# Patient Record
Sex: FEMALE | Race: WHITE | Employment: OTHER | URBAN - METROPOLITAN AREA
[De-identification: names, ages, dates, MRNs, and addresses within clinical notes are randomized per-mention and may not be internally consistent; named-entity substitution may affect disease eponyms.]

---

## 2017-01-02 ENCOUNTER — CARE COORDINATION (OUTPATIENT)
Dept: CARDIOLOGY | Facility: CLINIC | Age: 67
End: 2017-01-02

## 2017-01-02 DIAGNOSIS — I48.91 ATRIAL FIBRILLATION (H): Primary | ICD-10-CM

## 2017-01-02 RX ORDER — METOPROLOL SUCCINATE 25 MG/1
25 TABLET, EXTENDED RELEASE ORAL DAILY
Qty: 30 TABLET | Refills: 11 | Status: SHIPPED | OUTPATIENT
Start: 2017-01-02 | End: 2017-08-16

## 2017-08-15 ENCOUNTER — PRE VISIT (OUTPATIENT)
Dept: CARDIOLOGY | Facility: CLINIC | Age: 67
End: 2017-08-15

## 2017-08-15 DIAGNOSIS — I48.91 ATRIAL FIBRILLATION (H): Primary | ICD-10-CM

## 2017-08-16 ENCOUNTER — OFFICE VISIT (OUTPATIENT)
Dept: CARDIOLOGY | Facility: CLINIC | Age: 67
End: 2017-08-16
Attending: INTERNAL MEDICINE
Payer: COMMERCIAL

## 2017-08-16 VITALS
WEIGHT: 128.9 LBS | HEART RATE: 93 BPM | DIASTOLIC BLOOD PRESSURE: 85 MMHG | HEIGHT: 63 IN | BODY MASS INDEX: 22.84 KG/M2 | OXYGEN SATURATION: 97 % | SYSTOLIC BLOOD PRESSURE: 120 MMHG

## 2017-08-16 DIAGNOSIS — I48.19 PERSISTENT ATRIAL FIBRILLATION (H): Primary | ICD-10-CM

## 2017-08-16 PROCEDURE — 93005 ELECTROCARDIOGRAM TRACING: CPT | Mod: ZF

## 2017-08-16 PROCEDURE — 93010 ELECTROCARDIOGRAM REPORT: CPT | Mod: ZP | Performed by: INTERNAL MEDICINE

## 2017-08-16 PROCEDURE — 99213 OFFICE O/P EST LOW 20 MIN: CPT | Mod: ZF

## 2017-08-16 PROCEDURE — 99214 OFFICE O/P EST MOD 30 MIN: CPT | Mod: ZP | Performed by: INTERNAL MEDICINE

## 2017-08-16 RX ORDER — METOPROLOL SUCCINATE 25 MG/1
25 TABLET, EXTENDED RELEASE ORAL PRN
Qty: 30 TABLET | Refills: 1 | Status: SHIPPED | OUTPATIENT
Start: 2017-08-16 | End: 2018-01-12

## 2017-08-16 ASSESSMENT — PAIN SCALES - GENERAL: PAINLEVEL: NO PAIN (0)

## 2017-08-16 NOTE — MR AVS SNAPSHOT
After Visit Summary   8/16/2017    Cordell Donaldson    MRN: [id number]           Patient Information     Date Of Birth          1950        Visit Information        Provider Department      8/16/2017 12:30 PM Elise Huitron MD [facility]        Today's Diagnoses     Persistent atrial fibrillation (H)    -  1      Care Instructions    Patient Instructions:  It was a pleasure to see you in the cardiology clinic today.      If you have any questions, you can reach my nurse, Mendy Boo, at [phone].  Press Option #1 for the [facility], and then press Option #3 for nursing.  We are encouraging the use of [employer] to communicate with your HealthCare Provider    Note the new medications: metoprolol 25 mg by mouth prn for palpitations  Stop the following medications: none    Follow the American Heart Association Diet and Lifestyle recommendations:  Limit saturated fat, trans fat, sodium, red meat, sweets and sugar-sweetened beverages. If you choose to eat red meat, compare labels and select the leanest cuts available.  Aim for at least 150 minutes of moderate physical activity or 75 minutes of vigorous physical activity - or an equal combination of both - each week.    The results from today include: none   Please follow up with Dr. Elise Huitron    Sincerely,    Elise Huitron MD     If you have an urgent need after hours (8:00 am to 4:30 pm) please call [phone] and ask for the cardiology fellow on call.                    Follow-ups after your visit        Who to contact     If you have questions or need follow up information about today's clinic visit or your schedule please contact [facility] directly at [phone].  Normal or non-critical lab and imaging results will be communicated to you by MyChart, letter or phone within 4 business days after the clinic has received the results. If you do not hear from us within 7 days, please contact the  "clinic through [employer] or phone. If you have a critical or abnormal lab result, we will notify you by phone as soon as possible.  Submit refill requests through [employer] or call your pharmacy and they will forward the refill request to us. Please allow 3 business days for your refill to be completed.          Additional Information About Your Visit        [employer] Information     [employer] gives you secure access to your electronic health record. If you see a primary care provider, you can also send messages to your care team and make appointments. If you have questions, please call your primary care clinic.  If you do not have a primary care provider, please call [phone] and they will assist you.        Care EveryWhere ID     This is your Care EveryWhere ID. This could be used by other organizations to access your [facility] medical records  [id number]        Your Vitals Were     Pulse Height Pulse Oximetry BMI (Body Mass Index)          93 1.6 m (5' 3\") 97% 22.83 kg/m2         Blood Pressure from Last 3 Encounters:   08/16/17 120/85   11/07/16 123/81   10/03/16 115/77    Weight from Last 3 Encounters:   08/16/17 58.5 kg (128 lb 14.4 oz)   11/07/16 59.4 kg (130 lb 14.4 oz)   10/03/16 59.8 kg (131 lb 12.8 oz)              We Performed the Following     EKG 12-lead, tracing only (Future)          Today's Medication Changes          These changes are accurate as of: 8/16/17  3:12 PM.  If you have any questions, ask your nurse or doctor.               These medicines have changed or have updated prescriptions.        Dose/Directions    metoprolol 25 MG 24 hr tablet   Commonly known as:  TOPROL-XL   This may have changed:    - when to take this  - reasons to take this  - additional instructions   Changed by:  Elise Huitron MD        Dose:  25 mg   Take 1 tablet (25 mg) by mouth as needed Take as needed   Quantity:  30 tablet   Refills:  1            Where to get your medicines      Some of these will need a paper " prescription and others can be bought over the counter.  Ask your nurse if you have questions.     Bring a paper prescription for each of these medications     metoprolol 25 MG 24 hr tablet                Primary Care Provider Office Phone # Fax #    Bess Paredes [phone]       [address] [address]        Equal Access to Services     MARIAHKaiser Permanente Medical CenterJARRET : Hadii aad ku hadasho Soomaali, waaxda luqadaha, qaybta kaalmada adeegyada, waxay idiin hayaan adeeg khamairanish laraghav . So [facility] [phone].    ATENCIÓN: Si habla español, tiene a dill disposición servicios gratuitos de asistencia lingüística. Tiffanieame al [phone].    We comply with applicable federal civil rights laws and [place] laws. We do not discriminate on the basis of race, color, national origin, age, disability sex, sexual orientation or gender identity.            Thank you!     Thank you for choosing [facility]  for your care. Our goal is always to provide you with excellent care. Hearing back from our patients is one way we can continue to improve our services. Please take a few minutes to complete the written survey that you may receive in the mail after your visit with us. Thank you!             Your Updated Medication List - Protect others around you: Learn how to safely use, store and throw away your medicines at www.disposemymeds.org.          This list is accurate as of: 8/16/17  3:12 PM.  Always use your most recent med list.                   Brand Name Dispense Instructions for use Diagnosis    Acetylcarnitine HCl 250 MG Caps      Take 1,000 mg by mouth        CO Q 10 PO           Efraín Cota 550 MG Caps      Take 1 capsule by mouth 3 times daily        MAGNESIUM OXIDE PO      Take 500 mg by mouth        metoprolol 25 MG 24 hr tablet    TOPROL-XL    30 tablet    Take 1 tablet (25 mg) by mouth as needed Take as needed        Nattokinase 100 MG Caps      Take 1 capsule by mouth daily         OMEGA-3 FISH OIL PO      Take 1,000 mg by mouth        Selenium 200 MCG Caps      Take 200 mg by mouth        TAURINE PO      Take 3,000 mg by mouth        VITAMIN B-1 PO      Take 300 mg by mouth

## 2017-08-16 NOTE — LETTER
"8/16/2017      RE: Cordell Donaldson  [address]  [facility] [address]       Dear Colleague,    Thank you for the opportunity to participate in the care of your patient, Cordell Donaldson, at the [facility]. Please see a copy of my visit note below.    CARDIOLOGY Followup  HPI: Cordell Donaldson is a 67 year old woman with a history of breast cancer treated with lumpectomy and radiation who we first saw October 2016 for newly diagnosed, asymptomatic atrial fibrillation.  She declined cardioversion because she was undecided about taking anticoagulation and was also planning a trip to [place] with her .  We prescribed Rivaroxaban for CVA prophylaxis (CHADS2 Vasc = 2) and she started taking it, somewhat reluctantly.  We also prescribed prn metoprolol for symptomatic tachycardia which she has not taken.   Since our last visit with us in November 2016, she has had only one episode of palpitations after which he took metoprolol this was sometime in July. She has stopped rivbaroxaban. She leads an active lifestyle, biking and traveling regularly. She feels well with no chest pain, dyspnea, peripheral edema, lightheadedness or syncope.      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    EXAMINATION  /85 (BP Location: Left arm, Patient Position: Chair, Cuff Size: Adult Regular)  Pulse 93  Ht 1.6 m (5' 3\")  Wt 58.5 kg (128 lb 14.4 oz)  SpO2 97%  BMI 22.83 kg/m2  Fit-appearing, well-nourished female in no apparent distress.  Unlabored " breathing.  Alert & oriented fully.  Neck without JVD  CV - Irregularly irregular rhythm but not tachycardic.  Normal S1 and S2.  No murmur or rub.  Warm extremities without edema.  Clear lung fields  Neuro non focal  Skin:  No petechiae/ecchymoses.    Labs & Imaging tests were reviewed:  TSH   Date Value Ref Range Status   10/03/2016 1.64 0.40 - 4.00 mU/L Final     Cardiac data:  ECG today shows a atrial fibrillation at 93 VR        ECHO 10/3/16  The patient's rhythm is atrial fibrillation.  Left ventricular function, chamber size, wall motion, and wall thickness are normal.The EF is 60-65%. Global right ventricular function is normal. A small patent foramen ovale is present.      IMPRESSION & PLAN  68 y/o woman with persistent, asymptomatic atrial fibrillation without rapid ventricular rates.  CHADSVasc score is 2 for age and female gender.  She is unwilling to take anticoagulation but has started a Japanese herbal blood thinner, Nattokinase.  She understands that there is no data on stroke prevention with the herbal medication and also that her annual risk without being on anticoagulation from the atrial fibrillation is about 3%. She is willing to try the low dose metoprolol and return for a revisit in 12 mo.    Hyperlipidemia- based on 2014 lipids, her ASCVD 10-yr risk is 5%. Her lipids should be updated. She will continue to exercise regularly and pay attention to her diet.    Please do not hesitate to contact me if you have any questions/concerns.     Sincerely,     Elise Huitron MD

## 2017-08-16 NOTE — PATIENT INSTRUCTIONS
Patient Instructions:  It was a pleasure to see you in the cardiology clinic today.      If you have any questions, you can reach my nurse, Mendy Boo, at [phone].  Press Option #1 for the [facility], and then press Option #3 for nursing.  We are encouraging the use of [employer] to communicate with your HealthCare Provider    Note the new medications: metoprolol 25 mg by mouth prn for palpitations  Stop the following medications: none    Follow the American Heart Association Diet and Lifestyle recommendations:  Limit saturated fat, trans fat, sodium, red meat, sweets and sugar-sweetened beverages. If you choose to eat red meat, compare labels and select the leanest cuts available.  Aim for at least 150 minutes of moderate physical activity or 75 minutes of vigorous physical activity   or an equal combination of both   each week.    The results from today include: none   Please follow up with Dr. Elise Huitron    Sincerely,    Elise Huitron MD     If you have an urgent need after hours (8:00 am to 4:30 pm) please call [phone] and ask for the cardiology fellow on call.

## 2017-08-16 NOTE — NURSING NOTE
Chief Complaint   Patient presents with     Follow Up For     manage atrial fibrillation/last visit November 2016/EKG     Vitals were taken and medication were reconciled. EKG performed.    Sheeba Barros CMA    12:34 PM

## 2017-08-16 NOTE — PROGRESS NOTES
"CARDIOLOGY Followup  HPI: Cordell Donaldson is a 67 year old woman with a history of breast cancer treated with lumpectomy and radiation who we first saw October 2016 for newly diagnosed, asymptomatic atrial fibrillation.  She declined cardioversion because she was undecided about taking anticoagulation and was also planning a trip to [place] with her .  We prescribed Rivaroxaban for CVA prophylaxis (CHADS2 Vasc = 2) and she started taking it, somewhat reluctantly.  We also prescribed prn metoprolol for symptomatic tachycardia which she has not taken.   Since our last visit with us in November 2016, she has had only one episode of palpitations after which he took metoprolol this was sometime in July. She has stopped rivbaroxaban. She leads an active lifestyle, biking and traveling regularly. She feels well with no chest pain, dyspnea, peripheral edema, lightheadedness or syncope.      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    EXAMINATION  /85 (BP Location: Left arm, Patient Position: Chair, Cuff Size: Adult Regular)  Pulse 93  Ht 1.6 m (5' 3\")  Wt 58.5 kg (128 lb 14.4 oz)  SpO2 97%  BMI 22.83 kg/m2  Fit-appearing, well-nourished female in no apparent distress.  Unlabored breathing.  Alert & oriented fully.  Neck without JVD  CV - Irregularly irregular rhythm but not tachycardic.  Normal S1 and S2.  No murmur or rub.  Warm extremities without edema.  Clear lung fields  Neuro non focal  Skin:  No petechiae/ecchymoses.    Labs & Imaging tests were reviewed:  TSH   Date Value Ref Range Status "   10/03/2016 1.64 0.40 - 4.00 mU/L Final     Cardiac data:  ECG today shows a atrial fibrillation at 93 VR        ECHO 10/3/16  The patient's rhythm is atrial fibrillation.  Left ventricular function, chamber size, wall motion, and wall thickness are normal.The EF is 60-65%. Global right ventricular function is normal. A small patent foramen ovale is present.      IMPRESSION & PLAN  68 y/o woman with persistent, asymptomatic atrial fibrillation without rapid ventricular rates.  CHADSVasc score is 2 for age and female gender.  She is unwilling to take anticoagulation but has started a Japanese herbal blood thinner, Nattokinase.  She understands that there is no data on stroke prevention with the herbal medication and also that her annual risk without being on anticoagulation from the atrial fibrillation is about 3%. She is willing to try the low dose metoprolol and return for a revisit in 12 mo.    Hyperlipidemia- based on 2014 lipids, her ASCVD 10-yr risk is 5%. Her lipids should be updated. She will continue to exercise regularly and pay attention to her diet.    Elise Huitron MD, MS  Staff Cardiologist  Pager: [phone]      Answers for HPI/ROS submitted by the patient on 8/14/2017   General Symptoms: No  Skin Symptoms: No  HENT Symptoms: No  EYE SYMPTOMS: No  HEART SYMPTOMS: No  LUNG SYMPTOMS: No  INTESTINAL SYMPTOMS: No  URINARY SYMPTOMS: No  GYNECOLOGIC SYMPTOMS: No  BREAST SYMPTOMS: No  SKELETAL SYMPTOMS: No  BLOOD SYMPTOMS: No  NERVOUS SYSTEM SYMPTOMS: No  MENTAL HEALTH SYMPTOMS: No

## 2017-08-16 NOTE — NURSING NOTE
Diet: Patient instructed regarding a heart healthy diet, including discussion of reduced fat and sodium intake. Patient demonstrated understanding of this information and agreed to call with further questions or concerns.  Med Reconcile: Reviewed and verified all current medications with the patient. The updated medication list was printed and given to the patient.  Return Appointment: Patient given instructions regarding scheduling next clinic visit. Patient demonstrated understanding of this information and agreed to call with further questions or concerns.  Medication Change: Patient was educated regarding prescribed medication change, including discussion of the indication, administration, side effects, and when to report to MD or RN. Patient demonstrated understanding of this information and agreed to call with further questions or concerns.  Patient stated she understood all health information given and agreed to call with further questions or concerns.'

## 2017-08-17 LAB — INTERPRETATION ECG - MUSE: NORMAL

## 2017-08-22 ENCOUNTER — OFFICE VISIT (OUTPATIENT)
Dept: FAMILY MEDICINE | Facility: CLINIC | Age: 67
End: 2017-08-22
Attending: FAMILY MEDICINE
Payer: COMMERCIAL

## 2017-08-22 VITALS
BODY MASS INDEX: 21.97 KG/M2 | HEART RATE: 112 BPM | SYSTOLIC BLOOD PRESSURE: 128 MMHG | WEIGHT: 124 LBS | DIASTOLIC BLOOD PRESSURE: 73 MMHG | HEIGHT: 63 IN

## 2017-08-22 DIAGNOSIS — Z13.220 SCREENING FOR LIPID DISORDERS: ICD-10-CM

## 2017-08-22 DIAGNOSIS — R10.31 RLQ ABDOMINAL PAIN: Primary | ICD-10-CM

## 2017-08-22 LAB
ANION GAP SERPL CALCULATED.3IONS-SCNC: 11 MMOL/L (ref 3–14)
BASOPHILS # BLD AUTO: 0 10E9/L (ref 0–0.2)
BASOPHILS NFR BLD AUTO: 0.3 %
BUN SERPL-MCNC: 11 MG/DL (ref 7–30)
CALCIUM SERPL-MCNC: 9.1 MG/DL (ref 8.5–10.1)
CHLORIDE SERPL-SCNC: 106 MMOL/L (ref 94–109)
CHOLEST SERPL-MCNC: 226 MG/DL
CO2 SERPL-SCNC: 24 MMOL/L (ref 20–32)
CREAT SERPL-MCNC: 0.71 MG/DL (ref 0.52–1.04)
DIFFERENTIAL METHOD BLD: ABNORMAL
EOSINOPHIL # BLD AUTO: 0 10E9/L (ref 0–0.7)
EOSINOPHIL NFR BLD AUTO: 1 %
ERYTHROCYTE [DISTWIDTH] IN BLOOD BY AUTOMATED COUNT: 13.3 % (ref 10–15)
GFR SERPL CREATININE-BSD FRML MDRD: 83 ML/MIN/1.7M2
GLUCOSE SERPL-MCNC: 101 MG/DL (ref 70–99)
HCT VFR BLD AUTO: 43.4 % (ref 35–47)
HDLC SERPL-MCNC: 64 MG/DL
HGB BLD-MCNC: 14.6 G/DL (ref 11.7–15.7)
IMM GRANULOCYTES # BLD: 0 10E9/L (ref 0–0.4)
IMM GRANULOCYTES NFR BLD: 0 %
LDLC SERPL CALC-MCNC: 143 MG/DL
LYMPHOCYTES # BLD AUTO: 1 10E9/L (ref 0.8–5.3)
LYMPHOCYTES NFR BLD AUTO: 31.6 %
MCH RBC QN AUTO: 28.7 PG (ref 26.5–33)
MCHC RBC AUTO-ENTMCNC: 33.6 G/DL (ref 31.5–36.5)
MCV RBC AUTO: 85 FL (ref 78–100)
MONOCYTES # BLD AUTO: 0.3 10E9/L (ref 0–1.3)
MONOCYTES NFR BLD AUTO: 9.2 %
NEUTROPHILS # BLD AUTO: 1.8 10E9/L (ref 1.6–8.3)
NEUTROPHILS NFR BLD AUTO: 57.9 %
NONHDLC SERPL-MCNC: 162 MG/DL
NRBC # BLD AUTO: 0 10*3/UL
NRBC BLD AUTO-RTO: 0 /100
PLATELET # BLD AUTO: 174 10E9/L (ref 150–450)
POTASSIUM SERPL-SCNC: 4 MMOL/L (ref 3.4–5.3)
RBC # BLD AUTO: 5.08 10E12/L (ref 3.8–5.2)
SODIUM SERPL-SCNC: 141 MMOL/L (ref 133–144)
TRIGL SERPL-MCNC: 97 MG/DL
TSH SERPL DL<=0.005 MIU/L-ACNC: 2.16 MU/L (ref 0.4–4)
WBC # BLD AUTO: 3 10E9/L (ref 4–11)

## 2017-08-22 PROCEDURE — 84443 ASSAY THYROID STIM HORMONE: CPT | Performed by: FAMILY MEDICINE

## 2017-08-22 PROCEDURE — 85025 COMPLETE CBC W/AUTO DIFF WBC: CPT | Performed by: FAMILY MEDICINE

## 2017-08-22 PROCEDURE — 80048 BASIC METABOLIC PNL TOTAL CA: CPT | Performed by: FAMILY MEDICINE

## 2017-08-22 PROCEDURE — 99212 OFFICE O/P EST SF 10 MIN: CPT | Mod: ZF

## 2017-08-22 PROCEDURE — 80061 LIPID PANEL: CPT | Performed by: FAMILY MEDICINE

## 2017-08-22 PROCEDURE — 36415 COLL VENOUS BLD VENIPUNCTURE: CPT | Performed by: FAMILY MEDICINE

## 2017-08-22 ASSESSMENT — PAIN SCALES - GENERAL: PAINLEVEL: MILD PAIN (3)

## 2017-08-22 NOTE — NURSING NOTE
Chief Complaint   Patient presents with     Groin Pain     Pt c/o abdominal and groin pain that started this past weekend.

## 2017-08-22 NOTE — PROGRESS NOTES
Cordell is a 67 year old female who presents with groin pain  HPI:  This last week was busy with going to [employer], events and eating a lot of food [not good quality]    Saturday night [8..17] had stomach pain and bloating. Stayed in bed most of . Back to her regular schedule. Now feeling small amount of residual lower abdominal pain.     Regular BM's    No fever or chills [low grade fever a couple of days ago]  Controlled at fibrillation: no longer on metoprolol. Stopped Xerelto - on nanokinase and will consider baby ASA. [too fearful of medication].   ROS:  General: none  Head/Eyes: none  Ears/Nose/Throat: none  Cardiovascular: none  Respiratory: none  Gastrointestinal: see HPI  Skin: none  Neurological: none  Mental Health: none  Endocrine: none  PROBLEM LIST:  Patient Active Problem List   Diagnosis     Bursal abscess     Vaginal atrophy     HSIL on Pap smear     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)     VAIN III (vaginal intraepithelial neoplasia III)     Dupuytren contracture     Abnormal electrocardiogram     Atrial fibrillation (H)   OB/GYN HISTORY:    2 para 1011. Status post one vaginal delivery.   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 She has never used smokeless tobacco.   Alcohol:  reports that she drinks alcohol.   Drug use:  reports that she does not use illicit drugs.  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        FAMILY HISTORY:  Family History   Problem Relation Age of Onset     Breast Cancer Sister 47      of breast cancer age 63     Breast Cancer Paternal Grandmother    SOCIAL HISTORY:  Social History     Social History     Marital status:      Spouse name: N/A     Number of children: N/A     Years of education: N/A     Occupational History     Not on file.     Social History Main Topics     Smoking status: Former Smoker     Smokeless tobacco: Never Used     Alcohol use Yes      Comment: 2-3 galsses wine monthly     Drug use: No     Sexual activity: Not on file   MEDICATIONS:  Current Outpatient Prescriptions   Medication Sig Dispense Refill     Nattokinase 100 MG CAPS Take 1 capsule by mouth daily       Nogales Berry 550 MG CAPS Take 1 capsule by mouth 3 times daily       metoprolol (TOPROL-XL) 25 MG 24 hr tablet Take 1 tablet (25 mg) by mouth as needed Take as needed 30 tablet 1     TAURINE PO Take 3,000 mg by mouth       Thiamine HCl (VITAMIN B-1 PO) Take 300 mg by mouth       MAGNESIUM OXIDE PO Take 500 mg by mouth       Acetylcarnitine HCl 250 MG CAPS Take 1,000 mg by mouth       Omega-3 Fatty Acids (OMEGA-3 FISH OIL PO) Take 1,000 mg by mouth       Coenzyme Q10 (CO Q 10 PO)        Selenium 200 MCG CAPS Take 200 mg by mouth     ALLERGIES:  Review of patient's allergies indicates no known allergies.  VITALS:  /73  Pulse 112  Ht 1.6 m (5' 3\")  Wt 56.2 kg (124 lb)  BMI 21.97 kg/m2  PHYSICAL EXAM:  Constitutional: Well appearing woman in no acute distress.   Psychological: appropriate mood.  Eyes: anicteric, normal extra-ocular " movements,  pupils are equal and reactive to light.   Ears, Nose and Throat: tympanic membranes clear, nose clear and free of lesions, throat clear, moist mucous membrames, neck supple with full range of motion.    Cardiovascular: irregular irregular rate and rhythm, [At Fib]   Breast: Symmetrical without visible distortion or swelling. No masses noted. No nipple inversion, no breast dimpling or puckering. Axillary area without masses or lympadenapathy.   Gastrointestinal: positive bowel sounds, nontender, no hepatosplenomegaly, no masses. No guarding or rebound.  Lymphatic: no lymphadenopathy.  Musculoskeletal: full range of motion    Skin: no concerning lesions, no jaundice.  Neurological: normal gait, no tremor.   Diagnoses and associated orders for this visit:  RLQ abdominal pain  -     Basic Metabolic Panel; Future  -     CBC with Platelets Differential; Future  -     TSH; Future        -     Consider CT of abd/pelvis with any increase of pain.   Screening for lipid disorders  -     Lipid Panel; Future    Office Visit on 08/22/2017   Component Value Ref Range     Sodium 141  133 - 144 mmol/L     Potassium 4.0  3.4 - 5.3 mmol/L     Chloride 106  94 - 109 mmol/L     Carbon Dioxide 24  20 - 32 mmol/L     Anion Gap 11  3 - 14 mmol/L     Glucose 101* 70 - 99 mg/dL     Urea Nitrogen 11  7 - 30 mg/dL     Creatinine 0.71  0.52 - 1.04 mg/dL     GFR Estimate 83  >60 mL/min/1.7m2    Non African American GFR Calc     GFR Estimate If Black >90  >60 mL/min/1.7m2    African American GFR Calc     Calcium 9.1  8.5 - 10.1 mg/dL     WBC 3.0* 4.0 - 11.0 10e9/L     RBC Count 5.08  3.8 - 5.2 10e12/L     Hemoglobin 14.6  11.7 - 15.7 g/dL     Hematocrit 43.4  35.0 - 47.0 %     MCV 85  78 - 100 fl     MCH 28.7  26.5 - 33.0 pg     MCHC 33.6  31.5 - 36.5 g/dL     RDW 13.3  10.0 - 15.0 %     Platelet Count 174  150 - 450 10e9/L     Diff Method Automated Method       % Neutrophils 57.9  %     % Lymphocytes 31.6  %     % Monocytes 9.2  %      % Eosinophils 1.0  %     % Basophils 0.3  %     % Immature Granulocytes 0.0  %     Nucleated RBCs 0  0 /100     Absolute Neutrophil 1.8  1.6 - 8.3 10e9/L     Absolute Lymphocytes 1.0  0.8 - 5.3 10e9/L     Absolute Monocytes 0.3  0.0 - 1.3 10e9/L     Absolute Eosinophils 0.0  0.0 - 0.7 10e9/L     Absolute Basophils 0.0  0.0 - 0.2 10e9/L     Abs Immature Granulocytes 0.0  0 - 0.4 10e9/L     Absolute Nucleated RBC 0.0       Cholesterol 226* <200 mg/dL    Desirable:       <200 mg/dl     Triglycerides 97  <150 mg/dL     HDL Cholesterol 64  >49 mg/dL     LDL Cholesterol Calculated 143* <100 mg/dL    Comment: Above desirable:  100-129 mg/dl  Borderline High:  130-159 mg/dL  High:             160-189 mg/dL  Very high:       >189 mg/dl       Non HDL Cholesterol 162* <130 mg/dL    Comment: Above Desirable:  130-159 mg/dl  Borderline high:  160-189 mg/dl  High:             190-219 mg/dl  Very high:       >219 mg/dl       TSH 2.16  0.40 - 4.00 mU/L

## 2017-08-22 NOTE — LETTER
Date:August 23, 2017      Patient was self referred, no letter generated. Do not send.        [facility] Information

## 2017-08-22 NOTE — LETTER
2017       RE: Cordell Donaldson  [address]  [facility] [address]     Dear Colleague,    Thank you for referring your patient, Cordell Donaldson, to the [facility] at [facility]. Please see a copy of my visit note below.    Cordell is a 67 year old female who presents with groin pain  HPI:  This last week was busy with going to [place], events and eating a lot of food [not good quality]    Saturday night [8.19.17] had stomach pain and bloating. Stayed in bed most of . Back to her regular schedule. Now feeling small amount of residual lower abdominal pain.     Regular BM's    No fever or chills [low grade fever a couple of days ago]  Controlled at fibrillation: no longer on metoprolol. Stopped Xerelto - on nanokinase and will consider baby ASA. [too fearful of medication].   ROS:  General: none  Head/Eyes: none  Ears/Nose/Throat: none  Cardiovascular: none  Respiratory: none  Gastrointestinal: see HPI  Skin: none  Neurological: none  Mental Health: none  Endocrine: none  PROBLEM LIST:  Patient Active Problem List   Diagnosis     Bursal abscess     Vaginal atrophy     HSIL on Pap smear     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)     VAIN III (vaginal intraepithelial neoplasia III)     Dupuytren contracture     Abnormal electrocardiogram     Atrial fibrillation (H)   OB/GYN HISTORY:    2 para 1011. Status post one vaginal delivery.   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 She has never used smokeless tobacco.   Alcohol:  reports  that she drinks alcohol.   Drug use:  reports that she does not use illicit drugs.  Exercise:                    Diet:   PAST SURGICAL HISTORY:  Past Surgical History:   Procedure Laterality Date     BIOPSY       BREAST SURGERY       Colposcopy Cervix with Loop Electrode Conization and Lser to Vagina       COLPOSCOPY, BIOPSY, COMBINED  10/24/2012    Procedure: COMBINED COLPOSCOPY, BIOPSY;  Colposcopy, Biopsy of Cervix and Vagina, Ultrasound Guidance;  Surgeon: Colette Ng MD;  Location: [facility] OR     GYN SURGERY      laparotomy with FRANCIA, 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        FAMILY HISTORY:  Family History   Problem Relation Age of Onset     Breast Cancer Sister 47      of breast cancer age 63     Breast Cancer Paternal Grandmother    SOCIAL HISTORY:  Social History     Social History     Marital status:      Spouse name: N/A     Number of children: N/A     Years of education: N/A     Occupational History     Not on file.     Social History Main Topics     Smoking status: Former Smoker     Smokeless tobacco: Never Used     Alcohol use Yes      Comment: 2-3 galsses wine monthly     Drug use: No     Sexual activity: Not on file   MEDICATIONS:  Current Outpatient Prescriptions   Medication Sig Dispense Refill     Nattokinase 100 MG CAPS Take 1 capsule by mouth daily       [place] Berry 550 MG CAPS Take 1 capsule by mouth 3 times daily       metoprolol (TOPROL-XL) 25 MG 24 hr tablet Take 1 tablet (25 mg) by mouth as needed Take as needed 30 tablet 1     TAURINE PO Take 3,000 mg by mouth       Thiamine HCl (VITAMIN B-1 PO) Take 300 mg by mouth       MAGNESIUM OXIDE PO Take 500 mg by mouth       Acetylcarnitine HCl 250 MG CAPS Take 1,000 mg by mouth       Omega-3 Fatty Acids (OMEGA-3 FISH OIL PO) Take 1,000 mg by mouth       Coenzyme Q10 (CO Q 10 PO)        Selenium 200 MCG CAPS Take 200 mg by mouth     ALLERGIES:  Review  "of patient's allergies indicates no known allergies.  VITALS:  /73  Pulse 112  Ht 1.6 m (5' 3\")  Wt 56.2 kg (124 lb)  BMI 21.97 kg/m2  PHYSICAL EXAM:  Constitutional: Well appearing woman in no acute distress.   Psychological: appropriate mood.  Eyes: anicteric, normal extra-ocular movements,  pupils are equal and reactive to light.   Ears, Nose and Throat: tympanic membranes clear, nose clear and free of lesions, throat clear, moist mucous membrames, neck supple with full range of motion.    Cardiovascular: irregular irregular rate and rhythm, [At Fib]   Breast: Symmetrical without visible distortion or swelling. No masses noted. No nipple inversion, no breast dimpling or puckering. Axillary area without masses or lympadenapathy.   Gastrointestinal: positive bowel sounds, nontender, no hepatosplenomegaly, no masses. No guarding or rebound.  Lymphatic: no lymphadenopathy.  Musculoskeletal: full range of motion    Skin: no concerning lesions, no jaundice.  Neurological: normal gait, no tremor.   Diagnoses and associated orders for this visit:  RLQ abdominal pain  -     Basic Metabolic Panel; Future  -     CBC with Platelets Differential; Future  -     TSH; Future        -     Consider CT of abd/pelvis with any increase of pain.   Screening for lipid disorders  -     Lipid Panel; Future    Office Visit on 08/22/2017   Component Value Ref Range     Sodium 141  133 - 144 mmol/L     Potassium 4.0  3.4 - 5.3 mmol/L     Chloride 106  94 - 109 mmol/L     Carbon Dioxide 24  20 - 32 mmol/L     Anion Gap 11  3 - 14 mmol/L     Glucose 101* 70 - 99 mg/dL     Urea Nitrogen 11  7 - 30 mg/dL     Creatinine 0.71  0.52 - 1.04 mg/dL     GFR Estimate 83  >60 mL/min/1.7m2    Non African American GFR Calc     GFR Estimate If Black >90  >60 mL/min/1.7m2    African American GFR Calc     Calcium 9.1  8.5 - 10.1 mg/dL     WBC 3.0* 4.0 - 11.0 10e9/L     RBC Count 5.08  3.8 - 5.2 10e12/L     Hemoglobin 14.6  11.7 - 15.7 g/dL     " Hematocrit 43.4  35.0 - 47.0 %     MCV 85  78 - 100 fl     MCH 28.7  26.5 - 33.0 pg     MCHC 33.6  31.5 - 36.5 g/dL     RDW 13.3  10.0 - 15.0 %     Platelet Count 174  150 - 450 10e9/L     Diff Method Automated Method       % Neutrophils 57.9  %     % Lymphocytes 31.6  %     % Monocytes 9.2  %     % Eosinophils 1.0  %     % Basophils 0.3  %     % Immature Granulocytes 0.0  %     Nucleated RBCs 0  0 /100     Absolute Neutrophil 1.8  1.6 - 8.3 10e9/L     Absolute Lymphocytes 1.0  0.8 - 5.3 10e9/L     Absolute Monocytes 0.3  0.0 - 1.3 10e9/L     Absolute Eosinophils 0.0  0.0 - 0.7 10e9/L     Absolute Basophils 0.0  0.0 - 0.2 10e9/L     Abs Immature Granulocytes 0.0  0 - 0.4 10e9/L     Absolute Nucleated RBC 0.0       Cholesterol 226* <200 mg/dL    Desirable:       <200 mg/dl     Triglycerides 97  <150 mg/dL     HDL Cholesterol 64  >49 mg/dL     LDL Cholesterol Calculated 143* <100 mg/dL    Comment: Above desirable:  100-129 mg/dl  Borderline High:  130-159 mg/dL  High:             160-189 mg/dL  Very high:       >189 mg/dl       Non HDL Cholesterol 162* <130 mg/dL    Comment: Above Desirable:  130-159 mg/dl  Borderline high:  160-189 mg/dl  High:             190-219 mg/dl  Very high:       >219 mg/dl       TSH 2.16  0.40 - 4.00 mU/L           Again, thank you for allowing me to participate in the care of your patient.      Sincerely,    Bess Paredes MD

## 2017-08-22 NOTE — MR AVS SNAPSHOT
"              After Visit Summary   8/22/2017    Cordell Donaldson    MRN: [id number]           Patient Information     Date Of Birth          1950        Visit Information        Provider Department      8/22/2017 1:00 PM Bess Paredes MD [facility]        Today's Diagnoses     RLQ abdominal pain    -  1    Screening for lipid disorders           Follow-ups after your visit        Who to contact     Please call your clinic at [phone] to:    Ask questions about your health    Make or cancel appointments    Discuss your medicines    Learn about your test results    Speak to your doctor   If you have compliments or concerns about an experience at your clinic, or if you wish to file a complaint, please contact [facility] Patient Relations at [phone] or email us at [email or web address]         Additional Information About Your Visit        MyChart Information     Act[employer]it gives you secure access to your electronic health record. If you see a primary care provider, you can also send messages to your care team and make appointments. If you have questions, please call your primary care clinic.  If you do not have a primary care provider, please call [phone] and they will assist you.      Novus is an electronic gateway that provides easy, online access to your medical records. With Novus, you can request a clinic appointment, read your test results, renew a prescription or communicate with your care team.     To access your existing account, please contact your [facility] Clinic or call [phone] for assistance.        Care EveryWhere ID     This is your Care EveryWhere ID. This could be used by other organizations to access your [facility] medical records  [id number]        Your Vitals Were     Pulse Height BMI (Body Mass Index)             112 1.6 m (5' 3\") 21.97 kg/m2          Blood Pressure from Last 3 " Encounters:   08/22/17 128/73   08/16/17 120/85   11/07/16 123/81    Weight from Last 3 Encounters:   08/22/17 56.2 kg (124 lb)   08/16/17 58.5 kg (128 lb 14.4 oz)   11/07/16 59.4 kg (130 lb 14.4 oz)              We Performed the Following     Basic Metabolic Panel     CBC with Platelets Differential     Lipid Panel     TSH        Primary Care Provider Office Phone # Fax #    Bess Paredes [phone]       [address] [address]        Equal Access to Services     [facility]: Hadii loreta saunders hadmemo Soshan, wanicole sequeira, oumar neville, concha deng . So [facility] [phone].    ATENCIÓN: Si habla español, tiene a dill disposición servicios gratuitos de asistencia lingüística. Ll[facility] [phone].    We comply with applicable federal civil rights laws and [place] laws. We do not discriminate on the basis of race, color, national origin, age, disability sex, sexual orientation or gender identity.            Thank you!     Thank you for choosing [facility]  for your care. Our goal is always to provide you with excellent care. Hearing back from our patients is one way we can continue to improve our services. Please take a few minutes to complete the written survey that you may receive in the mail after your visit with us. Thank you!             Your Updated Medication List - Protect others around you: Learn how to safely use, store and throw away your medicines at www.disposemymeds.org.          This list is accurate as of: 8/22/17  3:27 PM.  Always use your most recent med list.                   Brand Name Dispense Instructions for use Diagnosis    Acetylcarnitine HCl 250 MG Caps      Take 1,000 mg by mouth        CO Q 10 PO           Efraín Cota 550 MG Caps      Take 1 capsule by mouth 3 times daily        MAGNESIUM OXIDE PO      Take 500 mg by mouth        metoprolol 25 MG 24 hr tablet    TOPROL-XL    30  tablet    Take 1 tablet (25 mg) by mouth as needed Take as needed        Nattokinase 100 MG Caps      Take 1 capsule by mouth daily        OMEGA-3 FISH OIL PO      Take 1,000 mg by mouth        Selenium 200 MCG Caps      Take 200 mg by mouth        TAURINE PO      Take 3,000 mg by mouth        VITAMIN B-1 PO      Take 300 mg by mouth

## 2018-01-03 NOTE — PROGRESS NOTES
[facility]  [address], [address]  [facility] [place] [address]  [phone]  Dept: [phone]    PRE-OP EVALUATION:  Today's date: 2018    Cordell Donaldson (: 1950) presents for pre-operative evaluation assessment as requested by Dr. Kun Bianchi.  She requires evaluation and anesthesia risk assessment prior to undergoing surgery/procedure for treatment of hearing loss in right ear. Proposed procedure: Stapedectomy.    Date of Surgery/ Procedure: 2017  Time of Surgery/ Procedure: 7:30 AM  Hospital/Surgical Facility: [facility]  Fax number for surgical facility: [phone]  Primary Physician: Bess Paredes  Type of Anesthesia Anticipated: to be determined    Patient has a Health Care Directive or Living Will:  YES [scanned]    HPI:                                                      Progressive right hearing loss and has been advised to have stapedectomy - by Dr. Bibi Bianchi.  ANW on 2018.    MEDICAL HISTORY:                                                      Patient Active Problem List    Diagnosis Date Noted     Atrial fibrillation (H) 10/03/2016     Priority: Medium     Abnormal electrocardiogram 2016     Priority: Medium     Dupuytren contracture 2014     Priority: Medium     VAIN III (vaginal intraepithelial neoplasia III) 2013     Priority: Medium     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     Priority: Medium     HSIL on Pap smear 10/12/2012     Priority: Medium     Problem list name updated by automated process. Provider to review and confirm       Bursal abscess 2012     Priority: Medium     Elbow tenderness on palpation olecranon bursa right       Vaginal atrophy 2012     Priority: Medium     Vaginal Mucosa Atrophy        Past Medical History:   Diagnosis Date     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      History of breast  cancer 2003    lumpectomy and radiation offerred chemo and patient declined at time but had oncogene test and low risk     Hyperlipidemia LDL goal < 160      Osteopenia      Severe vaginal dysplasia      Past Surgical History:   Procedure Laterality Date     BIOPSY       BREAST SURGERY       Colposcopy Cervix with Loop Electrode Conization and Lser to Vagina  2008     COLPOSCOPY, BIOPSY, COMBINED  10/24/2012    Procedure: COMBINED COLPOSCOPY, BIOPSY;  Colposcopy, Biopsy of Cervix and Vagina, Ultrasound Guidance;  Surgeon: Colette Ng MD;  Location: [facility] OR     GYN SURGERY  12/12    laparotomy with FRANCIA, presumed stage IA1 SCCA of cervix      HYSTERECTOMY, FRANCIA       FRANCIA with [facility] in December 2012.      Current Outpatient Prescriptions   Medication Sig Dispense Refill     [place] Berry 550 MG CAPS Take 1 capsule by mouth 3 times daily       TAURINE PO Take 3,000 mg by mouth       Thiamine HCl (VITAMIN B-1 PO) Take 300 mg by mouth       MAGNESIUM OXIDE PO Take 500 mg by mouth       Acetylcarnitine HCl 250 MG CAPS Take 1,000 mg by mouth       Coenzyme Q10 (CO Q 10 PO)        Selenium 200 MCG CAPS Take 200 mg by mouth       Omega-3 Fatty Acids (OMEGA-3 FISH OIL PO) Take 1,000 mg by mouth       OTC products:Taurice, Benfotiamine, magnesium, acetyl-L-Carnitine, B12, Probiotics  No Known Allergies: Latex Allergy: NO    Social History   Substance Use Topics     Smoking status: Former Smoker     Smokeless tobacco: Never Used     Alcohol use Yes      Comment: 2-3 galsses wine monthly     History   Drug Use No     REVIEW OF SYSTEMS:                                                    C: NEGATIVE for fever, chills, change in weight  E/M: NEGATIVE for ear, mouth and throat problems  R: NEGATIVE for significant cough or SOB  CV: NEGATIVE for chest pain, palpitations or peripheral edema    EXAM:                                                    /76 (BP Location: Left arm, Patient Position: Chair, Cuff Size:  "Adult Regular)  Pulse 114  Temp 97.6  F (36.4  C) (Oral)  Ht 5' 3\" (160 cm)  Wt 132 lb 0.6 oz (59.9 kg)  SpO2 97%  BMI 23.39 kg/m2  GENERAL APPEARANCE: healthy, alert and no distress  HENT: ear canals and TM's normal and nose and mouth without ulcers or lesions  RESP: lungs clear to auscultation - no rales, rhonchi or wheezes  CV: irregular rate and rhythm, normal S1 S2, no S3 or S4 and no murmur, click or rub   ABDOMEN: soft, nontender, no HSM or masses and bowel sounds normal  NEURO: Normal strength and tone, sensory exam grossly normal, mentation intact and speech normal    DIAGNOSTICS:                                                      Recent Labs   Lab Test  08/22/17   1348  10/03/16   1309  09/30/16   1051   HGB  14.6   --   14.8   PLT  174   --   192   NA  141  142   --    POTASSIUM  4.0  3.9   --    CR  0.71  0.81   --       IMPRESSION:                                                    67 year old woman who will proceed with Stapedectomy schedule for 1/23/2018 by Dr[facility] at [facility].     REVISED CARDIAC RISK INDEX: persistent, asymptomatic atrial fibrillation without rapid ventricular rates.  CHADSVasc score is 2 for age and female gender.  Declined anticoagulation, cardioversion and metoprolol. Consulted with Cardiology, Dr. Huitron 8/2017. Does take low dose ASA and fish oil [D/C 1.9.2018].     The patient has the following additional risks for perioperative complications:  -  Persistent Atrial Fibrillation [known 9/2016] without rapid ventricular rate.  Has seen cardiologist and declined cardioversion, anticoagulation and metoprolol.     RECOMMENDATIONS:                                                    Preop general physical exam    - requires cardiology clearance given persistent atrial fibrillation to discuss surgical risk given cardiac status.  She will schedule for a cardiology consult this week.   Signed Electronically by: Bess Paredes MD    Copy of this evaluation report is provided " to requesting physician.    [place] Preop Guidelines

## 2018-01-03 NOTE — PATIENT INSTRUCTIONS
Before Your Surgery      Call your surgeon if there is any change in your health. This includes signs of a cold or flu (such as a sore throat, runny nose, cough, rash or fever).    Do not smoke, drink alcohol or take over the counter medicine (unless your surgeon or primary care doctor tells you to) for the 24 hours before and after surgery.    If you take prescribed drugs: Follow your doctor s orders about which medicines to take and which to stop until after surgery.    Eating and drinking prior to surgery: follow the instructions from your surgeon    Take a shower or bath the night before surgery. Use the soap your surgeon gave you to gently clean your skin. If you do not have soap from your surgeon, use your regular soap. Do not shave or scrub the surgery site.  Wear clean pajamas and have clean sheets on your bed.     [facility]:[phone].      [facility] endoscopy [phone]    [facility]: [phone] endoscopy    [facility]: [phone]    Podiatry:  podiatry:   Dr. Gomes [facility]; [phone];  Johnathan Mack [phone].    medical arts: Rosette Walsh

## 2018-01-12 ENCOUNTER — OFFICE VISIT (OUTPATIENT)
Dept: FAMILY MEDICINE | Facility: CLINIC | Age: 68
End: 2018-01-12
Payer: COMMERCIAL

## 2018-01-12 VITALS
DIASTOLIC BLOOD PRESSURE: 76 MMHG | TEMPERATURE: 97.6 F | SYSTOLIC BLOOD PRESSURE: 109 MMHG | HEIGHT: 63 IN | BODY MASS INDEX: 23.39 KG/M2 | WEIGHT: 132.04 LBS | HEART RATE: 114 BPM | OXYGEN SATURATION: 97 %

## 2018-01-12 DIAGNOSIS — I48.19 PERSISTENT ATRIAL FIBRILLATION (H): ICD-10-CM

## 2018-01-12 DIAGNOSIS — Z12.11 COLON CANCER SCREENING: ICD-10-CM

## 2018-01-12 DIAGNOSIS — Z01.818 PREOP GENERAL PHYSICAL EXAM: Primary | ICD-10-CM

## 2018-01-12 RX ORDER — BACITRACIN ZINC AND POLYMYXIN B SULFATE 500; 10000 [USP'U]/G; [USP'U]/G
OINTMENT TOPICAL EVERY MORNING
COMMUNITY

## 2018-01-12 ASSESSMENT — PAIN SCALES - GENERAL: PAINLEVEL: NO PAIN (0)

## 2018-01-12 NOTE — NURSING NOTE
"67 year old  Chief Complaint   Patient presents with     Pre-Op Exam     Stapedectomy 1/23/2017       Blood pressure 109/76, pulse 114, temperature 97.6  F (36.4  C), temperature source Oral, height 5' 3\" (160 cm), weight 132 lb 0.6 oz (59.9 kg), SpO2 97 %. Body mass index is 23.39 kg/(m^2).  Patient Active Problem List   Diagnosis     Bursal abscess     Vaginal atrophy     HSIL on Pap smear     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)     VAIN III (vaginal intraepithelial neoplasia III)     Dupuytren contracture     Abnormal electrocardiogram     Atrial fibrillation (H)       Wt Readings from Last 2 Encounters:   01/12/18 132 lb 0.6 oz (59.9 kg)   08/22/17 124 lb (56.2 kg)     BP Readings from Last 3 Encounters:   01/12/18 109/76   08/22/17 128/73   08/16/17 120/85         Current Outpatient Prescriptions   Medication     [place] Berry 550 MG CAPS     TAURINE PO     Thiamine HCl (VITAMIN B-1 PO)     MAGNESIUM OXIDE PO     Acetylcarnitine HCl 250 MG CAPS     Coenzyme Q10 (CO Q 10 PO)     Selenium 200 MCG CAPS     Omega-3 Fatty Acids (OMEGA-3 FISH OIL PO)     No current facility-administered medications for this visit.        Social History   Substance Use Topics     Smoking status: Former Smoker     Smokeless tobacco: Never Used     Alcohol use Yes      Comment: 2-3 galsses wine monthly       Health Maintenance Due   Topic Date Due     TETANUS IMMUNIZATION (SYSTEM ASSIGNED)  04/14/1969     HEPATITIS C SCREENING  04/14/1969     ADVANCE DIRECTIVE PLANNING Q5 YRS  04/14/2006     FALL RISK ASSESSMENT  04/14/2016     DEXA SCAN SCREENING (SYSTEM ASSIGNED)  04/14/2016     PNEUMOCOCCAL (1 of 2 - PCV13) 04/14/2016     COLONOSCOPY Q10 YR  04/24/2016     INFLUENZA VACCINE (SYSTEM ASSIGNED)  09/01/2017       Lab Results   Component Value Date    PAP NIL 09/30/2016         Sarah German CMA  January 12, 2018 2:30 PM  "

## 2018-01-12 NOTE — MR AVS SNAPSHOT
After Visit Summary   1/12/2018    Cordell Donaldson    MRN: [id number]           Patient Information     Date Of Birth          1950        Visit Information        Provider Department      1/12/2018 2:20 PM Bess Paredes MD [facility]        Today's Diagnoses     Preop general physical exam    -  1      Care Instructions      Before Your Surgery      Call your surgeon if there is any change in your health. This includes signs of a cold or flu (such as a sore throat, runny nose, cough, rash or fever).    Do not smoke, drink alcohol or take over the counter medicine (unless your surgeon or primary care doctor tells you to) for the 24 hours before and after surgery.    If you take prescribed drugs: Follow your doctor s orders about which medicines to take and which to stop until after surgery.    Eating and drinking prior to surgery: follow the instructions from your surgeon    Take a shower or bath the night before surgery. Use the soap your surgeon gave you to gently clean your skin. If you do not have soap from your surgeon, use your regular soap. Do not shave or scrub the surgery site.  Wear clean pajamas and have clean sheets on your bed.     [facility]:[phone].      [facility] endoscopy [phone]    [facility]: [phone] endoscopy    [facility]: [phone]    Podiatry:    podiatry:   Dr. Gomes [facility]; [phone];  Johnathan Mack [phone].    medical arts: Rosette Walsh              Follow-ups after your visit        Who to contact     Please call your clinic at [phone] to:    Ask questions about your health    Make or cancel appointments    Discuss your medicines    Learn about your test results    Speak to your doctor   If you have compliments or concerns about an experience at your clinic, or if you wish to file a complaint, please contact [facility] Patient Relations at [phone] or email us at  [email or web address]         Additional Information About Your Visit        [employer] Information     [employer] gives you secure access to your electronic health record. If you see a primary care provider, you can also send messages to your care team and make appointments. If you have questions, please call your primary care clinic.  If you do not have a primary care provider, please call [phone] and they will assist you.      NCLC is an electronic gateway that provides easy, online access to your medical records. With [employer]CLC, you can request a clinic appointment, read your test results, renew a prescription or communicate with your care team.     To access your existing account, please contact your [facility] or call [phone] for assistance.        Care EveryWhere ID     This is your Care EveryWhere ID. This could be used by other organizations to access your [place] medical records  [id number]        Your Vitals Were     Pulse Temperature Height Pulse Oximetry BMI (Body Mass Index)       114 97.6  F (36.4  C) (Oral) 5' 3\" (160 cm) 97% 23.39 kg/m2        Blood Pressure from Last 3 Encounters:   01/12/18 109/76   08/22/17 128/73   08/16/17 120/85    Weight from Last 3 Encounters:   01/12/18 132 lb 0.6 oz (59.9 kg)   08/22/17 124 lb (56.2 kg)   08/16/17 128 lb 14.4 oz (58.5 kg)              Today, you had the following     No orders found for display       Primary Care Provider Office Phone # Fax #    Bess Paredes [phone]       [address] [place] [address]        Equal Access to Services     ERICA NIELSEN : Hadii loreta saunders hadmemo Soshan, waaxda luqadaha, qaybta kaalmaconcha downey. So [facility] [phone].    ATENCIÓN: Si habla español, tiene a dill disposición servicios gratuitos de asistencia lingüística. Llame al [phone].    We comply with applicable federal civil rights laws " and [place] laws. We do not discriminate on the basis of race, color, national origin, age, disability, sex, sexual orientation, or gender identity.            Thank you!     Thank you for choosing [facility]  for your care. Our goal is always to provide you with excellent care. Hearing back from our patients is one way we can continue to improve our services. Please take a few minutes to complete the written survey that you may receive in the mail after your visit with us. Thank you!             Your Updated Medication List - Protect others around you: Learn how to safely use, store and throw away your medicines at www.disposemymeds.org.          This list is accurate as of: 1/12/18  3:00 PM.  Always use your most recent med list.                   Brand Name Dispense Instructions for use Diagnosis    Acetylcarnitine HCl 250 MG Caps      Take 1,000 mg by mouth        CO Q 10 PO           Efraín Cota 550 MG Caps      Take 1 capsule by mouth 3 times daily        MAGNESIUM OXIDE PO      Take 500 mg by mouth        OMEGA-3 FISH OIL PO      Take 1,000 mg by mouth        Selenium 200 MCG Caps      Take 200 mg by mouth        TAURINE PO      Take 3,000 mg by mouth        VITAMIN B-1 PO      Take 300 mg by mouth

## 2018-01-15 ENCOUNTER — PRE VISIT (OUTPATIENT)
Dept: CARDIOLOGY | Facility: CLINIC | Age: 68
End: 2018-01-15

## 2018-01-15 DIAGNOSIS — Z01.810 PREOPERATIVE CARDIOVASCULAR EXAMINATION: Primary | ICD-10-CM

## 2018-01-15 DIAGNOSIS — Z53.20 ANTICOAGULANT DRUG DECLINED: ICD-10-CM

## 2018-01-15 DIAGNOSIS — Z86.79 HISTORY OF ATRIAL FIBRILLATION: ICD-10-CM

## 2018-01-15 ASSESSMENT — ENCOUNTER SYMPTOMS
CHILLS: 0
ALTERED TEMPERATURE REGULATION: 0
EXERCISE INTOLERANCE: 0
ORTHOPNEA: 0
NIGHT SWEATS: 0
HYPERTENSION: 0
SNORES LOUDLY: 0
LEG PAIN: 0
LIGHT-HEADEDNESS: 0
WEIGHT LOSS: 0
HALLUCINATIONS: 0
POSTURAL DYSPNEA: 0
DECREASED APPETITE: 0
COUGH: 0
COUGH DISTURBING SLEEP: 0
SHORTNESS OF BREATH: 0
FATIGUE: 0
SLEEP DISTURBANCES DUE TO BREATHING: 0
DYSPNEA ON EXERTION: 0
HYPOTENSION: 0
POLYDIPSIA: 0
WHEEZING: 0
SPUTUM PRODUCTION: 0
INCREASED ENERGY: 0
WEIGHT GAIN: 0
FEVER: 0
POLYPHAGIA: 0
PALPITATIONS: 1
SYNCOPE: 0
HEMOPTYSIS: 0

## 2018-01-16 ENCOUNTER — TELEPHONE (OUTPATIENT)
Dept: CARDIOLOGY | Facility: CLINIC | Age: 68
End: 2018-01-16

## 2018-01-16 NOTE — TELEPHONE ENCOUNTER
"Pharmacy would like to refill metoprolol succ er 25 mg tab     Not on pt med list     [facility] pharmacy   [phone]  [place] [address]    Please refer to Gali Delgado's 1/17/18 note:     She also carries a \"pill in the pocket\" from Dr. Huitron which is Toprol XL 25 mg and has not taken any since at least August 2017. She presents to the clinic today unaccompanied.     This is a 67 year old generally healthy patient with a past medical history significant for atrial fibrillation. She is stable from a cardiovascular disease standpoint with no evidence of unstable angina, decompensated heart failure and known atrial fibrillation. She can achieve > 10 METS without difficulty. Medications were reviewed and the patient has been on single anticoagulation therapy w/ Aspirin as she has declined Xarelto in the past due to her active lifestyle. Her CHADSVASc score is 2 which equates to an annual stroke risk of 3.2 % which would be reduced to 0.2 % annually if she were on long term anticoagulation. This does not necessarily increase her perioperative risk of a cardiac related event or increase her risk of stroke related event. I would recommend that with her heart rate of 100 bpm on EKG today that she initiate a beta blocker, previously prescribed by Dr. Huitron in an attempt to reduce her heart rate prior to this elective surgical procedure. The RCRI score is 0, Class I risk w/ 0.4 % risk of major cardiac event      Left message with [facility] and let them know that patient will call them when she needs a refill.      "

## 2018-01-17 ENCOUNTER — TELEPHONE (OUTPATIENT)
Dept: CARDIOLOGY | Facility: CLINIC | Age: 68
End: 2018-01-17

## 2018-01-17 ENCOUNTER — OFFICE VISIT (OUTPATIENT)
Dept: CARDIOLOGY | Facility: CLINIC | Age: 68
End: 2018-01-17
Attending: NURSE PRACTITIONER
Payer: COMMERCIAL

## 2018-01-17 VITALS
OXYGEN SATURATION: 99 % | BODY MASS INDEX: 22.98 KG/M2 | HEIGHT: 63 IN | WEIGHT: 129.7 LBS | HEART RATE: 75 BPM | DIASTOLIC BLOOD PRESSURE: 83 MMHG | SYSTOLIC BLOOD PRESSURE: 119 MMHG

## 2018-01-17 DIAGNOSIS — Z01.810 PREOPERATIVE CARDIOVASCULAR EXAMINATION: ICD-10-CM

## 2018-01-17 DIAGNOSIS — Z86.79 HISTORY OF ATRIAL FIBRILLATION: ICD-10-CM

## 2018-01-17 DIAGNOSIS — Z53.20 ANTICOAGULANT DRUG DECLINED: ICD-10-CM

## 2018-01-17 PROCEDURE — 93005 ELECTROCARDIOGRAM TRACING: CPT | Mod: ZF

## 2018-01-17 PROCEDURE — 99213 OFFICE O/P EST LOW 20 MIN: CPT | Mod: ZP | Performed by: NURSE PRACTITIONER

## 2018-01-17 PROCEDURE — G0463 HOSPITAL OUTPT CLINIC VISIT: HCPCS | Mod: 25,ZF

## 2018-01-17 RX ORDER — METOPROLOL SUCCINATE 25 MG/1
25 TABLET, EXTENDED RELEASE ORAL DAILY
COMMUNITY
End: 2018-01-18

## 2018-01-17 ASSESSMENT — PAIN SCALES - GENERAL: PAINLEVEL: NO PAIN (0)

## 2018-01-17 NOTE — NURSING NOTE
Chief Complaint   Patient presents with     Follow Up For     EKG same day for preop clearance     Vitals were taken and medication were reconciled. EKG performed.    Sheeba Barros CMA    9:22 AM

## 2018-01-17 NOTE — MR AVS SNAPSHOT
After Visit Summary   1/17/2018    Cordell Donaldson    MRN: [id number]           Patient Information     Date Of Birth          1950        Visit Information        Provider Department      1/17/2018 9:15 AM Gali Delgado APRN [facility]        Today's Diagnoses     Preoperative cardiovascular examination        History of atrial fibrillation        Anticoagulant drug declined          Care Instructions    Patient Instructions:    It was a pleasure to see you in the cardiology clinic today.    If you have any questions you can reach our nurse triage line at [phone].  Press Option #1 for the [facility], then press Option #3 for nursing or Option #1 for scheduling. We also encourage the use of [employer] to communicate with your HealthCare Provider    Note new medications: I would encourage you to take Metoprolol (Toprol XL 25 mg) once daily prior to your elective procedure.  Stop the following medications: None.    The results from today include: You are at low risk for any cardiac event related to your upcoming elective procedure. However, you do carry an annual stroke rate of 2.2-3 % annually with Atrial Fibrillation and sole anticoagulation with Aspirin and may have minimally higher stroke risk associated with surgery.    Please follow up with Cardiology in 1 year or as needed.    Sincerely,    HARI MurphyBC                  Follow-ups after your visit        Additional Services     Follow-Up with Cardiologist                 Future tests that were ordered for you today     Open Future Orders        Priority Expected Expires Ordered    Follow-Up with Cardiologist Routine 1/17/2019 1/18/2019 1/17/2018            Who to contact     If you have questions or need follow up information about today's clinic visit or your schedule please contact [facility] directly at [phone].  Normal or non-critical lab and imaging results will be  "communicated to you by MyChart, letter or phone within 4 business days after the clinic has received the results. If you do not hear from us within 7 days, please contact the clinic through [facility] or phone. If you have a critical or abnormal lab result, we will notify you by phone as soon as possible.  Submit refill requests through [facility] or call your pharmacy and they will forward the refill request to us. Please allow 3 business days for your refill to be completed.          Additional Information About Your Visit        [facility] Information     [facility] gives you secure access to your electronic health record. If you see a primary care provider, you can also send messages to your care team and make appointments. If you have questions, please call your primary care clinic.  If you do not have a primary care provider, please call [phone] and they will assist you.        Care EveryWhere ID     This is your Care EveryWhere ID. This could be used by other organizations to access your [place] medical records  [id number]        Your Vitals Were     Pulse Height Pulse Oximetry BMI (Body Mass Index)          75 1.6 m (5' 3\") 99% 22.98 kg/m2         Blood Pressure from Last 3 Encounters:   01/17/18 119/83   01/12/18 109/76   08/22/17 128/73    Weight from Last 3 Encounters:   01/17/18 58.8 kg (129 lb 11.2 oz)   01/12/18 59.9 kg (132 lb 0.6 oz)   08/22/17 56.2 kg (124 lb)              We Performed the Following     EKG 12-lead, tracing only        Primary Care Provider Office Phone # Fax #    Bess Paredes [phone]       [address] [facility] [address]  [facility] [address]        Equal Access to Services     [facility]: Hadii loreta ann Soshan, wakanwalda luqadaha, qaybta kaalmada jamin, concha deng . So [facility] [phone].    ATENCIÓN: Si habla español, tiene a dill disposición servicios gratuitos de asistencia lingüística. Llame al [phone].    We comply with " applicable federal civil rights laws and [place] laws. We do not discriminate on the basis of race, color, national origin, age, disability, sex, sexual orientation, or gender identity.            Thank you!     Thank you for choosing [facility]  for your care. Our goal is always to provide you with excellent care. Hearing back from our patients is one way we can continue to improve our services. Please take a few minutes to complete the written survey that you may receive in the mail after your visit with us. Thank you!             Your Updated Medication List - Protect others around you: Learn how to safely use, store and throw away your medicines at www.disposemymeds.org.          This list is accurate as of: 1/17/18 10:02 AM.  Always use your most recent med list.                   Brand Name Dispense Instructions for use Diagnosis    Acetylcarnitine HCl 250 MG Caps      Take 500 mg by mouth 2 times daily        ASPIRIN ADULT LOW STRENGTH PO      Take 20 mg by mouth daily        B-12 PO      Take 1,600 mg by mouth daily        BENFOTIAMINE PO      Take 150 mg by mouth 2 times daily        CO Q 10 PO      Take 100 mg by mouth daily        Efraín Cota 550 MG Caps      Take 1,200 mg by mouth 3 times daily        MAGNESIUM OXIDE PO      Take 500 mg by mouth        OMEGA-3 FISH OIL PO      Take 630 mg by mouth 2 times daily        PROBIOTIC FORMULA 1-250 BILLION-MG Caps      Take by mouth three times a week 25+billion CFUS        Selenium 200 MCG Caps      Take 200 mg by mouth        TAURINE PO      Take 3,000 mg by mouth        TOPROL XL 25 MG 24 hr tablet   Generic drug:  metoprolol succinate      Take 25 mg by mouth daily        * UNABLE TO FIND      5 PELLET 4 times daily MEDICATION NAME: Homeopathic Thiosinaminum        * UNABLE TO FIND           VITAMIN B-1 PO      Take 300 mg by mouth        VITAMIN D-3 PO      Take 5,000 Units by mouth daily        * Notice:  This list has 2 medication(s) that  are the same as other medications prescribed for you. Read the directions carefully, and ask your doctor or other care provider to review them with you.

## 2018-01-17 NOTE — TELEPHONE ENCOUNTER
Patient calling providing a fax number for Gali Delgado to fax pre op form to [facility] for patient's upcoming surgery - [phone].  No need to call patient

## 2018-01-17 NOTE — LETTER
"1/17/2018      RE: Cordell Donaldson  [address]  [facility] [address]       Dear Colleague,    Thank you for the opportunity to participate in the care of your patient, Cordell Donaldson, at the [facility] at [facility]. Please see a copy of my visit note below.    HPI: Ms. Donaldson is a 67 year old female patient of Dr. Huitron who was last evaluated in clinic 11/2016. The patient has a history of breast cancer treated w/ lumpectomy and radiation who has a recent diagnosis of atrial fibrillation. She has previously declined cardioversion and was undecided about taking anticoagulation but did end up taking Rivaroxaban for a short while and then self discontinued this medication and started a Japanese herbal blood thinner which was against recommendations of Dr. Huitron. Her CHADSVasc Score is 2 for age and female gender w/ an annual stroke risk of ~ 3 %.     Ms. Donaldson is quite active and does Tomás three times per week and has also been taking swimming lessons. She walks frequently as well. She has not had any chest pain, pressure or tightness. She has not had any shortness of breath, orthopnea or PND. She has not had any pre or juany syncope. She has some rare palpitations of she sleeps on her left side but not associated w/ exercise. She has not had any LE edema or weight gain. She denies any history of PE/DVT or stroke. She has no other specific cardiopulmonary concerns today. She states that she does not wish to take Xarelto or other blood thinner because of her active lifestyle and that she also has not taken Metoprolol because it blunts her heart rate response related to exercise and causes mild fatigue. She has been taking ASA daily for treatment of Afib but has been off of this since Tuesday in anticipation of her elective surgery. She also carries a \"pill in the pocket\" from Dr. Huitron which is Toprol XL 25 mg and has not taken any since at least August " . She presents to the clinic today unaccompanied.     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      History of breast cancer     lumpectomy and radiation offerred chemo and patient declined at time but had oncogene test and low risk     Hyperlipidemia LDL goal < 160      Osteopenia      Severe vaginal dysplasia        Past Surgical History:  Past Surgical History:   Procedure Laterality Date     BIOPSY       BREAST SURGERY       Colposcopy Cervix with Loop Electrode Conization and Lser to Vagina       COLPOSCOPY, BIOPSY, COMBINED  10/24/2012    Procedure: COMBINED COLPOSCOPY, BIOPSY;  Colposcopy, Biopsy of Cervix and Vagina, Ultrasound Guidance;  Surgeon: Colette Ng MD;  Location: [facility] OR     GYN SURGERY      laparotomy with FRANCIA, 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        Social History:  Social History   Substance Use Topics     Smoking status: Former Smoker     Packs/day: 0.50     Years: 15.00     Smokeless tobacco: Never Used     Alcohol use Yes      Comment: Socially        Family History:  Family History   Problem Relation Age of Onset     Breast Cancer Sister 47      of breast cancer age 63     Breast Cancer Paternal Grandmother      Myocardial Infarction Mother 73     HEART DISEASE Father 68       Medications:  Current Outpatient Prescriptions   Medication Sig     UNABLE TO FIND      metoprolol succinate (TOPROL XL) 25 MG 24 hr tablet Take 25 mg by mouth daily     BENFOTIAMINE PO Take 150 mg by mouth 2 times daily     Cholecalciferol (VITAMIN D-3 PO) Take 5,000 Units by mouth daily     Cyanocobalamin (B-12 PO) Take 1,600 mg by mouth daily     Bacillus Coagulans-Inulin (PROBIOTIC FORMULA) 1-250 BILLION-MG CAPS Take by mouth three times a week 25+billion CFUS     UNABLE TO FIND 5 PELLET 4 times daily MEDICATION  "NAME: Homeopathic Thiosinaminum     Efraín Berry 550 MG CAPS Take 1,200 mg by mouth 3 times daily      TAURINE PO Take 3,000 mg by mouth     Thiamine HCl (VITAMIN B-1 PO) Take 300 mg by mouth     MAGNESIUM OXIDE PO Take 500 mg by mouth     Acetylcarnitine HCl 250 MG CAPS Take 500 mg by mouth 2 times daily      Coenzyme Q10 (CO Q 10 PO) Take 100 mg by mouth daily      Selenium 200 MCG CAPS Take 200 mg by mouth     ASPIRIN ADULT LOW STRENGTH PO Take 20 mg by mouth daily     Omega-3 Fatty Acids (OMEGA-3 FISH OIL PO) Take 630 mg by mouth 2 times daily      No current facility-administered medications for this visit.        Review of Systems:  Constitutional: No fever, chills, or sweats. No weight gain/loss   ENT: No visual disturbance, ear ache, epistaxis, sore throat  Allergies/Immunologic: Negative.   Respiratory: No cough, hemoptysia  Cardiovascular: As per HPI  GI: No nausea, vomiting, hematemesis, melena, or hematochezia  : No urinary frequency, dysuria, or hematuria  Integument: Negative  Psychiatric: Negative  Neuro: Negative  Endocrinology: Negative   Musculoskeletal: Negative    Physical Exam:   /83 (BP Location: Left arm, Patient Position: Chair, Cuff Size: Adult Small)  Pulse 75  Ht 1.6 m (5' 3\")  Wt 58.8 kg (129 lb 11.2 oz)  SpO2 99%  BMI 22.98 kg/m2  In general, the patient is a pleasant female in no apparent distress.    HEENT: NC/AT.  PERRLA.  EOMI.  Sclerae white, not injected.  Nares clear.  Pharynx without erythema or exudate.  Dentition intact.    Neck: No adenopathy.  No thyromegaly. Carotids +4/4 bilaterally without bruits.  No jugular venous distension.   Heart: HR Irreg. Normal S1, S2 splits physiologically. No murmur, rub, click, or gallop. The PMI is in the 5th ICS in the midclavicular line. There is no heave.    Lungs: CTA.  No ronchi, wheezes, rales.  No dullness to percussion.   Abdomen: Soft, nontender, nondistended. No organomegaly.  No bruits.   Extremities: No clubbing, " cyanosis, or edema.  The pulses are +4/4 at the radial, brachial, femoral, popliteal, DP, and PT sites bilaterally.  No bruits are noted.  Neurologic: Alert and oriented to person/place/time, normal speech, gait and affect.  Skin: No petechiae, purpura or rash.    Labs:  LIPID RESULTS:  Lab Results   Component Value Date    CHOL 226 (H) 08/22/2017    HDL 64 08/22/2017     (H) 08/22/2017    TRIG 97 08/22/2017    CHOLHDLRATIO 3.4 09/18/2014    NHDL 162 (H) 08/22/2017       LIVER ENZYME RESULTS:  No results found for: AST, ALT    CBC RESULTS:  Lab Results   Component Value Date    WBC 3.0 (L) 08/22/2017    RBC 5.08 08/22/2017    HGB 14.6 08/22/2017    HCT 43.4 08/22/2017    MCV 85 08/22/2017    MCH 28.7 08/22/2017    MCHC 33.6 08/22/2017    RDW 13.3 08/22/2017     08/22/2017       BMP RESULTS:  Lab Results   Component Value Date     08/22/2017    POTASSIUM 4.0 08/22/2017    CHLORIDE 106 08/22/2017    CO2 24 08/22/2017    ANIONGAP 11 08/22/2017     (H) 08/22/2017    BUN 11 08/22/2017    CR 0.71 08/22/2017    GFRESTIMATED 83 08/22/2017    GFRESTBLACK >90 08/22/2017    LINDA 9.1 08/22/2017        A1C RESULTS:  No results found for: A1C    INR RESULTS:  No results found for: INR    Procedures:  EKG 1/17/18: Afib w/ rate 100 bpm. No ischemic changes noted - unchanged from prior.     Echocardiogram 10/3/2016: Afib. LV function, chamber size, wall motion, and wall thickness are normal. EF 60-65 %. Global RV function is normal. A small patent foramen ovale is present.     Assessment and Plan: This is a 67 year old generally healthy patient with a past medical history significant for atrial fibrillation. She is stable from a cardiovascular disease standpoint with no evidence of unstable angina, decompensated heart failure and known atrial fibrillation. She can achieve > 10 METS without difficulty. Medications were reviewed and the patient has been on single anticoagulation therapy w/ Aspirin as she has  declined Xarelto in the past due to her active lifestyle. Her CHADSVASc score is 2 which equates to an annual stroke risk of 3.2 % which would be reduced to 0.2 % annually if she were on long term anticoagulation. This does not necessarily increase her perioperative risk of a cardiac related event or increase her risk of stroke related event. I would recommend that with her heart rate of 100 bpm on EKG today that she initiate a beta blocker, previously prescribed by Dr. Huitron in an attempt to reduce her heart rate prior to this elective surgical procedure. The RCRI score is 0, Class I risk w/ 0.4 % risk of major cardiac event. The patient may proceed with elective surgical procedure involving Stapedectomy at an acceptable perioperative risk. We recommend ongoing evaluation and clinical follow-up annually with Dr. Tomy Delgado  01/17/18  9:07 AM          Patient Care Team:  Bess Paredes MD as PCP - Kun Marino  Fax [phone]

## 2018-01-17 NOTE — PATIENT INSTRUCTIONS
Patient Instructions:    It was a pleasure to see you in the cardiology clinic today.    If you have any questions you can reach our nurse triage line at [phone].  Press Option #1 for the [facility], then press Option #3 for nursing or Option #1 for scheduling. We also encourage the use of [employer] to communicate with your HealthCare Provider    Note new medications: I would encourage you to take Metoprolol (Toprol XL 25 mg) once daily prior to your elective procedure.  Stop the following medications: None.    The results from today include: You are at low risk for any cardiac event related to your upcoming elective procedure. However, you do carry an annual stroke rate of 3.2 % annually with Atrial Fibrillation and sole anticoagulation with Aspirin and may have minimally higher stroke risk associated with surgery.    Please follow up with Cardiology in 1 year or as needed.    Sincerely,    Gali TIWARI, SARAHIP-BC

## 2018-01-17 NOTE — PROGRESS NOTES
"HPI: Ms. Donaldson is a 67 year old female patient of Dr. Huitron who was last evaluated in clinic 11/2016. The patient has a history of breast cancer treated w/ lumpectomy and radiation who has a recent diagnosis of atrial fibrillation. She has previously declined cardioversion and was undecided about taking anticoagulation but did end up taking Rivaroxaban for a short while and then self discontinued this medication and started a Japanese herbal blood thinner which was against recommendations of Dr. Huitron. Her CHADSVasc Score is 2 for age and female gender w/ an annual stroke risk of ~ 3 %.     Ms. Donaldson is quite active and does Tomás three times per week and has also been taking swimming lessons. She walks frequently as well. She has not had any chest pain, pressure or tightness. She has not had any shortness of breath, orthopnea or PND. She has not had any pre or juany syncope. She has some rare palpitations of she sleeps on her left side but not associated w/ exercise. She has not had any LE edema or weight gain. She denies any history of PE/DVT or stroke. She has no other specific cardiopulmonary concerns today. She states that she does not wish to take Xarelto or other blood thinner because of her active lifestyle and that she also has not taken Metoprolol because it blunts her heart rate response related to exercise and causes mild fatigue. She has been taking ASA daily for treatment of Afib but has been off of this since Tuesday in anticipation of her elective surgery. She also carries a \"pill in the pocket\" from Dr. Huitron which is Toprol XL 25 mg and has not taken any since at least August 2017. She presents to the clinic today unaccompanied.     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      History of breast cancer 2003    lumpectomy and radiation offerred chemo and patient declined at time but had " oncogene test and low risk     Hyperlipidemia LDL goal < 160      Osteopenia      Severe vaginal dysplasia        Past Surgical History:  Past Surgical History:   Procedure Laterality Date     BIOPSY       BREAST SURGERY       Colposcopy Cervix with Loop Electrode Conization and Lser to Vagina       COLPOSCOPY, BIOPSY, COMBINED  10/24/2012    Procedure: COMBINED COLPOSCOPY, BIOPSY;  Colposcopy, Biopsy of Cervix and Vagina, Ultrasound Guidance;  Surgeon: Colette Ng MD;  Location: [facility] OR     GYN SURGERY      laparotomy with FRANCIA, 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        Social History:  Social History   Substance Use Topics     Smoking status: Former Smoker     Packs/day: 0.50     Years: 15.00     Smokeless tobacco: Never Used     Alcohol use Yes      Comment: Socially        Family History:  Family History   Problem Relation Age of Onset     Breast Cancer Sister 47      of breast cancer age 63     Breast Cancer Paternal Grandmother      Myocardial Infarction Mother 73     HEART DISEASE Father 68       Medications:  Current Outpatient Prescriptions   Medication Sig     UNABLE TO FIND      metoprolol succinate (TOPROL XL) 25 MG 24 hr tablet Take 25 mg by mouth daily     BENFOTIAMINE PO Take 150 mg by mouth 2 times daily     Cholecalciferol (VITAMIN D-3 PO) Take 5,000 Units by mouth daily     Cyanocobalamin (B-12 PO) Take 1,600 mg by mouth daily     Bacillus Coagulans-Inulin (PROBIOTIC FORMULA) 1-250 BILLION-MG CAPS Take by mouth three times a week 25+billion CFUS     UNABLE TO FIND 5 PELLET 4 times daily MEDICATION NAME: Homeopathic Thiosinaminum     Efraín Berry 550 MG CAPS Take 1,200 mg by mouth 3 times daily      TAURINE PO Take 3,000 mg by mouth     Thiamine HCl (VITAMIN B-1 PO) Take 300 mg by mouth     MAGNESIUM OXIDE PO Take 500 mg by mouth     Acetylcarnitine HCl 250 MG CAPS Take 500 mg by mouth 2 times daily       "Coenzyme Q10 (CO Q 10 PO) Take 100 mg by mouth daily      Selenium 200 MCG CAPS Take 200 mg by mouth     ASPIRIN ADULT LOW STRENGTH PO Take 20 mg by mouth daily     Omega-3 Fatty Acids (OMEGA-3 FISH OIL PO) Take 630 mg by mouth 2 times daily      No current facility-administered medications for this visit.        Review of Systems:  Constitutional: No fever, chills, or sweats. No weight gain/loss   ENT: No visual disturbance, ear ache, epistaxis, sore throat  Allergies/Immunologic: Negative.   Respiratory: No cough, hemoptysia  Cardiovascular: As per HPI  GI: No nausea, vomiting, hematemesis, melena, or hematochezia  : No urinary frequency, dysuria, or hematuria  Integument: Negative  Psychiatric: Negative  Neuro: Negative  Endocrinology: Negative   Musculoskeletal: Negative    Physical Exam:   /83 (BP Location: Left arm, Patient Position: Chair, Cuff Size: Adult Small)  Pulse 75  Ht 1.6 m (5' 3\")  Wt 58.8 kg (129 lb 11.2 oz)  SpO2 99%  BMI 22.98 kg/m2  In general, the patient is a pleasant female in no apparent distress.    HEENT: NC/AT.  PERRLA.  EOMI.  Sclerae white, not injected.  Nares clear.  Pharynx without erythema or exudate.  Dentition intact.    Neck: No adenopathy.  No thyromegaly. Carotids +4/4 bilaterally without bruits.  No jugular venous distension.   Heart: HR Irreg. Normal S1, S2 splits physiologically. No murmur, rub, click, or gallop. The PMI is in the 5th ICS in the midclavicular line. There is no heave.    Lungs: CTA.  No ronchi, wheezes, rales.  No dullness to percussion.   Abdomen: Soft, nontender, nondistended. No organomegaly.  No bruits.   Extremities: No clubbing, cyanosis, or edema.  The pulses are +4/4 at the radial, brachial, femoral, popliteal, DP, and PT sites bilaterally.  No bruits are noted.  Neurologic: Alert and oriented to person/place/time, normal speech, gait and affect.  Skin: No petechiae, purpura or rash.    Labs:  LIPID RESULTS:  Lab Results   Component Value " Date    CHOL 226 (H) 08/22/2017    HDL 64 08/22/2017     (H) 08/22/2017    TRIG 97 08/22/2017    CHOLHDLRATIO 3.4 09/18/2014    NHDL 162 (H) 08/22/2017       LIVER ENZYME RESULTS:  No results found for: AST, ALT    CBC RESULTS:  Lab Results   Component Value Date    WBC 3.0 (L) 08/22/2017    RBC 5.08 08/22/2017    HGB 14.6 08/22/2017    HCT 43.4 08/22/2017    MCV 85 08/22/2017    MCH 28.7 08/22/2017    MCHC 33.6 08/22/2017    RDW 13.3 08/22/2017     08/22/2017       BMP RESULTS:  Lab Results   Component Value Date     08/22/2017    POTASSIUM 4.0 08/22/2017    CHLORIDE 106 08/22/2017    CO2 24 08/22/2017    ANIONGAP 11 08/22/2017     (H) 08/22/2017    BUN 11 08/22/2017    CR 0.71 08/22/2017    GFRESTIMATED 83 08/22/2017    GFRESTBLACK >90 08/22/2017    LINDA 9.1 08/22/2017        A1C RESULTS:  No results found for: A1C    INR RESULTS:  No results found for: INR    Procedures:  EKG 1/17/18: Afib w/ rate 100 bpm. No ischemic changes noted - unchanged from prior.     Echocardiogram 10/3/2016: Afib. LV function, chamber size, wall motion, and wall thickness are normal. EF 60-65 %. Global RV function is normal. A small patent foramen ovale is present.     Assessment and Plan: This is a 67 year old generally healthy patient with a past medical history significant for atrial fibrillation. She is stable from a cardiovascular disease standpoint with no evidence of unstable angina, decompensated heart failure and known atrial fibrillation. She can achieve > 10 METS without difficulty. Medications were reviewed and the patient has been on single anticoagulation therapy w/ Aspirin as she has declined Xarelto in the past due to her active lifestyle. Her CHADSVASc score is 2 which equates to an annual stroke risk of 3.2 % which would be reduced to 0.2 % annually if she were on long term anticoagulation. This does not necessarily increase her perioperative risk of a cardiac related event or increase her  risk of stroke related event. I would recommend that with her heart rate of 100 bpm on EKG today that she initiate a beta blocker, previously prescribed by Dr. Huitron in an attempt to reduce her heart rate prior to this elective surgical procedure. The RCRI score is 0, Class I risk w/ 0.4 % risk of major cardiac event. The patient may proceed with elective surgical procedure involving Stapedectomy at an acceptable perioperative risk. We recommend ongoing evaluation and clinical follow-up annually with Dr. Tomy Delgado  01/17/18  9:07 AM        CC  Patient Care Team:  Bess Paredes MD as PCP - General  Bess Paredes MD TORKELSON, CAROLYN Griebie, Matthew  Fax [phone]    Answers for HPI/ROS submitted by the patient on 1/15/2018   General Symptoms: Yes  Skin Symptoms: No  HENT Symptoms: No  EYE SYMPTOMS: No  HEART SYMPTOMS: Yes  LUNG SYMPTOMS: Yes  INTESTINAL SYMPTOMS: No  URINARY SYMPTOMS: No  GYNECOLOGIC SYMPTOMS: No  BREAST SYMPTOMS: No  SKELETAL SYMPTOMS: No  BLOOD SYMPTOMS: No  NERVOUS SYSTEM SYMPTOMS: No  MENTAL HEALTH SYMPTOMS: No  Fever: No  Loss of appetite: No  Weight loss: No  Weight gain: No  Fatigue: No  Night sweats: No  Chills: No  Increased stress: No  Excessive hunger: No  Excessive thirst: No  Feeling hot or cold when others believe the temperature is normal: No  Loss of height: No  Post-operative complications: No  Surgical site pain: No  Hallucinations: No  Change in or Loss of Energy: No  Hyperactivity: No  Confusion: No  Cough: No  Sputum or phlegm: No  Coughing up blood: No  Difficulty breating or shortness of breath: No  Snoring: No  Wheezing: No  Difficulty breathing on exertion: No  Nighttime Cough: No  Difficulty breathing when lying flat: No  Chest pain or pressure: No  Fast or irregular heartbeat: Yes  Pain in legs with walking: No  Trouble breathing while lying down: No  Fingers or toes appear blue: No  High blood pressure: No  Low blood pressure:  No  Fainting: No  Murmurs: No  Pacemaker: No  Varicose veins: No  Edema or swelling: No  Wake up at night with shortness of breath: No  Light-headedness: No  Exercise intolerance: No

## 2018-01-18 DIAGNOSIS — I48.91 ATRIAL FIBRILLATION (H): Primary | ICD-10-CM

## 2018-01-18 LAB — INTERPRETATION ECG - MUSE: NORMAL

## 2018-01-18 RX ORDER — METOPROLOL SUCCINATE 25 MG/1
25 TABLET, EXTENDED RELEASE ORAL DAILY
Qty: 30 TABLET | Refills: 6 | Status: SHIPPED | OUTPATIENT
Start: 2018-01-18 | End: 2018-07-30

## 2018-01-18 NOTE — TELEPHONE ENCOUNTER
APPT INFO    Date /Time: 2/2/18- 12:20 PM    Reason for Appt: Cyst on left foot   Ref Provider/Clinic: Dr. Paredes    Are there internal records? Yes/No?  IF YES, list clinic names: [facility]     Are there outside records? Yes/No? No   Patient Contact (Y/N) & Call Details: No- referral. Per appt note- all records in Epic.    Action: ---

## 2018-01-23 ENCOUNTER — TRANSFERRED RECORDS (OUTPATIENT)
Dept: HEALTH INFORMATION MANAGEMENT | Facility: CLINIC | Age: 68
End: 2018-01-23

## 2018-02-02 ENCOUNTER — OFFICE VISIT (OUTPATIENT)
Dept: ORTHOPEDICS | Facility: CLINIC | Age: 68
End: 2018-02-02
Payer: COMMERCIAL

## 2018-02-02 ENCOUNTER — PRE VISIT (OUTPATIENT)
Dept: ORTHOPEDICS | Facility: CLINIC | Age: 68
End: 2018-02-02

## 2018-02-02 VITALS — BODY MASS INDEX: 21.26 KG/M2 | WEIGHT: 120 LBS | HEIGHT: 63 IN

## 2018-02-02 DIAGNOSIS — M71.30 MYXOID CYST: Primary | ICD-10-CM

## 2018-02-02 NOTE — LETTER
2/2/2018       RE: Cordell Donaldson  [address]  [place] [id number]     Dear Colleague,    Thank you for referring your patient, Cordell Donaldson, to the [facility] at [facility]. Please see a copy of my visit note below.    Date of Service: 2/2/2018    Chief Complaint:   Chief Complaint   Patient presents with     Eval/Assessment     Patient here for cyst on middle toe of left foot x 2 months, no pain        HPI: Cordell is a 67 year old female who presents today for further evaluation of mucoid cyst on the middle toe of the left foot.      Location: DIPJ of the left 3rd digit.     Duration: 2 months    Onset: gradual    Course: stable    Aggravating/alleviating factors: none/none. No pain. Is an annoyance.     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      History of breast cancer 2003    lumpectomy and radiation offerred chemo and patient declined at time but had oncogene test and low risk     Hyperlipidemia LDL goal < 160      Osteopenia      Severe vaginal dysplasia        PSxH:   Past Surgical History:   Procedure Laterality Date     BIOPSY       BREAST SURGERY       Colposcopy Cervix with Loop Electrode Conization and Lser to Vagina  2008     COLPOSCOPY, BIOPSY, COMBINED  10/24/2012    Procedure: COMBINED COLPOSCOPY, BIOPSY;  Colposcopy, Biopsy of Cervix and Vagina, Ultrasound Guidance;  Surgeon: Colette Ng MD;  Location: [facility] OR     GYN SURGERY  12/12    laparotomy with FRANCIA, presumed stage IA1 SCCA of cervix      HYSTERECTOMY, FRANCIA       FRANCIA with [facility] in December 2012.        Allergies: Review of patient's allergies indicates no known allergies.    SH:   Social History     Social History     Marital status:      Spouse name: N/A     Number of children: N/A     Years of education:  "N/A     Occupational History     Not on file.     Social History Main Topics     Smoking status: Former Smoker     Packs/day: 0.50     Years: 15.00     Smokeless tobacco: Never Used     Alcohol use Yes      Comment: Socially      Drug use: No     Sexual activity: Not on file     Other Topics Concern     Not on file     Social History Narrative       FH:   Family History   Problem Relation Age of Onset     Breast Cancer Sister 47      of breast cancer age 63     Breast Cancer Paternal Grandmother      Myocardial Infarction Mother 73     HEART DISEASE Father 68       Objective:   5' 3\" 120 lbs 0 oz    PT and DP pulses are 2/4 bilaterally. CRT is 3 seconds. Positive pedal hair.   Gross sensation is intact bilaterally.   Equinus intact bilaterally. No pain with active or passive ROM of the ankle, MTJ, 1st ray, or halluces bilaterally,.  Lesion on the DIPJ of the left third digit which appears to be a mucoid cyst.  Area transilluminates.  It does decrease and increase with flexion of the joint.  No pain noted to the area.  Nails normal bilaterally. No open lesions are noted.     Assessment: Mucoid/myxoid cyst of the left DIPJ of the third digit.    Plan:  - Pt seen and evaluated  -She would like to consider having this removed.  I related she would need to see Dr. Dumont for that.  She relates that she would like to have a consult with him to discuss this.  -I will see her again as needed.  Otherwise, see Dr. Dumont at scheduled visit.          Again, thank you for allowing me to participate in the care of your patient.      Sincerely,    Ke Donnelly, PINKY      "

## 2018-02-02 NOTE — MR AVS SNAPSHOT
After Visit Summary   2/2/2018    Cordell Donaldson    MRN: [id number]           Patient Information     Date Of Birth          1950        Visit Information        Provider Department      2/2/2018 12:20 PM Ke Donnelly DPM [facility]        Today's Diagnoses     Myxoid cyst    -  1       Follow-ups after your visit        Your next 10 appointments already scheduled     Feb 20, 2018  6:00 PM CST   (Arrive by 5:45 PM)   NEW FOOT/ANKLE with Iain Dumont MD   [facility] ([facility])    [address] [place] [address] [id number] [phone]              Who to contact     Please call your clinic at [phone] to:    Ask questions about your health    Make or cancel appointments    Discuss your medicines    Learn about your test results    Speak to your doctor   If you have compliments or concerns about an experience at your clinic, or if you wish to file a complaint, please contact [facility] Patient Relations at [phone] or email us at [email or web address]         Additional Information About Your Visit        MyChart Information     [employer] - is now [employer] gives you secure access to your electronic health record. If you see a primary care provider, you can also send messages to your care team and make appointments. If you have questions, please call your primary care clinic.  If you do not have a primary care provider, please call [phone] and they will assist you.      [employer] is an electronic gateway that provides easy, online access to your medical records. With [employer], you can request a clinic appointment, read your test results, renew a prescription or communicate with your care team.     To access your existing account, please contact your [facility] or call [phone] for assistance.        Care EveryWhere ID     This is your  "Care EveryWhere ID. This could be used by other organizations to access your [facility] medical records  [id number]        Your Vitals Were     Height BMI (Body Mass Index)                1.6 m (5' 3\") 21.26 kg/m2           Blood Pressure from Last 3 Encounters:   01/17/18 119/83   01/12/18 109/76   08/22/17 128/73    Weight from Last 3 Encounters:   02/02/18 54.4 kg (120 lb)   01/17/18 58.8 kg (129 lb 11.2 oz)   01/12/18 59.9 kg (132 lb 0.6 oz)              Today, you had the following     No orders found for display       Primary Care Provider Office Phone # Fax #    Bess Paredes [phone]       [address] [place] [address]        Equal Access to Services     ERICA NIELSEN : Hadii loreta brookso Soshan, waaxda luqadaha, qaybta kaalmada jamin, concha deng . So [facility] [phone].    ATENCIÓN: Si habla español, tiene a dill disposición servicios gratuitos de asistencia lingüística. Mojgan al [phone].    We comply with applicable federal civil rights laws and [place] laws. We do not discriminate on the basis of race, color, national origin, age, disability, sex, sexual orientation, or gender identity.            Thank you!     Thank you for choosing [facility]  for your care. Our goal is always to provide you with excellent care. Hearing back from our patients is one way we can continue to improve our services. Please take a few minutes to complete the written survey that you may receive in the mail after your visit with us. Thank you!             Your Updated Medication List - Protect others around you: Learn how to safely use, store and throw away your medicines at www.disposemymeds.org.          This list is accurate as of 2/2/18  3:04 PM.  Always use your most recent med list.                   Brand Name Dispense Instructions for use Diagnosis    Acetylcarnitine HCl 250 MG Caps      Take 500 mg by mouth 2 times daily     "    ASPIRIN ADULT LOW STRENGTH PO      Take 20 mg by mouth daily        B-12 PO      Take 1,600 mg by mouth daily        BENFOTIAMINE PO      Take 150 mg by mouth 2 times daily        CO Q 10 PO      Take 100 mg by mouth daily        Rye Cota 550 MG Caps      Take 1,200 mg by mouth 3 times daily        MAGNESIUM OXIDE PO      Take 500 mg by mouth        metoprolol succinate 25 MG 24 hr tablet    TOPROL XL    30 tablet    Take 1 tablet (25 mg) by mouth daily    Atrial fibrillation (H)       OMEGA-3 FISH OIL PO      Take 630 mg by mouth 2 times daily        PROBIOTIC FORMULA 1-250 BILLION-MG Caps      Take by mouth three times a week 25+billion CFUS        Selenium 200 MCG Caps      Take 200 mg by mouth        TAURINE PO      Take 3,000 mg by mouth        UNABLE TO FIND      5 PELLET 4 times daily MEDICATION NAME: Homeopathic Thiosinaminum        VITAMIN B-1 PO      Take 300 mg by mouth        VITAMIN D-3 PO      Take 5,000 Units by mouth daily

## 2018-02-02 NOTE — NURSING NOTE
"Chief Complaint   Patient presents with     Cystic Fibrosis     Patient here for cyst on middle toe of left foot x 2 months, no pain       Pain Assessment  Patient Currently in Pain: Denies               Ht 1.6 m (5' 3\")  Wt 54.4 kg (120 lb)  BMI 21.26 kg/m2    No Known Allergies    Current Outpatient Prescriptions   Medication Sig Dispense Refill     metoprolol succinate (TOPROL XL) 25 MG 24 hr tablet Take 1 tablet (25 mg) by mouth daily 30 tablet 6     BENFOTIAMINE PO Take 150 mg by mouth 2 times daily       Cholecalciferol (VITAMIN D-3 PO) Take 5,000 Units by mouth daily       Cyanocobalamin (B-12 PO) Take 1,600 mg by mouth daily       Bacillus Coagulans-Inulin (PROBIOTIC FORMULA) 1-250 BILLION-MG CAPS Take by mouth three times a week 25+billion CFUS       UNABLE TO FIND 5 PELLET 4 times daily MEDICATION NAME: Homeopathic Thiosinaminum       Efraín Berry 550 MG CAPS Take 1,200 mg by mouth 3 times daily        TAURINE PO Take 3,000 mg by mouth       Thiamine HCl (VITAMIN B-1 PO) Take 300 mg by mouth       MAGNESIUM OXIDE PO Take 500 mg by mouth       Acetylcarnitine HCl 250 MG CAPS Take 500 mg by mouth 2 times daily        Coenzyme Q10 (CO Q 10 PO) Take 100 mg by mouth daily        Selenium 200 MCG CAPS Take 200 mg by mouth       ASPIRIN ADULT LOW STRENGTH PO Take 20 mg by mouth daily       Omega-3 Fatty Acids (OMEGA-3 FISH OIL PO) Take 630 mg by mouth 2 times daily          Catherine Stallings CMA  2/2/2018    "

## 2018-02-02 NOTE — PROGRESS NOTES
Date of Service: 2/2/2018    Chief Complaint:   Chief Complaint   Patient presents with     Eval/Assessment     Patient here for cyst on middle toe of left foot x 2 months, no pain        HPI: Cordell is a 67 year old female who presents today for further evaluation of mucoid cyst on the middle toe of the left foot.      Location: DIPJ of the left 3rd digit.     Duration: 2 months    Onset: gradual    Course: stable    Aggravating/alleviating factors: none/none. No pain. Is an annoyance.     Previous Treatments: none      Review of Systems: Answers for HPI/ROS submitted by the patient on 1/29/2018   General Symptoms: No  Skin Symptoms: No  HENT Symptoms: No  EYE SYMPTOMS: No  HEART SYMPTOMS: No  LUNG SYMPTOMS: No  INTESTINAL SYMPTOMS: No  URINARY SYMPTOMS: No  GYNECOLOGIC SYMPTOMS: No  BREAST SYMPTOMS: No  SKELETAL SYMPTOMS: No  BLOOD SYMPTOMS: No  NERVOUS SYSTEM SYMPTOMS: No  MENTAL HEALTH SYMPTOMS: No      PMH:   Past Medical History:   Diagnosis Date     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      History of breast cancer 2003    lumpectomy and radiation offerred chemo and patient declined at time but had oncogene test and low risk     Hyperlipidemia LDL goal < 160      Osteopenia      Severe vaginal dysplasia        PSxH:   Past Surgical History:   Procedure Laterality Date     BIOPSY       BREAST SURGERY       Colposcopy Cervix with Loop Electrode Conization and Lser to Vagina  2008     COLPOSCOPY, BIOPSY, COMBINED  10/24/2012    Procedure: COMBINED COLPOSCOPY, BIOPSY;  Colposcopy, Biopsy of Cervix and Vagina, Ultrasound Guidance;  Surgeon: Colette Ng MD;  Location: [facility] OR     GYN SURGERY  12/12    laparotomy with FRANCIA, presumed stage IA1 SCCA of cervix      HYSTERECTOMY, FRANCIA       FRANCIA with [facility] in December 2012.        Allergies: Review of patient's allergies indicates no known allergies.    SH:   Social History     Social History     Marital status:  "     Spouse name: N/A     Number of children: N/A     Years of education: N/A     Occupational History     Not on file.     Social History Main Topics     Smoking status: Former Smoker     Packs/day: 0.50     Years: 15.00     Smokeless tobacco: Never Used     Alcohol use Yes      Comment: Socially      Drug use: No     Sexual activity: Not on file     Other Topics Concern     Not on file     Social History Narrative       FH:   Family History   Problem Relation Age of Onset     Breast Cancer Sister 47      of breast cancer age 63     Breast Cancer Paternal Grandmother      Myocardial Infarction Mother 73     HEART DISEASE Father 68       Objective:   5' 3\" 120 lbs 0 oz    PT and DP pulses are 2/4 bilaterally. CRT is 3 seconds. Positive pedal hair.   Gross sensation is intact bilaterally.   Equinus intact bilaterally. No pain with active or passive ROM of the ankle, MTJ, 1st ray, or halluces bilaterally,.  Lesion on the DIPJ of the left third digit which appears to be a mucoid cyst.  Area transilluminates.  It does decrease and increase with flexion of the joint.  No pain noted to the area.  Nails normal bilaterally. No open lesions are noted.     Assessment: Mucoid/myxoid cyst of the left DIPJ of the third digit.    Plan:  - Pt seen and evaluated  -She would like to consider having this removed.  I related she would need to see Dr. Dumont for that.  She relates that she would like to have a consult with him to discuss this.  -I will see her again as needed.  Otherwise, see Dr. Dumont at scheduled visit.        "

## 2018-02-04 NOTE — TELEPHONE ENCOUNTER
APPT INFO    Date /Time: 2/20/18 6PM   Reason for Appt: Mucoid Cyst on Middle Toe Left Foot   Ref Provider/Clinic: Dr. Donnelly   Are there internal records? Yes/No?  IF YES, list clinic names: Yes -     [facility]  [facility] -- Dr. Paredes   Are there outside records? Yes/No? no   Patient Contact (Y/N) & Call Details: No - internal referral   Action: Chart reviewed

## 2018-02-20 ENCOUNTER — PRE VISIT (OUTPATIENT)
Dept: ORTHOPEDICS | Facility: CLINIC | Age: 68
End: 2018-02-20

## 2018-07-30 DIAGNOSIS — I48.0 PAROXYSMAL ATRIAL FIBRILLATION (H): ICD-10-CM

## 2018-08-01 RX ORDER — METOPROLOL SUCCINATE 25 MG/1
25 TABLET, EXTENDED RELEASE ORAL DAILY
Qty: 90 TABLET | Refills: 1 | Status: SHIPPED | OUTPATIENT
Start: 2018-08-01 | End: 2019-01-25

## 2018-08-09 ASSESSMENT — ANXIETY QUESTIONNAIRES
1. FEELING NERVOUS, ANXIOUS, OR ON EDGE: NOT AT ALL
4. TROUBLE RELAXING: NOT AT ALL
6. BECOMING EASILY ANNOYED OR IRRITABLE: NOT AT ALL
2. NOT BEING ABLE TO STOP OR CONTROL WORRYING: NOT AT ALL
GAD7 TOTAL SCORE: 0
7. FEELING AFRAID AS IF SOMETHING AWFUL MIGHT HAPPEN: NOT AT ALL
7. FEELING AFRAID AS IF SOMETHING AWFUL MIGHT HAPPEN: NOT AT ALL
5. BEING SO RESTLESS THAT IT IS HARD TO SIT STILL: NOT AT ALL
3. WORRYING TOO MUCH ABOUT DIFFERENT THINGS: NOT AT ALL
GAD7 TOTAL SCORE: 0

## 2018-08-10 ASSESSMENT — ANXIETY QUESTIONNAIRES: GAD7 TOTAL SCORE: 0

## 2018-08-20 ENCOUNTER — RADIANT APPOINTMENT (OUTPATIENT)
Dept: MAMMOGRAPHY | Facility: CLINIC | Age: 68
End: 2018-08-20
Attending: FAMILY MEDICINE
Payer: COMMERCIAL

## 2018-08-20 DIAGNOSIS — Z12.31 VISIT FOR SCREENING MAMMOGRAM: ICD-10-CM

## 2018-08-20 PROCEDURE — 77067 SCR MAMMO BI INCL CAD: CPT

## 2018-08-21 ENCOUNTER — OFFICE VISIT (OUTPATIENT)
Dept: FAMILY MEDICINE | Facility: CLINIC | Age: 68
End: 2018-08-21
Attending: FAMILY MEDICINE
Payer: COMMERCIAL

## 2018-08-21 VITALS
SYSTOLIC BLOOD PRESSURE: 121 MMHG | DIASTOLIC BLOOD PRESSURE: 82 MMHG | HEART RATE: 78 BPM | BODY MASS INDEX: 23.37 KG/M2 | WEIGHT: 131.9 LBS | HEIGHT: 63 IN

## 2018-08-21 DIAGNOSIS — Z00.00 ANNUAL PHYSICAL EXAM: Primary | ICD-10-CM

## 2018-08-21 DIAGNOSIS — Z12.72 VAGINAL PAPANICOLAOU SMEAR: ICD-10-CM

## 2018-08-21 PROBLEM — H90.11 CONDUCTIVE HEARING LOSS IN RIGHT EAR: Status: ACTIVE | Noted: 2018-01-23

## 2018-08-21 PROCEDURE — G0463 HOSPITAL OUTPT CLINIC VISIT: HCPCS | Mod: 25

## 2018-08-21 PROCEDURE — 87624 HPV HI-RISK TYP POOLED RSLT: CPT | Performed by: FAMILY MEDICINE

## 2018-08-21 PROCEDURE — G0145 SCR C/V CYTO,THINLAYER,RESCR: HCPCS | Performed by: FAMILY MEDICINE

## 2018-08-21 ASSESSMENT — PAIN SCALES - GENERAL: PAINLEVEL: NO PAIN (0)

## 2018-08-21 NOTE — LETTER
8/21/2018       RE: Cordell Donaldson  [address]  [facility] [address]     Dear Colleague,    Thank you for referring your patient, Cordell Donaldson, to the [facility] at [facility]. Please see a copy of my visit note below.    SUBJECTIVE:                                                            Cordell Donaldson is a 68 year old female who presents for Preventive Visit.  Are you in the first 12 months of your Medicare Part B coverage?  No  Healthy Habits:    Do you get at least three servings of calcium containing foods daily (dairy, green leafy vegetables, etc.)? yes    Amount of exercise or daily activities, outside of work: daily - walking and vitor. Regular.     Problems taking medications regularly No    Medication side effects: No    Have you had an eye exam in the past two years? yes    Do you see a dentist twice per year? Yes    Stapedectomy - by Dr. Bibi Bianchi.  ANW on 1.23.2018. Not wearing hearing aids    Do you have sleep apnea, excessive snoring or daytime drowsiness?no  Concerns:   - exhausted at time but very active.   - Atrial Fibrillation - stopped ASA on and off [declined Zarelto]. On Metoprolol.   HYSTERECTOMY, FRANCIA FRANCIA with [facility] in December 2012.        Social History   Substance Use Topics     Smoking status: Former Smoker     Packs/day: 0.50     Years: 15.00     Smokeless tobacco: Never Used     Alcohol use Yes      Comment: Socially      The patient does not drink >3 drinks per day nor >7 drinks per week.  Today's PHQ-2 Score:   PHQ-2 ( 1999 Pfizer) 8/9/2018 1/12/2018   Q1: Little interest or pleasure in doing things 0 0   Q2: Feeling down, depressed or hopeless 0 0   PHQ-2 Score 0 0   Q1: Little interest or pleasure in doing things Not at all -   Q2: Feeling down, depressed or hopeless Not at all -   PHQ-2 Score 0 -   [facility]: Mammogram 8.20.2018; colonoscopy 9/2018;    Do you feel safe in your environment  - Yes  Do you have a Health Care Directive?: Yes.  Current providers sharing in care for this patient include:   Patient Care Team:  Bess Paredes MD as PCP - General  eBss Paredes MD Toker, Elizabeth A, RN as Nurse Coordinator (Cardiology)  Ke Donnelly DPM as MD (Podiatry)    Hearing impairment: Stapedectomy - by Dr. Bibi Bianchi.  ANW on 1.23.2018.    Ability to successfully perform activities of daily living: Yes, no assistance needed     Fall risk:Home safety:  none identified  Health Maintenance   Topic Date Due     TETANUS IMMUNIZATION (SYSTEM ASSIGNED)  04/14/1969     HEPATITIS C SCREENING  04/14/1969     ADVANCE DIRECTIVE PLANNING Q5 YRS  04/14/2006     DEXA SCAN SCREENING (SYSTEM ASSIGNED)  04/14/2016     PNEUMOCOCCAL (1 of 2 - PCV13) 04/14/2016     MAMMO SCREEN Q2 YR (SYSTEM ASSIGNED)  08/24/2018     INFLUENZA VACCINE (1) 09/01/2018     FALL RISK ASSESSMENT  02/02/2019     PHQ-2 Q1 YR  08/21/2019     LIPID SCREEN Q5 YR FEMALE (SYSTEM ASSIGNED)  08/22/2022     COLONOSCOPY Q10 YR  01/12/2028     Current Outpatient Prescriptions   Medication     Acetylcarnitine HCl 250 MG CAPS     aspirin 81 MG tablet     Bacillus Coagulans-Inulin (PROBIOTIC FORMULA) 1-250 BILLION-MG CAPS     BENFOTIAMINE PO     Cholecalciferol (VITAMIN D-3 PO)     Coenzyme Q10 (CO Q 10 PO)     [place] Cota 550 MG CAPS     MAGNESIUM OXIDE PO     metoprolol succinate (TOPROL XL) 25 MG 24 hr tablet     Omega-3 Fatty Acids (OMEGA-3 FISH OIL PO)     Selenium 200 MCG CAPS     TAURINE PO     Past Medical History:   Diagnosis Date     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      History of breast cancer 2003    lumpectomy and radiation offerred chemo and patient declined at time but had oncogene test and low risk     Hyperlipidemia LDL goal < 160      Osteopenia      Severe vaginal dysplasia      Past Surgical History:   Procedure Laterality Date     BIOPSY       BREAST SURGERY    "    Colposcopy Cervix with Loop Electrode Conization and Lser to Vagina  2008     COLPOSCOPY, BIOPSY, COMBINED  10/24/2012    Procedure: COMBINED COLPOSCOPY, BIOPSY;  Colposcopy, Biopsy of Cervix and Vagina, Ultrasound Guidance;  Surgeon: Colette Ng MD;  Location: [facility] OR     GYN SURGERY  12/12    laparotomy with FRANCIA, presumed stage IA1 SCCA of cervix      HYSTERECTOMY, FRANCIA       FRANCIA with [facility] in December 2012.    ROS:  C: NEGATIVE for fever, chills, change in weight  E/M: NEGATIVE for ear, mouth and throat problems  R: NEGATIVE for significant cough or SOB  CV: NEGATIVE for chest pain, palpitations or peripheral edema.  No palpitations  OBJECTIVE:                                                            /82  Pulse 78  Ht 1.6 m (5' 3\")  Wt 59.8 kg (131 lb 14.4 oz)  BMI 23.37 kg/m2 Estimated body mass index is 21.26 kg/(m^2) as calculated from the following:    Height as of 2/2/18: 1.6 m (5' 3\").    Weight as of 2/2/18: 54.4 kg (120 lb).  EXAM:   GENERAL: healthy, alert and no distress  EYES: Eyes grossly normal to inspection  HENT: ear canals and TM's normal, nose and mouth without ulcers or lesions  NECK: no adenopathy, no asymmetry, masses, or scars and thyroid normal to palpation  RESP: lungs clear to auscultation - no rales, rhonchi or wheezes  BREAST: Left breast with lumpectomy scar superiorly. Small breast on Left. no palpable axillary masses or adenopathy.  Lumpectomy scar   CV: irregular rate and rhythm, normal S1 S2, no S3 or S4, no murmur, click or rub, no peripheral edema and peripheral pulses strong.  Patient without symptoms.   ABDOMEN: soft, nontender, no hepatosplenomegaly, no masses and bowel sounds normal   (female): normal female external genitalia, normal urethral meatus, vaginal mucosa pale pink. Pap from vaginal cuff.    MS: no gross musculoskeletal defects noted, no edema  SKIN: no suspicious lesions or rashes  NEURO: Normal strength and tone, mentation intact " "and speech normal  PSYCH: mentation appears normal, affect normal/bright  EKG: will send to Cardiology for evaluation   ASSESSMENT / PLAN:                                                             Diagnoses and associated orders for this visit:  Annual physical exam    -- Mammogram up to date    -- Colonoscopyo 9/2018    -- pap/HPV today and year given history of cervical cancer [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]    -- Discussed DEXA [maybe at 71]- would decline medication; takes Vitamin D and some calcium    -- Labs in 2019    -- Advised dermatology    -- Declined immunization    -- Advanced directive [scanned]   Vaginal Papanicolaou smear  -     Obtaining, preparing and conveyance of cervical or vaginal smear to laboratory.  -     Pap imaged thin layer screen with HPV - recommended age 30 - 65 years (select HPV order below)  Other orders  -     aspirin 81 MG tablet; Take 81 mg by mouth daily  End of Life Planning:  Patient currently has an advanced directive: Yes - Practitioner is supportive of decision.  COUNSELING:  Reviewed preventive health counseling, as reflected in patient instructions       Regular exercise       Healthy diet/nutrition  Estimated body mass index is 21.26 kg/(m^2) as calculated from the following:    Height as of 2/2/18: 1.6 m (5' 3\").    Weight as of 2/2/18: 54.4 kg (120 lb).   reports that she has quit smoking. She has a 7.50 pack-year smoking history. She has never used smokeless tobacco.  Appropriate preventive services were discussed with this patient, including applicable screening as appropriate for cardiovascular disease, diabetes, osteopenia/osteoporosis, and glaucoma.  As appropriate for age/gender, discussed screening for colorectal cancer, prostate cancer, breast cancer, and cervical cancer. Checklist reviewing preventive services available has been given to the patient.  Reviewed patients plan of care and provided an AVS. The Basic " Care Plan (routine screening as documented in Health Maintenance) for Cordell meets the Care Plan requirement. This Care Plan has been established and reviewed with the Patient.  Counseling Resources:  ATP IV Guidelines  Pooled Cohorts Equation Calculator  Breast Cancer Risk Calculator  FRAX Risk Assessment  ICSI Preventive Guidelines  Dietary Guidelines for Americans, 2010  USDA's MyPlate  ASA Prophylaxis  Lung CA Screening    Again, thank you for allowing me to participate in the care of your patient.      Sincerely,    Bess Paredes MD

## 2018-08-21 NOTE — PATIENT INSTRUCTIONS
Dermatology Referral:  [facility]:  [phone]:   Colette Pemberton [[place]]  [facility] dermatology: [phone]  [facility] dermatology/[facility]  [phone]  Fax [phone]  [facility]: Dr. Denise Marie, [phone]

## 2018-08-21 NOTE — MR AVS SNAPSHOT
After Visit Summary   8/21/2018    Cordell Donaldson    MRN: [id number]           Patient Information     Date Of Birth          1950        Visit Information        Provider Department      8/21/2018 2:20 PM Bess Paredes MD [facility]        Today's Diagnoses     Annual physical exam    -  1    Vaginal Papanicolaou smear          Care Instructions    Dermatology Referral:  [facility]:  [phone]:   Colette Pemberton [[place]]  [facility] dermatology: [phone]  [facility] dermatology/[facility]  [phone]  Fax [phone]  [facility]: Dr. Denise Marie, [phone]            Follow-ups after your visit        Your next 10 appointments already scheduled     Sep 14, 2018  1:45 PM CDT   Colonoscopy with Facundo Tijerina MD   [facility] ([facility])    [address] [place] [id number] [phone]              Who to contact     Please call your clinic at [phone] to:    Ask questions about your health    Make or cancel appointments    Discuss your medicines    Learn about your test results    Speak to your doctor            Additional Information About Your Visit        [email or web address] Information     [employer] gives you secure access to your electronic health record. If you see a primary care provider, you can also send messages to your care team and make appointments. If you have questions, please call your primary care clinic.  If you do not have a primary care provider, please call [phone] and they will assist you.      [employer] is an electronic gateway that provides easy, online access to your medical records. With [employer], you can request a clinic appointment, read your test results, renew a prescription or communicate with your care team.     To access your existing account, please contact your [facility] or call [phone] for assistance.        Care  "EveryWhere ID     This is your Care EveryWhere ID. This could be used by other organizations to access your [place] medical records  [id number]        Your Vitals Were     Pulse Height BMI (Body Mass Index)             78 1.6 m (5' 3\") 23.37 kg/m2          Blood Pressure from Last 3 Encounters:   08/21/18 121/82   01/17/18 119/83   01/12/18 109/76    Weight from Last 3 Encounters:   08/21/18 59.8 kg (131 lb 14.4 oz)   02/02/18 54.4 kg (120 lb)   01/17/18 58.8 kg (129 lb 11.2 oz)              We Performed the Following     Obtaining, preparing and conveyance of cervical or vaginal smear to laboratory.     Pap imaged thin layer screen with HPV - recommended age 30 - 65 years (select HPV order below)        Primary Care Provider Office Phone # Fax #    Bess Paredes [phone]       [address] [facility] [address]  [facility] [address]        Equal Access to Services     ANGIE NIELSEN : Hadii aad ku hadasho Soomaali, waaxda luqadaha, qaybta kaalmada adeegyada, concha deng . So [facility] [phone].    ATENCIÓN: Si habla español, tiene a dill disposición servicios gratuitos de asistencia lingüística. Llame al [phone].    We comply with applicable federal civil rights laws and [place] laws. We do not discriminate on the basis of race, color, national origin, age, disability, sex, sexual orientation, or gender identity.            Thank you!     Thank you for choosing [facility]  for your care. Our goal is always to provide you with excellent care. Hearing back from our patients is one way we can continue to improve our services. Please take a few minutes to complete the written survey that you may receive in the mail after your visit with us. Thank you!             Your Updated Medication List - Protect others around you: Learn how to safely use, store and throw away your medicines at www.disposemymeds.org.          This list is accurate as of 8/21/18  " 3:02 PM.  Always use your most recent med list.                   Brand Name Dispense Instructions for use Diagnosis    Acetylcarnitine HCl 250 MG Caps      Take 500 mg by mouth 2 times daily        aspirin 81 MG tablet      Take 81 mg by mouth daily        BENFOTIAMINE PO      Take 150 mg by mouth 2 times daily        CO Q 10 PO      Take 100 mg by mouth daily        [place] Cota 550 MG Caps      Take 1,200 mg by mouth 3 times daily        MAGNESIUM OXIDE PO      Take 500 mg by mouth        metoprolol succinate 25 MG 24 hr tablet    TOPROL XL    90 tablet    Take 1 tablet (25 mg) by mouth daily    Paroxysmal atrial fibrillation (H)       OMEGA-3 FISH OIL PO      Take 630 mg by mouth 2 times daily        PROBIOTIC FORMULA 1-250 BILLION-MG Caps      Take by mouth three times a week 25+billion CFUS        Selenium 200 MCG Caps      Take 200 mg by mouth        TAURINE PO      Take 3,000 mg by mouth        VITAMIN D-3 PO      Take 5,000 Units by mouth daily

## 2018-08-21 NOTE — PROGRESS NOTES
SUBJECTIVE:                                                            Cordell Donaldson is a 68 year old female who presents for Preventive Visit.  Are you in the first 12 months of your Medicare Part B coverage?  No  Healthy Habits:    Do you get at least three servings of calcium containing foods daily (dairy, green leafy vegetables, etc.)? yes    Amount of exercise or daily activities, outside of work: daily - walking and vitor. Regular.     Problems taking medications regularly No    Medication side effects: No    Have you had an eye exam in the past two years? yes    Do you see a dentist twice per year? Yes    Stapedectomy - by Dr. Bibi Bianchi.  ANW on 1.23.2018. Not wearing hearing aids    Do you have sleep apnea, excessive snoring or daytime drowsiness?no  Concerns:   - exhausted at time but very active.   - Atrial Fibrillation - stopped ASA on and off [declined Zarelto]. On Metoprolol.   HYSTERECTOMY, FRANCIA FRANCIA with [facility] in December 2012.        Social History   Substance Use Topics     Smoking status: Former Smoker     Packs/day: 0.50     Years: 15.00     Smokeless tobacco: Never Used     Alcohol use Yes      Comment: Socially      The patient does not drink >3 drinks per day nor >7 drinks per week.  Today's PHQ-2 Score:   PHQ-2 ( 1999 Pfizer) 8/9/2018 1/12/2018   Q1: Little interest or pleasure in doing things 0 0   Q2: Feeling down, depressed or hopeless 0 0   PHQ-2 Score 0 0   Q1: Little interest or pleasure in doing things Not at all -   Q2: Feeling down, depressed or hopeless Not at all -   PHQ-2 Score 0 -   [facility]: Mammogram 8.20.2018; colonoscopy 9/2018;    Do you feel safe in your environment - Yes  Do you have a Health Care Directive?: Yes.  Current providers sharing in care for this patient include:   Patient Care Team:  Bess Paredes MD as PCP - General  Bess Paredes MD Toker, Elizabeth A, RN as Nurse Coordinator (Cardiology)  Ke Donnelly DPM as MD  (Podiatry)    Hearing impairment: Stapedectomy - by Dr. Bibi Bianchi.  ANW on 1.23.2018.    Ability to successfully perform activities of daily living: Yes, no assistance needed     Fall risk:Home safety:  none identified  Health Maintenance   Topic Date Due     TETANUS IMMUNIZATION (SYSTEM ASSIGNED)  04/14/1969     HEPATITIS C SCREENING  04/14/1969     ADVANCE DIRECTIVE PLANNING Q5 YRS  04/14/2006     DEXA SCAN SCREENING (SYSTEM ASSIGNED)  04/14/2016     PNEUMOCOCCAL (1 of 2 - PCV13) 04/14/2016     MAMMO SCREEN Q2 YR (SYSTEM ASSIGNED)  08/24/2018     INFLUENZA VACCINE (1) 09/01/2018     FALL RISK ASSESSMENT  02/02/2019     PHQ-2 Q1 YR  08/21/2019     LIPID SCREEN Q5 YR FEMALE (SYSTEM ASSIGNED)  08/22/2022     COLONOSCOPY Q10 YR  01/12/2028     Current Outpatient Prescriptions   Medication     Acetylcarnitine HCl 250 MG CAPS     aspirin 81 MG tablet     Bacillus Coagulans-Inulin (PROBIOTIC FORMULA) 1-250 BILLION-MG CAPS     BENFOTIAMINE PO     Cholecalciferol (VITAMIN D-3 PO)     Coenzyme Q10 (CO Q 10 PO)     [place] Cota 550 MG CAPS     MAGNESIUM OXIDE PO     metoprolol succinate (TOPROL XL) 25 MG 24 hr tablet     Omega-3 Fatty Acids (OMEGA-3 FISH OIL PO)     Selenium 200 MCG CAPS     TAURINE PO     Past Medical History:   Diagnosis Date     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      History of breast cancer 2003    lumpectomy and radiation offerred chemo and patient declined at time but had oncogene test and low risk     Hyperlipidemia LDL goal < 160      Osteopenia      Severe vaginal dysplasia      Past Surgical History:   Procedure Laterality Date     BIOPSY       BREAST SURGERY       Colposcopy Cervix with Loop Electrode Conization and Lser to Vagina  2008     COLPOSCOPY, BIOPSY, COMBINED  10/24/2012    Procedure: COMBINED COLPOSCOPY, BIOPSY;  Colposcopy, Biopsy of Cervix and Vagina, Ultrasound Guidance;  Surgeon: Colette Ng MD;  Location: [facility] OR     GYN  "SURGERY  12/12    laparotomy with FRANCIA, presumed stage IA1 SCCA of cervix      HYSTERECTOMY, FRANCIA       FRANCIA with [facility] in December 2012.    ROS:  C: NEGATIVE for fever, chills, change in weight  E/M: NEGATIVE for ear, mouth and throat problems  R: NEGATIVE for significant cough or SOB  CV: NEGATIVE for chest pain, palpitations or peripheral edema.  No palpitations  OBJECTIVE:                                                            /82  Pulse 78  Ht 1.6 m (5' 3\")  Wt 59.8 kg (131 lb 14.4 oz)  BMI 23.37 kg/m2 Estimated body mass index is 21.26 kg/(m^2) as calculated from the following:    Height as of 2/2/18: 1.6 m (5' 3\").    Weight as of 2/2/18: 54.4 kg (120 lb).  EXAM:   GENERAL: healthy, alert and no distress  EYES: Eyes grossly normal to inspection  HENT: ear canals and TM's normal, nose and mouth without ulcers or lesions  NECK: no adenopathy, no asymmetry, masses, or scars and thyroid normal to palpation  RESP: lungs clear to auscultation - no rales, rhonchi or wheezes  BREAST: Left breast with lumpectomy scar superiorly. Small breast on Left. no palpable axillary masses or adenopathy.  Lumpectomy scar   CV: irregular rate and rhythm, normal S1 S2, no S3 or S4, no murmur, click or rub, no peripheral edema and peripheral pulses strong.  Patient without symptoms.   ABDOMEN: soft, nontender, no hepatosplenomegaly, no masses and bowel sounds normal   (female): normal female external genitalia, normal urethral meatus, vaginal mucosa pale pink. Pap from vaginal cuff.    MS: no gross musculoskeletal defects noted, no edema  SKIN: no suspicious lesions or rashes  NEURO: Normal strength and tone, mentation intact and speech normal  PSYCH: mentation appears normal, affect normal/bright  EKG: will send to Cardiology for evaluation   ASSESSMENT / PLAN:                                                             Diagnoses and associated orders for this visit:  Annual physical exam    -- Mammogram " "up to date    -- Colonoscopyo 9/2018    -- pap/HPV today and year given history of cervical cancer [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]    -- Discussed DEXA [maybe at 71]- would decline medication; takes Vitamin D and some calcium    -- Labs in 2019    -- Advised dermatology    -- Declined immunization    -- Advanced directive [scanned]   Vaginal Papanicolaou smear  -     Obtaining, preparing and conveyance of cervical or vaginal smear to laboratory.  -     Pap imaged thin layer screen with HPV - recommended age 30 - 65 years (select HPV order below)  Other orders  -     aspirin 81 MG tablet; Take 81 mg by mouth daily  End of Life Planning:  Patient currently has an advanced directive: Yes - Practitioner is supportive of decision.  COUNSELING:  Reviewed preventive health counseling, as reflected in patient instructions       Regular exercise       Healthy diet/nutrition  Estimated body mass index is 21.26 kg/(m^2) as calculated from the following:    Height as of 2/2/18: 1.6 m (5' 3\").    Weight as of 2/2/18: 54.4 kg (120 lb).   reports that she has quit smoking. She has a 7.50 pack-year smoking history. She has never used smokeless tobacco.  Appropriate preventive services were discussed with this patient, including applicable screening as appropriate for cardiovascular disease, diabetes, osteopenia/osteoporosis, and glaucoma.  As appropriate for age/gender, discussed screening for colorectal cancer, prostate cancer, breast cancer, and cervical cancer. Checklist reviewing preventive services available has been given to the patient.  Reviewed patients plan of care and provided an AVS. The Basic Care Plan (routine screening as documented in Health Maintenance) for Cordell meets the Care Plan requirement. This Care Plan has been established and reviewed with the Patient.  Counseling Resources:  ATP IV Guidelines  Pooled Cohorts Equation Calculator  Breast Cancer Risk " Calculator  FRAX Risk Assessment  ICSI Preventive Guidelines  Dietary Guidelines for Americans, 2010  USDA's MyPlate  ASA Prophylaxis  Lung CA Screening    Bess Paredes MD  [facility]

## 2018-08-24 LAB
COPATH REPORT: NORMAL
PAP: NORMAL

## 2018-08-28 LAB
FINAL DIAGNOSIS: NORMAL
HPV HR 12 DNA CVX QL NAA+PROBE: NEGATIVE
HPV16 DNA SPEC QL NAA+PROBE: NEGATIVE
HPV18 DNA SPEC QL NAA+PROBE: NEGATIVE
SPECIMEN DESCRIPTION: NORMAL
SPECIMEN SOURCE CVX/VAG CYTO: NORMAL

## 2018-09-07 ENCOUNTER — TELEPHONE (OUTPATIENT)
Dept: GASTROENTEROLOGY | Facility: OUTPATIENT CENTER | Age: 68
End: 2018-09-07

## 2018-09-14 ENCOUNTER — DOCUMENTATION ONLY (OUTPATIENT)
Dept: GASTROENTEROLOGY | Facility: OUTPATIENT CENTER | Age: 68
End: 2018-09-14
Payer: COMMERCIAL

## 2018-09-14 ENCOUNTER — TRANSFERRED RECORDS (OUTPATIENT)
Dept: HEALTH INFORMATION MANAGEMENT | Facility: CLINIC | Age: 68
End: 2018-09-14

## 2018-12-17 ENCOUNTER — OFFICE VISIT (OUTPATIENT)
Dept: CARDIOLOGY | Facility: CLINIC | Age: 68
End: 2018-12-17
Attending: INTERNAL MEDICINE
Payer: COMMERCIAL

## 2018-12-17 VITALS
DIASTOLIC BLOOD PRESSURE: 78 MMHG | SYSTOLIC BLOOD PRESSURE: 115 MMHG | OXYGEN SATURATION: 97 % | WEIGHT: 132 LBS | HEART RATE: 88 BPM | HEIGHT: 63 IN | BODY MASS INDEX: 23.39 KG/M2

## 2018-12-17 DIAGNOSIS — Z53.20 ANTICOAGULANT DRUG DECLINED: ICD-10-CM

## 2018-12-17 DIAGNOSIS — I48.20 CHRONIC ATRIAL FIBRILLATION (H): Primary | ICD-10-CM

## 2018-12-17 PROCEDURE — G0463 HOSPITAL OUTPT CLINIC VISIT: HCPCS | Mod: 25,ZF

## 2018-12-17 PROCEDURE — 93005 ELECTROCARDIOGRAM TRACING: CPT | Mod: ZF

## 2018-12-17 PROCEDURE — 93010 ELECTROCARDIOGRAM REPORT: CPT | Mod: ZP | Performed by: INTERNAL MEDICINE

## 2018-12-17 PROCEDURE — 99214 OFFICE O/P EST MOD 30 MIN: CPT | Mod: ZP | Performed by: INTERNAL MEDICINE

## 2018-12-17 ASSESSMENT — PAIN SCALES - GENERAL: PAINLEVEL: NO PAIN (0)

## 2018-12-17 ASSESSMENT — MIFFLIN-ST. JEOR: SCORE: 1102.88

## 2018-12-17 NOTE — NURSING NOTE
Vitals completed successfully medication reconciled. Zuleyka Torres MA  Chief Complaint   Patient presents with     Follow Up      manage atrial fibrillation

## 2018-12-17 NOTE — PROGRESS NOTES
"CARDIOLOGY Followup  HPI: Cordell Donaldson is a 68 year old woman with a history of breast cancer treated with lumpectomy and radiation who we first saw October 2016 for newly diagnosed, asymptomatic atrial fibrillation.  She declined cardioversion because she was undecided about taking anticoagulation and was also planning a trip to [place] with her .  We prescribed Rivaroxaban for CVA prophylaxis (CHADS2 Vasc = 2) took it somewhat reluctantly only for a short period and then stopped it. She also carries a \"pill in the pocket\" Toprol XL 25 mg and has not taken any recently.   Since her last visit with me in 2017, she underwent middle ear surgery without any cardiac issues. She leads an active lifestyle, feels well with no chest pain, dyspnea, peripheral edema, lightheadedness or syncope.      Past Medical History:   Diagnosis Date     Abnormal Pap smear     maybe 20 years ago     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      History of breast cancer 2003    lumpectomy and radiation offerred chemo and patient declined at time but had oncogene test and low risk     Hyperlipidemia LDL goal < 160      Osteopenia      Paroxysmal A-fib (H)      Severe vaginal dysplasia      Allergies   Allergen Reactions     Quinolones Unknown     Pt states that her naturopath recommended against taking any medications in this category.       Current Outpatient Medications   Medication     Acetylcarnitine HCl 250 MG CAPS     aspirin 81 MG tablet     Bacillus Coagulans-Inulin (PROBIOTIC FORMULA) 1-250 BILLION-MG CAPS     BENFOTIAMINE PO     Cholecalciferol (VITAMIN D-3 PO)     Coenzyme Q10 (CO Q 10 PO)     Efraín Cota 550 MG CAPS     MAGNESIUM OXIDE PO     metoprolol succinate (TOPROL XL) 25 MG 24 hr tablet     Omega-3 Fatty Acids (OMEGA-3 FISH OIL PO)     Selenium 200 MCG CAPS     TAURINE PO     No current facility-administered medications for this visit.        ROS:  A complete review of " "systems was negative except as noted above in the HPI.    EXAMINATION  /78 (BP Location: Left arm, Patient Position: Chair, Cuff Size: Adult Regular)   Pulse 88   Ht 1.6 m (5' 3\")   Wt 59.9 kg (132 lb)   SpO2 97%   BMI 23.38 kg/m    Fit-appearing, well-nourished female in no apparent distress.  Unlabored breathing.  Alert & oriented fully.  Neck without JVD  CV - Irregularly irregular rhythm but not tachycardic.  Normal S1 and S2.  No murmur or rub.  Warm extremities without edema.  Clear lung fields  Neuro non focal  Skin:  No petechiae/ecchymoses.  Endocrine: no thyromegaly  Musculoskeletal: no joint swelling or tenderness  Psych: pleasant and conversant      Labs & Imaging tests were reviewed:  TSH   Date Value Ref Range Status   08/22/2017 2.16 0.40 - 4.00 mU/L Final     Cardiac data:  ECG today shows a atrial fibrillation at 83, controlled        ECHO 10/3/16  The patient's rhythm is atrial fibrillation.  Left ventricular function, chamber size, wall motion, and wall thickness are normal.The EF is 60-65%. Global right ventricular function is normal. A small patent foramen ovale is present.      IMPRESSION & PLAN  66 y/o woman with persistent, asymptomatic atrial fibrillation without rapid ventricular rates.  CHADSVasc score is 2 for age and female gender.  She is unwilling to take anticoagulation and understands that her annual risk without being on anticoagulation from the atrial fibrillation is about 3%. She is content using the metoprolol as needed and agrees to return for a revisit in 12 mo.    Hyperlipidemia- based on 2017 lipids, her ASCVD 10-yr risk is 7.4%, and does not qualify for statin therapy for primary prevention. She will continue to exercise regularly and pay attention to her diet.    Elise Huitron MD, MS  Staff Cardiologist  Pager: [phone]      Answers for HPI/ROS submitted by the patient on 12/3/2018   General Symptoms: No  Skin Symptoms: No  HENT Symptoms: No  EYE SYMPTOMS: " No  HEART SYMPTOMS: No  LUNG SYMPTOMS: No  INTESTINAL SYMPTOMS: No  URINARY SYMPTOMS: No  GYNECOLOGIC SYMPTOMS: No  BREAST SYMPTOMS: No  SKELETAL SYMPTOMS: No  BLOOD SYMPTOMS: No  NERVOUS SYSTEM SYMPTOMS: No  MENTAL HEALTH SYMPTOMS: No

## 2018-12-17 NOTE — LETTER
"12/17/2018      RE: Cordell Donaldson  [address]  [facility] [id number]       Dear Colleague,    Thank you for the opportunity to participate in the care of your patient, Cordell Donaldson, at the [facility]. Please see a copy of my visit note below.    CARDIOLOGY Followup  HPI: Cordell Donaldson is a 68 year old woman with a history of breast cancer treated with lumpectomy and radiation who we first saw October 2016 for newly diagnosed, asymptomatic atrial fibrillation.  She declined cardioversion because she was undecided about taking anticoagulation and was also planning a trip to [place] with her .  We prescribed Rivaroxaban for CVA prophylaxis (CHADS2 Vasc = 2) took it somewhat reluctantly only for a short period and then stopped it. She also carries a \"pill in the pocket\" Toprol XL 25 mg and has not taken any recently.   Since her last visit with me in 2017, she underwent middle ear surgery without any cardiac issues. She leads an active lifestyle, feels well with no chest pain, dyspnea, peripheral edema, lightheadedness or syncope.      Past Medical History:   Diagnosis Date     Abnormal Pap smear     maybe 20 years ago     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      History of breast cancer 2003    lumpectomy and radiation offerred chemo and patient declined at time but had oncogene test and low risk     Hyperlipidemia LDL goal < 160      Osteopenia      Paroxysmal A-fib (H)      Severe vaginal dysplasia      Allergies   Allergen Reactions     Quinolones Unknown     Pt states that her naturopath recommended against taking any medications in this category.       Current Outpatient Medications   Medication     Acetylcarnitine HCl 250 MG CAPS     aspirin 81 MG tablet     Bacillus Coagulans-Inulin (PROBIOTIC FORMULA) 1-250 BILLION-MG CAPS     BENFOTIAMINE PO     Cholecalciferol (VITAMIN D-3 PO)     " "Coenzyme Q10 (CO Q 10 PO)     Efraín Cota 550 MG CAPS     MAGNESIUM OXIDE PO     metoprolol succinate (TOPROL XL) 25 MG 24 hr tablet     Omega-3 Fatty Acids (OMEGA-3 FISH OIL PO)     Selenium 200 MCG CAPS     TAURINE PO     No current facility-administered medications for this visit.      EXAMINATION  /78 (BP Location: Left arm, Patient Position: Chair, Cuff Size: Adult Regular)   Pulse 88   Ht 1.6 m (5' 3\")   Wt 59.9 kg (132 lb)   SpO2 97%   BMI 23.38 kg/m     Fit-appearing, well-nourished female in no apparent distress.  Unlabored breathing.  Alert & oriented fully.  Neck without JVD  CV - Irregularly irregular rhythm but not tachycardic.  Normal S1 and S2.  No murmur or rub.  Warm extremities without edema.  Clear lung fields  Neuro non focal  Skin:  No petechiae/ecchymoses.  Endocrine: no thyromegaly  Musculoskeletal: no joint swelling or tenderness  Psych: pleasant and conversant      Labs & Imaging tests were reviewed:  TSH   Date Value Ref Range Status   08/22/2017 2.16 0.40 - 4.00 mU/L Final     Cardiac data:  ECG today shows a atrial fibrillation at 83, controlled        ECHO 10/3/16  The patient's rhythm is atrial fibrillation.  Left ventricular function, chamber size, wall motion, and wall thickness are normal.The EF is 60-65%. Global right ventricular function is normal. A small patent foramen ovale is present.      IMPRESSION & PLAN  66 y/o woman with persistent, asymptomatic atrial fibrillation without rapid ventricular rates.  CHADSVasc score is 2 for age and female gender.  She is unwilling to take anticoagulation and understands that her annual risk without being on anticoagulation from the atrial fibrillation is about 3%. She is content using the metoprolol as needed and agrees to return for a revisit in 12 mo.    Hyperlipidemia- based on 2017 lipids, her ASCVD 10-yr risk is 7.4%, and does not qualify for statin therapy for primary prevention. She will continue to exercise regularly " and pay attention to her diet.    Elise Huitron MD, MS  Staff Cardiologist  Pager: [phone]

## 2018-12-18 LAB — INTERPRETATION ECG - MUSE: NORMAL

## 2019-01-11 ENCOUNTER — OFFICE VISIT (OUTPATIENT)
Dept: FAMILY MEDICINE | Facility: CLINIC | Age: 69
End: 2019-01-11
Payer: COMMERCIAL

## 2019-01-11 VITALS
BODY MASS INDEX: 24.67 KG/M2 | DIASTOLIC BLOOD PRESSURE: 70 MMHG | RESPIRATION RATE: 16 BRPM | OXYGEN SATURATION: 98 % | SYSTOLIC BLOOD PRESSURE: 104 MMHG | HEART RATE: 81 BPM | TEMPERATURE: 97.6 F | WEIGHT: 139.25 LBS

## 2019-01-11 DIAGNOSIS — M67.88 ACHILLES TENDINOSIS: Primary | ICD-10-CM

## 2019-01-11 NOTE — PROGRESS NOTES
"Cordell is a 68 year old female who presents today wanting advise as to who to see for her achilles injury.  Wondering if PT referral would be helpful.   HPI:   - does vitor three times a week and has \"blown out her achilles tendon\"   about 4-6 months ago. Seeing a chiropractor and massage therapist.  Swimming more often and that is helpful. Able to ambulate and exercise without problems but has a bulge on the back of her heel.  ROS:  General: none  Head/Eyes: none  Ears/Nose/Throat: none  Musculoskeletal: see HPI  PROBLEM LIST:  Patient Active Problem List   Diagnosis     Dupuytren contracture     Abnormal electrocardiogram     Atrial fibrillation (H)     Conductive hearing loss in right ear     Severe cervical dysplasia   PAST MEDICAL HISTORY:  Past Medical History:   Diagnosis Date     Abnormal Pap smear     maybe 20 years ago     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      History of breast cancer 2003    lumpectomy and radiation offerred chemo and patient declined at time but had oncogene test and low risk     Hyperlipidemia LDL goal < 160      Osteopenia      Paroxysmal A-fib (H)      Severe vaginal dysplasia    Life Style Modifiers:   Tobacco:  reports that she has quit smoking. She has a 7.50 pack-year smoking history. she has never used smokeless tobacco.   Alcohol:  reports that she drinks alcohol.   Drug use:  reports that she does not use drugs.  PAST SURGICAL HISTORY:  Past Surgical History:   Procedure Laterality Date     BIOPSY       BREAST SURGERY       COLONOSCOPY       Colposcopy Cervix with Loop Electrode Conization and Lser to Vagina  2008     COLPOSCOPY, BIOPSY, COMBINED  10/24/2012    Procedure: COMBINED COLPOSCOPY, BIOPSY;  Colposcopy, Biopsy of Cervix and Vagina, Ultrasound Guidance;  Surgeon: Colette Ng MD;  Location: [facility] OR     ENT SURGERY  1/23/2018     GYN SURGERY  12/12    laparotomy with FRANCIA, presumed stage IA1 SCCA of cervix      HYSTERECTOMY, " FRANCIA FELDMAN with [facility] in 2012.    FAMILY HISTORY:  Family History   Problem Relation Age of Onset     Breast Cancer Sister 47         of breast cancer age 63     Breast Cancer Paternal Grandmother      Myocardial Infarction Mother 73     Heart Disease Father 68     Breast Cancer Sister    SOCIAL HISTORY:  [employer], business is slow at this time  MEDICATIONS:  Current Outpatient Medications   Medication Sig Dispense Refill     Acetylcarnitine HCl 250 MG CAPS Take 500 mg by mouth 2 times daily        aspirin 81 MG tablet Take 81 mg by mouth daily       Bacillus Coagulans-Inulin (PROBIOTIC FORMULA) 1-250 BILLION-MG CAPS Take by mouth three times a week 25+billion CFUS       BENFOTIAMINE PO Take 150 mg by mouth 2 times daily       Cholecalciferol (VITAMIN D-3 PO) Take 5,000 Units by mouth daily       Coenzyme Q10 (CO Q 10 PO) Take 100 mg by mouth daily        Velma Berry 550 MG CAPS Take 1,200 mg by mouth 3 times daily        MAGNESIUM OXIDE PO Take 500 mg by mouth       metoprolol succinate (TOPROL XL) 25 MG 24 hr tablet Take 1 tablet (25 mg) by mouth daily 90 tablet 1     Omega-3 Fatty Acids (OMEGA-3 FISH OIL PO) Take 630 mg by mouth 2 times daily        Selenium 200 MCG CAPS Take 200 mg by mouth       TAURINE PO Take 3,000 mg by mouth     ALLERGIES:  Quinolones  VITALS:  Blood pressure 104/70, pulse 81, temperature 97.6  F (36.4  C), temperature source Oral, resp. rate 16, weight 63.2 kg (139 lb 4 oz), SpO2 98 %.  PHYSICAL EXAM:  Constitutional: Well appearing woman in no acute distress.   Psychological: appropriate mood.  Musculoskeletal: heel has a prominent bulge. Tender to palpation.  Skin: no concerning lesions, no jaundice.  Neurological: normal gait, no tremor.   Diagnoses and associated orders for this visit:  Achilles tendinosis       She will see Dr. Davey for evaluation and possible imaging       She will proceed with scheduling and appointment with Dr. Potter but she is  aware that it may take months to get an appointment.

## 2019-01-14 ENCOUNTER — OFFICE VISIT (OUTPATIENT)
Dept: FAMILY MEDICINE | Facility: CLINIC | Age: 69
End: 2019-01-14
Payer: COMMERCIAL

## 2019-01-14 VITALS
RESPIRATION RATE: 16 BRPM | OXYGEN SATURATION: 97 % | BODY MASS INDEX: 24.62 KG/M2 | HEART RATE: 93 BPM | WEIGHT: 139 LBS | DIASTOLIC BLOOD PRESSURE: 80 MMHG | TEMPERATURE: 97.9 F | SYSTOLIC BLOOD PRESSURE: 113 MMHG

## 2019-01-14 DIAGNOSIS — M76.62 LEFT ACHILLES TENDINITIS: Primary | ICD-10-CM

## 2019-01-14 RX ORDER — DEXAMETHASONE SODIUM PHOSPHATE 4 MG/ML
INJECTION, SOLUTION INTRA-ARTICULAR; INTRALESIONAL; INTRAMUSCULAR; INTRAVENOUS; SOFT TISSUE
Qty: 30 ML | Refills: 0 | Status: SHIPPED | OUTPATIENT
Start: 2019-01-14 | End: 2019-12-17

## 2019-01-14 NOTE — PATIENT INSTRUCTIONS
LEFT sided Achilles tendinopathy with tenosynovitis.   Non-insertional    PLAN:  Suggested physical therapy with iontophoresis as a possible treatment.   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   Sent to see Mendy Matta PT    If no improvement, may obtain X-ray of Calcaneus and consider injection.   Further imaging, e.g. MRI indicated if no improvement

## 2019-01-14 NOTE — NURSING NOTE
68 year old  Chief Complaint   Patient presents with     Ankle Pain     left ankle       Blood pressure 113/80, pulse 93, temperature 97.9  F (36.6  C), temperature source Oral, resp. rate 16, weight 63 kg (139 lb), SpO2 97 %. Body mass index is 24.62 kg/m .  Patient Active Problem List   Diagnosis     Dupuytren contracture     Abnormal electrocardiogram     Atrial fibrillation (H)     Conductive hearing loss in right ear     Severe cervical dysplasia       Wt Readings from Last 2 Encounters:   01/14/19 63 kg (139 lb)   01/11/19 63.2 kg (139 lb 4 oz)     BP Readings from Last 3 Encounters:   01/14/19 113/80   01/11/19 104/70   12/17/18 115/78         Current Outpatient Medications   Medication     Acetylcarnitine HCl 250 MG CAPS     aspirin 81 MG tablet     Bacillus Coagulans-Inulin (PROBIOTIC FORMULA) 1-250 BILLION-MG CAPS     BENFOTIAMINE PO     Cholecalciferol (VITAMIN D-3 PO)     Coenzyme Q10 (CO Q 10 PO)     [place] Cota 550 MG CAPS     MAGNESIUM OXIDE PO     metoprolol succinate (TOPROL XL) 25 MG 24 hr tablet     Omega-3 Fatty Acids (OMEGA-3 FISH OIL PO)     Selenium 200 MCG CAPS     TAURINE PO     No current facility-administered medications for this visit.        Social History     Tobacco Use     Smoking status: Former Smoker     Packs/day: 0.50     Years: 15.00     Pack years: 7.50     Smokeless tobacco: Never Used   Substance Use Topics     Alcohol use: Yes     Comment: Socially      Drug use: No       Health Maintenance Due   Topic Date Due     HEPATITIS C SCREENING  04/14/1969     DTAP/TDAP/TD IMMUNIZATION (1 - Tdap) 04/14/1976     ZOSTER IMMUNIZATION (1 of 2) 04/14/2001     ADVANCE DIRECTIVE PLANNING Q5 YRS  04/14/2006     DEXA SCAN SCREENING (SYSTEM ASSIGNED)  04/14/2016     INFLUENZA VACCINE (1) 09/01/2018     FALL RISK ASSESSMENT  02/02/2019       Lab Results   Component Value Date    PAP NIL 08/21/2018 January 14, 2019 2:22 PM

## 2019-01-14 NOTE — PROGRESS NOTES
"REASON FOR VISIT:  LEFT achilles pain.       HISTORY OF PRESENT ILLNESS: Cordell Donaldson is a 68 year old female who presents for left achilles pain.    Cordell first noticed 6-8 months ago that her achilles tendon was swollen with intermittent pain, \"I blew out my achilles tendon.\" She was treating the swelling with kinesiotape, chiropractic visits, and massage therapy. She reports she first had pain starting 4 months ago, initially during the end of her exercise. She participates in [employer] 3 times a week. thought that she was doing better until 1/10/19, 4 days ago when she had an increase in pain. Cordell saw Dr. Paredes 1/11/19, 3 days ago who recommended she follow up me with me for evaluation and possible imaging.   She is interested in trying physical therapy and asks about dance physical therapy.     SOCIAL HISTORY:   Social History     Social History Narrative     Not on file           MEDICATIONS:  Reviewed.       REVIEW OF SYSTEMS: POSITIVE for 6-8 months of LEFT achilles tendon swelling and pain. She participates in [employer] 3x per week. Otherwise, the ROS is negative.      OBJECTIVE:      EXAM:  VITAL SIGNS: /80 (BP Location: Right arm, Patient Position: Sitting, Cuff Size: Adult Regular)   Pulse 93   Temp 97.9  F (36.6  C) (Oral)   Resp 16   Wt 63 kg (139 lb)   SpO2 97%   BMI 24.62 kg/m    Constitutional: healthy, alert and no distress   LEFT ankle:   Observation: Fusiform swelling of LEFT achilles tendon approximally 2-3 cm proximal to the insertion on the calcaneus.    Palpation: mild tenderness with squeeze over the involved area. Achilles is felt without defect.   Range of motion: Lacks 5 degrees of dorsiflexion as compared to the unaffected side.    Special Tests: Spencer test negative. She can stand on her toes without difficulty, no weakness.       LABORATORY DATA: None      ASSESSMENT AND PLAN:   LEFT sided Achilles tendinopathy with tenosynovitis. "   Non-insertional    PLAN:  Suggested physical therapy with iontophoresis as a possible treatment.   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   Sent to see Mendy Matta PT    If no improvement, may obtain X-ray of Calcaneus and consider injection.   Further imaging, e.g. MRI indicated if no improvement    Call with any problems or questions.      I, Lyn Delong, am serving as a scribe to document services personally performed by Surya Davey MD based on data collection and the provider's statements to me. Dr. Davey has reviewed, edited and approved the above note.     --Surya Davey MD

## 2019-01-16 ENCOUNTER — THERAPY VISIT (OUTPATIENT)
Dept: PHYSICAL THERAPY | Facility: CLINIC | Age: 69
End: 2019-01-16
Payer: COMMERCIAL

## 2019-01-16 DIAGNOSIS — M76.62 LEFT ACHILLES TENDINITIS: ICD-10-CM

## 2019-01-16 DIAGNOSIS — M76.62 ACHILLES TENDINITIS, LEFT LEG: ICD-10-CM

## 2019-01-16 PROCEDURE — 97140 MANUAL THERAPY 1/> REGIONS: CPT | Mod: GP | Performed by: PHYSICAL THERAPIST

## 2019-01-16 PROCEDURE — 97161 PT EVAL LOW COMPLEX 20 MIN: CPT | Mod: GP | Performed by: PHYSICAL THERAPIST

## 2019-01-16 PROCEDURE — 97110 THERAPEUTIC EXERCISES: CPT | Mod: GP | Performed by: PHYSICAL THERAPIST

## 2019-01-16 NOTE — PROGRESS NOTES
"[facility] Initial Evaluation  Subjective:  The history is provided by the patient.   Cordell Donaldson is a 68 year old female with a left ankle condition.  Condition occurred with:  Repetition/overuse.  Condition occurred: during recreation/sport.  This is a new condition  Date of orders 1/14/19    Pain onset 6-8 months ago during [employer], noticed swelling and a \"lump\". Self treated with KT tape. Now notices fatigue 45 minutes into activity. Improves with icing, chiropractic, massage.    PMH: hysterectomy, L side incision pt reports she had PT post operatively to help with scar tissue. Reports hx of L side hip issues in the past    Social: Realtor, [employer] 3d/wk, yoga 1 d/wk, sees chiro/massage 1x/month.    Patient reports pain:  Posterior.  Radiates to:  Ankle.  Pain is described as aching and is intermittent and reported as 4/10.  Associated symptoms:  Loss of motion/stiffness and loss of strength. Worse during: post activity.  Symptoms are exacerbated by activity and running (jumping/dancing) and relieved by ice.      Previous treatment includes chiropractic (massage).  There was moderate improvement following previous treatment.  General health as reported by patient is excellent.                                              Objective:  Standing Alignment:        Lumbar:  Anterior pelvic tilt      Knee:  Genu recuvatum L and genu recurvatum R  Ankle/foot deviations: inc thickness L achilles.    Gait:    Weight Bearing Status:  WBAT     Deviations:  Hip:  Excessive flexion LKnee:  Knee extension decr LAnkle:  Push off decr L          Ankle/Foot Evaluation  ROM:      PROM:    Dorsiflexion: Left:    Dec L ankle DF     Right:                   Strength wnl ankle: dec hip ext strength L, dec gastroc strength L.    SPECIAL TESTS:     Left ankle negative for the following special tests:  Spencer sign    PALPATION:   Left ankle tenderness present at:  gastroc/soleus; achilles tendon and plantar " fascia    EDEMA:   Left ankle edema present at: posterior (L achilles thickening)          MOBILITY TESTING:       Talocrural Left: hypomobile      Subtalar Left: hypomobile          FUNCTIONAL TESTS:         Quad:      Bilateral Leg Squat:  Control is mild loss of control and excessive anterior knee excursion                                                        General     ROS    Assessment/Plan:    Patient is a 68 year old female with left side ankle complaints.    Patient has the following significant findings with corresponding treatment plan.                Diagnosis 1:  L ankle achilles tendonosis  Pain -  hot/cold therapy, manual therapy, splint/taping/bracing/orthotics, self management, education and home program  Decreased ROM/flexibility - manual therapy, therapeutic exercise and home program  Decreased joint mobility - manual therapy, therapeutic exercise and home program  Decreased strength - therapeutic exercise, therapeutic activities and home program  Impaired balance - neuro re-education, gait training, therapeutic activities and home program  Impaired gait - gait training and home program    Therapy Evaluation Codes:   1) History comprised of:   Personal factors that impact the plan of care:      Age, Gender, Living environment, Past/current experiences and Time since onset of symptoms.    Comorbidity factors that impact the plan of care are:      None.     Medications impacting care: None.  2) Examination of Body Systems comprised of:   Body structures and functions that impact the plan of care:      Ankle.   Activity limitations that impact the plan of care are:      Bathing, Bending, Jumping, Lifting, Running and Walking.  3) Clinical presentation characteristics are:   Stable/Uncomplicated.  4) Decision-Making    Low complexity using standardized patient assessment instrument and/or measureable assessment of functional outcome.  Cumulative Therapy Evaluation is: Low complexity.    Previous and  current functional limitations:  (See Goal Flow Sheet for this information)    Short term and Long term goals: (See Goal Flow Sheet for this information)     Communication ability:  Patient appears to be able to clearly communicate and understand verbal and written communication and follow directions correctly.  Treatment Explanation - The following has been discussed with the patient:   RX ordered/plan of care  Anticipated outcomes  Possible risks and side effects  This patient would benefit from PT intervention to resume normal activities.   Rehab potential is good.    Frequency:  1 X week, once daily  Duration:  for 4 weeks tapering to 2 X a month over 8 weeks  Discharge Plan:  Achieve all LTG.  Independent in home treatment program.  Reach maximal therapeutic benefit.    Please refer to the daily flowsheet for treatment today, total treatment time and time spent performing 1:1 timed codes.

## 2019-01-24 DIAGNOSIS — I48.0 PAROXYSMAL ATRIAL FIBRILLATION (H): ICD-10-CM

## 2019-01-25 RX ORDER — METOPROLOL SUCCINATE 25 MG/1
25 TABLET, EXTENDED RELEASE ORAL DAILY
Qty: 90 TABLET | Refills: 3 | Status: SHIPPED | OUTPATIENT
Start: 2019-01-25 | End: 2020-01-23

## 2019-01-30 ENCOUNTER — THERAPY VISIT (OUTPATIENT)
Dept: PHYSICAL THERAPY | Facility: CLINIC | Age: 69
End: 2019-01-30
Payer: COMMERCIAL

## 2019-01-30 DIAGNOSIS — M76.62 ACHILLES TENDINITIS, LEFT LEG: ICD-10-CM

## 2019-01-30 PROCEDURE — 97112 NEUROMUSCULAR REEDUCATION: CPT | Mod: GP | Performed by: PHYSICAL THERAPIST

## 2019-01-30 PROCEDURE — 97110 THERAPEUTIC EXERCISES: CPT | Mod: GP | Performed by: PHYSICAL THERAPIST

## 2019-01-30 PROCEDURE — 97140 MANUAL THERAPY 1/> REGIONS: CPT | Mod: GP | Performed by: PHYSICAL THERAPIST

## 2019-02-06 ENCOUNTER — THERAPY VISIT (OUTPATIENT)
Dept: PHYSICAL THERAPY | Facility: CLINIC | Age: 69
End: 2019-02-06
Payer: COMMERCIAL

## 2019-02-06 DIAGNOSIS — M76.62 ACHILLES TENDINITIS, LEFT LEG: ICD-10-CM

## 2019-02-06 PROCEDURE — 97112 NEUROMUSCULAR REEDUCATION: CPT | Mod: GP | Performed by: PHYSICAL THERAPIST

## 2019-02-06 PROCEDURE — 97140 MANUAL THERAPY 1/> REGIONS: CPT | Mod: GP | Performed by: PHYSICAL THERAPIST

## 2019-02-13 ENCOUNTER — THERAPY VISIT (OUTPATIENT)
Dept: PHYSICAL THERAPY | Facility: CLINIC | Age: 69
End: 2019-02-13
Payer: COMMERCIAL

## 2019-02-13 DIAGNOSIS — M76.62 ACHILLES TENDINITIS, LEFT LEG: ICD-10-CM

## 2019-02-13 PROCEDURE — 97112 NEUROMUSCULAR REEDUCATION: CPT | Mod: GP | Performed by: PHYSICAL THERAPIST

## 2019-02-13 PROCEDURE — 97140 MANUAL THERAPY 1/> REGIONS: CPT | Mod: GP | Performed by: PHYSICAL THERAPIST

## 2019-02-27 ENCOUNTER — THERAPY VISIT (OUTPATIENT)
Dept: PHYSICAL THERAPY | Facility: CLINIC | Age: 69
End: 2019-02-27
Payer: COMMERCIAL

## 2019-02-27 DIAGNOSIS — M76.62 ACHILLES TENDINITIS, LEFT LEG: ICD-10-CM

## 2019-02-27 PROCEDURE — 97140 MANUAL THERAPY 1/> REGIONS: CPT | Mod: GP | Performed by: PHYSICAL THERAPIST

## 2019-02-27 PROCEDURE — 97110 THERAPEUTIC EXERCISES: CPT | Mod: GP | Performed by: PHYSICAL THERAPIST

## 2019-03-21 ENCOUNTER — THERAPY VISIT (OUTPATIENT)
Dept: PHYSICAL THERAPY | Facility: CLINIC | Age: 69
End: 2019-03-21
Payer: COMMERCIAL

## 2019-03-21 DIAGNOSIS — M76.62 ACHILLES TENDINITIS, LEFT LEG: ICD-10-CM

## 2019-03-21 PROCEDURE — 97140 MANUAL THERAPY 1/> REGIONS: CPT | Mod: GP | Performed by: PHYSICAL THERAPIST

## 2019-03-21 PROCEDURE — 97110 THERAPEUTIC EXERCISES: CPT | Mod: GP | Performed by: PHYSICAL THERAPIST

## 2019-10-03 ENCOUNTER — HEALTH MAINTENANCE LETTER (OUTPATIENT)
Age: 69
End: 2019-10-03

## 2019-10-09 ENCOUNTER — DOCUMENTATION ONLY (OUTPATIENT)
Dept: CARE COORDINATION | Facility: CLINIC | Age: 69
End: 2019-10-09

## 2019-11-21 NOTE — PROGRESS NOTES
"Discharge Note    Progress reporting period is from last progress note on Jan 16  to Mar 21, 2019.    Cordell failed to follow up and current status is unknown.  Please see information below for last relevant information on current status.  Patient seen for 6 visits.    SUBJECTIVE  Subjective changes noted by patient:  Some intermittent pain, has been doing a lot of shoveling and chopping ice. Admits to being less consistent w/HEP  .  Current pain level is  .     Previous pain level was  4/10.   Changes in function:  Yes (See Goal flowsheet attached for changes in current functional level)  Adverse reaction to treatment or activity: None    OBJECTIVE  Changes noted in objective findings: 5-/5 gastroc strength L, gait w/trendelenberg, SLS w/o trendelenberg x 30\". Localized effusion around midsubstance achilles tendon. dec Gastroc flexibility. Instr pt to either try anti inflammatories for 7 days or pursue dexamethasone treatment due to lack of progress     ASSESSMENT/PLAN  Diagnosis: L achilles   Updated problem list and treatment plan:   Pain - HEP  Decreased ROM/flexibility - HEP  Decreased strength - HEP  Impaired gait - HEP  Impaired balance - HEP  Decreased joint mobility - HEP  STG/LTGs have been met or progress has been made towards goals:  Yes, please see goal flowsheet for most current information  Assessment of Progress: current status is unknown.    Last current status: Pt is progressing slower than anticipated   Self Management Plans:  HEP  I have re-evaluated this patient and find that the nature, scope, duration and intensity of the therapy is appropriate for the medical condition of the patient.  Cordell continues to require the following intervention to meet STG and LTG's:  HEP.    Recommendations:  Discharge with current home program.  Patient to follow up with MD as needed.    Please refer to the daily flowsheet for treatment today, total treatment time and time spent performing 1:1 timed " codes.

## 2019-12-09 ENCOUNTER — OFFICE VISIT (OUTPATIENT)
Dept: CARDIOLOGY | Facility: CLINIC | Age: 69
End: 2019-12-09
Attending: INTERNAL MEDICINE
Payer: COMMERCIAL

## 2019-12-09 VITALS
HEIGHT: 63 IN | SYSTOLIC BLOOD PRESSURE: 128 MMHG | DIASTOLIC BLOOD PRESSURE: 76 MMHG | WEIGHT: 137.2 LBS | BODY MASS INDEX: 24.31 KG/M2 | OXYGEN SATURATION: 96 % | HEART RATE: 94 BPM

## 2019-12-09 DIAGNOSIS — Z91.89 10 YEAR RISK OF MI OR STROKE 7.5% OR GREATER: ICD-10-CM

## 2019-12-09 DIAGNOSIS — I48.20 CHRONIC ATRIAL FIBRILLATION (H): Primary | ICD-10-CM

## 2019-12-09 PROCEDURE — 93005 ELECTROCARDIOGRAM TRACING: CPT | Mod: ZF

## 2019-12-09 PROCEDURE — 99214 OFFICE O/P EST MOD 30 MIN: CPT | Mod: GC | Performed by: INTERNAL MEDICINE

## 2019-12-09 PROCEDURE — G0463 HOSPITAL OUTPT CLINIC VISIT: HCPCS | Mod: 25,ZF

## 2019-12-09 ASSESSMENT — MIFFLIN-ST. JEOR: SCORE: 1121.47

## 2019-12-09 ASSESSMENT — PAIN SCALES - GENERAL: PAINLEVEL: NO PAIN (0)

## 2019-12-09 NOTE — NURSING NOTE
Chief Complaint   Patient presents with     Follow Up      manage atrial fibrillation     Vitals were taken and medications were reconciled. EKG was performed.    Chloe Howell  11:22 AM

## 2019-12-09 NOTE — PATIENT INSTRUCTIONS
Patient Instructions:  It was a pleasure to see you in the cardiology clinic today.      If you have any questions, call  Nubia Ferrer RN, at [phone].  Press Option #1 for the [facility], and then press Option #4  We are encouraging the use of SourceTrace System[employer] to communicate with your HealthCare Provider    Note the new or changes to your medications: None  Stop the following medications: None    The results from today include: None  Please follow up with Dr. Huitron 1 year      If you have an urgent need after hours (8:00 am to 4:30 pm) please call [phone] and ask for the cardiology fellow on call.     PCP reccomendations provided

## 2019-12-09 NOTE — LETTER
12/9/2019      RE: Cordell Donaldson  [address]  [facility] [address]       Dear Colleague,    Thank you for the opportunity to participate in the care of your patient, Cordell Donaldson, at the [facility]. Please see a copy of my visit note below.    CARDIOLOGY Followup  HPI: Cordell Donaldson is a 69 year old woman with a history of breast cancer treated with lumpectomy and radiation who we first saw October 2016 for newly diagnosed, asymptomatic atrial fibrillation.  She declined cardioversion because she was undecided about taking anticoagulation.  We prescribed Rivaroxaban for CVA prophylaxis (CHADS2 Vasc = 2) took it somewhat reluctantly only for a short period and then stopped it. She takesToprol XL 25 mg daily.   Since her last visit with me in 2018, she denies any cardiac issues. She leads an active lifestyle, feels well with no chest pain, dyspnea, peripheral edema, lightheadedness or syncope.      Past Medical History:   Diagnosis Date     Abnormal Pap smear     maybe 20 years ago     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      History of breast cancer 2003    lumpectomy and radiation offerred chemo and patient declined at time but had oncogene test and low risk     Hyperlipidemia LDL goal < 160      Osteopenia      Paroxysmal A-fib (H)      Severe vaginal dysplasia      Allergies   Allergen Reactions     Quinolones Unknown     Pt states that her naturopath recommended against taking any medications in this category.       Current Outpatient Medications   Medication     Acetylcarnitine HCl 250 MG CAPS     Bacillus Coagulans-Inulin (PROBIOTIC FORMULA) 1-250 BILLION-MG CAPS     BENFOTIAMINE PO     Coenzyme Q10 (CO Q 10 PO)     Efraín Cota 550 MG CAPS     MAGNESIUM OXIDE PO     metoprolol succinate ER (TOPROL XL) 25 MG 24 hr tablet     Omega-3 Fatty Acids (OMEGA-3 FISH OIL PO)     Selenium 200 MCG  "CAPS     TAURINE PO     aspirin 81 MG tablet     Cholecalciferol (VITAMIN D-3 PO)     dexamethasone (DECADRON) 4 MG/ML injection     No current facility-administered medications for this visit.        ROS:  A complete review of systems was negative except as noted above in the HPI.    EXAMINATION  /76 (BP Location: Left arm, Patient Position: Chair, Cuff Size: Adult Regular)   Pulse 94   Ht 1.6 m (5' 3\")   Wt 62.2 kg (137 lb 3.2 oz)   SpO2 96%   BMI 24.30 kg/m     Fit-appearing, well-nourished female in no apparent distress.  Unlabored breathing.  Alert & oriented fully.  Neck without JVD  CV - Irregularly irregular rhythm but not tachycardic.  Normal S1 and S2.  No murmur or rub.  Warm extremities without edema.  Clear lung fields  Neuro non focal  Skin:  No petechiae/ecchymoses.  Endocrine: no thyromegaly  Musculoskeletal: no joint swelling or tenderness  Psych: pleasant and conversant      Labs & Imaging tests were reviewed:  TSH   Date Value Ref Range Status   08/22/2017 2.16 0.40 - 4.00 mU/L Final     Cardiac data:  ECG today shows a atrial fibrillation at 83, controlled        ECHO 10/3/16  The patient's rhythm is atrial fibrillation.  Left ventricular function, chamber size, wall motion, and wall thickness are normal.The EF is 60-65%. Global right ventricular function is normal. A small patent foramen ovale is present.      IMPRESSION & PLAN  #Atrial fibrillation (CHADVASC 2)  Rate appears to be well controlled with current metoprolol dose. Her stroke risk per year is 2-3%. She continues to decline starting on anticoagulation and is currently taking \"natural blood thinners; tumeric and garlic\". No signs of decompensation on exam or history.   - Continue home dose of metoprolol succinate    #HLD  Last LDL in 2017 was 143. Her ASCVD is 7.7%.   - Repeat lipid panel when fasting  - Will discuss statin indication depending on updated labs    Patient evaluated and discussed with Dr. Huitron.     Lalo " Don, [facility]  Cardiology Fellow    ATTENDING ATTESTATION:  This patient has been seen and examined by me December 9, 2019 with Lalo Ochoa MD, cardiology fellow. I have reviewed the vitals, laboratory and imaging data relevant to this patient's care. I have edited this note to reflect our joint assessment and plan, and discussed the plan with the patient    68 y/o woman with persistent, asymptomatic atrial fibrillation without rapid ventricular rates.  CHADSVasc score is 2 for age and female gender.  She is unwilling to take anticoagulation and understands that her annual risk without being on anticoagulation from the atrial fibrillation is about 3%. She is content using the metoprolol as needed and agrees to return for a revisit in 12 mo.    Hyperlipidemia- based on 2017 lipids, her ASCVD 10-yr risk is 7.4%, and does qualify for statin therapy for primary prevention but she is not interested. She is in agreement to another lipid panel this year. She will continue to exercise regularly and pay attention to her diet.     Elise Huitron MD, MS  Staff Cardiologist  Pager: [phone]

## 2019-12-09 NOTE — PROGRESS NOTES
CARDIOLOGY Followup  HPI: Cordell Donaldson is a 69 year old woman with a history of breast cancer treated with lumpectomy and radiation who we first saw October 2016 for newly diagnosed, asymptomatic atrial fibrillation.  She declined cardioversion because she was undecided about taking anticoagulation.  We prescribed Rivaroxaban for CVA prophylaxis (CHADS2 Vasc = 2) took it somewhat reluctantly only for a short period and then stopped it. She takesToprol XL 25 mg daily.   Since her last visit with me in 2018, she denies any cardiac issues. She leads an active lifestyle, feels well with no chest pain, dyspnea, peripheral edema, lightheadedness or syncope.      Past Medical History:   Diagnosis Date     Abnormal Pap smear     maybe 20 years ago     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      History of breast cancer 2003    lumpectomy and radiation offerred chemo and patient declined at time but had oncogene test and low risk     Hyperlipidemia LDL goal < 160      Osteopenia      Paroxysmal A-fib (H)      Severe vaginal dysplasia      Allergies   Allergen Reactions     Quinolones Unknown     Pt states that her naturopath recommended against taking any medications in this category.       Current Outpatient Medications   Medication     Acetylcarnitine HCl 250 MG CAPS     Bacillus Coagulans-Inulin (PROBIOTIC FORMULA) 1-250 BILLION-MG CAPS     BENFOTIAMINE PO     Coenzyme Q10 (CO Q 10 PO)     [place] Cota 550 MG CAPS     MAGNESIUM OXIDE PO     metoprolol succinate ER (TOPROL XL) 25 MG 24 hr tablet     Omega-3 Fatty Acids (OMEGA-3 FISH OIL PO)     Selenium 200 MCG CAPS     TAURINE PO     aspirin 81 MG tablet     Cholecalciferol (VITAMIN D-3 PO)     dexamethasone (DECADRON) 4 MG/ML injection     No current facility-administered medications for this visit.        ROS:  A complete review of systems was negative except as noted above in the HPI.    EXAMINATION  /76 (BP Location:  "Left arm, Patient Position: Chair, Cuff Size: Adult Regular)   Pulse 94   Ht 1.6 m (5' 3\")   Wt 62.2 kg (137 lb 3.2 oz)   SpO2 96%   BMI 24.30 kg/m    Fit-appearing, well-nourished female in no apparent distress.  Unlabored breathing.  Alert & oriented fully.  Neck without JVD  CV - Irregularly irregular rhythm but not tachycardic.  Normal S1 and S2.  No murmur or rub.  Warm extremities without edema.  Clear lung fields  Neuro non focal  Skin:  No petechiae/ecchymoses.  Endocrine: no thyromegaly  Musculoskeletal: no joint swelling or tenderness  Psych: pleasant and conversant      Labs & Imaging tests were reviewed:  TSH   Date Value Ref Range Status   08/22/2017 2.16 0.40 - 4.00 mU/L Final     Cardiac data:  ECG today shows a atrial fibrillation at 83, controlled        ECHO 10/3/16  The patient's rhythm is atrial fibrillation.  Left ventricular function, chamber size, wall motion, and wall thickness are normal.The EF is 60-65%. Global right ventricular function is normal. A small patent foramen ovale is present.      IMPRESSION & PLAN  #Atrial fibrillation (CHADVASC 2)  Rate appears to be well controlled with current metoprolol dose. Her stroke risk per year is 2-3%. She continues to decline starting on anticoagulation and is currently taking \"natural blood thinners; tumeric and garlic\". No signs of decompensation on exam or history.   - Continue home dose of metoprolol succinate    #HLD  Last LDL in 2017 was 143. Her ASCVD is 7.7%.   - Repeat lipid panel when fasting  - Will discuss statin indication depending on updated labs    Patient evaluated and discussed with Dr. Huitron.     Lalo Ochoa, [facility]  Cardiology Fellow    ATTENDING ATTESTATION:  This patient has been seen and examined by me December 9, 2019 with Lalo Ochoa MD, cardiology fellow. I have reviewed the vitals, laboratory and imaging data relevant to this patient's care. I have edited this note to reflect our joint assessment and plan, and " discussed the plan with the patient    68 y/o woman with persistent, asymptomatic atrial fibrillation without rapid ventricular rates.  CHADSVasc score is 2 for age and female gender.  She is unwilling to take anticoagulation and understands that her annual risk without being on anticoagulation from the atrial fibrillation is about 3%. She is content using the metoprolol as needed and agrees to return for a revisit in 12 mo.    Hyperlipidemia- based on 2017 lipids, her ASCVD 10-yr risk is 7.4%, and does qualify for statin therapy for primary prevention but she is not interested. She is in agreement to another lipid panel this year. She will continue to exercise regularly and pay attention to her diet.     Elise Huitron MD, MS  Staff Cardiologist  Pager: [phone]      Answers for HPI/ROS submitted by the patient on 12/6/2019   General Symptoms: No  Skin Symptoms: No  HENT Symptoms: No  EYE SYMPTOMS: No  HEART SYMPTOMS: No  LUNG SYMPTOMS: No  INTESTINAL SYMPTOMS: No  URINARY SYMPTOMS: No  GYNECOLOGIC SYMPTOMS: No  BREAST SYMPTOMS: No  SKELETAL SYMPTOMS: No  BLOOD SYMPTOMS: No  NERVOUS SYSTEM SYMPTOMS: No  MENTAL HEALTH SYMPTOMS: No

## 2019-12-11 PROBLEM — M76.62 ACHILLES TENDINITIS, LEFT LEG: Status: RESOLVED | Noted: 2019-01-16 | Resolved: 2019-12-11

## 2019-12-16 ENCOUNTER — OFFICE VISIT (OUTPATIENT)
Dept: FAMILY MEDICINE | Facility: CLINIC | Age: 69
End: 2019-12-16
Payer: COMMERCIAL

## 2019-12-16 VITALS
HEART RATE: 83 BPM | WEIGHT: 140.25 LBS | HEIGHT: 63 IN | TEMPERATURE: 97.5 F | BODY MASS INDEX: 24.85 KG/M2 | SYSTOLIC BLOOD PRESSURE: 119 MMHG | OXYGEN SATURATION: 96 % | RESPIRATION RATE: 18 BRPM | DIASTOLIC BLOOD PRESSURE: 74 MMHG

## 2019-12-16 DIAGNOSIS — D06.9 SEVERE CERVICAL DYSPLASIA: ICD-10-CM

## 2019-12-16 DIAGNOSIS — M76.62 LEFT ACHILLES TENDINITIS: ICD-10-CM

## 2019-12-16 DIAGNOSIS — Z00.00 ENCOUNTER FOR MEDICARE ANNUAL WELLNESS EXAM: Primary | ICD-10-CM

## 2019-12-16 DIAGNOSIS — M79.644 BILATERAL THUMB PAIN: ICD-10-CM

## 2019-12-16 DIAGNOSIS — M79.645 BILATERAL THUMB PAIN: ICD-10-CM

## 2019-12-16 ASSESSMENT — MIFFLIN-ST. JEOR: SCORE: 1135.17

## 2019-12-16 NOTE — PROGRESS NOTES
Cordell Donaldson is here for a general check up. Her previous PCP was Dr. Bess Paredes. She is not fasting. She is up to date on eye exams (glasses) and dental visits. No concerns for glaucoma, cataracts, or macular degeneration. Wears seat belt-yes. Bike helmet- yes. Concerns today:    ANNEL Landa is a 69 year old female here for a general check-up. She declines immunizations. Mammogram is up to date. She is due for a baseline DEXA scan, declines. Colonoscopy last done in 2018 and was normal. She will be up to date until 2028. She follows a vegan diet, eats fish occasionally.     History of cervical dysplasia  She has a history of cervical dysplasia and hysterectomy approximately 7 years ago. Ovaries are intact. She did not need radiation or chemotherapy. Her last pap was in normal with negative HPV. She is due for a pap this year and it is advised that these patients continue pap smears for at least 20 yrs post surgery.     History of breast cancer  She had left breast cancer in 2013, s/p lumpectomy and radiation. She was on tamoxifen for 5 years. She has mammograms every other year. She is up to date until next year.     Thumb pain  She is experiencing some pain at the base of her thumbs bilaterally. She has been doing treatment with her chiropractor with improvement. She has no other joint pain.     Advance directive: Yes -- on file  Hearing concerns: None  Fall Risk: None  Independent at home: Yes  Safe : Yes  Memory concerns: Yes, difficulty remembering names.     COGNITIVE SCREEN  1) Repeat 3 items (Bicycle, Hat, [place])    2) Clock draw: NORMAL  3) 3 item recall: Recalls 2 objects   Results: NORMAL clock, 1-2 items recalled: COGNITIVE IMPAIRMENT LESS LIKELY    Mini-CogTM Copyright [employer] [place]. Licensed by the author for use in [place] [employer] [facility]; reprinted with permission ([email or web address]). All rights reserved.          Health Maintenance   Topic Date Due     DEXA  1950     HEPATITIS C SCREENING   1950     ADVANCE CARE PLANNING  1950     DTAP/TDAP/TD IMMUNIZATION (1 - Tdap) 1962     ZOSTER IMMUNIZATION (1 of 2) 2001     PNEUMOCOCCAL IMMUNIZATION 65+ LOW/MEDIUM RISK (1 of 2 - PCV13) 2016     FALL RISK ASSESSMENT  2019     MAMMO SCREENING  2020     MEDICARE ANNUAL WELLNESS VISIT  2020     LIPID  2022     COLONOSCOPY  2028     PHQ-2  Completed     INFLUENZA VACCINE  Addressed     IPV IMMUNIZATION  Aged Out     MENINGITIS IMMUNIZATION  Aged Out         Patient Active Problem List   Diagnosis     Dupuytren contracture     Abnormal electrocardiogram     Atrial fibrillation (H)     Conductive hearing loss in right ear     Severe cervical dysplasia       Past Surgical History:   Procedure Laterality Date     BIOPSY       BREAST SURGERY       COLONOSCOPY       Colposcopy Cervix with Loop Electrode Conization and Lser to Vagina       COLPOSCOPY, BIOPSY, COMBINED  10/24/2012    Procedure: COMBINED COLPOSCOPY, BIOPSY;  Colposcopy, Biopsy of Cervix and Vagina, Ultrasound Guidance;  Surgeon: Colette Ng MD;  Location: [facility] OR     ENT SURGERY  2018     GYN SURGERY      laparotomy with FRANCIA, 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        Family History   Problem Relation Age of Onset     Breast Cancer Sister 47         of breast cancer age 63     Breast Cancer Paternal Grandmother      Myocardial Infarction Mother 73     Heart Disease Father 68     Breast Cancer Sister        Social    Works in real estate    HABITS:  Tob: None  ETOH: Socially drinks occasionally  Calcium: 2-3/day, no family history of osteoporosis  Exercise: Yoga    OB/GYN HISTORY:  Patient is postmenopausal.  Hx abnormal pap? Yes, history of cervical dysplasia, s/p FRANCIA   STD hx? None  Vasomotor sx: None  G 2   P 1   A 1    Current Outpatient Medications   Medication Sig Dispense Refill      Acetylcarnitine HCl 250 MG CAPS Take 500 mg by mouth 2 times daily        aspirin 81 MG tablet Take 81 mg by mouth daily       Bacillus Coagulans-Inulin (PROBIOTIC FORMULA) 1-250 BILLION-MG CAPS Take by mouth three times a week 25+billion CFUS       BENFOTIAMINE PO Take 150 mg by mouth 2 times daily       Cholecalciferol (VITAMIN D-3 PO) Take 5,000 Units by mouth daily       Coenzyme Q10 (CO Q 10 PO) Take 100 mg by mouth daily        dexamethasone (DECADRON) 4 MG/ML injection Bring to physical therapist office for use in Iontophoresis. 30 mL 0     Efraín Berry 550 MG CAPS Take 1,200 mg by mouth 3 times daily        MAGNESIUM OXIDE PO Take 500 mg by mouth       metoprolol succinate ER (TOPROL XL) 25 MG 24 hr tablet Take 1 tablet (25 mg) by mouth daily 90 tablet 3     Omega-3 Fatty Acids (OMEGA-3 FISH OIL PO) Take 630 mg by mouth 2 times daily        Selenium 200 MCG CAPS Take 200 mg by mouth       TAURINE PO Take 3,000 mg by mouth       Allergies   Allergen Reactions     Quinolones Unknown     Pt states that her naturopath recommended against taking any medications in this category.         ROS  CONSTITUTIONAL:NEGATIVE for fever, chills, change in weight  INTEGUMENTARY/SKIN: NEGATIVE for worrisome rashes, moles or lesions  EYES: NEGATIVE for vision changes or irritation  ENT/MOUTH: NEGATIVE for ear, mouth and throat problems  RESP:NEGATIVE for significant cough or SOB  BREAST: NEGATIVE for masses, tenderness or discharge Hx of left breast cancer, s/p lumpectomy (2013)  CV: NEGATIVE for chest pain, palpitations, SUN, orthopnea, PND  or peripheral edema  GI: NEGATIVE for nausea, abdominal pain, heartburn, or change in bowel habits  :NEGATIVE for frequency, dysuria, or hematuria  GYN:  Hx of cervical dysplasia, s/p hysterectomy (2012)  MUSCULOSKELETAL:NEGATIVE for significant arthralgias or myalgia POS for pain at the base of her thumb bilaterally.   NEURO: NEGATIVE for weakness, dizziness or  "paresthesias  ENDOCRINE: NEGATIVE for polyuria/dipsia,  temperature intolerance, skin/hair changes  HEME/ALLERGY/IMMUNE: NEGATIVE for bleeding problems  PSYCHIATRIC: NEGATIVE for changes in mood or affect    EXAM  /74 (BP Location: Right arm, Patient Position: Sitting, Cuff Size: Adult Regular)   Pulse 83   Temp 97.5  F (36.4  C) (Oral)   Resp 18   Ht 1.6 m (5' 2.99\")   Wt 63.6 kg (140 lb 4 oz)   SpO2 96%   BMI 24.85 kg/m    GENERAL APPEARANCE: Alert, pleasant, NAD  EYES: PERRL, EOMI, conjunctiva clear  HENT: TM normal bilaterally. Nose and mouth without lesions  NECK: no adenopathy, thyroid normal to palpation. No carotid bruits.   RESP: lungs clear to auscultation bilaterally, no rhonchi, wheezes or rales  BREAST: lumpectomy scar, well healed at left breast. Otherwise, normal without masses, no tenderness or nipple discharge and no palpable axillary masses or adenopathy   CV: regular rate and rhythm, normal S1 S2, no murmur, no carotid bruits  ABDOMEN: soft, nontender, without HSM or masses. Bowel sounds normal  : External genitalia normal mucosa pale, pap is easily obtained from vaginal cuff. Bimanual exam without adnexal tenderness or mass, uterus absent  RECTAL EXAM: small external hemorrhoid non inflamed.  MS: extremities normal-point tenderness with palpation over the CMC joint of both thumbs  SKIN: no suspicious lesions or rashes  NEURO: Normal strength and tone, sensory exam grossly normal, DTR normoreflexive in upper and lower extremities  PSYCH: mentation appears normal. and affect normal/bright.  EXT: no peripheral edema, pedal pulses palpable    Assessment:  (Z00.00) Encounter for Medicare annual wellness exam  (primary encounter diagnosis)  Comment: Cordell is a 69 year old female in stable health.  Plan: Comprehensive Metabolic Panel (LabDAQ), Lipid         Panel (LabDAQ)        Anticipatory guidance given today regarding diet, exercise, calcium intake. Declines all immunizations. She " is up to date with mammogram and colonoscopy.    (D06.9) Severe cervical dysplasia  Comment: Hx of cervical dysplasia, s/p FRANCIA 2012  Plan: HPV High Risk Types DNA Cervical, Pap imaged         thin layer diagnostic with HPV (select HPV         order below)        She will need to continue with yearly pap smears through 2032.     (M79.644,  M79.645) Bilateral thumb pain  Comment: Base of the thumbs, CMC joints  Plan: ORTHOPEDICS ADULT REFERRAL        Offered referral to ortho for xray, evaluation and treatment. Discussed alternate treatment with tumeric, analgesics, paraffin wax bath, chiro care.         Merry Lino MD  Internal Medicine/Pediatrics    I, Juan Parmar, am serving as a scribe to document services personally performed by Dr. Merry Lino, based on data collection and the provider's statements to me. Dr. Lino has reviewed, edited and approved the above note.

## 2019-12-16 NOTE — NURSING NOTE
"69 year old  Chief Complaint   Patient presents with     Medicare Visit     69 yrs old ,         Blood pressure 119/74, pulse 83, temperature 97.5  F (36.4  C), temperature source Oral, resp. rate 18, height 1.6 m (5' 2.99\"), weight 63.6 kg (140 lb 4 oz), SpO2 96 %. Body mass index is 24.85 kg/m .  Patient Active Problem List   Diagnosis     Dupuytren contracture     Abnormal electrocardiogram     Atrial fibrillation (H)     Conductive hearing loss in right ear     Severe cervical dysplasia       Wt Readings from Last 2 Encounters:   12/16/19 63.6 kg (140 lb 4 oz)   12/09/19 62.2 kg (137 lb 3.2 oz)     BP Readings from Last 3 Encounters:   12/16/19 119/74   12/09/19 128/76   01/14/19 113/80         Current Outpatient Medications   Medication     Acetylcarnitine HCl 250 MG CAPS     aspirin 81 MG tablet     Bacillus Coagulans-Inulin (PROBIOTIC FORMULA) 1-250 BILLION-MG CAPS     BENFOTIAMINE PO     Cholecalciferol (VITAMIN D-3 PO)     Coenzyme Q10 (CO Q 10 PO)     dexamethasone (DECADRON) 4 MG/ML injection     [place] Cota 550 MG CAPS     MAGNESIUM OXIDE PO     metoprolol succinate ER (TOPROL XL) 25 MG 24 hr tablet     Omega-3 Fatty Acids (OMEGA-3 FISH OIL PO)     Selenium 200 MCG CAPS     TAURINE PO     No current facility-administered medications for this visit.        Social History     Tobacco Use     Smoking status: Former Smoker     Packs/day: 0.50     Years: 15.00     Pack years: 7.50     Smokeless tobacco: Never Used   Substance Use Topics     Alcohol use: Yes     Comment: Socially      Drug use: No       Health Maintenance Due   Topic Date Due     DEXA  1950     HEPATITIS C SCREENING  1950     ADVANCE CARE PLANNING  1950     DTAP/TDAP/TD IMMUNIZATION (1 - Tdap) 04/14/1962     ZOSTER IMMUNIZATION (1 of 2) 04/14/2001     PNEUMOCOCCAL IMMUNIZATION 65+ LOW/MEDIUM RISK (1 of 2 - PCV13) 04/14/2016     FALL RISK ASSESSMENT  02/02/2019       Lab Results   Component Value Date    PAP NIL 08/21/2018 "         December 16, 2019 3:52 PM

## 2019-12-16 NOTE — PATIENT INSTRUCTIONS
Patient Education   Personalized Prevention Plan  You are due for the preventive services outlined below.  Your care team is available to assist you in scheduling these services.  If you have already completed any of these items, please share that information with your care team to update in your medical record.  Health Maintenance Due   Topic Date Due     Osteoporosis Screening  1950     Hepatitis C Screening  1950     Discuss Advance Care Planning  1950     Diptheria Tetanus Pertussis (DTAP/TDAP/TD) Vaccine (1 - Tdap) 04/14/1962     Zoster (Shingles) Vaccine (1 of 2) 04/14/2001     Pneumococcal Vaccine (1 of 2 - PCV13) 04/14/2016     FALL RISK ASSESSMENT  02/02/2019     Annual Wellness Visit  08/21/2019     Flu Vaccine (1) 09/01/2019

## 2019-12-17 RX ORDER — CHOLECALCIFEROL (VITAMIN D3) 25 MCG
1000 CAPSULE ORAL EVERY MORNING
Qty: 90 CAPSULE | Refills: 3 | COMMUNITY
Start: 2019-12-17

## 2019-12-20 LAB
COPATH REPORT: NORMAL
PAP: NORMAL

## 2019-12-23 DIAGNOSIS — Z00.00 ENCOUNTER FOR MEDICARE ANNUAL WELLNESS EXAM: ICD-10-CM

## 2019-12-23 LAB
ALBUMIN SERPL-MCNC: 3.7 G/DL (ref 3.4–5)
ALP SERPL-CCNC: 49 U/L (ref 40–150)
ALT SERPL W P-5'-P-CCNC: 44 U/L (ref 0–50)
ANION GAP SERPL CALCULATED.3IONS-SCNC: 3 MMOL/L (ref 3–14)
AST SERPL W P-5'-P-CCNC: 30 U/L (ref 0–45)
BILIRUB SERPL-MCNC: 0.8 MG/DL (ref 0.2–1.3)
BUN SERPL-MCNC: 15 MG/DL (ref 7–30)
CALCIUM SERPL-MCNC: 9 MG/DL (ref 8.5–10.1)
CHLORIDE SERPL-SCNC: 111 MMOL/L (ref 94–109)
CHOLEST SERPL-MCNC: 280 MG/DL
CO2 SERPL-SCNC: 27 MMOL/L (ref 20–32)
CREAT SERPL-MCNC: 0.8 MG/DL (ref 0.52–1.04)
FINAL DIAGNOSIS: NORMAL
GFR SERPL CREATININE-BSD FRML MDRD: 76 ML/MIN/{1.73_M2}
GLUCOSE SERPL-MCNC: 105 MG/DL (ref 70–99)
HDLC SERPL-MCNC: 67 MG/DL
HPV HR 12 DNA CVX QL NAA+PROBE: NEGATIVE
HPV16 DNA SPEC QL NAA+PROBE: NEGATIVE
HPV18 DNA SPEC QL NAA+PROBE: NEGATIVE
LDLC SERPL CALC-MCNC: 191 MG/DL
NONHDLC SERPL-MCNC: 213 MG/DL
POTASSIUM SERPL-SCNC: 4.1 MMOL/L (ref 3.4–5.3)
PROT SERPL-MCNC: 6.7 G/DL (ref 6.8–8.8)
SODIUM SERPL-SCNC: 141 MMOL/L (ref 133–144)
SPECIMEN DESCRIPTION: NORMAL
SPECIMEN SOURCE CVX/VAG CYTO: NORMAL
TRIGL SERPL-MCNC: 109 MG/DL

## 2020-01-02 ENCOUNTER — OFFICE VISIT (OUTPATIENT)
Dept: FAMILY MEDICINE | Facility: CLINIC | Age: 70
End: 2020-01-02
Payer: COMMERCIAL

## 2020-01-02 VITALS
DIASTOLIC BLOOD PRESSURE: 86 MMHG | RESPIRATION RATE: 17 BRPM | HEIGHT: 63 IN | SYSTOLIC BLOOD PRESSURE: 124 MMHG | BODY MASS INDEX: 24.45 KG/M2 | TEMPERATURE: 97.4 F | HEART RATE: 89 BPM | OXYGEN SATURATION: 96 % | WEIGHT: 138 LBS

## 2020-01-02 DIAGNOSIS — N87.9 CERVICAL DYSPLASIA: Primary | ICD-10-CM

## 2020-01-02 DIAGNOSIS — E78.5 HYPERLIPIDEMIA LDL GOAL <130: ICD-10-CM

## 2020-01-02 ASSESSMENT — MIFFLIN-ST. JEOR: SCORE: 1124.96

## 2020-01-02 NOTE — NURSING NOTE
"69 year old  Chief Complaint   Patient presents with     Gyn Exam       Blood pressure 124/86, pulse 89, temperature 97.4  F (36.3  C), temperature source Oral, resp. rate 17, height 1.6 m (5' 2.99\"), weight 62.6 kg (138 lb), SpO2 96 %. Body mass index is 24.45 kg/m .  Patient Active Problem List   Diagnosis     Dupuytren contracture     Abnormal electrocardiogram     Atrial fibrillation (H)     Conductive hearing loss in right ear     Severe cervical dysplasia       Wt Readings from Last 2 Encounters:   01/02/20 62.6 kg (138 lb)   12/16/19 63.6 kg (140 lb 4 oz)     BP Readings from Last 3 Encounters:   01/02/20 124/86   12/16/19 119/74   12/09/19 128/76         Current Outpatient Medications   Medication     Acetylcarnitine HCl 250 MG CAPS     Bacillus Coagulans-Inulin (PROBIOTIC FORMULA) 1-250 BILLION-MG CAPS     BENFOTIAMINE PO     Cholecalciferol (VITAMIN D-3) 25 MCG (1000 UT) CAPS     Coenzyme Q10 (CO Q 10 PO)     [place] 550 MG CAPS     MAGNESIUM OXIDE PO     metoprolol succinate ER (TOPROL XL) 25 MG 24 hr tablet     Omega-3 Fatty Acids (OMEGA-3 FISH OIL PO)     Selenium 200 MCG CAPS     Taurine 1000 MG CAPS     Turmeric 450 MG CAPS     UNABLE TO FIND     No current facility-administered medications for this visit.        Social History     Tobacco Use     Smoking status: Former Smoker     Packs/day: 0.50     Years: 15.00     Pack years: 7.50     Smokeless tobacco: Never Used   Substance Use Topics     Alcohol use: Yes     Comment: Socially      Drug use: No       Health Maintenance Due   Topic Date Due     ADVANCE CARE PLANNING  1950     PHQ-2  01/01/2020       Lab Results   Component Value Date    PAP UNSAT 12/16/2019 January 2, 2020 4:21 PM  "

## 2020-01-02 NOTE — PROGRESS NOTES
Cordell Donaldson is a 69 year old female here for the following issues:    Cervical dysplasia   Cordell has a history of cervical dysplasia and hysterectomy approximately 7 years ago.  Her ovaries are intact.  She did not need radiation or chemotherapy.  Her last pap was normal with negative HPV.  She is aware that these patients need to continue pap smears at least 20 years post surgery.    When I last did a pap smear, in the past month, it returned with scant cells and could not be read. She is here, therefore for repeat testing.    Hyperlipidemia  Cordell had a lipid panel drawn last visit which showed elevated total and LDL cholesterol. Previous cholesterol in 2014 and 2017 was lower. She reports she currently eats a lot of dairy, cheese and butter. No low fat sources. She is interested in modifying her diet to see if she can bring the numbers down. We discussed recheck in 6 months.   Recent Labs   Lab Test 12/23/19  0837 08/22/17  1348 09/18/14  0819 10/12/12  1051   CHOL 280* 226* 202* 269*   HDL 67 64 60 61   * 143* 122 193*   TRIG 109 97 100 73   CHOLHDLRATIO  --   --  3.4 4.4       Patient Active Problem List   Diagnosis     Dupuytren contracture     Abnormal electrocardiogram     Atrial fibrillation (H)     Conductive hearing loss in right ear     Severe cervical dysplasia       Current Outpatient Medications   Medication Sig Dispense Refill     Acetylcarnitine HCl 250 MG CAPS Take 500 mg by mouth 2 times daily        Bacillus Coagulans-Inulin (PROBIOTIC FORMULA) 1-250 BILLION-MG CAPS Take by mouth three times a week 25+billion CFUS       BENFOTIAMINE PO Take 150 mg by mouth 2 times daily       Cholecalciferol (VITAMIN D-3) 25 MCG (1000 UT) CAPS Take 1,000 Units by mouth daily 90 capsule 3     Coenzyme Q10 (CO Q 10 PO) Take 100 mg by mouth daily        Efraín Berry 550 MG CAPS Take 400 mg by mouth 2 times daily 60 capsule 11     MAGNESIUM OXIDE PO Take 500 mg by mouth       metoprolol succinate  "ER (TOPROL XL) 25 MG 24 hr tablet Take 1 tablet (25 mg) by mouth daily 90 tablet 3     Omega-3 Fatty Acids (OMEGA-3 FISH OIL PO) Take 630 mg by mouth 2 times daily        Selenium 200 MCG CAPS Take 200 mg by mouth       Taurine 1000 MG CAPS Take one po twice daily 60 capsule 11     Turmeric 450 MG CAPS Take 2 capsules by mouth daily 60 capsule 11     UNABLE TO FIND Take 1 g by mouth daily Take 1 g of Lion's Santy Mushroom daily         Allergies   Allergen Reactions     Quinolones Unknown     Pt states that her naturopath recommended against taking any medications in this category.        EXAM  /86   Pulse 89   Temp 97.4  F (36.3  C) (Oral)   Resp 17   Ht 1.6 m (5' 2.99\")   Wt 62.6 kg (138 lb)   SpO2 96%   BMI 24.45 kg/m    Gen: Alert, pleasant, NAD  : External genitalia normal . Vaginal mucosa pale. Vaginal cuff is easily identified and is normal in appearance. Cotton swab is used to clean area as pt reported using coconut oil vaginally in the 2 days prior to today's visit. Multiple passes with cytobrush were done. Bimanual is deferred      Assessment:  (N87.9) Cervical dysplasia  (primary encounter diagnosis)  Comment: hx of cervical dysplasia, requires yearly diagnostic paps. Last pap with scant cells, she is postmenopausal, no HRT  Plan: Pap imaged thin layer screen with HPV -         recommended age 30 - 65 years (select HPV order        below), HPV High Risk Types DNA Cervical        Resend sample today.    (E78.5) Hyperlipidemia LDL goal <130  Comment: very high total and LDL cholesterol  Plan: recommend modification of diet, discussed low fat sources of dairy, switch to almond, soy milk.  Recheck lipid panel again in 6 months.        Merry Lino MD  Internal Medicine/Pediatrics    "

## 2020-01-07 LAB
COPATH REPORT: NORMAL
PAP: NORMAL

## 2020-01-10 ENCOUNTER — DOCUMENTATION ONLY (OUTPATIENT)
Dept: OTHER | Facility: CLINIC | Age: 70
End: 2020-01-10

## 2020-01-22 DIAGNOSIS — I48.0 PAROXYSMAL ATRIAL FIBRILLATION (H): ICD-10-CM

## 2020-01-23 RX ORDER — METOPROLOL SUCCINATE 25 MG/1
25 TABLET, EXTENDED RELEASE ORAL DAILY
Qty: 90 TABLET | Refills: 3 | Status: SHIPPED | OUTPATIENT
Start: 2020-01-23 | End: 2021-02-01

## 2020-01-23 NOTE — TELEPHONE ENCOUNTER
"METOPROLOL SUCC ER 25 MG TAB    Last Written Prescription Date:  1/25/2019  Last Fill Quantity: 90,   # refills: 3  Last Office Visit :12/9/2019  Future Office visit:  None  90 Tabs, 3 Refills sent to pharmacy for Pt care on 1/23/2020.    Francine Epstein RN  Central Triage Red Flags/Med Refills        IMPRESSION & PLAN    12/9/2019  #Atrial fibrillation (CHADVASC 2)  Rate appears to be well controlled with current metoprolol dose. Her stroke risk per year is 2-3%. She continues to decline starting on anticoagulation and is currently taking \"natural blood thinners; tumeric and garlic\". No signs of decompensation on exam or history.   - Continue home dose of metoprolol succinate    Lalo Ochoa, [facility]  Cardiology Fellow    Elise Huitron MD, MS  Staff Cardiologist  Pager: [phone]  "

## 2020-03-05 ENCOUNTER — OFFICE VISIT (OUTPATIENT)
Dept: FAMILY MEDICINE | Facility: CLINIC | Age: 70
End: 2020-03-05
Payer: COMMERCIAL

## 2020-03-05 VITALS
BODY MASS INDEX: 25.44 KG/M2 | HEIGHT: 62 IN | OXYGEN SATURATION: 98 % | DIASTOLIC BLOOD PRESSURE: 84 MMHG | HEART RATE: 87 BPM | SYSTOLIC BLOOD PRESSURE: 116 MMHG | TEMPERATURE: 97.2 F | WEIGHT: 138.25 LBS

## 2020-03-05 DIAGNOSIS — L08.9 INFECTED SEBACEOUS CYST: Primary | ICD-10-CM

## 2020-03-05 DIAGNOSIS — L72.3 INFECTED SEBACEOUS CYST: Primary | ICD-10-CM

## 2020-03-05 RX ORDER — CEPHALEXIN 500 MG/1
500 CAPSULE ORAL 3 TIMES DAILY
Qty: 21 CAPSULE | Refills: 0 | Status: SHIPPED | OUTPATIENT
Start: 2020-03-05 | End: 2020-03-12

## 2020-03-05 ASSESSMENT — MIFFLIN-ST. JEOR: SCORE: 1113.6

## 2020-03-05 NOTE — PROGRESS NOTES
Subjective     Cordell Donaldson is a 69 year old female who presents to clinic today for the following health issues:    HPI   Concern - Cyst  Onset: End of January    Description:   She first noticed this cyst at the end of January.  She was traveling at the time, and forgot about it.  This is now tender when sitting in certain chairs, when pressure is applied to the area.  She does not believe this area has been draining. She denies fever and malaise.    Intensity: mild    Progression of Symptoms:  worsening    Accompanying Signs & Symptoms:  None    Previous history of similar problem:   No previous history    Precipitating factors:   Worsened by: None    Alleviating factors:  Improved by: Hot packs  Therapies Tried and outcome: She has tried hot packs, but stopped, as this required a lot of effort.      Patient Active Problem List   Diagnosis     Dupuytren contracture     Abnormal electrocardiogram     Atrial fibrillation (H)     Conductive hearing loss in right ear     Severe cervical dysplasia     Hyperlipidemia LDL goal <130     Past Surgical History:   Procedure Laterality Date     BIOPSY       BREAST SURGERY  2003    lumpectomy left, did radiation, 5 yr of tamoxifen     COLONOSCOPY  2018    repeat in 2028     Colposcopy Cervix with Loop Electrode Conization and Lser to Vagina  2008     COLPOSCOPY, BIOPSY, COMBINED  10/24/2012    Procedure: COMBINED COLPOSCOPY, BIOPSY;  Colposcopy, Biopsy of Cervix and Vagina, Ultrasound Guidance;  Surgeon: Colette Ng MD;  Location: [facility] OR     ENT SURGERY  01/23/2018    otoscelerosis, right side     GYN SURGERY  12/2012    laparotomy with FRANCIA, 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        Social History     Tobacco Use     Smoking status: Former Smoker     Packs/day: 0.50     Years: 15.00     Pack years: 7.50     Smokeless tobacco: Never Used   Substance Use Topics     Alcohol use:  Yes     Alcohol/week: 2.0 standard drinks     Types: 2 Standard drinks or equivalent per week     Frequency: 2-4 times a month     Drinks per session: 1 or 2     Binge frequency: Never     Comment: Socially      Family History   Problem Relation Age of Onset     Breast Cancer Sister 47         of breast cancer age 63     Breast Cancer Paternal Grandmother      Myocardial Infarction Mother 73        Tob     Myocardial Infarction Father 68        Tob         Current Outpatient Medications   Medication Sig Dispense Refill     Acetylcarnitine HCl 250 MG CAPS Take 500 mg by mouth 2 times daily        Bacillus Coagulans-Inulin (PROBIOTIC FORMULA) 1-250 BILLION-MG CAPS Take by mouth three times a week 25+billion CFUS       BENFOTIAMINE PO Take 150 mg by mouth 2 times daily       cephALEXin (KEFLEX) 500 MG capsule Take 1 capsule (500 mg) by mouth 3 times daily for 7 days 21 capsule 0     Cholecalciferol (VITAMIN D-3) 25 MCG (1000 UT) CAPS Take 1,000 Units by mouth daily 90 capsule 3     Coenzyme Q10 (CO Q 10 PO) Take 100 mg by mouth daily        Efraín Berry 550 MG CAPS Take 400 mg by mouth 2 times daily 60 capsule 11     MAGNESIUM OXIDE PO Take 500 mg by mouth       metoprolol succinate ER (TOPROL-XL) 25 MG 24 hr tablet Take 1 tablet (25 mg) by mouth daily DO NOT CRUSH. Tablet may be split in half along score line. 90 tablet 3     Omega-3 Fatty Acids (OMEGA-3 FISH OIL PO) Take 630 mg by mouth 2 times daily        Selenium 200 MCG CAPS Take 200 mg by mouth       Taurine 1000 MG CAPS Take one po twice daily 60 capsule 11     Turmeric 450 MG CAPS Take 2 capsules by mouth daily 60 capsule 11     UNABLE TO FIND Take 1 g by mouth daily Take 1 g of Lion's Santy Mushroom daily       Allergies   Allergen Reactions     Quinolones Unknown     Pt states that her naturopath recommended against taking any medications in this category.       Reviewed and updated as needed this visit by Provider  Tobacco  Allergies  Meds   "Problems  Med Hx  Surg Hx  Fam Hx  Soc Hx          Review of Systems   ROS COMP: Constitutional, HEENT, cardiovascular, pulmonary, gi and gu systems are negative, except as otherwise noted.      Objective    /84 (BP Location: Left arm, Patient Position: Sitting, Cuff Size: Adult Regular)   Pulse 87   Temp 97.2  F (36.2  C) (Oral)   Ht 1.58 m (5' 2.21\")   Wt 62.7 kg (138 lb 4 oz)   SpO2 98%   BMI 25.12 kg/m    Body mass index is 25.12 kg/m .  Physical Exam  GENERAL: healthy, alert and no distress  SKIN: 2 cm erythematous, tender nodule with central fluctuance. Right mid back.    Procedure:  After discussion of possible scarring, infection and recurrence, Verbal consent obtained. Area anesthetized with 1 cc 1% lidocaine and cleansed with chlorprep. 11 blade used to make a 1/4 \" stab incision and cyst drained of purulent and sebaceous material. Wound was cleaned and covered with loose bandage.           Assessment & Plan       ICD-10-CM    1. Infected sebaceous cyst L72.3 cephALEXin (KEFLEX) 500 MG capsule    L08.9 DRAIN SKIN ABSCESS SIMPLE/SINGLE      AB sent to pharmacy but patient instructed to take it only if redness and swelling increased. Will likely drain for a couple days.  Use warm compresses. RTC if fever or concerns.      Anita Gamez PA-C  [facility]    I, Alex Mcdermott, am serving as a scribe to document services personally performed by Anita Gamez PA-C, based on data collection and the provider's statements to me. Anita Gamez PA-C, has reviewed, edited, and approv      "

## 2020-03-05 NOTE — NURSING NOTE
"69 year old  Chief Complaint   Patient presents with     Derm Problem     cyst right side mid back x1.5 mons        Blood pressure 116/84, pulse 87, temperature 97.2  F (36.2  C), temperature source Oral, height 1.58 m (5' 2.21\"), weight 62.7 kg (138 lb 4 oz), SpO2 98 %. Body mass index is 25.12 kg/m .  Patient Active Problem List   Diagnosis     Dupuytren contracture     Abnormal electrocardiogram     Atrial fibrillation (H)     Conductive hearing loss in right ear     Severe cervical dysplasia     Hyperlipidemia LDL goal <130       Wt Readings from Last 2 Encounters:   03/05/20 62.7 kg (138 lb 4 oz)   01/02/20 62.6 kg (138 lb)     BP Readings from Last 3 Encounters:   03/05/20 116/84   01/02/20 124/86   12/16/19 119/74         Current Outpatient Medications   Medication     Acetylcarnitine HCl 250 MG CAPS     Bacillus Coagulans-Inulin (PROBIOTIC FORMULA) 1-250 BILLION-MG CAPS     BENFOTIAMINE PO     Cholecalciferol (VITAMIN D-3) 25 MCG (1000 UT) CAPS     Coenzyme Q10 (CO Q 10 PO)     [place] 550 MG CAPS     MAGNESIUM OXIDE PO     metoprolol succinate ER (TOPROL-XL) 25 MG 24 hr tablet     Omega-3 Fatty Acids (OMEGA-3 FISH OIL PO)     Selenium 200 MCG CAPS     Taurine 1000 MG CAPS     Turmeric 450 MG CAPS     UNABLE TO FIND     No current facility-administered medications for this visit.        Social History     Tobacco Use     Smoking status: Former Smoker     Packs/day: 0.50     Years: 15.00     Pack years: 7.50     Smokeless tobacco: Never Used   Substance Use Topics     Alcohol use: Yes     Comment: Socially      Drug use: No       Health Maintenance Due   Topic Date Due     PHQ-2  01/01/2020     ZOSTER IMMUNIZATION (2 of 2) 02/12/2020       Lab Results   Component Value Date    PAP NIL 01/02/2020 March 5, 2020 1:05 PM    "

## 2020-03-06 NOTE — PATIENT INSTRUCTIONS
Patient Education     Epidermoid Cyst (Sebaceous Cyst), Infected (Incision and Drainage)  You have an epidermoid cyst. This is a small, painless lump under your skin. An epidermoid cyst (often called anepidermal cyst, epidermal inclusion cyst, or incorrectly a sebaceous cyst) is a term most often used for 2 similar types of cysts:    Epidermoid cysts. These cysts form slowly under the skin. They can be found on most parts of the body. But they are most often found on areas with more hair such as the scalp, face, upper back, and genitals.    Pilar cysts. These are similar to epidermoid cysts. But they start from a different part of the hair follicle. They are more likely to be on the scalp.  Some general facts about these cysts:    A cyst is a sac filled with material that is often cheesy, fatty, oily, or stringy. The material inside them can be thick. Or it can be a thin liquid.    You can usually move the cyst slightly if you try.    The cysts can be smaller than a pea or as large as a few inches.    The cysts are usually not painful, unless they become inflamed or infected.    The area around the cyst may smell bad. If the cyst breaks open, the material inside it often smells bad too.  Your cyst became inflamed or infected and your healthcare provider wanted to drain it. Gauze packing may have been inserted into the cyst opening (cavity). This keeps the cyst open so it doesn t seal up before it has time to drain more. No matter how well it was cleaned out, no cleaning is perfect. The packing will need to be removed.  Once the pus is drained, antibiotics may not be needed unless the infection has spread into the skin around the wound. The wound will take about 1 to 2 weeks to heal, depending on the size of the abscess.  Home care  The following will help you care for your wound at home:    The wound may drain for the first 2 days. Cover the opening with a clean dry bandage. If the dressing becomes soaked with  blood or pus, change it.    If a gauze packing was placed inside the opening of the cyst, it will need to be removed. Your healthcare provider will usually do this after 2 days. If it falls out sooner, do not try to put it back inside the wound. Once the packing is removed, you should wash the area carefully in the shower once a day, until the skin opening has closed. This could take up to 5 days depending on the size of the cyst. It is good to direct the shower spray directly into the opening if this is not too painful.    If you were prescribed antibiotics, take them as directed until they are all used up.    You may use over-the-counter pain medicine to control pain, unless another medicine was given. If you have chronic liver or kidney disease or ever had a stomach ulcer or GI bleeding, talk with your provider before using these medicines.  Prevention  Once this infection has healed, use these prevention tips to avoid another infection:    Keep the cyst opening clean by bathing or showering daily.    Avoid tight-fitting clothing in the cyst area.    Watch for the signs of infection listed below so that treatment may be started early.  Follow-up care  Follow up with your healthcare provider, or as advised. If a gauze packing was put in your wound, it should be removed as instructed by your healthcare provider. Check your wound every day for the signs listed below.  When to seek medical advice  Call your healthcare provider right away if any of these occur:    Pus continues to come from the cyst 2 days after the incision and drainage    Increasing redness around the wound.    Increasing local pain or swelling    Fever of 100.4 F (38 C) or higher, or as directed by your provider  Date Last Reviewed: 10/1/2016    8254-6343 [employer]. [address] [place] [address]. All rights reserved. This information is not intended as a substitute for professional medical care. Always follow your  healthcare professional's instructions.

## 2020-03-07 SDOH — HEALTH STABILITY: MENTAL HEALTH: HOW OFTEN DO YOU HAVE A DRINK CONTAINING ALCOHOL?: 2-4 TIMES A MONTH

## 2020-03-07 SDOH — HEALTH STABILITY: MENTAL HEALTH: HOW OFTEN DO YOU HAVE 6 OR MORE DRINKS ON ONE OCCASION?: NEVER

## 2020-03-07 SDOH — HEALTH STABILITY: MENTAL HEALTH: HOW MANY STANDARD DRINKS CONTAINING ALCOHOL DO YOU HAVE ON A TYPICAL DAY?: 1 OR 2

## 2020-03-25 ENCOUNTER — TELEPHONE (OUTPATIENT)
Dept: FAMILY MEDICINE | Facility: CLINIC | Age: 70
End: 2020-03-25

## 2020-03-25 NOTE — TELEPHONE ENCOUNTER
[facility] Call Center    Phone Message    May a detailed message be left on voicemail: yes     Reason for Call: Other: Patient called to schedule an appointment with Anita Gamez for her sebaceous cyst . Nurse will call patient back regarding appointment. Please call patient back on phone number: [phone].     Action Taken: Message routed to:  [place] [facility]: [facility]    Travel Screening: Not Applicable

## 2020-03-25 NOTE — TELEPHONE ENCOUNTER
Patient's sebaceous cyst feels infected again, feels it fills up over past 2 days. Wanting it to have I/D again.   OK to schedule per Merle Gamez.     Swetha Harris RN  03/25/20  3:03 PM

## 2020-03-26 ENCOUNTER — OFFICE VISIT (OUTPATIENT)
Dept: FAMILY MEDICINE | Facility: CLINIC | Age: 70
End: 2020-03-26
Payer: COMMERCIAL

## 2020-03-26 VITALS
OXYGEN SATURATION: 99 % | TEMPERATURE: 97.5 F | BODY MASS INDEX: 25.9 KG/M2 | WEIGHT: 140.75 LBS | DIASTOLIC BLOOD PRESSURE: 74 MMHG | RESPIRATION RATE: 17 BRPM | HEIGHT: 62 IN | SYSTOLIC BLOOD PRESSURE: 113 MMHG | HEART RATE: 97 BPM

## 2020-03-26 DIAGNOSIS — L72.3 SEBACEOUS CYST: Primary | ICD-10-CM

## 2020-03-26 ASSESSMENT — MIFFLIN-ST. JEOR: SCORE: 1124.94

## 2020-03-26 NOTE — NURSING NOTE
"69 year old  Chief Complaint   Patient presents with     Derm Problem     mid back right side cyst removal       Blood pressure 113/74, pulse 97, temperature 97.5  F (36.4  C), temperature source Oral, resp. rate 17, height 1.58 m (5' 2.21\"), weight 63.8 kg (140 lb 12 oz), SpO2 99 %. Body mass index is 25.57 kg/m .  Patient Active Problem List   Diagnosis     Dupuytren contracture     Abnormal electrocardiogram     Atrial fibrillation (H)     Conductive hearing loss in right ear     Severe cervical dysplasia     Hyperlipidemia LDL goal <130       Wt Readings from Last 2 Encounters:   03/26/20 63.8 kg (140 lb 12 oz)   03/05/20 62.7 kg (138 lb 4 oz)     BP Readings from Last 3 Encounters:   03/26/20 113/74   03/05/20 116/84   01/02/20 124/86         Current Outpatient Medications   Medication     Acetylcarnitine HCl 250 MG CAPS     Bacillus Coagulans-Inulin (PROBIOTIC FORMULA) 1-250 BILLION-MG CAPS     BENFOTIAMINE PO     Cholecalciferol (VITAMIN D-3) 25 MCG (1000 UT) CAPS     Coenzyme Q10 (CO Q 10 PO)     Efraín Cota 550 MG CAPS     MAGNESIUM OXIDE PO     metoprolol succinate ER (TOPROL-XL) 25 MG 24 hr tablet     Omega-3 Fatty Acids (OMEGA-3 FISH OIL PO)     Selenium 200 MCG CAPS     Taurine 1000 MG CAPS     Turmeric 450 MG CAPS     UNABLE TO FIND     No current facility-administered medications for this visit.        Social History     Tobacco Use     Smoking status: Former Smoker     Packs/day: 0.50     Years: 15.00     Pack years: 7.50     Smokeless tobacco: Never Used   Substance Use Topics     Alcohol use: Yes     Alcohol/week: 2.0 standard drinks     Types: 2 Standard drinks or equivalent per week     Frequency: 2-4 times a month     Drinks per session: 1 or 2     Binge frequency: Never     Comment: Socially      Drug use: No       Health Maintenance Due   Topic Date Due     ZOSTER IMMUNIZATION (2 of 2) 02/12/2020       Lab Results   Component Value Date    PAP NIL 01/02/2020 March 26, 2020 8:11 " AM

## 2020-04-01 PROBLEM — H90.11 CONDUCTIVE HEARING LOSS IN RIGHT EAR: Status: RESOLVED | Noted: 2018-01-23 | Resolved: 2020-04-01

## 2020-04-01 NOTE — PROGRESS NOTES
"Subjective     Cordell Donaldson is a 69 year old female who presents to clinic today for the following health issues:      Acute Illness   Acute illness concerns: Sebaceous cyst has gotten reinfected.  Was seen 3/5/2020 for I&D of infected sebaceous cyst mid right side of back.  Patient did not take antibiotics.  She felt that it had healed and resolved.  There was no redness or swelling and then in the past few days she noted it increasing in size and becoming tender again.  She is here for definitive treatment.        Reviewed and updated as needed this visit by Provider  Tobacco  Allergies  Meds  Problems  Med Hx  Surg Hx  Fam Hx           Objective    /74   Pulse 97   Temp 97.5  F (36.4  C) (Oral)   Resp 17   Ht 1.58 m (5' 2.21\")   Wt 63.8 kg (140 lb 12 oz)   SpO2 99%   BMI 25.57 kg/m    Body mass index is 25.57 kg/m .  Physical Exam   GENERAL: healthy, alert and no distress.    Posterior right mid back:  1.5-2cm tender mildly erythematous area.  Overlying stab incision scar well healed.  Procedure note:  Verbal informed consent was obtained.  Patient informed of the possibility of infection and scarring.    Area was anesthetized with 2.5cc lidocaine, 1 cm incision made with drainage of purulent discontinue.  Attempted to remove as much of the cyst wall as possible. Area cleaned with sterile saline and q tip. Small amount of plain packing was applied.  Area covered with AB ointment, telfa and tegaderm.  Procedure tolerated well.      Assessment & Plan       ICD-10-CM    1. Sebaceous cyst  L72.3       Keep bandage in place for 24 hours then change.  If gauze falls out that is OK. Cover with ab ointment and telfa and let heal on its own.  Will likely take a 1-2 weeks.    Follow up if any signs of infection. Recurrence is still a possibility but my hope is that enough of the wall was removed so this will not happen.  BMI:   Estimated body mass index is 25.57 kg/m  as calculated from the " "following:    Height as of this encounter: 1.58 m (5' 2.21\").    Weight as of this encounter: 63.8 kg (140 lb 12 oz).       Anita Gamez PA-C  [facility]          Sebaceous cyst:    "

## 2020-11-07 ENCOUNTER — HEALTH MAINTENANCE LETTER (OUTPATIENT)
Age: 70
End: 2020-11-07

## 2020-11-19 DIAGNOSIS — Z12.31 VISIT FOR SCREENING MAMMOGRAM: ICD-10-CM

## 2020-11-19 PROCEDURE — 77067 SCR MAMMO BI INCL CAD: CPT

## 2020-11-19 PROCEDURE — 77067 SCR MAMMO BI INCL CAD: CPT | Mod: 26 | Performed by: RADIOLOGY

## 2021-01-30 ENCOUNTER — HEALTH MAINTENANCE LETTER (OUTPATIENT)
Age: 71
End: 2021-01-30

## 2021-02-01 DIAGNOSIS — I48.0 PAROXYSMAL ATRIAL FIBRILLATION (H): ICD-10-CM

## 2021-02-01 RX ORDER — METOPROLOL SUCCINATE 25 MG/1
25 TABLET, EXTENDED RELEASE ORAL DAILY
Qty: 90 TABLET | Refills: 0 | Status: SHIPPED | OUTPATIENT
Start: 2021-02-01 | End: 2021-04-25

## 2021-02-01 NOTE — TELEPHONE ENCOUNTER
[facility] Call Center    Phone Message    May a detailed message be left on voicemail: yes     Reason for Call: Medication Refill Request    Has the patient contacted the pharmacy for the refill? Yes   Name of medication being requested: Metoprolol  Provider who prescribed the medication: Tomy  Pharmacy: [id number] - [place], [place] - [address]  Date medication is needed: asap        Action Taken: Message routed to:  [facility] cardio    Travel Screening: Not Applicable

## 2021-02-01 NOTE — TELEPHONE ENCOUNTER
Centralized Medication Refill Team note:   metoprolol succinate ER (TOPROL-XL) 25 MG 24 hr tablet  Last Written Prescription Date:  1/23/20  Last Fill Quantity: 90,   # refills: 3  Last Office Visit : 12/19/2019  Future Office visit:  None/ one year  Scheduling has been notified to contact the pt for appointment.    90 RF sent and MyChart notification

## 2021-02-22 ENCOUNTER — VIRTUAL VISIT (OUTPATIENT)
Dept: CARDIOLOGY | Facility: CLINIC | Age: 71
End: 2021-02-22
Attending: INTERNAL MEDICINE
Payer: COMMERCIAL

## 2021-02-22 DIAGNOSIS — E78.2 MIXED HYPERLIPIDEMIA: ICD-10-CM

## 2021-02-22 DIAGNOSIS — I48.20 CHRONIC ATRIAL FIBRILLATION (H): Primary | ICD-10-CM

## 2021-02-22 PROCEDURE — 99214 OFFICE O/P EST MOD 30 MIN: CPT | Mod: 95 | Performed by: INTERNAL MEDICINE

## 2021-02-22 NOTE — PATIENT INSTRUCTIONS
Patient Instructions:  It was a pleasure to see you in the cardiology clinic today.      If you have any questions, call  Nubia Ferrer RN, at [phone].  Press Option #1 for the [facility], and then press Option #4  We are encouraging the use of [employer] to communicate with your HealthCare Provider    Please follow up with Dr. Huitron in 1 year. Please have lipids checked in 3 months.       If you have an urgent need after hours (8:00 am to 4:30 pm) please call [phone] and ask for the cardiology fellow on call.

## 2021-02-22 NOTE — PROGRESS NOTES
Cordell is a 70 year old who is being evaluated via a billable telephone visit.      What phone number would you like to be contacted at? [phone]  How would you like to obtain your AVS? MyChart    History:    Virtual visit. Follow up visit.     Cordell Donaldson is a 70 year old woman with a history of breast cancer treated with lumpectomy and radiation who we first saw October 2016 for newly diagnosed, asymptomatic atrial fibrillation.    She declined cardioversion because she was undecided about taking anticoagulation.  We has declined anticoagulation for CVA prophylaxis (CHADS2 Vasc = 2). She takesToprol XL 25 mg daily.     Since her last visit with me in 2019, she denies any cardiac issues. She leads an active lifestyle, feels well with no chest pain, dyspnea, peripheral edema, lightheadedness or syncope. No recent hospitalization for HF/MI/stroke    Current Outpatient Medications   Medication Sig Dispense Refill     Acetylcarnitine HCl 250 MG CAPS Take 500 mg by mouth 2 times daily        Bacillus Coagulans-Inulin (PROBIOTIC FORMULA) 1-250 BILLION-MG CAPS Take by mouth three times a week 25+billion CFUS       BENFOTIAMINE PO Take 150 mg by mouth 2 times daily       Cholecalciferol (VITAMIN D-3) 25 MCG (1000 UT) CAPS Take 1,000 Units by mouth daily 90 capsule 3     Coenzyme Q10 (CO Q 10 PO) Take 100 mg by mouth daily        Efraín Berry 550 MG CAPS Take 400 mg by mouth 2 times daily 60 capsule 11     MAGNESIUM OXIDE PO Take 500 mg by mouth       metoprolol succinate ER (TOPROL-XL) 25 MG 24 hr tablet Take 1 tablet (25 mg) by mouth daily Please call [phone] to make annual appointment 90 tablet 0     Omega-3 Fatty Acids (OMEGA-3 FISH OIL PO) Take 630 mg by mouth 2 times daily        Selenium 200 MCG CAPS Take 200 mg by mouth       Taurine 1000 MG CAPS Take one po twice daily 60 capsule 11     Turmeric 450 MG CAPS Take 2 capsules by mouth daily 60 capsule 11     UNABLE TO FIND Take 1 g by mouth daily  Take 1 g of Lion's Santy Mushroom daily         Allergies - reviewed   No Known Allergies    Past history -reviewed  Active Ambulatory Problems     Diagnosis Date Noted     Dupuytren contracture 09/05/2014     Abnormal electrocardiogram 09/30/2016     Atrial fibrillation (H) 10/03/2016     Hyperlipidemia LDL goal <130 01/02/2020     Resolved Ambulatory Problems     Diagnosis Date Noted     Bursal abscess 08/17/2012     Vaginal atrophy 08/17/2012     HSIL on Pap smear 10/12/2012     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) 08/26/2013     VAIN III (vaginal intraepithelial neoplasia III) 09/02/2013     Conductive hearing loss in right ear 01/23/2018     Severe cervical dysplasia 12/13/2012     Achilles tendinitis, left leg 01/16/2019     Past Medical History:   Diagnosis Date     Abnormal Pap smear      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      History of breast cancer 2003     Hyperlipidemia LDL goal < 160      Osteopenia      Paroxysmal A-fib (H)      Severe vaginal dysplasia         Social history - reviewed  Social History     Socioeconomic History     Marital status:      Spouse name: Not on file     Number of children: Not on file     Years of education: Not on file     Highest education level: Not on file   Occupational History     Not on file   Social Needs     Financial resource strain: Not on file     Food insecurity     Worry: Not on file     Inability: Not on file     Transportation needs     Medical: Not on file     Non-medical: Not on file   Tobacco Use     Smoking status: Former Smoker     Packs/day: 0.50     Years: 15.00     Pack years: 7.50     Smokeless tobacco: Never Used   Substance and Sexual Activity     Alcohol use: Yes     Alcohol/week: 2.0 standard drinks     Types: 2 Standard drinks or equivalent per week     Frequency: 2-4 times a month     Drinks per session: 1 or 2     Binge frequency: Never     Comment: Socially      Drug use: No      Sexual activity: Not Currently     Partners: Male     Birth control/protection: Post-menopausal   Lifestyle     Physical activity     Days per week: Not on file     Minutes per session: Not on file     Stress: Not on file   Relationships     Social connections     Talks on phone: Not on file     Gets together: Not on file     Attends Jewish service: Not on file     Active member of club or organization: Not on file     Attends meetings of clubs or organizations: Not on file     Relationship status: Not on file     Intimate partner violence     Fear of current or ex partner: Not on file     Emotionally abused: Not on file     Physically abused: Not on file     Forced sexual activity: Not on file   Other Topics Concern     Parent/sibling w/ CABG, MI or angioplasty before 65F 55M? 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        Family history -reviewed  Family History   Problem Relation Age of Onset     Breast Cancer Sister 47         of breast cancer age 63     Breast Cancer Paternal Grandmother      Myocardial Infarction Mother 73        Tob     Myocardial Infarction Father 68        Tob       ROS: non contributory on the 10-point review of system    Exam:   In general, the patient is in no apparent distress.    There were no vitals taken for this visit.  Breathing is unlabored.   HEENT: NC/AT.  PERRLA.  EOMI.  Sclerae white, not injected.    Neck: No jugular venous distension.    Extremities: No edema.   Neurologic: Alert and oriented to person/place/time, normal speech and affect  Skin: No rash.    Data:    All relevant labs were personally reviewed and discussed with the patient.   Chemistry panel:   Recent Labs   Lab Test 19  0837 17  1348    141   POTASSIUM 4.1 4.0   CHLORIDE 111* 106   CO2 27 24   ANIONGAP 3 11   * 101*   BUN 15 11   CR 0.80 0.71   LINDA 9.0 9.1   GFRESTIMATED 76 83   AST  30  --    ALT 44  --        CBC:   Recent Labs   Lab Test 08/22/17  1348 09/30/16  1051   WBC 3.0* 3.2*   RBC 5.08 5.17   HGB 14.6 14.8   HCT 43.4 45.4   MCV 85 88   MCH 28.7 28.6   MCHC 33.6 32.6   RDW 13.3 14.1    192       Lipid Panel:  Recent Labs   Lab Test 12/23/19  0837 08/22/17  1348 09/18/14  0819   CHOL 280* 226* 202*   HDL 67 64 60   * 143* 122   TRIG 109 97 100   CHOLHDLRATIO  --   --  3.4       Thyroid:   TSH   Date Value Ref Range Status   08/22/2017 2.16 0.40 - 4.00 mU/L Final     No results found for: T4  No results found for: A1C  No results found for: INR    Patient's all available and relevant cardiac investigations were personally reviewed and discussed with the patient today.    ECG Dec 2019 shows a atrial fibrillation at 83, controlled          ECHO 10/3/16  The patient's rhythm is atrial fibrillation.  Left ventricular function, chamber size, wall motion, and wall thickness are normal.The EF is 60-65%. Global right ventricular function is normal. A small patent foramen ovale is present.       Assessment and Plan:  70 year old female with    Persistent atrial fibrillation  Normal biventricular function   Dyslipidemia    Cordell has persistent, asymptomatic atrial fibrillation without rapid ventricular rates.  CHADSVasc score is 2 for age and female gender.  She has declined anticoagulation and understands that her annual risk without being on anticoagulation from the atrial fibrillation is about 3%. She takes metoprolol for rate control.     In 2019 lipids were elevated, LDL-c 191  and her ASCVD 10-yr risk is 7.2%, but patient is not interested in statin therapy. She is in agreement to another lipid panel this year. She will continue to exercise regularly and pay attention to her diet.    I do not recommend any changes to the cardiac medical regimen at this point. The patient states understanding and is agreeable with the plan.    Recommendations:   1. Continue current cardiac  medications.  2. Healthy lifestyle encouraged with regular exercise (moderate intensity,150 minutes/week) and diet low in carbohydrates and saturated fat  3. Lipids and ECG in 3 months  4. Follow up with me 1 year    The 10-year ASCVD risk score ([place] BLANKENSHIP Jr., et al., 2013) is: 7.2%    Values used to calculate the score:      Age: 70 years      Sex: Female      Is Non- : No      Diabetic: No      Tobacco smoker: No      Systolic Blood Pressure: 113 mmHg      Is BP treated: No      HDL Cholesterol: 67 mg/dL      Total Cholesterol: 280 mg/dL    Video Visit Details:    Type of service:  Video Visit    Video Start Time: 11.00 am  Video End Time: 11.13 am    Originating Location (pt. Location): Home    Distant Location (provider location):  [facility]     Platform used for Video Visit: [employer]    In addition to visit time documented above, I spent an additional 10 minutes on data review and documentation.      Elise Huitron MD, MS  Professor of Medicine  Cardiovascular division

## 2021-02-22 NOTE — LETTER
2/22/2021      RE: Cordell Donaldson  [address]  [facility] [address]       Dear Colleague,    Thank you for the opportunity to participate in the care of your patient, Cordell Donaldson, at the [facility] [place] [facility]. Please see a copy of my visit note below.    Cordell is a 70 year old who is being evaluated via a billable telephone visit.      What phone number would you like to be contacted at? [phone]  How would you like to obtain your AVS? MyChart    History:    Virtual visit. Follow up visit.     Cordell Donaldson is a 70 year old woman with a history of breast cancer treated with lumpectomy and radiation who we first saw October 2016 for newly diagnosed, asymptomatic atrial fibrillation.    She declined cardioversion because she was undecided about taking anticoagulation.  We has declined anticoagulation for CVA prophylaxis (CHADS2 Vasc = 2). She takesToprol XL 25 mg daily.     Since her last visit with me in 2019, she denies any cardiac issues. She leads an active lifestyle, feels well with no chest pain, dyspnea, peripheral edema, lightheadedness or syncope. No recent hospitalization for HF/MI/stroke    Current Outpatient Medications   Medication Sig Dispense Refill     Acetylcarnitine HCl 250 MG CAPS Take 500 mg by mouth 2 times daily        Bacillus Coagulans-Inulin (PROBIOTIC FORMULA) 1-250 BILLION-MG CAPS Take by mouth three times a week 25+billion CFUS       BENFOTIAMINE PO Take 150 mg by mouth 2 times daily       Cholecalciferol (VITAMIN D-3) 25 MCG (1000 UT) CAPS Take 1,000 Units by mouth daily 90 capsule 3     Coenzyme Q10 (CO Q 10 PO) Take 100 mg by mouth daily        Efraín Berry 550 MG CAPS Take 400 mg by mouth 2 times daily 60 capsule 11     MAGNESIUM OXIDE PO Take 500 mg by mouth       metoprolol succinate ER (TOPROL-XL) 25 MG 24 hr tablet Take 1 tablet (25 mg) by mouth daily Please call [phone]  to make annual appointment 90 tablet 0     Omega-3 Fatty Acids (OMEGA-3 FISH OIL PO) Take 630 mg by mouth 2 times daily        Selenium 200 MCG CAPS Take 200 mg by mouth       Taurine 1000 MG CAPS Take one po twice daily 60 capsule 11     Turmeric 450 MG CAPS Take 2 capsules by mouth daily 60 capsule 11     UNABLE TO FIND Take 1 g by mouth daily Take 1 g of Lion's Santy Mushroom daily         Allergies - reviewed   No Known Allergies    Past history -reviewed  Active Ambulatory Problems     Diagnosis Date Noted     Dupuytren contracture 09/05/2014     Abnormal electrocardiogram 09/30/2016     Atrial fibrillation (H) 10/03/2016     Hyperlipidemia LDL goal <130 01/02/2020     Resolved Ambulatory Problems     Diagnosis Date Noted     Bursal abscess 08/17/2012     Vaginal atrophy 08/17/2012     HSIL on Pap smear 10/12/2012     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) 08/26/2013     VAIN III (vaginal intraepithelial neoplasia III) 09/02/2013     Conductive hearing loss in right ear 01/23/2018     Severe cervical dysplasia 12/13/2012     Achilles tendinitis, left leg 01/16/2019     Past Medical History:   Diagnosis Date     Abnormal Pap smear      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      History of breast cancer 2003     Hyperlipidemia LDL goal < 160      Osteopenia      Paroxysmal A-fib (H)      Severe vaginal dysplasia         Social history - reviewed  Social History     Socioeconomic History     Marital status:      Spouse name: Not on file     Number of children: Not on file     Years of education: Not on file     Highest education level: Not on file   Occupational History     Not on file   Social Needs     Financial resource strain: Not on file     Food insecurity     Worry: Not on file     Inability: Not on file     Transportation needs     Medical: Not on file     Non-medical: Not on file   Tobacco Use     Smoking status: Former Smoker     Packs/day:  0.50     Years: 15.00     Pack years: 7.50     Smokeless tobacco: Never Used   Substance and Sexual Activity     Alcohol use: Yes     Alcohol/week: 2.0 standard drinks     Types: 2 Standard drinks or equivalent per week     Frequency: 2-4 times a month     Drinks per session: 1 or 2     Binge frequency: Never     Comment: Socially      Drug use: No     Sexual activity: Not Currently     Partners: Male     Birth control/protection: Post-menopausal   Lifestyle     Physical activity     Days per week: Not on file     Minutes per session: Not on file     Stress: Not on file   Relationships     Social connections     Talks on phone: Not on file     Gets together: Not on file     Attends Adventist service: Not on file     Active member of club or organization: Not on file     Attends meetings of clubs or organizations: Not on file     Relationship status: Not on file     Intimate partner violence     Fear of current or ex partner: Not on file     Emotionally abused: Not on file     Physically abused: Not on file     Forced sexual activity: Not on file   Other Topics Concern     Parent/sibling w/ CABG, MI or angioplasty before 65F 55M? 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        Family history -reviewed  Family History   Problem Relation Age of Onset     Breast Cancer Sister 47         of breast cancer age 63     Breast Cancer Paternal Grandmother      Myocardial Infarction Mother 73        Tob     Myocardial Infarction Father 68        Tob       ROS: non contributory on the 10-point review of system    Exam:   In general, the patient is in no apparent distress.    There were no vitals taken for this visit.  Breathing is unlabored.   HEENT: NC/AT.  PERRLA.  EOMI.  Sclerae white, not injected.    Neck: No jugular venous distension.    Extremities: No edema.   Neurologic: Alert and oriented to person/place/time, normal speech  and affect  Skin: No rash.    Data:    All relevant labs were personally reviewed and discussed with the patient.   Chemistry panel:   Recent Labs   Lab Test 12/23/19  0837 08/22/17  1348    141   POTASSIUM 4.1 4.0   CHLORIDE 111* 106   CO2 27 24   ANIONGAP 3 11   * 101*   BUN 15 11   CR 0.80 0.71   LINDA 9.0 9.1   GFRESTIMATED 76 83   AST 30  --    ALT 44  --        CBC:   Recent Labs   Lab Test 08/22/17  1348 09/30/16  1051   WBC 3.0* 3.2*   RBC 5.08 5.17   HGB 14.6 14.8   HCT 43.4 45.4   MCV 85 88   MCH 28.7 28.6   MCHC 33.6 32.6   RDW 13.3 14.1    192       Lipid Panel:  Recent Labs   Lab Test 12/23/19  0837 08/22/17  1348 09/18/14  0819   CHOL 280* 226* 202*   HDL 67 64 60   * 143* 122   TRIG 109 97 100   CHOLHDLRATIO  --   --  3.4       Thyroid:   TSH   Date Value Ref Range Status   08/22/2017 2.16 0.40 - 4.00 mU/L Final     No results found for: T4  No results found for: A1C  No results found for: INR    Patient's all available and relevant cardiac investigations were personally reviewed and discussed with the patient today.    ECG Dec 2019 shows a atrial fibrillation at 83, controlled          ECHO 10/3/16  The patient's rhythm is atrial fibrillation.  Left ventricular function, chamber size, wall motion, and wall thickness are normal.The EF is 60-65%. Global right ventricular function is normal. A small patent foramen ovale is present.       Assessment and Plan:  70 year old female with    Persistent atrial fibrillation  Normal biventricular function   Dyslipidemia    Cordell has persistent, asymptomatic atrial fibrillation without rapid ventricular rates.  CHADSVasc score is 2 for age and female gender.  She has declined anticoagulation and understands that her annual risk without being on anticoagulation from the atrial fibrillation is about 3%. She takes metoprolol for rate control.     In 2019 lipids were elevated, LDL-c 191  and her ASCVD 10-yr risk is 7.2%, but patient is not  interested in statin therapy. She is in agreement to another lipid panel this year. She will continue to exercise regularly and pay attention to her diet.    I do not recommend any changes to the cardiac medical regimen at this point. The patient states understanding and is agreeable with the plan.    Recommendations:   1. Continue current cardiac medications.  2. Healthy lifestyle encouraged with regular exercise (moderate intensity,150 minutes/week) and diet low in carbohydrates and saturated fat  3. Lipids and ECG in 3 months  4. Follow up with me 1 year    The 10-year ASCVD risk score ([place] Jr., et al., 2013) is: 7.2%    Values used to calculate the score:      Age: 70 years      Sex: Female      Is Non- : No      Diabetic: No      Tobacco smoker: No      Systolic Blood Pressure: 113 mmHg      Is BP treated: No      HDL Cholesterol: 67 mg/dL      Total Cholesterol: 280 mg/dL    Video Visit Details:    Type of service:  Video Visit    Video Start Time: 11.00 am  Video End Time: 11.13 am    Originating Location (pt. Location): Home    Distant Location (provider location):  [facility]     Platform used for Video Visit: [employer]    In addition to visit time documented above, I spent an additional 10 minutes on data review and documentation.      Elise Huitron MD, MS  Professor of Medicine  Cardiovascular division        Please do not hesitate to contact me if you have any questions/concerns.     Sincerely,     Elise Huirton MD

## 2021-04-25 ENCOUNTER — MYC REFILL (OUTPATIENT)
Dept: CARDIOLOGY | Facility: CLINIC | Age: 71
End: 2021-04-25

## 2021-04-25 DIAGNOSIS — I48.0 PAROXYSMAL ATRIAL FIBRILLATION (H): ICD-10-CM

## 2021-04-26 RX ORDER — METOPROLOL SUCCINATE 25 MG/1
25 TABLET, EXTENDED RELEASE ORAL DAILY
Qty: 90 TABLET | Refills: 1 | Status: SHIPPED | OUTPATIENT
Start: 2021-04-26 | End: 2021-10-18

## 2021-04-26 NOTE — TELEPHONE ENCOUNTER
metoprolol succinate ER (TOPROL-XL) 25 MG 24 hr tablet  Last Written Prescription Date:  2/1/2021  Last Fill Quantity: 90,   # refills: 0  Last Office Visit : 2/22/2021  Future Office visit:  None  90 Tabs, 1 Refill sent to pharm  4/26/2021    Francine Epstein RN  Central Triage Red Flags/Med Refills

## 2021-04-27 DIAGNOSIS — I48.0 PAROXYSMAL ATRIAL FIBRILLATION (H): Primary | ICD-10-CM

## 2021-04-27 RX ORDER — METOPROLOL SUCCINATE 25 MG/1
25 TABLET, EXTENDED RELEASE ORAL DAILY
Qty: 90 TABLET | Refills: 0 | OUTPATIENT
Start: 2021-04-27

## 2021-05-12 DIAGNOSIS — I48.20 CHRONIC ATRIAL FIBRILLATION (H): ICD-10-CM

## 2021-05-12 DIAGNOSIS — E78.2 MIXED HYPERLIPIDEMIA: ICD-10-CM

## 2021-05-12 DIAGNOSIS — I48.0 PAROXYSMAL ATRIAL FIBRILLATION (H): ICD-10-CM

## 2021-05-12 LAB
CHOLEST SERPL-MCNC: 246 MG/DL
HDLC SERPL-MCNC: 55 MG/DL
LDLC SERPL CALC-MCNC: 167 MG/DL
NONHDLC SERPL-MCNC: 191 MG/DL
TRIGL SERPL-MCNC: 120 MG/DL

## 2021-05-12 PROCEDURE — 80061 LIPID PANEL: CPT | Performed by: PATHOLOGY

## 2021-05-12 PROCEDURE — 36415 COLL VENOUS BLD VENIPUNCTURE: CPT | Performed by: PATHOLOGY

## 2021-05-13 LAB — INTERPRETATION ECG - MUSE: NORMAL

## 2021-06-09 NOTE — PATIENT INSTRUCTIONS
The 10-year ASCVD risk score ([place] BLANKENSHIP Jr., et al., 2013) is: 9.4%    Values used to calculate the score:      Age: 71 years      Sex: Female      Is Non- : No      Diabetic: No      Tobacco smoker: No      Systolic Blood Pressure: 123 mmHg      Is BP treated: No      HDL Cholesterol: 55 mg/dL      Total Cholesterol: 246 mg/dL    Preventive Health Recommendations    See your health care provider every year to    Review health changes.     Discuss preventive care.      Review your medicines if your doctor has prescribed any.      You no longer need a yearly Pap test unless you've had an abnormal Pap test in the past 10 years. If you have vaginal symptoms, such as bleeding or discharge, be sure to talk with your provider about a Pap test.      Every 1 to 2 years, have a mammogram.  If you are over 69, talk with your health care provider about whether or not you want to continue having screening mammograms.      Every 10 years, have a colonoscopy. Or, have a yearly FIT test (stool test). These exams will check for colon cancer.       Have a cholesterol test every 5 years, or more often if your doctor advises it.       Have a diabetes test (fasting glucose) every three years. If you are at risk for diabetes, you should have this test more often.       At age 65, have a bone density scan (DEXA) to check for osteoporosis (brittle bone disease).    Shots:    Get a flu shot each year.    Get a tetanus shot every 10 years.    Talk to your doctor about your pneumonia vaccines. There are now two you should receive - Pneumovax (PPSV 23) and Prevnar (PCV 13).    Talk to your pharmacist about the shingles vaccine.    Talk to your doctor about the hepatitis B vaccine.    Nutrition:     Eat at least 5 servings of fruits and vegetables each day.      Eat whole-grain bread, whole-wheat pasta and brown rice instead of white grains and rice.      Get adequate about Calcium and Vitamin D.     Lifestyle    Exercise  at least 150 minutes a week (30 minutes a day, 5 days a week). This will help you control your weight and prevent disease.      Limit alcohol to one drink per day.      No smoking.       Wear sunscreen to prevent skin cancer.       See your dentist twice a year for an exam and cleaning.      See your eye doctor every 1 to 2 years to screen for conditions such as glaucoma, macular degeneration, cataracts, etc.    Personalized Prevention Plan  You are due for the preventive services outlined below.  Your care team is available to assist you in scheduling these services.  If you have already completed any of these items, please share that information with your care team to update in your medical record.    Health Maintenance Due   Topic Date Due     Pneumococcal Vaccine (1 of 1 - PPSV23) Never done     Zoster (Shingles) Vaccine (2 of 2) 02/12/2020     PHQ-2  01/01/2021     FALL RISK ASSESSMENT  03/05/2021

## 2021-06-09 NOTE — PROGRESS NOTES
Cordell Donaldson is a 70 yo woman with hx of breast cancer, treated with left sided lumpectomy (2003), radiation and tamoxifen x 5 yr. She also has hx of asymptomatic atrial fibrillation, not on anticoagulation. She is  here for a general check up. She is not fasting, had lipids drawn earlier this year. She is up to date on eye exams and dental visits. Wears seat belt-yes. Bike helmet- yes.       HCM  Declines pneumococcal/ shingles vaccine  Td booster, agrees to this today, she is an active   Mammogram completed 11/2020  Colonoscopy completed 2018, repeat 10 yr  DEXA-declines    Advance directive: Yes  Hearing concerns: Follows with audiologist, had surgery with hearing aid, now tinnitus, 1 ear   Fall Risk: No  Independent at home: Yes   Safe : Yes  Memory concerns: No     COGNITIVE SCREEN  1) Repeat 3 items (Banana, Sunrise, Chair)    2) Clock draw: NORMAL  3) 3 item recall: Recalls 3 objects  Results: 3 items recalled: COGNITIVE IMPAIRMENT LESS LIKELY    Mini-CogTM Copyright S [place]. Licensed by the author for use in [facility] Mayi Zhaopin; reprinted with permission ([email or web address]). All rights reserved.        Diet: No meat, fish, veggies  Wt Readings from Last 4 Encounters:   06/23/21 63.4 kg (139 lb 12.8 oz)   03/26/20 63.8 kg (140 lb 12 oz)   03/05/20 62.7 kg (138 lb 4 oz)   01/02/20 62.6 kg (138 lb)     Body mass index is 25.24 kg/m .    Atrial fibrillation  Maritza was diagnosed with asymptomatic Afib in 2016. She elects not to take any anticoagulation. She met with her cardiologist recently and they discussed risk of 3% risk of stroke without anticoagulation. She takes metoprolol for rate control. Denies dizziness or exercise intolerance.     Lipids were reviewed by her cardiologist  Recent Labs   Lab Test 05/12/21  1146 12/23/19  0837 09/18/14  0819 09/18/14  0819   CHOL 246* 280*   < > 202*   HDL 55 67   < > 60   * 191*   < > 122   TRIG 120 109   < > 100   CHOLHDLRATIO  --   --   --  3.4     < > = values in this interval not displayed.     The 10-year ASCVD risk score (Narindermigdalia BLANKENSHIP Jr., et al., 2013) is: 9.4%  Her risk is >7.5% but she declines statin use.     Health Maintenance   Topic Date Due     Pneumococcal Vaccine: 65+ Years (1 of 1 - PPSV23) Never done     ZOSTER IMMUNIZATION (2 of 2) 2020     MEDICARE ANNUAL WELLNESS VISIT  2020     PHQ-2  2021     FALL RISK ASSESSMENT  2021     INFLUENZA VACCINE (Season Ended) 2021     MAMMO SCREENING  2022     ADVANCE CARE PLANNING  01/10/2025     LIPID  2026     COLORECTAL CANCER SCREENING  2028     DTAP/TDAP/TD IMMUNIZATION (2 - Td) 2029     DEXA  2034     COVID-19 Vaccine  Completed     HEPATITIS C SCREENING  Addressed     IPV IMMUNIZATION  Aged Out     MENINGITIS IMMUNIZATION  Aged Out     HEPATITIS B IMMUNIZATION  Aged Out         Patient Active Problem List   Diagnosis     Dupuytren contracture     Abnormal electrocardiogram     Atrial fibrillation (H)     Hyperlipidemia LDL goal <130       Past Surgical History:   Procedure Laterality Date     BIOPSY       BREAST SURGERY      lumpectomy left, did radiation, 5 yr of tamoxifen     COLONOSCOPY  2018    repeat in      Colposcopy Cervix with Loop Electrode Conization and Lser to Vagina       COLPOSCOPY, BIOPSY, COMBINED  10/24/2012    Procedure: COMBINED COLPOSCOPY, BIOPSY;  Colposcopy, Biopsy of Cervix and Vagina, Ultrasound Guidance;  Surgeon: Colette Ng MD;  Location: [facility] OR     ENT SURGERY  2018    otoscelerosis, right side     GYN SURGERY  2012    laparotomy with FRANCIA, 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        Family History   Problem Relation Age of Onset     Breast Cancer Sister 47         of breast cancer age 63     Breast Cancer Paternal Grandmother      Myocardial Infarction Mother 73        Tob     Myocardial Infarction  Father 68        Tob       Social    Works in real estate     HABITS:  Tob: former smoker, quit 30 yr, total 15 yr 0.5 ppd, 7.5 pk yr  ETOH: 0-1 per day  Calcium: 2-3/day, no family history of osteoporosis + 1000 international unit(s) D  Caffeine: 2-3 per day  Exercise: Yoga twice weekly, bike, walking, gardening     OB/GYN HISTORY:  Patient is postmenopausal.  Hx abnormal pap? Yes, history of cervical dysplasia, s/p FRANCIA 2012  STD hx? None  Vasomotor sx: None  G 2   P 1   A 1       Current Outpatient Medications   Medication Sig Dispense Refill     Acetylcarnitine HCl 250 MG CAPS Take 500 mg by mouth 2 times daily        Bacillus Coagulans-Inulin (PROBIOTIC FORMULA) 1-250 BILLION-MG CAPS Take by mouth three times a week 25+billion CFUS       BENFOTIAMINE PO Take 150 mg by mouth 2 times daily       Cholecalciferol (VITAMIN D-3) 25 MCG (1000 UT) CAPS Take 1,000 Units by mouth daily 90 capsule 3     Coenzyme Q10 (CO Q 10 PO) Take 100 mg by mouth daily        Efraín Berry 550 MG CAPS Take 400 mg by mouth 2 times daily 60 capsule 11     MAGNESIUM OXIDE PO Take 500 mg by mouth       metoprolol succinate ER (TOPROL-XL) 25 MG 24 hr tablet Take 1 tablet (25 mg) by mouth daily 90 tablet 1     Omega-3 Fatty Acids (OMEGA-3 FISH OIL PO) Take 630 mg by mouth 2 times daily        Selenium 200 MCG CAPS Take 200 mg by mouth       Taurine 1000 MG CAPS Take one po twice daily 60 capsule 11     Turmeric 450 MG CAPS Take 2 capsules by mouth daily 60 capsule 11     No Known Allergies      ROS  CONSTITUTIONAL:NEGATIVE for fever, chills, change in weight  INTEGUMENTARY/SKIN: NEGATIVE for worrisome rashes, moles or lesions  EYES: NEGATIVE for vision changes or irritation  ENT/MOUTH: NEGATIVE for ear, mouth and throat problems  RESP:NEGATIVE for significant cough or SOB  BREAST: hx of breast cancer, left sided lumpectomy 2003, currently up to date with mammograms  CV: history of atrial fibrillation, rate controlled.   GI: NEGATIVE  "for nausea, abdominal pain, heartburn, or change in bowel habits  :NEGATIVE for frequency, dysuria, or hematuria  MUSCULOSKELETAL:NEGATIVE for significant arthralgias or myalgia  NEURO: NEGATIVE for weakness, dizziness or paresthesias  ENDOCRINE: NEGATIVE for polyuria/dipsia,  temperature intolerance, skin/hair changes  HEME/ALLERGY/IMMUNE: NEGATIVE for bleeding problems  PSYCHIATRIC: NEGATIVE for changes in mood or affect    EXAM  /88   Pulse 98   Temp 97.2  F (36.2  C)   Resp 15   Ht 1.585 m (5' 2.4\")   Wt 63.4 kg (139 lb 12.8 oz)   SpO2 97%   BMI 25.24 kg/m        GENERAL APPEARANCE: Alert, pleasant, NAD  EYES: PERRL, EOMI, conjunctiva clear  HENT: TM normal bilaterally. Nose and mouth masked  NECK: no adenopathy, thyroid normal to palpation   RESP: lungs clear to auscultation bilaterally,   BREAST: normal without masses, no tenderness or nipple discharge and no palpable  axillary masses or adenopathy   CV: regular rate and rhythm, normal S1 S2, no murmur, no carotid bruits  ABDOMEN: soft, nontender, without HSM or masses. Bowel sounds normal  MS: extremities normal- no gross deformities noted, no tender, hot or swollen joints.   SKIN: no suspicious lesions or rashes  NEURO: Normal strength and tone, sensory exam grossly normal, DTR normoreflexive in upper and lower extremities  PSYCH: mentation appears normal. and affect normal/bright.  EXT: no peripheral edema, pedal pulses palpable    Assessment:  (Z00.00) Routine general medical examination at a health care facility  (primary encounter diagnosis)  Comment: 71 year old woman in good health  Plan: Comprehensive Metabolic Panel (LabDAQ), CBC         with Plt (LabDAQ)        Today we discussed ways to maintain a healthy lifestyle with sensible eating, regular exercise and self cares. We dicussed calcium and Vitamin D intake, vaccinations and preventive health screens.        (E78.5) Hyperlipidemia LDL goal <130  Comment: [place] score " >7.5%  Plan: she declines statin use. Continue healthy diet and exercise program    (I48.0) Paroxysmal atrial fibrillation (H)  Comment: rate controlled with metoprolol, recent visit with cardiologist reviewed  Plan: continue metoprolol. Notify MD for any acute changes in status    (Z23) Need for Td vaccine  Comment: Cordell requesting this vaccine today  Plan: given    Merry Lino MD  Internal Medicine/Pediatrics

## 2021-06-23 ENCOUNTER — OFFICE VISIT (OUTPATIENT)
Dept: FAMILY MEDICINE | Facility: CLINIC | Age: 71
End: 2021-06-23
Payer: COMMERCIAL

## 2021-06-23 VITALS
DIASTOLIC BLOOD PRESSURE: 88 MMHG | HEIGHT: 62 IN | TEMPERATURE: 97.2 F | HEART RATE: 98 BPM | SYSTOLIC BLOOD PRESSURE: 123 MMHG | WEIGHT: 139.8 LBS | RESPIRATION RATE: 15 BRPM | OXYGEN SATURATION: 97 % | BODY MASS INDEX: 25.73 KG/M2

## 2021-06-23 DIAGNOSIS — E78.5 HYPERLIPIDEMIA LDL GOAL <130: ICD-10-CM

## 2021-06-23 DIAGNOSIS — Z00.00 ROUTINE GENERAL MEDICAL EXAMINATION AT A HEALTH CARE FACILITY: Primary | ICD-10-CM

## 2021-06-23 DIAGNOSIS — I48.0 PAROXYSMAL ATRIAL FIBRILLATION (H): ICD-10-CM

## 2021-06-23 DIAGNOSIS — Z23 NEED FOR TD VACCINE: ICD-10-CM

## 2021-06-23 LAB
ALBUMIN SERPL-MCNC: 3.4 G/DL (ref 3.2–4.5)
ALP SERPL-CCNC: 55 U/L (ref 40–150)
ALT SERPL-CCNC: 28 U/L (ref 0–50)
AST SERPL-CCNC: 28 U/L (ref 0–45)
BILIRUB SERPL-MCNC: 0.9 MG/DL (ref 0.2–1.3)
BUN SERPL-MCNC: 16 MG/DL (ref 7–30)
CALCIUM SERPL-MCNC: 9.6 MG/DL (ref 8.5–10.4)
CHLORIDE SERPLBLD-SCNC: 108 MMOL/L (ref 94–109)
CO2 SERPL-SCNC: 30 MMOL/L (ref 20–32)
CREAT SERPL-MCNC: 0.8 MG/DL (ref 0.6–1.3)
EGFR CALCULATED (NON BLACK REFERENCE): 75.4
ERYTHROCYTE [DISTWIDTH] IN BLOOD BY AUTOMATED COUNT: 13.6 %
GLUCOSE SERPL-MCNC: 80 MG/DL (ref 60–99)
HCT VFR BLD AUTO: 45.8 % (ref 35–47)
HEMOGLOBIN: 14.7 G/DL (ref 11.7–15.7)
MCH RBC QN AUTO: 27.7 PG (ref 26.5–35)
MCHC RBC AUTO-ENTMCNC: 32.1 G/DL (ref 32–36)
MCV RBC AUTO: 86.4 FL (ref 78–100)
PLATELET # BLD AUTO: 205 K/UL (ref 150–450)
POTASSIUM SERPL-SCNC: 4.9 MMOL/L (ref 3.4–5.3)
PROT SERPL-MCNC: 6.5 G/DL (ref 6.8–8.8)
RBC # BLD AUTO: 5.3 M/UL (ref 3.8–5.2)
SODIUM SERPL-SCNC: 143 MMOL/L (ref 137.3–146.3)
WBC # BLD AUTO: 3.8 K/UL (ref 4–11)

## 2021-06-23 RX ORDER — METOPROLOL SUCCINATE 25 MG/1
25 TABLET, EXTENDED RELEASE ORAL DAILY
Qty: 90 TABLET | Refills: 1 | Status: CANCELLED | OUTPATIENT
Start: 2021-06-23

## 2021-06-23 ASSESSMENT — MIFFLIN-ST. JEOR: SCORE: 1113.76

## 2021-06-23 NOTE — NURSING NOTE
"71 year old  Chief Complaint   Patient presents with     Physical     tinitus poss right ear       Blood pressure 123/88, pulse 98, temperature 97.2  F (36.2  C), resp. rate 15, height 1.585 m (5' 2.4\"), weight 63.4 kg (139 lb 12.8 oz), SpO2 97 %. Body mass index is 25.24 kg/m .  Patient Active Problem List   Diagnosis     Dupuytren contracture     Abnormal electrocardiogram     Atrial fibrillation (H)     Hyperlipidemia LDL goal <130       Wt Readings from Last 2 Encounters:   06/23/21 63.4 kg (139 lb 12.8 oz)   03/26/20 63.8 kg (140 lb 12 oz)     BP Readings from Last 3 Encounters:   06/23/21 123/88   03/26/20 113/74   03/05/20 116/84         Current Outpatient Medications   Medication     Acetylcarnitine HCl 250 MG CAPS     Bacillus Coagulans-Inulin (PROBIOTIC FORMULA) 1-250 BILLION-MG CAPS     BENFOTIAMINE PO     Cholecalciferol (VITAMIN D-3) 25 MCG (1000 UT) CAPS     Coenzyme Q10 (CO Q 10 PO)     Efraín Cota 550 MG CAPS     MAGNESIUM OXIDE PO     metoprolol succinate ER (TOPROL-XL) 25 MG 24 hr tablet     Omega-3 Fatty Acids (OMEGA-3 FISH OIL PO)     Selenium 200 MCG CAPS     Taurine 1000 MG CAPS     Turmeric 450 MG CAPS     UNABLE TO FIND     No current facility-administered medications for this visit.        Social History     Tobacco Use     Smoking status: Former Smoker     Packs/day: 0.50     Years: 15.00     Pack years: 7.50     Smokeless tobacco: Never Used   Substance Use Topics     Alcohol use: Yes     Alcohol/week: 2.0 standard drinks     Types: 2 Standard drinks or equivalent per week     Frequency: 2-4 times a month     Drinks per session: 1 or 2     Binge frequency: Never     Comment: Socially      Drug use: No       Health Maintenance Due   Topic Date Due     Pneumococcal Vaccine: 65+ Years (1 of 1 - PPSV23) Never done     ZOSTER IMMUNIZATION (2 of 2) 02/12/2020     PHQ-2  01/01/2021     FALL RISK ASSESSMENT  03/05/2021       Lab Results   Component Value Date    PAP NIL 01/02/2020         Rosie " 23, 2021 8:37 AM

## 2021-06-23 NOTE — NURSING NOTE
Prior to immunization administration, verified patients identity using patient s name and date of birth. Please see Immunization Activity for additional information.     Screening Questionnaire for Adult Immunization    Are you sick today?   No   Do you have allergies to medications, food, a vaccine component or latex?   No   Have you ever had a serious reaction after receiving a vaccination?   No   Do you have a long-term health problem with heart, lung, kidney, or metabolic disease (e.g., diabetes), asthma, a blood disorder, no spleen, complement component deficiency, a cochlear implant, or a spinal fluid leak?  Are you on long-term aspirin therapy?   No   Do you have cancer, leukemia, HIV/AIDS, or any other immune system problem?   No   Do you have a parent, brother, or sister with an immune system problem?   No   In the past 3 months, have you taken medications that affect  your immune system, such as prednisone, other steroids, or anticancer drugs; drugs for the treatment of rheumatoid arthritis, Crohn s disease, or psoriasis; or have you had radiation treatments?   No   Have you had a seizure, or a brain or other nervous system problem?   No   During the past year, have you received a transfusion of blood or blood    products, or been given immune (gamma) globulin or antiviral drug?   No   For women: Are you pregnant or is there a chance you could become       pregnant during the next month?   No   Have you received any vaccinations in the past 4 weeks?   No     Immunization questionnaire answers were all negative.        Per orders of Dr. Lino, injection of TD given by Sarah German CMA. Patient instructed to remain in clinic for 15 minutes afterwards, and to report any adverse reaction to me immediately.       Screening performed by Sarah German CMA on 6/23/2021 at 9:45 AM.

## 2021-09-05 ENCOUNTER — HEALTH MAINTENANCE LETTER (OUTPATIENT)
Age: 71
End: 2021-09-05

## 2021-10-15 DIAGNOSIS — I48.0 PAROXYSMAL ATRIAL FIBRILLATION (H): ICD-10-CM

## 2021-10-18 RX ORDER — METOPROLOL SUCCINATE 25 MG/1
25 TABLET, EXTENDED RELEASE ORAL DAILY
Qty: 90 TABLET | Refills: 1 | Status: SHIPPED | OUTPATIENT
Start: 2021-10-18 | End: 2022-04-19

## 2021-10-18 NOTE — TELEPHONE ENCOUNTER
Last Clinic Visit: 2/22/2021  [facility]

## 2022-02-04 ASSESSMENT — ENCOUNTER SYMPTOMS
POOR WOUND HEALING: 0
SKIN CHANGES: 0
NAIL CHANGES: 0

## 2022-02-07 ENCOUNTER — OFFICE VISIT (OUTPATIENT)
Dept: CARDIOLOGY | Facility: CLINIC | Age: 72
End: 2022-02-07
Attending: INTERNAL MEDICINE
Payer: COMMERCIAL

## 2022-02-07 VITALS
OXYGEN SATURATION: 98 % | BODY MASS INDEX: 22.48 KG/M2 | HEIGHT: 63 IN | HEART RATE: 107 BPM | SYSTOLIC BLOOD PRESSURE: 132 MMHG | WEIGHT: 126.9 LBS | DIASTOLIC BLOOD PRESSURE: 84 MMHG

## 2022-02-07 DIAGNOSIS — I48.20 CHRONIC ATRIAL FIBRILLATION (H): Primary | ICD-10-CM

## 2022-02-07 DIAGNOSIS — E78.2 MIXED HYPERLIPIDEMIA: ICD-10-CM

## 2022-02-07 PROCEDURE — G0463 HOSPITAL OUTPT CLINIC VISIT: HCPCS | Mod: 25

## 2022-02-07 PROCEDURE — 93005 ELECTROCARDIOGRAM TRACING: CPT

## 2022-02-07 PROCEDURE — 99214 OFFICE O/P EST MOD 30 MIN: CPT | Mod: GC | Performed by: INTERNAL MEDICINE

## 2022-02-07 ASSESSMENT — PAIN SCALES - GENERAL: PAINLEVEL: NO PAIN (0)

## 2022-02-07 ASSESSMENT — MIFFLIN-ST. JEOR: SCORE: 1060.24

## 2022-02-07 NOTE — PATIENT INSTRUCTIONS
Cardiology Providers you saw during your visit:  Dr. Huitron    Medication changes: None    Follow up: with Dr. Huitron in 1 year. Repeat labs per primary care provider.       Follow the American Heart Association Diet and Lifestyle recommendations:  Limit saturated fat, trans fat, sodium, red meat, sweets and sugar-sweetened beverages. If you choose to eat red meat, compare labels and select the leanest cuts available.  Aim for at least 150 minutes of moderate physical activity or 75 minutes of vigorous physical activity - or an equal combination of both - each week.      If you have any questions, call  Pratik Iniguez RN, at [phone].  Press Option #1 for the [facility], and then press Option #4  We are encouraging the use of Synthace to communicate with your HealthCare Provider      After hours, weekends or holidays: On Call Cardiologist- [phone] option #4 and ask to speak to the on-call Cardiologist.

## 2022-02-07 NOTE — PROGRESS NOTES
History:    Cordell Donaldson is a 71 year old woman with a history of breast cancer treated with lumpectomy and radiation who we first saw October 2016 for newly diagnosed, asymptomatic atrial fibrillation.    She declined cardioversion because she was undecided about taking anticoagulation.  We has declined anticoagulation for CVA prophylaxis (CHADS2 Vasc = 2). She takes Toprol XL 25 mg daily.     Since her last visit with me in 2021, she denies any cardiac issues. She leads an active lifestyle, feels well with no chest pain, dyspnea, peripheral edema, lightheadedness or syncope. No recent hospitalization for HF/MI/stroke    Reports that she is sometimes able to tell when she is having palpitations but have not had that sensation in the past year. Last lipid level was elevated on last check 5/2021 and patient reports that she has modified her diet (decreased cheese and egg intake) and have lost some weight since then.     Current Outpatient Medications   Medication Sig Dispense Refill     Acetylcarnitine HCl 250 MG CAPS Take 500 mg by mouth 2 times daily        Bacillus Coagulans-Inulin (PROBIOTIC FORMULA) 1-250 BILLION-MG CAPS Take by mouth three times a week 25+billion CFUS       BENFOTIAMINE PO Take 150 mg by mouth 2 times daily       Cholecalciferol (VITAMIN D-3) 25 MCG (1000 UT) CAPS Take 1,000 Units by mouth daily 90 capsule 3     Coenzyme Q10 (CO Q 10 PO) Take 100 mg by mouth daily        [place] Berry 550 MG CAPS Take 400 mg by mouth 2 times daily 60 capsule 11     MAGNESIUM OXIDE PO Take 500 mg by mouth       metoprolol succinate ER (TOPROL-XL) 25 MG 24 hr tablet Take 1 tablet (25 mg) by mouth daily 90 tablet 1     Omega-3 Fatty Acids (OMEGA-3 FISH OIL PO) Take 630 mg by mouth 2 times daily        Selenium 200 MCG CAPS Take 200 mg by mouth       Taurine 1000 MG CAPS Take one po twice daily 60 capsule 11     Turmeric 450 MG CAPS Take 2 capsules by mouth daily 60 capsule 11       Allergies - reviewed   No  Known Allergies    Past history -reviewed  Active Ambulatory Problems     Diagnosis Date Noted     Dupuytren contracture 09/05/2014     Abnormal electrocardiogram 09/30/2016     Atrial fibrillation (H) 10/03/2016     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 12/13/2012     Hyperlipidemia LDL goal <130 01/02/2020     Resolved Ambulatory Problems     Diagnosis Date Noted     Bursal abscess 08/17/2012     Vaginal atrophy 08/17/2012     HSIL on Pap smear 10/12/2012     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) 08/26/2013     VAIN III (vaginal intraepithelial neoplasia III) 09/02/2013     Conductive hearing loss in right ear 01/23/2018     Achilles tendinitis, left leg 01/16/2019     Past Medical History:   Diagnosis Date     Abnormal Pap smear      History of breast cancer 2003     Hyperlipidemia LDL goal < 160      Osteopenia      Paroxysmal A-fib (H)      Severe vaginal dysplasia         Social history - reviewed  Social History     Socioeconomic History     Marital status:      Spouse name: Not on file     Number of children: Not on file     Years of education: Not on file     Highest education level: Not on file   Occupational History     Not on file   Tobacco Use     Smoking status: Former Smoker     Packs/day: 0.50     Years: 15.00     Pack years: 7.50     Smokeless tobacco: Never Used   Substance and Sexual Activity     Alcohol use: Yes     Alcohol/week: 2.0 standard drinks     Types: 2 Standard drinks or equivalent per week     Comment: Socially      Drug use: No     Sexual activity: Not Currently     Partners: Male     Birth control/protection: Post-menopausal   Other Topics Concern     Parent/sibling w/ CABG, MI or angioplasty before 65F 55M? 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      Social Determinants of Health     Financial Resource Strain: Not on file   Food  "Insecurity: Not on file   Transportation Needs: Not on file   Physical Activity: Not on file   Stress: Not on file   Social Connections: Not on file   Intimate Partner Violence: Not on file   Housing Stability: Not on file       Family history -reviewed  Family History   Problem Relation Age of Onset     Breast Cancer Sister 47         of breast cancer age 63     Breast Cancer Paternal Grandmother      Myocardial Infarction Mother 73        Tob     Myocardial Infarction Father 68        Tob       ROS: non contributory on the 10-point review of system    Exam:   /84 (BP Location: Left arm, Patient Position: Chair, Cuff Size: Adult Regular)   Pulse 107   Ht 1.593 m (5' 2.72\")   Wt 57.6 kg (126 lb 14.4 oz)   SpO2 98%   BMI 22.68 kg/m       In general, the patient is in no apparent distress.      HEENT: Sclerae white, not injected.    Neck: No JVD. No thyromegaly  Heart: Irregular. No murmur, rub, click, or gallop.    Lungs: Clear bilaterally.  No rhonchi, wheezes, rales.   GI: Soft, nontender, nondistended.   Extremities: No edema.  The pulses are 2+at the radial bilaterally.  Neuro: grossly non focal.   Psych: pleasant and conversant      Wt Readings from Last 3 Encounters:   22 57.6 kg (126 lb 14.4 oz)   21 63.4 kg (139 lb 12.8 oz)   20 63.8 kg (140 lb 12 oz)         Data:    Chemistry panel: Recent Labs   Lab Test 21  0902 19  0837 17  1348   .0 141 141   POTASSIUM 4.9 4.1 4.0   CHLORIDE 108.0 111* 106   CO2 30.0 27 24   ANIONGAP  --  3 11   GLC 80.0 105* 101*   BUN 16.0 15 11   CR 0.8 0.80 0.71   LINDA 9.6 9.0 9.1   GFRESTIMATED  --  76 83   AST 28.0 30  --    ALT 28.0 44  --        CBC:   Recent Labs   Lab Test 21  0902 17  1348 16  1051   WBC  --  3.0* 3.2*   RBC  --  5.08 5.17   HGB 14.7 14.6 14.8   HCT 45.8 43.4 45.4   MCV 86.4 85 88   MCH 27.7 28.7 28.6   MCHC 32.1 33.6 32.6   RDW 13.6 13.3 14.1   PLT  --  174 192       Lipid " Panel:  Recent Labs   Lab Test 05/12/21  1146 12/23/19  0837 08/22/17  1348 09/18/14  0819   CHOL 246* 280*   < > 202*   HDL 55 67   < > 60   * 191*   < > 122   TRIG 120 109   < > 100   CHOLHDLRATIO  --   --   --  3.4    < > = values in this interval not displayed.       Thyroid:   TSH   Date Value Ref Range Status   08/22/2017 2.16 0.40 - 4.00 mU/L Final     No results found for: T4  No results found for: A1C  No results found for: INR      Patient's all available and relevant cardiac investigations were personally reviewed and discussed with the patient today.    ECG today - atrial fibrillation with an average ventricular response 107         ECG May 2021 shows a atrial fibrillation at 83, controlled       ECHO 10/3/16  The patient's rhythm is atrial fibrillation.  Left ventricular function, chamber size, wall motion, and wall thickness are normal.The EF is 60-65%. Global right ventricular function is normal. A small patent foramen ovale is present.       Assessment and Plan:  71 year old female with    Persistent atrial fibrillation  Normal biventricular function   Dyslipidemia    Cordell has persistent, asymptomatic atrial fibrillation without rapid ventricular rates.  CHADSVasc score is 2 for age and female gender.  She has declined anticoagulation and understands that her annual risk without being on anticoagulation from the atrial fibrillation is about 3%. She takes metoprolol for rate control.     Her lipids are elevated, LDL-c 167 (was 191)  and her ASCVD 10-yr risk is 10%, but patient is not interested in statin therapy. She is in agreement to another lipid panel this year. She will continue to exercise regularly and pay attention to her diet.    I do not recommend any changes to the cardiac medical regimen at this point. The patient states understanding and is agreeable with the plan.    Recommendations:   1. Continue current cardiac medications.  2. Healthy lifestyle encouraged with regular  exercise (moderate intensity,150 minutes/week) and diet low in carbohydrates and saturated fat  4. Follow up with me 1 year    The 10-year ASCVD risk score (Narinder PATTIE Jr., et al., 2013) is: 10.7%    Values used to calculate the score:      Age: 71 years      Sex: Female      Is Non- : No      Diabetic: No      Tobacco smoker: No      Systolic Blood Pressure: 132 mmHg      Is BP treated: No      HDL Cholesterol: 55 mg/dL      Total Cholesterol: 246 mg/dL      ATTENDING ATTESTATION:  This patient has been seen and examined by me February 7, 2022 with Dr. Watt, cardiology fellow. I have reviewed the vitals, laboratory and imaging data relevant to this patient's care. I have edited this note to reflect our joint assessment and plan, and discussed the plan with the patient.    Elise Huitron MD, MS  Professor of Medicine  Cardiovascular division      Answers for HPI/ROS submitted by the patient on 2/4/2022  General Symptoms: No  Skin Symptoms: Yes  HENT Symptoms: No  EYE SYMPTOMS: No  HEART SYMPTOMS: No  LUNG SYMPTOMS: No  INTESTINAL SYMPTOMS: No  URINARY SYMPTOMS: No  GYNECOLOGIC SYMPTOMS: No  BREAST SYMPTOMS: No  SKELETAL SYMPTOMS: No  BLOOD SYMPTOMS: No  NERVOUS SYSTEM SYMPTOMS: No  MENTAL HEALTH SYMPTOMS: No  Changes in hair: No  Changes in moles/birth marks: No  Itching: No  Rashes: Yes  Changes in nails: No  Acne: No  Hair in places you don't want it: No  Change in facial hair: No  Warts: No  Non-healing sores: No  Scarring: No  Flaking of skin: No  Color changes of hands/feet in cold : No  Sun sensitivity: No  Skin thickening: No

## 2022-02-07 NOTE — NURSING NOTE
No medication changes today.     Follow up with Dr. Huitron in 1 year with EKG prior.     Praitk Iniguez, RN   Cardiology Nurse Coordinator

## 2022-02-07 NOTE — NURSING NOTE
Chief Complaint   Patient presents with     Follow Up     Return for follow up     Vitals were taken, medications reconciled, and EKG performed.    Hussein Cleaning  2:09 PM

## 2022-02-07 NOTE — LETTER
2/7/2022      RE: Cordell Donaldson  [address]  [facility] [id number]       Dear Colleague,    Thank you for the opportunity to participate in the care of your patient, Cordell Donaldson, at the [facility] [place] [facility]. Please see a copy of my visit note below.    History:    Cordell Donaldson is a 71 year old woman with a history of breast cancer treated with lumpectomy and radiation who we first saw October 2016 for newly diagnosed, asymptomatic atrial fibrillation.    She declined cardioversion because she was undecided about taking anticoagulation.  We has declined anticoagulation for CVA prophylaxis (CHADS2 Vasc = 2). She takes Toprol XL 25 mg daily.     Since her last visit with me in 2021, she denies any cardiac issues. She leads an active lifestyle, feels well with no chest pain, dyspnea, peripheral edema, lightheadedness or syncope. No recent hospitalization for HF/MI/stroke    Reports that she is sometimes able to tell when she is having palpitations but have not had that sensation in the past year. Last lipid level was elevated on last check 5/2021 and patient reports that she has modified her diet (decreased cheese and egg intake) and have lost some weight since then.     Current Outpatient Medications   Medication Sig Dispense Refill     Acetylcarnitine HCl 250 MG CAPS Take 500 mg by mouth 2 times daily        Bacillus Coagulans-Inulin (PROBIOTIC FORMULA) 1-250 BILLION-MG CAPS Take by mouth three times a week 25+billion CFUS       BENFOTIAMINE PO Take 150 mg by mouth 2 times daily       Cholecalciferol (VITAMIN D-3) 25 MCG (1000 UT) CAPS Take 1,000 Units by mouth daily 90 capsule 3     Coenzyme Q10 (CO Q 10 PO) Take 100 mg by mouth daily        Efraín Berry 550 MG CAPS Take 400 mg by mouth 2 times daily 60 capsule 11     MAGNESIUM OXIDE PO Take 500 mg by mouth       metoprolol succinate ER (TOPROL-XL) 25 MG 24 hr  tablet Take 1 tablet (25 mg) by mouth daily 90 tablet 1     Omega-3 Fatty Acids (OMEGA-3 FISH OIL PO) Take 630 mg by mouth 2 times daily        Selenium 200 MCG CAPS Take 200 mg by mouth       Taurine 1000 MG CAPS Take one po twice daily 60 capsule 11     Turmeric 450 MG CAPS Take 2 capsules by mouth daily 60 capsule 11       Allergies - reviewed   No Known Allergies    Past history -reviewed  Active Ambulatory Problems     Diagnosis Date Noted     Dupuytren contracture 09/05/2014     Abnormal electrocardiogram 09/30/2016     Atrial fibrillation (H) 10/03/2016     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 12/13/2012     Hyperlipidemia LDL goal <130 01/02/2020     Resolved Ambulatory Problems     Diagnosis Date Noted     Bursal abscess 08/17/2012     Vaginal atrophy 08/17/2012     HSIL on Pap smear 10/12/2012     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) 08/26/2013     VAIN III (vaginal intraepithelial neoplasia III) 09/02/2013     Conductive hearing loss in right ear 01/23/2018     Achilles tendinitis, left leg 01/16/2019     Past Medical History:   Diagnosis Date     Abnormal Pap smear      History of breast cancer 2003     Hyperlipidemia LDL goal < 160      Osteopenia      Paroxysmal A-fib (H)      Severe vaginal dysplasia         Social history - reviewed  Social History     Socioeconomic History     Marital status:      Spouse name: Not on file     Number of children: Not on file     Years of education: Not on file     Highest education level: Not on file   Occupational History     Not on file   Tobacco Use     Smoking status: Former Smoker     Packs/day: 0.50     Years: 15.00     Pack years: 7.50     Smokeless tobacco: Never Used   Substance and Sexual Activity     Alcohol use: Yes     Alcohol/week: 2.0 standard drinks     Types: 2 Standard drinks or equivalent per week     Comment: Socially      Drug use: No     Sexual activity: Not Currently     Partners:  "Male     Birth control/protection: Post-menopausal   Other Topics Concern     Parent/sibling w/ CABG, MI or angioplasty before 65F 55M? 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      Social Determinants of Health     Financial Resource Strain: Not on file   Food Insecurity: Not on file   Transportation Needs: Not on file   Physical Activity: Not on file   Stress: Not on file   Social Connections: Not on file   Intimate Partner Violence: Not on file   Housing Stability: Not on file       Family history -reviewed  Family History   Problem Relation Age of Onset     Breast Cancer Sister 47         of breast cancer age 63     Breast Cancer Paternal Grandmother      Myocardial Infarction Mother 73        Tob     Myocardial Infarction Father 68        Tob       ROS: non contributory on the 10-point review of system    Exam:   /84 (BP Location: Left arm, Patient Position: Chair, Cuff Size: Adult Regular)   Pulse 107   Ht 1.593 m (5' 2.72\")   Wt 57.6 kg (126 lb 14.4 oz)   SpO2 98%   BMI 22.68 kg/m       In general, the patient is in no apparent distress.      HEENT: Sclerae white, not injected.    Neck: No JVD. No thyromegaly  Heart: Irregular. No murmur, rub, click, or gallop.    Lungs: Clear bilaterally.  No rhonchi, wheezes, rales.   GI: Soft, nontender, nondistended.   Extremities: No edema.  The pulses are 2+at the radial bilaterally.  Neuro: grossly non focal.   Psych: pleasant and conversant      Wt Readings from Last 3 Encounters:   22 57.6 kg (126 lb 14.4 oz)   21 63.4 kg (139 lb 12.8 oz)   20 63.8 kg (140 lb 12 oz)         Data:    Chemistry panel: Recent Labs   Lab Test 21  0902 19  0837 17  1348   .0 141 141   POTASSIUM 4.9 4.1 4.0   CHLORIDE 108.0 111* 106   CO2 30.0 27 24   ANIONGAP  --  3 11   GLC 80.0 105* 101*   BUN 16.0 15 11   CR 0.8 0.80 0.71   LINDA 9.6 9.0 9.1 "   GFRESTIMATED  --  76 83   AST 28.0 30  --    ALT 28.0 44  --        CBC:   Recent Labs   Lab Test 06/23/21  0902 08/22/17  1348 09/30/16  1051   WBC  --  3.0* 3.2*   RBC  --  5.08 5.17   HGB 14.7 14.6 14.8   HCT 45.8 43.4 45.4   MCV 86.4 85 88   MCH 27.7 28.7 28.6   MCHC 32.1 33.6 32.6   RDW 13.6 13.3 14.1   PLT  --  174 192       Lipid Panel:  Recent Labs   Lab Test 05/12/21  1146 12/23/19  0837 08/22/17  1348 09/18/14  0819   CHOL 246* 280*   < > 202*   HDL 55 67   < > 60   * 191*   < > 122   TRIG 120 109   < > 100   CHOLHDLRATIO  --   --   --  3.4    < > = values in this interval not displayed.       Thyroid:   TSH   Date Value Ref Range Status   08/22/2017 2.16 0.40 - 4.00 mU/L Final     No results found for: T4  No results found for: A1C  No results found for: INR      Patient's all available and relevant cardiac investigations were personally reviewed and discussed with the patient today.    ECG today - atrial fibrillation with an average ventricular response 107         ECG May 2021 shows a atrial fibrillation at 83, controlled       ECHO 10/3/16  The patient's rhythm is atrial fibrillation.  Left ventricular function, chamber size, wall motion, and wall thickness are normal.The EF is 60-65%. Global right ventricular function is normal. A small patent foramen ovale is present.       Assessment and Plan:  71 year old female with    Persistent atrial fibrillation  Normal biventricular function   Dyslipidemia    Cordell has persistent, asymptomatic atrial fibrillation without rapid ventricular rates.  CHADSVasc score is 2 for age and female gender.  She has declined anticoagulation and understands that her annual risk without being on anticoagulation from the atrial fibrillation is about 3%. She takes metoprolol for rate control.     Her lipids are elevated, LDL-c 167 (was 191)  and her ASCVD 10-yr risk is 10%, but patient is not interested in statin therapy. She is in agreement to another lipid panel  this year. She will continue to exercise regularly and pay attention to her diet.    I do not recommend any changes to the cardiac medical regimen at this point. The patient states understanding and is agreeable with the plan.    Recommendations:   1. Continue current cardiac medications.  2. Healthy lifestyle encouraged with regular exercise (moderate intensity,150 minutes/week) and diet low in carbohydrates and saturated fat  4. Follow up with me 1 year    The 10-year ASCVD risk score (Jamesportmigdalia BLANKENSHIP Jr., et al., 2013) is: 10.7%    Values used to calculate the score:      Age: 71 years      Sex: Female      Is Non- : No      Diabetic: No      Tobacco smoker: No      Systolic Blood Pressure: 132 mmHg      Is BP treated: No      HDL Cholesterol: 55 mg/dL      Total Cholesterol: 246 mg/dL      ATTENDING ATTESTATION:  This patient has been seen and examined by me February 7, 2022 with Dr. Watt, cardiology fellow. I have reviewed the vitals, laboratory and imaging data relevant to this patient's care. I have edited this note to reflect our joint assessment and plan, and discussed the plan with the patient.    Elise Huitron MD, MS  Professor of Medicine  Cardiovascular division      Answers for HPI/ROS submitted by the patient on 2/4/2022  General Symptoms: No  Skin Symptoms: Yes  HENT Symptoms: No  EYE SYMPTOMS: No  HEART SYMPTOMS: No  LUNG SYMPTOMS: No  INTESTINAL SYMPTOMS: No  URINARY SYMPTOMS: No  GYNECOLOGIC SYMPTOMS: No  BREAST SYMPTOMS: No  SKELETAL SYMPTOMS: No  BLOOD SYMPTOMS: No  NERVOUS SYSTEM SYMPTOMS: No  MENTAL HEALTH SYMPTOMS: No  Changes in hair: No  Changes in moles/birth marks: No  Itching: No  Rashes: Yes  Changes in nails: No  Acne: No  Hair in places you don't want it: No  Change in facial hair: No  Warts: No  Non-healing sores: No  Scarring: No  Flaking of skin: No  Color changes of hands/feet in cold : No  Sun sensitivity: No  Skin thickening: No          Please do not  hesitate to contact me if you have any questions/concerns.     Sincerely,     Elise Huitron MD

## 2022-02-08 LAB
ATRIAL RATE - MUSE: 468 BPM
DIASTOLIC BLOOD PRESSURE - MUSE: NORMAL MMHG
INTERPRETATION ECG - MUSE: NORMAL
P AXIS - MUSE: NORMAL DEGREES
PR INTERVAL - MUSE: NORMAL MS
QRS DURATION - MUSE: 68 MS
QT - MUSE: 302 MS
QTC - MUSE: 403 MS
R AXIS - MUSE: 39 DEGREES
SYSTOLIC BLOOD PRESSURE - MUSE: NORMAL MMHG
T AXIS - MUSE: -30 DEGREES
VENTRICULAR RATE- MUSE: 107 BPM

## 2022-04-06 ENCOUNTER — OFFICE VISIT (OUTPATIENT)
Dept: FAMILY MEDICINE | Facility: CLINIC | Age: 72
End: 2022-04-06
Payer: COMMERCIAL

## 2022-04-06 VITALS
TEMPERATURE: 97 F | SYSTOLIC BLOOD PRESSURE: 134 MMHG | DIASTOLIC BLOOD PRESSURE: 75 MMHG | BODY MASS INDEX: 21.97 KG/M2 | HEIGHT: 63 IN | WEIGHT: 124 LBS | HEART RATE: 108 BPM | OXYGEN SATURATION: 99 %

## 2022-04-06 DIAGNOSIS — N32.9 BLADDER PROBLEM: Primary | ICD-10-CM

## 2022-04-06 DIAGNOSIS — E78.5 HYPERLIPIDEMIA LDL GOAL <130: ICD-10-CM

## 2022-04-06 LAB
ALBUMIN UR-MCNC: NEGATIVE MG/DL
ALBUMIN UR-MCNC: NEGATIVE MG/DL
APPEARANCE UR: ABNORMAL
APPEARANCE UR: CLEAR
BILIRUB UR QL STRIP: NEGATIVE
BILIRUB UR QL STRIP: NEGATIVE
CHOLEST SERPL-MCNC: 202 MG/DL
COLOR UR AUTO: ABNORMAL
COLOR UR AUTO: YELLOW
ERYTHROCYTE [DISTWIDTH] IN BLOOD BY AUTOMATED COUNT: 17 % (ref 10–15)
FASTING STATUS PATIENT QL REPORTED: NO
GLUCOSE UR STRIP-MCNC: NEGATIVE MG/DL
GLUCOSE UR STRIP-MCNC: NEGATIVE MG/DL
HCT VFR BLD AUTO: 41.3 % (ref 35–47)
HDLC SERPL-MCNC: 41 MG/DL
HGB BLD-MCNC: 12.1 G/DL (ref 11.7–15.7)
HGB UR QL STRIP: ABNORMAL
HGB UR QL STRIP: NEGATIVE
KETONES UR STRIP-MCNC: NEGATIVE MG/DL
KETONES UR STRIP-MCNC: NEGATIVE MG/DL
LDLC SERPL CALC-MCNC: 144 MG/DL
LEUKOCYTE ESTERASE UR QL STRIP: NEGATIVE
LEUKOCYTE ESTERASE UR QL STRIP: NEGATIVE
MCH RBC QN AUTO: 23.2 PG (ref 26.5–33)
MCHC RBC AUTO-ENTMCNC: 29.3 G/DL (ref 31.5–36.5)
MCV RBC AUTO: 79 FL (ref 78–100)
MUCOUS THREADS #/AREA URNS LPF: PRESENT /LPF
NITRATE UR QL: NEGATIVE
NITRATE UR QL: NEGATIVE
NONHDLC SERPL-MCNC: 161 MG/DL
PH UR STRIP: 7 [PH] (ref 5–7)
PH UR STRIP: 7 [PH] (ref 5–7)
PLATELET # BLD AUTO: 308 10E3/UL (ref 150–450)
RBC # BLD AUTO: 5.21 10E6/UL (ref 3.8–5.2)
RBC URINE: 1 /HPF
SP GR UR STRIP: 1.02 (ref 1–1.03)
SP GR UR STRIP: 1.02 (ref 1–1.03)
TRANSITIONAL EPI: <1 /HPF
TRIGL SERPL-MCNC: 86 MG/DL
UROBILINOGEN UR STRIP-ACNC: 0.2 E.U./DL
UROBILINOGEN UR STRIP-MCNC: NORMAL MG/DL
WBC # BLD AUTO: 7.2 10E3/UL (ref 4–11)
WBC URINE: 1 /HPF

## 2022-04-06 PROCEDURE — 81001 URINALYSIS AUTO W/SCOPE: CPT | Performed by: INTERNAL MEDICINE

## 2022-04-06 PROCEDURE — 87086 URINE CULTURE/COLONY COUNT: CPT | Performed by: INTERNAL MEDICINE

## 2022-04-06 PROCEDURE — 80061 LIPID PANEL: CPT | Performed by: INTERNAL MEDICINE

## 2022-04-06 NOTE — NURSING NOTE
"71 year old  Chief Complaint   Patient presents with     Urinary Problem     frequency and pain in lower pelvis area       Blood pressure 134/75, pulse 108, temperature 97  F (36.1  C), height 1.593 m (5' 2.72\"), weight 56.2 kg (124 lb), SpO2 99 %. Body mass index is 22.16 kg/m .  Patient Active Problem List   Diagnosis     Dupuytren contracture     Abnormal electrocardiogram     Atrial fibrillation (H)     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     Hyperlipidemia LDL goal <130     Malignant neoplasm of breast (H)       Wt Readings from Last 2 Encounters:   04/06/22 56.2 kg (124 lb)   02/07/22 57.6 kg (126 lb 14.4 oz)     BP Readings from Last 3 Encounters:   04/06/22 134/75   02/07/22 132/84   06/23/21 123/88         Current Outpatient Medications   Medication     Acetylcarnitine HCl 250 MG CAPS     Bacillus Coagulans-Inulin (PROBIOTIC FORMULA) 1-250 BILLION-MG CAPS     BENFOTIAMINE PO     Cholecalciferol (VITAMIN D-3) 25 MCG (1000 UT) CAPS     Coenzyme Q10 (CO Q 10 PO)     Efraín Cota 550 MG CAPS     MAGNESIUM OXIDE PO     metoprolol succinate ER (TOPROL-XL) 25 MG 24 hr tablet     Omega-3 Fatty Acids (OMEGA-3 FISH OIL PO)     Selenium 200 MCG CAPS     Taurine 1000 MG CAPS     Turmeric 450 MG CAPS     No current facility-administered medications for this visit.       Social History     Tobacco Use     Smoking status: Former Smoker     Packs/day: 0.50     Years: 15.00     Pack years: 7.50     Smokeless tobacco: Never Used   Substance Use Topics     Alcohol use: Yes     Alcohol/week: 2.0 standard drinks     Types: 2 Standard drinks or equivalent per week     Comment: Socially      Drug use: No       Health Maintenance Due   Topic Date Due     LUNG CANCER SCREENING  10/31/2013     Pneumococcal Vaccine: 65+ Years (1 of 1 - PPSV23) Never done     ZOSTER IMMUNIZATION (2 of 2) 02/12/2020     FALL RISK ASSESSMENT  03/05/2021     INFLUENZA VACCINE (1) 09/01/2021       Lab Results "   Component Value Date    PAP NIL 01/02/2020 April 6, 2022 3:15 PM

## 2022-04-06 NOTE — PROGRESS NOTES
"Cordell Donaldson is a 71 year old female here for the following issues:    Urinary symptoms  Cordell reports 2 month hx of urinary frequency and feeling of pressure in her pelvis/bladder. Her urine stream has also felt intermittently dampened/\"more constricted\". Nocturia has increased from one time to three times during the night, but not every night. No dysuria, hematuria, or urgency. No urge or stress incontinence. No fevers. No vaginal bleeding. No diarrhea, maybe some constipation due to change in diet while on recent vacation. Cordell had a UTI in the past that was painful and accompanied by a fever.  This feels different.    She recently traveled and this seemed to \"exaggerate\" her symptoms. Her lower abdomen is painful in the evenings, but is sometimes just around her tailbone. Taking ibuprofen and using a heating pad. Wonders if this is from lost \"padding\" due to weight loss and sitting on hard surfaces. She has been losing weight intentionally through dietary adjustments (less cheese, pescatarian diet) and thought that these symptoms may have been connected to the weight loss. No back pain.     Wt Readings from Last 4 Encounters:   04/06/22 56.2 kg (124 lb)   02/07/22 57.6 kg (126 lb 14.4 oz)   06/23/21 63.4 kg (139 lb 12.8 oz)   03/26/20 63.8 kg (140 lb 12 oz)   Body mass index is 22.16 kg/m .      Patient Active Problem List   Diagnosis     Dupuytren contracture     Abnormal electrocardiogram     Atrial fibrillation (H)     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     Hyperlipidemia LDL goal <130     Malignant neoplasm of breast (H)       Current Outpatient Medications   Medication Sig Dispense Refill     Acetylcarnitine HCl 250 MG CAPS Take 500 mg by mouth 2 times daily        Bacillus Coagulans-Inulin (PROBIOTIC FORMULA) 1-250 BILLION-MG CAPS Take by mouth three times a week 25+billion CFUS       BENFOTIAMINE PO Take 150 mg by mouth 2 times daily       " "Cholecalciferol (VITAMIN D-3) 25 MCG (1000 UT) CAPS Take 1,000 Units by mouth daily 90 capsule 3     Coenzyme Q10 (CO Q 10 PO) Take 100 mg by mouth daily        Efraín Berry 550 MG CAPS Take 400 mg by mouth 2 times daily 60 capsule 11     MAGNESIUM OXIDE PO Take 500 mg by mouth       metoprolol succinate ER (TOPROL-XL) 25 MG 24 hr tablet Take 1 tablet (25 mg) by mouth daily 90 tablet 1     Omega-3 Fatty Acids (OMEGA-3 FISH OIL PO) Take 630 mg by mouth 2 times daily        Selenium 200 MCG CAPS Take 200 mg by mouth       Taurine 1000 MG CAPS Take one po twice daily 60 capsule 11     Turmeric 450 MG CAPS Take 2 capsules by mouth daily 60 capsule 11       No Known Allergies     EXAM  /75   Pulse 108   Temp 97  F (36.1  C)   Ht 1.593 m (5' 2.72\")   Wt 56.2 kg (124 lb)   SpO2 99%   BMI 22.16 kg/m    Gen: Alert, pleasant, NAD  COR: S1,S2, no murmur  Lungs: CTA bilaterally, no rhonchi, wheezes or rales  Abdomen: Soft, non tender, no HSM or mass, no bladder distension  Hips: no tenderness over hip bursa bilaterally, no pain with passive external or internal rotation of either hip, good ROM bilaterally    Results for orders placed or performed in visit on 04/06/22   Urinalysis Macroscopic     Status: Abnormal   Result Value Ref Range    Color Urine Yellow Colorless, Straw, Light Yellow, Yellow    Appearance Urine Slightly Cloudy (A) Clear    Glucose Urine Negative Negative mg/dL    Bilirubin Urine Negative Negative    Ketones Urine Negative Negative mg/dL    Specific Gravity Urine 1.025 1.003 - 1.035    Blood Urine Trace (A) Negative    pH Urine 7.0 5.0 - 7.0    Protein Albumin Urine Negative Negative mg/dL    Urobilinogen Urine 0.2 0.2, 1.0 E.U./dL    Nitrite Urine Negative Negative    Leukocyte Esterase Urine Negative Negative         Assessment:  (N32.9) Bladder problem  (primary encounter diagnosis)  Comment: 2 month hx of urinary frequency, intermittent dampened urine stream, nocturia up to 3x/night, and " lower abdominal pressure/pain  Plan: Urinalysis Macroscopic, CBC with Platelets,         Routine UA with micro reflex to culture, Urine         Culture Aerobic Bacterial        UA as above, urine culture pending. Other labs pending as well. Advised to increase dietary fiber to help with constipation.   If symptoms not improving, offered referral to urologist, she wishes to defer at this time.  Urine culture negative.    (E78.5) Hyperlipidemia LDL goal <130  Comment: Concerted weight loss through dietary adjustments, diet is largely vegetables with some fish, not fasting today  Meets criteria to start lipid lowering medication  Recent Labs   Lab Test 04/06/22  1544 05/12/21  1146 08/22/17  1348 09/18/14  0819   CHOL 202* 246*   < > 202*   HDL 41* 55   < > 60   * 167*   < > 122   TRIG 86 120   < > 100   CHOLHDLRATIO  --   --   --  3.4    < > = values in this interval not displayed.   The 10-year ASCVD risk score (Reveremigdalia BLANKENSHIP Jr., et al., 2013) is: 10.9%   Plan: Lipid Profile  Recommend lifestyle changes, more plant based diet. Also discussed option for lipid lowering medication in lab result note. Follow up next visit.    19 minutes spend on the date of this encounter doing chart review, history and exam, documentation and further activities as noted above.       Merry Lino MD  Internal Medicine/Pediatrics      I, Gloria Pascual, am serving as a scribe to document services personally performed by Dr. Merry Lino, based on data collection and the provider's statements to me. Dr. Lino has reviewed, edited, and approved the above note.

## 2022-04-08 LAB — BACTERIA UR CULT: NORMAL

## 2022-04-12 NOTE — PROGRESS NOTES
"Cordell Donaldson is a 71 year old female here for the following issues:    Pelvic pain, vaginal concern  I last saw Cordell on 4/6/22. At that time she described a  2 month hx of urinary frequency, episodic dampened urine stream, nocturia up to 3x/night, and lower abdominal pressure/pain. Urine was sent for culture but negative for growth. I recommended referral to urology but she declined.  She reported some associated constipation and I advised increasing dietary fiber.    She returns today, concerned about persistent pelvic discomfort. She checked vaginal area and noted a firm vaginal mass. She is s/p upper vaginectomy 10/2013, for vaginal dysplasia.  She had FRANCIA due to high grade cervical dysplasia, questionable early cervical cancer in December 2012. Ovaries are intact. She required no additional therapy. No current vaginal bleeding. Still reports some pelvic pressure and need to \"push out urine\".     Reports some constipation, no leakage. Reports sense of incomplete evacuation of stool. No abdominal bloating. Appetite is fine.     Patient Active Problem List   Diagnosis     Dupuytren contracture     Abnormal electrocardiogram     Atrial fibrillation (H)     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     Hyperlipidemia LDL goal <130     Malignant neoplasm of breast (H)       Current Outpatient Medications   Medication Sig Dispense Refill     Acetylcarnitine HCl 250 MG CAPS Take 500 mg by mouth 2 times daily        Bacillus Coagulans-Inulin (PROBIOTIC FORMULA) 1-250 BILLION-MG CAPS Take by mouth three times a week 25+billion CFUS       BENFOTIAMINE PO Take 150 mg by mouth 2 times daily       Cholecalciferol (VITAMIN D-3) 25 MCG (1000 UT) CAPS Take 1,000 Units by mouth daily 90 capsule 3     Coenzyme Q10 (CO Q 10 PO) Take 100 mg by mouth daily        Efraín Berry 550 MG CAPS Take 400 mg by mouth 2 times daily 60 capsule 11     MAGNESIUM OXIDE PO Take 500 mg by mouth       " "metoprolol succinate ER (TOPROL-XL) 25 MG 24 hr tablet Take 1 tablet (25 mg) by mouth daily 90 tablet 1     Omega-3 Fatty Acids (OMEGA-3 FISH OIL PO) Take 630 mg by mouth 2 times daily        Selenium 200 MCG CAPS Take 200 mg by mouth       Taurine 1000 MG CAPS Take one po twice daily 60 capsule 11     Turmeric 450 MG CAPS Take 2 capsules by mouth daily 60 capsule 11       No Known Allergies     EXAM  /82   Pulse 88   Temp 97.9  F (36.6  C)   Ht 1.593 m (5' 2.72\")   Wt 54.9 kg (121 lb 1.9 oz)   SpO2 98%   BMI 21.65 kg/m    Gen: Alert, pleasant, NAD  : External genitalia without lesions, no abnormal discharge, bimanual exam without adnexal masses, uterus absent. Palpable firm smooth BB sized mass at 12 oclock position, superior vagina, no tenderness, tissue intact, no friability or bleeding.   Rectal: normal rectal tone. Moderate rectocele      Assessment:  (R10.2) Pelvic pain in female  (primary encounter diagnosis)  Comment: persistent pelvic pressure, with some difficulty voiding \"pushing out urine\" DDX includes pelvic floor dysfunction. Bladder or urethral polyps, stricture.  Had previous high grade cervical and vaginal dysplasia, s/p surgery. Had FRANCIA and vaginectomy. Now with palpable firm BB sized mass. I suspect this is scar tissue, but could consider investigation with colposcopy, ? Transvaginal US? Could refer back to Dr. Megan Arciniega to evaluate.   Plan: Adult Urology Referral        Recommend that Cordell meet with our urogyn team for evaluation and treatment.  Would like her to be seen in the next 4 wks.    (N81.6) Rectocele  Comment: noted rectal fullness, splinting at times. No fecal incontinence.   Plan: discussed high fiber diet, miralax if needed. May benefit from referral for pelvic floor therapy.    33 minutes spend on the date of this encounter doing chart review, history and exam, documentation and further activities as noted above.       Merry Lino MD  Internal " Medicine/Pediatrics      I, Gloria Pascual, am serving as a scribe to document services personally performed by Dr. Merry Lino, based on data collection and the provider's statements to me. Dr. Lino has reviewed, edited, and approved the above note.

## 2022-04-13 ENCOUNTER — OFFICE VISIT (OUTPATIENT)
Dept: FAMILY MEDICINE | Facility: CLINIC | Age: 72
End: 2022-04-13
Payer: COMMERCIAL

## 2022-04-13 VITALS
WEIGHT: 121.12 LBS | DIASTOLIC BLOOD PRESSURE: 82 MMHG | TEMPERATURE: 97.9 F | SYSTOLIC BLOOD PRESSURE: 125 MMHG | BODY MASS INDEX: 21.46 KG/M2 | HEART RATE: 88 BPM | OXYGEN SATURATION: 98 % | HEIGHT: 63 IN

## 2022-04-13 DIAGNOSIS — R10.2 PELVIC PAIN IN FEMALE: Primary | ICD-10-CM

## 2022-04-13 DIAGNOSIS — N81.6 RECTOCELE: ICD-10-CM

## 2022-04-13 NOTE — NURSING NOTE
"71 year old  Chief Complaint   Patient presents with     Vaginal Problem     Pt noticed some bumps in her vaginal area.       Blood pressure 125/82, pulse 88, temperature 97.9  F (36.6  C), height 1.593 m (5' 2.72\"), weight 54.9 kg (121 lb 1.9 oz), SpO2 98 %. Body mass index is 21.65 kg/m .  Patient Active Problem List   Diagnosis     Dupuytren contracture     Abnormal electrocardiogram     Atrial fibrillation (H)     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     Hyperlipidemia LDL goal <130     Malignant neoplasm of breast (H)       Wt Readings from Last 2 Encounters:   04/13/22 54.9 kg (121 lb 1.9 oz)   04/06/22 56.2 kg (124 lb)     BP Readings from Last 3 Encounters:   04/13/22 125/82   04/06/22 134/75   02/07/22 132/84         Current Outpatient Medications   Medication     Acetylcarnitine HCl 250 MG CAPS     Bacillus Coagulans-Inulin (PROBIOTIC FORMULA) 1-250 BILLION-MG CAPS     BENFOTIAMINE PO     Cholecalciferol (VITAMIN D-3) 25 MCG (1000 UT) CAPS     Coenzyme Q10 (CO Q 10 PO)     Efraín Cota 550 MG CAPS     MAGNESIUM OXIDE PO     metoprolol succinate ER (TOPROL-XL) 25 MG 24 hr tablet     Omega-3 Fatty Acids (OMEGA-3 FISH OIL PO)     Selenium 200 MCG CAPS     Taurine 1000 MG CAPS     Turmeric 450 MG CAPS     No current facility-administered medications for this visit.       Social History     Tobacco Use     Smoking status: Former Smoker     Packs/day: 0.50     Years: 15.00     Pack years: 7.50     Smokeless tobacco: Never Used   Substance Use Topics     Alcohol use: Yes     Alcohol/week: 2.0 standard drinks     Types: 2 Standard drinks or equivalent per week     Comment: Socially      Drug use: No       Health Maintenance Due   Topic Date Due     LUNG CANCER SCREENING  10/31/2013     Pneumococcal Vaccine: 65+ Years (1 of 1 - PPSV23) Never done     ZOSTER IMMUNIZATION (2 of 2) 02/12/2020     FALL RISK ASSESSMENT  03/05/2021     INFLUENZA VACCINE (1) 09/01/2021       Lab " Results   Component Value Date    PAP NIL 01/02/2020 April 13, 2022 4:11 PM

## 2022-04-13 NOTE — Clinical Note
"Fausto Romero, I referred Cordell to you for 2 mos hx of increasing pelvic discomfort, difficulty emptying bladder. I am sending my note. I reviewed her past hx after our visit. She saw Dr. Megan Arciniega in 2012-13 for FRANCIA for cervical dysplasia, then had partial vaginectomy (superior). Pt recently felt a \"mass\". Which feels like a firm BB at upper vagina. I suspect this is simply scar tissue but would like your opinion. ? Need for colposcopy, pelvic US? No report of bloating. She has a rectocele. Thanks for seeing this nice lady.  Merry"

## 2022-04-13 NOTE — PATIENT INSTRUCTIONS
Dr. Strauss or Dr. Ayon  Uro gynecologists    [facility]   [address], [address], [address]   [facility] [address]   Phone: [phone]   OR   [phone]

## 2022-04-14 PROBLEM — N81.6 RECTOCELE: Status: ACTIVE | Noted: 2022-04-14

## 2022-04-14 PROBLEM — R10.2 PELVIC PAIN IN FEMALE: Status: ACTIVE | Noted: 2022-04-14

## 2022-04-16 DIAGNOSIS — I48.0 PAROXYSMAL ATRIAL FIBRILLATION (H): ICD-10-CM

## 2022-04-19 RX ORDER — METOPROLOL SUCCINATE 25 MG/1
25 TABLET, EXTENDED RELEASE ORAL DAILY
Qty: 90 TABLET | Refills: 3 | Status: SHIPPED | OUTPATIENT
Start: 2022-04-19 | End: 2022-06-20

## 2022-04-28 ENCOUNTER — PRE VISIT (OUTPATIENT)
Dept: UROLOGY | Facility: CLINIC | Age: 72
End: 2022-04-28
Payer: COMMERCIAL

## 2022-04-28 NOTE — TELEPHONE ENCOUNTER
MEDICAL RECORDS REQUEST   [facility]  Urology Clinic  [address] [place] [address]  PHONE: [phone]  Fax: [phone]        FUTURE VISIT INFORMATION                                                   Cordell Donaldson, : 1950 scheduled for future visit at [facility]    APPOINTMENT INFORMATION:    Date: 2022    Provider:  Heather Ayon MD    Reason for Visit/Diagnosis: Pelvic pain in female [R10.2]    REFERRAL INFORMATION:    Referring provider:  Merry Lino MD    Referring providers clinic:  [facility]    RECORDS REQUESTED FOR VISIT                                                     NOTES  STATUS/DETAILS   OFFICE NOTE from referring provider  yes, 2022, 2022, Merry Lino MD @ [facility]   MEDICATION LIST  yes   LABS     URINALYSIS (UA)  yes, 2022     PRE-VISIT CHECKLIST      Record collection complete Yes   Appointment appropriately scheduled           (right time/right provider) Yes   Joint diagnostic appointment coordinated correctly          (ensure right order & amount of time) Yes   MyChart activation Yes   Questionnaire complete If no, please explain pending

## 2022-05-05 ENCOUNTER — PRE VISIT (OUTPATIENT)
Dept: UROLOGY | Facility: CLINIC | Age: 72
End: 2022-05-05
Payer: COMMERCIAL

## 2022-05-05 NOTE — TELEPHONE ENCOUNTER
Reason for visit: pelvic pain      Relevant information: rectocele, history of high grade cervical and vaginal dysplasia, history of hysterectomy and vaginectomy    Records/imaging/labs/orders: records available    Pt called: no need for a call    At Rooming: collect a urine/pvr      Ericka Chen CMA  5/5/2022  1:54 PM

## 2022-05-12 ENCOUNTER — OFFICE VISIT (OUTPATIENT)
Dept: UROLOGY | Facility: CLINIC | Age: 72
End: 2022-05-12
Attending: INTERNAL MEDICINE
Payer: COMMERCIAL

## 2022-05-12 VITALS
HEART RATE: 86 BPM | SYSTOLIC BLOOD PRESSURE: 125 MMHG | DIASTOLIC BLOOD PRESSURE: 72 MMHG | BODY MASS INDEX: 21.26 KG/M2 | WEIGHT: 120 LBS | HEIGHT: 63 IN

## 2022-05-12 DIAGNOSIS — R10.2 PELVIC PAIN IN FEMALE: Primary | ICD-10-CM

## 2022-05-12 DIAGNOSIS — K59.00 CONSTIPATION, UNSPECIFIED CONSTIPATION TYPE: ICD-10-CM

## 2022-05-12 DIAGNOSIS — R10.2 PELVIC PRESSURE IN FEMALE: ICD-10-CM

## 2022-05-12 DIAGNOSIS — D06.9 CIN III (CERVICAL INTRAEPITHELIAL NEOPLASIA GRADE III) WITH SEVERE DYSPLASIA: ICD-10-CM

## 2022-05-12 PROCEDURE — 51798 US URINE CAPACITY MEASURE: CPT | Performed by: UROLOGY

## 2022-05-12 PROCEDURE — 99204 OFFICE O/P NEW MOD 45 MIN: CPT | Mod: 25 | Performed by: UROLOGY

## 2022-05-12 ASSESSMENT — PAIN SCALES - GENERAL: PAINLEVEL: NO PAIN (0)

## 2022-05-12 NOTE — PROGRESS NOTES
"We are pleased to see Ms. oCrdell Donaldson in consultation at the request of Dr. Lino for the evaluation of chief complaint listed below    Chief Complaint:    Pelvic Pain         History of Present Illness:   Cordell Donaldson is a 72 year old female w/ PMH of high grade cervical dysplasia s/p FRANCIA (2012) followed by high grade vaginal dysplasia s/p upper vaginectomy (Dr. Arciniega, 2013). Final path showed:    FINAL DIAGNOSIS:   Vagina, not otherwise specified, biopsy:        -  High grade squamous intraepithelial lesion, cannot exclude   invasion     With regards to Gyn hx, she has since been followed by PCP with pap smear about every 2 years - last was performed in 1/2/2020 with results negative.    Seen by PCP (Dr. Lino) for pelvic discomfort last month as well as not a firm vaginal mass that was thought to be potentially her vaginal cuff suture line or scar tissue. Today, she describes her pain is located in the lower abdomen which she associates with eating. Her pelvic pain is located sacral and pelvic floor pain. Pain onset in the afternoon or evening and may persist through the night. Also describes associated \"heaviness\". First noted the pain after a trip to [place] (Mid-March 2022) and noted she was sitting on hard surfaces such as trolley cars. Sitting makes it worse. Heat improves pain. This has improved over the past 3 weeks. She was previousy taking tylenol but now taking aspirin for this pain.    She reports a recent colonoscopy was negative. She has multiple BMs in the morning and one in the evening, which is her baseline. No fecal incontinence. She does have to splint when she has constipation. She has tried fiber for a few days but felt this or tylenol worsened her constipation so she stopped both.    Positive for urinary frequency. She has increased her fluid intake, coffee in the AM, water and peppermint or green tea through out the day. Nocturia x 1-2. Double voids with success. Denies " "hematuria. No urinary urgency or incontinence. No EMILY. No UTIs. No kidney stones.    Flank pain with associated rash in last month but this has now resolved (no prior shingles vaccine). She has intentionally lost 20 lbs in last 6 months due to recent diagnosis of hyperlipidemia. She is feeling \"constricted\" more after this weight loss.         Past Medical History:     Past Medical History:   Diagnosis Date     Abnormal Pap smear     maybe 20 years ago     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      History of breast cancer     lumpectomy and radiation offerred chemo and patient declined at time but had oncogene test and low risk     Hyperlipidemia LDL goal < 160      Osteopenia      Paroxysmal A-fib (H)      Severe vaginal dysplasia             Past Surgical History:     Past Surgical History:   Procedure Laterality Date     BIOPSY       BREAST SURGERY Left     lumpectomy left, did radiation, 5 yr of tamoxifen     COLONOSCOPY      repeat in      Colposcopy Cervix with Loop Electrode Conization and Lser to Vagina       COLPOSCOPY, BIOPSY, COMBINED  10/24/2012    Procedure: COMBINED COLPOSCOPY, BIOPSY;  Colposcopy, Biopsy of Cervix and Vagina, Ultrasound Guidance;  Surgeon: Colette Ng MD;  Location: [facility]     ENT SURGERY  2018    otoscelerosis, right side     HYSTERECTOMY, FRANCIA  2012    high grade cervical dysplasia, [place] [facility], Dr. Arciniega     [place] LAP VAGINECTOMY, PARTIAL REMOVAL OF VAGINAL WALL  10/2013    upper vaginectomy for dysplasia, Dr. Megan Arciniega            Social History:   Works as a realtor.        Smoking: Prior smoker, 10-15 years (< 1 ppd)  Alcohol: Wine - 2 glasses in month  Drug Use: None         Family History:     Family History   Problem Relation Age of Onset     Breast Cancer Sister 47         of breast cancer age 63     Breast Cancer Paternal Grandmother      Myocardial Infarction Mother 73        Tob     Myocardial Infarction " "Father 68        Tob     No urologic cancers in the family.         Allergies:   No Known Allergies         Medications:     Current Outpatient Medications   Medication Sig     Acetylcarnitine HCl 250 MG CAPS Take 500 mg by mouth 2 times daily      Bacillus Coagulans-Inulin (PROBIOTIC FORMULA) 1-250 BILLION-MG CAPS Take by mouth three times a week 25+billion CFUS     BENFOTIAMINE PO Take 150 mg by mouth 2 times daily     Cholecalciferol (VITAMIN D-3) 25 MCG (1000 UT) CAPS Take 1,000 Units by mouth daily     Coenzyme Q10 (CO Q 10 PO) Take 100 mg by mouth daily      [place] Berry 550 MG CAPS Take 400 mg by mouth 2 times daily     MAGNESIUM OXIDE PO Take 500 mg by mouth     metoprolol succinate ER (TOPROL-XL) 25 MG 24 hr tablet Take 1 tablet (25 mg) by mouth daily     Omega-3 Fatty Acids (OMEGA-3 FISH OIL PO) Take 630 mg by mouth 2 times daily      Selenium 200 MCG CAPS Take 200 mg by mouth     Taurine 1000 MG CAPS Take one po twice daily     Turmeric 450 MG CAPS Take 2 capsules by mouth daily     No current facility-administered medications for this visit.            REVIEW OF SYSTEMS:    See HPI for pertinent details.  Remainder of 10-point ROS negative.         PHYSICAL EXAM   /72   Pulse 86   Ht 1.6 m (5' 3\")   Wt 54.4 kg (120 lb)   BMI 21.26 kg/m    GENERAL: No acute distress. Well nourished.   HEENT:  Sclerae anicteric.  Conjunctivae pink.  Moist mucous membranes.  CARDIAC:  Warm, well perfused.  LUNGS:  Non-labored breathing on room air  BACK:  No costovertebral tenderness. Several small red skin lesions (appear like healing vesicles) where she indicated her prior rash was located  ABDOMEN: Soft, non-tender, no surgical scars, no organomegaly, non-tender.  : Normal external female genitalia. Speculum exam reveals surgically shortened vagina, some white scar tissue present at cuff but no visual abnormalities. On manual exam there is an area of induration with rough mucosa present on distal left " anterior vaginal wall but no visual abnormalities overlying this region. Urethral meatus is orthotopic and patent. No EMILY with coughing.  RECTAL:  Deferred  SKIN:  Dry. See back above.  EXTREMITIES:  No lower extremity edema bilaterally.  NEURO: normal gait, no focal deficits.       PVR = 57 mL via bladder scan          LABS AND IMAGING:   Labs reviewed    UA 4/6/22 - clear  UCx 4/6/22 - 10-50 K mixed raymundo  Pathology - per HPI    No recent pertinent imaging          ASSESSMENT:   Cordell Donaldson is a 72 year old female with PMH of high grade cervical dysplasia s/p FRANCIA (2012) followed by high grade vaginal dysplasia s/p upper vaginectomy (Dr. Arciniega, 2013) who presents for pelvic pain and recently discovered vaginal mass of unclear etiology on self-exam. Also has recent weight loss though intentional. No recent imaging.     #Vaginal mass - Last Pap in 2020 was normal. Exam reveals a palpable area of induration but no overlying lesion visualized. Discussed need for further diagnostic work up including MRI vs US imaging given her prior history. She would prefer to start with US.     #Abdominal/Pelvic pain - Nonspecific, no evidence of POP on exam today, possibly related to constipation due to her abnormal bowel habits. Discussed trying fiber supplement with large glass of water & continuing for several weeks to see results. Alternatively, this could be musculoskeletal etiology. Could consider PFPT if pain persists. PVR reassuring at 57 mL today. Imaging as above may provide additional evaluation, too.            PLAN:     Transvaginal US to start, especially as she is unsure if she is able to do a MRI because of a hearing device (but we have asked to to check on this)    Bowel regimen with fiber supplementation    Follow up with US results    Patient was seen and examined with staff, Dr. Ayon, who developed the above treatment plan.    Alon Keen MD  Urology Resident, PGY-4    Addendum:    The patient was seen and  evaluated with the resident.  The plan was formulated in conjunction with me and I agree with the note with changes made as necessary.    20 minutes were spent today on the date of the encounter in reviewing the EMR including Dr Lino's (PCP) notes in Epic, Dr Arciniega's (gyn onc) operative notes in EPIC, direct patient care including ordering pelvic US, coordination of care, and documentation.    Heather Ayon MD MPH  (she/her/hers)   of Urology  [facility]  Patient Care Team:  Bindu Lino MD as PCP - General (Internal Medicine)  Bess Paredes MD Cor[place], Aaron Daniel, DPM as MD (Podiatry)  Elise Huitron MD as Assigned Heart and Vascular Provider  Bindu Lino MD as Assigned PCP  Tess Mcgowan, RN as Specialty Care Coordinator  Heather Ayon MD as MD (Urology)  BINDU LINO    Copy to patient  CATA SALAZAR  [address]  [facility] [address]

## 2022-05-12 NOTE — NURSING NOTE
"Chief Complaint   Patient presents with     Consult     Pelvic pain consult       Blood pressure 125/72, pulse 86, height 1.6 m (5' 3\"), weight 54.4 kg (120 lb). Body mass index is 21.26 kg/m .    Patient Active Problem List   Diagnosis     Dupuytren contracture     Abnormal electrocardiogram     Atrial fibrillation (H)     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     Hyperlipidemia LDL goal <130     Malignant neoplasm of breast (H)     Rectocele     Pelvic pain in female       No Known Allergies    Current Outpatient Medications   Medication Sig Dispense Refill     Acetylcarnitine HCl 250 MG CAPS Take 500 mg by mouth 2 times daily        Bacillus Coagulans-Inulin (PROBIOTIC FORMULA) 1-250 BILLION-MG CAPS Take by mouth three times a week 25+billion CFUS       BENFOTIAMINE PO Take 150 mg by mouth 2 times daily       Cholecalciferol (VITAMIN D-3) 25 MCG (1000 UT) CAPS Take 1,000 Units by mouth daily 90 capsule 3     Coenzyme Q10 (CO Q 10 PO) Take 100 mg by mouth daily        [place] Berry 550 MG CAPS Take 400 mg by mouth 2 times daily 60 capsule 11     MAGNESIUM OXIDE PO Take 500 mg by mouth       metoprolol succinate ER (TOPROL-XL) 25 MG 24 hr tablet Take 1 tablet (25 mg) by mouth daily 90 tablet 3     Omega-3 Fatty Acids (OMEGA-3 FISH OIL PO) Take 630 mg by mouth 2 times daily        Selenium 200 MCG CAPS Take 200 mg by mouth       Taurine 1000 MG CAPS Take one po twice daily 60 capsule 11     Turmeric 450 MG CAPS Take 2 capsules by mouth daily 60 capsule 11       Social History     Tobacco Use     Smoking status: Former Smoker     Packs/day: 0.50     Years: 15.00     Pack years: 7.50     Smokeless tobacco: Never Used   Substance Use Topics     Alcohol use: Yes     Alcohol/week: 2.0 standard drinks     Types: 2 Standard drinks or equivalent per week     Comment: Socially      Drug use: No       Ericka Chen CMA  5/12/2022  9:20 AM     "

## 2022-05-12 NOTE — LETTER
"5/12/2022       RE: Cordell Donaldson  [address]  [facility] [address]     Dear Colleague,    Thank you for referring your patient, Cordell Donaldson, to the [facility] [place] at [facility]. Please see a copy of my visit note below.    We are pleased to see Ms. Cordell Donaldson in consultation at the request of Dr. Lino for the evaluation of chief complaint listed below    Chief Complaint:    Pelvic Pain         History of Present Illness:   Cordell Donaldson is a 72 year old female w/ PMH of high grade cervical dysplasia s/p FRANCIA (2012) followed by high grade vaginal dysplasia s/p upper vaginectomy (Dr. Arciniega, 2013). Final path showed:    FINAL DIAGNOSIS:   Vagina, not otherwise specified, biopsy:        -  High grade squamous intraepithelial lesion, cannot exclude   invasion     With regards to Gyn hx, she has since been followed by PCP with pap smear about every 2 years - last was performed in 1/2/2020 with results negative.    Seen by PCP (Dr. Lino) for pelvic discomfort last month as well as not a firm vaginal mass that was thought to be potentially her vaginal cuff suture line or scar tissue. Today, she describes her pain is located in the lower abdomen which she associates with eating. Her pelvic pain is located sacral and pelvic floor pain. Pain onset in the afternoon or evening and may persist through the night. Also describes associated \"heaviness\". First noted the pain after a trip to [place] (Mid-March 2022) and noted she was sitting on hard surfaces such as trolley cars. Sitting makes it worse. Heat improves pain. This has improved over the past 3 weeks. She was previousy taking tylenol but now taking aspirin for this pain.    She reports a recent colonoscopy was negative. She has multiple BMs in the morning and one in the evening, which is her baseline. No fecal incontinence. She does have to splint when she has " "constipation. She has tried fiber for a few days but felt this or tylenol worsened her constipation so she stopped both.    Positive for urinary frequency. She has increased her fluid intake, coffee in the AM, water and peppermint or green tea through out the day. Nocturia x 1-2. Double voids with success. Denies hematuria. No urinary urgency or incontinence. No EMILY. No UTIs. No kidney stones.    Flank pain with associated rash in last month but this has now resolved (no prior shingles vaccine). She has intentionally lost 20 lbs in last 6 months due to recent diagnosis of hyperlipidemia. She is feeling \"constricted\" more after this weight loss.         Past Medical History:     Past Medical History:   Diagnosis Date     Abnormal Pap smear     maybe 20 years ago     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      History of breast cancer 2003    lumpectomy and radiation offerred chemo and patient declined at time but had oncogene test and low risk     Hyperlipidemia LDL goal < 160      Osteopenia      Paroxysmal A-fib (H)      Severe vaginal dysplasia             Past Surgical History:     Past Surgical History:   Procedure Laterality Date     BIOPSY       BREAST SURGERY Left 2003    lumpectomy left, did radiation, 5 yr of tamoxifen     COLONOSCOPY  2018    repeat in 2028     Colposcopy Cervix with Loop Electrode Conization and Lser to Vagina  2008     COLPOSCOPY, BIOPSY, COMBINED  10/24/2012    Procedure: COMBINED COLPOSCOPY, BIOPSY;  Colposcopy, Biopsy of Cervix and Vagina, Ultrasound Guidance;  Surgeon: Colette Ng MD;  Location: [facility] OR     ENT SURGERY  01/23/2018    otoscelerosis, right side     HYSTERECTOMY, FRANCIA  12/2012    high grade cervical dysplasia, [place] [facility], Dr. Arciniega     [place] LAP VAGINECTOMY, PARTIAL REMOVAL OF VAGINAL WALL  10/2013    upper vaginectomy for dysplasia, Dr. Megan Arciniega            Social History:   Works as a realtor.        Smoking: Prior smoker, 10-15 years (< " "1 ppd)  Alcohol: Wine - 2 glasses in month  Drug Use: None         Family History:     Family History   Problem Relation Age of Onset     Breast Cancer Sister 47         of breast cancer age 63     Breast Cancer Paternal Grandmother      Myocardial Infarction Mother 73        Tob     Myocardial Infarction Father 68        Tob     No urologic cancers in the family.         Allergies:   No Known Allergies         Medications:     Current Outpatient Medications   Medication Sig     Acetylcarnitine HCl 250 MG CAPS Take 500 mg by mouth 2 times daily      Bacillus Coagulans-Inulin (PROBIOTIC FORMULA) 1-250 BILLION-MG CAPS Take by mouth three times a week 25+billion CFUS     BENFOTIAMINE PO Take 150 mg by mouth 2 times daily     Cholecalciferol (VITAMIN D-3) 25 MCG (1000 UT) CAPS Take 1,000 Units by mouth daily     Coenzyme Q10 (CO Q 10 PO) Take 100 mg by mouth daily      [place] Berry 550 MG CAPS Take 400 mg by mouth 2 times daily     MAGNESIUM OXIDE PO Take 500 mg by mouth     metoprolol succinate ER (TOPROL-XL) 25 MG 24 hr tablet Take 1 tablet (25 mg) by mouth daily     Omega-3 Fatty Acids (OMEGA-3 FISH OIL PO) Take 630 mg by mouth 2 times daily      Selenium 200 MCG CAPS Take 200 mg by mouth     Taurine 1000 MG CAPS Take one po twice daily     Turmeric 450 MG CAPS Take 2 capsules by mouth daily     No current facility-administered medications for this visit.            REVIEW OF SYSTEMS:    See HPI for pertinent details.  Remainder of 10-point ROS negative.         PHYSICAL EXAM   /72   Pulse 86   Ht 1.6 m (5' 3\")   Wt 54.4 kg (120 lb)   BMI 21.26 kg/m    GENERAL: No acute distress. Well nourished.   HEENT:  Sclerae anicteric.  Conjunctivae pink.  Moist mucous membranes.  CARDIAC:  Warm, well perfused.  LUNGS:  Non-labored breathing on room air  BACK:  No costovertebral tenderness. Several small red skin lesions (appear like healing vesicles) where she indicated her prior rash was located  ABDOMEN: " Soft, non-tender, no surgical scars, no organomegaly, non-tender.  : Normal external female genitalia. Speculum exam reveals surgically shortened vagina, some white scar tissue present at cuff but no visual abnormalities. On manual exam there is an area of induration with rough mucosa present on distal left anterior vaginal wall but no visual abnormalities overlying this region. Urethral meatus is orthotopic and patent. No EMILY with coughing.  RECTAL:  Deferred  SKIN:  Dry. See back above.  EXTREMITIES:  No lower extremity edema bilaterally.  NEURO: normal gait, no focal deficits.       PVR = 57 mL via bladder scan          LABS AND IMAGING:   Labs reviewed    UA 4/6/22 - clear  UCx 4/6/22 - 10-50 K mixed raymundo  Pathology - per HPI    No recent pertinent imaging          ASSESSMENT:   Cordell Donaldson is a 72 year old female with PMH of high grade cervical dysplasia s/p FRANCIA (2012) followed by high grade vaginal dysplasia s/p upper vaginectomy (Dr. Arciniega, 2013) who presents for pelvic pain and recently discovered vaginal mass of unclear etiology on self-exam. Also has recent weight loss though intentional. No recent imaging.     #Vaginal mass - Last Pap in 2020 was normal. Exam reveals a palpable area of induration but no overlying lesion visualized. Discussed need for further diagnostic work up including MRI vs US imaging given her prior history. She would prefer to start with US.     #Abdominal/Pelvic pain - Nonspecific, no evidence of POP on exam today, possibly related to constipation due to her abnormal bowel habits. Discussed trying fiber supplement with large glass of water & continuing for several weeks to see results. Alternatively, this could be musculoskeletal etiology. Could consider PFPT if pain persists. PVR reassuring at 57 mL today. Imaging as above may provide additional evaluation, too.            PLAN:     Transvaginal US to start, especially as she is unsure if she is able to do a MRI because of a  hearing device (but we have asked to to check on this)    Bowel regimen with fiber supplementation    Follow up with US results    Patient was seen and examined with staff, Dr. Ayon, who developed the above treatment plan.    Alon Keen MD  Urology Resident, PGY-4    Addendum:    The patient was seen and evaluated with the resident.  The plan was formulated in conjunction with me and I agree with the note with changes made as necessary.    20 minutes were spent today on the date of the encounter in reviewing the EMR including Dr Lino's (PCP) notes in Epic, Dr Arciniega's (gyn onc) operative notes in EPIC, direct patient care including ordering pelvic US, coordination of care, and documentation.    Heather Ayon MD MPH  (she/her/hers)   of Urology  [facility]  Patient Care Team:  Bindu Lino MD as PCP - General (Internal Medicine)  Bess Paredes MD Corfield, Aaron Daniel, DPM as MD (Podiatry)  Elise Huitron MD as Assigned Heart and Vascular Provider  Bindu Lino MD as Assigned PCP  Tess Mcgowan, RN as Specialty Care Coordinator  Heather Ayon MD as MD (Urology)  BINDU LINO    Copy to patient  CATA SALAZAR  [address]  [facility] [address]

## 2022-05-12 NOTE — PATIENT INSTRUCTIONS
Daily fiber supplement that you mix in the water    Websites with free information:    [employer] patient website: www.voicesforpfd.org    Total Control Program: www.totalcontrolprogram.com    Please do a pelvic ultrasound    It was a pleasure meeting with you today.  Thank you for allowing me and my team the privilege of caring for you today.  YOU are the reason we are here, and I truly hope we provided you with the excellent service you deserve.  Please let us know if there is anything else we can do for you so that we can be sure you are leaving completely satisfied with your care experience.

## 2022-05-16 ENCOUNTER — ANCILLARY PROCEDURE (OUTPATIENT)
Dept: ULTRASOUND IMAGING | Facility: CLINIC | Age: 72
End: 2022-05-16
Attending: UROLOGY
Payer: COMMERCIAL

## 2022-05-16 DIAGNOSIS — D06.9 CIN III (CERVICAL INTRAEPITHELIAL NEOPLASIA GRADE III) WITH SEVERE DYSPLASIA: ICD-10-CM

## 2022-05-16 DIAGNOSIS — R19.00 PELVIC MASS: Primary | ICD-10-CM

## 2022-05-16 DIAGNOSIS — R10.2 PELVIC PRESSURE IN FEMALE: ICD-10-CM

## 2022-05-16 DIAGNOSIS — R10.2 PELVIC PAIN IN FEMALE: ICD-10-CM

## 2022-05-16 LAB — RADIOLOGIST FLAGS: NORMAL

## 2022-05-16 PROCEDURE — 76830 TRANSVAGINAL US NON-OB: CPT | Performed by: STUDENT IN AN ORGANIZED HEALTH CARE EDUCATION/TRAINING PROGRAM

## 2022-05-16 PROCEDURE — 76856 US EXAM PELVIC COMPLETE: CPT | Performed by: STUDENT IN AN ORGANIZED HEALTH CARE EDUCATION/TRAINING PROGRAM

## 2022-05-16 NOTE — CONFIDENTIAL NOTE
May 16, 2022    Called patient today to let her know the pelvic US results.  Recommend a pelvic MRI (she got confirmation that her ear implant is MRI safe) and see gyn onc.  She wishes to return to see Dr Arciniega so I told the patient I would order the MRI and then she should call Dr Arciniega's office to schedule.  Advised if she is unable to make an appointment then to let me know so we can help her coordinate here.  At this time questions answered and she is agreeable to the plan    Heather Ayon MD MPH  (she/her/hers)   of Urology  [facility]

## 2022-05-18 ENCOUNTER — TELEPHONE (OUTPATIENT)
Dept: UROLOGY | Facility: CLINIC | Age: 72
End: 2022-05-18
Payer: COMMERCIAL

## 2022-05-18 ENCOUNTER — TELEPHONE (OUTPATIENT)
Dept: FAMILY MEDICINE | Facility: CLINIC | Age: 72
End: 2022-05-18
Payer: COMMERCIAL

## 2022-05-18 NOTE — TELEPHONE ENCOUNTER
Called Cordell who stated she called imaging to schedule her MRI pelvis and was told there wasn't an order.  There is an order in the system and I can see it.  I called imaging and spoke with Ele who can see the order and will call Cordell to schedule an appointment as soon as possible.  EZRA LorenzanaN, RN, [facility]  05/18/22  10:52 AM

## 2022-05-18 NOTE — TELEPHONE ENCOUNTER
Pt called back with the following fax number for the MRI Order:    [phone]  Attn: Megan Arciniega    Thanks!

## 2022-05-18 NOTE — TELEPHONE ENCOUNTER
Who is calling? Patient   Reason for Call: Patient was advise to get MRI and Biopsy done prior May 31st for a provider outside of [place] system. Dr. Lino offered help incase it gets overwhelming. Patient would like to talk to RN.

## 2022-05-18 NOTE — TELEPHONE ENCOUNTER
Health Call Center    Phone Message    May a detailed message be left on voicemail: yes     Reason for Call: Other: Pt would like to have orders for MRI faxed to [facility] in [place]. She will call back with that fax number. She also would like a call back to discuss the biopsy that Nany recommended for her to have done. Please call pt to discuss next steps. Thanks     Action Taken: Message routed to:  [facility] ([facility]): uro    Travel Screening: Not Applicable

## 2022-05-20 ENCOUNTER — HOSPITAL ENCOUNTER (OUTPATIENT)
Dept: MRI IMAGING | Facility: CLINIC | Age: 72
Discharge: HOME | End: 2022-05-20
Attending: UROLOGY | Admitting: UROLOGY
Payer: COMMERCIAL

## 2022-05-20 DIAGNOSIS — R19.00 PELVIC MASS: ICD-10-CM

## 2022-05-20 DIAGNOSIS — D06.9 CIN III (CERVICAL INTRAEPITHELIAL NEOPLASIA GRADE III) WITH SEVERE DYSPLASIA: ICD-10-CM

## 2022-05-20 PROCEDURE — 72197 MRI PELVIS W/O & W/DYE: CPT | Mod: 26 | Performed by: RADIOLOGY

## 2022-05-20 PROCEDURE — 255N000002 HC RX 255 OP 636: Performed by: UROLOGY

## 2022-05-20 PROCEDURE — A9585 GADOBUTROL INJECTION: HCPCS | Performed by: UROLOGY

## 2022-05-20 PROCEDURE — 72197 MRI PELVIS W/O & W/DYE: CPT

## 2022-05-20 RX ORDER — GADOBUTROL 604.72 MG/ML
0.1 INJECTION INTRAVENOUS ONCE
Status: COMPLETED | OUTPATIENT
Start: 2022-05-20 | End: 2022-05-20

## 2022-05-20 RX ADMIN — GADOBUTROL 5.44 ML: 604.72 INJECTION INTRAVENOUS at 18:13

## 2022-05-31 ENCOUNTER — TRANSFERRED RECORDS (OUTPATIENT)
Dept: HEALTH INFORMATION MANAGEMENT | Facility: CLINIC | Age: 72
End: 2022-05-31

## 2022-06-03 DIAGNOSIS — R19.09 GROIN SWELLING: Primary | ICD-10-CM

## 2022-06-06 ENCOUNTER — LAB (OUTPATIENT)
Dept: LAB | Facility: CLINIC | Age: 72
End: 2022-06-06
Payer: COMMERCIAL

## 2022-06-06 DIAGNOSIS — R19.09 GROIN SWELLING: ICD-10-CM

## 2022-06-06 LAB
ALBUMIN SERPL-MCNC: 3.2 G/DL (ref 3.4–5)
ALP SERPL-CCNC: 90 U/L (ref 40–150)
ALT SERPL W P-5'-P-CCNC: 17 U/L (ref 0–50)
ANION GAP SERPL CALCULATED.3IONS-SCNC: 9 MMOL/L (ref 3–14)
AST SERPL W P-5'-P-CCNC: 18 U/L (ref 0–45)
BASOPHILS # BLD AUTO: 0 10E3/UL (ref 0–0.2)
BASOPHILS NFR BLD AUTO: 0 %
BILIRUB SERPL-MCNC: 0.4 MG/DL (ref 0.2–1.3)
BUN SERPL-MCNC: 9 MG/DL (ref 7–30)
CALCIUM SERPL-MCNC: 9.6 MG/DL (ref 8.5–10.1)
CHLORIDE BLD-SCNC: 101 MMOL/L (ref 94–109)
CO2 SERPL-SCNC: 27 MMOL/L (ref 20–32)
CREAT SERPL-MCNC: 0.65 MG/DL (ref 0.52–1.04)
EOSINOPHIL # BLD AUTO: 0.1 10E3/UL (ref 0–0.7)
EOSINOPHIL NFR BLD AUTO: 1 %
ERYTHROCYTE [DISTWIDTH] IN BLOOD BY AUTOMATED COUNT: 16.4 % (ref 10–15)
GFR SERPL CREATININE-BSD FRML MDRD: >90 ML/MIN/1.73M2
GLUCOSE BLD-MCNC: 109 MG/DL (ref 70–99)
HCT VFR BLD AUTO: 40.4 % (ref 35–47)
HGB BLD-MCNC: 12.3 G/DL (ref 11.7–15.7)
IMM GRANULOCYTES # BLD: 0 10E3/UL
IMM GRANULOCYTES NFR BLD: 0 %
LYMPHOCYTES # BLD AUTO: 1 10E3/UL (ref 0.8–5.3)
LYMPHOCYTES NFR BLD AUTO: 9 %
MCH RBC QN AUTO: 24.2 PG (ref 26.5–33)
MCHC RBC AUTO-ENTMCNC: 30.4 G/DL (ref 31.5–36.5)
MCV RBC AUTO: 80 FL (ref 78–100)
MONOCYTES # BLD AUTO: 0.6 10E3/UL (ref 0–1.3)
MONOCYTES NFR BLD AUTO: 5 %
NEUTROPHILS # BLD AUTO: 9.3 10E3/UL (ref 1.6–8.3)
NEUTROPHILS NFR BLD AUTO: 85 %
NRBC # BLD AUTO: 0 10E3/UL
NRBC BLD AUTO-RTO: 0 /100
PLATELET # BLD AUTO: 319 10E3/UL (ref 150–450)
POTASSIUM BLD-SCNC: 4 MMOL/L (ref 3.4–5.3)
PROT SERPL-MCNC: 7 G/DL (ref 6.8–8.8)
RBC # BLD AUTO: 5.08 10E6/UL (ref 3.8–5.2)
SODIUM SERPL-SCNC: 137 MMOL/L (ref 133–144)
WBC # BLD AUTO: 11.1 10E3/UL (ref 4–11)

## 2022-06-06 PROCEDURE — 85025 COMPLETE CBC W/AUTO DIFF WBC: CPT | Performed by: PATHOLOGY

## 2022-06-06 PROCEDURE — 36415 COLL VENOUS BLD VENIPUNCTURE: CPT | Performed by: PATHOLOGY

## 2022-06-06 PROCEDURE — 80053 COMPREHEN METABOLIC PANEL: CPT | Performed by: PATHOLOGY

## 2022-06-08 ENCOUNTER — TRANSFERRED RECORDS (OUTPATIENT)
Dept: HEALTH INFORMATION MANAGEMENT | Facility: CLINIC | Age: 72
End: 2022-06-08

## 2022-06-09 ENCOUNTER — TRANSFERRED RECORDS (OUTPATIENT)
Dept: HEALTH INFORMATION MANAGEMENT | Facility: CLINIC | Age: 72
End: 2022-06-09

## 2022-06-15 ENCOUNTER — MEDICAL CORRESPONDENCE (OUTPATIENT)
Dept: HEALTH INFORMATION MANAGEMENT | Facility: CLINIC | Age: 72
End: 2022-06-15

## 2022-06-20 ENCOUNTER — MYC MEDICAL ADVICE (OUTPATIENT)
Dept: CARDIOLOGY | Facility: CLINIC | Age: 72
End: 2022-06-20
Payer: COMMERCIAL

## 2022-06-20 DIAGNOSIS — I48.0 PAROXYSMAL ATRIAL FIBRILLATION (H): ICD-10-CM

## 2022-06-20 PROBLEM — R59.0 RETROPERITONEAL LYMPHADENOPATHY: Status: ACTIVE | Noted: 2022-06-20

## 2022-06-20 PROBLEM — Z87.411: Status: ACTIVE | Noted: 2022-06-20

## 2022-06-20 RX ORDER — METOPROLOL SUCCINATE 50 MG/1
50 TABLET, EXTENDED RELEASE ORAL DAILY
Qty: 90 TABLET | Refills: 1 | Status: SHIPPED | OUTPATIENT
Start: 2022-06-20 | End: 2022-12-15

## 2022-06-20 NOTE — TELEPHONE ENCOUNTER
Date: 6/20/2022    Time of Call: 4:15 PM     [ TORB ] Ordering provider: Dr. Huitron  Order: Increase metoprolol dose to 50 mg daily     Order received by: GEOFF Pulido     Follow-up/additional notes: Pt notified via [facility] message.     Praitk Iniguez RN   Cardiology Nurse Coordinator

## 2022-06-21 ENCOUNTER — TELEPHONE (OUTPATIENT)
Dept: RADIATION ONCOLOGY | Facility: CLINIC | Age: 72
End: 2022-06-21
Payer: COMMERCIAL

## 2022-06-22 ASSESSMENT — ENCOUNTER SYMPTOMS
DIZZINESS: 0
DIARRHEA: 0
HEMATOCHEZIA: 0
COUGH: 0
FEVER: 0
MYALGIAS: 0
SORE THROAT: 0
HEMATURIA: 0
BREAST MASS: 0
DYSURIA: 0
NAUSEA: 0
CONSTIPATION: 0
HEADACHES: 0
EYE PAIN: 0
WEAKNESS: 0
NERVOUS/ANXIOUS: 0
PALPITATIONS: 0
PARESTHESIAS: 0
JOINT SWELLING: 0
ABDOMINAL PAIN: 0
HEARTBURN: 0
ARTHRALGIAS: 0
CHILLS: 0
SHORTNESS OF BREATH: 0
FREQUENCY: 1

## 2022-06-22 ASSESSMENT — ACTIVITIES OF DAILY LIVING (ADL): CURRENT_FUNCTION: NO ASSISTANCE NEEDED

## 2022-06-23 DIAGNOSIS — C53.9 RECURRENT CERVICAL CANCER (H): Primary | ICD-10-CM

## 2022-06-24 ENCOUNTER — LAB REQUISITION (OUTPATIENT)
Dept: LAB | Facility: CLINIC | Age: 72
End: 2022-06-24
Payer: COMMERCIAL

## 2022-06-24 PROCEDURE — 88321 CONSLTJ&REPRT SLD PREP ELSWR: CPT | Mod: GC | Performed by: PATHOLOGY

## 2022-06-27 LAB
PATH REPORT.COMMENTS IMP SPEC: ABNORMAL
PATH REPORT.COMMENTS IMP SPEC: YES
PATH REPORT.FINAL DX SPEC: ABNORMAL
PATH REPORT.GROSS SPEC: ABNORMAL
PATH REPORT.MICROSCOPIC SPEC OTHER STN: ABNORMAL
PATH REPORT.MICROSCOPIC SPEC OTHER STN: ABNORMAL
PATH REPORT.RELEVANT HX SPEC: ABNORMAL
PATH REPORT.RELEVANT HX SPEC: ABNORMAL
PATH REPORT.SITE OF ORIGIN SPEC: ABNORMAL

## 2022-06-27 NOTE — PATIENT INSTRUCTIONS
Hydrocortisone ointment as needed for rectal irritation    Patient Education   Personalized Prevention Plan  You are due for the preventive services outlined below.  Your care team is available to assist you in scheduling these services.  If you have already completed any of these items, please share that information with your care team to update in your medical record.  Health Maintenance Due   Topic Date Due    Pneumococcal Vaccine (1 - PCV) Never done    LUNG CANCER SCREENING  10/31/2013    Zoster (Shingles) Vaccine (2 of 2) 02/12/2020    FALL RISK ASSESSMENT  03/05/2021    COVID-19 Vaccine (4 - Booster for Pfizer series) 02/08/2022     Preventive Health Recommendations    See your health care provider every year to  Review health changes.   Discuss preventive care.    Review your medicines if your doctor has prescribed any.  You no longer need a yearly Pap test unless you've had an abnormal Pap test in the past 10 years. If you have vaginal symptoms, such as bleeding or discharge, be sure to talk with your provider about a Pap test.  Every 1 to 2 years, have a mammogram.  If you are over 69, talk with your health care provider about whether or not you want to continue having screening mammograms.  Every 10 years, have a colonoscopy. Or, have a yearly FIT test (stool test). These exams will check for colon cancer.   Have a cholesterol test every 5 years, or more often if your doctor advises it.   Have a diabetes test (fasting glucose) every three years. If you are at risk for diabetes, you should have this test more often.   At age 65, have a bone density scan (DEXA) to check for osteoporosis (brittle bone disease).    Shots:  Get a flu shot each year.  Get a tetanus shot every 10 years.  Talk to your doctor about your pneumonia vaccines. There are now two you should receive - Pneumovax (PPSV 23) and Prevnar (PCV 13).  Talk to your pharmacist about the shingles vaccine.  Talk to your doctor about the hepatitis B  vaccine.    Nutrition:   Eat at least 5 servings of fruits and vegetables each day.  Eat whole-grain bread, whole-wheat pasta and brown rice instead of white grains and rice.  Get adequate Calcium and Vitamin D.     Lifestyle  Exercise at least 150 minutes a week (30 minutes a day, 5 days a week). This will help you control your weight and prevent disease.  Limit alcohol to one drink per day.  No smoking.   Wear sunscreen to prevent skin cancer.   See your dentist twice a year for an exam and cleaning.  See your eye doctor every 1 to 2 years to screen for conditions such as glaucoma, macular degeneration and cataracts.    Personalized Prevention Plan  You are due for the preventive services outlined below.  Your care team is available to assist you in scheduling these services.  If you have already completed any of these items, please share that information with your care team to update in your medical record.  Health Maintenance   Topic Date Due    Pneumococcal Vaccine: 65+ Years (1 - PCV) Never done    LUNG CANCER SCREENING  10/31/2013    ZOSTER IMMUNIZATION (2 of 2) 02/12/2020    FALL RISK ASSESSMENT  03/05/2021    COVID-19 Vaccine (4 - Booster for Pfizer series) 02/08/2022    INFLUENZA VACCINE (Season Ended) 09/01/2022    MAMMO SCREENING  11/19/2022    PAP  01/02/2023    MEDICARE ANNUAL WELLNESS VISIT  06/28/2023    ADVANCE CARE PLANNING  01/10/2025    LIPID  04/06/2027    COLORECTAL CANCER SCREENING  09/14/2028    DTAP/TDAP/TD IMMUNIZATION (3 - Td or Tdap) 06/23/2031    DEXA  12/18/2034    PHQ-2 (once per calendar year)  Completed    HEPATITIS C SCREENING  Addressed    IPV IMMUNIZATION  Aged Out    MENINGITIS IMMUNIZATION  Aged Out    HEPATITIS B IMMUNIZATION  Aged Out

## 2022-06-28 ENCOUNTER — OFFICE VISIT (OUTPATIENT)
Dept: FAMILY MEDICINE | Facility: CLINIC | Age: 72
End: 2022-06-28
Payer: COMMERCIAL

## 2022-06-28 VITALS
OXYGEN SATURATION: 97 % | TEMPERATURE: 97.5 F | DIASTOLIC BLOOD PRESSURE: 77 MMHG | WEIGHT: 115.08 LBS | SYSTOLIC BLOOD PRESSURE: 114 MMHG | BODY MASS INDEX: 20.39 KG/M2 | HEIGHT: 63 IN | HEART RATE: 115 BPM

## 2022-06-28 DIAGNOSIS — I48.11 LONGSTANDING PERSISTENT ATRIAL FIBRILLATION (H): ICD-10-CM

## 2022-06-28 DIAGNOSIS — Z00.00 ENCOUNTER FOR MEDICARE ANNUAL WELLNESS EXAM: Primary | ICD-10-CM

## 2022-06-28 DIAGNOSIS — C53.9 RECURRENT CERVICAL CANCER (H): ICD-10-CM

## 2022-06-28 NOTE — PROGRESS NOTES
Cordell Donaldson is a 72 year old woman with hx of breast cancer, treated with left sided lumpectomy (2003), radiation and tamoxifen x 5 yr. She also has hx of asymptomatic atrial fibrillation, not on anticoagulation. Hx of cervical cancer 2012 and vaginal cancer 2013, with recent recurrence of cervical cancer (see below). She is here for a preventive exam.  She is not fasting. She is due for  eye exams and up to date dental visits. Wears seat belt-yes. Bike helmet- yes.       HCM  Advanced Directive: on file from 2012 , will send updated copy  COVID vaccine series: 3/4 completed  Other vaccinations Pneumovax 20 due today --declines  Mammogram: completed 11/2020, due this year  Colonoscopy: due 2028  DEXA  Has never had baseline, --declines    Hearing concerns: No  Fall Risk: no  Independent at home: Yes  Safe : Yes  Memory concerns: No     COGNITIVE SCREEN  1) Repeat 3 items (Banana, Sunrise, Chair)    2) Clock draw: NORMAL  3) 3 item recall: Recalls 3 objects  Results: 3 items recalled: COGNITIVE IMPAIRMENT LESS LIKELY    Mini-CogTM Copyright S [place]. Licensed by the author for use in [facility]; reprinted with permission ([email or web address]). All rights reserved.      Diet: Fish and veggies  Wt Readings from Last 4 Encounters:   06/28/22 52.2 kg (115 lb 1.3 oz)   05/12/22 54.4 kg (120 lb)   04/13/22 54.9 kg (121 lb 1.9 oz)   04/06/22 56.2 kg (124 lb)     Body mass index is 20.39 kg/m .    Recurrent cervical cancer  Maritza is a 72-year-old woman with a history of cervical squamous cell carcinoma.  She is status post hysterectomy in 2012.  She presented to me in April of this year describing a 2-month history of urinary frequency and difficulty with urine stream.  She also described lower abdominal pressure and pain.  Urinalysis was normal.  On pelvic exam, she had a BB sized mass, 12 o'clock position.  It was unclear if this was scar tissue.  She had previous history of vaginal resection in 2013 for  vaginal dysplasia.    I referred her on to urology at the [facility].  Postvoid residual for the bladder scan was normal.  She was referred for transvaginal ultrasound.  A large heterogeneous mass was identified measuring up to 6.9 cm, internal vascularity noted with concern for neoplasm.  At this point, she was referred back to the gynecologist that performed her previous surgeries, Dr. Arciniega.  She underwent CT-guided biopsy of the pelvic mass.  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  She is now referred to radiation oncology as the mass is too large to be resected. A PET scan was ordered by an outside provider but has not been completed. She is wondering if that can be ordered at the [facility].    She reports loose stools, 3-5x per day. Added fiber, psyllium seed 2 days ago.  No nausea or vomiting. Reports pelvic pain which worsens throughout the day, most noted by late afternoon. Taking 1000-1500mg tyelnol daily. Has some aching, which radiates down both legs.   Taking sitz baths, using a heating pad.     Atrial fibrillation  Maritza has history of chronic atrial fibrillation.  For the most part, she is asymptomatic.  She has been evaluated by a cardiologist, but as of February of this year, she declined anticoagulation.  She takes Toprol for rate control and recently had to increase her dose from 25 to 50 mg a day due to heart rates in the 110s, 120s.  Today 115 HR, , 93 at rest. No dizziness. Taking baby aspirin. No other anticoaguation.     Hyperlipidemia  Cordell has history of Afib, no hx of ASCVD or stroke. Nonsmoker. Both parents with ASCVD in their late 60s -70s. She does not currently take lipid lowering medication.   Most recent lipid panel in April showed elevated total and LDL cholesterol and dampened HDL.   Recent Labs   Lab Test 04/06/22  1544 05/12/21  1146 08/22/17  1348 09/18/14  0819   CHOL 202* 246*   < > 202*   HDL 41* 55   < > 60   *  167*   < > 122   TRIG 86 120   < > 100   CHOLHDLRATIO  --   --   --  3.4    < > = values in this interval not displayed.           Health Maintenance   Topic Date Due     Pneumococcal Vaccine: 65+ Years (1 - PCV) Never done     LUNG CANCER SCREENING  10/31/2013     ZOSTER IMMUNIZATION (2 of 2) 02/12/2020     FALL RISK ASSESSMENT  03/05/2021     COVID-19 Vaccine (4 - Booster for Pfizer series) 02/08/2022     INFLUENZA VACCINE (Season Ended) 09/01/2022     MAMMO SCREENING  11/19/2022     PAP  01/02/2023     MEDICARE ANNUAL WELLNESS VISIT  06/28/2023     ADVANCE CARE PLANNING  01/10/2025     LIPID  04/06/2027     COLORECTAL CANCER SCREENING  09/14/2028     DTAP/TDAP/TD IMMUNIZATION (3 - Td or Tdap) 06/23/2031     DEXA  12/18/2034     PHQ-2 (once per calendar year)  Completed     HEPATITIS C SCREENING  Addressed     IPV IMMUNIZATION  Aged Out     MENINGITIS IMMUNIZATION  Aged Out     HEPATITIS B IMMUNIZATION  Aged Out         Patient Active Problem List   Diagnosis     Dupuytren contracture     Abnormal electrocardiogram     Atrial fibrillation (H)     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     Hyperlipidemia LDL goal <130     Malignant neoplasm of breast (H)     Rectocele     Pelvic pain in female     History of intraepithelial neoplasia of vagina     Retroperitoneal lymphadenopathy       Past Surgical History:   Procedure Laterality Date     BIOPSY       BREAST SURGERY Left 2003    lumpectomy left, did radiation, 5 yr of tamoxifen     COLONOSCOPY  2018    repeat in 2028     Colposcopy Cervix with Loop Electrode Conization and Lser to Vagina  2008     COLPOSCOPY, BIOPSY, COMBINED  10/24/2012    Procedure: COMBINED COLPOSCOPY, BIOPSY;  Colposcopy, Biopsy of Cervix and Vagina, Ultrasound Guidance;  Surgeon: Colette Ng MD;  Location: [facility] OR     ENT SURGERY  01/23/2018    otoscelerosis, right side     HYSTERECTOMY, FRANCIA  12/2012    high grade cervical dysplasia, [facility], Dr. Arciniega      [place] LAP VAGINECTOMY, PARTIAL REMOVAL OF VAGINAL WALL  10/2013    upper vaginectomy for dysplasia, Dr. Megan Arciniega       Family History   Problem Relation Age of Onset     Breast Cancer Sister 47         of breast cancer age 63     Breast Cancer Paternal Grandmother      Myocardial Infarction Mother 73        Tob     Myocardial Infarction Father 68        Tob       Social    Real estate retired     HABITS:  Tob: former smoker, quit 30 yr, total 15 yr 0.5 ppd, 7.5 pk yr  ETOH: rare  Calcium: 1-2 per day  Caffeine: 1 per day  Exercise:  6-7 days a week     OB/GYN HISTORY:  Patient is postmenopausal.  Hx abnormal pap? Yes, history of cervical dysplasia, s/p [facility]   Vasomotor sx: None, night sweats stopped last week, 3-4 days worth  G 2   P 1   A 1    Current Outpatient Medications   Medication Sig Dispense Refill     Acetylcarnitine HCl 250 MG CAPS Take 500 mg by mouth 2 times daily        Bacillus Coagulans-Inulin (PROBIOTIC FORMULA) 1-250 BILLION-MG CAPS Take by mouth three times a week 25+billion CFUS       BENFOTIAMINE PO Take 150 mg by mouth 2 times daily       Cholecalciferol (VITAMIN D-3) 25 MCG (1000 UT) CAPS Take 1,000 Units by mouth daily 90 capsule 3     Coenzyme Q10 (CO Q 10 PO) Take 100 mg by mouth daily        Efraín Berry 550 MG CAPS Take 400 mg by mouth 2 times daily 60 capsule 11     MAGNESIUM OXIDE PO Take 500 mg by mouth       metoprolol succinate ER (TOPROL XL) 50 MG 24 hr tablet Take 1 tablet (50 mg) by mouth daily 90 tablet 1     Omega-3 Fatty Acids (OMEGA-3 FISH OIL PO) Take 630 mg by mouth 2 times daily        Selenium 200 MCG CAPS Take 200 mg by mouth       Taurine 1000 MG CAPS Take one po twice daily 60 capsule 11     Turmeric 450 MG CAPS Take 2 capsules by mouth daily 60 capsule 11     No Known Allergies      ROS  CONSTITUTIONAL:NEGATIVE for fever, Reports recent night sweats, , + 10 pound weight loss since 2022  INTEGUMENTARY/SKIN: NEGATIVE for worrisome rashes, moles or  "lesions  EYES: NEGATIVE for vision changes or irritation  ENT/MOUTH: NEGATIVE for ear, mouth and throat problems  RESP:NEGATIVE for significant cough or SOB  BREAST: NEGATIVE for masses, tenderness or discharge  CV: NEGATIVE for chest pain, palpitations, SUN, orthopnea, PND  or peripheral edema, Afib rate controlled, taking Asprin for anticoagulation  GI: + pelvic pressure, loose stools, nausea, as above  :NEGATIVE for frequency, dysuria, or hematuria, pelvic pain, radiating to legs, as above  GYN: pelvic pain, recent recurrence of cervical cancer, poorly differentiated adenocarcinoma, see above  MUSCULOSKELETAL:NEGATIVE for significant arthralgias or myalgia  NEURO: NEGATIVE for weakness, dizziness or paresthesias  ENDOCRINE: NEGATIVE for polyuria/dipsia,  temperature intolerance, skin/hair changes  HEME/ALLERGY/IMMUNE: NEGATIVE for bleeding problems  PSYCHIATRIC: NEGATIVE for changes in mood or affect    EXAM  /77   Pulse 115   Temp 97.5  F (36.4  C)   Ht 1.6 m (5' 2.99\")   Wt 52.2 kg (115 lb 1.3 oz)   SpO2 97%   BMI 20.39 kg/m    GENERAL APPEARANCE: Alert, pleasant, NAD  EYES: PERRL, EOMI, conjunctiva clear  HENT: TM normal bilaterally. Nose and mouth masked  NECK: no adenopathy, thyroid normal to palpation  RESP: lungs clear to auscultation bilaterally  BREAST: normal without masses, no tenderness or nipple discharge and no palpable  axillary masses or adenopathy   CV: irreg, irregular rhythm, no murmur  ABDOMEN: soft,  without HSM or masses. Bowel sounds normal, discomfort with palpation at lower abdomen  MS: extremities normal- no gross deformities noted, no tender, hot or swollen joints.    SKIN: no suspicious lesions or rashes  NEURO: Normal strength and tone, sensory exam grossly normal, DTR normoreflexive in upper and lower extremities  PSYCH: mentation appears normal. and affect normal.  EXT: no peripheral edema, pedal pulses palpable    Assessment:  (Z00.00) Encounter for Medicare annual " "wellness exam  (primary encounter diagnosis)  Comment: 72 year old woman with complex past history, breast cancer in remission, newly diagnosed recurrent cervical cancer, see below  Plan: Today we discussed ways to maintain a healthy lifestyle with sensible eating, regular exercise and self cares. We dicussed calcium and Vitamin D intake, vaccinations and preventive health screens.  Recommend COVID booster #2, she declines pneumovax 20 today, may return for nurse visit if desired. Declines DEXA scan.     (I48.11) Longstanding persistent atrial fibrillation (H)  Comment: rate controlled with metoprolol. Taking low dose aspirin daily  Plan: Discussed anticoagulation issue with Cordell today. Indicated that she was at higher risk for clotting given recurrence of cervical cancer. Encouraged her to consider anticoagulation, she may contact me or her cardiologist if she changes her mind. Discussed symptoms of stroke or DVT, proceed to ER if this should occur    (C53.9) Recurrent cervical cancer (H)  Comment: recent pelvic discomfort, urinary and bowel symptoms. Found to have a mass on transvaginal US. Referred for CT scan with biopsy which confirmed recurrent cervical cancer, mass is too large to be resected.  Plan: Cordell will be meeting with a radiation oncologist at the [facility] this week to discuss therapy.  They will order PET scan.  She is encouraged to contact me with any concerns, questions.    Merry Lino MD  Internal Medicine/Pediatrics      Answers for HPI/ROS submitted by the patient on 6/22/2022  In general, how would you rate your overall physical health?: fair  Frequency of exercise:: 6-7 days/week  Do you usually eat at least 4 servings of fruit and vegetables a day, include whole grains & fiber, and avoid regularly eating high fat or \"junk\" foods? : Yes  Taking medications regularly:: Yes  Medication side effects:: Not applicable  Activities of Daily Living: no assistance needed  Home safety: no " safety concerns identified  Hearing Impairment:: no hearing concerns  In the past 6 months, have you been bothered by leaking of urine?: No  abdominal pain: No  Blood in stool: No  Blood in urine: No  chest pain: No  chills: No  congestion: No  constipation: No  cough: No  diarrhea: No  dizziness: No  ear pain: No  eye pain: No  nervous/anxious: No  fever: No  frequency: Yes  genital sores: No  headaches: No  hearing loss: No  heartburn: No  arthralgias: No  joint swelling: No  peripheral edema: No  mood changes: No  myalgias: No  nausea: No  dysuria: No  palpitations: No  Skin sensation changes: No  sore throat: No  urgency: No  rash: No  shortness of breath: No  visual disturbance: No  weakness: No  pelvic pain: Yes  vaginal bleeding: No  vaginal discharge: No  tenderness: No  breast mass: No  breast discharge: No  In general, how would you rate your overall mental or emotional health?: good  Additional concerns today:: Yes  Duration of exercise:: 30-45 minutes

## 2022-06-30 NOTE — PROGRESS NOTES
"Department of Radiation Oncology                   [place] [place]  [address]  Office:  [phone]  Fax:  [phone]   Radiation Oncology Clinic  [address] [place] [address]  Phone:  [phone]  Fax:  [phone]     RE: Cordell Donaldson : 1950   MRN: [id number] MCKINLEY: 2022     OUTPATIENT VISIT NOTE       PROBLEM: Recurrent cervical cancer at the vaginal cuff with bulky adenopathy     was seen for initial consultation in the Dept of Therapeutic Radiology on 2022 at the request of Dr. Megan Arciniega.     HISTORY OF PRESENT ILLNESS: Ms. Donaldson is a 73 yo female, a breast cancer survivor of 20 years, referred for management of recurrent cervical cancer.    Regarding her gynecologic history, she states that she has been dealing with HPV positivity \"all her life\" Per Epic, she was found to have NGUYỄN via cone biopsy in . She had been followed by Dr. Christy Sharma, and underwent LEEP of the cervix and treatment of VAIN 3 in 2002. She had multiple colposcopies, vaginal biopsies and CO2 laser over the years. A pap smear done on 10/12/2012 showed squamous cell carcinoma. Subsequent biopsy of the right lateral vagina by Dr. Ng on 10/19/2012 showed VAIN 3. EUA and biopsy on 10/24/2012 showed CIN3 and VAIN3 as well as squamous carcinoma from endocervical curettings,invasion could not be excluded. Because of the challenging anatomy, cervix could not be identified and therefore a cone could not be done. She was recommended definitive surgery.      She opted to proceed with laparoscopic converted to laparotomy with FRANCIA for presumed FIGO IA1 cancer with Dr. Megan Arciniega in [facility] on 2012. Hysterectomy specimen showed NGUYỄN III, HSIL, but no invasive cancer was found.     She then reestablished care at the  and began seeing Dr. Johnson. Vaginal apex biopsy on 2013 showed HSIL, could not rule out invasion. An upper " "vaginetomy was recommended, which she initially declined. She ultimately had an upper vaginectomy with Dr. Arciniega on 10/2/2013. 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     She has since followed by her primary care with pap smear, which had all been negative for intraepithelial lesion or malignancy, with last on 1/2/2020.    In Feb 2022, she developed urinary frequency and feeling of pressure in her pelvis/bladder. Her urine stream was \"intermittently dampened/more constricted\" along with increased nocturia. She brought this to the attention of her primary care physician Dr. Lino in April. Urine culture showed mixed raymundo. Subsequently, she noted a firm mass in her vagina and returned for follow up. On exam, there was a palpable firm smooth BB sized mass at 12:00 position, superior vagina that was not friable.      Cordell was referred to Dr. Heather Ayon and saw her on 5/12/2022. Speculum exam revealed surgically shortened vagina with some white scar tissue present at cuff but no visual abnormalities. On bimanual exam, there was an area of induration with rough mucosa present on distal left anterior vaginal wall but no visual abnormalities overlying this region. Urethral meatus was orthotopic and patent.     She then proceeded with an ultrasound on 5/16/2022, which showed a solid, vascular mass measuring about 6.3 x 6.7 x 7.8 cm, involving the vaginal vault. Further evaluation with pelvic MRI on 5/20/2022 demonstrated a centrally hypoenhancing heterogenous midline pelvic mass along the superior margin of the vaginal vault measuring 8.5 x 6.7 x 8.1 cm, abutting the bladder and indenting the rectum but without definite invasion. There were multiple enlarged lymph nodes within the left hemipelvis, largest measuring 2.7 x 3.5 cm. Some of the nodes demonstrated extracapsular extension. The highest imaged nodes were at the aortic bifurcation. Also seen were smaller perirectal nodes " "measuring up to 9 mm.     Given this finding, she went back to Dr. Megan Arciniega and saw her on 5/31/2022.  On speculum exam, \"the vaginal apex was descending midway down the vagina with a bulging mass pushing from the pelvic aspect. At this distended cuff, there was no clear lesion, maybe a small area of sclerosis about 2-3 mm.\" On bimanual exam, the mass felt fixed in the low pelvis. There was no rectal involvement. Dr. Arciniega did not feel she was a surgical candidate short of a total exenteration. With brenden involvement, surgery was not curative.      CT of the chest/abdomen/pelvis on 6/8/2022 again showed enlarged nodes up to the aortic bifurcation. The pelvic mass appeared larger than that shown on 5/20/2022.     CT guided biopsy was performed on 6/9/2022. 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  This was reviewed at [facility] which additionally noted perineural invasion.       Dr. Arciniega referred the patient to [facility] given the anticipation of interstitial brachytherapy as a necessary component of salvage radiation therapy.     Prior to the visit today, Cordell had a PET/CT scan, which showed the pelvic mass to measure 8.5 x 7.2 x 8.1 cm with max SUV up to 13.2 with central necrosis. Multiple pelvic lymph nodes were seen, with the largest on the left, up to 3.3 cm with max SUV of 12.2. Node at the aortic bifurcation measured 2.9 x 1.6 cm with max SUV of 1.9 x 1.7 cm. Also mentioned was mild soft tissue thickening in the left retropectoral region with low metabolic activity, which could represent a small reactive lymph node.     Codrell is accompanied by her  Isaac. On interview, she states that she has lost 25 pounds, some of which was intentional. She feels full easily and has started taking supplements such as Ensure and Boost. She feels a lot of pressure in her low pelvis. She feels the urge to empty her bladder, but often has to push to get the stream going. Her " "urine feels \"restricted\". She takes fiber to help her bowel movements. Her stools are in small pieces. She also passes mucous and now wears a pad for it. She feels that the tumor has pushed down her anus.     PAST MEDICAL HISTORY:   Past Medical History:   Diagnosis Date     Abnormal Pap smear     maybe 20 years ago     Cervical cancer (H)      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      History of breast cancer 2003    lumpectomy and radiation offerred chemo and patient declined at time but had oncogene test and low risk     Hyperlipidemia LDL goal < 160      Osteopenia      Paroxysmal A-fib (H)      Severe vaginal dysplasia       Past Surgical History:   Procedure Laterality Date     BIOPSY       BREAST SURGERY Left 2003    lumpectomy left, did radiation, 5 yr of tamoxifen     COLONOSCOPY  2018    repeat in 2028     Colposcopy Cervix with Loop Electrode Conization and Lser to Vagina  2008     COLPOSCOPY, BIOPSY, COMBINED  10/24/2012    Procedure: COMBINED COLPOSCOPY, BIOPSY;  Colposcopy, Biopsy of Cervix and Vagina, Ultrasound Guidance;  Surgeon: Colette Ng MD;  Location: [facility] OR     ENT SURGERY  01/23/2018    otoscelerosis, right side     HYSTERECTOMY, FRANCIA  12/2012    high grade cervical dysplasia, [facility], Dr. Arciniega     ND LAP VAGINECTOMY, PARTIAL REMOVAL OF VAGINAL WALL  10/2013    upper vaginectomy for dysplasia, Dr. Megan Arciniega       CHEMOTHERAPY HISTORY: None    PAST RADIATION THERAPY HISTORY: None    MEDICATIONS:   Current Outpatient Medications   Medication Sig Dispense Refill     Acetylcarnitine HCl 250 MG CAPS Take 500 mg by mouth 2 times daily        Bacillus Coagulans-Inulin (PROBIOTIC FORMULA) 1-250 BILLION-MG CAPS Take by mouth three times a week 25+billion CFUS       BENFOTIAMINE PO Take 150 mg by mouth 2 times daily       Cholecalciferol (VITAMIN D-3) 25 MCG (1000 UT) CAPS Take 1,000 Units by mouth daily 90 capsule 3     Coenzyme Q10 (CO Q 10 PO) Take 100 mg by " mouth daily        Efraín Berry 550 MG CAPS Take 400 mg by mouth 2 times daily 60 capsule 11     MAGNESIUM OXIDE PO Take 500 mg by mouth       metoprolol succinate ER (TOPROL XL) 50 MG 24 hr tablet Take 1 tablet (50 mg) by mouth daily 90 tablet 1     Omega-3 Fatty Acids (OMEGA-3 FISH OIL PO) Take 630 mg by mouth 2 times daily        Selenium 200 MCG CAPS Take 200 mg by mouth       Taurine 1000 MG CAPS Take one po twice daily 60 capsule 11     Turmeric 450 MG CAPS Take 2 capsules by mouth daily 60 capsule 11       ALLERGIES:  No Known Allergies.    SOCIAL HISTORY:   Works as a realtor     Quit smoking in   .       FAMILY HISTORY:   family history includes Breast Cancer in her paternal grandmother; Breast Cancer (age of onset: 47) in her sister; Myocardial Infarction (age of onset: 68) in her father; Myocardial Infarction (age of onset: 73) in her mother.   Sister: recurrent breast cancer  Maternal grandmother:  at 83 or unknown type of cancer  Paternal grandmother:  of unknown type of cancer at age 85  Paternal cousin: endometrial cancer      REVIEW OF SYMPTOMS:  A full 14-point review of systems was performed. See HPI for details.     PHYSICAL EXAMINATION:    /67 (BP Location: Right arm)   Pulse 96   Resp 16   Wt 51.8 kg (114 lb 3.2 oz)   SpO2 97%   BMI 20.23 kg/m     Gen: appears well, NAD  HEENT: unremarkable  CV: well perfused  Resp: breathing comfortably on room air  Pelvic: deferred (she is seeing Dr. Raygoza on Monday)  Neuro: grossly intact    IMAGING:     Pelvic nodes        PET/CT             ASSESSMENT AND PLAN: In summary, Ms. Donaldson is a 73 yo female with a longstanding history of HPV related dysplasia, s/p simple hysterectomy for suspected invasive disease at the cervix in  and additional treatment including vaginectomy for VAIN III. She has now developed a central pelvic recurrence with bulky adenopathy extending to just below the aortic bifurcation. Her  disease was superior to the vaginal cuff, and has not protruded into the vaginal vault, and she is currently without bleeding or discharge. She is symptomatic from the mass effect with urinary and bowel symptoms.     I reviewed her MRI and PET/CT images in great details with her and her . She expressed full understanding why surgery is not an option. Since her disease is confined locoregionally, it can be encompassed by radiation field. I explained that the radiation will consists of 5 weeks of external beam radiotherapy followed by interstitial therapy. My concern is that her disease is bulky such that external beam radiotherapy may not produce adequate shrinkage to allow needle placement.     Anticipating the challenge in treating her with salvage radiation therapy, I discussed her case at the Gyn Onc/Rad Onc joint conference yesterday. Consensus was to start with systemic therapy. The recommendation is also based on the concern of her high risk of distant metastases given bulky adenopathy. Dr. Raygoza has kindly offered to see her in clinic on 7/5. Her mass and adenopathy has increased in size over the last month. Thus a standard salvage chemotherapy will be initiated soon with addition of immunotherapy depending on the genetic analysis of tumor tissue.     Cordell expressed an understanding of the rationale of our recommendation and is in agreement with the treatment plan. Dr. Megan Arciniega was updated of her PET/CT result and the tumor conference discussion.     The PET/CT result was not available at the time of the consult. I thus did not discuss the finding of a possible pectoral node with Cordell. Her last mammogram was in 11/19/2020. I reviewed her imaging with breast radiologist who recommended a diagnostic mammogram with US. Cordell is agreeable to the workup and I placed those orders.     Thank you for allowing us to participate in this patient's care.  Please feel free to call with any questions or  concerns.       Raymond Stockton M.D./Ph.D.  Radiation Oncologist   Department of Radiation Oncology  [facility]  Phone: [phone]         I reviewed patient's chart, internal/external medical records, imaging studies (including actual images), labs and pathology reports.  I interviewed and counseled the patient face to face.  I additionally discussed the case with patient's referring physicians and care team.      120 minutes were spent on the date of the encounter doing chart review, history and exam, documentation and further activities as noted above.           Raymond Stockton MD

## 2022-07-01 ENCOUNTER — ANCILLARY PROCEDURE (OUTPATIENT)
Dept: PET IMAGING | Facility: CLINIC | Age: 72
End: 2022-07-01
Attending: OBSTETRICS & GYNECOLOGY
Payer: COMMERCIAL

## 2022-07-01 ENCOUNTER — OFFICE VISIT (OUTPATIENT)
Dept: RADIATION ONCOLOGY | Facility: CLINIC | Age: 72
End: 2022-07-01
Attending: RADIOLOGY
Payer: COMMERCIAL

## 2022-07-01 VITALS
HEART RATE: 96 BPM | SYSTOLIC BLOOD PRESSURE: 102 MMHG | BODY MASS INDEX: 20.23 KG/M2 | WEIGHT: 114.2 LBS | RESPIRATION RATE: 16 BRPM | DIASTOLIC BLOOD PRESSURE: 67 MMHG | OXYGEN SATURATION: 97 %

## 2022-07-01 DIAGNOSIS — C53.9 CERVICAL CANCER (H): ICD-10-CM

## 2022-07-01 DIAGNOSIS — Z85.3 HX: BREAST CANCER: Primary | ICD-10-CM

## 2022-07-01 LAB — GLUCOSE SERPL-MCNC: 100 MG/DL (ref 70–99)

## 2022-07-01 PROCEDURE — G0463 HOSPITAL OUTPT CLINIC VISIT: HCPCS

## 2022-07-01 RX ORDER — FUROSEMIDE 10 MG/ML
40 INJECTION INTRAMUSCULAR; INTRAVENOUS ONCE
Status: COMPLETED | OUTPATIENT
Start: 2022-07-01 | End: 2022-07-01

## 2022-07-01 RX ORDER — ASPIRIN 81 MG/1
81 TABLET, CHEWABLE ORAL EVERY EVENING
COMMUNITY

## 2022-07-01 RX ADMIN — FUROSEMIDE 40 MG: 10 INJECTION INTRAMUSCULAR; INTRAVENOUS at 07:54

## 2022-07-01 ASSESSMENT — ENCOUNTER SYMPTOMS
DIARRHEA: 1
FREQUENCY: 1
DYSURIA: 1

## 2022-07-01 ASSESSMENT — PAIN SCALES - GENERAL: PAINLEVEL: NO PAIN (1)

## 2022-07-01 NOTE — PROGRESS NOTES
INITIAL PATIENT ASSESSMENT    Diagnosis: Recurrent cervical cancer    Prior radiation therapy: Radiation treatment for breast cancer in 2003    Prior chemotherapy: None    Prior hormonal therapy: Tamoxifen for 5 years    Pain Eval:  Denies    Psychosocial  Living arrangements:  Isaac  Fall Risk: independent   referral needs: Not needed    Advanced Directive: Yes - Location: [facility]  Implantable Cardiac Device? No    Onset of menarche: 14  LMP: No LMP recorded. Patient is postmenopausal.  Onset of menopause: 2004  Abnormal vaginal bleeding/discharge: No  Are you pregnant? No    Nurse face-to-face time: Level 5:  over 15 min face to face time        Review of Systems   Gastrointestinal: Positive for diarrhea.        Pt reports a feeling of pressure in her anus and is now wearing a pad at all times due to leakage. This feeling has her up 3-5x/night to push for a bowel movement.    Genitourinary: Positive for dysuria, frequency and urgency.        Pt reports feeling of pressure and thus frequent urination particularly nocturia 3-5x/night with only a little bit at each void.   All other systems reviewed and are negative.

## 2022-07-01 NOTE — LETTER
"    2022         RE: Cordell Donaldson  [address]  [facility] [address]        Dear Colleague,    Thank you for referring your patient, Cordell Donaldson, to the [facility] RADIATION ONCOLOGY. Please see a copy of my visit note below.    Department of Radiation Oncology                   [place] [address] [place]  [address]  Office:  [phone]  Fax:  [phone]   Radiation Oncology Clinic  [address] [place] [address]  Phone:  [phone]  Fax:  [phone]     RE: Cordell Donaldson : 1950   MRN: [id number] MCKINLEY: 2022     OUTPATIENT VISIT NOTE       PROBLEM: Recurrent cervical cancer at the vaginal cuff with bulky adenopathy     was seen for initial consultation in the Dept of Therapeutic Radiology on 2022 at the request of Dr. Megan Arciniega.     HISTORY OF PRESENT ILLNESS: Ms. Donaldson is a 73 yo female, a breast cancer survivor of 20 years, referred for management of recurrent cervical cancer.    Regarding her gynecologic history, she states that she has been dealing with HPV positivity \"all her life\" Per Epic, she was found to have NGUYỄN via cone biopsy in . She had been followed by Dr. Christy Sharma, and underwent LEEP of the cervix and treatment of VAIN 3 in 2002. She had multiple colposcopies, vaginal biopsies and CO2 laser over the years. A pap smear done on 10/12/2012 showed squamous cell carcinoma. Subsequent biopsy of the right lateral vagina by Dr. Ng on 10/19/2012 showed VAIN 3. EUA and biopsy on 10/24/2012 showed CIN3 and VAIN3 as well as squamous carcinoma from endocervical curettings,invasion could not be excluded. Because of the challenging anatomy, cervix could not be identified and therefore a cone could not be done. She was recommended definitive surgery.      She opted to proceed with laparoscopic converted to laparotomy with FRANCIA for presumed FIGO IA1 cancer with Dr. Megan Arciniega in " "[facility] on 12/13/2012. Hysterectomy specimen showed NGUYỄN III, HSIL, but no invasive cancer was found.     She then reestablished care at the  and began seeing Dr. Johnson. Vaginal apex biopsy on 7/22/2013 showed HSIL, could not rule out invasion. An upper vaginetomy was recommended, which she initially declined. She ultimately had an upper vaginectomy with Dr. Arciniega on 10/2/2013. 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     She has since followed by her primary care with pap smear, which had all been negative for intraepithelial lesion or malignancy, with last on 1/2/2020.    In Feb 2022, she developed urinary frequency and feeling of pressure in her pelvis/bladder. Her urine stream was \"intermittently dampened/more constricted\" along with increased nocturia. She brought this to the attention of her primary care physician Dr. Lino in April. Urine culture showed mixed raymundo. Subsequently, she noted a firm mass in her vagina and returned for follow up. On exam, there was a palpable firm smooth BB sized mass at 12:00 position, superior vagina that was not friable.      Cordell was referred to Dr. Heather Ayon and saw her on 5/12/2022. Speculum exam revealed surgically shortened vagina with some white scar tissue present at cuff but no visual abnormalities. On bimanual exam, there was an area of induration with rough mucosa present on distal left anterior vaginal wall but no visual abnormalities overlying this region. Urethral meatus was orthotopic and patent.     She then proceeded with an ultrasound on 5/16/2022, which showed a solid, vascular mass measuring about 6.3 x 6.7 x 7.8 cm, involving the vaginal vault. Further evaluation with pelvic MRI on 5/20/2022 demonstrated a centrally hypoenhancing heterogenous midline pelvic mass along the superior margin of the vaginal vault measuring 8.5 x 6.7 x 8.1 cm, abutting the bladder and indenting the rectum but without " "definite invasion. There were multiple enlarged lymph nodes within the left hemipelvis, largest measuring 2.7 x 3.5 cm. Some of the nodes demonstrated extracapsular extension. The highest imaged nodes were at the aortic bifurcation. Also seen were smaller perirectal nodes measuring up to 9 mm.     Given this finding, she went back to Dr. Megan Arciniega and saw her on 5/31/2022.  On speculum exam, \"the vaginal apex was descending midway down the vagina with a bulging mass pushing from the pelvic aspect. At this distended cuff, there was no clear lesion, maybe a small area of sclerosis about 2-3 mm.\" On bimanual exam, the mass felt fixed in the low pelvis. There was no rectal involvement. Dr. Arciniega did not feel she was a surgical candidate short of a total exenteration. With brenden involvement, surgery was not curative.      CT of the chest/abdomen/pelvis on 6/8/2022 again showed enlarged nodes up to the aortic bifurcation. The pelvic mass appeared larger than that shown on 5/20/2022.     CT guided biopsy was performed on 6/9/2022. 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  This was reviewed at [facility] which additionally noted perineural invasion.       Dr. Arciniega referred the patient to [facility] given the anticipation of interstitial brachytherapy as a necessary component of salvage radiation therapy.     Prior to the visit today, Cordell had a PET/CT scan, which showed the pelvic mass to measure 8.5 x 7.2 x 8.1 cm with max SUV up to 13.2 with central necrosis. Multiple pelvic lymph nodes were seen, with the largest on the left, up to 3.3 cm with max SUV of 12.2. Node at the aortic bifurcation measured 2.9 x 1.6 cm with max SUV of 1.9 x 1.7 cm. Also mentioned was mild soft tissue thickening in the left retropectoral region with low metabolic activity, which could represent a small reactive lymph node.     Cordell is accompanied by her  Isaac. On interview, she states that she " "has lost 25 pounds, some of which was intentional. She feels full easily and has started taking supplements such as Ensure and Boost. She feels a lot of pressure in her low pelvis. She feels the urge to empty her bladder, but often has to push to get the stream going. Her urine feels \"restricted\". She takes fiber to help her bowel movements. Her stools are in small pieces. She also passes mucous and now wears a pad for it. She feels that the tumor has pushed down her anus.     PAST MEDICAL HISTORY:   Past Medical History:   Diagnosis Date     Abnormal Pap smear     maybe 20 years ago     Cervical cancer (H)      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      History of breast cancer 2003    lumpectomy and radiation offerred chemo and patient declined at time but had oncogene test and low risk     Hyperlipidemia LDL goal < 160      Osteopenia      Paroxysmal A-fib (H)      Severe vaginal dysplasia       Past Surgical History:   Procedure Laterality Date     BIOPSY       BREAST SURGERY Left 2003    lumpectomy left, did radiation, 5 yr of tamoxifen     COLONOSCOPY  2018    repeat in 2028     Colposcopy Cervix with Loop Electrode Conization and Lser to Vagina  2008     COLPOSCOPY, BIOPSY, COMBINED  10/24/2012    Procedure: COMBINED COLPOSCOPY, BIOPSY;  Colposcopy, Biopsy of Cervix and Vagina, Ultrasound Guidance;  Surgeon: Colette Ng MD;  Location: [facility] OR     ENT SURGERY  01/23/2018    otoscelerosis, right side     HYSTERECTOMY, FRANCIA  12/2012    high grade cervical dysplasia, [facility], Dr. Arciniega     WA LAP VAGINECTOMY, PARTIAL REMOVAL OF VAGINAL WALL  10/2013    upper vaginectomy for dysplasia, Dr. Megan Arciniega       CHEMOTHERAPY HISTORY: None    PAST RADIATION THERAPY HISTORY: None    MEDICATIONS:   Current Outpatient Medications   Medication Sig Dispense Refill     Acetylcarnitine HCl 250 MG CAPS Take 500 mg by mouth 2 times daily        Bacillus Coagulans-Inulin (PROBIOTIC FORMULA) " 1-250 BILLION-MG CAPS Take by mouth three times a week 25+billion CFUS       BENFOTIAMINE PO Take 150 mg by mouth 2 times daily       Cholecalciferol (VITAMIN D-3) 25 MCG (1000 UT) CAPS Take 1,000 Units by mouth daily 90 capsule 3     Coenzyme Q10 (CO Q 10 PO) Take 100 mg by mouth daily        Efraín Berry 550 MG CAPS Take 400 mg by mouth 2 times daily 60 capsule 11     MAGNESIUM OXIDE PO Take 500 mg by mouth       metoprolol succinate ER (TOPROL XL) 50 MG 24 hr tablet Take 1 tablet (50 mg) by mouth daily 90 tablet 1     Omega-3 Fatty Acids (OMEGA-3 FISH OIL PO) Take 630 mg by mouth 2 times daily        Selenium 200 MCG CAPS Take 200 mg by mouth       Taurine 1000 MG CAPS Take one po twice daily 60 capsule 11     Turmeric 450 MG CAPS Take 2 capsules by mouth daily 60 capsule 11       ALLERGIES:  No Known Allergies.    SOCIAL HISTORY:   Works as a realtor     Quit smoking in   .       FAMILY HISTORY:   family history includes Breast Cancer in her paternal grandmother; Breast Cancer (age of onset: 47) in her sister; Myocardial Infarction (age of onset: 68) in her father; Myocardial Infarction (age of onset: 73) in her mother.   Sister: recurrent breast cancer  Maternal grandmother:  at 83 or unknown type of cancer  Paternal grandmother:  of unknown type of cancer at age 85  Paternal cousin: endometrial cancer      REVIEW OF SYMPTOMS:  A full 14-point review of systems was performed. See HPI for details.     PHYSICAL EXAMINATION:    /67 (BP Location: Right arm)   Pulse 96   Resp 16   Wt 51.8 kg (114 lb 3.2 oz)   SpO2 97%   BMI 20.23 kg/m     Gen: appears well, NAD  HEENT: unremarkable  CV: well perfused  Resp: breathing comfortably on room air  Pelvic: deferred (she is seeing Dr. Raygoza on Monday)  Neuro: grossly intact    IMAGING:     Pelvic nodes        PET/CT             ASSESSMENT AND PLAN: In summary, Ms. Donaldson is a 73 yo female with a longstanding history of HPV  related dysplasia, s/p simple hysterectomy for suspected invasive disease at the cervix in 2012 and additional treatment including vaginectomy for VAIN III. She has now developed a central pelvic recurrence with bulky adenopathy extending to just below the aortic bifurcation. Her disease was superior to the vaginal cuff, and has not protruded into the vaginal vault, and she is currently without bleeding or discharge. She is symptomatic from the mass effect with urinary and bowel symptoms.     I reviewed her MRI and PET/CT images in great details with her and her . She expressed full understanding why surgery is not an option. Since her disease is confined locoregionally, it can be encompassed by radiation field. I explained that the radiation will consists of 5 weeks of external beam radiotherapy followed by interstitial therapy. My concern is that her disease is bulky such that external beam radiotherapy may not produce adequate shrinkage to allow needle placement.     Anticipating the challenge in treating her with salvage radiation therapy, I discussed her case at the Gyn Onc/Rad Onc joint conference yesterday. Consensus was to start with systemic therapy. The recommendation is also based on the concern of her high risk of distant metastases given bulky adenopathy. Dr. Raygoza has kindly offered to see her in clinic on 7/5. Her mass and adenopathy has increased in size over the last month. Thus a standard salvage chemotherapy will be initiated soon with addition of immunotherapy depending on the genetic analysis of tumor tissue.     Cordell expressed an understanding of the rationale of our recommendation and is in agreement with the treatment plan. Dr. Megan Arciniega was updated of her PET/CT result and the tumor conference discussion.     The PET/CT result was not available at the time of the consult. I thus did not discuss the finding of a possible pectoral node with Cordell. Her last mammogram was in  11/19/2020. I reviewed her imaging with breast radiologist who recommended a diagnostic mammogram with US. Cordell is agreeable to the workup and I placed those orders.     Thank you for allowing us to participate in this patient's care.  Please feel free to call with any questions or concerns.       Raymond Stockton M.D./Ph.D.  Radiation Oncologist   Department of Radiation Oncology  [facility]  Phone: [phone]         I reviewed patient's chart, internal/external medical records, imaging studies (including actual images), labs and pathology reports.  I interviewed and counseled the patient face to face.  I additionally discussed the case with patient's referring physicians and care team.      120 minutes were spent on the date of the encounter doing chart review, history and exam, documentation and further activities as noted above.           Raymond Stockton MD      INITIAL PATIENT ASSESSMENT    Diagnosis: Recurrent cervical cancer    Prior radiation therapy: Radiation treatment for breast cancer in 2003    Prior chemotherapy: None    Prior hormonal therapy: Tamoxifen for 5 years    Pain Eval:  Denies    Psychosocial  Living arrangements:  Isaac  Fall Risk: independent   referral needs: Not needed    Advanced Directive: [facility]  Implantable Cardiac Device? No    Onset of menarche: 14  LMP: No LMP recorded. Patient is postmenopausal.  Onset of menopause: 2004  Abnormal vaginal bleeding/discharge: No  Are you pregnant? No    Nurse face-to-face time: Level 5:  over 15 min face to face time        Review of Systems   Gastrointestinal: Positive for diarrhea.        Pt reports a feeling of pressure in her anus and is now wearing a pad at all times due to leakage. This feeling has her up 3-5x/night to push for a bowel movement.    Genitourinary: Positive for dysuria, frequency and urgency.        Pt reports feeling of pressure and thus frequent  urination particularly nocturia 3-5x/night with only a little bit at each void.   All other systems reviewed and are negative.                Again, thank you for allowing me to participate in the care of your patient.        Sincerely,        Raymond Stockton MD

## 2022-07-04 NOTE — PROGRESS NOTES
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       History of Present Illness:   Cordell Donaldson (she/her/hers) is a 72 year old referred to the Gynecologic Cancer Clinic at the [facility] on 2022 by gynecologic oncologist Dr. Arciniega and radiation oncologist Dr. Stockton for management of a pelvic mass due to recurrent cervical cancer vs new vaginal cancer. History as follows:     Breast Cancer History:  : Left breast cancer.  -Lumpectomy.  -Radiation therapy.    -present: BRENNAN.       Cervical/Vaginal Dysplasia/Cancer History:  10/24/12:   -Endocervical curettings: Squamous carcinoma, invasion cannot be assessed.   -Ectocervical biopsies: HSIL (CIN3).   -Vaginal biopsy, posterior: HSIL (VaIN3).  2012: Robotic-assisted laparoscopic lysis of adhesions, left ureterolysis with  conversion to laparotomy, total abdominal hysterectomy, and an upper  Vaginectomy by gynecologic oncologist Dr. Arciniega.   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     13: Vaginal biopsy: HSIL, cannot exclude invasion.   -Consultation with gynecologic oncologist Dr. Johnson. Upper vaginectomy and CO2 laser ablation recommended, patient declined.     [id number]: No vaginal dysplasia treatment.     22: Pelvic MRI: I have personally reviewed the MRI images.   Centrally hypoenhancing heterogeneous midline pelvic mass along  the superior margin of the vaginal vault measures 8.5 x 6.7 x 8.1 cm.  The mass abut the bladder and there is  "mucosal irregularity at the  site of abutment. The mass indents the  decompressed rectum but there is no definite invasion or mucosal  Irregularity.  6/9/22: CT-guided biopsy of pelvic mass: Poorly-differentiated squamous cell carcinoma, HPV-associated.   7/1/22: Staging PET/CT: I have personally reviewed the PET/CT images.   Large heterogenous and hypermetabolic mass seen within the midline of  the pelvis measuring approximately 8.5 x 7.2 x 8.1 cm with an SUV max  measuring up to 13.2. Additionally, there is central hypoattenuation  with relative lack of metabolic activity, suggestive of central  Necrosis (previously 8.5 x 6.7 x 8.1 cm on prior  MRI). No inguinal lymphadenopathy.  Redemonstration of hypermetabolic left pelvic lymphadenopathy, the  largest lymph node measuring up to 3.3 x 2.4 cm with an SUV max of  12.2 (previously measuring 2.5 x 2.1 cm). Additional conglomeration of  hypermetabolic lymph nodes at the aortic bifurcation measuring 2.9 x  1.6 cm with an SUV max of 14.3 (previously measuring 1.9 x 1.7 cm).  Unchanged enlarged mildly metabolic right pelvic lymph node measuring  2.5 x 1.1 cm with an SUV max of 4.2, favored to represent a reactive  lymph node given the relative lack of hypermetabolic activity compared  to the left pelvic lymph node.      Genetic/Genomic/Molecular Testing History:  2006: Negative for germline BRCA1, BRCA2 pathogenic variants.       Subjective:  Cordell presents to the gyn onc clinic today for her scheduled consultation, accompanied by her . Cordell reports that she had vaginal screening in 2020, with reported negative HPV testing at that time. Cordell reports that she traveling and riding over rough terrain, causing \"tail bone\" pain which she attributed to trauma. She also notes urinary incontinence which led to a consultation with urogynecologist Dr. Ayon, leading to the evaluation above. Currently Cordell reports that by the end of the day she has a heaviness in " "the pelvis with radiation down the bilateral legs. She takes acetaminophen 2-3 times/day. At night she takes CBD oil. She has to urinate every 1-1.5 hours. She does have to valsalva at night to void despite frequent nocturia. She has strange bowel habits, described as stool which comes out in pieces. In the evening she has a lot of gas, and some leakage of liquid stool. She has been taking fiber and \"Heathers Tummy\" which is a powder for the past 5 days. She is also losing weight. Since seeing Dr. Stockton last week she added a [employer] paste product to add calories, but this increased constipation. She has lost 25 pounds since fall, although some of this was intentional to lower her cholesterol. Cordell eats a primarily plant-based diet and fish; she can no longer tolerate beans.   Cordell walks daily although shorter than previously (limited due to frequent use of the bathroom and pelvic pressure), gardens, does yoga twice per week. Previously biked and went to the gym to weight lift.       Review of Systems:   Systemic           + weight changes; no fever; no chills; no night sweats; no appetite changes  Skin           no rashes, or lesions  Eye           no irritation; no changes in vision  Ramona-Laryngeal           no dysphagia; no hoarseness   Pulmonary    no cough; no shortness of breath  Cardiovascular    no chest pain; no palpitations  Gastrointestinal    no diarrhea; no constipation; no abdominal pain; + changes in bowel  habits; no blood in stool  Genitourinary   + urinary frequency; + urinary urgency; no dysuria; no pain; no abnormal vaginal discharge; no abnormal vaginal bleeding  Breast   no breast discharge; no breast changes; no breast pain  Musculoskeletal    no myalgias; no arthralgias; no back pain  Psychiatric           no depressed mood; no anxiety    Hematologic           no tender lymph nodes; no noticeable swellings or lumps   Endocrine    no hot flashes; no heat/cold intolerance       "   Neurological   no tremor; no numbness and tingling; no headaches; no difficulty  sleeping      Past Medical History:  Past Medical History:   Diagnosis Date     Abnormal Pap smear     maybe 20 years ago     Cervical cancer (H)      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      History of breast cancer 2003    lumpectomy and radiation offerred chemo and patient declined at time but had oncogene test and low risk     Hyperlipidemia LDL goal < 160      Osteopenia      Paroxysmal A-fib (H)      Severe vaginal dysplasia          Past Surgical History:  Past Surgical History:   Procedure Laterality Date     BIOPSY       BREAST SURGERY Left 2003    lumpectomy left, did radiation, 5 yr of tamoxifen     COLONOSCOPY  2018    repeat in 2028     Colposcopy Cervix with Loop Electrode Conization and Lser to Vagina  2008     COLPOSCOPY, BIOPSY, COMBINED  10/24/2012    Procedure: COMBINED COLPOSCOPY, BIOPSY;  Colposcopy, Biopsy of Cervix and Vagina, Ultrasound Guidance;  Surgeon: Colette Ng MD;  Location: [facility] OR     ENT SURGERY  01/23/2018    otoscelerosis, right side     HYSTERECTOMY, FRANCIA  12/2012    high grade cervical dysplasia, [facility], Dr. Arciniega     HI LAP VAGINECTOMY, PARTIAL REMOVAL OF VAGINAL WALL  10/2013    upper vaginectomy for dysplasia, Dr. Megan Arciniega       Current Medications:   Current Outpatient Medications   Medication     Acetylcarnitine HCl 250 MG CAPS     aspirin (ASA) 81 MG chewable tablet     Bacillus Coagulans-Inulin (PROBIOTIC FORMULA) 1-250 BILLION-MG CAPS     BENFOTIAMINE PO     Cholecalciferol (VITAMIN D-3) 25 MCG (1000 UT) CAPS     Coenzyme Q10 (CO Q 10 PO)     Efraín Cota 550 MG CAPS     MAGNESIUM OXIDE PO     metoprolol succinate ER (TOPROL XL) 50 MG 24 hr tablet     Omega-3 Fatty Acids (OMEGA-3 FISH OIL PO)     Pectin Cit-Inos-C-Bioflav-Soy (MODIFIED CITRUS PECTIN PO)     Selenium 200 MCG CAPS     Taurine 1000 MG CAPS     Turmeric 450 MG CAPS     No current  "facility-administered medications for this visit.        Allergies:   No Known Allergies       Social History:  Patient lives with her . 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 She walks daily, gardens, does yoga twice/week.  She does have Advanced Directives, but has identified her daughter and  as her desired [employer] of .   Social History     Tobacco Use     Smoking status: Former Smoker     Packs/day: 0.50     Years: 15.00     Pack years: 7.50     Smokeless tobacco: Never Used   Substance Use Topics     Alcohol use: Yes     Alcohol/week: 2.0 standard drinks     Types: 2 Standard drinks or equivalent per week     Comment: Socially        History   Drug Use No       Family History:   The patient's family history is notable for a first-degree and second-degree paternal relative with breast cancer.  No known family history of ovarian, uterine, colon, urothelial/renal, prostate or pancreatic cancers.  No known family history of melanoma.  Family History   Problem Relation Age of Onset     Myocardial Infarction Mother 73        Tob     Myocardial Infarction Father 68        Tob     Breast Cancer Sister 47         of breast cancer age 63     Cancer Maternal Grandmother      Breast Cancer Paternal Grandmother        Physical Exam:     /78   Pulse 102   Temp 98.1  F (36.7  C) (Oral)   Resp 16   Ht 1.6 m (5' 2.99\")   Wt 52.7 kg (116 lb 3.2 oz)   SpO2 98%   BMI 20.59 kg/m    Body mass index is 20.59 kg/m .    General Appearance: healthy and alert, no distress     HEENT:  no thyromegaly, no palpable nodules or masses     Breast: Symmetric in appearance, bilateral nipple retraction, no palpable masses.        Cardiovascular: regular rate and rhythm, no gallops, rubs or murmurs     Respiratory: lungs clear, no rales, rhonchi or wheezes, normal diaphragmatic excursion    Musculoskeletal: extremities non tender and without edema    Skin: no lesions or rashes     Neurological: normal " gait, no gross defects     Psychiatric: appropriate mood and affect                               Hematological: normal cervical, supraclavicular and inguinal lymph nodes     Gastrointestinal:       abdomen soft, non-tender, non-distended, no organomegaly or masses    Genitourinary: External genitalia and urethral meatus appears normal.  Unable to insert speculum due to mass in the vagina extending to within 1 cm of the introitus. On bimanual exam there is nodularity posterior to the urethra. There is a large mass filling the vagina. On rectovaginal exam the mass impinges on the rectum and narrows almost the entire rectal canal. Mucosa smooth without obvious invasion.       Performance Status:  ECOG Grade 0.       Assessment:    Cordell Donaldson is a 72 year old woman with a diagnosis of a large pelvic mass and pelvic lymphadenopathy with biopsy showing recurrent cervical cancer vs a new vaginal cancer.     Medical history significant for FIGO Stage IA1 squamous cell carcinoma of the cervix s/p hysterectomy with no residual disease, and a long history of vaginal HSIL.     A total of 60 minutes was spent with the patient, 45 minutes of which were spent in counseling the patient and/or treatment planning.      Plan:     1.)    Squamous cell carcinoma: I reviewed the exam, MRI and PET/CT findings with the patient, and discussed recommended management. Her case was also discussed at the [facility] gynecologic oncology/radiation oncology tumor conference. Unfortunately an exenterative procedure is not an option due to the presence of lymph node metastases. Consensus recommendation for primary chemotherapy, with consideration of consolidation radiation therapy if there is significant decrease in the size of the pelvis mass. Discussed the palliative nature of treatment.     After discussion of risks/benefits, Cordell would like to proceed with the recommended first-line chemotherapy with palliative intent with IV cisplatin 50  mg/m2 + paclitaxel 175 mg/m2 + bevacizumab 15 mg/kg every 21 days per the GOG-240 regimen.   -Cordell will return to the infusion center in 1-2 weeks to initiate chemotherapy.  -Will get Caris testing on the tumor biopsy. If PD-L1+ (CPS 1% or greater), then will add pembrolizumab 200 mg every 21 days to therapy per the KEYNOTE-826 regimen.   -Plan for 3 cycles of therapy followed by repeat PET/CT to assess disease response.     Side-effects:  --Chemotherapy counseling and written information provided by GEOFF Lim.  -Patient declines port-a-cath placement.      2.) Genetic risk factors were assessed and the patient does not meet the qualifications for a referral.      3.) Labs and/or tests ordered include:  1) Pre-chemotherapy labs: CBC with diff, CMP, magnesium, qualitative urine protein. 2) Caris testing.      4.) Health maintenance issues addressed today include none.    5.) Code status:  Full-code.    6.) Prescriptions: None; standard prophylactic anti-emetics to be prescribed at the time of chemotherapy.       Hina Raygoza MD, MS, FACOG, FACS  7/5/2022  11:42 AM        CC  Patient Care Team:  Merry Lino MD as PCP - General (Internal Medicine)  Bess Paredes MD Corfield, Aaron Daniel, DPM as MD (Podiatry)  Elise Huitron MD as Assigned Heart and Vascular Provider  Merry Lino MD as Assigned PCP  Tess Mcgowan, RN as Specialty Care Coordinator  Heather Ayon MD as MD (Urology)  Heather Ayon MD as Assigned Surgical Provider  SELF, REFERRED

## 2022-07-04 NOTE — PATIENT INSTRUCTIONS
SCHEDULING:  -Pre-chemotherapy labs: CBC with diff, CMP, magnesium, qualitative urine protein.  -Return to the infusion center within 1-2 weeks to initiate therapy with cisplatin + paclitaxel + bevacizumab.  -RTC 3 weeks later for consideration of cycle #2 of chemotherapy (NP, virtual visit okay).  -Please send tumor biopsy from 6/9/22 for Caris testing.       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   History of vaginal HSIL (pre-cancer).  Squamous cell carcinoma involving a pelvic mass with associated lymphadenopathy, recurrent cervical cancer vs new vaginal cancer.   Treatment History:  -12/2012: Robotic-assisted laparoscopic lysis of adhesions (take-down of scar tissue), left ureterolysis (freeing of the left ureter) with  conversion to laparotomy, total abdominal hysterectomy (removal of uterus/cervix), and an upper  Vaginectomy (removal of the upper vagina).      PLAN:  1) Squamous cell carcinoma: Given the large pelvic mass and metastatic disease to the lymph nodes, plan for chemotherapy with palliative intent.  DRUGS: Cisplatin + paclitaxel  + bevacizumab  SCHEDULE: 1 day every 3 weeks  PLAN: 3 cycles followed by repeat PET/CT to assess disease response.   NOTE: Will send tumor biopsy for molecular testing. If the tumor is PD-L1+, then will add the immunotherapy agent pembrolizumab to therapy.     --Chemotherapy counseling and written information provided by GEOFF Lim.      Hina Raygoza MD, MS, FACOG, FACS  7/5/2022  11:37 AM

## 2022-07-05 ENCOUNTER — ONCOLOGY VISIT (OUTPATIENT)
Dept: ONCOLOGY | Facility: CLINIC | Age: 72
End: 2022-07-05
Attending: OBSTETRICS & GYNECOLOGY
Payer: COMMERCIAL

## 2022-07-05 VITALS
DIASTOLIC BLOOD PRESSURE: 78 MMHG | RESPIRATION RATE: 16 BRPM | HEART RATE: 102 BPM | TEMPERATURE: 98.1 F | WEIGHT: 116.2 LBS | SYSTOLIC BLOOD PRESSURE: 117 MMHG | HEIGHT: 63 IN | OXYGEN SATURATION: 98 % | BODY MASS INDEX: 20.59 KG/M2

## 2022-07-05 DIAGNOSIS — C53.0 MALIGNANT NEOPLASM OF ENDOCERVIX (H): Primary | ICD-10-CM

## 2022-07-05 PROCEDURE — 99205 OFFICE O/P NEW HI 60 MIN: CPT | Performed by: OBSTETRICS & GYNECOLOGY

## 2022-07-05 PROCEDURE — G0463 HOSPITAL OUTPT CLINIC VISIT: HCPCS

## 2022-07-05 RX ORDER — HEPARIN SODIUM (PORCINE) LOCK FLUSH IV SOLN 100 UNIT/ML 100 UNIT/ML
5 SOLUTION INTRAVENOUS
Status: CANCELLED | OUTPATIENT
Start: 2022-07-11

## 2022-07-05 RX ORDER — DIPHENHYDRAMINE HCL 25 MG
50 CAPSULE ORAL ONCE
Status: CANCELLED
Start: 2022-07-11

## 2022-07-05 RX ORDER — DEXTROSE, SODIUM CHLORIDE, AND POTASSIUM CHLORIDE 5; .45; .15 G/100ML; G/100ML; G/100ML
1000 INJECTION INTRAVENOUS ONCE
Status: CANCELLED
Start: 2022-07-11

## 2022-07-05 RX ORDER — MEPERIDINE HYDROCHLORIDE 25 MG/ML
25 INJECTION INTRAMUSCULAR; INTRAVENOUS; SUBCUTANEOUS EVERY 30 MIN PRN
Status: CANCELLED | OUTPATIENT
Start: 2022-07-11

## 2022-07-05 RX ORDER — DIPHENHYDRAMINE HYDROCHLORIDE 50 MG/ML
50 INJECTION INTRAMUSCULAR; INTRAVENOUS
Status: CANCELLED
Start: 2022-07-11

## 2022-07-05 RX ORDER — HEPARIN SODIUM,PORCINE 10 UNIT/ML
5 VIAL (ML) INTRAVENOUS
Status: CANCELLED | OUTPATIENT
Start: 2022-07-11

## 2022-07-05 RX ORDER — ALBUTEROL SULFATE 90 UG/1
1-2 AEROSOL, METERED RESPIRATORY (INHALATION)
Status: CANCELLED
Start: 2022-07-11

## 2022-07-05 RX ORDER — METHYLPREDNISOLONE SODIUM SUCCINATE 125 MG/2ML
125 INJECTION, POWDER, LYOPHILIZED, FOR SOLUTION INTRAMUSCULAR; INTRAVENOUS
Status: CANCELLED
Start: 2022-07-11

## 2022-07-05 RX ORDER — PALONOSETRON 0.05 MG/ML
0.25 INJECTION, SOLUTION INTRAVENOUS ONCE
Status: CANCELLED | OUTPATIENT
Start: 2022-07-11

## 2022-07-05 RX ORDER — LORAZEPAM 2 MG/ML
0.5 INJECTION INTRAMUSCULAR EVERY 4 HOURS PRN
Status: CANCELLED | OUTPATIENT
Start: 2022-07-11

## 2022-07-05 RX ORDER — ALBUTEROL SULFATE 0.83 MG/ML
2.5 SOLUTION RESPIRATORY (INHALATION)
Status: CANCELLED | OUTPATIENT
Start: 2022-07-11

## 2022-07-05 RX ORDER — EPINEPHRINE 1 MG/ML
0.3 INJECTION, SOLUTION INTRAMUSCULAR; SUBCUTANEOUS EVERY 5 MIN PRN
Status: CANCELLED | OUTPATIENT
Start: 2022-07-11

## 2022-07-05 ASSESSMENT — PAIN SCALES - GENERAL: PAINLEVEL: NO PAIN (0)

## 2022-07-05 NOTE — LETTER
2022         RE: Cordell Donaldson  [address]  [facility] [address]        Dear Colleague,    Thank you for referring your patient, Cordell Donaldson, to the [facility]. Please see a copy of my visit note below.                            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       History of Present Illness:   Cordell Donaldson (she/her/hers) is a 72 year old referred to the Gynecologic Cancer Clinic at the [facility] on 2022 by gynecologic oncologist Dr. Arciniega and radiation oncologist Dr. Stockton for management of a pelvic mass due to recurrent cervical cancer vs new vaginal cancer. History as follows:     Breast Cancer History:  : Left breast cancer.  -Lumpectomy.  -Radiation therapy.    -present: BRENNAN.       Cervical/Vaginal Dysplasia/Cancer History:  10/24/12:   -Endocervical curettings: Squamous carcinoma, invasion cannot be assessed.   -Ectocervical biopsies: HSIL (CIN3).   -Vaginal biopsy, posterior: HSIL (VaIN3).  2012: Robotic-assisted laparoscopic lysis of adhesions, left ureterolysis with  conversion to laparotomy, total abdominal hysterectomy, and an upper  Vaginectomy by gynecologic oncologist Dr. Arciniega.   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     13: Vaginal biopsy: HSIL, cannot exclude invasion.   -Consultation with gynecologic oncologist Dr. Johnson. Upper vaginectomy and CO2 laser ablation recommended, patient declined.     [id number]: No vaginal  dysplasia treatment.     5/20/22: Pelvic MRI: I have personally reviewed the MRI images.   Centrally hypoenhancing heterogeneous midline pelvic mass along  the superior margin of the vaginal vault measures 8.5 x 6.7 x 8.1 cm.  The mass abut the bladder and there is mucosal irregularity at the  site of abutment. The mass indents the  decompressed rectum but there is no definite invasion or mucosal  Irregularity.  6/9/22: CT-guided biopsy of pelvic mass: Poorly-differentiated squamous cell carcinoma, HPV-associated.   7/1/22: Staging PET/CT: I have personally reviewed the PET/CT images.   Large heterogenous and hypermetabolic mass seen within the midline of  the pelvis measuring approximately 8.5 x 7.2 x 8.1 cm with an SUV max  measuring up to 13.2. Additionally, there is central hypoattenuation  with relative lack of metabolic activity, suggestive of central  Necrosis (previously 8.5 x 6.7 x 8.1 cm on prior  MRI). No inguinal lymphadenopathy.  Redemonstration of hypermetabolic left pelvic lymphadenopathy, the  largest lymph node measuring up to 3.3 x 2.4 cm with an SUV max of  12.2 (previously measuring 2.5 x 2.1 cm). Additional conglomeration of  hypermetabolic lymph nodes at the aortic bifurcation measuring 2.9 x  1.6 cm with an SUV max of 14.3 (previously measuring 1.9 x 1.7 cm).  Unchanged enlarged mildly metabolic right pelvic lymph node measuring  2.5 x 1.1 cm with an SUV max of 4.2, favored to represent a reactive  lymph node given the relative lack of hypermetabolic activity compared  to the left pelvic lymph node.      Genetic/Genomic/Molecular Testing History:  2006: Negative for germline BRCA1, BRCA2 pathogenic variants.       Subjective:  Cordell presents to the gyn onc clinic today for her scheduled consultation, accompanied by her . Cordell reports that she had vaginal screening in 2020, with reported negative HPV testing at that time. Cordell reports that she traveling and riding over rough  "terrain, causing \"tail bone\" pain which she attributed to trauma. She also notes urinary incontinence which led to a consultation with urogynecologist Dr. Ayon, leading to the evaluation above. Currently Cordell reports that by the end of the day she has a heaviness in the pelvis with radiation down the bilateral legs. She takes acetaminophen 2-3 times/day. At night she takes CBD oil. She has to urinate every 1-1.5 hours. She does have to valsalva at night to void despite frequent nocturia. She has strange bowel habits, described as stool which comes out in pieces. In the evening she has a lot of gas, and some leakage of liquid stool. She has been taking fiber and \"Heathers Tummy\" which is a powder for the past 5 days. She is also losing weight. Since seeing Dr. Stockton last week she added a Farnam's paste product to add calories, but this increased constipation. She has lost 25 pounds since fall, although some of this was intentional to lower her cholesterol. Cordell eats a primarily plant-based diet and fish; she can no longer tolerate beans.   Cordell walks daily although shorter than previously (limited due to frequent use of the bathroom and pelvic pressure), gardens, does yoga twice per week. Previously biked and went to the gym to weight lift.       Review of Systems:   Systemic           + weight changes; no fever; no chills; no night sweats; no appetite changes  Skin           no rashes, or lesions  Eye           no irritation; no changes in vision  Ramona-Laryngeal           no dysphagia; no hoarseness   Pulmonary    no cough; no shortness of breath  Cardiovascular    no chest pain; no palpitations  Gastrointestinal    no diarrhea; no constipation; no abdominal pain; + changes in bowel  habits; no blood in stool  Genitourinary   + urinary frequency; + urinary urgency; no dysuria; no pain; no abnormal vaginal discharge; no abnormal vaginal bleeding  Breast   no breast discharge; no breast changes; no breast " pain  Musculoskeletal    no myalgias; no arthralgias; no back pain  Psychiatric           no depressed mood; no anxiety    Hematologic           no tender lymph nodes; no noticeable swellings or lumps   Endocrine    no hot flashes; no heat/cold intolerance         Neurological   no tremor; no numbness and tingling; no headaches; no difficulty  sleeping      Past Medical History:  Past Medical History:   Diagnosis Date     Abnormal Pap smear     maybe 20 years ago     Cervical cancer (H)      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      History of breast cancer 2003    lumpectomy and radiation offerred chemo and patient declined at time but had oncogene test and low risk     Hyperlipidemia LDL goal < 160      Osteopenia      Paroxysmal A-fib (H)      Severe vaginal dysplasia          Past Surgical History:  Past Surgical History:   Procedure Laterality Date     BIOPSY       BREAST SURGERY Left 2003    lumpectomy left, did radiation, 5 yr of tamoxifen     COLONOSCOPY  2018    repeat in 2028     Colposcopy Cervix with Loop Electrode Conization and Lser to Vagina  2008     COLPOSCOPY, BIOPSY, COMBINED  10/24/2012    Procedure: COMBINED COLPOSCOPY, BIOPSY;  Colposcopy, Biopsy of Cervix and Vagina, Ultrasound Guidance;  Surgeon: Colette Ng MD;  Location: [facility] OR     ENT SURGERY  01/23/2018    otoscelerosis, right side     HYSTERECTOMY, FRANCIA  12/2012    high grade cervical dysplasia, [facility], Dr. Arciniega     IA LAP VAGINECTOMY, PARTIAL REMOVAL OF VAGINAL WALL  10/2013    upper vaginectomy for dysplasia, Dr. Megan Arciniega       Current Medications:   Current Outpatient Medications   Medication     Acetylcarnitine HCl 250 MG CAPS     aspirin (ASA) 81 MG chewable tablet     Bacillus Coagulans-Inulin (PROBIOTIC FORMULA) 1-250 BILLION-MG CAPS     BENFOTIAMINE PO     Cholecalciferol (VITAMIN D-3) 25 MCG (1000 UT) CAPS     Coenzyme Q10 (CO Q 10 PO)     Efraín Cota 550 MG CAPS     MAGNESIUM OXIDE  "PO     metoprolol succinate ER (TOPROL XL) 50 MG 24 hr tablet     Omega-3 Fatty Acids (OMEGA-3 FISH OIL PO)     Pectin Cit-Inos-C-Bioflav-Soy (MODIFIED CITRUS PECTIN PO)     Selenium 200 MCG CAPS     Taurine 1000 MG CAPS     Turmeric 450 MG CAPS     No current facility-administered medications for this visit.        Allergies:   No Known Allergies       Social History:  Patient lives with her . 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 She walks daily, gardens, does yoga twice/week.  She does have Advanced Directives, but has identified her daughter and  as her desired [employer] of .   Social History     Tobacco Use     Smoking status: Former Smoker     Packs/day: 0.50     Years: 15.00     Pack years: 7.50     Smokeless tobacco: Never Used   Substance Use Topics     Alcohol use: Yes     Alcohol/week: 2.0 standard drinks     Types: 2 Standard drinks or equivalent per week     Comment: Socially        History   Drug Use No       Family History:   The patient's family history is notable for a first-degree and second-degree paternal relative with breast cancer.  No known family history of ovarian, uterine, colon, urothelial/renal, prostate or pancreatic cancers.  No known family history of melanoma.  Family History   Problem Relation Age of Onset     Myocardial Infarction Mother 73        Tob     Myocardial Infarction Father 68        Tob     Breast Cancer Sister 47         of breast cancer age 63     Cancer Maternal Grandmother      Breast Cancer Paternal Grandmother        Physical Exam:     /78   Pulse 102   Temp 98.1  F (36.7  C) (Oral)   Resp 16   Ht 1.6 m (5' 2.99\")   Wt 52.7 kg (116 lb 3.2 oz)   SpO2 98%   BMI 20.59 kg/m    Body mass index is 20.59 kg/m .    General Appearance: healthy and alert, no distress     HEENT:  no thyromegaly, no palpable nodules or masses     Breast: Symmetric in appearance, bilateral nipple retraction, no palpable masses. "        Cardiovascular: regular rate and rhythm, no gallops, rubs or murmurs     Respiratory: lungs clear, no rales, rhonchi or wheezes, normal diaphragmatic excursion    Musculoskeletal: extremities non tender and without edema    Skin: no lesions or rashes     Neurological: normal gait, no gross defects     Psychiatric: appropriate mood and affect                               Hematological: normal cervical, supraclavicular and inguinal lymph nodes     Gastrointestinal:       abdomen soft, non-tender, non-distended, no organomegaly or masses    Genitourinary: External genitalia and urethral meatus appears normal.  Unable to insert speculum due to mass in the vagina extending to within 1 cm of the introitus. On bimanual exam there is nodularity posterior to the urethra. There is a large mass filling the vagina. On rectovaginal exam the mass impinges on the rectum and narrows almost the entire rectal canal. Mucosa smooth without obvious invasion.       Performance Status:  ECOG Grade 0.       Assessment:    Cordell Donaldson is a 72 year old woman with a diagnosis of a large pelvic mass and pelvic lymphadenopathy with biopsy showing recurrent cervical cancer vs a new vaginal cancer.     Medical history significant for FIGO Stage IA1 squamous cell carcinoma of the cervix s/p hysterectomy with no residual disease, and a long history of vaginal HSIL.     A total of 60 minutes was spent with the patient, 45 minutes of which were spent in counseling the patient and/or treatment planning.      Plan:     1.)    Squamous cell carcinoma: I reviewed the exam, MRI and PET/CT findings with the patient, and discussed recommended management. Her case was also discussed at the [facility] gynecologic oncology/radiation oncology tumor conference. Unfortunately an exenterative procedure is not an option due to the presence of lymph node metastases. Consensus recommendation for primary chemotherapy, with consideration of consolidation  radiation therapy if there is significant decrease in the size of the pelvis mass. Discussed the palliative nature of treatment.     After discussion of risks/benefits, Cordell would like to proceed with the recommended first-line chemotherapy with palliative intent with IV cisplatin 50 mg/m2 + paclitaxel 175 mg/m2 + bevacizumab 15 mg/kg every 21 days per the GOG-240 regimen.   -Cordell will return to the infusion center in 1-2 weeks to initiate chemotherapy.  -Will get Caris testing on the tumor biopsy. If PD-L1+ (CPS 1% or greater), then will add pembrolizumab 200 mg every 21 days to therapy per the KEYNOTE-826 regimen.   -Plan for 3 cycles of therapy followed by repeat PET/CT to assess disease response.     Side-effects:  --Chemotherapy counseling and written information provided by GEOFF Lim.  -Patient declines port-a-cath placement.      2.) Genetic risk factors were assessed and the patient does not meet the qualifications for a referral.      3.) Labs and/or tests ordered include:  1) Pre-chemotherapy labs: CBC with diff, CMP, magnesium, qualitative urine protein. 2) Caris testing.      4.) Health maintenance issues addressed today include none.    5.) Code status:  Full-code.    6.) Prescriptions: None; standard prophylactic anti-emetics to be prescribed at the time of chemotherapy.       Hina Raygoza MD, MS, FACOG, FACS  7/5/2022  11:42 AM        CC  Patient Care Team:  Merry Lino MD as PCP - General (Internal Medicine)  Bess Paredes MD Corfield, Aaron Daniel, DPM as MD (Podiatry)  Elise Huitron MD as Assigned Heart and Vascular Provider  Tess Mcgowan RN as Specialty Care Coordinator  Heather Ayon MD as MD (Urology)

## 2022-07-05 NOTE — NURSING NOTE
"Oncology Rooming Note    July 5, 2022 9:37 AM   Cordell Donaldson is a 72 year old female who presents for:    Chief Complaint   Patient presents with     Oncology Clinic Visit     [facility] [place] RETURN - CERVICAL CANCER     Initial Vitals: /78   Pulse 102   Temp 98.1  F (36.7  C) (Oral)   Resp 16   Ht 1.6 m (5' 2.99\")   Wt 52.7 kg (116 lb 3.2 oz)   SpO2 98%   BMI 20.59 kg/m   Estimated body mass index is 20.59 kg/m  as calculated from the following:    Height as of this encounter: 1.6 m (5' 2.99\").    Weight as of this encounter: 52.7 kg (116 lb 3.2 oz). Body surface area is 1.53 meters squared.  No Pain (0) Comment: Data Unavailable   No LMP recorded. Patient is postmenopausal.  Allergies reviewed: Yes  Medications reviewed: Yes    Medications: Medication refills not needed today.  Pharmacy name entered into [employer]:    [id number] - [place], [place] - [address]  [id number] - [place], [place] - [address]    Clinical concerns: No new concerns. Teoh was notified.      Tom Johnson LPN            "

## 2022-07-05 NOTE — NURSING NOTE
Plan chemo  Cisplatin/avastin/taxol  Path for caris tumor testing  7/5/22  Port offered to pt will decide and let know  Might add keytruda after caris results final  3 cycles then pet scan and md appt

## 2022-07-09 PROBLEM — C53.9: Status: ACTIVE | Noted: 2022-07-09

## 2022-07-13 ENCOUNTER — ANCILLARY PROCEDURE (OUTPATIENT)
Dept: MAMMOGRAPHY | Facility: CLINIC | Age: 72
End: 2022-07-13
Payer: COMMERCIAL

## 2022-07-13 ENCOUNTER — TELEPHONE (OUTPATIENT)
Dept: MAMMOGRAPHY | Facility: CLINIC | Age: 72
End: 2022-07-13

## 2022-07-13 DIAGNOSIS — R93.89 ABNORMAL FINDING ON IMAGING: ICD-10-CM

## 2022-07-13 DIAGNOSIS — Z85.3 HX: BREAST CANCER: ICD-10-CM

## 2022-07-13 PROCEDURE — G0279 TOMOSYNTHESIS, MAMMO: HCPCS | Performed by: RADIOLOGY

## 2022-07-13 PROCEDURE — 76642 ULTRASOUND BREAST LIMITED: CPT | Mod: LT | Performed by: RADIOLOGY

## 2022-07-13 PROCEDURE — 77066 DX MAMMO INCL CAD BI: CPT | Performed by: RADIOLOGY

## 2022-07-13 NOTE — PROGRESS NOTES
Cordell is a 72 year old who is being evaluated via a billable video visit.  Currently in [place].    How would you like to obtain your AVS? MyChart  If the video visit is dropped, the invitation should be resent by: Text to cell phone: [phone]  Will anyone else be joining your video visit? Yes, patients  Isaac Nunez, RODOLFO      Video-Visit Details    Video Start Time: 919    Type of service:  Video Visit    Video End Time: 953    Originating Location (pt. Location): Home    Distant Location (provider location):  [facility]     Platform used for Video Visit: [employer]                     Follow Up Notes on Referred Patient    Date: 7/15/2022       Dr. Bess Paredes MD  [address]  [place] [address]       RE: Cordell Donaldson  : 1950  MCKINLEY: 7/15/2022    Dear Dr. Bess Paredes:        Cordell Donaldson is a 72 year old woman with a diagnosis of a large pelvic mass and pelvic lymphadenopathy with biopsy showing recurrent cervical cancer vs a new vaginal cancer.  She is here today for a disease management visit. Video visit.      History of Present Illness:   Cordell Donaldson (she/her/hers) is a 72 year old referred to the Gynecologic Cancer Clinic at the [facility] on 2022 by gynecologic oncologist Dr. Arciniega and radiation oncologist Dr. Stockton for management of a pelvic mass due to recurrent cervical cancer vs new vaginal cancer. History as follows:      Breast Cancer History:  : Left breast cancer.  -Lumpectomy.  -Radiation therapy.     -present: BRENNAN.         Cervical/Vaginal Dysplasia/Cancer History:  10/24/12:   -Endocervical curettings: Squamous carcinoma, invasion cannot be assessed.   -Ectocervical biopsies: HSIL (CIN3).   -Vaginal biopsy, posterior: HSIL (VaIN3).  2012: Robotic-assisted laparoscopic lysis of adhesions, left ureterolysis with  conversion to laparotomy, total abdominal hysterectomy, and an  upper  Vaginectomy by gynecologic oncologist Dr. Arciniega.   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      7/22/13: Vaginal biopsy: HSIL, cannot exclude invasion.   -Consultation with gynecologic oncologist Dr. Johnson. Upper vaginectomy and CO2 laser ablation recommended, patient declined.      [id number]: No vaginal dysplasia treatment.      5/20/22: Pelvic MRI: I have personally reviewed the MRI images.   Centrally hypoenhancing heterogeneous midline pelvic mass along  the superior margin of the vaginal vault measures 8.5 x 6.7 x 8.1 cm.  The mass abut the bladder and there is mucosal irregularity at the  site of abutment. The mass indents the  decompressed rectum but there is no definite invasion or mucosal  Irregularity.  6/9/22: CT-guided biopsy of pelvic mass: Poorly-differentiated squamous cell carcinoma, HPV-associated.   7/1/22: Staging PET/CT: I have personally reviewed the PET/CT images.   Large heterogenous and hypermetabolic mass seen within the midline of  the pelvis measuring approximately 8.5 x 7.2 x 8.1 cm with an SUV max  measuring up to 13.2. Additionally, there is central hypoattenuation  with relative lack of metabolic activity, suggestive of central  Necrosis (previously 8.5 x 6.7 x 8.1 cm on prior  MRI). No inguinal lymphadenopathy.  Redemonstration of hypermetabolic left pelvic lymphadenopathy, the  largest lymph node measuring up to 3.3 x 2.4 cm with an SUV max of  12.2 (previously measuring 2.5 x 2.1 cm). Additional conglomeration of  hypermetabolic lymph nodes at the aortic bifurcation measuring 2.9 x  1.6 cm with an SUV max of 14.3 (previously measuring 1.9 x 1.7 cm).  Unchanged enlarged mildly metabolic right pelvic lymph node measuring  2.5 x 1.1 cm with an SUV max of 4.2, favored to represent a reactive  lymph node given the relative lack of hypermetabolic activity compared  to the left pelvic lymph node.  7/5/2022:  Cisplatin 50 mg/m2 + paclitaxel 175 mg/m2 + bevacizumab 15 mg/kg every 21 days. Get Caris on tumor biopsy. If PD-L1+ (CPS 1% or greater), then will add pembrolizumab 200 mg every 21 days to therapy. Plan for 3 cycles of therapy followed by repeat PET/CT to assess disease response.      Genetic/Genomic/Molecular Testing History:  2006: Negative for germline BRCA1, BRCA2 pathogenic variants.             Today Maritza presents via video with her . She reports pelvic and abdominal pain that presents by the end of the afternoon. Tylenol 2-3 times per day along with CBD oil largely controls discomfort. She is interested in a consultation with palliative care. Doing pelvic floor PT and feels sore today.  She denies any vaginal bleeding, no changes in her bowel or bladder habits, no nausea/emesis, no lower extremity edema, and no difficulties eating or sleeping. She denies any abdominal bloating, no fevers or chills, and no chest pain or shortness of breath. Eating high protein meals. Appetite is ok. Has a hx of a-fib and is on Metoprolol. No neuropathy. 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 Pt denies tinnitus.               Review of Systems:    Systemic           no weight changes; no fever; no chills; no night sweats; no appetite changes  Skin           no rashes, or lesions  Eye           no irritation; no changes in vision  Ramona-Laryngeal           no dysphagia; no hoarseness   Pulmonary    no cough; no shortness of breath  Cardiovascular    no chest pain; no palpitations  Gastrointestinal    no diarrhea; no constipation; no abdominal pain; no changes in bowel  habits; no blood in stool  Genitourinary   no urinary frequency; no urinary urgency; no dysuria; no pain; no abnormal vaginal discharge; no abnormal vaginal bleeding, + pelvic pain  Breast   no breast discharge; no breast changes; no breast pain  Musculoskeletal    no myalgias; no arthralgias; no back  pain  Psychiatric           no depressed mood; no anxiety    Hematologic           no tender lymph nodes; no noticeable swellings or lumps   Endocrine    no hot flashes; no heat/cold intolerance         Neurological   no tremor; no numbness and tingling; no headaches; no difficulty sleeping      Past Medical History:    Past Medical History:   Diagnosis Date     Abnormal Pap smear     maybe 20 years ago     Cervical cancer (H)      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      History of breast cancer 2003    lumpectomy and radiation offerred chemo and patient declined at time but had oncogene test and low risk     Hyperlipidemia LDL goal < 160      Osteopenia      Paroxysmal A-fib (H)      Severe vaginal dysplasia          Past Surgical History:    Past Surgical History:   Procedure Laterality Date     BIOPSY       BREAST SURGERY Left 2003    lumpectomy left, did radiation, 5 yr of tamoxifen     COLONOSCOPY  2018    repeat in 2028     Colposcopy Cervix with Loop Electrode Conization and Lser to Vagina  2008     COLPOSCOPY, BIOPSY, COMBINED  10/24/2012    Procedure: COMBINED COLPOSCOPY, BIOPSY;  Colposcopy, Biopsy of Cervix and Vagina, Ultrasound Guidance;  Surgeon: Colette Ng MD;  Location: [facility] OR     ENT SURGERY  01/23/2018    otoscelerosis, right side     HYSTERECTOMY, FRANCIA  12/2012    high grade cervical dysplasia, [place] [facility], Dr. Arciniega     [place] LAP VAGINECTOMY, PARTIAL REMOVAL OF VAGINAL WALL  10/2013    upper vaginectomy for dysplasia, Dr. Megan Arciniega         Health Maintenance Due   Topic Date Due     Pneumococcal Vaccine: 65+ Years (1 - PCV) Never done     ZOSTER IMMUNIZATION (2 of 2) 02/12/2020     COVID-19 Vaccine (4 - Booster for Pfizer series) 02/08/2022     PAP  01/02/2023       Current Medications:     Current Outpatient Medications   Medication Sig Dispense Refill     Acetylcarnitine HCl 250 MG CAPS Take 500 mg by mouth 2 times daily        aspirin (ASA) 81 MG chewable  "tablet Take 81 mg by mouth daily       Bacillus Coagulans-Inulin (PROBIOTIC FORMULA) 1-250 BILLION-MG CAPS Take by mouth three times a week 25+billion CFUS       BENFOTIAMINE PO Take 150 mg by mouth 2 times daily       Cholecalciferol (VITAMIN D-3) 25 MCG (1000 UT) CAPS Take 1,000 Units by mouth daily 90 capsule 3     Coenzyme Q10 (CO Q 10 PO) Take 100 mg by mouth daily        Efraín Berry 550 MG CAPS Take 400 mg by mouth 2 times daily 60 capsule 11     MAGNESIUM OXIDE PO Take 500 mg by mouth       metoprolol succinate ER (TOPROL XL) 50 MG 24 hr tablet Take 1 tablet (50 mg) by mouth daily 90 tablet 1     Omega-3 Fatty Acids (OMEGA-3 FISH OIL PO) Take 630 mg by mouth 2 times daily        OVER-THE-COUNTER Turkey tail mushroom powder, use 3 times a day 1 Can 11     Pectin Cit-Inos-C-Bioflav-Soy (MODIFIED CITRUS PECTIN PO)        Selenium 200 MCG CAPS Take 200 mg by mouth       Taurine 1000 MG CAPS Take one po twice daily 60 capsule 11     Turmeric 450 MG CAPS Take 2 capsules by mouth daily 60 capsule 11         Allergies:      No Known Allergies     Social History:     Social History     Tobacco Use     Smoking status: Former Smoker     Packs/day: 0.50     Years: 15.00     Pack years: 7.50     Smokeless tobacco: Never Used   Substance Use Topics     Alcohol use: Yes     Alcohol/week: 2.0 standard drinks     Types: 2 Standard drinks or equivalent per week     Comment: Socially        History   Drug Use No         Family History:     The patient's family history is notable for     Family History   Problem Relation Age of Onset     Myocardial Infarction Mother 73        Tob     Myocardial Infarction Father 68        Tob     Breast Cancer Sister 47         of breast cancer age 63; BRCA mutation testing negative     Cancer Maternal Grandmother         Unsure of type.     Breast Cancer Paternal Grandmother         Unsure of diagnosis--some type of \"women's issue\"         Physical Exam:     There were no vitals taken " for this visit.  There is no height or weight on file to calculate BMI.    General Appearance:  healthy and alert, no distress     Eyes:  Eyes grossly normal to inspection.  No discharge or erythema, or obvious scleral/conjunctival abnormalities.     Respiratory:     No audible wheeze, cough, or visible cyanosis.  No visible retractions or increased work of breathing.      Musculoskeletal:         extremities non tender and without edema     Skin:    no lesions or rashes on visible skin     Neurological:   normal gait, no gross defects                Psychiatric:      appropriate mood and affect. Mentation appears normal, affect normal/bright, judgement and insight intact, normal speech and appearance well-groomed                            The rest of a comprehensive physical examination is deferred due to PHE (public health emergency) video visit restrictions.         Assessment:    Cordell Donaldson is a 72 year old woman with a diagnosis of a large pelvic mass and pelvic lymphadenopathy with biopsy showing recurrent cervical cancer vs a new vaginal cancer.  She is here today for a disease management visit. Video visit.      Medical history significant for FIGO Stage IA1 squamous cell carcinoma of the cervix s/p hysterectomy with no residual disease, and a long history of vaginal HSIL.     40 minutes spent on the date of the encounter doing chart review, history and exam, documentation, and further activities as noted above.         Plan:     1.)   Squamous cell carcinoma. Reviewed patient s diagnosis and treatment plan (Cisplatin, Paclitaxel, Bevacizumab) as recommended by Dr. Ng. The goal of treatment is palliative intent.  [facility] has submitted Caris. Follow up Gyn Oncology visits are scheduled. Pt aware. Reviewed signs and symptoms for when she should contact the clinic or seek additional care. Patient to contact the clinic with any questions or concerns in the interim.      Discussed that the  administration of chemotherapy can carry certain risks, including failure to obtain the desired result, discomforts, injury, and the need for additional therapy. Reviewed possible side effects of these drugs including: Allergic reaction; Pancytopenia including Anemia causing weakness/fatigue, Low WBC causing infection, Low platelets causing bruising/bleeding; Mouth sores; Hair loss; Fatigue; Brief periods of forgetfulness; Nausea and vomiting; Neuropathy; Diarrhea/Constipation; Hair loss; Skin and nail changes; Liver effects; Heart effects; Kidney/Bladder effects; Lung effects; Loss of appetite; Weight gain or loss; Visual/Auditory changes; Sexual effects; Mood effects; Menopausal symptoms; Reproductive/Fertility Effects; risk for bowel perforation or HTN. Discussed that the patient may have side effects from chemotherapy that have not been discussed today because each patient may respond differently to chemotherapy and could have side effects that have not been previously reported by others. These side effects will be monitored via discussion with the patient and labs prior to each chemotherapy cycle per protocol.       Discussed ways to manage certain side effects at home and when to call the clinic or go to the emergency department.       Patient was provided with a copy of [facility] drug information regarding (Cisplatin, Paclitaxel, Bevacizumab); [facility] Cancer Care packet including: Getting Ready for Chemotherapy, Comparing Chemotherapy, Immunotherapy, and targeted Therapy, Your Cancer Care team and MyChart, Understanding Clinical Trials, Honoring Choices, Cancer Care Services, and Integrative Therapies; and contact information of the Gynecologic Oncology Clinic. Reviewed resources available including nutrition services, rehabilitation and exercise, pharmacy services, tobacco cessation programs, social work services, spiritual health, cancer risk management program, cancer survivorship program,  and palliative care program.       Discussed nutrition and the importance of eating small frequent meal, high protein, and drinking 8-10 glasses of noncaffeinated and nonalcoholic beverages.         2.) Genetic risk factors were assessed and the patient does not meet the qualifications for a referral.      3.) Labs and/or tests ordered include: none.     4.) Health maintenance issues addressed today include annual health maintenance and non-gynecologic issues with PCP.    5.)        Pelvic pain: largely controlled with APAP and CBD oil per patient. Reviewed that we can send in different pain medications if not controlled. Pt appropriate for Palliative Care referral given pain and recurrence of disease. Referral placed today.         TE Head, NP-BC  Women's Health Nurse Practitioner  Division of Gynecologic Oncology  [facility]        CC  Patient Care Team:  Merry Lino MD as PCP - General (Internal Medicine)  Gui Mccollum MD [place], Aaron Daniel, MYAM as MD (Podiatry)  Elise Huitron MD as Assigned Heart and Vascular Provider  Merry Lino MD as Assigned PCP  Tess Mcgowan RN as Specialty Care Coordinator  Heather Ayon MD as MD (Urology)  Heather Ayon MD as Assigned Surgical Provider  GUI MCCOLLUM

## 2022-07-14 ENCOUNTER — TELEPHONE (OUTPATIENT)
Dept: SURGERY | Facility: CLINIC | Age: 72
End: 2022-07-14

## 2022-07-14 ENCOUNTER — TELEPHONE (OUTPATIENT)
Dept: INTERVENTIONAL RADIOLOGY/VASCULAR | Facility: CLINIC | Age: 72
End: 2022-07-14

## 2022-07-14 NOTE — TELEPHONE ENCOUNTER
Cordell called [place] [facility] RNs twice this morning looking for pre-procedure instructions for her port placement at the clinic and surgery center on Monday. I was unable to locate a phone number for her to call and receive pre-procedure instructions. I passed along instructions from the 5th floor control desk. Patient to arrive at 7:30 am, no solids after midnight, clears until 5:00 am, have a  bring her home, and to have someone with her 24 hours post surgery.

## 2022-07-14 NOTE — TELEPHONE ENCOUNTER
[facility] Call Center    Phone Message    May a detailed message be left on voicemail: yes     Reason for Call: Other: Per pt got a miss call from someone name Brenda but didn't state which clinic they were calling from. No call back number on the  either. Writer double check [employer] messages and nothing was documented. Per pt please if its anyone from this clinic call pt back by today since pt will be busy with appt all day tomorrow and pt surgey is on monday 07/18. Thank you!     Action Taken: Message routed to:  [facility] ([facility]): PAC    Travel Screening: Not Applicable

## 2022-07-15 ENCOUNTER — ANCILLARY PROCEDURE (OUTPATIENT)
Dept: MAMMOGRAPHY | Facility: CLINIC | Age: 72
End: 2022-07-15
Attending: RADIOLOGY
Payer: COMMERCIAL

## 2022-07-15 ENCOUNTER — TELEPHONE (OUTPATIENT)
Dept: PALLIATIVE CARE | Facility: CLINIC | Age: 72
End: 2022-07-15

## 2022-07-15 ENCOUNTER — VIRTUAL VISIT (OUTPATIENT)
Dept: ONCOLOGY | Facility: CLINIC | Age: 72
End: 2022-07-15
Attending: OBSTETRICS & GYNECOLOGY
Payer: COMMERCIAL

## 2022-07-15 DIAGNOSIS — R93.89 ABNORMAL FINDING ON IMAGING: ICD-10-CM

## 2022-07-15 DIAGNOSIS — Z85.3 HX: BREAST CANCER: ICD-10-CM

## 2022-07-15 DIAGNOSIS — C53.9 RECURRENT CERVICAL CANCER (H): Primary | ICD-10-CM

## 2022-07-15 DIAGNOSIS — R10.2 PELVIC PAIN IN FEMALE: ICD-10-CM

## 2022-07-15 PROCEDURE — 88377 M/PHMTRC ALYS ISHQUANT/SEMIQ: CPT | Performed by: RADIOLOGY

## 2022-07-15 PROCEDURE — G0463 HOSPITAL OUTPT CLINIC VISIT: HCPCS | Mod: PN,RTG,25 | Performed by: OBSTETRICS & GYNECOLOGY

## 2022-07-15 PROCEDURE — 88377 M/PHMTRC ALYS ISHQUANT/SEMIQ: CPT | Mod: 26 | Performed by: MEDICAL GENETICS

## 2022-07-15 PROCEDURE — 19083 BX BREAST 1ST LESION US IMAG: CPT | Mod: LT | Performed by: RADIOLOGY

## 2022-07-15 PROCEDURE — 88360 TUMOR IMMUNOHISTOCHEM/MANUAL: CPT | Mod: 26 | Performed by: PATHOLOGY

## 2022-07-15 PROCEDURE — 88305 TISSUE EXAM BY PATHOLOGIST: CPT | Mod: TC | Performed by: RADIOLOGY

## 2022-07-15 PROCEDURE — 88305 TISSUE EXAM BY PATHOLOGIST: CPT | Mod: 26 | Performed by: PATHOLOGY

## 2022-07-15 PROCEDURE — 99215 OFFICE O/P EST HI 40 MIN: CPT | Mod: 95 | Performed by: OBSTETRICS & GYNECOLOGY

## 2022-07-15 NOTE — LETTER
7/15/2022         RE: Cordell Donaldson  [address]  [facility] [address]        Dear Colleague,    Thank you for referring your patient, Cordell Donaldson, to the [facility]. Please see a copy of my visit note below.    Cordell is a 72 year old who is being evaluated via a billable video visit.  Currently in [place].    How would you like to obtain your AVS? MyChart  If the video visit is dropped, the invitation should be resent by: Text to cell phone: [phone]  Will anyone else be joining your video visit? Yes, patients  Isaac      RODOLFO Ortega      Video-Visit Details    Video Start Time: 919    Type of service:  Video Visit    Video End Time: 953    Originating Location (pt. Location): Home    Distant Location (provider location):  [facility]     Platform used for Video Visit: [employer]                     Follow Up Notes on Referred Patient    Date: 7/15/2022       Dr. Bess Paredes MD  [address]  [place] [address]       RE: Cordell Donaldson  : 1950  [place]: 7/15/2022    Dear Dr. Bess Paredes:        Cordell Donaldson is a 72 year old woman with a diagnosis of a large pelvic mass and pelvic lymphadenopathy with biopsy showing recurrent cervical cancer vs a new vaginal cancer.  She is here today for a disease management visit. Video visit.      History of Present Illness:   Cordell Donaldson (she/her/hers) is a 72 year old referred to the Gynecologic Cancer Clinic at the [facility] on 2022 by gynecologic oncologist Dr. Arciniega and radiation oncologist Dr. Stockton for management of a pelvic mass due to recurrent cervical cancer vs new vaginal cancer. History as follows:      Breast Cancer History:  : Left breast cancer.  -Lumpectomy.  -Radiation therapy.     -present: BRENNAN.         Cervical/Vaginal Dysplasia/Cancer History:  10/24/12:   -Endocervical curettings: Squamous carcinoma,  invasion cannot be assessed.   -Ectocervical biopsies: HSIL (CIN3).   -Vaginal biopsy, posterior: HSIL (VaIN3).  12/2012: Robotic-assisted laparoscopic lysis of adhesions, left ureterolysis with  conversion to laparotomy, total abdominal hysterectomy, and an upper  Vaginectomy by gynecologic oncologist Dr. Arciniega.   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      7/22/13: Vaginal biopsy: HSIL, cannot exclude invasion.   -Consultation with gynecologic oncologist Dr. Johnson. Upper vaginectomy and CO2 laser ablation recommended, patient declined.      [id number]: No vaginal dysplasia treatment.      5/20/22: Pelvic MRI: I have personally reviewed the MRI images.   Centrally hypoenhancing heterogeneous midline pelvic mass along  the superior margin of the vaginal vault measures 8.5 x 6.7 x 8.1 cm.  The mass abut the bladder and there is mucosal irregularity at the  site of abutment. The mass indents the  decompressed rectum but there is no definite invasion or mucosal  Irregularity.  6/9/22: CT-guided biopsy of pelvic mass: Poorly-differentiated squamous cell carcinoma, HPV-associated.   7/1/22: Staging PET/CT: I have personally reviewed the PET/CT images.   Large heterogenous and hypermetabolic mass seen within the midline of  the pelvis measuring approximately 8.5 x 7.2 x 8.1 cm with an SUV max  measuring up to 13.2. Additionally, there is central hypoattenuation  with relative lack of metabolic activity, suggestive of central  Necrosis (previously 8.5 x 6.7 x 8.1 cm on prior  MRI). No inguinal lymphadenopathy.  Redemonstration of hypermetabolic left pelvic lymphadenopathy, the  largest lymph node measuring up to 3.3 x 2.4 cm with an SUV max of  12.2 (previously measuring 2.5 x 2.1 cm). Additional conglomeration of  hypermetabolic lymph nodes at the aortic bifurcation measuring 2.9 x  1.6 cm with an SUV max of 14.3 (previously measuring 1.9 x 1.7  cm).  Unchanged enlarged mildly metabolic right pelvic lymph node measuring  2.5 x 1.1 cm with an SUV max of 4.2, favored to represent a reactive  lymph node given the relative lack of hypermetabolic activity compared  to the left pelvic lymph node.  7/5/2022: Cisplatin 50 mg/m2 + paclitaxel 175 mg/m2 + bevacizumab 15 mg/kg every 21 days. Get Caris on tumor biopsy. If PD-L1+ (CPS 1% or greater), then will add pembrolizumab 200 mg every 21 days to therapy. Plan for 3 cycles of therapy followed by repeat PET/CT to assess disease response.      Genetic/Genomic/Molecular Testing History:  2006: Negative for germline BRCA1, BRCA2 pathogenic variants.             Today Maritza presents via video with her . She reports pelvic and abdominal pain that presents by the end of the afternoon. Tylenol 2-3 times per day along with CBD oil largely controls discomfort. She is interested in a consultation with palliative care. Doing pelvic floor PT and feels sore today.  She denies any vaginal bleeding, no changes in her bowel or bladder habits, no nausea/emesis, no lower extremity edema, and no difficulties eating or sleeping. She denies any abdominal bloating, no fevers or chills, and no chest pain or shortness of breath. Eating high protein meals. Appetite is ok. Has a hx of a-fib and is on Metoprolol. No neuropathy. 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 Pt denies tinnitus.               Review of Systems:    Systemic           no weight changes; no fever; no chills; no night sweats; no appetite changes  Skin           no rashes, or lesions  Eye           no irritation; no changes in vision  Ramona-Laryngeal           no dysphagia; no hoarseness   Pulmonary    no cough; no shortness of breath  Cardiovascular    no chest pain; no palpitations  Gastrointestinal    no diarrhea; no constipation; no abdominal pain; no changes in bowel  habits; no blood in stool  Genitourinary   no  urinary frequency; no urinary urgency; no dysuria; no pain; no abnormal vaginal discharge; no abnormal vaginal bleeding, + pelvic pain  Breast   no breast discharge; no breast changes; no breast pain  Musculoskeletal    no myalgias; no arthralgias; no back pain  Psychiatric           no depressed mood; no anxiety    Hematologic           no tender lymph nodes; no noticeable swellings or lumps   Endocrine    no hot flashes; no heat/cold intolerance         Neurological   no tremor; no numbness and tingling; no headaches; no difficulty sleeping      Past Medical History:    Past Medical History:   Diagnosis Date     Abnormal Pap smear     maybe 20 years ago     Cervical cancer (H)      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      History of breast cancer 2003    lumpectomy and radiation offerred chemo and patient declined at time but had oncogene test and low risk     Hyperlipidemia LDL goal < 160      Osteopenia      Paroxysmal A-fib (H)      Severe vaginal dysplasia          Past Surgical History:    Past Surgical History:   Procedure Laterality Date     BIOPSY       BREAST SURGERY Left 2003    lumpectomy left, did radiation, 5 yr of tamoxifen     COLONOSCOPY  2018    repeat in 2028     Colposcopy Cervix with Loop Electrode Conization and Lser to Vagina  2008     COLPOSCOPY, BIOPSY, COMBINED  10/24/2012    Procedure: COMBINED COLPOSCOPY, BIOPSY;  Colposcopy, Biopsy of Cervix and Vagina, Ultrasound Guidance;  Surgeon: Colette Ng MD;  Location: [facility] OR     ENT SURGERY  01/23/2018    otoscelerosis, right side     HYSTERECTOMY, FRANCIA  12/2012    high grade cervical dysplasia, [place] [facility], Dr. Arciniega     CT LAP VAGINECTOMY, PARTIAL REMOVAL OF VAGINAL WALL  10/2013    upper vaginectomy for dysplasia, Dr. Megan Arciniega         Health Maintenance Due   Topic Date Due     Pneumococcal Vaccine: 65+ Years (1 - PCV) Never done     ZOSTER IMMUNIZATION (2 of 2) 02/12/2020     COVID-19 Vaccine (4 -  Booster for Pfizer series) 2022     PAP  2023       Current Medications:     Current Outpatient Medications   Medication Sig Dispense Refill     Acetylcarnitine HCl 250 MG CAPS Take 500 mg by mouth 2 times daily        aspirin (ASA) 81 MG chewable tablet Take 81 mg by mouth daily       Bacillus Coagulans-Inulin (PROBIOTIC FORMULA) 1-250 BILLION-MG CAPS Take by mouth three times a week 25+billion CFUS       BENFOTIAMINE PO Take 150 mg by mouth 2 times daily       Cholecalciferol (VITAMIN D-3) 25 MCG (1000 UT) CAPS Take 1,000 Units by mouth daily 90 capsule 3     Coenzyme Q10 (CO Q 10 PO) Take 100 mg by mouth daily        [place] 550 MG CAPS Take 400 mg by mouth 2 times daily 60 capsule 11     MAGNESIUM OXIDE PO Take 500 mg by mouth       metoprolol succinate ER (TOPROL XL) 50 MG 24 hr tablet Take 1 tablet (50 mg) by mouth daily 90 tablet 1     Omega-3 Fatty Acids (OMEGA-3 FISH OIL PO) Take 630 mg by mouth 2 times daily        OVER-THE-COUNTER Turkey tail mushroom powder, use 3 times a day 1 Can 11     Pectin Cit-Inos-C-Bioflav-Soy (MODIFIED CITRUS PECTIN PO)        Selenium 200 MCG CAPS Take 200 mg by mouth       Taurine 1000 MG CAPS Take one po twice daily 60 capsule 11     Turmeric 450 MG CAPS Take 2 capsules by mouth daily 60 capsule 11         Allergies:      No Known Allergies     Social History:     Social History     Tobacco Use     Smoking status: Former Smoker     Packs/day: 0.50     Years: 15.00     Pack years: 7.50     Smokeless tobacco: Never Used   Substance Use Topics     Alcohol use: Yes     Alcohol/week: 2.0 standard drinks     Types: 2 Standard drinks or equivalent per week     Comment: Socially        History   Drug Use No         Family History:     The patient's family history is notable for     Family History   Problem Relation Age of Onset     Myocardial Infarction Mother 73        Tob     Myocardial Infarction Father 68        Tob     Breast Cancer Sister 47         of  "breast cancer age 63; BRCA mutation testing negative     Cancer Maternal Grandmother         Unsure of type.     Breast Cancer Paternal Grandmother         Unsure of diagnosis--some type of \"women's issue\"         Physical Exam:     There were no vitals taken for this visit.  There is no height or weight on file to calculate BMI.    General Appearance:  healthy and alert, no distress     Eyes:  Eyes grossly normal to inspection.  No discharge or erythema, or obvious scleral/conjunctival abnormalities.     Respiratory:     No audible wheeze, cough, or visible cyanosis.  No visible retractions or increased work of breathing.      Musculoskeletal:         extremities non tender and without edema     Skin:    no lesions or rashes on visible skin     Neurological:   normal gait, no gross defects                Psychiatric:      appropriate mood and affect. Mentation appears normal, affect normal/bright, judgement and insight intact, normal speech and appearance well-groomed                            The rest of a comprehensive physical examination is deferred due to PHE (public health emergency) video visit restrictions.         Assessment:    Cordell Donaldson is a 72 year old woman with a diagnosis of a large pelvic mass and pelvic lymphadenopathy with biopsy showing recurrent cervical cancer vs a new vaginal cancer.  She is here today for a disease management visit. Video visit.      Medical history significant for FIGO Stage IA1 squamous cell carcinoma of the cervix s/p hysterectomy with no residual disease, and a long history of vaginal HSIL.     40 minutes spent on the date of the encounter doing chart review, history and exam, documentation, and further activities as noted above.         Plan:     1.)   Squamous cell carcinoma. Reviewed patient s diagnosis and treatment plan (Cisplatin, Paclitaxel, Bevacizumab) as recommended by Dr. Ng. The goal of treatment is palliative intent.  [facility] has submitted " Jesus Alberto. Follow up Gyn Oncology visits are scheduled. Pt aware. Reviewed signs and symptoms for when she should contact the clinic or seek additional care. Patient to contact the clinic with any questions or concerns in the interim.      Discussed that the administration of chemotherapy can carry certain risks, including failure to obtain the desired result, discomforts, injury, and the need for additional therapy. Reviewed possible side effects of these drugs including: Allergic reaction; Pancytopenia including Anemia causing weakness/fatigue, Low WBC causing infection, Low platelets causing bruising/bleeding; Mouth sores; Hair loss; Fatigue; Brief periods of forgetfulness; Nausea and vomiting; Neuropathy; Diarrhea/Constipation; Hair loss; Skin and nail changes; Liver effects; Heart effects; Kidney/Bladder effects; Lung effects; Loss of appetite; Weight gain or loss; Visual/Auditory changes; Sexual effects; Mood effects; Menopausal symptoms; Reproductive/Fertility Effects; risk for bowel perforation or HTN. Discussed that the patient may have side effects from chemotherapy that have not been discussed today because each patient may respond differently to chemotherapy and could have side effects that have not been previously reported by others. These side effects will be monitored via discussion with the patient and labs prior to each chemotherapy cycle per protocol.       Discussed ways to manage certain side effects at home and when to call the clinic or go to the emergency department.       Patient was provided with a copy of [facility] drug information regarding (Cisplatin, Paclitaxel, Bevacizumab); [facility] packet including: Getting Ready for Chemotherapy, Comparing Chemotherapy, Immunotherapy, and targeted Therapy, Your Cancer Care team and MyChart, Understanding Clinical Trials, Honoring Choices, Cancer Care Services, and Integrative Therapies; and contact information of the Gynecologic  Oncology Clinic. Reviewed resources available including nutrition services, rehabilitation and exercise, pharmacy services, tobacco cessation programs, social work services, spiritual health, cancer risk management program, cancer survivorship program, and palliative care program.       Discussed nutrition and the importance of eating small frequent meal, high protein, and drinking 8-10 glasses of noncaffeinated and nonalcoholic beverages.         2.) Genetic risk factors were assessed and the patient does not meet the qualifications for a referral.      3.) Labs and/or tests ordered include: none.     4.) Health maintenance issues addressed today include annual health maintenance and non-gynecologic issues with PCP.    5.)        Pelvic pain: largely controlled with APAP and CBD oil per patient. Reviewed that we can send in different pain medications if not controlled. Pt appropriate for Palliative Care referral given pain and recurrence of disease. Referral placed today.         TE Head, NP-BC  Women's Health Nurse Practitioner  Division of Gynecologic Oncology  [facility]        CC  Patient Care Team:  Merry Lino MD as PCP - General (Internal Medicine)  Gui Mccollum MD Corfield, Aaron Daniel, DPM as MD (Podiatry)  Elise Huitron MD as Assigned Heart and Vascular Provider  Merry Lino MD as Assigned PCP  Tess Mcgowan, RN as Specialty Care Coordinator  Heather Ayon MD as MD (Urology)  Heather Ayon MD as Assigned Surgical Provider  [facility]        Again, thank you for allowing me to participate in the care of your patient.        Sincerely,        TE Gaston CNP

## 2022-07-15 NOTE — TELEPHONE ENCOUNTER
Active Palliative order/referral requesting [facility] location. Please review order in work queue and reach out to patient to arrange an appointment

## 2022-07-15 NOTE — LETTER
7/15/2022         RE: Cordell Donaldson  [address]  [facility] [address]        Dear Colleague,    Thank you for referring your patient, Cordell Donaldson, to the [facility]. Please see a copy of my visit note below.    Cordell is a 72 year old who is being evaluated via a billable video visit.  Currently in [place].    How would you like to obtain your AVS? MyChart  If the video visit is dropped, the invitation should be resent by: Text to cell phone: [phone]  Will anyone else be joining your video visit? Yes, patients  Isaac      RODOLFO Ortega      Video-Visit Details    Video Start Time: 919    Type of service:  Video Visit    Video End Time: 953    Originating Location (pt. Location): Home    Distant Location (provider location):  [facility]     Platform used for Video Visit: [employer]                     Follow Up Notes on Referred Patient    Date: 7/15/2022       Dr. Bess Paredes MD  [address]  [place] [address]       RE: Cordell Donaldson  : 1950  [place]INLEY: 7/15/2022    Dear Dr. Bess Paredes:        Cordell Donaldson is a 72 year old woman with a diagnosis of a large pelvic mass and pelvic lymphadenopathy with biopsy showing recurrent cervical cancer vs a new vaginal cancer.  She is here today for a disease management visit. Video visit.      History of Present Illness:   Cordell Donaldson (she/her/hers) is a 72 year old referred to the Gynecologic Cancer Clinic at the [facility] on 2022 by gynecologic oncologist Dr. Arciniega and radiation oncologist Dr. Stockton for management of a pelvic mass due to recurrent cervical cancer vs new vaginal cancer. History as follows:      Breast Cancer History:  : Left breast cancer.  -Lumpectomy.  -Radiation therapy.     -present: BRENNAN.         Cervical/Vaginal Dysplasia/Cancer History:  10/24/12:   -Endocervical curettings: Squamous carcinoma,  invasion cannot be assessed.   -Ectocervical biopsies: HSIL (CIN3).   -Vaginal biopsy, posterior: HSIL (VaIN3).  12/2012: Robotic-assisted laparoscopic lysis of adhesions, left ureterolysis with  conversion to laparotomy, total abdominal hysterectomy, and an upper  Vaginectomy by gynecologic oncologist Dr. Arciniega.   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      7/22/13: Vaginal biopsy: HSIL, cannot exclude invasion.   -Consultation with gynecologic oncologist Dr. Johnson. Upper vaginectomy and CO2 laser ablation recommended, patient declined.      [id number]: No vaginal dysplasia treatment.      5/20/22: Pelvic MRI: I have personally reviewed the MRI images.   Centrally hypoenhancing heterogeneous midline pelvic mass along  the superior margin of the vaginal vault measures 8.5 x 6.7 x 8.1 cm.  The mass abut the bladder and there is mucosal irregularity at the  site of abutment. The mass indents the  decompressed rectum but there is no definite invasion or mucosal  Irregularity.  6/9/22: CT-guided biopsy of pelvic mass: Poorly-differentiated squamous cell carcinoma, HPV-associated.   7/1/22: Staging PET/CT: I have personally reviewed the PET/CT images.   Large heterogenous and hypermetabolic mass seen within the midline of  the pelvis measuring approximately 8.5 x 7.2 x 8.1 cm with an SUV max  measuring up to 13.2. Additionally, there is central hypoattenuation  with relative lack of metabolic activity, suggestive of central  Necrosis (previously 8.5 x 6.7 x 8.1 cm on prior  MRI). No inguinal lymphadenopathy.  Redemonstration of hypermetabolic left pelvic lymphadenopathy, the  largest lymph node measuring up to 3.3 x 2.4 cm with an SUV max of  12.2 (previously measuring 2.5 x 2.1 cm). Additional conglomeration of  hypermetabolic lymph nodes at the aortic bifurcation measuring 2.9 x  1.6 cm with an SUV max of 14.3 (previously measuring 1.9 x 1.7  cm).  Unchanged enlarged mildly metabolic right pelvic lymph node measuring  2.5 x 1.1 cm with an SUV max of 4.2, favored to represent a reactive  lymph node given the relative lack of hypermetabolic activity compared  to the left pelvic lymph node.  7/5/2022: Cisplatin 50 mg/m2 + paclitaxel 175 mg/m2 + bevacizumab 15 mg/kg every 21 days. Get Caris on tumor biopsy. If PD-L1+ (CPS 1% or greater), then will add pembrolizumab 200 mg every 21 days to therapy. Plan for 3 cycles of therapy followed by repeat PET/CT to assess disease response.      Genetic/Genomic/Molecular Testing History:  2006: Negative for germline BRCA1, BRCA2 pathogenic variants.             Today Maritza presents via video with her . She reports pelvic and abdominal pain that presents by the end of the afternoon. Tylenol 2-3 times per day along with CBD oil largely controls discomfort. She is interested in a consultation with palliative care. Doing pelvic floor PT and feels sore today.  She denies any vaginal bleeding, no changes in her bowel or bladder habits, no nausea/emesis, no lower extremity edema, and no difficulties eating or sleeping. She denies any abdominal bloating, no fevers or chills, and no chest pain or shortness of breath. Eating high protein meals. Appetite is ok. Has a hx of a-fib and is on Metoprolol. No neuropathy. 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 Pt denies tinnitus.               Review of Systems:    Systemic           no weight changes; no fever; no chills; no night sweats; no appetite changes  Skin           no rashes, or lesions  Eye           no irritation; no changes in vision  Ramona-Laryngeal           no dysphagia; no hoarseness   Pulmonary    no cough; no shortness of breath  Cardiovascular    no chest pain; no palpitations  Gastrointestinal    no diarrhea; no constipation; no abdominal pain; no changes in bowel  habits; no blood in stool  Genitourinary   no  urinary frequency; no urinary urgency; no dysuria; no pain; no abnormal vaginal discharge; no abnormal vaginal bleeding, + pelvic pain  Breast   no breast discharge; no breast changes; no breast pain  Musculoskeletal    no myalgias; no arthralgias; no back pain  Psychiatric           no depressed mood; no anxiety    Hematologic           no tender lymph nodes; no noticeable swellings or lumps   Endocrine    no hot flashes; no heat/cold intolerance         Neurological   no tremor; no numbness and tingling; no headaches; no difficulty sleeping      Past Medical History:    Past Medical History:   Diagnosis Date     Abnormal Pap smear     maybe 20 years ago     Cervical cancer (H)      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      History of breast cancer 2003    lumpectomy and radiation offerred chemo and patient declined at time but had oncogene test and low risk     Hyperlipidemia LDL goal < 160      Osteopenia      Paroxysmal A-fib (H)      Severe vaginal dysplasia          Past Surgical History:    Past Surgical History:   Procedure Laterality Date     BIOPSY       BREAST SURGERY Left 2003    lumpectomy left, did radiation, 5 yr of tamoxifen     COLONOSCOPY  2018    repeat in 2028     Colposcopy Cervix with Loop Electrode Conization and Lser to Vagina  2008     COLPOSCOPY, BIOPSY, COMBINED  10/24/2012    Procedure: COMBINED COLPOSCOPY, BIOPSY;  Colposcopy, Biopsy of Cervix and Vagina, Ultrasound Guidance;  Surgeon: Colette Ng MD;  Location: [facility] OR     ENT SURGERY  01/23/2018    otoscelerosis, right side     HYSTERECTOMY, FRANCIA  12/2012    high grade cervical dysplasia, [place] [facility], Dr. Arciniega     [place] LAP VAGINECTOMY, PARTIAL REMOVAL OF VAGINAL WALL  10/2013    upper vaginectomy for dysplasia, Dr. Megan Arciniega         Health Maintenance Due   Topic Date Due     Pneumococcal Vaccine: 65+ Years (1 - PCV) Never done     ZOSTER IMMUNIZATION (2 of 2) 02/12/2020     COVID-19 Vaccine (4 -  Booster for Pfizer series) 2022     PAP  2023       Current Medications:     Current Outpatient Medications   Medication Sig Dispense Refill     Acetylcarnitine HCl 250 MG CAPS Take 500 mg by mouth 2 times daily        aspirin (ASA) 81 MG chewable tablet Take 81 mg by mouth daily       Bacillus Coagulans-Inulin (PROBIOTIC FORMULA) 1-250 BILLION-MG CAPS Take by mouth three times a week 25+billion CFUS       BENFOTIAMINE PO Take 150 mg by mouth 2 times daily       Cholecalciferol (VITAMIN D-3) 25 MCG (1000 UT) CAPS Take 1,000 Units by mouth daily 90 capsule 3     Coenzyme Q10 (CO Q 10 PO) Take 100 mg by mouth daily        [place] 550 MG CAPS Take 400 mg by mouth 2 times daily 60 capsule 11     MAGNESIUM OXIDE PO Take 500 mg by mouth       metoprolol succinate ER (TOPROL XL) 50 MG 24 hr tablet Take 1 tablet (50 mg) by mouth daily 90 tablet 1     Omega-3 Fatty Acids (OMEGA-3 FISH OIL PO) Take 630 mg by mouth 2 times daily        OVER-THE-COUNTER Turkey tail mushroom powder, use 3 times a day 1 Can 11     Pectin Cit-Inos-C-Bioflav-Soy (MODIFIED CITRUS PECTIN PO)        Selenium 200 MCG CAPS Take 200 mg by mouth       Taurine 1000 MG CAPS Take one po twice daily 60 capsule 11     Turmeric 450 MG CAPS Take 2 capsules by mouth daily 60 capsule 11         Allergies:      No Known Allergies     Social History:     Social History     Tobacco Use     Smoking status: Former Smoker     Packs/day: 0.50     Years: 15.00     Pack years: 7.50     Smokeless tobacco: Never Used   Substance Use Topics     Alcohol use: Yes     Alcohol/week: 2.0 standard drinks     Types: 2 Standard drinks or equivalent per week     Comment: Socially        History   Drug Use No         Family History:     The patient's family history is notable for     Family History   Problem Relation Age of Onset     Myocardial Infarction Mother 73        Tob     Myocardial Infarction Father 68        Tob     Breast Cancer Sister 47         of  "breast cancer age 63; BRCA mutation testing negative     Cancer Maternal Grandmother         Unsure of type.     Breast Cancer Paternal Grandmother         Unsure of diagnosis--some type of \"women's issue\"         Physical Exam:     There were no vitals taken for this visit.  There is no height or weight on file to calculate BMI.    General Appearance:  healthy and alert, no distress     Eyes:  Eyes grossly normal to inspection.  No discharge or erythema, or obvious scleral/conjunctival abnormalities.     Respiratory:     No audible wheeze, cough, or visible cyanosis.  No visible retractions or increased work of breathing.      Musculoskeletal:         extremities non tender and without edema     Skin:    no lesions or rashes on visible skin     Neurological:   normal gait, no gross defects                Psychiatric:      appropriate mood and affect. Mentation appears normal, affect normal/bright, judgement and insight intact, normal speech and appearance well-groomed                            The rest of a comprehensive physical examination is deferred due to PHE (public health emergency) video visit restrictions.         Assessment:    Cordell Donaldson is a 72 year old woman with a diagnosis of a large pelvic mass and pelvic lymphadenopathy with biopsy showing recurrent cervical cancer vs a new vaginal cancer.  She is here today for a disease management visit. Video visit.      Medical history significant for FIGO Stage IA1 squamous cell carcinoma of the cervix s/p hysterectomy with no residual disease, and a long history of vaginal HSIL.     40 minutes spent on the date of the encounter doing chart review, history and exam, documentation, and further activities as noted above.         Plan:     1.)   Squamous cell carcinoma. Reviewed patient s diagnosis and treatment plan (Cisplatin, Paclitaxel, Bevacizumab) as recommended by Dr. Ng. The goal of treatment is palliative intent.  [facility] has submitted " Jesus Alberto. Follow up Gyn Oncology visits are scheduled. Pt aware. Reviewed signs and symptoms for when she should contact the clinic or seek additional care. Patient to contact the clinic with any questions or concerns in the interim.      Discussed that the administration of chemotherapy can carry certain risks, including failure to obtain the desired result, discomforts, injury, and the need for additional therapy. Reviewed possible side effects of these drugs including: Allergic reaction; Pancytopenia including Anemia causing weakness/fatigue, Low WBC causing infection, Low platelets causing bruising/bleeding; Mouth sores; Hair loss; Fatigue; Brief periods of forgetfulness; Nausea and vomiting; Neuropathy; Diarrhea/Constipation; Hair loss; Skin and nail changes; Liver effects; Heart effects; Kidney/Bladder effects; Lung effects; Loss of appetite; Weight gain or loss; Visual/Auditory changes; Sexual effects; Mood effects; Menopausal symptoms; Reproductive/Fertility Effects; risk for bowel perforation or HTN. Discussed that the patient may have side effects from chemotherapy that have not been discussed today because each patient may respond differently to chemotherapy and could have side effects that have not been previously reported by others. These side effects will be monitored via discussion with the patient and labs prior to each chemotherapy cycle per protocol.       Discussed ways to manage certain side effects at home and when to call the clinic or go to the emergency department.       Patient was provided with a copy of [facility] drug information regarding (Cisplatin, Paclitaxel, Bevacizumab); [facility] Cancer Care packet including: Getting Ready for Chemotherapy, Comparing Chemotherapy, Immunotherapy, and targeted Therapy, Your Cancer Care team and MyChart, Understanding Clinical Trials, Honoring Choices, Cancer Care Services, and Integrative Therapies; and contact information of the Gynecologic  Oncology Clinic. Reviewed resources available including nutrition services, rehabilitation and exercise, pharmacy services, tobacco cessation programs, social work services, spiritual health, cancer risk management program, cancer survivorship program, and palliative care program.       Discussed nutrition and the importance of eating small frequent meal, high protein, and drinking 8-10 glasses of noncaffeinated and nonalcoholic beverages.         2.) Genetic risk factors were assessed and the patient does not meet the qualifications for a referral.      3.) Labs and/or tests ordered include: none.     4.) Health maintenance issues addressed today include annual health maintenance and non-gynecologic issues with PCP.    5.)        Pelvic pain: largely controlled with APAP and CBD oil per patient. Reviewed that we can send in different pain medications if not controlled. Pt appropriate for Palliative Care referral given pain and recurrence of disease. Referral placed today.         TE Head, NP-BC  Women's Health Nurse Practitioner  Division of Gynecologic Oncology  [facility]        CC  Patient Care Team:  Merry Lino MD as PCP - General (Internal Medicine)  Gui Mccollum MD Corfield, Aaron Daniel, DPM as MD (Podiatry)  Elise Huitron MD as Assigned Heart and Vascular Provider  Merry Lino MD as Assigned PCP  Tess Mcgowan, RN as Specialty Care Coordinator  Heather Ayon MD as MD (Urology)  Heather Ayon MD as Assigned Surgical Provider  GUI MCCOLLUM        Again, thank you for allowing me to participate in the care of your patient.        Sincerely,        TE Gaston CNP

## 2022-07-16 ENCOUNTER — ANESTHESIA EVENT (OUTPATIENT)
Dept: SURGERY | Facility: AMBULATORY SURGERY CENTER | Age: 72
End: 2022-07-16
Payer: COMMERCIAL

## 2022-07-18 ENCOUNTER — HOSPITAL ENCOUNTER (OUTPATIENT)
Facility: AMBULATORY SURGERY CENTER | Age: 72
Discharge: HOME | End: 2022-07-18
Attending: RADIOLOGY
Payer: COMMERCIAL

## 2022-07-18 ENCOUNTER — ANESTHESIA (OUTPATIENT)
Dept: SURGERY | Facility: AMBULATORY SURGERY CENTER | Age: 72
End: 2022-07-18
Payer: COMMERCIAL

## 2022-07-18 ENCOUNTER — ANCILLARY PROCEDURE (OUTPATIENT)
Dept: RADIOLOGY | Facility: AMBULATORY SURGERY CENTER | Age: 72
End: 2022-07-18
Attending: OBSTETRICS & GYNECOLOGY
Payer: COMMERCIAL

## 2022-07-18 VITALS
DIASTOLIC BLOOD PRESSURE: 70 MMHG | BODY MASS INDEX: 20.73 KG/M2 | WEIGHT: 117 LBS | RESPIRATION RATE: 16 BRPM | HEIGHT: 63 IN | OXYGEN SATURATION: 97 % | TEMPERATURE: 97 F | SYSTOLIC BLOOD PRESSURE: 98 MMHG | HEART RATE: 83 BPM

## 2022-07-18 DIAGNOSIS — C53.0 MALIGNANT NEOPLASM OF ENDOCERVIX (H): ICD-10-CM

## 2022-07-18 PROCEDURE — 77001 FLUOROGUIDE FOR VEIN DEVICE: CPT | Mod: 26 | Performed by: RADIOLOGY

## 2022-07-18 PROCEDURE — 36561 INSERT TUNNELED CV CATH: CPT | Performed by: RADIOLOGY

## 2022-07-18 PROCEDURE — 36561 INSERT TUNNELED CV CATH: CPT

## 2022-07-18 PROCEDURE — 76937 US GUIDE VASCULAR ACCESS: CPT | Mod: 26 | Performed by: RADIOLOGY

## 2022-07-18 DEVICE — CATH PORT POWERPORT CLEARVUE SLIM 6FR 5616000
Type: IMPLANTABLE DEVICE | Site: CHEST | Status: NON-FUNCTIONAL
Removed: 2023-03-30

## 2022-07-18 RX ORDER — LIDOCAINE 40 MG/G
CREAM TOPICAL
Status: DISCONTINUED | OUTPATIENT
Start: 2022-07-18 | End: 2022-07-19 | Stop reason: HOSPADM

## 2022-07-18 RX ORDER — PROPOFOL 10 MG/ML
INJECTION, EMULSION INTRAVENOUS PRN
Status: DISCONTINUED | OUTPATIENT
Start: 2022-07-18 | End: 2022-07-18

## 2022-07-18 RX ORDER — ACETAMINOPHEN 325 MG/1
975 TABLET ORAL ONCE
Status: COMPLETED | OUTPATIENT
Start: 2022-07-18 | End: 2022-07-18

## 2022-07-18 RX ORDER — PROPOFOL 10 MG/ML
INJECTION, EMULSION INTRAVENOUS CONTINUOUS PRN
Status: DISCONTINUED | OUTPATIENT
Start: 2022-07-18 | End: 2022-07-18

## 2022-07-18 RX ORDER — MEPERIDINE HYDROCHLORIDE 25 MG/ML
12.5 INJECTION INTRAMUSCULAR; INTRAVENOUS; SUBCUTANEOUS
Status: DISCONTINUED | OUTPATIENT
Start: 2022-07-18 | End: 2022-07-19 | Stop reason: HOSPADM

## 2022-07-18 RX ORDER — HEPARIN SODIUM,PORCINE 10 UNIT/ML
5-10 VIAL (ML) INTRAVENOUS EVERY 24 HOURS
Status: CANCELLED | OUTPATIENT
Start: 2022-07-18

## 2022-07-18 RX ORDER — LIDOCAINE HYDROCHLORIDE 10 MG/ML
INJECTION, SOLUTION EPIDURAL; INFILTRATION; INTRACAUDAL; PERINEURAL PRN
Status: DISCONTINUED | OUTPATIENT
Start: 2022-07-18 | End: 2022-07-18 | Stop reason: HOSPADM

## 2022-07-18 RX ORDER — LIDOCAINE HYDROCHLORIDE 20 MG/ML
INJECTION, SOLUTION INFILTRATION; PERINEURAL PRN
Status: DISCONTINUED | OUTPATIENT
Start: 2022-07-18 | End: 2022-07-18

## 2022-07-18 RX ORDER — ONDANSETRON 2 MG/ML
4 INJECTION INTRAMUSCULAR; INTRAVENOUS EVERY 30 MIN PRN
Status: DISCONTINUED | OUTPATIENT
Start: 2022-07-18 | End: 2022-07-19 | Stop reason: HOSPADM

## 2022-07-18 RX ORDER — HEPARIN SODIUM,PORCINE 10 UNIT/ML
5-10 VIAL (ML) INTRAVENOUS
Status: CANCELLED | OUTPATIENT
Start: 2022-07-18

## 2022-07-18 RX ORDER — HALOPERIDOL 5 MG/ML
1 INJECTION INTRAMUSCULAR
Status: DISCONTINUED | OUTPATIENT
Start: 2022-07-18 | End: 2022-07-19 | Stop reason: HOSPADM

## 2022-07-18 RX ORDER — OXYCODONE HYDROCHLORIDE 5 MG/1
5 TABLET ORAL EVERY 4 HOURS PRN
Status: DISCONTINUED | OUTPATIENT
Start: 2022-07-18 | End: 2022-07-19 | Stop reason: HOSPADM

## 2022-07-18 RX ORDER — ONDANSETRON 4 MG/1
4 TABLET, ORALLY DISINTEGRATING ORAL EVERY 30 MIN PRN
Status: DISCONTINUED | OUTPATIENT
Start: 2022-07-18 | End: 2022-07-19 | Stop reason: HOSPADM

## 2022-07-18 RX ORDER — GABAPENTIN 100 MG/1
100 CAPSULE ORAL
Status: COMPLETED | OUTPATIENT
Start: 2022-07-18 | End: 2022-07-18

## 2022-07-18 RX ORDER — SODIUM CHLORIDE, SODIUM LACTATE, POTASSIUM CHLORIDE, CALCIUM CHLORIDE 600; 310; 30; 20 MG/100ML; MG/100ML; MG/100ML; MG/100ML
INJECTION, SOLUTION INTRAVENOUS CONTINUOUS
Status: DISCONTINUED | OUTPATIENT
Start: 2022-07-18 | End: 2022-07-19 | Stop reason: HOSPADM

## 2022-07-18 RX ORDER — ALBUTEROL SULFATE 0.83 MG/ML
2.5 SOLUTION RESPIRATORY (INHALATION) EVERY 4 HOURS PRN
Status: DISCONTINUED | OUTPATIENT
Start: 2022-07-18 | End: 2022-07-19 | Stop reason: HOSPADM

## 2022-07-18 RX ORDER — HYDROMORPHONE HYDROCHLORIDE 1 MG/ML
0.2 INJECTION, SOLUTION INTRAMUSCULAR; INTRAVENOUS; SUBCUTANEOUS EVERY 10 MIN PRN
Status: DISCONTINUED | OUTPATIENT
Start: 2022-07-18 | End: 2022-07-19 | Stop reason: HOSPADM

## 2022-07-18 RX ORDER — HEPARIN SODIUM (PORCINE) LOCK FLUSH IV SOLN 100 UNIT/ML 100 UNIT/ML
5-10 SOLUTION INTRAVENOUS
Status: CANCELLED | OUTPATIENT
Start: 2022-07-18

## 2022-07-18 RX ORDER — CEFAZOLIN SODIUM 2 G/50ML
2 SOLUTION INTRAVENOUS
Status: COMPLETED | OUTPATIENT
Start: 2022-07-18 | End: 2022-07-18

## 2022-07-18 RX ORDER — FENTANYL CITRATE 50 UG/ML
25 INJECTION, SOLUTION INTRAMUSCULAR; INTRAVENOUS EVERY 5 MIN PRN
Status: DISCONTINUED | OUTPATIENT
Start: 2022-07-18 | End: 2022-07-19 | Stop reason: HOSPADM

## 2022-07-18 RX ADMIN — ACETAMINOPHEN 975 MG: 325 TABLET ORAL at 08:05

## 2022-07-18 RX ADMIN — SODIUM CHLORIDE, SODIUM LACTATE, POTASSIUM CHLORIDE, CALCIUM CHLORIDE: 600; 310; 30; 20 INJECTION, SOLUTION INTRAVENOUS at 08:54

## 2022-07-18 RX ADMIN — CEFAZOLIN SODIUM 2 G: 2 SOLUTION INTRAVENOUS at 08:54

## 2022-07-18 RX ADMIN — PROPOFOL 20 MG: 10 INJECTION, EMULSION INTRAVENOUS at 09:00

## 2022-07-18 RX ADMIN — GABAPENTIN 100 MG: 100 CAPSULE ORAL at 08:05

## 2022-07-18 RX ADMIN — PROPOFOL 125 MCG/KG/MIN: 10 INJECTION, EMULSION INTRAVENOUS at 08:58

## 2022-07-18 RX ADMIN — LIDOCAINE HYDROCHLORIDE 50 MG: 20 INJECTION, SOLUTION INFILTRATION; PERINEURAL at 08:58

## 2022-07-18 ASSESSMENT — ENCOUNTER SYMPTOMS: DYSRHYTHMIAS: 1

## 2022-07-18 NOTE — ANESTHESIA PREPROCEDURE EVALUATION
Anesthesia Pre-Procedure Evaluation    Patient: Cordell Donaldson   MRN: [id number] : 1950        Procedure : Procedure(s):  INSERTION, VASCULAR ACCESS PORT          Past Medical History:   Diagnosis Date     Abnormal Pap smear     maybe 20 years ago     Cervical cancer (H)      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      History of breast cancer     lumpectomy and radiation offerred chemo and patient declined at time but had oncogene test and low risk     Hyperlipidemia LDL goal < 160      Osteopenia      Paroxysmal A-fib (H)      Severe vaginal dysplasia       Past Surgical History:   Procedure Laterality Date     BIOPSY       BREAST SURGERY Left     lumpectomy left, did radiation, 5 yr of tamoxifen     COLONOSCOPY  2018    repeat in      Colposcopy Cervix with Loop Electrode Conization and Lser to Vagina       COLPOSCOPY, BIOPSY, COMBINED  10/24/2012    Procedure: COMBINED COLPOSCOPY, BIOPSY;  Colposcopy, Biopsy of Cervix and Vagina, Ultrasound Guidance;  Surgeon: Colette Ng MD;  Location: [facility] OR     ENT SURGERY  2018    otoscelerosis, right side     HYSTERECTOMY, FRANCIA  2012    high grade cervical dysplasia, [place] [facility], Dr. Arciniega     [place] LAP VAGINECTOMY, PARTIAL REMOVAL OF VAGINAL WALL  10/2013    upper vaginectomy for dysplasia, Dr. Megan Arciniega      No Known Allergies   Social History     Tobacco Use     Smoking status: Former Smoker     Packs/day: 0.50     Years: 15.00     Pack years: 7.50     Smokeless tobacco: Never Used   Substance Use Topics     Alcohol use: Yes     Alcohol/week: 2.0 standard drinks     Types: 2 Standard drinks or equivalent per week     Comment: Socially       Wt Readings from Last 1 Encounters:   22 53.1 kg (117 lb)        Anesthesia Evaluation   Pt has had prior anesthetic.     No history of anesthetic complications       ROS/MED HX  ENT/Pulmonary:  - neg pulmonary ROS     Neurologic:  - neg neurologic ROS      Cardiovascular:     (+) Dyslipidemia -----dysrhythmias, a-fib,     METS/Exercise Tolerance: >4 METS    Hematologic:  - neg hematologic  ROS     Musculoskeletal:  - neg musculoskeletal ROS     GI/Hepatic:  - neg GI/hepatic ROS     Renal/Genitourinary:  - neg Renal ROS     Endo:  - neg endo ROS     Psychiatric/Substance Use:  - neg psychiatric ROS     Infectious Disease:  - neg infectious disease ROS     Malignancy:   (+) Malignancy,     Other:  - neg other ROS          Physical Exam    Airway  airway exam normal      Mallampati: I   TM distance: > 3 FB   Neck ROM: full   Mouth opening: > 3 cm    Respiratory Devices and Support         Dental  no notable dental history         Cardiovascular   cardiovascular exam normal       Rhythm and rate: regular and normal     Pulmonary   pulmonary exam normal        breath sounds clear to auscultation           OUTSIDE LABS:  CBC:   Lab Results   Component Value Date    WBC 11.1 (H) 06/06/2022    WBC 7.2 04/06/2022    HGB 12.3 06/06/2022    HGB 12.1 04/06/2022    HCT 40.4 06/06/2022    HCT 41.3 04/06/2022     06/06/2022     04/06/2022     BMP:   Lab Results   Component Value Date     06/06/2022    .0 06/23/2021    POTASSIUM 4.0 06/06/2022    POTASSIUM 4.9 06/23/2021    CHLORIDE 101 06/06/2022    CHLORIDE 108.0 06/23/2021    CO2 27 06/06/2022    CO2 30.0 06/23/2021    BUN 9 06/06/2022    BUN 16.0 06/23/2021    CR 0.65 06/06/2022    CR 0.8 06/23/2021     (A) 07/01/2022     (H) 06/06/2022     COAGS: No results found for: PTT, INR, FIBR  POC:   Lab Results   Component Value Date     (H) 10/31/2012     HEPATIC:   Lab Results   Component Value Date    ALBUMIN 3.2 (L) 06/06/2022    PROTTOTAL 7.0 06/06/2022    ALT 17 06/06/2022    AST 18 06/06/2022    ALKPHOS 90 06/06/2022    BILITOTAL 0.4 06/06/2022     OTHER:   Lab Results   Component Value Date    LINDA 9.6 06/06/2022    TSH 2.16 08/22/2017       Anesthesia Plan    ASA Status:  2   NPO  Status:  NPO Appropriate    Anesthesia Type: MAC.     - Reason for MAC: Deep or markedly invasive procedure (G8)   Induction: Propofol.   Maintenance: N/A.        Consents    Anesthesia Plan(s) and associated risks, benefits, and realistic alternatives discussed. Questions answered and patient/representative(s) expressed understanding.    - Discussed:     - Discussed with:  Patient    Use of blood products discussed: No .     Postoperative Care    Pain management: Multi-modal analgesia, Oral pain medications.   PONV prophylaxis: Ondansetron (or other 5HT-3), Dexamethasone or Solumedrol     Comments:           H&P reviewed: Unable to attach H&P to encounter due to EHR limitations. H&P Update: appropriate H&P reviewed, patient examined. No interval changes since H&P (within 30 days).         Ke Potter, DO

## 2022-07-18 NOTE — DISCHARGE INSTRUCTIONS
A collaboration between [facility] and [facility]  Experts in minimally invasive, targeted treatments performed using imaging guidance    Venous Access Device,  Port Catheter or Tunneled or Non-Tunneled Central Line Placement    Today you had a procedure today to install a venous access device; either a tunneled central vein catheter or a subcutaneous port catheter.    After you go home:  Drink plenty of fluids.  Generally 6-8 (8 ounce) glasses a day is recommended.  Resume your regular diet unless otherwise ordered by a medical provider.  Keep any applied tape/gauze dressings clean and dry.  Change tape/gauze dressings if they get wet or soiled.  You may shower the following day after procedure, however cover and protect from moisture any tape/gauze dressings.  You may let water hit and run over dried skin glue, but do not scrub.  Pat the area dry after showering.  Port placement incisions are closed with absorbable suture, meaning they do not need to be removed at a later date, and a topical skin adhesive (skin glue).  This glue will wear off in 7-14 days.  Do not remove before this time.  If 14 days have passed and residual glue is present, you may gently remove it.  Do not apply gels, lotions, or ointments to the glue site for the first 10 days as this may cause the glue to prematurely soften and fail.  Do not perform strenuous activities or lift greater than 10 pounds for the next three days.  If there is bleeding or oozing from the procedure site, apply firm pressure to the area for 5-10 minutes.  If the bleeding continues seek medical advice at the numbers below.  Mild procedure site discomfort can be treated with an ice pack and over-the-counter pain relievers.        For 24 hours after any sedation used:  Relax and take it easy.  No strenuous activities.  Do not drive or operate machines at home or at work.  No alcohol consumption.  Do not make any  important or legal decisions.    Call our Interventional Radiology (IR) service if:  If you start bleeding from the procedure site.  If you do start to bleed from the site, lie down and hold some pressure on the site.  Our radiology provider can help you decide if you need to return to the hospital.  If you have new or worsening pain related to the procedure.  If you have concerning swelling at the procedure site.  If you develop persistent nausea or vomiting.  If you develop hives or a rash or any unexplained itching.  If you have a fever of greater than 100.5  F and chills in the first 5 days after procedure.  Any other concerns related to your procedure.      [facility]  Interventional Radiology (IR)  [address]  [address] [facility] [place] [address]    Contact Number:  [phone]  (IR control desk)  Monday - Friday 8:00 am - 4:30 pm    After hours for urgent concerns:  [phone]  After 4:30 pm Monday - Friday, Weekends and Holidays.   Ask for Interventional Radiology on-call.  Someone is available 24 hours a day.  [facility] toll free number:  [phone]

## 2022-07-18 NOTE — BRIEF OP NOTE
[facility]    Brief Operative Note    Pre-operative diagnosis: Malignant neoplasm of endocervix (H) [C53.0]  Post-operative diagnosis Same as pre-operative diagnosis    Procedure: Procedure(s):  INSERTION, VASCULAR ACCESS PORT  Surgeon: Surgeon(s) and Role:     * Donnie Elias MD - Primary  Anesthesia: Monitor Anesthesia Care   Estimated Blood Loss: Minimal    Drains: None  Specimens: * No specimens in log *  Findings:   None.  Complications: None.  Implants:   Implant Name Type Inv. Item Serial No.  Lot No. LRB No. Used Action   CATH PORT POWERPORT CLEARVUE SLIM 6FR [id number] - [id number] Port CATH PORT POWERPORT CLEARVUE SLIM 6FR [id number]  CR [employer] [id number] Right 1 Implanted     6 [place] 24 cm single lumen BD Bard PowerPort ClearVUE slim placed via right internal jugular vein. Tip in the high right atrium. Heparin locked and ready for use.

## 2022-07-18 NOTE — ANESTHESIA POSTPROCEDURE EVALUATION
Patient: Cordell Donaldson    Procedure: Procedure(s):  INSERTION, VASCULAR ACCESS PORT       Anesthesia Type:  MAC    Note:  Disposition: Outpatient   Postop Pain Control: Uneventful            Sign Out: Well controlled pain   PONV: No   Neuro/Psych: Uneventful            Sign Out: Acceptable/Baseline neuro status   Airway/Respiratory: Uneventful            Sign Out: Acceptable/Baseline resp. status   CV/Hemodynamics: Uneventful            Sign Out: Acceptable CV status   Other NRE: NONE   DID A NON-ROUTINE EVENT OCCUR? No           Last vitals:  Vitals Value Taken Time   BP 98/70 07/18/22 1002   Temp 36.1  C (97  F) 07/18/22 1002   Pulse 83 07/18/22 1002   Resp 16 07/18/22 1002   SpO2 97 % 07/18/22 1002       Electronically Signed By: Ke Potter DO  July 18, 2022  2:26 PM

## 2022-07-18 NOTE — ANESTHESIA CARE TRANSFER NOTE
Patient: Cordell Donaldson    Procedure: Procedure(s):  INSERTION, VASCULAR ACCESS PORT       Diagnosis: Malignant neoplasm of endocervix (H) [C53.0]  Diagnosis Additional Information: No value filed.    Anesthesia Type:   MAC     Note:    Oropharynx: oropharynx clear of all foreign objects and spontaneously breathing  Level of Consciousness: awake  Oxygen Supplementation: room air    Independent Airway: airway patency satisfactory and stable  Dentition: dentition unchanged  Vital Signs Stable: post-procedure vital signs reviewed and stable  Report to RN Given: handoff report given  Patient transferred to: Phase II    Handoff Report: Identifed the Patient, Identified the Reponsible Provider, Reviewed the pertinent medical history, Discussed the surgical course, Reviewed Intra-OP anesthesia mangement and issues during anesthesia, Set expectations for post-procedure period and Allowed opportunity for questions and acknowledgement of understanding      Vitals:  Vitals Value Taken Time   BP 86/54 07/18/22 0942   Temp 36.3  C (97.3  F) 07/18/22 0942   Pulse 79 07/18/22 0942   Resp 16 07/18/22 0942   SpO2 95 % 07/18/22 0942       Electronically Signed By: TE Fang CRNA  July 18, 2022  9:44 AM

## 2022-07-19 ENCOUNTER — TELEPHONE (OUTPATIENT)
Dept: MAMMOGRAPHY | Facility: CLINIC | Age: 72
End: 2022-07-19

## 2022-07-19 DIAGNOSIS — Z17.0 MALIGNANT NEOPLASM OF UPPER-INNER QUADRANT OF LEFT BREAST IN FEMALE, ESTROGEN RECEPTOR POSITIVE (H): Primary | ICD-10-CM

## 2022-07-19 DIAGNOSIS — C50.212 MALIGNANT NEOPLASM OF UPPER-INNER QUADRANT OF LEFT BREAST IN FEMALE, ESTROGEN RECEPTOR POSITIVE (H): Primary | ICD-10-CM

## 2022-07-19 DIAGNOSIS — C50.912 INVASIVE DUCTAL CARCINOMA OF BREAST, FEMALE, LEFT (H): Primary | ICD-10-CM

## 2022-07-19 LAB
PATH REPORT.COMMENTS IMP SPEC: ABNORMAL
PATH REPORT.COMMENTS IMP SPEC: ABNORMAL
PATH REPORT.COMMENTS IMP SPEC: YES
PATH REPORT.FINAL DX SPEC: ABNORMAL
PATH REPORT.GROSS SPEC: ABNORMAL
PATH REPORT.MICROSCOPIC SPEC OTHER STN: ABNORMAL
PATH REPORT.RELEVANT HX SPEC: ABNORMAL
PATHOLOGY SYNOPTIC REPORT: ABNORMAL
PHOTO IMAGE: ABNORMAL

## 2022-07-19 NOTE — TELEPHONE ENCOUNTER
Spoke to Cordell about her new diagnosis of Invasive Ductal Carcinoma found in her left breast.  We discussed the Radiologist's recommendation of surgical and oncology consultation.  At this time Cordell would like to wait to have a referral placed to give her time to speak to her doctor through [facility] that she sees for her cervical cancer and process the findings.  Writer encouraged her to call when she decides what steps she would like to take going forward.  She did mention that if she needs surgery she may want [place] [facility] [place] previous breast cancer. Cordell verbalized understanding of the plan and all questions and concerns were answered at this time.

## 2022-07-20 ENCOUNTER — INFUSION THERAPY VISIT (OUTPATIENT)
Dept: ONCOLOGY | Facility: CLINIC | Age: 72
End: 2022-07-20
Attending: OBSTETRICS & GYNECOLOGY
Payer: COMMERCIAL

## 2022-07-20 ENCOUNTER — PATIENT OUTREACH (OUTPATIENT)
Dept: ONCOLOGY | Facility: CLINIC | Age: 72
End: 2022-07-20

## 2022-07-20 ENCOUNTER — APPOINTMENT (OUTPATIENT)
Dept: LAB | Facility: CLINIC | Age: 72
End: 2022-07-20
Attending: OBSTETRICS & GYNECOLOGY
Payer: COMMERCIAL

## 2022-07-20 ENCOUNTER — TELEPHONE (OUTPATIENT)
Dept: ONCOLOGY | Facility: CLINIC | Age: 72
End: 2022-07-20

## 2022-07-20 ENCOUNTER — PRE VISIT (OUTPATIENT)
Dept: ONCOLOGY | Facility: CLINIC | Age: 72
End: 2022-07-20

## 2022-07-20 VITALS
HEART RATE: 106 BPM | OXYGEN SATURATION: 99 % | WEIGHT: 116.9 LBS | BODY MASS INDEX: 20.71 KG/M2 | SYSTOLIC BLOOD PRESSURE: 109 MMHG | DIASTOLIC BLOOD PRESSURE: 69 MMHG | TEMPERATURE: 97 F | RESPIRATION RATE: 16 BRPM

## 2022-07-20 DIAGNOSIS — C53.9 RECURRENT CERVICAL CANCER (H): Primary | ICD-10-CM

## 2022-07-20 DIAGNOSIS — C53.0 MALIGNANT NEOPLASM OF ENDOCERVIX (H): ICD-10-CM

## 2022-07-20 LAB
ALBUMIN SERPL-MCNC: 2.8 G/DL (ref 3.4–5)
ALBUMIN UR-MCNC: NEGATIVE MG/DL
ALBUMIN UR-MCNC: NEGATIVE MG/DL
ALP SERPL-CCNC: 83 U/L (ref 40–150)
ALT SERPL W P-5'-P-CCNC: 12 U/L (ref 0–50)
ANION GAP SERPL CALCULATED.3IONS-SCNC: 9 MMOL/L (ref 3–14)
APPEARANCE UR: CLEAR
AST SERPL W P-5'-P-CCNC: 17 U/L (ref 0–45)
BASOPHILS # BLD AUTO: 0.1 10E3/UL (ref 0–0.2)
BASOPHILS NFR BLD AUTO: 0 %
BILIRUB SERPL-MCNC: 0.5 MG/DL (ref 0.2–1.3)
BILIRUB UR QL STRIP: NEGATIVE
BUN SERPL-MCNC: 9 MG/DL (ref 7–30)
CALCIUM SERPL-MCNC: 9.7 MG/DL (ref 8.5–10.1)
CHLORIDE BLD-SCNC: 101 MMOL/L (ref 94–109)
CO2 SERPL-SCNC: 26 MMOL/L (ref 20–32)
COLOR UR AUTO: YELLOW
CREAT SERPL-MCNC: 0.6 MG/DL (ref 0.52–1.04)
EOSINOPHIL # BLD AUTO: 0.3 10E3/UL (ref 0–0.7)
EOSINOPHIL NFR BLD AUTO: 2 %
ERYTHROCYTE [DISTWIDTH] IN BLOOD BY AUTOMATED COUNT: 15.4 % (ref 10–15)
GFR SERPL CREATININE-BSD FRML MDRD: >90 ML/MIN/1.73M2
GLUCOSE BLD-MCNC: 97 MG/DL (ref 70–99)
GLUCOSE UR STRIP-MCNC: NEGATIVE MG/DL
HCT VFR BLD AUTO: 33.8 % (ref 35–47)
HGB BLD-MCNC: 10.4 G/DL (ref 11.7–15.7)
HGB UR QL STRIP: NEGATIVE
IMM GRANULOCYTES # BLD: 0.1 10E3/UL
IMM GRANULOCYTES NFR BLD: 0 %
KETONES UR STRIP-MCNC: 5 MG/DL
LEUKOCYTE ESTERASE UR QL STRIP: NEGATIVE
LYMPHOCYTES # BLD AUTO: 0.8 10E3/UL (ref 0.8–5.3)
LYMPHOCYTES NFR BLD AUTO: 5 %
MAGNESIUM SERPL-MCNC: 2.1 MG/DL (ref 1.6–2.3)
MCH RBC QN AUTO: 23.7 PG (ref 26.5–33)
MCHC RBC AUTO-ENTMCNC: 30.8 G/DL (ref 31.5–36.5)
MCV RBC AUTO: 77 FL (ref 78–100)
MONOCYTES # BLD AUTO: 0.8 10E3/UL (ref 0–1.3)
MONOCYTES NFR BLD AUTO: 5 %
NEUTROPHILS # BLD AUTO: 13.1 10E3/UL (ref 1.6–8.3)
NEUTROPHILS NFR BLD AUTO: 88 %
NITRATE UR QL: NEGATIVE
NRBC # BLD AUTO: 0 10E3/UL
NRBC BLD AUTO-RTO: 0 /100
PH UR STRIP: 6.5 [PH] (ref 5–7)
PLATELET # BLD AUTO: 397 10E3/UL (ref 150–450)
POTASSIUM BLD-SCNC: 3.9 MMOL/L (ref 3.4–5.3)
PROT SERPL-MCNC: 6.5 G/DL (ref 6.8–8.8)
RBC # BLD AUTO: 4.38 10E6/UL (ref 3.8–5.2)
RBC URINE: 1 /HPF
SODIUM SERPL-SCNC: 136 MMOL/L (ref 133–144)
SP GR UR STRIP: 1.01 (ref 1–1.03)
SQUAMOUS EPITHELIAL: <1 /HPF
UROBILINOGEN UR STRIP-MCNC: NORMAL MG/DL
WBC # BLD AUTO: 15.1 10E3/UL (ref 4–11)
WBC URINE: 1 /HPF

## 2022-07-20 PROCEDURE — 258N000003 HC RX IP 258 OP 636: Performed by: OBSTETRICS & GYNECOLOGY

## 2022-07-20 PROCEDURE — 250N000013 HC RX MED GY IP 250 OP 250 PS 637: Performed by: OBSTETRICS & GYNECOLOGY

## 2022-07-20 PROCEDURE — 81001 URINALYSIS AUTO W/SCOPE: CPT

## 2022-07-20 PROCEDURE — 81003 URINALYSIS AUTO W/O SCOPE: CPT | Performed by: OBSTETRICS & GYNECOLOGY

## 2022-07-20 PROCEDURE — 96375 TX/PRO/DX INJ NEW DRUG ADDON: CPT

## 2022-07-20 PROCEDURE — 85025 COMPLETE CBC W/AUTO DIFF WBC: CPT | Performed by: OBSTETRICS & GYNECOLOGY

## 2022-07-20 PROCEDURE — 96415 CHEMO IV INFUSION ADDL HR: CPT

## 2022-07-20 PROCEDURE — 80053 COMPREHEN METABOLIC PANEL: CPT | Performed by: OBSTETRICS & GYNECOLOGY

## 2022-07-20 PROCEDURE — 250N000011 HC RX IP 250 OP 636: Performed by: OBSTETRICS & GYNECOLOGY

## 2022-07-20 PROCEDURE — 96413 CHEMO IV INFUSION 1 HR: CPT

## 2022-07-20 PROCEDURE — 83735 ASSAY OF MAGNESIUM: CPT | Performed by: OBSTETRICS & GYNECOLOGY

## 2022-07-20 PROCEDURE — 96361 HYDRATE IV INFUSION ADD-ON: CPT

## 2022-07-20 PROCEDURE — 96417 CHEMO IV INFUS EACH ADDL SEQ: CPT

## 2022-07-20 PROCEDURE — 258N000001 HC RX 258: Performed by: OBSTETRICS & GYNECOLOGY

## 2022-07-20 PROCEDURE — 96367 TX/PROPH/DG ADDL SEQ IV INF: CPT

## 2022-07-20 RX ORDER — DIPHENHYDRAMINE HCL 25 MG
50 CAPSULE ORAL ONCE
Status: COMPLETED | OUTPATIENT
Start: 2022-07-20 | End: 2022-07-20

## 2022-07-20 RX ORDER — HEPARIN SODIUM (PORCINE) LOCK FLUSH IV SOLN 100 UNIT/ML 100 UNIT/ML
5 SOLUTION INTRAVENOUS
Status: DISCONTINUED | OUTPATIENT
Start: 2022-07-20 | End: 2022-07-20 | Stop reason: HOSPADM

## 2022-07-20 RX ORDER — PROCHLORPERAZINE MALEATE 10 MG
10 TABLET ORAL EVERY 6 HOURS PRN
Qty: 30 TABLET | Refills: 2 | Status: SHIPPED | OUTPATIENT
Start: 2022-07-20 | End: 2023-02-09

## 2022-07-20 RX ORDER — DEXAMETHASONE 4 MG/1
8 TABLET ORAL DAILY
Qty: 6 TABLET | Refills: 3 | Status: SHIPPED | OUTPATIENT
Start: 2022-07-20 | End: 2023-01-02

## 2022-07-20 RX ORDER — HEPARIN SODIUM (PORCINE) LOCK FLUSH IV SOLN 100 UNIT/ML 100 UNIT/ML
5 SOLUTION INTRAVENOUS ONCE
Status: COMPLETED | OUTPATIENT
Start: 2022-07-20 | End: 2022-07-20

## 2022-07-20 RX ORDER — PALONOSETRON 0.05 MG/ML
0.25 INJECTION, SOLUTION INTRAVENOUS ONCE
Status: COMPLETED | OUTPATIENT
Start: 2022-07-20 | End: 2022-07-20

## 2022-07-20 RX ORDER — ONDANSETRON 8 MG/1
8 TABLET, FILM COATED ORAL EVERY 8 HOURS PRN
Qty: 30 TABLET | Refills: 2 | Status: SHIPPED | OUTPATIENT
Start: 2022-07-20 | End: 2023-02-09

## 2022-07-20 RX ORDER — DEXTROSE, SODIUM CHLORIDE, AND POTASSIUM CHLORIDE 5; .45; .15 G/100ML; G/100ML; G/100ML
1000 INJECTION INTRAVENOUS ONCE
Status: COMPLETED | OUTPATIENT
Start: 2022-07-20 | End: 2022-07-20

## 2022-07-20 RX ADMIN — CISPLATIN 80 MG: 1 INJECTION, SOLUTION INTRAVENOUS at 13:28

## 2022-07-20 RX ADMIN — SODIUM CHLORIDE 1000 ML: 9 INJECTION, SOLUTION INTRAVENOUS at 08:13

## 2022-07-20 RX ADMIN — POTASSIUM CHLORIDE, DEXTROSE MONOHYDRATE AND SODIUM CHLORIDE 1000 ML: 150; 5; 450 INJECTION, SOLUTION INTRAVENOUS at 13:26

## 2022-07-20 RX ADMIN — FOSAPREPITANT: 150 INJECTION, POWDER, LYOPHILIZED, FOR SOLUTION INTRAVENOUS at 09:23

## 2022-07-20 RX ADMIN — DIPHENHYDRAMINE HYDROCHLORIDE 50 MG: 25 CAPSULE ORAL at 09:28

## 2022-07-20 RX ADMIN — SODIUM CHLORIDE 250 ML: 9 INJECTION, SOLUTION INTRAVENOUS at 15:33

## 2022-07-20 RX ADMIN — SODIUM CHLORIDE 800 MG: 9 INJECTION, SOLUTION INTRAVENOUS at 15:33

## 2022-07-20 RX ADMIN — PALONOSETRON HYDROCHLORIDE 0.25 MG: 0.25 INJECTION INTRAVENOUS at 09:18

## 2022-07-20 RX ADMIN — Medication 5 ML: at 07:21

## 2022-07-20 RX ADMIN — PACLITAXEL 268 MG: 6 INJECTION, SOLUTION INTRAVENOUS at 10:26

## 2022-07-20 RX ADMIN — FAMOTIDINE 20 MG: 10 INJECTION INTRAVENOUS at 09:20

## 2022-07-20 RX ADMIN — Medication 5 ML: at 16:13

## 2022-07-20 ASSESSMENT — PAIN SCALES - GENERAL: PAINLEVEL: MILD PAIN (2)

## 2022-07-20 NOTE — TELEPHONE ENCOUNTER
PA Initiation    Medication: Ondansetron- PA initiated   Insurance Company: [employer] - Phone [phone] Fax [phone]  Pharmacy Filling the Rx: [facility] - [place], [place] - [address] [place] [address]  Filling Pharmacy Phone:    Filling Pharmacy Fax:    Start Date: 7/20/2022

## 2022-07-20 NOTE — NURSING NOTE
Chief Complaint   Patient presents with     Port Draw     Labs drawn via port by rn in lab. VS taken.     Port accessed with 20g 3/4 inch gripper needle by RN, labs collected, line flushed with saline and heparin.  Vitals taken. Pt checked in for appointment(s).    Shabbir Cheng, RN

## 2022-07-20 NOTE — TELEPHONE ENCOUNTER
Prior Authorization Approval    Authorization Effective Date: 4/21/2022  Authorization Expiration Date: 7/20/2023  Medication: Ondansetron- PA APPROVED  Approved Dose/Quantity: 30/10 DS  Reference #: Key: [id number]   Insurance Company: [employer] - Phone [phone] Fax [phone]  Expected CoPay:       CoPay Card Available:      Foundation Assistance Needed:    Which Pharmacy is filling the prescription (Not needed for infusion/clinic administered): [facility] - [place], [place] - [address] [place] [address]  Pharmacy Notified: Yes  Patient Notified: No

## 2022-07-20 NOTE — PROGRESS NOTES
RECORDS STATUS - BREAST    RECORDS REQUESTED FROM: EPIC   DATE REQUESTED: TBD- Not Scheduled Yet    NOTES DETAILS STATUS   OFFICE NOTE from referring provider Complete EPIC  Raymond Stockton,   OFFICE NOTE from medical oncologist  Cervical Cancer Records are in EPIC   OFFICE NOTE from surgeon Complete See Breast Biopsy in EPIC   OFFICE NOTE from radiation oncologist     DISCHARGE SUMMARY from hospital     DISCHARGE REPORT from the ER     OPERATIVE REPORT Complete See Breast biopsy in EPIC   MEDICATION LIST Complete [facility]   CLINICAL TRIAL TREATMENTS TO DATE     LABS     REQUEST BLOCKS FOR ALL BREAST CANCER PTS     PATHOLOGY REPORTS  (Tissue diagnosis, Stage, ER/AL percentage positive and intensity of staining, HER2 IHC, FISH, and all biopsies from breast and any distant metastasis)                 Complete 7/15/2022   . LEFT BREAST, 12:30 POSITION, 8 CM FROM NIPPLE, BIOPSY:  -INVASIVE DUCTAL CARCINOMA   GENONOMIC TESTING     TYPE:   (Next Generation Sequencing, including Foundation One testing, and Oncotype score) Complete Labs last updated on 7/20/2022     7/15/2022 Her 2 Porsha Fish    IMAGING (NEED IMAGES & REPORT)     IR Chest Complete  7/18/2022   CT SCANS Complete CT Biopsy [facility] 6/9/2022   MRI Complete MRI Pelvis    MAMMO Complete 7/13/2022   ULTRASOUND Complete US Breast 7/15/2022 more in PACS   PET Complete 7/1/2022   BONE SCAN     BRAIN MRI

## 2022-07-20 NOTE — PATIENT INSTRUCTIONS
[facility] Triage and after hours / weekends / holidays:  [phone]    Please call the triage or after hours line if you experience a temperature greater than or equal to 100.4, shaking chills, have uncontrolled nausea, vomiting and/or diarrhea, dizziness, shortness of breath, chest pain, bleeding, unexplained bruising, or if you have any other new/concerning symptoms, questions or concerns.      If you are having any concerning symptoms or wish to speak to a provider before your next infusion visit, please call your care coordinator or triage to notify them so we can adequately serve you.     If you need a refill on a narcotic prescription or other medication, please call before your infusion appointment.       Do not take zofran/ondansetron for 3 days.  Ok to take on Saturday morning.         July 2022 Sunday Monday Tuesday Wednesday Thursday Friday Saturday                            1    [facility]/ ONCOLOGY   6:45 AM   (60 min.)   [address] for Clinical Imaging Research    NEW  11:00 AM   (90 min.)   Raymond Stockton MD   [facility] Radiation Oncology 2       3     4     5    RETURN   9:15 AM   (30 min.)   Hina Raygoza MD   [facility] 6     7     8     9       10     11     12     13    MA DIAGNOSTIC BILAT W/ WENDY  12:45 PM   (30 min.)   [facility]   [facility] BREAST LT LMT 1-3 QUAD   1:15 PM   (30 min.)   [facility]   [facility] Breast [facility] [place] 14     15    VIDEO VISIT RETURN   9:20 AM   (40 min.)   Jewels Oconnor APRN CNP   [facility] BREAST BX CORE NEEDLE LEFT  10:45 AM   (60 min.)   [facility]   [facility] [place] 16       17     18    Outpatient Visit   7:20 AM   [facility] OR [place]    IR CHEST PORT PLACEMENT >5 YRS   7:30 AM   (60 min.)   [facility]   [facility]  [place]    INSERTION, VASCULAR ACCESS PORT   9:00 AM   Donnie Elias MD   [facility] OR 19     20    LAB CENTRAL   7:00 AM   (15 min.)    [facility]RAW   [facility]    ONC INFUSION 6 HR (360 MIN)   7:30 AM   (360 min.)    ONC INFUSION NURSE   [facility] 21     22     23       24     25     26     27     28     29     30       31 August 2022 Sunday Monday Tuesday Wednesday Thursday Friday Saturday        1     2     3     4     5     6       7     8     9     10    LAB CENTRAL   7:45 AM   (15 min.)   [facility]   [facility]    RETURN   8:05 AM   (40 min.)   Jewels Oconnor APRN CNP   [facility]    ONC INFUSION 6 HR (360 MIN)   9:00 AM   (360 min.)    ONC INFUSION NURSE   [facility] 11     12     13       14     15     16     17     18     19     20       21     22     23     24     25     26     27       28     29     30    VIDEO VISIT RETURN   1:20 PM   (40 min.)   Jewels Oconnor APRN CNP   [facility] 31    LAB CENTRAL   8:00 AM   (15 min.)    [facility]   [facility]    ONC INFUSION 6 HR (360 MIN)   8:30 AM   (360 min.)    ONC INFUSION NURSE   [facility]                                   Recent Results (from the past 24 hour(s))   Comprehensive metabolic panel    Collection Time: 07/20/22  7:31 AM   Result Value Ref Range    Sodium 136 133 - 144 mmol/L    Potassium 3.9 3.4 - 5.3 mmol/L    Chloride 101 94 - 109 mmol/L    Carbon Dioxide (CO2) 26 20 - 32 mmol/L    Anion Gap 9 3 - 14 mmol/L    Urea Nitrogen 9 7 - 30 mg/dL    Creatinine 0.60 0.52 - 1.04 mg/dL    Calcium 9.7 8.5 - 10.1 mg/dL    Glucose 97 70 - 99 mg/dL    Alkaline Phosphatase 83 40 - 150 U/L    AST 17 0 - 45 U/L    ALT 12 0 - 50 U/L     Protein Total 6.5 (L) 6.8 - 8.8 g/dL    Albumin 2.8 (L) 3.4 - 5.0 g/dL    Bilirubin Total 0.5 0.2 - 1.3 mg/dL    GFR Estimate >90 >60 mL/min/1.73m2   Magnesium    Collection Time: 07/20/22  7:31 AM   Result Value Ref Range    Magnesium 2.1 1.6 - 2.3 mg/dL   Protein qualitative urine    Collection Time: 07/20/22  7:31 AM   Result Value Ref Range    Protein Albumin Urine Negative Negative mg/dL   CBC with platelets and differential    Collection Time: 07/20/22  7:31 AM   Result Value Ref Range    WBC Count 15.1 (H) 4.0 - 11.0 10e3/uL    RBC Count 4.38 3.80 - 5.20 10e6/uL    Hemoglobin 10.4 (L) 11.7 - 15.7 g/dL    Hematocrit 33.8 (L) 35.0 - 47.0 %    MCV 77 (L) 78 - 100 fL    MCH 23.7 (L) 26.5 - 33.0 pg    MCHC 30.8 (L) 31.5 - 36.5 g/dL    RDW 15.4 (H) 10.0 - 15.0 %    Platelet Count 397 150 - 450 10e3/uL    % Neutrophils 88 %    % Lymphocytes 5 %    % Monocytes 5 %    % Eosinophils 2 %    % Basophils 0 %    % Immature Granulocytes 0 %    NRBCs per 100 WBC 0 <1 /100    Absolute Neutrophils 13.1 (H) 1.6 - 8.3 10e3/uL    Absolute Lymphocytes 0.8 0.8 - 5.3 10e3/uL    Absolute Monocytes 0.8 0.0 - 1.3 10e3/uL    Absolute Eosinophils 0.3 0.0 - 0.7 10e3/uL    Absolute Basophils 0.1 0.0 - 0.2 10e3/uL    Absolute Immature Granulocytes 0.1 <=0.4 10e3/uL    Absolute NRBCs 0.0 10e3/uL   Routine UA with micro reflex to culture    Collection Time: 07/20/22  9:16 AM    Specimen: Urine, Clean Catch   Result Value Ref Range    Color Urine Yellow Colorless, Straw, Light Yellow, Yellow    Appearance Urine Clear Clear    Glucose Urine Negative Negative mg/dL    Bilirubin Urine Negative Negative    Ketones Urine 5  (A) Negative mg/dL    Specific Gravity Urine 1.010 1.003 - 1.035    Blood Urine Negative Negative    pH Urine 6.5 5.0 - 7.0    Protein Albumin Urine Negative Negative mg/dL    Urobilinogen Urine Normal Normal, 2.0 mg/dL    Nitrite Urine Negative Negative    Leukocyte Esterase Urine Negative Negative    RBC Urine 1 <=2 /HPF     WBC Urine 1 <=5 /HPF    Squamous Epithelials Urine <1 <=1 /HPF

## 2022-07-20 NOTE — PROGRESS NOTES
New Patient Oncology Nurse Navigator Note     Referring provider: Shayla Stockton MD     Referring Clinic/Organization: [facility] Radiation oncology     Referred to (specialty:) Medical Oncology and Cancer Surgery     Requested provider (if applicable): Dr. Angela Montana and Dr. Yvette Pike (first) or Dr. 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 Started chemo infusion 7/20/22.     Patient has a history of left breast cancer in 2003 and is s/p left lumpectomy and radiation therapy.     History of left breast cancer status post lumpectomy (2003) and radiation.   Doesn't recall the provider name for radiation oncology     12/23/2003 - [id number]  Breast, left, needle core biopsy -   1. infiltrating ductal carcinoma, Grade II/III   2. comedo and noncomedo ductal carcinoma in-situ.     12/23/2003 - [id number]  Left breast FNA positive for malignancy    1/5/2004 - [id number]  Left breast lumpectomy and left axillary sentinel lymph node biopsy showed invasive ductal carcinoma, Grade 2, 2 cm maximum dimension.  0/1 nodes. Estrogen receptor diffuse positive, progesterone receptor focal positive, and HER2 not amplified.    Dr. Geovanna Mtz was her medical oncologist.  Oncotype DX recurrent score of 10. Patient took tamoxifen until January 2009.    Medical history significant for FIGO Stage IA1 squamous cell carcinoma of the cervix s/p hysterectomy with no residual disease, and a long history of vaginal HSIL.    2022 Breast Cancer  PET scan on 7/1/2022 showed Left breast - Mild soft tissue thickening in the left retropectoral region which demonstrates low level metabolic activity. Findings may represent a small reactive lymph node, however  given history of left breast cancer (2003) attention on follow up imaging or biopsy may be considered.  (New since PET/CT 10/31/2012.)  Bilateral diagnostic mammogram and left breast ultrasound followed on 7/13 showing no suspicious mammographic findings in either breast. Targeted utrasound evaluation of the left breast at 12:30, 8 cm from the nipple demonstrates a 1.1 x 0.9 x 1.3 cm irregular hypoechoic mass with angular margins. Color Doppler flow is demonstrated centrally within the mass. Findings may represent an abnormal lymph node versus a solid mass.    7/15/22 -   LEFT BREAST, 12:30 POSITION, 8 CM FROM NIPPLE, BIOPSY:  -INVASIVE DUCTAL CARCINOMA, Hixson grade 2, at least 10 mm in linear extent.  -Invasive carcinoma infiltrates fibrous tissue and skeletal muscle.  -Focal microcalcifications are present.  -Invasive carcinoma is positive for estrogen receptor (100% nuclei, strong staining) and negative for progesterone receptor; HER2/carlos eduardo gene amplification by FISH is in progress and results will be provided separately.    HER2 by FISH subsequently reported as negative.    Genetic testing in 2006 - Negative for germline BRCA1, BRCA2 pathogenic variants     Records Location: Care Everywhere, Media and See Bookmarked material     Records Needed:   2003 left breast core biopsy pathology review Case #[id number]  2003 left lumpectomy pathology review Case #[id number]  2003 Oncotype report   Radiation oncology notes from 2003/2004 (or most recent)  Breast imaging for past 5 years    Telephoned and left voice message for Cordell requesting call back at her convenience.  (She is in infusion today for initial treatment of squamous cell carcinoma of the cervix with perineural invasion.)

## 2022-07-20 NOTE — PROGRESS NOTES
"Infusion Nursing Note:  Cordell Donaldson presents today for Cycle 1 Day 1 Taxol, Cisplatin, Bevacizumab.    Patient spoke with provider today: No    Treatment Conditions:  Lab Results   Component Value Date    HGB 10.4 (L) 07/20/2022    WBC 15.1 (H) 07/20/2022    ANEU 1.8 08/22/2017    ANEUTAUTO 13.1 (H) 07/20/2022     07/20/2022      Lab Results   Component Value Date     07/20/2022    POTASSIUM 3.9 07/20/2022    MAG 2.1 07/20/2022    CR 0.60 07/20/2022    LINDA 9.7 07/20/2022    BILITOTAL 0.5 07/20/2022    ALBUMIN 2.8 (L) 07/20/2022    ALT 12 07/20/2022    AST 17 07/20/2022       Pain: 2/10       Note: BP: 109/69  Urine protein: negative.      Patient new to oncology infusion room and is receiving Taxol, Cisplatin and Bevacizumab for the first time.   Pt oriented to infusion room and call light. Chemotherapy teaching done previously by RNCC.    Reinforced chemotherapy teaching/side effects and schedule during infusion visit.       WBC: 15.1, Hgb 10.4    Denies fever, chills, SOB or cough. Reports urinary frequency and \"straining to empty bladder.\"    7/20/22 at 0850 ANITA Garcia / Renee Nagy RN:  Check post void residual   UA/UCx    POST VOID RESIDUAL approximately 150 ml, Dr. Overton notified.     7/20/22 at 0910 TORB Dr. Overton / Renee Nagy RN:  OK to proceed with chemotherapy today  Send UA/UCx as ordered above      Copy of AVS reviewed with patient. Pt instructed to call care coordinator, triage (or MD on call if after hours/weekends) with chills/temp >=100.4, questions/concerns. Pt stated understanding of plan.       Intravenous Access:  Implanted Port.    Post Infusion Assessment:  Patient tolerated infusion without incident.  Blood return noted pre and post infusion.  Access discontinued per protocol.    Discharge Plan:   Prescriptions filled for Zofran, Compazine and Decadron and reviewed by pharmacist.  Discharge instructions reviewed with: Patient.  Patient and/or family " verbalized understanding of discharge instructions and all questions answered.  Copy of AVS reviewed with patient and/or family.  Patient will return 8/10/22 for next appointment.  Patient discharged in stable condition accompanied by: self and .  Departure Mode: Ambulatory.    Renee Nagy RN

## 2022-07-21 ENCOUNTER — TRANSCRIBE ORDERS (OUTPATIENT)
Dept: ONCOLOGY | Facility: CLINIC | Age: 72
End: 2022-07-21

## 2022-07-21 DIAGNOSIS — C50.919 BREAST CANCER (H): Primary | ICD-10-CM

## 2022-07-21 NOTE — PROGRESS NOTES
RECORDS STATUS - BREAST    RECORDS REQUESTED FROM: [facility] [facility]    DATE REQUESTED: 7/25/2022-     Action    Action Taken 7/21/2022 2:20pm KEB   I called pt Cordell - unavailable.     7/22/2022 3:23pm KEB   I called pt Cordell again     [place] [facility] [place] I faxed over a STAT request for the Rad Onc notes to [facility].     The Oncotype report was done at [facility] back in 2003/2004 -she will fax over a report      I faxed her BRCA report off to HIM     I called [facility] [phone] - I sent a STAT fax for Rad Onc records     7/25/2022 7:28AM KEB  I faxed [place] [facility] To [facility]    7/25/2022 3:49pm KEB  I received a call back from [facility]- they found an Oncotype from 2007. They will fax/email over rad onc records and the Oncotype report.     7/25/2022 4:21pm KEB   I [place]ed [place] [facility] [place] [facility]    I haven't received an email from [facility] yet.     I [place] [facility]- no one answered the phone.    7/26/2022 1:35pM KEB   I [place] [facility] again  Ph:[phone] - they are going to send the email again- we didn't receive it yesterday.       NOTES DETAILS STATUS   OFFICE NOTE from referring provider Complete EPIC  Raymond Stockton,   OFFICE NOTE from medical oncologist  Cervical Cancer Records are in EPIC   OFFICE NOTE from surgeon Complete See Breast Biopsy in EPIC   OFFICE NOTE from radiation oncologist     DISCHARGE SUMMARY from hospital     DISCHARGE REPORT from the ER     OPERATIVE REPORT Complete See Breast biopsy in EPIC   MEDICATION LIST Complete [facility]   CLINICAL TRIAL TREATMENTS TO DATE     LABS     REQUEST BLOCKS FOR ALL BREAST CANCER PTS     PATHOLOGY REPORTS  (Tissue diagnosis, Stage, ER/MI percentage positive and intensity of staining, HER2 IHC, FISH, and all biopsies from breast and any distant metastasis)                 Complete 7/15/2022   . LEFT BREAST, 12:30 POSITION, 8 CM FROM NIPPLE, BIOPSY:  -INVASIVE DUCTAL CARCINOMA   GENONOMIC  TESTING     TYPE:   (Next Generation Sequencing, including Foundation One testing, and Oncotype score) Complete Labs last updated on 7/20/2022     7/15/2022 Her 2 Porsha Fish    IMAGING (NEED IMAGES & REPORT)     IR Chest Complete  7/18/2022   CT SCANS Complete CT Biopsy [facility] 6/9/2022   MRI Complete MRI Pelvis    MAMMO Complete 7/13/2022   ULTRASOUND Complete US Breast 7/15/2022 more in PACS   PET Complete 7/1/2022   BONE SCAN     BRAIN MRI

## 2022-07-21 NOTE — PROGRESS NOTES
Telephoned and spoke with Cordell.  She had not received my voice message from yesterday and was pleased to connect.  We will have her see Dr. Montana on 7/25 and writer will work to identify a surgical consult with Dr. Pike and call patient asap or direct New Patient Scheduling.      We did regroup at 12:02 and spoke about an appointment with Dr. Pike at 8:00am on 8/2.  Call transferred to New Patient Scheduling to finalize that appointment. Merle Goodman RN

## 2022-07-22 LAB — INTERPRETATION: NORMAL

## 2022-07-25 ENCOUNTER — PRE VISIT (OUTPATIENT)
Dept: ONCOLOGY | Facility: CLINIC | Age: 72
End: 2022-07-25

## 2022-07-25 ENCOUNTER — ONCOLOGY VISIT (OUTPATIENT)
Dept: ONCOLOGY | Facility: CLINIC | Age: 72
End: 2022-07-25
Attending: RADIOLOGY
Payer: COMMERCIAL

## 2022-07-25 VITALS
BODY MASS INDEX: 19.65 KG/M2 | TEMPERATURE: 97.3 F | OXYGEN SATURATION: 98 % | DIASTOLIC BLOOD PRESSURE: 79 MMHG | WEIGHT: 110.9 LBS | SYSTOLIC BLOOD PRESSURE: 125 MMHG | HEART RATE: 94 BPM

## 2022-07-25 DIAGNOSIS — C50.912 INVASIVE DUCTAL CARCINOMA OF BREAST, FEMALE, LEFT (H): ICD-10-CM

## 2022-07-25 DIAGNOSIS — C50.912 RECURRENT BREAST CANCER, LEFT (H): Primary | ICD-10-CM

## 2022-07-25 DIAGNOSIS — C50.412 MALIGNANT NEOPLASM OF UPPER-OUTER QUADRANT OF LEFT BREAST IN FEMALE, ESTROGEN RECEPTOR POSITIVE (H): ICD-10-CM

## 2022-07-25 DIAGNOSIS — Z80.3 FAMILY HISTORY OF MALIGNANT NEOPLASM OF BREAST: ICD-10-CM

## 2022-07-25 DIAGNOSIS — C53.9 RECURRENT CERVICAL CANCER (H): ICD-10-CM

## 2022-07-25 DIAGNOSIS — Z17.0 MALIGNANT NEOPLASM OF UPPER-OUTER QUADRANT OF LEFT BREAST IN FEMALE, ESTROGEN RECEPTOR POSITIVE (H): ICD-10-CM

## 2022-07-25 PROCEDURE — 99205 OFFICE O/P NEW HI 60 MIN: CPT | Mod: GC | Performed by: INTERNAL MEDICINE

## 2022-07-25 PROCEDURE — G0463 HOSPITAL OUTPT CLINIC VISIT: HCPCS

## 2022-07-25 ASSESSMENT — PAIN SCALES - GENERAL: PAINLEVEL: NO PAIN (0)

## 2022-07-25 NOTE — PROGRESS NOTES
"Oncology Consultation:  Date on this visit: 7/25/2022    Cordell Donaldson  is referred by Dr.Jianling Stockton for an oncology consultation. She requires evaluation for new diagnosis of left breast invasive ductal carcinoma.    Primary Physician: Merry Lino     History Of Present Illness:  Ms. Donaldson is a 72 year old female with cervical and breast cancer.    She has a history of left breast cancer treated with lumpectomy and radiation therapy in 2003 (followed w/ Dr. Mtz at [facility]). Decline chemotherapy (oncotype Dx score later found to be 10), she took 5 years of tamoxifen, which she tolerated well. She had a mammogram in 11/2020 with benign findings. Most recently, she had incidental L breast lesion that was noted as an incidental findings for her pelvic mass (described below). Ultrasound showed a 1.3 cm mass at 12:30, 8 cm from the nipple of the left breast.  She denies any symptoms with her breast including pain, lumps, or discharge. She did lose a significant amount of weight with an estimated 20 lb loss since the beginning of this year. This weight loss was intentional at first, but she continued to lose weight and notices that she tends to get full easily.  She feels tired as she often wakes up at night to urinate. Spends at least 6 hours a day while awake laying in bed or sitting. Estimates she could walk 30 minutes before she would like stop to take a break. She continues to keep up with her ADLs and chores at home.     In regards to her cervical cancer history, she was initially determined to have squamous carcinoma in 10/2012.  She is s/p FRANCIA and upper vaginectomy in 12/2012 with no residual invasive cancer, but residual HSIL.  Biopsy on 7/22/2013 was c/w HSIL, cannot exclude invasion.  In 05/2022 she developed symptoms of pelvic heaviness (felt like she \"bruised her tailbone\") and progressive pain. She felt like her urination and bowel movements were constricted, needed to put in a lot of " "effort to urinate or have BM. Pelvic MRI showed a large midline pelvic mass measuring up to 8.5 cm and abutting the bladder and the rectum without definite invasion or left pelvic LAD.  These findings were confirmed on PET/CT. She was started on chemo with cisplatin, paclitaxel, and bevacizumab (7/11/22). She tolerated this well and has noticed some improvement of her urinary and bowel retention. Her only SE after her first cycle was fatigue for a few days and that her stomach felt \"off\" on day 4, but no nausea and did not require PRN nausea or pain medications.     Past Medical/Surgical History:  Past Medical History:   Diagnosis Date     Abnormal Pap smear     maybe 20 years ago     Cervical cancer (H)      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      History of breast cancer 2003    lumpectomy and radiation offerred chemo and patient declined at time but had oncogene test and low risk     Hyperlipidemia LDL goal < 160      Osteopenia      Paroxysmal A-fib (H)      Severe vaginal dysplasia      Past Surgical History:   Procedure Laterality Date     BIOPSY       BREAST SURGERY Left 2003    lumpectomy left, did radiation, 5 yr of tamoxifen     COLONOSCOPY  2018    repeat in 2028     Colposcopy Cervix with Loop Electrode Conization and Lser to Vagina  2008     COLPOSCOPY, BIOPSY, COMBINED  10/24/2012    Procedure: COMBINED COLPOSCOPY, BIOPSY;  Colposcopy, Biopsy of Cervix and Vagina, Ultrasound Guidance;  Surgeon: Colette Ng MD;  Location: [facility] OR     ENT SURGERY  01/23/2018    otoscelerosis, right side     HYSTERECTOMY, FRANCIA  12/2012    high grade cervical dysplasia, [place] [facility], Dr. Arciniega     INSERT PORT VASCULAR ACCESS Right 7/18/2022    Procedure: INSERTION, VASCULAR ACCESS PORT;  Surgeon: Donnie Elias MD;  Location: [facility] OR     IR CHEST PORT PLACEMENT > 5 YRS OF AGE  7/18/2022     ID LAP VAGINECTOMY, PARTIAL REMOVAL OF VAGINAL WALL  10/2013    upper vaginectomy for " dysplasia, Dr. Megan Arciniega     Allergies:  Allergies as of 07/25/2022     (No Known Allergies)     Current Medications:  Current Outpatient Medications   Medication Sig Dispense Refill     Acetylcarnitine HCl 250 MG CAPS Take 500 mg by mouth 2 times daily        aspirin (ASA) 81 MG chewable tablet Take 81 mg by mouth daily       Bacillus Coagulans-Inulin (PROBIOTIC FORMULA) 1-250 BILLION-MG CAPS Take by mouth three times a week 25+billion CFUS       BENFOTIAMINE PO Take 150 mg by mouth 2 times daily       Cholecalciferol (VITAMIN D-3) 25 MCG (1000 UT) CAPS Take 1,000 Units by mouth daily 90 capsule 3     Coenzyme Q10 (CO Q 10 PO) Take 100 mg by mouth daily        dexamethasone (DECADRON) 4 MG tablet Take 2 tablets (8 mg) by mouth daily Take for 3 days, starting the day after chemo. Take with food. 6 tablet 3     Efraín Berry 550 MG CAPS Take 400 mg by mouth 2 times daily 60 capsule 11     MAGNESIUM OXIDE PO Take 500 mg by mouth       metoprolol succinate ER (TOPROL XL) 50 MG 24 hr tablet Take 1 tablet (50 mg) by mouth daily 90 tablet 1     Omega-3 Fatty Acids (OMEGA-3 FISH OIL PO) Take 630 mg by mouth 2 times daily        ondansetron (ZOFRAN) 8 MG tablet Take 1 tablet (8 mg) by mouth every 8 hours as needed for nausea (vomiting) 30 tablet 2     OVER-THE-COUNTER Turkey tail mushroom powder, use 3 times a day 1 Can 11     Pectin Cit-Inos-C-Bioflav-Soy (MODIFIED CITRUS PECTIN PO)        prochlorperazine (COMPAZINE) 10 MG tablet Take 1 tablet (10 mg) by mouth every 6 hours as needed for nausea or vomiting 30 tablet 2     Selenium 200 MCG CAPS Take 200 mg by mouth       Taurine 1000 MG CAPS Take one po twice daily 60 capsule 11     Turmeric 450 MG CAPS Take 2 capsules by mouth daily 60 capsule 11      Social History:  , lives at home with her  (Jhno).  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     Has 1 biological daughter and 1 step  child. Remains independent with ADLs. Recently she has been gardening in the mornings, does household chores. Mornings tend to be good for her, but will need to take a nap in the afternoon.     Patient has a remote 7.5 pyh of smoking.  Prior to starting chemotherapy, she was having approximately 2 drinks with alcohol per week.    Family History:  Father: MI (68), mom: MI (73), sister with breast cancer in 60  Habits: former smoker, about 5 pack year history quit 30 years ago, previously drank wine (3-4 glasses/week), no recreational drugs     Patient had negative germline BRCA 1/2 testing in 2006.    Physical Exam:  /79   Pulse 94   Temp 97.3  F (36.3  C) (Oral)   Wt 50.3 kg (110 lb 14.4 oz)   SpO2 98%   BMI 19.65 kg/m      GENERAL APPEARANCE: Sitting comfortably, alert and no distress     HEENT: NC/AT, sclera anicteric.  MMM.  No lesions of the oropharynx.     LYMPHATICS: No cervical, supraclavicular, axillary or inguinal lymphadenopathy     RESP: lungs clear to auscultation - no rales, rhonchi or wheezes     CARDIOVASCULAR: irregularly irregular, no murmur.     ABDOMEN:  soft, nontender, bowel sounds normal     MUSCULOSKELETAL: extremities normal- no gross deformities noted, no edema b/l LE.     SKIN: healing bruising overlaying right port, no suspicious lesions or rashes     Breast: Left breast with healed lumpectomy scar, no discharge, At 12:30, 8 cm from the nipple of the left breast deep in the breast is an approximate 1 cm ill-defined density.      PSYCHIATRIC: mentation appears normal and affect normal    Laboratory/Imaging Studies  7/1/2022 PET/CT:  - mild soft tissue thickening in the left retropectoral region with an JOSHUA max of 2.7.  - large heterogenous and hypermetabolic mass in the midline of the pelvis measuring 8.5 x 7.2 x 8.1 cm with an SUV of 13.2.  There is abutment of the pelvic mass along the posterior aspect of the bladder and anterior rectum.  - hypermetabolic left pelvic LAD  - 1.5  cm cystic hypodensity within the inferior right hepatic lobe    7/13/2022 Bilateral diagnostic mammograms:   No suspicious findings in either breast.    7/13/2022 Left breast ultrasound:  - in the left breast 12:30, 8 cm from the nipple is a 1.3 cm mass with angular margins.      7/15/2022 Left breast biopsy, 12:30, 8 cm from the nipple:  - grade 2 invasive ductal carcinoma  - invasive carcinoma is ER strong in 100%, NE negative  - HER2 FISH is negative    ASSESSMENT/PLAN:  Ms. Donaldson is a 71 y/o woman with a history of left breast cancer (s/p lumpectomy, radiation in 2003 followed by 5 years of tamoxifen w/ Dr. Mtz) and cervical SCC (10/2012, s/p FRANCIA, upper vaginectomy) who represented for pelvic pain and heaviness. She was found to have a 8.5 cm pelvic mass abutting the bladder and rectum concerning for recurrent cervical cancer versus new vaginal cancer. She had an incidental finding of a 1.3 cm L breast lesion during the work-up for her pelvic mass, biopsy c/w grade 2 invasive ductal carcinoma: ER +, NE -, HER2 negative (FISH).    1. Left breast cancer  - We discussed given advanced stage cervical cancer, treatment of her cervical cancer needs to be prioritized over her breast cancer.  -She is already receiving Cisplatin 50 mg/m2 + paclitaxel 175 mg/m2 + bevacizumab 15 mg/kg every 21 days started on 7/11 for cervical cancer. This chemotherapy will also treat her breast cancer   -Will obtain baseline breast MRI, and repeat MRI in approximately 2-3 months in order to monitor response.   -When she has completed chemotherapy, she can be switched to endocrine therapy.  Chemotherapy and endocrine are not synergistic in the treatment of ER positive breast cancer, therefore would not start endocrine therapy until has completed planned chemotherapy. Upon completion of chemotherapy, plan will be to start letrozole.  We reviewed the mechanism of action and potential side effects of letrozole  -If she has a good response  to chemotherapy, may receive pelvic radiation after.  Surgery options for her breast cancer in the future would depend on how responsive her cervical cancer is to chemotherapy    2. Recurrent cervical versus vaginal cancer   -She follows with GynOnc (Dr. Raygoza)  -Cisplatin 50 mg/m2 + paclitaxel 175 mg/m2 + bevacizumab 15 mg/kg every 21 days (start 7/11)  -Caris testing pending, if PD-L1 is positive, will add pembrolizumab   -PET/CT after 3 cycles   -Difficulty with voiding and BM has improved recently after 1st cycle of chemo     3.  Cancer risk management:    - personal history of breast cancer.  Family history of breast cancer.  Will place cancer risk management referral to update genetic testing.    4.  Bone Health:  We reviewed endocrine therapy can be associated with risk of thinning of the bones.  Will plan to obtain a baseline DEXA bone density scan at time of initiation of endocrine therapy.    5.  Follow Up:  Breast MRI within 1-2 weeks. Repeat breast MRI and visit with Gloria or I in approximately 10 weeks.    Patient was seen and discussed with medical oncology fellow, Dr. Cruz.  I have edited the above note to reflect our joint assessment and plan.  I spent 70 minutes on the date of the encounter doing chart review, review of outside records, review of test results, interpretation of tests, patient visit, documentation and discussion with other provider(s).    Angela Montana MD

## 2022-07-25 NOTE — LETTER
7/25/2022         RE: Cordell Donaldson  [address]  [facility] [address]        Dear Colleague,    Thank you for referring your patient, Cordell Donaldson, to the [facility]. Please see a copy of my visit note below.    Oncology Consultation: Date on this visit: 7/25/2022    Cordell Donaldson  is referred by Dr.Jianling Stockton for an oncology consultation. She requires evaluation for new diagnosis of left breast invasive ductal carcinoma.    Primary Physician: Merry Lino     History Of Present Illness:  Ms. Donaldson is a 72 year old female with cervical and breast cancer.    She has a history of left breast cancer treated with lumpectomy and radiation therapy in 2003 (followed w/ Dr. Mtz at [facility]). Decline chemotherapy (oncotype Dx score later found to be 10), she took 5 years of tamoxifen, which she tolerated well. She had a mammogram in 11/2020 with benign findings. Most recently, she had incidental L breast lesion that was noted as an incidental findings for her pelvic mass (described below). Ultrasound showed a 1.3 cm mass at 12:30, 8 cm from the nipple of the left breast.  She denies any symptoms with her breast including pain, lumps, or discharge. She did lose a significant amount of weight with an estimated 20 lb loss since the beginning of this year. This weight loss was intentional at first, but she continued to lose weight and notices that she tends to get full easily.  She feels tired as she often wakes up at night to urinate. Spends at least 6 hours a day while awake laying in bed or sitting. Estimates she could walk 30 minutes before she would like stop to take a break. She continues to keep up with her ADLs and chores at home.     In regards to her cervical cancer history, she was initially determined to have squamous carcinoma in 10/2012.  She is s/p FRANCIA and upper vaginectomy in 12/2012 with no residual invasive cancer, but residual HSIL.  Biopsy on  "7/22/2013 was c/w HSIL, cannot exclude invasion.  In 05/2022 she developed symptoms of pelvic heaviness (felt like she \"bruised her tailbone\") and progressive pain. She felt like her urination and bowel movements were constricted, needed to put in a lot of effort to urinate or have BM. Pelvic MRI showed a large midline pelvic mass measuring up to 8.5 cm and abutting the bladder and the rectum without definite invasion or left pelvic LAD.  These findings were confirmed on PET/CT. She was started on chemo with cisplatin, paclitaxel, and bevacizumab (7/11/22). She tolerated this well and has noticed some improvement of her urinary and bowel retention. Her only SE after her first cycle was fatigue for a few days and that her stomach felt \"off\" on day 4, but no nausea and did not require PRN nausea or pain medications.     Past Medical/Surgical History:  Past Medical History:   Diagnosis Date     Abnormal Pap smear     maybe 20 years ago     Cervical cancer (H)      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      History of breast cancer 2003    lumpectomy and radiation offerred chemo and patient declined at time but had oncogene test and low risk     Hyperlipidemia LDL goal < 160      Osteopenia      Paroxysmal A-fib (H)      Severe vaginal dysplasia      Past Surgical History:   Procedure Laterality Date     BIOPSY       BREAST SURGERY Left 2003    lumpectomy left, did radiation, 5 yr of tamoxifen     COLONOSCOPY  2018    repeat in 2028     Colposcopy Cervix with Loop Electrode Conization and Lser to Vagina  2008     COLPOSCOPY, BIOPSY, COMBINED  10/24/2012    Procedure: COMBINED COLPOSCOPY, BIOPSY;  Colposcopy, Biopsy of Cervix and Vagina, Ultrasound Guidance;  Surgeon: Colette Ng MD;  Location: [facility] OR     ENT SURGERY  01/23/2018    otoscelerosis, right side     HYSTERECTOMY, FRANCIA  12/2012    high grade cervical dysplasia, [place] [facility], Dr. Arciniega     INSERT PORT VASCULAR ACCESS Right " 7/18/2022    Procedure: INSERTION, VASCULAR ACCESS PORT;  Surgeon: Donnie Elias MD;  Location: [facility] OR     IR CHEST PORT PLACEMENT > 5 YRS OF AGE  7/18/2022     ND LAP VAGINECTOMY, PARTIAL REMOVAL OF VAGINAL WALL  10/2013    upper vaginectomy for dysplasia, Dr. Megan Arciniega     Allergies:  Allergies as of 07/25/2022     (No Known Allergies)     Current Medications:  Current Outpatient Medications   Medication Sig Dispense Refill     Acetylcarnitine HCl 250 MG CAPS Take 500 mg by mouth 2 times daily        aspirin (ASA) 81 MG chewable tablet Take 81 mg by mouth daily       Bacillus Coagulans-Inulin (PROBIOTIC FORMULA) 1-250 BILLION-MG CAPS Take by mouth three times a week 25+billion CFUS       BENFOTIAMINE PO Take 150 mg by mouth 2 times daily       Cholecalciferol (VITAMIN D-3) 25 MCG (1000 UT) CAPS Take 1,000 Units by mouth daily 90 capsule 3     Coenzyme Q10 (CO Q 10 PO) Take 100 mg by mouth daily        dexamethasone (DECADRON) 4 MG tablet Take 2 tablets (8 mg) by mouth daily Take for 3 days, starting the day after chemo. Take with food. 6 tablet 3     Efraín Berry 550 MG CAPS Take 400 mg by mouth 2 times daily 60 capsule 11     MAGNESIUM OXIDE PO Take 500 mg by mouth       metoprolol succinate ER (TOPROL XL) 50 MG 24 hr tablet Take 1 tablet (50 mg) by mouth daily 90 tablet 1     Omega-3 Fatty Acids (OMEGA-3 FISH OIL PO) Take 630 mg by mouth 2 times daily        ondansetron (ZOFRAN) 8 MG tablet Take 1 tablet (8 mg) by mouth every 8 hours as needed for nausea (vomiting) 30 tablet 2     OVER-THE-COUNTER Turkey tail mushroom powder, use 3 times a day 1 Can 11     Pectin Cit-Inos-C-Bioflav-Soy (MODIFIED CITRUS PECTIN PO)        prochlorperazine (COMPAZINE) 10 MG tablet Take 1 tablet (10 mg) by mouth every 6 hours as needed for nausea or vomiting 30 tablet 2     Selenium 200 MCG CAPS Take 200 mg by mouth       Taurine 1000 MG CAPS Take one po twice daily 60 capsule 11     Turmeric 450 MG CAPS Take 2 capsules by  mouth daily 60 capsule 11   Social History:  , lives at home with her  (Jhon).  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     Has 1 biological daughter and 1 step child. Remains independent with ADLs. Recently she has been gardening in the mornings, does household chores. Mornings tend to be good for her, but will need to take a nap in the afternoon.     Patient has a remote 7.5 pyh of smoking.  Prior to starting chemotherapy, she was having approximately 2 drinks with alcohol per week.    Family History:  Father: MI (68), mom: MI (73), sister with breast cancer in 60  Habits: former smoker, about 5 pack year history quit 30 years ago, previously drank wine (3-4 glasses/week), no recreational drugs     Patient had negative germline BRCA 1/2 testing in 2006.    Physical Exam:  /79   Pulse 94   Temp 97.3  F (36.3  C) (Oral)   Wt 50.3 kg (110 lb 14.4 oz)   SpO2 98%   BMI 19.65 kg/m      GENERAL APPEARANCE: Sitting comfortably, alert and no distress     HEENT: NC/AT, sclera anicteric.  MMM.  No lesions of the oropharynx.     LYMPHATICS: No cervical, supraclavicular, axillary or inguinal lymphadenopathy     RESP: lungs clear to auscultation - no rales, rhonchi or wheezes     CARDIOVASCULAR: irregularly irregular, no murmur.     ABDOMEN:  soft, nontender, bowel sounds normal     MUSCULOSKELETAL: extremities normal- no gross deformities noted, no edema b/l LE.     SKIN: healing bruising overlaying right port, no suspicious lesions or rashes     Breast: Left breast with healed lumpectomy scar, no discharge, At 12:30, 8 cm from the nipple of the left breast deep in the breast is an approximate 1 cm ill-defined density.      PSYCHIATRIC: mentation appears normal and affect normal    Laboratory/Imaging Studies  7/1/2022 PET/CT:  - mild soft tissue thickening in the left retropectoral region with an JOSHUA max of 2.7.  - large  heterogenous and hypermetabolic mass in the midline of the pelvis measuring 8.5 x 7.2 x 8.1 cm with an SUV of 13.2.  There is abutment of the pelvic mass along the posterior aspect of the bladder and anterior rectum.  - hypermetabolic left pelvic LAD  - 1.5 cm cystic hypodensity within the inferior right hepatic lobe    7/13/2022 Bilateral diagnostic mammograms:   No suspicious findings in either breast.    7/13/2022 Left breast ultrasound:  - in the left breast 12:30, 8 cm from the nipple is a 1.3 cm mass with angular margins.      7/15/2022 Left breast biopsy, 12:30, 8 cm from the nipple:  - grade 2 invasive ductal carcinoma  - invasive carcinoma is ER strong in 100%, NH negative  - HER2 FISH is negative    ASSESSMENT/PLAN:  Ms. Donaldson is a 73 y/o woman with a history of left breast cancer (s/p lumpectomy, radiation in 2003 followed by 5 years of tamoxifen w/ Dr. Mtz) and cervical SCC (10/2012, s/p FRANCIA, upper vaginectomy) who represented for pelvic pain and heaviness. She was found to have a 8.5 cm pelvic mass abutting the bladder and rectum concerning for recurrent cervical cancer versus new vaginal cancer. She had an incidental finding of a 1.3 cm L breast lesion during the work-up for her pelvic mass, biopsy c/w grade 2 invasive ductal carcinoma: ER +, NH -, HER2 negative (FISH).    1. Left breast cancer  - We discussed given advanced stage cervical cancer, treatment of her cervical cancer needs to be prioritized over her breast cancer.  -She is already receiving Cisplatin 50 mg/m2 + paclitaxel 175 mg/m2 + bevacizumab 15 mg/kg every 21 days started on 7/11 for cervical cancer. This chemotherapy will also treat her breast cancer   -Will obtain baseline breast MRI, and repeat MRI in approximately 2-3 months in order to monitor response.   -When she has completed chemotherapy, she can be switched to endocrine therapy.  Chemotherapy and endocrine are not synergistic in the treatment of ER positive breast cancer,  therefore would not start endocrine therapy until has completed planned chemotherapy. Upon completion of chemotherapy, plan will be to start letrozole.  We reviewed the mechanism of action and potential side effects of letrozole  -If she has a good response to chemotherapy, may receive pelvic radiation after.  Surgery options for her breast cancer in the future would depend on how responsive her cervical cancer is to chemotherapy    2. Recurrent cervical versus vaginal cancer   -She follows with GynOnc (Dr. Raygoza)  -Cisplatin 50 mg/m2 + paclitaxel 175 mg/m2 + bevacizumab 15 mg/kg every 21 days (start 7/11)  -Caris testing pending, if PD-L1 is positive, will add pembrolizumab   -PET/CT after 3 cycles   -Difficulty with voiding and BM has improved recently after 1st cycle of chemo     3.  Cancer risk management:    - personal history of breast cancer.  Family history of breast cancer.  Will place cancer risk management referral to update genetic testing.    4.  Bone Health:  We reviewed endocrine therapy can be associated with risk of thinning of the bones.  Will plan to obtain a baseline DEXA bone density scan at time of initiation of endocrine therapy.    5.  Follow Up:  Breast MRI within 1-2 weeks. Repeat breast MRI and visit with Gloria nickerson I in approximately 10 weeks.    Patient was seen and discussed with medical oncology fellow, Dr. Cruz.  I have edited the above note to reflect our joint assessment and plan.  I spent 70 minutes on the date of the encounter doing chart review, review of outside records, review of test results, interpretation of tests, patient visit, documentation and discussion with other provider(s).    Angela Montana MD

## 2022-07-25 NOTE — NURSING NOTE
"Oncology Rooming Note    July 25, 2022 1:01 PM   Cordell Donaldson is a 72 year old female who presents for:    Chief Complaint   Patient presents with     Oncology Clinic Visit     Invasive ductal carcinoma of breast     Initial Vitals: /79   Pulse 94   Temp 97.3  F (36.3  C) (Oral)   Wt 50.3 kg (110 lb 14.4 oz)   SpO2 98%   BMI 19.65 kg/m   Estimated body mass index is 19.65 kg/m  as calculated from the following:    Height as of 7/18/22: 1.6 m (5' 3\").    Weight as of this encounter: 50.3 kg (110 lb 14.4 oz). Body surface area is 1.5 meters squared.  No Pain (0) Comment: Data Unavailable   No LMP recorded. Patient is postmenopausal.  Allergies reviewed: Yes  Medications reviewed: Yes    Medications: Medication refills not needed today.  Pharmacy name entered into EPIC:    [id number] - [place], [place] - [address] [facility]  [id number] - [place], [place] - [facility] [place], [place] - [address] [place] [phone]    Clinical concerns: none       Nevin Wen"

## 2022-07-29 NOTE — PROGRESS NOTES
RECORDS STATUS - BREAST    RECORDS REQUESTED FROM: [facility]/ [facility]/ [place]     Action    Action Taken 7/29/2022 2:44pm KEINDERJIT   I called [facility] [place] [facility]- they will send the Rad Onc images/graphs in color on Monday         DATE REQUESTED: 8/2/2022   NOTES DETAILS STATUS   OFFICE NOTE from referring provider Complete Epic   Raymond Stockton MD   OFFICE NOTE from medical oncologist Complete 7/25/2022    Recurrent breast cancer, left (H) +    More in [facility]   OFFICE NOTE from surgeon Complete See Breast Biopsy in EPIC   OFFICE NOTE from radiation oncologist     DISCHARGE SUMMARY from hospital     DISCHARGE REPORT from the ER     OPERATIVE REPORT Complete See Breast Biopsy in EPIC   MEDICATION LIST Complete [facility]   CLINICAL TRIAL TREATMENTS TO DATE     LABS     REQUEST BLOCKS FOR ALL BREAST CANCER PTS     PATHOLOGY REPORTS  (Tissue diagnosis, Stage, ER/WI percentage positive and intensity of staining, HER2 IHC, FISH, and all biopsies from breast and any distant metastasis)                 Complete 7/15/2022   A. LEFT BREAST, 12:30 POSITION, 8 CM FROM NIPPLE, BIOPSY:  -INVASIVE DUCTAL CARCINOMA   GENONOMIC TESTING     TYPE:   (Next Generation Sequencing, including Foundation One testing, and Oncotype score) Complete Her 2 Porsha Fish 7/15/2022   IMAGING (NEED IMAGES & REPORT)     CT SCANS     MRI     MAMMO Complete 7/13/2022 more in PACS   ULTRASOUND Complete US Breast 7/15/2022, 7/13/2022 more in PACS   PET Complete 7/1/2022   BONE SCAN     BRAIN MRI

## 2022-08-02 ENCOUNTER — PATIENT OUTREACH (OUTPATIENT)
Dept: ONCOLOGY | Facility: CLINIC | Age: 72
End: 2022-08-02

## 2022-08-02 ENCOUNTER — ONCOLOGY VISIT (OUTPATIENT)
Dept: ONCOLOGY | Facility: CLINIC | Age: 72
End: 2022-08-02
Attending: SURGERY
Payer: COMMERCIAL

## 2022-08-02 ENCOUNTER — PRE VISIT (OUTPATIENT)
Dept: ONCOLOGY | Facility: CLINIC | Age: 72
End: 2022-08-02

## 2022-08-02 VITALS
WEIGHT: 111.6 LBS | HEIGHT: 63 IN | HEART RATE: 79 BPM | OXYGEN SATURATION: 99 % | DIASTOLIC BLOOD PRESSURE: 84 MMHG | SYSTOLIC BLOOD PRESSURE: 128 MMHG | BODY MASS INDEX: 19.77 KG/M2 | TEMPERATURE: 97.6 F

## 2022-08-02 DIAGNOSIS — C50.912 RECURRENT BREAST CANCER, LEFT (H): ICD-10-CM

## 2022-08-02 PROCEDURE — 99205 OFFICE O/P NEW HI 60 MIN: CPT | Performed by: SURGERY

## 2022-08-02 PROCEDURE — G0463 HOSPITAL OUTPT CLINIC VISIT: HCPCS

## 2022-08-02 ASSESSMENT — PAIN SCALES - GENERAL: PAINLEVEL: NO PAIN (0)

## 2022-08-02 NOTE — PATIENT INSTRUCTIONS
Plan for MRI - to be scheduled    Plan for repeat MRI in October     Meet with Dr. Pike in early November to review scan and discuss surgery    Our schedulers will call you to schedule the above appointments.

## 2022-08-02 NOTE — PROGRESS NOTES
"Palliative Care Clinic Progress Note    Patient ID:     Medical -     History:    Patient has history of breast and cervical cancer.    She has a history of left breast cancer treated with lumpectomy and radiation therapy in 2003 (followed w/ Dr. Mtz at [facility]). Declined chemotherapy (oncotype Dx score later found to be 10), she took 5 years of tamoxifen, which she tolerated well. She had a mammogram in 11/2020 with benign findings. Most recently, she had incidental L breast lesion that was noted as an incidental findings for her pelvic mass (described below). She denies any symptoms with her breast including pain, lumps, or discharge. She did lose a significant amount of weight with an estimated 20 lb loss since the beginning of this year. This weight loss was intentional at first, but she continued to lose weight and notices that she tends to get full easily.  She feels tired as she often wakes up at night to urinate. Spends at least 6 hours a day while awake laying in bed or sitting. Estimates she could walk 30 minutes before she would like stop to take a break. She continues to keep up with her ADLs and chores at home. She had an incidental finding of a 8cm L breast lesion during the work-up for her pelvic mass, found to have grade 2 invasive ductal carcinoma: ER +, CO -, HER2 negative (FISH).     In regards to her cervical cancer history, she was initially determined to have squamous carcinoma in 10/2012.  She is s/p FRANCIA and upper vaginectomy in 12/2012 with no residual invasive cancer, but residual HSIL.  Biopsy on 7/22/2013 was c/w HSIL, cannot exclude invasion.  In 05/2022 she developed symptoms of pelvic heaviness (felt like she \"bruised her tailbone\") and progressive pain. She felt like her urination and bowel movements were constricted, needed to put in a lot of effort to urinate or have BM. Pelvic MRI showed a large midline pelvic mass measuring up to 8.5 cm and abutting the bladder and the rectum without " "definite invasion or left pelvic LAD.  These findings were confirmed on PET/CT. She was started on chemo with cisplatin, paclitaxel, and bevacizumab (7/11/22). She tolerated this well and has noticed some improvement of her urinary and bowel retention. Her only SE after her first cycle was fatigue for a few days and that her stomach felt \"off\" on day 4, but no nausea and did not require PRN nausea or pain medications.     Oncology Plan:     1. Left breast cancer  -She is already receiving Cisplatin 50 mg/m2 + paclitaxel 175 mg/m2 + bevacizumab 15 mg/kg every 21 days started on 7/11 for cervical cancer. This chemotherapy will also treat her breast cancer   -Will obtain baseline breast MRI, followed by surveillance imaging   -When she has completed chemotherapy, she can be switched to endocrine therapy - will start letrozole then   -If she has a good response to chemotherapy, may receive radiation after. Surgery options in the future would depend on how responsive her cervical cancer is to chemotherapy      2. Recurrent cervical versus vaginal cancer   -She follows with GynOnc (Dr. Raygoza)  -Cisplatin 50 mg/m2 + paclitaxel 175 mg/m2 + bevacizumab 15 mg/kg every 21 days (start 7/11)  -Caris testing pending, if PD-L1 is positive, will add pembrolizumab   -PET/CT after 3 cycles   -Difficulty with voiding and BM has improved recently after 1st cycle of chemo     ----------------------------------------------------------------------------------------------------------------------------------------------------     Patient is here today with her .  Recommended to come to palliative care early given her diagnosis.    In the afternoon have gas so cramps evening plans, quite smelly.  She is having some leaking (likely bowel). Bowel movements have been more regular in the morning but by the afternoon there is some leakage. Was told normal because the area is narrowed.  Started taking Senna about one week ago, takes as " needed. If feel pressure will take it at night.     She had been taking Tylenol for pain for awhile, the pain is gone. Using CBD oil at night to help sleep.  Has CBD cream and oil and medical marijana.     Have been feeling fatigued. 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 Take a nap in the afternoon. Rode bike for the first time yesterday. Two day ago walked 1.6 miles. Trying to increase activity wilfredo had been sitting around a lot. Her daughter was visiting was visiting from [place] and helped her get active. Her  has noticed she seems to have lost some muscle mass in her upper extremities.     Hair is falling out which is hard but the change of not having pain is helping since starting chemotherapy.    Eating is better.  For awhile was very picky because had a special diet with breast cancer. When her daughter came they made meals together and she has been enjoying food.  After the chemo (day 4) wasn't nauseated but didn't feel good. Couple days after that appetite came back. Started at 140 lbs (was trying to lose weight last fall), today weighs 110. Weight hasn't been below 110. No nasuea, vomiting or abdominal pain.    She is taking supplements from naturopath to help protect her organs from chemotherapy. After steroids wore off she was very fatigued for 3-4 days, appetite went down, low affect. Everythign picked back up after that.    Mood: Up and down. Was very fun to have her daughter here. Can get crabby if feel tired. She feels more positive about her situation than the doctors do. She feels that she has been strong and healthy and she is giving it the best treatment she can and that she has a good chance. Was given a 1.5-2 year statistical life expectency. This was incredibly upsetting which was followed by review of terrible side effect of chemotherapy.    Social -  to hospitals (had a 10 year engagement, have been together 28 years, marred 18), they used to  travel a lot, go out and go to the movies. She doesn't feel that she is in the position to do those things right now. Isaac has a daughter in [place] (she was just diagnosed with WK).     Care Planning - Patient has a HCD.  Her  is her HCA.  She is remaining very hopeful that the chemotherapy will work and she will continue to live a healthy life.           PE: LMP  (LMP Unknown)        Wt Readings from Last 3 Encounters:   06/24/22 51.3 kg (113 lb 1.6 oz)   04/07/22 54 kg (119 lb)   03/25/22 53.5 kg (118 lb)      General: Thin, pleasant, NAD  HEENT: EOMI, no scleral icterus or injection  Respiratory: Breathing comfortably on RA, speaking in full sentences  Skin: No rashes or lesions  Psych: Alert and oriented x3, pleasant affect        Data reviewed:  I reviewed recent labs and imaging.      database reviewed: Not taking any controlled substances.        Impression & Recommendations:    Breast Cancer and Recurrent Cervical Cancer: Currently tolerating chemotherapy, pain is actually improving. Rarely using Tylenol. No significant nausea. Eating well.  Has lost weight but monitoring this and working on increasing physical activity again.  Does have PRN antiemetics should nausea become an issue.    Constipation/Leakage/Gas: Had been constipated.  STarted Senna PRN which has helped.  Having lots of gas.  Discussed trying OTC agents to see if this helps and paying attention to foods to see if one is triggering this.  Discussed ensuring regular bowel movemetns and using Senna and/or Miralax.    Coping: Patient feels her mood is good. Feels that she is strong and will be able to tolerate treatmetn for her cancer.  Did ask about counseling should she need this. Does not want to schedule anything right now.    Arleen Fink MD      Attending Note:  Patient left the clinic while waiting for me to staff. I did call her and apologized she had to wait. Dr Fink's findings and recommendations reviewed with me. I am not  sure she will follow-up with us.    Thank you for involving us in the patient's care.   Thomas Anders MD / Palliative Medicine / Text me via [facility].

## 2022-08-02 NOTE — NURSING NOTE
"Oncology Rooming Note    August 2, 2022 8:10 AM   Cordell Donaldson is a 72 year old female who presents for:    Chief Complaint   Patient presents with     Oncology Clinic Visit     New Breast Ca     Initial Vitals: /84 (BP Location: Right arm, Patient Position: Fowlers, Cuff Size: Adult Regular)   Pulse 79   Temp 97.6  F (36.4  C) (Oral)   Ht 1.59 m (5' 2.6\")   Wt 50.6 kg (111 lb 9.6 oz)   SpO2 99%   BMI 20.02 kg/m   Estimated body mass index is 20.02 kg/m  as calculated from the following:    Height as of this encounter: 1.59 m (5' 2.6\").    Weight as of this encounter: 50.6 kg (111 lb 9.6 oz). Body surface area is 1.49 meters squared.  No Pain (0)  No LMP recorded. Patient is postmenopausal.  Allergies reviewed: Yes  Medications reviewed: Yes    Medications:Not Needed  Pharmacy name entered into [facility]:    [id number] - [place], [place] - [facility]  [id number] - [place], [place] - [facility] [place], [place] - [address] [place] [address] [phone]    Clinical concerns: Pt has no concerns for their provider on August 2, 2022 and would like to discuss the original chief complaint of the appointment.     Marija Street, EMT on August 2, 2022 at 8:11 AM            "

## 2022-08-02 NOTE — PROGRESS NOTES
RN CARE COORDINATION  Surgical Oncology      Met with Cordell after clinic consultation with Dr. Pike on 8/2/2022  We reviewed Dr. Pike's plan of care:     -  MRI now  -  Repeat MRI in October  - Return visit with Dr. Pike in early November to review scans and discuss surgery       Introduced self and role of RN Care Coordinator at [facility]. Provided contact information, [facility] phone number. Reviewed use of [employer] to contact team with questions or concerns while follow-up is being scheduled.       Writer answered all questions to the best of her ability. Approximately five minutes was spent in consultation with the patient.         ELMO Crespo, RN  RN Care Coordinator  [facility]  Surgical Oncology

## 2022-08-02 NOTE — PROGRESS NOTES
NEW SURGICAL CONSULTATION  Aug 2, 2022    Cordell Donaldson is a 72 year old woman who presents with a left  chest wall complaint.  She was self-referred.     HPI:    Treatment to date:  1. Left lumpectomy + [place] in [place] [facility] [place] [id number] invasive carcinoma with uninvolved margins, 0/1 nodes  2. Oncotype 10  3. Adjuvant endocrine therapy (tamoxifen x 5 years)  4. Cervical cancer dx 2012 - [facility],upper vaginectomy 2012  5. Diagnosed with recurrence of cervical cancer in May 2022   6. Chemotherapy with cisplatin, paclitaxel, bevacizumab (started 7/11/2022 to ongoing)    After her breast cancer treatment, she had been undergoing screening without issues.  She tolerated her treatment well.    Her pelvic symptoms (pelvic pressure and pain) have been improving somewhat with chemotherapy - has needed less Tylenol.  Some issues with bowels and urinary frequency.  No breast symptoms.    Imaging showed a LEFT interpectoral mass. US confirmed a 1.3 cm mass at 12:30 8 cm FN.    A biopsy was performed and a Q-shaped clip was placed.  It showed invasive ductal carcinoma, grade 2, ER+ SC- HER2/carlos eduardo non-amplified.      BREAST-SPECIFIC HISTORY:  Prior breast surgeries: Yes  Prior radiation history: Yes  Bra size: 34 A  Dominant hand: Right    Past Medical History:   Diagnosis Date     Abnormal Pap smear     maybe 20 years ago     Cervical cancer (H)      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      History of breast cancer 2003    lumpectomy and radiation offerred chemo and patient declined at time but had oncogene test and low risk     Hyperlipidemia LDL goal < 160      Osteopenia      Paroxysmal A-fib (H)      Severe vaginal dysplasia    No MI, CVA, DM    Past Surgical History:   Procedure Laterality Date     BIOPSY       BREAST SURGERY Left 2003    lumpectomy left, did radiation, 5 yr of tamoxifen     COLONOSCOPY  2018    repeat in 2028     Colposcopy Cervix with Loop Electrode  Conization and Lser to Vagina  2008     COLPOSCOPY, BIOPSY, COMBINED  10/24/2012    Procedure: COMBINED COLPOSCOPY, BIOPSY;  Colposcopy, Biopsy of Cervix and Vagina, Ultrasound Guidance;  Surgeon: Colette Ng MD;  Location: [facility] OR     ENT SURGERY  01/23/2018    otoscelerosis, right side     HYSTERECTOMY, FRANCIA  12/2012    high grade cervical dysplasia, [facility], Dr. Arciniega     INSERT PORT VASCULAR ACCESS Right 7/18/2022    Procedure: INSERTION, VASCULAR ACCESS PORT;  Surgeon: Donnie Elias MD;  [place] [facility] [place]     IR CHEST PORT PLACEMENT > 5 YRS OF AGE  7/18/2022     IA LAP VAGINECTOMY, PARTIAL REMOVAL OF VAGINAL WALL  10/2013    upper vaginectomy for dysplasia, Dr. Megan Arciniega   No GA issues    Current Outpatient Medications   Medication Sig Dispense Refill     Acetylcarnitine HCl 250 MG CAPS Take 500 mg by mouth 2 times daily        aspirin (ASA) 81 MG chewable tablet Take 81 mg by mouth daily       Bacillus Coagulans-Inulin (PROBIOTIC FORMULA) 1-250 BILLION-MG CAPS Take by mouth three times a week 25+billion CFUS       BENFOTIAMINE PO Take 150 mg by mouth 2 times daily       Cholecalciferol (VITAMIN D-3) 25 MCG (1000 UT) CAPS Take 1,000 Units by mouth daily 90 capsule 3     Coenzyme Q10 (CO Q 10 PO) Take 100 mg by mouth daily        dexamethasone (DECADRON) 4 MG tablet Take 2 tablets (8 mg) by mouth daily Take for 3 days, starting the day after chemo. Take with food. 6 tablet 3     Efraín Berry 550 MG CAPS Take 400 mg by mouth 2 times daily 60 capsule 11     MAGNESIUM OXIDE PO Take 500 mg by mouth       metoprolol succinate ER (TOPROL XL) 50 MG 24 hr tablet Take 1 tablet (50 mg) by mouth daily 90 tablet 1     Omega-3 Fatty Acids (OMEGA-3 FISH OIL PO) Take 630 mg by mouth 2 times daily        ondansetron (ZOFRAN) 8 MG tablet Take 1 tablet (8 mg) by mouth every 8 hours as needed for nausea (vomiting) 30 tablet 2     OVER-THE-COUNTER Turkey tail mushroom powder, use 3 times a day 1 Can 11     Pectin  "Cit-Inos-C-Bioflav-Soy (MODIFIED CITRUS PECTIN PO)        prochlorperazine (COMPAZINE) 10 MG tablet Take 1 tablet (10 mg) by mouth every 6 hours as needed for nausea or vomiting 30 tablet 2     Selenium 200 MCG CAPS Take 200 mg by mouth       Taurine 1000 MG CAPS Take one po twice daily 60 capsule 11     Turmeric 450 MG CAPS Take 2 capsules by mouth daily 60 capsule 11         No Known Allergies     SOCIAL HISTORY:  Smokes: No    She works as a realtor.    ROS:  Easy bruising/bleeding: No  History of DVT/PE: No    /84 (BP Location: Right arm, Patient Position: Fowlers, Cuff Size: Adult Regular)   Pulse 79   Temp 97.6  F (36.4  C) (Oral)   Ht 1.59 m (5' 2.6\")   Wt 50.6 kg (111 lb 9.6 oz)   SpO2 99%   BMI 20.02 kg/m     Physical Exam  Constitutional:       Appearance: She is well-developed.   Chest:   Breasts: Breasts are symmetrical.      Right: Axillary adenopathy (Rubbery 1 cm mobile node) present. No inverted nipple, mass, nipple discharge, skin change, tenderness or supraclavicular adenopathy.      Left: No inverted nipple, mass, nipple discharge, skin change, tenderness, axillary adenopathy or supraclavicular adenopathy.            Comments: Patient was examined in both supine and upright positions.   Lymphadenopathy:      Cervical: No cervical adenopathy.      Right cervical: No superficial, deep or posterior cervical adenopathy.     Left cervical: No superficial, deep or posterior cervical adenopathy.      Upper Body:      Right upper body: Axillary adenopathy (Rubbery 1 cm mobile node) present. No supraclavicular or pectoral adenopathy.      Left upper body: No supraclavicular, axillary or pectoral adenopathy.      Comments: No lymphedema in bilateral upper extremities.   Skin:     General: Skin is warm and dry.          INVESTIGATIONS:    PET/CT (7/1/2022) showed:  FINDINGS:   HEAD/NECK:  There is no  suspicious FDG uptake in the neck.   The paranasal sinuses demonstrate scattered areas of " polypoid mucosal thickening, most pronounced within the maxillary sinuses. The mastoid air cells are clear.   The mucosal pharyngeal space, the , prevertebral and carotid spaces are within normal limits.   No masses, mass effect or pathologically enlarged lymph nodes are evident. The thyroid gland is unremarkable.  CHEST:  There is no suspicious FDG uptake in the chest. Mild soft tissue thickening in the left retropectoral region with an SUV max of 2.7. This may represent a small reactive lymph node.  There are no pathologically enlarged mediastinal, hilar or axillary lymph nodes.  There are no suspicious lung nodules or evidence for infection.      There is no significant pericardial or pleural effusions.  ABDOMEN AND PELVIS:  Large heterogenous and hypermetabolic mass seen within the midline of the pelvis measuring approximately 8.5 x 7.2 x 8.1 cm with an SUV max measuring up to 13.2. Additionally, there is central hypoattenuation with relative lack of metabolic activity, suggestive of central necrosis. This mass previously measured 8.5 x 6.7 x 8.1 cm on prior MRI from 5/20/2022. There is abutment of the pelvic mass along the posterior aspect of the bladder and anterior rectum, cannot definitively exclude invasion. No inguinal lymphadenopathy. Redemonstration of hypermetabolic left pelvic lymphadenopathy, the largest lymph node measuring up to 3.3 x 2.4 cm with an SUV max of 12.2, previously measuring 2.5 x 2.1 cm. Additional conglomeration of hypermetabolic lymph nodes at the aortic bifurcation measuring 2.9 x 1.6 cm with an SUV max of 14.3, previously measuring 1.9 x 1.7 cm. Unchanged enlarged mildly metabolic right pelvic lymph node measuring 2.5 x 1.1 cm with an SUV max of 4.2, favored to represent a reactive lymph node given the relative lack of hypermetabolic activity compared to the left pelvic lymph node.  1.5 cm cystic hypodensity within the inferior right hepatic lobe, likely representing  simple hepatic cysts. Layering hyperdense material within the gallbladder, consistent with biliary sludge. 1.8 cm intermediate density exophytic cyst off the mid pole the right kidney which demonstrates hypermetabolic activity with SUV max of 11.7. No evidence of splenomegaly. No pancreatic mass lesion. No suspicious adrenal masses or gallbladder calculi. No hydronephrosis. No evidence of diverticulitis, bowel obstruction or significant free fluid.  LOWER EXTREMITIES:   No abnormal masses or hypermetabolic lesions.  BONES:   There are no suspicious lytic or blastic osseous lesions.  There is no abnormal FDG uptake in the skeleton. Multilevel degenerative changes throughout the spine.  IMPRESSION:   In this patient with a history of biopsy-proven poorly differentiated squamous cell carcinoma of the cervix, there is metastatic disease (abdominal/pelvic lymphadenopathy).  1. Large hypermetabolic and centrally necrotic midline pelvic mass which exerts mass effect upon the posterior bladder and anterior rectal walls, which is consistent with known squamous cell carcinoma the cervix. This mass is relatively unchanged when compared to prior MRI from 5/20/2022.  2. Metastatic lymph nodes - hypermetabolic bilateral pelvic (left greater than right) and periaortic abdominal lymphadenopathy   3. Left breast - Mild soft tissue thickening in the left retropectoral region which demonstrates low level metabolic activity. Findings may represent a small reactive lymph node, however given history of left breast cancer (2003) attention on follow up imaging or biopsy may be considered.  (New since PET/CT 10/31/2012.)    Diagnostic Mammogram & Ultrasound (7/13/2022) showed:  BREAST DENSITY: Heterogeneously dense.  Findings:     Mammogram:  No suspicious mammographic findings in either breast.  Ultrasound:  Targeted ultrasound evaluation was performed by the technologist and radiologist. Ultrasound evaluation of the left breast at 12:30,  8 cm from the nipple demonstrates a 1.1 x 0.9 x 1.3 cm irregular hypoechoic mass with angular margins. Color Doppler flow is demonstrated centrally within the mass. Findings may represent an abnormal lymph node versus a solid mass.  IMPRESSION: BI-RADS CATEGORY: 4 - Suspicious.    Biopsy (7/15/2022) showed:  Final Diagnosis   A. LEFT BREAST, 12:30 POSITION, 8 CM FROM NIPPLE, BIOPSY:  -INVASIVE DUCTAL CARCINOMA, [place] grade 2, at least 10 mm in linear extent.  -Invasive carcinoma infiltrates fibrous tissue and skeletal muscle.  -Focal microcalcifications are present.     ER positive  [place] negative  HER2/carlos eduardo negative    ASSESSMENT:  Cordell Donaldson is a 72 year old woman with recurrent left breast and cervical cancer.    I personally reviewed her imaging above.    The natural history, diagnosis and treatment of recurrent breast cancer was discussed with Cordell Donaldson and her spouse Jhon.  I reviewed the above PET/CT with Cordell Donaldson.  She has an interpectoral recurrence, likely brenden despite there not being any brenden elements on biopsy.    We discussed that management of locoregionally recurrent breast cancer typically involves multidisciplinary treatment, with systemic therapy, surgery, and radiation therapy. She has met with Dr Montana, who stated that the chemotherapy Cordell Donaldson is receiving for her recurrent cervical cancer is also appropriate for the breast cancer.  The plan following chemotherapy will likely be endocrine therapy with letrozole.    We then discussed that surgical resection following systemic therapy, which could provide some information regarding tumor response.  We discussed that surgical resection involves either removing the previously clipped area versus a lymph node dissection.  She will likely need additional radiation to the [place] basin. A lymph node dissection carries with it significantly higher risks of lymphedema.      Finally, we discussed that treatment of  the cervical cancer is the current priority.  Whether we move to surgical resection will depend on response of her cervical cancer to treatment.    I agree with Dr Montana's recommendations for a baseline breast MRI now (it was ordered on 7/25, but not yet scheduled as of today's appointment), and a repeat in October 2022 to determine radiographic response. The breast MRI now will also allow for assessment of the right axillary node.  I suspect it is reactive due to the extensive hematoma from the port placement.  I will see her for follow up in early November 2022 to discuss next steps in treatment.    All of the above was discussed with Cordell Donaldson and all questions were answered.      Total time spent with the patient was 60 minutes, of which 75% was counseling.     PLAN:  1. Bilateral breast MRI now  2. Chemotherapy per Dr Raygoza  3. Follow up breast MRI in October 2022  4. Follow up with me in November 2022 to discuss next steps    Yvette Pike MD MSc [facility] FACS  Associate Professor of Surgery  Division of Surgical Oncology  [facility]     75 minutes spent on the date of the encounter doing chart review, review of outside records, review of test results, interpretation of tests, patient visit, documentation and discussion with family.

## 2022-08-02 NOTE — LETTER
8/2/2022         RE: Cordell Donaldson  [address]  [facility] [address]        Dear Colleague,    Thank you for referring your patient, Cordell Donaldson, to the [facility]. Please see a copy of my visit note below.    NEW SURGICAL CONSULTATION  Aug 2, 2022    Cordell Donaldson is a 72 year old woman who presents with a left  chest wall complaint.  She was self-referred.     HPI:    Treatment to date:  1. Left lumpectomy + radiation therapy in 2003 at [facility] - pT1cN0 invasive carcinoma with uninvolved margins, 0/1 nodes  2. Oncotype 10  3. Adjuvant endocrine therapy (tamoxifen x 5 years)  4. Cervical cancer dx 2012 - [facility],upper vaginectomy 2012  5. Diagnosed with recurrence of cervical cancer in May 2022   6. Chemotherapy with cisplatin, paclitaxel, bevacizumab (started 7/11/2022 to ongoing)    After her breast cancer treatment, she had been undergoing screening without issues.  She tolerated her treatment well.    Her pelvic symptoms (pelvic pressure and pain) have been improving somewhat with chemotherapy - has needed less Tylenol.  Some issues with bowels and urinary frequency.  No breast symptoms.    Imaging showed a LEFT interpectoral mass. US confirmed a 1.3 cm mass at 12:30 8 cm FN.    A biopsy was performed and a Q-shaped clip was placed.  It showed invasive ductal carcinoma, grade 2, ER+ RI- HER2/carlos eduardo non-amplified.      BREAST-SPECIFIC HISTORY:  Prior breast surgeries: Yes  Prior radiation history: Yes  Bra size: 34 A  Dominant hand: Right    Past Medical History:   Diagnosis Date     Abnormal Pap smear     maybe 20 years ago     Cervical cancer (H)      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      History of breast cancer 2003    lumpectomy and radiation offerred chemo and patient declined at time but had oncogene test and low risk     Hyperlipidemia LDL goal < 160      Osteopenia      Paroxysmal A-fib (H)      Severe vaginal  dysplasia    No MI, CVA, DM    Past Surgical History:   Procedure Laterality Date     BIOPSY       BREAST SURGERY Left 2003    lumpectomy left, did radiation, 5 yr of tamoxifen     COLONOSCOPY  2018    repeat in 2028     Colposcopy Cervix with Loop Electrode Conization and Lser to Vagina  2008     COLPOSCOPY, BIOPSY, COMBINED  10/24/2012    Procedure: COMBINED COLPOSCOPY, BIOPSY;  Colposcopy, Biopsy of Cervix and Vagina, Ultrasound Guidance;  Surgeon: Colette Ng MD;  Location: [facility] OR     ENT SURGERY  01/23/2018    otoscelerosis, right side     HYSTERECTOMY, FRANCIA  12/2012    high grade cervical dysplasia, [place] [facility], Dr. Arciniega     INSERT PORT VASCULAR ACCESS Right 7/18/2022    Procedure: INSERTION, VASCULAR ACCESS PORT;  Surgeon: Donnie Elias MD;  Location: [facility] OR     IR CHEST PORT PLACEMENT > 5 YRS OF AGE  7/18/2022     [place] LAP VAGINECTOMY, PARTIAL REMOVAL OF VAGINAL WALL  10/2013    upper vaginectomy for dysplasia, Dr. Megan Arciniega   No GA issues    Current Outpatient Medications   Medication Sig Dispense Refill     Acetylcarnitine HCl 250 MG CAPS Take 500 mg by mouth 2 times daily        aspirin (ASA) 81 MG chewable tablet Take 81 mg by mouth daily       Bacillus Coagulans-Inulin (PROBIOTIC FORMULA) 1-250 BILLION-MG CAPS Take by mouth three times a week 25+billion CFUS       BENFOTIAMINE PO Take 150 mg by mouth 2 times daily       Cholecalciferol (VITAMIN D-3) 25 MCG (1000 UT) CAPS Take 1,000 Units by mouth daily 90 capsule 3     Coenzyme Q10 (CO Q 10 PO) Take 100 mg by mouth daily        dexamethasone (DECADRON) 4 MG tablet Take 2 tablets (8 mg) by mouth daily Take for 3 days, starting the day after chemo. Take with food. 6 tablet 3     Efraín Berry 550 MG CAPS Take 400 mg by mouth 2 times daily 60 capsule 11     MAGNESIUM OXIDE PO Take 500 mg by mouth       metoprolol succinate ER (TOPROL XL) 50 MG 24 hr tablet Take 1 tablet (50 mg) by mouth daily 90 tablet 1     Omega-3 Fatty Acids (OMEGA-3 FISH OIL PO)  "Take 630 mg by mouth 2 times daily        ondansetron (ZOFRAN) 8 MG tablet Take 1 tablet (8 mg) by mouth every 8 hours as needed for nausea (vomiting) 30 tablet 2     OVER-THE-COUNTER Turkey tail mushroom powder, use 3 times a day 1 Can 11     Pectin Cit-Inos-C-Bioflav-Soy (MODIFIED CITRUS PECTIN PO)        prochlorperazine (COMPAZINE) 10 MG tablet Take 1 tablet (10 mg) by mouth every 6 hours as needed for nausea or vomiting 30 tablet 2     Selenium 200 MCG CAPS Take 200 mg by mouth       Taurine 1000 MG CAPS Take one po twice daily 60 capsule 11     Turmeric 450 MG CAPS Take 2 capsules by mouth daily 60 capsule 11         No Known Allergies     SOCIAL HISTORY:  Smokes: No    She works as a realtor.    ROS:  Easy bruising/bleeding: No  History of DVT/PE: No    /84 (BP Location: Right arm, Patient Position: Fowlers, Cuff Size: Adult Regular)   Pulse 79   Temp 97.6  F (36.4  C) (Oral)   Ht 1.59 m (5' 2.6\")   Wt 50.6 kg (111 lb 9.6 oz)   SpO2 99%   BMI 20.02 kg/m     Physical Exam  Constitutional:       Appearance: She is well-developed.   Chest:   Breasts: Breasts are symmetrical.      Right: Axillary adenopathy (Rubbery 1 cm mobile node) present. No inverted nipple, mass, nipple discharge, skin change, tenderness or supraclavicular adenopathy.      Left: No inverted nipple, mass, nipple discharge, skin change, tenderness, axillary adenopathy or supraclavicular adenopathy.            Comments: Patient was examined in both supine and upright positions.   Lymphadenopathy:      Cervical: No cervical adenopathy.      Right cervical: No superficial, deep or posterior cervical adenopathy.     Left cervical: No superficial, deep or posterior cervical adenopathy.      Upper Body:      Right upper body: Axillary adenopathy (Rubbery 1 cm mobile node) present. No supraclavicular or pectoral adenopathy.      Left upper body: No supraclavicular, axillary or pectoral adenopathy.      Comments: No lymphedema in bilateral " upper extremities.   Skin:     General: Skin is warm and dry.          INVESTIGATIONS:    PET/CT (7/1/2022) showed:  FINDINGS:   HEAD/NECK:  There is no  suspicious FDG uptake in the neck.   The paranasal sinuses demonstrate scattered areas of polypoid mucosal thickening, most pronounced within the maxillary sinuses. The mastoid air cells are clear.   The mucosal pharyngeal space, the , prevertebral and carotid spaces are within normal limits.   No masses, mass effect or pathologically enlarged lymph nodes are evident. The thyroid gland is unremarkable.  CHEST:  There is no suspicious FDG uptake in the chest. Mild soft tissue thickening in the left retropectoral region with an SUV max of 2.7. This may represent a small reactive lymph node.  There are no pathologically enlarged mediastinal, hilar or axillary lymph nodes.  There are no suspicious lung nodules or evidence for infection.      There is no significant pericardial or pleural effusions.  ABDOMEN AND PELVIS:  Large heterogenous and hypermetabolic mass seen within the midline of the pelvis measuring approximately 8.5 x 7.2 x 8.1 cm with an SUV max measuring up to 13.2. Additionally, there is central hypoattenuation with relative lack of metabolic activity, suggestive of central necrosis. This mass previously measured 8.5 x 6.7 x 8.1 cm on prior MRI from 5/20/2022. There is abutment of the pelvic mass along the posterior aspect of the bladder and anterior rectum, cannot definitively exclude invasion. No inguinal lymphadenopathy. Redemonstration of hypermetabolic left pelvic lymphadenopathy, the largest lymph node measuring up to 3.3 x 2.4 cm with an SUV max of 12.2, previously measuring 2.5 x 2.1 cm. Additional conglomeration of hypermetabolic lymph nodes at the aortic bifurcation measuring 2.9 x 1.6 cm with an SUV max of 14.3, previously measuring 1.9 x 1.7 cm. Unchanged enlarged mildly metabolic right pelvic lymph node measuring 2.5 x 1.1 cm with  an SUV max of 4.2, favored to represent a reactive lymph node given the relative lack of hypermetabolic activity compared to the left pelvic lymph node.  1.5 cm cystic hypodensity within the inferior right hepatic lobe, likely representing simple hepatic cysts. Layering hyperdense material within the gallbladder, consistent with biliary sludge. 1.8 cm intermediate density exophytic cyst off the mid pole the right kidney which demonstrates hypermetabolic activity with SUV max of 11.7. No evidence of splenomegaly. No pancreatic mass lesion. No suspicious adrenal masses or gallbladder calculi. No hydronephrosis. No evidence of diverticulitis, bowel obstruction or significant free fluid.  LOWER EXTREMITIES:   No abnormal masses or hypermetabolic lesions.  BONES:   There are no suspicious lytic or blastic osseous lesions.  There is no abnormal FDG uptake in the skeleton. Multilevel degenerative changes throughout the spine.  IMPRESSION:   In this patient with a history of biopsy-proven poorly differentiated squamous cell carcinoma of the cervix, there is metastatic disease (abdominal/pelvic lymphadenopathy).  1. Large hypermetabolic and centrally necrotic midline pelvic mass which exerts mass effect upon the posterior bladder and anterior rectal walls, which is consistent with known squamous cell carcinoma the cervix. This mass is relatively unchanged when compared to prior MRI from 5/20/2022.  2. Metastatic lymph nodes - hypermetabolic bilateral pelvic (left greater than right) and periaortic abdominal lymphadenopathy   3. Left breast - Mild soft tissue thickening in the left retropectoral region which demonstrates low level metabolic activity. Findings may represent a small reactive lymph node, however given history of left breast cancer (2003) attention on follow up imaging or biopsy may be considered.  (New since PET/CT 10/31/2012.)    Diagnostic Mammogram & Ultrasound (7/13/2022) showed:  BREAST DENSITY:  Heterogeneously dense.  Findings:     Mammogram:  No suspicious mammographic findings in either breast.  Ultrasound:  Targeted ultrasound evaluation was performed by the technologist and radiologist. Ultrasound evaluation of the left breast at 12:30, 8 cm from the nipple demonstrates a 1.1 x 0.9 x 1.3 cm irregular hypoechoic mass with angular margins. Color Doppler flow is demonstrated centrally within the mass. Findings may represent an abnormal lymph node versus a solid mass.  IMPRESSION: BI-RADS CATEGORY: 4 - Suspicious.    Biopsy (7/15/2022) showed:  Final Diagnosis   A. LEFT BREAST, 12:30 POSITION, 8 CM FROM NIPPLE, BIOPSY:  -INVASIVE DUCTAL CARCINOMA, Shane grade 2, at least 10 mm in linear extent.  -Invasive carcinoma infiltrates fibrous tissue and skeletal muscle.  -Focal microcalcifications are present.     ER positive  MT negative  HER2/carlos eduardo negative    ASSESSMENT:  Cordell Donaldson is a 72 year old woman with recurrent left breast and cervical cancer.    I personally reviewed her imaging above.    The natural history, diagnosis and treatment of recurrent breast cancer was discussed with Cordell Donaldson and her spouse Jhon.  I reviewed the above PET/CT with Cordell Donaldson.  She has an interpectoral recurrence, likely brenden despite there not being any brenden elements on biopsy.    We discussed that management of locoregionally recurrent breast cancer typically involves multidisciplinary treatment, with systemic therapy, surgery, and radiation therapy. She has met with Dr Montana, who stated that the chemotherapy Cordell Donaldson is receiving for her recurrent cervical cancer is also appropriate for the breast cancer.  The plan following chemotherapy will likely be endocrine therapy with letrozole.    We then discussed that surgical resection following systemic therapy, which could provide some information regarding tumor response.  We discussed that surgical resection involves either removing  the previously clipped area versus a lymph node dissection.  She will likely need additional radiation to the brenden basin. A lymph node dissection carries with it significantly higher risks of lymphedema.      Finally, we discussed that treatment of the cervical cancer is the current priority.  Whether we move to surgical resection will depend on response of her cervical cancer to treatment.    I agree with Dr Montana's recommendations for a baseline breast MRI now (it was ordered on 7/25, but not yet scheduled as of today's appointment), and a repeat in October 2022 to determine radiographic response. The breast MRI now will also allow for assessment of the right axillary node.  I suspect it is reactive due to the extensive hematoma from the port placement.  I will see her for follow up in early November 2022 to discuss next steps in treatment.    All of the above was discussed with Cordell Donaldson and all questions were answered.      Total time spent with the patient was 60 minutes, of which 75% was counseling.     PLAN:  1. Bilateral breast MRI now  2. Chemotherapy per Dr Raygoza  3. Follow up breast MRI in October 2022  4. Follow up with me in November 2022 to discuss next steps      75 minutes spent on the date of the encounter doing chart review, review of outside records, review of test results, interpretation of tests, patient visit, documentation and discussion with family.        Again, thank you for allowing me to participate in the care of your patient.      Sincerely,    Yvette Pike MD

## 2022-08-03 ENCOUNTER — OFFICE VISIT (OUTPATIENT)
Dept: PALLIATIVE CARE | Facility: CLINIC | Age: 72
End: 2022-08-03
Attending: INTERNAL MEDICINE
Payer: COMMERCIAL

## 2022-08-03 VITALS
TEMPERATURE: 98 F | WEIGHT: 110.7 LBS | SYSTOLIC BLOOD PRESSURE: 131 MMHG | HEART RATE: 99 BPM | BODY MASS INDEX: 19.86 KG/M2 | DIASTOLIC BLOOD PRESSURE: 89 MMHG | OXYGEN SATURATION: 98 %

## 2022-08-03 DIAGNOSIS — C53.9 RECURRENT CERVICAL CANCER (H): Primary | ICD-10-CM

## 2022-08-03 PROCEDURE — G0463 HOSPITAL OUTPT CLINIC VISIT: HCPCS

## 2022-08-03 ASSESSMENT — PAIN SCALES - GENERAL: PAINLEVEL: NO PAIN (0)

## 2022-08-03 NOTE — NURSING NOTE
"0Oncology Rooming Note    August 3, 2022 2:55 PM   Cordell Donaldson is a 72 year old female who presents for:    Chief Complaint   Patient presents with     Oncology Clinic Visit     Palliative Care     Initial Vitals: /89 (BP Location: Right arm, Patient Position: Sitting, Cuff Size: Adult Regular)   Pulse 99   Temp 98  F (36.7  C) (Oral)   Wt 50.2 kg (110 lb 11.2 oz)   SpO2 98%   BMI 19.86 kg/m   Estimated body mass index is 19.86 kg/m  as calculated from the following:    Height as of 8/2/22: 1.59 m (5' 2.6\").    Weight as of this encounter: 50.2 kg (110 lb 11.2 oz). Body surface area is 1.49 meters squared.  No Pain (0) Comment: Data Unavailable   No LMP recorded. Patient is postmenopausal.  Allergies reviewed: Yes  Medications reviewed: Yes    Medications: Medication refills not needed today.  Pharmacy name entered into [email or web address]:    [id number] - [place], [place] - [facility]  [id number] - [place], [place] - [facility] [place], [place] - [address] [facility] [phone]    Clinical concerns: none.      Lebron Addison"

## 2022-08-03 NOTE — PATIENT INSTRUCTIONS
Make sure that you are having regular bowel movements.  You can take both Senna and Miralax if needed.  You can try over the counter gas medications such as Maalox or Simethicone.  If you develop any new or worsening symptoms you can call the palliative care clinic to be seen sooner otherwise you can follow up in the next 3 months (you will be called to schedule this).  Call if you decide that you would like to see social work for counseling (you can also call the number on your insurance card to get a list of places that will accept your insurance).    If you need to reach anyone with palliative care after hours call: [phone]    The palliative care clinic number is: [phone]    Alecia Carvalho (palliative care nurse) is: [phone]

## 2022-08-03 NOTE — LETTER
8/3/2022       RE: Cordell Donaldson  [address]  [facility] [address]     Dear Colleague,    Thank you for referring your patient, Cordell Donaldson, to the [facility] at [facility]. Please see a copy of my visit note below.    Palliative Care Clinic Progress Note    Patient ID:     Medical -     History:    Patient has history of breast and cervical cancer.    She has a history of left breast cancer treated with lumpectomy and radiation therapy in 2003 (followed w/ Dr. Mtz at [facility] Declined chemotherapy (oncotype Dx score later found to be 10), she took 5 years of tamoxifen, which she tolerated well. She had a mammogram in 11/2020 with benign findings. Most recently, she had incidental L breast lesion that was noted as an incidental findings for her pelvic mass (described below). She denies any symptoms with her breast including pain, lumps, or discharge. She did lose a significant amount of weight with an estimated 20 lb loss since the beginning of this year. This weight loss was intentional at first, but she continued to lose weight and notices that she tends to get full easily.  She feels tired as she often wakes up at night to urinate. Spends at least 6 hours a day while awake laying in bed or sitting. Estimates she could walk 30 minutes before she would like stop to take a break. She continues to keep up with her ADLs and chores at home. She had an incidental finding of a 8cm L breast lesion during the work-up for her pelvic mass, found to have grade 2 invasive ductal carcinoma: ER +, TN -, HER2 negative (FISH).     In regards to her cervical cancer history, she was initially determined to have squamous carcinoma in 10/2012.  She is s/p FRANCIA and upper vaginectomy in 12/2012 with no residual invasive cancer, but residual HSIL.  Biopsy on 7/22/2013 was c/w HSIL, cannot exclude invasion.  In 05/2022 she developed symptoms of  "pelvic heaviness (felt like she \"bruised her tailbone\") and progressive pain. She felt like her urination and bowel movements were constricted, needed to put in a lot of effort to urinate or have BM. Pelvic MRI showed a large midline pelvic mass measuring up to 8.5 cm and abutting the bladder and the rectum without definite invasion or left pelvic LAD.  These findings were confirmed on PET/CT. She was started on chemo with cisplatin, paclitaxel, and bevacizumab (7/11/22). She tolerated this well and has noticed some improvement of her urinary and bowel retention. Her only SE after her first cycle was fatigue for a few days and that her stomach felt \"off\" on day 4, but no nausea and did not require PRN nausea or pain medications.     Oncology Plan:     1. Left breast cancer  -She is already receiving Cisplatin 50 mg/m2 + paclitaxel 175 mg/m2 + bevacizumab 15 mg/kg every 21 days started on 7/11 for cervical cancer. This chemotherapy will also treat her breast cancer   -Will obtain baseline breast MRI, followed by surveillance imaging   -When she has completed chemotherapy, she can be switched to endocrine therapy - will start letrozole then   -If she has a good response to chemotherapy, may receive radiation after. Surgery options in the future would depend on how responsive her cervical cancer is to chemotherapy      2. Recurrent cervical versus vaginal cancer   -She follows with GynOnc (Dr. Raygoza)  -Cisplatin 50 mg/m2 + paclitaxel 175 mg/m2 + bevacizumab 15 mg/kg every 21 days (start 7/11)  -Caris testing pending, if PD-L1 is positive, will add pembrolizumab   -PET/CT after 3 cycles   -Difficulty with voiding and BM has improved recently after 1st cycle of chemo     ----------------------------------------------------------------------------------------------------------------------------------------------------     Patient is here today with her .  Recommended to come to palliative care early given her " diagnosis.    In the afternoon have gas so cramps evening plans, quite smelly.  She is having some leaking (likely bowel). Bowel movements have been more regular in the morning but by the afternoon there is some leakage. Was told normal because the area is narrowed.  Started taking Senna about one week ago, takes as needed. If feel pressure will take it at night.     She had been taking Tylenol for pain for awhile, the pain is gone. Using CBD oil at night to help sleep.  Has CBD cream and oil and medical marijana.     Have been feeling fatigued. 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 Take a nap in the afternoon. Rode bike for the first time yesterday. Two day ago walked 1.6 miles. Trying to increase activity wilfredo had been sitting around a lot. Her daughter was visiting was visiting from [place] and helped her get active. Her  has noticed she seems to have lost some muscle mass in her upper extremities.     Hair is falling out which is hard but the change of not having pain is helping since starting chemotherapy.    Eating is better.  For awhile was very picky because had a special diet with breast cancer. When her daughter came they made meals together and she has been enjoying food.  After the chemo (day 4) wasn't nauseated but didn't feel good. Couple days after that appetite came back. Started at 140 lbs (was trying to lose weight last fall), today weighs 110. Weight hasn't been below 110. No nasuea, vomiting or abdominal pain.    She is taking supplements from naturopath to help protect her organs from chemotherapy. After steroids wore off she was very fatigued for 3-4 days, appetite went down, low affect. Everythign picked back up after that.    Mood: Up and down. Was very fun to have her daughter here. Can get crabby if feel tired. She feels more positive about her situation than the doctors do. She feels that she has been strong and healthy and she is giving it the  best treatment she can and that she has a good chance. Was given a 1.5-2 year statistical life expectency. This was incredibly upsetting which was followed by review of terrible side effect of chemotherapy.    Social -  to Isaac (had a 10 year engagement, have been together 28 years, marred 18), they used to travel a lot, go out and go to the movies. She doesn't feel that she is in the position to do those things right now. Isaac has a daughter in [place] (she was just diagnosed with WK).     Care Planning - Patient has a HCD.  Her  is her HCA.  She is remaining very hopeful that the chemotherapy will work and she will continue to live a healthy life.           PE: LMP  (LMP Unknown)        Wt Readings from Last 3 Encounters:   06/24/22 51.3 kg (113 lb 1.6 oz)   04/07/22 54 kg (119 lb)   03/25/22 53.5 kg (118 lb)      General: Thin, pleasant, NAD  HEENT: EOMI, no scleral icterus or injection  Respiratory: Breathing comfortably on RA, speaking in full sentences  Skin: No rashes or lesions  Psych: Alert and oriented x3, pleasant affect        Data reviewed:  I reviewed recent labs and imaging.      database reviewed: Not taking any controlled substances.        Impression & Recommendations:    Breast Cancer and Recurrent Cervical Cancer: Currently tolerating chemotherapy, pain is actually improving. Rarely using Tylenol. No significant nausea. Eating well.  Has lost weight but monitoring this and working on increasing physical activity again.  Does have PRN antiemetics should nausea become an issue.    Constipation/Leakage/Gas: Had been constipated.  STarted Senna PRN which has helped.  Having lots of gas.  Discussed trying OTC agents to see if this helps and paying attention to foods to see if one is triggering this.  Discussed ensuring regular bowel movemetns and using Senna and/or Miralax.    Coping: Patient feels her mood is good. Feels that she is strong and will be able to tolerate treatmetn for  her cancer.  Did ask about counseling should she need this. Does not want to schedule anything right now.    Arleen Fink MD      Attending Note:  Patient left the clinic while waiting for me to staff. I did call her and apologized she had to wait. Dr Fink's findings and recommendations reviewed with me. I am not sure she will follow-up with us.    Thank you for involving us in the patient's care.   Thomas Anders MD / Palliative Medicine / Text me via [facility].      Sincerely,    Arleen Fink MD

## 2022-08-04 ENCOUNTER — PATIENT OUTREACH (OUTPATIENT)
Dept: ONCOLOGY | Facility: CLINIC | Age: 72
End: 2022-08-04

## 2022-08-04 ENCOUNTER — HOSPITAL ENCOUNTER (OUTPATIENT)
Dept: MRI IMAGING | Facility: CLINIC | Age: 72
Discharge: HOME | End: 2022-08-04
Attending: INTERNAL MEDICINE | Admitting: INTERNAL MEDICINE
Payer: COMMERCIAL

## 2022-08-04 DIAGNOSIS — C50.912 RECURRENT BREAST CANCER, LEFT (H): ICD-10-CM

## 2022-08-04 PROCEDURE — 77049 MRI BREAST C-+ W/CAD BI: CPT

## 2022-08-04 PROCEDURE — 250N000011 HC RX IP 250 OP 636

## 2022-08-04 PROCEDURE — A9585 GADOBUTROL INJECTION: HCPCS | Performed by: INTERNAL MEDICINE

## 2022-08-04 PROCEDURE — 255N000002 HC RX 255 OP 636: Performed by: INTERNAL MEDICINE

## 2022-08-04 PROCEDURE — C8908 MRI W/O FOL W/CONT, BREAST,: HCPCS

## 2022-08-04 RX ORDER — GADOBUTROL 604.72 MG/ML
7.5 INJECTION INTRAVENOUS ONCE
Status: COMPLETED | OUTPATIENT
Start: 2022-08-04 | End: 2022-08-04

## 2022-08-04 RX ORDER — HEPARIN SODIUM (PORCINE) LOCK FLUSH IV SOLN 100 UNIT/ML 100 UNIT/ML
SOLUTION INTRAVENOUS
Status: COMPLETED
Start: 2022-08-04 | End: 2022-08-04

## 2022-08-04 RX ORDER — HEPARIN SODIUM (PORCINE) LOCK FLUSH IV SOLN 100 UNIT/ML 100 UNIT/ML
5 SOLUTION INTRAVENOUS ONCE
Status: COMPLETED | OUTPATIENT
Start: 2022-08-04 | End: 2022-08-04

## 2022-08-04 RX ADMIN — GADOBUTROL 5 ML: 604.72 INJECTION INTRAVENOUS at 10:22

## 2022-08-04 RX ADMIN — HEPARIN SODIUM (PORCINE) LOCK FLUSH IV SOLN 100 UNIT/ML 5 ML: 100 SOLUTION at 11:13

## 2022-08-08 NOTE — PROGRESS NOTES
Follow Up Notes on Referred Patient    Date: 8/10/2022       Dr. Bess Paredes MD  [address] [facility] [address]  [place] [address]       RE: Cordell Donaldson  : 1950  MCKINLEY: 8/10/2022    Dear Dr. Bess Paredes:        Cordell Donaldson is a 72 year old woman with a diagnosis of a large pelvic mass and pelvic lymphadenopathy with biopsy showing recurrent cervical cancer vs a new vaginal cancer.  She is here today for a disease management visit.     History of Present Illness:   Cordell Donaldson (she/her/hers) is a 72 year old referred to the Gynecologic Cancer Clinic at the [facility] on 2022 by gynecologic oncologist Dr. Arciniega and radiation oncologist Dr. Stockton for management of a pelvic mass due to recurrent cervical cancer vs new vaginal cancer. History as follows:      Breast Cancer History:  : Left breast cancer.  -Lumpectomy.  -Radiation therapy.     -present: BRENNAN.         Cervical/Vaginal Dysplasia/Cancer History:  10/24/12:   -Endocervical curettings: Squamous carcinoma, invasion cannot be assessed.   -Ectocervical biopsies: HSIL (CIN3).   -Vaginal biopsy, posterior: HSIL (VaIN3).  2012: Robotic-assisted laparoscopic lysis of adhesions, left ureterolysis with  conversion to laparotomy, total abdominal hysterectomy, and an upper  Vaginectomy by gynecologic oncologist Dr. Arciniega.   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      13: Vaginal biopsy: HSIL, cannot exclude invasion.   -Consultation with gynecologic oncologist Dr. Johnson. Upper vaginectomy and CO2 laser ablation recommended, patient declined.      [id number]: No vaginal dysplasia treatment.      22: Pelvic MRI: I have personally reviewed the MRI images.   Centrally hypoenhancing heterogeneous midline pelvic mass along  the superior margin of the vaginal vault measures 8.5 x 6.7 x 8.1 cm.  The mass abut the bladder and there  is mucosal irregularity at the  site of abutment. The mass indents the  decompressed rectum but there is no definite invasion or mucosal  Irregularity.  6/9/22: CT-guided biopsy of pelvic mass: Poorly-differentiated squamous cell carcinoma, HPV-associated.   7/1/22: Staging PET/CT: I have personally reviewed the PET/CT images.   Large heterogenous and hypermetabolic mass seen within the midline of  the pelvis measuring approximately 8.5 x 7.2 x 8.1 cm with an SUV max  measuring up to 13.2. Additionally, there is central hypoattenuation  with relative lack of metabolic activity, suggestive of central  Necrosis (previously 8.5 x 6.7 x 8.1 cm on prior  MRI). No inguinal lymphadenopathy.  Redemonstration of hypermetabolic left pelvic lymphadenopathy, the  largest lymph node measuring up to 3.3 x 2.4 cm with an SUV max of  12.2 (previously measuring 2.5 x 2.1 cm). Additional conglomeration of  hypermetabolic lymph nodes at the aortic bifurcation measuring 2.9 x  1.6 cm with an SUV max of 14.3 (previously measuring 1.9 x 1.7 cm).  Unchanged enlarged mildly metabolic right pelvic lymph node measuring  2.5 x 1.1 cm with an SUV max of 4.2, favored to represent a reactive  lymph node given the relative lack of hypermetabolic activity compared  to the left pelvic lymph node.  7/5/2022: Cisplatin 50 mg/m2 + paclitaxel 175 mg/m2 + bevacizumab 15 mg/kg every 21 days. Get Caris on tumor biopsy. If PD-L1+ (CPS 1% or greater), then will add pembrolizumab 200 mg every 21 days to therapy. Plan for 3 cycles of therapy followed by repeat PET/CT to assess disease response.     7/20/2022: Cycle 1 Cisplatin, Paclitaxel, bevazicumab   8/8/2022: Cycle 2 Cisplatin, Paclitaxel, bevacizumab     Genetic/Genomic/Molecular Testing History:  2006: Negative for germline BRCA1, BRCA2 pathogenic variants.             Today Cordell comes to the clinic ok. Overwhelmed by new breast cancer diagnosis however she has her daughter and  for support.  Has considered a therapist but is not ready for referral. Has neighbors and friends.     Pt reports that pelvic pain has started again achy pressure pain near sacrum. Pain is controlled with Tylenol 1000 mg prn with CBD oil is controlled with this.     Weight loss: Fasting for 48 hours around chemotherapy through a study with BRIAN Neri. Pt is trying to eat often but is aware of her weight loss. She is a vegetarian but will eat fish and eggs here and there. Eating yogurt and vegetables. Eating whey protein protein shakes in the morning. Eats avocados and nuts and seeds. Using Senna two tablets nightly controlling constipation. Pt has great appetite. Pt will add peanut butter and increase protein shakes. Increase fish, beans, proteins.     She denies any vaginal bleeding, no changes in her bowel or bladder habits, no nausea/emesis, no lower extremity edema, and no difficulties eating or sleeping. She denies any abdominal bloating, no fevers or chills, and no chest pain or shortness of breath. Has a hx of a-fib and is on Metoprolol. No neuropathy. 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 Pt reports tinnitus in the right ear for two days after chemo which resolved after two days. No increased hearing loss.                Review of Systems:    Systemic           + weight changes; no fever; no chills; no night sweats; no appetite changes  Skin           no rashes, or lesions  Eye           no irritation; no changes in vision  Ramona-Laryngeal           no dysphagia; no hoarseness   Pulmonary    no cough; no shortness of breath  Cardiovascular    no chest pain; no palpitations  Gastrointestinal    no diarrhea; + constipation; no abdominal pain; no changes in bowel  habits; no blood in stool  Genitourinary   no urinary frequency; no urinary urgency; no dysuria; no pain; no abnormal vaginal discharge; no abnormal vaginal bleeding, + pelvic pain  Breast   no breast discharge; no  breast changes; no breast pain  Musculoskeletal    no myalgias; no arthralgias; no back pain  Psychiatric           no depressed mood; no anxiety    Hematologic           no tender lymph nodes; no noticeable swellings or lumps   Endocrine    no hot flashes; no heat/cold intolerance         Neurological   no tremor; no numbness and tingling; no headaches; no difficulty sleeping      Past Medical History:    Past Medical History:   Diagnosis Date     Abnormal Pap smear     maybe 20 years ago     Cervical cancer (H)      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      History of breast cancer 2003    lumpectomy and radiation offerred chemo and patient declined at time but had oncogene test and low risk     Hyperlipidemia LDL goal < 160      Osteopenia      Paroxysmal A-fib (H)      Severe vaginal dysplasia          Past Surgical History:    Past Surgical History:   Procedure Laterality Date     BIOPSY       BREAST SURGERY Left 2003    lumpectomy left, did radiation, 5 yr of tamoxifen     COLONOSCOPY  2018    repeat in 2028     Colposcopy Cervix with Loop Electrode Conization and Lser to Vagina  2008     COLPOSCOPY, BIOPSY, COMBINED  10/24/2012    Procedure: COMBINED COLPOSCOPY, BIOPSY;  Colposcopy, Biopsy of Cervix and Vagina, Ultrasound Guidance;  Surgeon: Colette Ng MD;  Location: [facility] OR     ENT SURGERY  01/23/2018    otoscelerosis, right side     HYSTERECTOMY, FRANCIA  12/2012    high grade cervical dysplasia, [place] [facility], Dr. Arciniega     INSERT PORT VASCULAR ACCESS Right 7/18/2022    Procedure: INSERTION, VASCULAR ACCESS PORT;  Surgeon: Donnie Elias MD;  Location: [facility] OR      CHEST PORT PLACEMENT > 5 YRS OF AGE  7/18/2022     AR LAP VAGINECTOMY, PARTIAL REMOVAL OF VAGINAL WALL  10/2013    upper vaginectomy for dysplasia, Dr. Megan Arciniega         Health Maintenance Due   Topic Date Due     Pneumococcal Vaccine: 65+ Years (1 - PCV) Never done     ZOSTER IMMUNIZATION (2 of 2)  02/12/2020     COVID-19 Vaccine (4 - Booster for Pfizer series) 01/08/2022     PAP  01/02/2023       Current Medications:     Current Outpatient Medications   Medication Sig Dispense Refill     Acetylcarnitine HCl 250 MG CAPS Take 500 mg by mouth 2 times daily        aspirin (ASA) 81 MG chewable tablet Take 81 mg by mouth daily       Bacillus Coagulans-Inulin (PROBIOTIC FORMULA) 1-250 BILLION-MG CAPS Take by mouth three times a week 25+billion CFUS       BENFOTIAMINE PO Take 150 mg by mouth 2 times daily       Cholecalciferol (VITAMIN D-3) 25 MCG (1000 UT) CAPS Take 1,000 Units by mouth daily 90 capsule 3     Coenzyme Q10 (CO Q 10 PO) Take 100 mg by mouth daily        dexamethasone (DECADRON) 4 MG tablet Take 2 tablets (8 mg) by mouth daily Take for 3 days, starting the day after chemo. Take with food. 6 tablet 3     Columbus Berry 550 MG CAPS Take 400 mg by mouth 2 times daily 60 capsule 11     MAGNESIUM OXIDE PO Take 500 mg by mouth       metoprolol succinate ER (TOPROL XL) 50 MG 24 hr tablet Take 1 tablet (50 mg) by mouth daily 90 tablet 1     Omega-3 Fatty Acids (OMEGA-3 FISH OIL PO) Take 630 mg by mouth 2 times daily        ondansetron (ZOFRAN) 8 MG tablet Take 1 tablet (8 mg) by mouth every 8 hours as needed for nausea (vomiting) 30 tablet 2     OVER-THE-COUNTER Turkey tail mushroom powder, use 3 times a day 1 Can 11     Pectin Cit-Inos-C-Bioflav-Soy (MODIFIED CITRUS PECTIN PO)        prochlorperazine (COMPAZINE) 10 MG tablet Take 1 tablet (10 mg) by mouth every 6 hours as needed for nausea or vomiting 30 tablet 2     Selenium 200 MCG CAPS Take 200 mg by mouth       Taurine 1000 MG CAPS Take one po twice daily 60 capsule 11     Turmeric 450 MG CAPS Take 2 capsules by mouth daily 60 capsule 11         Allergies:      No Known Allergies     Social History:     Social History     Tobacco Use     Smoking status: Former Smoker     Packs/day: 0.50     Years: 15.00     Pack years: 7.50     Smokeless tobacco: Never  "Used   Substance Use Topics     Alcohol use: Yes     Alcohol/week: 2.0 standard drinks     Types: 2 Standard drinks or equivalent per week     Comment: Socially        History   Drug Use No         Family History:     The patient's family history is notable for     Family History   Problem Relation Age of Onset     Myocardial Infarction Mother 73        Tob     Myocardial Infarction Father 68        Tob     Breast Cancer Sister 47         of breast cancer age 63; BRCA mutation testing negative     Cancer Maternal Grandmother         Unsure of type.     Breast Cancer Paternal Grandmother         Unsure of diagnosis--some type of \"women's issue\"         Physical Exam:     /86   Pulse 74   Temp 97.5  F (36.4  C)   Resp 18   Wt 48.5 kg (107 lb)   SpO2 100%   BMI 19.20 kg/m    Body mass index is 19.2 kg/m .    General Appearance: healthy and alert, no distress     HEENT: no thyromegaly, no palpable nodules or masses        Cardiovascular: regular rate and rhythm, no gallops, rubs or murmurs     Respiratory: lungs clear, no rales, rhonchi or wheezes    Musculoskeletal: extremities non tender and without edema    Skin: no lesions or rashes    Neurological: normal gait, no gross defects     Psychiatric: appropriate mood and affect                               Hematological: normal cervical, supraclavicular lymph nodes     Gastrointestinal:       abdomen soft, non-tender, non-distended     Genitourinary: deferred          Assessment:    Cordell Donaldson is a 72 year old woman with a diagnosis of a large pelvic mass and pelvic lymphadenopathy with biopsy showing recurrent cervical cancer vs a new vaginal cancer.  She is here today for a disease management visit.     Medical history significant for FIGO Stage IA1 squamous cell carcinoma of the cervix s/p hysterectomy with no residual disease, and a long history of vaginal HSIL. New recurrent breast cancer - history of left breast cancer treated with " lumpectomy and radiation therapy in 2003 (followed w/ Dr. Mtz at [facility]). Now with confirmed left breast biopsy on 7/15/2022 grade 2 invasive ductal carcinoma: ER +, [place] -, HER2 negative (FISH).     40 minutes spent on the date of the encounter doing chart review, history and exam, documentation, and further activities as noted above.         Plan:     1.)   Squamous cell carcinoma. Ok to proceed with planned treatment given labs have met parameters. 24 hour urine ahead of next treatment due to 100 urine protein. Encouraged 5-6 small high protein meals a day and discussed nausea management and control. Encouraged 64 oz of fluids per day. Discussed neutropenic precautions and faviola period. Reviewed signs and symptoms for when she should contact the clinic or seek additional care. Patient to contact the clinic with any questions or concerns in the interim.     2.) Genetics: referral placed. Appt scheduled for 10/24/2022.     3.) Labs and/or tests ordered include: CBC, CMP, Mag, Protein urine reviewed during visit.   -obtain 24 hour urine ahead of next cycle reviewed and ordered with patient      4.) Health maintenance issues addressed today include annual health maintenance and non-gynecologic issues with PCP.    5.)        Pelvic pain: largely controlled with APAP and CBD oil per patient. Reviewed that we can send in different pain medications if not controlled. Met with Palliative Care on 8/3/22.     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 Mtz). Consultation with Dr. Montana in Oncology on 7/25/2022 plan to obtain baseline breast MRI, and repeat MRI in approximately 2-3 months in order to monitor breast response to cervical cancer chemotherapy. Message sent to Dr. Raygoza with updates.       7.)      Weight loss: long discussion on maximizing calories and protein in vegetarian diet.  Encouraged more plant based protein and fish/eggs (she eats), along with protein shakes, avocado, olive oil, nuts and seeds. Appetite is fine.         TE Head, NP-BC  Women's Health Nurse Practitioner  Division of Gynecologic Oncology  [facility]        CC  Patient Care Team:  Merry Lino MD as PCP - General (Internal Medicine)  Gui Mccollum MD Corfield, Aaron Daniel, DPM as MD (Podiatry)  Elise Huitron MD as Assigned Heart and Vascular Provider  Merry Lino MD as Assigned PCP  Tess Mcgowan RN as Specialty Care Coordinator  Heather Ayon MD as MD (Urology)  Heather Ayon MD as Assigned Surgical Provider  Angela Montana MD as MD (Hematology & Oncology)  Yvette Pike MD as MD (Surgery)  Jewels Oconnor APRN CNP as Assigned Cancer Care Provider  Valeri Kirby, RN as Specialty Care Coordinator (Hematology & Oncology)  GUI MCCOLLUM

## 2022-08-10 ENCOUNTER — APPOINTMENT (OUTPATIENT)
Dept: LAB | Facility: CLINIC | Age: 72
End: 2022-08-10
Attending: OBSTETRICS & GYNECOLOGY
Payer: COMMERCIAL

## 2022-08-10 ENCOUNTER — ONCOLOGY VISIT (OUTPATIENT)
Dept: ONCOLOGY | Facility: CLINIC | Age: 72
End: 2022-08-10
Attending: OBSTETRICS & GYNECOLOGY
Payer: COMMERCIAL

## 2022-08-10 VITALS
SYSTOLIC BLOOD PRESSURE: 124 MMHG | TEMPERATURE: 97.5 F | WEIGHT: 107 LBS | RESPIRATION RATE: 18 BRPM | BODY MASS INDEX: 19.2 KG/M2 | OXYGEN SATURATION: 100 % | HEART RATE: 74 BPM | DIASTOLIC BLOOD PRESSURE: 86 MMHG

## 2022-08-10 DIAGNOSIS — C53.0 MALIGNANT NEOPLASM OF ENDOCERVIX (H): Primary | ICD-10-CM

## 2022-08-10 DIAGNOSIS — R80.9 URINE PROTEIN INCREASED: ICD-10-CM

## 2022-08-10 DIAGNOSIS — C53.9 RECURRENT CERVICAL CANCER (H): Primary | ICD-10-CM

## 2022-08-10 DIAGNOSIS — C53.0 MALIGNANT NEOPLASM OF ENDOCERVIX (H): ICD-10-CM

## 2022-08-10 LAB
ALBUMIN SERPL-MCNC: 3.2 G/DL (ref 3.4–5)
ALBUMIN UR-MCNC: 100 MG/DL
ALP SERPL-CCNC: 101 U/L (ref 40–150)
ALT SERPL W P-5'-P-CCNC: 18 U/L (ref 0–50)
ANION GAP SERPL CALCULATED.3IONS-SCNC: 10 MMOL/L (ref 3–14)
AST SERPL W P-5'-P-CCNC: 16 U/L (ref 0–45)
BASOPHILS # BLD AUTO: 0.1 10E3/UL (ref 0–0.2)
BASOPHILS NFR BLD AUTO: 0 %
BILIRUB SERPL-MCNC: 0.5 MG/DL (ref 0.2–1.3)
BUN SERPL-MCNC: 8 MG/DL (ref 7–30)
CALCIUM SERPL-MCNC: 9.3 MG/DL (ref 8.5–10.1)
CHLORIDE BLD-SCNC: 104 MMOL/L (ref 94–109)
CO2 SERPL-SCNC: 24 MMOL/L (ref 20–32)
CREAT SERPL-MCNC: 0.61 MG/DL (ref 0.52–1.04)
EOSINOPHIL # BLD AUTO: 0.1 10E3/UL (ref 0–0.7)
EOSINOPHIL NFR BLD AUTO: 1 %
ERYTHROCYTE [DISTWIDTH] IN BLOOD BY AUTOMATED COUNT: 17.3 % (ref 10–15)
GFR SERPL CREATININE-BSD FRML MDRD: >90 ML/MIN/1.73M2
GLUCOSE BLD-MCNC: 103 MG/DL (ref 70–99)
HCT VFR BLD AUTO: 38.4 % (ref 35–47)
HGB BLD-MCNC: 12.1 G/DL (ref 11.7–15.7)
IMM GRANULOCYTES # BLD: 0.1 10E3/UL
IMM GRANULOCYTES NFR BLD: 1 %
LYMPHOCYTES # BLD AUTO: 1 10E3/UL (ref 0.8–5.3)
LYMPHOCYTES NFR BLD AUTO: 8 %
MAGNESIUM SERPL-MCNC: 2 MG/DL (ref 1.6–2.3)
MCH RBC QN AUTO: 24.4 PG (ref 26.5–33)
MCHC RBC AUTO-ENTMCNC: 31.5 G/DL (ref 31.5–36.5)
MCV RBC AUTO: 78 FL (ref 78–100)
MONOCYTES # BLD AUTO: 0.8 10E3/UL (ref 0–1.3)
MONOCYTES NFR BLD AUTO: 6 %
NEUTROPHILS # BLD AUTO: 11.3 10E3/UL (ref 1.6–8.3)
NEUTROPHILS NFR BLD AUTO: 84 %
NRBC # BLD AUTO: 0 10E3/UL
NRBC BLD AUTO-RTO: 0 /100
PLATELET # BLD AUTO: 326 10E3/UL (ref 150–450)
POTASSIUM BLD-SCNC: 4 MMOL/L (ref 3.4–5.3)
PROT SERPL-MCNC: 7 G/DL (ref 6.8–8.8)
RBC # BLD AUTO: 4.95 10E6/UL (ref 3.8–5.2)
SODIUM SERPL-SCNC: 138 MMOL/L (ref 133–144)
WBC # BLD AUTO: 13.3 10E3/UL (ref 4–11)

## 2022-08-10 PROCEDURE — 80053 COMPREHEN METABOLIC PANEL: CPT | Performed by: OBSTETRICS & GYNECOLOGY

## 2022-08-10 PROCEDURE — 96413 CHEMO IV INFUSION 1 HR: CPT

## 2022-08-10 PROCEDURE — 96417 CHEMO IV INFUS EACH ADDL SEQ: CPT

## 2022-08-10 PROCEDURE — 250N000013 HC RX MED GY IP 250 OP 250 PS 637: Performed by: OBSTETRICS & GYNECOLOGY

## 2022-08-10 PROCEDURE — 81003 URINALYSIS AUTO W/O SCOPE: CPT | Performed by: OBSTETRICS & GYNECOLOGY

## 2022-08-10 PROCEDURE — 258N000001 HC RX 258: Performed by: OBSTETRICS & GYNECOLOGY

## 2022-08-10 PROCEDURE — 99215 OFFICE O/P EST HI 40 MIN: CPT | Performed by: OBSTETRICS & GYNECOLOGY

## 2022-08-10 PROCEDURE — G0463 HOSPITAL OUTPT CLINIC VISIT: HCPCS

## 2022-08-10 PROCEDURE — 96415 CHEMO IV INFUSION ADDL HR: CPT

## 2022-08-10 PROCEDURE — 96375 TX/PRO/DX INJ NEW DRUG ADDON: CPT

## 2022-08-10 PROCEDURE — 258N000003 HC RX IP 258 OP 636: Performed by: OBSTETRICS & GYNECOLOGY

## 2022-08-10 PROCEDURE — 96367 TX/PROPH/DG ADDL SEQ IV INF: CPT

## 2022-08-10 PROCEDURE — 96361 HYDRATE IV INFUSION ADD-ON: CPT

## 2022-08-10 PROCEDURE — 250N000011 HC RX IP 250 OP 636: Performed by: OBSTETRICS & GYNECOLOGY

## 2022-08-10 PROCEDURE — 83735 ASSAY OF MAGNESIUM: CPT | Performed by: OBSTETRICS & GYNECOLOGY

## 2022-08-10 PROCEDURE — 85025 COMPLETE CBC W/AUTO DIFF WBC: CPT | Performed by: OBSTETRICS & GYNECOLOGY

## 2022-08-10 RX ORDER — HEPARIN SODIUM (PORCINE) LOCK FLUSH IV SOLN 100 UNIT/ML 100 UNIT/ML
5 SOLUTION INTRAVENOUS
Status: DISCONTINUED | OUTPATIENT
Start: 2022-08-10 | End: 2022-08-10 | Stop reason: HOSPADM

## 2022-08-10 RX ORDER — DEXTROSE, SODIUM CHLORIDE, AND POTASSIUM CHLORIDE 5; .45; .15 G/100ML; G/100ML; G/100ML
1000 INJECTION INTRAVENOUS ONCE
Status: CANCELLED
Start: 2022-08-10

## 2022-08-10 RX ORDER — HEPARIN SODIUM (PORCINE) LOCK FLUSH IV SOLN 100 UNIT/ML 100 UNIT/ML
5 SOLUTION INTRAVENOUS EVERY 8 HOURS
Status: DISCONTINUED | OUTPATIENT
Start: 2022-08-10 | End: 2022-08-10 | Stop reason: HOSPADM

## 2022-08-10 RX ORDER — EPINEPHRINE 1 MG/ML
0.3 INJECTION, SOLUTION INTRAMUSCULAR; SUBCUTANEOUS EVERY 5 MIN PRN
Status: CANCELLED | OUTPATIENT
Start: 2022-08-10

## 2022-08-10 RX ORDER — MEPERIDINE HYDROCHLORIDE 25 MG/ML
25 INJECTION INTRAMUSCULAR; INTRAVENOUS; SUBCUTANEOUS EVERY 30 MIN PRN
Status: CANCELLED | OUTPATIENT
Start: 2022-08-10

## 2022-08-10 RX ORDER — PALONOSETRON 0.05 MG/ML
0.25 INJECTION, SOLUTION INTRAVENOUS ONCE
Status: COMPLETED | OUTPATIENT
Start: 2022-08-10 | End: 2022-08-10

## 2022-08-10 RX ORDER — ALBUTEROL SULFATE 0.83 MG/ML
2.5 SOLUTION RESPIRATORY (INHALATION)
Status: CANCELLED | OUTPATIENT
Start: 2022-08-10

## 2022-08-10 RX ORDER — DIPHENHYDRAMINE HCL 25 MG
50 CAPSULE ORAL ONCE
Status: CANCELLED
Start: 2022-08-10

## 2022-08-10 RX ORDER — PALONOSETRON 0.05 MG/ML
0.25 INJECTION, SOLUTION INTRAVENOUS ONCE
Status: CANCELLED | OUTPATIENT
Start: 2022-08-10

## 2022-08-10 RX ORDER — LORAZEPAM 2 MG/ML
0.5 INJECTION INTRAMUSCULAR EVERY 4 HOURS PRN
Status: CANCELLED | OUTPATIENT
Start: 2022-08-10

## 2022-08-10 RX ORDER — ALBUTEROL SULFATE 90 UG/1
1-2 AEROSOL, METERED RESPIRATORY (INHALATION)
Status: CANCELLED
Start: 2022-08-10

## 2022-08-10 RX ORDER — DIPHENHYDRAMINE HYDROCHLORIDE 50 MG/ML
50 INJECTION INTRAMUSCULAR; INTRAVENOUS
Status: CANCELLED
Start: 2022-08-10

## 2022-08-10 RX ORDER — DIPHENHYDRAMINE HCL 25 MG
50 CAPSULE ORAL ONCE
Status: COMPLETED | OUTPATIENT
Start: 2022-08-10 | End: 2022-08-10

## 2022-08-10 RX ORDER — HEPARIN SODIUM (PORCINE) LOCK FLUSH IV SOLN 100 UNIT/ML 100 UNIT/ML
5 SOLUTION INTRAVENOUS
Status: CANCELLED | OUTPATIENT
Start: 2022-08-10

## 2022-08-10 RX ORDER — HEPARIN SODIUM,PORCINE 10 UNIT/ML
5 VIAL (ML) INTRAVENOUS
Status: CANCELLED | OUTPATIENT
Start: 2022-08-10

## 2022-08-10 RX ORDER — DEXTROSE, SODIUM CHLORIDE, AND POTASSIUM CHLORIDE 5; .45; .15 G/100ML; G/100ML; G/100ML
1000 INJECTION INTRAVENOUS ONCE
Status: COMPLETED | OUTPATIENT
Start: 2022-08-10 | End: 2022-08-10

## 2022-08-10 RX ORDER — METHYLPREDNISOLONE SODIUM SUCCINATE 125 MG/2ML
125 INJECTION, POWDER, LYOPHILIZED, FOR SOLUTION INTRAMUSCULAR; INTRAVENOUS
Status: CANCELLED
Start: 2022-08-10

## 2022-08-10 RX ADMIN — PALONOSETRON HYDROCHLORIDE 0.25 MG: 0.25 INJECTION INTRAVENOUS at 09:15

## 2022-08-10 RX ADMIN — FAMOTIDINE 20 MG: 10 INJECTION INTRAVENOUS at 09:17

## 2022-08-10 RX ADMIN — CISPLATIN 80 MG: 1 INJECTION, SOLUTION INTRAVENOUS at 13:09

## 2022-08-10 RX ADMIN — SODIUM CHLORIDE 800 MG: 9 INJECTION, SOLUTION INTRAVENOUS at 15:34

## 2022-08-10 RX ADMIN — Medication 5 ML: at 08:12

## 2022-08-10 RX ADMIN — PACLITAXEL 268 MG: 6 INJECTION, SOLUTION INTRAVENOUS at 10:03

## 2022-08-10 RX ADMIN — Medication 5 ML: at 16:09

## 2022-08-10 RX ADMIN — DIPHENHYDRAMINE HYDROCHLORIDE 50 MG: 25 CAPSULE ORAL at 09:13

## 2022-08-10 RX ADMIN — POTASSIUM CHLORIDE, DEXTROSE MONOHYDRATE AND SODIUM CHLORIDE 1000 ML: 150; 5; 450 INJECTION, SOLUTION INTRAVENOUS at 13:07

## 2022-08-10 RX ADMIN — DEXAMETHASONE SODIUM PHOSPHATE: 10 INJECTION, SOLUTION INTRAMUSCULAR; INTRAVENOUS at 09:28

## 2022-08-10 RX ADMIN — SODIUM CHLORIDE 250 ML: 9 INJECTION, SOLUTION INTRAVENOUS at 15:32

## 2022-08-10 RX ADMIN — SODIUM CHLORIDE 1000 ML: 9 INJECTION, SOLUTION INTRAVENOUS at 09:12

## 2022-08-10 ASSESSMENT — PAIN SCALES - GENERAL: PAINLEVEL: NO PAIN (0)

## 2022-08-10 NOTE — NURSING NOTE
"Oncology Rooming Note    August 10, 2022 8:22 AM   Cordell Donaldson is a 72 year old female who presents for:    Chief Complaint   Patient presents with     Port Draw     Power needle. Heparin locked,vitals checked     Oncology Clinic Visit     Cervical Cancer     Initial Vitals: /86   Pulse 74   Temp 97.5  F (36.4  C)   Resp 18   Wt 48.5 kg (107 lb)   SpO2 100%   BMI 19.20 kg/m   Estimated body mass index is 19.2 kg/m  as calculated from the following:    Height as of 8/2/22: 1.59 m (5' 2.6\").    Weight as of this encounter: 48.5 kg (107 lb). Body surface area is 1.46 meters squared.  No Pain (0) Comment: Data Unavailable   No LMP recorded. Patient is postmenopausal.  Allergies reviewed: Yes  Medications reviewed: Yes    Medications: Medication refills not needed today.  Pharmacy name entered into EPIC:    [id number] - [place], [place] - [address] [place]  [id number] - [place], [place] - [facility] [facility] [place], [place] - [address] [place] [address] [phone]    Clinical concerns: none.      Lebron Addison"

## 2022-08-10 NOTE — PROGRESS NOTES
Infusion Nursing Note:  Cordell Donaldson presents today for Cycle 2 Day 1 Taxol, Cisplatin and Bevacizumab-bvzr.    Patient seen by provider today: Yes: Jewels Oconnor NP   present during visit today: Not Applicable.    Note: Patient presents to infusion today doing well. Denies any new questions or concerns following her visit with Jewels Oconnor NP.    TORB @ 0920 Jewels Dixon NP/ Megan Pritchard, RN:  - Patient needs a 24 hour urine sample prior to next infusion    Patient states that she did not take home dexamethasone with last cycle as she was not aware. Wondering if she has to take it with this round.    TORB @ 0967 Jewels Oconnor NP/ Megan Pritchard, RN:  - Does not have to take dexamethasone if she doesn't have nausea, just make sure she knows not to take zofran for 3 days as she got aloxi    24 hour urine collection kit sent home with patient. Patient educated on how to complete collection. Patient denied any further questions at this time.    Intravenous Access:  Implanted Port.    Treatment Conditions:  Lab Results   Component Value Date    HGB 12.1 08/10/2022    WBC 13.3 (H) 08/10/2022    ANEU 1.8 08/22/2017    ANEUTAUTO 11.3 (H) 08/10/2022     08/10/2022      Lab Results   Component Value Date     08/10/2022    POTASSIUM 4.0 08/10/2022    MAG 2.0 08/10/2022    CR 0.61 08/10/2022    LINDA 9.3 08/10/2022    BILITOTAL 0.5 08/10/2022    ALBUMIN 3.2 (L) 08/10/2022    ALT 18 08/10/2022    AST 16 08/10/2022     Results reviewed, labs MET treatment parameters, ok to proceed with treatment.  Urine 100.  BP WNL.  Patient voided pre and post Cisplatin infusion.    Post Infusion Assessment:  Patient tolerated infusion without incident.  Blood return noted pre and post infusion.  Site patent and intact, free from redness, edema or discomfort.  No evidence of extravasations.  Access discontinued per protocol.     Discharge Plan:   Patient declined prescription refills.  Discharge instructions  reviewed with: Patient.  Patient and/or family verbalized understanding of discharge instructions and all questions answered.  AVS to patient via [id number].  Patient will return 8/31 for next appointment.   Patient discharged in stable condition accompanied by: self.  Departure Mode: Ambulatory.      Megan Pritchard RN

## 2022-08-10 NOTE — LETTER
8/10/2022         RE: Cordell Donaldson  [address]  [facility] [address]        Dear Colleague,    Thank you for referring your patient, Cordell Donaldson, to the [facility]. Please see a copy of my visit note below.                     Follow Up Notes on Referred Patient    Date: 8/10/2022       Dr. Bess Paredes MD  [address]  [place] [address]       RE: Cordell Donaldson  : 1950  MCKINLEY: 8/10/2022    Dear Dr. Bess Paredes:      Cordell Donaldson is a 72 year old woman with a diagnosis of a large pelvic mass and pelvic lymphadenopathy with biopsy showing recurrent cervical cancer vs a new vaginal cancer.  She is here today for a disease management visit.     History of Present Illness:   Cordell Donaldson (she/her/hers) is a 72 year old referred to the Gynecologic Cancer Clinic at the [facility] on 2022 by gynecologic oncologist Dr. Arciniega and radiation oncologist Dr. Stockton for management of a pelvic mass due to recurrent cervical cancer vs new vaginal cancer. History as follows:      Breast Cancer History:  : Left breast cancer.  -Lumpectomy.  -Radiation therapy.     -present: BRENNAN.         Cervical/Vaginal Dysplasia/Cancer History:  10/24/12:   -Endocervical curettings: Squamous carcinoma, invasion cannot be assessed.   -Ectocervical biopsies: HSIL (CIN3).   -Vaginal biopsy, posterior: HSIL (VaIN3).  2012: Robotic-assisted laparoscopic lysis of adhesions, left ureterolysis with  conversion to laparotomy, total abdominal hysterectomy, and an upper  Vaginectomy by gynecologic oncologist Dr. Arciniega.   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      13: Vaginal biopsy: HSIL, cannot exclude invasion.   -Consultation with gynecologic oncologist Dr. Johnson. Upper vaginectomy and CO2 laser ablation recommended, patient declined.      [id number]: No vaginal  dysplasia treatment.      5/20/22: Pelvic MRI: I have personally reviewed the MRI images.   Centrally hypoenhancing heterogeneous midline pelvic mass along  the superior margin of the vaginal vault measures 8.5 x 6.7 x 8.1 cm.  The mass abut the bladder and there is mucosal irregularity at the  site of abutment. The mass indents the  decompressed rectum but there is no definite invasion or mucosal  Irregularity.  6/9/22: CT-guided biopsy of pelvic mass: Poorly-differentiated squamous cell carcinoma, HPV-associated.   7/1/22: Staging PET/CT: I have personally reviewed the PET/CT images.   Large heterogenous and hypermetabolic mass seen within the midline of  the pelvis measuring approximately 8.5 x 7.2 x 8.1 cm with an SUV max  measuring up to 13.2. Additionally, there is central hypoattenuation  with relative lack of metabolic activity, suggestive of central  Necrosis (previously 8.5 x 6.7 x 8.1 cm on prior  MRI). No inguinal lymphadenopathy.  Redemonstration of hypermetabolic left pelvic lymphadenopathy, the  largest lymph node measuring up to 3.3 x 2.4 cm with an SUV max of  12.2 (previously measuring 2.5 x 2.1 cm). Additional conglomeration of  hypermetabolic lymph nodes at the aortic bifurcation measuring 2.9 x  1.6 cm with an SUV max of 14.3 (previously measuring 1.9 x 1.7 cm).  Unchanged enlarged mildly metabolic right pelvic lymph node measuring  2.5 x 1.1 cm with an SUV max of 4.2, favored to represent a reactive  lymph node given the relative lack of hypermetabolic activity compared  to the left pelvic lymph node.  7/5/2022: Cisplatin 50 mg/m2 + paclitaxel 175 mg/m2 + bevacizumab 15 mg/kg every 21 days. Get Caris on tumor biopsy. If PD-L1+ (CPS 1% or greater), then will add pembrolizumab 200 mg every 21 days to therapy. Plan for 3 cycles of therapy followed by repeat PET/CT to assess disease response.     7/20/2022: Cycle 1 Cisplatin, Paclitaxel, bevazicumab   8/8/2022: Cycle 2 Cisplatin, Paclitaxel,  bevacizumab     Genetic/Genomic/Molecular Testing History:  2006: Negative for germline BRCA1, BRCA2 pathogenic variants.             Today Cordell comes to the clinic ok. Overwhelmed by new breast cancer diagnosis however she has her daughter and  for support. Has considered a therapist but is not ready for referral. Has neighbors and friends.     Pt reports that pelvic pain has started again achy pressure pain near sacrum. Pain is controlled with Tylenol 1000 mg prn with CBD oil is controlled with this.     Weight loss: Fasting for 48 hours around chemotherapy through a study with  Daron. Pt is trying to eat often but is aware of her weight loss. She is a vegetarian but will eat fish and eggs here and there. Eating yogurt and vegetables. Eating whey protein protein shakes in the morning. Eats avocados and nuts and seeds. Using Senna two tablets nightly controlling constipation. Pt has great appetite. Pt will add peanut butter and increase protein shakes. Increase fish, beans, proteins.     She denies any vaginal bleeding, no changes in her bowel or bladder habits, no nausea/emesis, no lower extremity edema, and no difficulties eating or sleeping. She denies any abdominal bloating, no fevers or chills, and no chest pain or shortness of breath. Has a hx of a-fib and is on Metoprolol. No neuropathy. 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 Pt reports tinnitus in the right ear for two days after chemo which resolved after two days. No increased hearing loss.                Review of Systems:    Systemic           + weight changes; no fever; no chills; no night sweats; no appetite changes  Skin           no rashes, or lesions  Eye           no irritation; no changes in vision  Ramona-Laryngeal           no dysphagia; no hoarseness   Pulmonary    no cough; no shortness of breath  Cardiovascular    no chest pain; no palpitations  Gastrointestinal    no diarrhea; +  constipation; no abdominal pain; no changes in bowel  habits; no blood in stool  Genitourinary   no urinary frequency; no urinary urgency; no dysuria; no pain; no abnormal vaginal discharge; no abnormal vaginal bleeding, + pelvic pain  Breast   no breast discharge; no breast changes; no breast pain  Musculoskeletal    no myalgias; no arthralgias; no back pain  Psychiatric           no depressed mood; no anxiety    Hematologic           no tender lymph nodes; no noticeable swellings or lumps   Endocrine    no hot flashes; no heat/cold intolerance         Neurological   no tremor; no numbness and tingling; no headaches; no difficulty sleeping      Past Medical History:    Past Medical History:   Diagnosis Date     Abnormal Pap smear     maybe 20 years ago     Cervical cancer (H)      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      History of breast cancer 2003    lumpectomy and radiation offerred chemo and patient declined at time but had oncogene test and low risk     Hyperlipidemia LDL goal < 160      Osteopenia      Paroxysmal A-fib (H)      Severe vaginal dysplasia          Past Surgical History:    Past Surgical History:   Procedure Laterality Date     BIOPSY       BREAST SURGERY Left 2003    lumpectomy left, did radiation, 5 yr of tamoxifen     COLONOSCOPY  2018    repeat in 2028     Colposcopy Cervix with Loop Electrode Conization and Lser to Vagina  2008     COLPOSCOPY, BIOPSY, COMBINED  10/24/2012    Procedure: COMBINED COLPOSCOPY, BIOPSY;  Colposcopy, Biopsy of Cervix and Vagina, Ultrasound Guidance;  Surgeon: Colette Ng MD;  Location: [facility] OR     ENT SURGERY  01/23/2018    otoscelerosis, right side     HYSTERECTOMY, FRANCIA  12/2012    high grade cervical dysplasia, [place] [facility], Dr. Arciniega     INSERT PORT VASCULAR ACCESS Right 7/18/2022    Procedure: INSERTION, VASCULAR ACCESS PORT;  Surgeon: Donnie Elias MD;  Location: [facility] OR     IR CHEST PORT PLACEMENT > 5 YRS OF AGE   7/18/2022     [place] LAP VAGINECTOMY, PARTIAL REMOVAL OF VAGINAL WALL  10/2013    upper vaginectomy for dysplasia, Dr. Megan Arciniega         Health Maintenance Due   Topic Date Due     Pneumococcal Vaccine: 65+ Years (1 - PCV) Never done     ZOSTER IMMUNIZATION (2 of 2) 02/12/2020     COVID-19 Vaccine (4 - Booster for Pfizer series) 01/08/2022     PAP  01/02/2023       Current Medications:     Current Outpatient Medications   Medication Sig Dispense Refill     Acetylcarnitine HCl 250 MG CAPS Take 500 mg by mouth 2 times daily        aspirin (ASA) 81 MG chewable tablet Take 81 mg by mouth daily       Bacillus Coagulans-Inulin (PROBIOTIC FORMULA) 1-250 BILLION-MG CAPS Take by mouth three times a week 25+billion CFUS       BENFOTIAMINE PO Take 150 mg by mouth 2 times daily       Cholecalciferol (VITAMIN D-3) 25 MCG (1000 UT) CAPS Take 1,000 Units by mouth daily 90 capsule 3     Coenzyme Q10 (CO Q 10 PO) Take 100 mg by mouth daily        dexamethasone (DECADRON) 4 MG tablet Take 2 tablets (8 mg) by mouth daily Take for 3 days, starting the day after chemo. Take with food. 6 tablet 3     Grindstone Berry 550 MG CAPS Take 400 mg by mouth 2 times daily 60 capsule 11     MAGNESIUM OXIDE PO Take 500 mg by mouth       metoprolol succinate ER (TOPROL XL) 50 MG 24 hr tablet Take 1 tablet (50 mg) by mouth daily 90 tablet 1     Omega-3 Fatty Acids (OMEGA-3 FISH OIL PO) Take 630 mg by mouth 2 times daily        ondansetron (ZOFRAN) 8 MG tablet Take 1 tablet (8 mg) by mouth every 8 hours as needed for nausea (vomiting) 30 tablet 2     OVER-THE-COUNTER Turkey tail mushroom powder, use 3 times a day 1 Can 11     Pectin Cit-Inos-C-Bioflav-Soy (MODIFIED CITRUS PECTIN PO)        prochlorperazine (COMPAZINE) 10 MG tablet Take 1 tablet (10 mg) by mouth every 6 hours as needed for nausea or vomiting 30 tablet 2     Selenium 200 MCG CAPS Take 200 mg by mouth       Taurine 1000 MG CAPS Take one po twice daily 60 capsule 11     Turmeric 450 MG CAPS  "Take 2 capsules by mouth daily 60 capsule 11         Allergies:      No Known Allergies     Social History:     Social History     Tobacco Use     Smoking status: Former Smoker     Packs/day: 0.50     Years: 15.00     Pack years: 7.50     Smokeless tobacco: Never Used   Substance Use Topics     Alcohol use: Yes     Alcohol/week: 2.0 standard drinks     Types: 2 Standard drinks or equivalent per week     Comment: Socially        History   Drug Use No         Family History:     The patient's family history is notable for     Family History   Problem Relation Age of Onset     Myocardial Infarction Mother 73        Tob     Myocardial Infarction Father 68        Tob     Breast Cancer Sister 47         of breast cancer age 63; BRCA mutation testing negative     Cancer Maternal Grandmother         Unsure of type.     Breast Cancer Paternal Grandmother         Unsure of diagnosis--some type of \"women's issue\"         Physical Exam:     /86   Pulse 74   Temp 97.5  F (36.4  C)   Resp 18   Wt 48.5 kg (107 lb)   SpO2 100%   BMI 19.20 kg/m    Body mass index is 19.2 kg/m .    General Appearance: healthy and alert, no distress     HEENT: no thyromegaly, no palpable nodules or masses        Cardiovascular: regular rate and rhythm, no gallops, rubs or murmurs     Respiratory: lungs clear, no rales, rhonchi or wheezes    Musculoskeletal: extremities non tender and without edema    Skin: no lesions or rashes    Neurological: normal gait, no gross defects     Psychiatric: appropriate mood and affect                               Hematological: normal cervical, supraclavicular lymph nodes     Gastrointestinal:       abdomen soft, non-tender, non-distended     Genitourinary: deferred          Assessment:    Cordell Donaldson is a 72 year old woman with a diagnosis of a large pelvic mass and pelvic lymphadenopathy with biopsy showing recurrent cervical cancer vs a new vaginal cancer.  She is here today for a disease " management visit.     Medical history significant for FIGO Stage IA1 squamous cell carcinoma of the cervix s/p hysterectomy with no residual disease, and a long history of vaginal HSIL. New recurrent breast cancer - history of left breast cancer treated with lumpectomy and radiation therapy in 2003 ([place] [facility] Now with confirmed left breast biopsy on 7/15/2022 grade 2 invasive ductal carcinoma: ER +, CO -, HER2 negative (FISH).     40 minutes spent on the date of the encounter doing chart review, history and exam, documentation, and further activities as noted above.         Plan:     1.)   Squamous cell carcinoma. Ok to proceed with planned treatment given labs have met parameters. 24 hour urine ahead of next treatment due to 100 urine protein. Encouraged 5-6 small high protein meals a day and discussed nausea management and control. Encouraged 64 oz of fluids per day. Discussed neutropenic precautions and faviola period. Reviewed signs and symptoms for when she should contact the clinic or seek additional care. Patient to contact the clinic with any questions or concerns in the interim.     2.) Genetics: referral placed. Appt scheduled for 10/24/2022.     3.) Labs and/or tests ordered include: CBC, CMP, Mag, Protein urine reviewed during visit.   -obtain 24 hour urine ahead of next cycle reviewed and ordered with patient      4.) Health maintenance issues addressed today include annual health maintenance and non-gynecologic issues with PCP.    5.)        Pelvic pain: largely controlled with APAP and CBD oil per patient. Reviewed that we can send in different pain medications if not controlled. Met with Palliative Care on 8/3/22.     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 Mtz). Consultation with Dr. Montana in Oncology on 7/25/2022 plan to obtain baseline breast MRI,  and repeat MRI in approximately 2-3 months in order to monitor breast response to cervical cancer chemotherapy. Message sent to Dr. Raygoza with updates.       7.)      Weight loss: long discussion on maximizing calories and protein in vegetarian diet. Encouraged more plant based protein and fish/eggs (she eats), along with protein shakes, avocado, olive oil, nuts and seeds. Appetite is fine.         TE Head, NP-BC  Women's Health Nurse Practitioner  Division of Gynecologic Oncology  [facility]        CC  Patient Care Team:  Merry Lino MD as PCP - General (Internal Medicine)  Giu Mccollum MD Corfield, Aaron Daniel, DPM as MD (Podiatry)  Elise Huitron MD as Assigned Heart and Vascular Provider  Merry Lino MD as Assigned PCP  Tess Mcgowan RN as Specialty Care Coordinator  Heather Ayon MD as MD (Urology)  Heather Ayon MD as Assigned Surgical Provider  Angela Montana MD as MD (Hematology & Oncology)  Yvette Pike MD as MD (Surgery)  Jewels Oconnor APRN CNP as Assigned Cancer Care Provider  Valeri Kirby, RN as Specialty Care Coordinator (Hematology & Oncology)  GUI MCCOLLUM

## 2022-08-10 NOTE — NURSING NOTE
Chief Complaint   Patient presents with     Port Draw     Power needle. Heparin locked,vitals checked     Christy Marie RN on 8/10/2022 at 8:13 AM

## 2022-08-10 NOTE — PATIENT INSTRUCTIONS
Contact Numbers  [facility]: [phone] (for symptom and scheduling needs)    Please call the [facility] Triage line if you experience a temperature greater than or equal to 100.4, shaking chills, have uncontrolled nausea, vomiting and/or diarrhea, dizziness, shortness of breath, chest pain, bleeding, unexplained bruising, or if you have any other new/concerning symptoms, questions or concerns.     If you are having any concerning symptoms or wish to speak to a provider before your next infusion visit, please call your care coordinator or triage to notify them so we can adequately serve you.     If you need a refill on a narcotic prescription or other medication, please call triage before your infusion appointment.           August 2022 Sunday Monday Tuesday Wednesday Thursday Friday Saturday        1     2    NEW   7:45 AM   (60 min.)   Yvette Pike MD   [facility] 3    NEW   2:30 PM   (75 min.)   Arleen Fink MD   [facility] 4    MR BREAST BILATERAL WWO  10:15 AM   (60 min.)   [id number]   [facility] 5     6       7     8     9     10    LAB CENTRAL   7:45 AM   (15 min.)   [facility] LAB DRAW   [facility]    RETURN   8:05 AM   (40 min.)   Jewels Oconnor APRN CNP   [facility]    ONC INFUSION 6 HR (360 MIN)   9:00 AM   (360 min.)    ONC INFUSION NURSE   [facility] 11     12    US BREAST RT LMT 1-3 QUAD  10:15 AM   (30 min.)   [id number]   [facility] [place]    US BREAST BX CORE NEEDLE RIGHT  10:45 AM   (60 min.)   [email or web address]   [facility] [place]    MA POST PROCEDURE RIGHT  11:45 AM   (15 min.)   [email or web address]   [facility] [place] 13       14     15     16     17     18     19     20       21     22     23     24     25     26      27       28     29     30    VIDEO VISIT RETURN   1:20 PM   (40 min.)   Jewels Oconnor APRN CNP   [facility] [place] 31    LAB CENTRAL   8:00 AM   (15 min.)   [facility] DRAW   [facility]    ONC INFUSION 6 HR (360 MIN)   8:30 AM   (360 min.)    ONC INFUSION NURSE   [facility]                              September 2022 Sunday Monday Tuesday Wednesday Thursday Friday Saturday                       1     2     3       4     5     6     7     8     9     10       11     12     13     14     15     16     17       18     19     20     21     22     23     24       25     26     27     28     29     30                            Lab Results:  Recent Results (from the past 12 hour(s))   Comprehensive metabolic panel    Collection Time: 08/10/22  8:12 AM   Result Value Ref Range    Sodium 138 133 - 144 mmol/L    Potassium 4.0 3.4 - 5.3 mmol/L    Chloride 104 94 - 109 mmol/L    Carbon Dioxide (CO2) 24 20 - 32 mmol/L    Anion Gap 10 3 - 14 mmol/L    Urea Nitrogen 8 7 - 30 mg/dL    Creatinine 0.61 0.52 - 1.04 mg/dL    Calcium 9.3 8.5 - 10.1 mg/dL    Glucose 103 (H) 70 - 99 mg/dL    Alkaline Phosphatase 101 40 - 150 U/L    AST 16 0 - 45 U/L    ALT 18 0 - 50 U/L    Protein Total 7.0 6.8 - 8.8 g/dL    Albumin 3.2 (L) 3.4 - 5.0 g/dL    Bilirubin Total 0.5 0.2 - 1.3 mg/dL    GFR Estimate >90 >60 mL/min/1.73m2   Magnesium    Collection Time: 08/10/22  8:12 AM   Result Value Ref Range    Magnesium 2.0 1.6 - 2.3 mg/dL   Protein qualitative urine    Collection Time: 08/10/22  8:12 AM   Result Value Ref Range    Protein Albumin Urine 100  (A) Negative mg/dL   CBC with platelets and differential    Collection Time: 08/10/22  8:12 AM   Result Value Ref Range    WBC Count 13.3 (H) 4.0 - 11.0 10e3/uL    RBC Count 4.95 3.80 - 5.20 10e6/uL    Hemoglobin 12.1 11.7 - 15.7 g/dL    Hematocrit 38.4 35.0 - 47.0 %    MCV 78 78 - 100 fL    MCH 24.4 (L) 26.5  - 33.0 pg    MCHC 31.5 31.5 - 36.5 g/dL    RDW 17.3 (H) 10.0 - 15.0 %    Platelet Count 326 150 - 450 10e3/uL    % Neutrophils 84 %    % Lymphocytes 8 %    % Monocytes 6 %    % Eosinophils 1 %    % Basophils 0 %    % Immature Granulocytes 1 %    NRBCs per 100 WBC 0 <1 /100    Absolute Neutrophils 11.3 (H) 1.6 - 8.3 10e3/uL    Absolute Lymphocytes 1.0 0.8 - 5.3 10e3/uL    Absolute Monocytes 0.8 0.0 - 1.3 10e3/uL    Absolute Eosinophils 0.1 0.0 - 0.7 10e3/uL    Absolute Basophils 0.1 0.0 - 0.2 10e3/uL    Absolute Immature Granulocytes 0.1 <=0.4 10e3/uL    Absolute NRBCs 0.0 10e3/uL

## 2022-08-12 ENCOUNTER — ANCILLARY PROCEDURE (OUTPATIENT)
Dept: MAMMOGRAPHY | Facility: CLINIC | Age: 72
End: 2022-08-12
Attending: INTERNAL MEDICINE
Payer: COMMERCIAL

## 2022-08-12 DIAGNOSIS — R92.8 ABNORMAL FINDING ON BREAST IMAGING: ICD-10-CM

## 2022-08-12 PROCEDURE — 76642 ULTRASOUND BREAST LIMITED: CPT | Mod: RT | Performed by: RADIOLOGY

## 2022-08-29 NOTE — PROGRESS NOTES
Cordell is a 72 year old who is being evaluated via a billable video visit.      Patient stated she is in the state of [place] for the visit today.    How would you like to obtain your AVS? MyChart  If the video visit is dropped, the invitation should be resent by: Send to e-mail at: [email or web address]  Will anyone else be joining your video visit? No        Video-Visit Details    Video Start Time: 1314    Type of service:  Video Visit    Video End Time: 1330    Originating Location (pt. Location): Home    Distant Location (provider location):  [facility]     Platform used for Video Visit: Jono Nunn, Virtual Visit Facilitator                            Follow Up Notes on Referred Patient    Date: 2022         RE: Cordell Donaldson  : 1950  MCKINLEY: 2022        Cordell Donaldson is a 72 year old woman with a diagnosis of a large pelvic mass and pelvic lymphadenopathy with biopsy showing recurrent cervical cancer vs a new vaginal cancer. She is here today for follow up and disease management. Video visit.       History of Present Illness:   Cordell Donaldson (she/her/hers) is a 72 year old referred to the Gynecologic Cancer Clinic at the [facility] on 2022 by gynecologic oncologist Dr. Arciniega and radiation oncologist Dr. Stockton for management of a pelvic mass due to recurrent cervical cancer vs new vaginal cancer. History as follows:      Breast Cancer History:  : Left breast cancer.  -Lumpectomy.  -Radiation therapy.     -present: BRENNAN.         Cervical/Vaginal Dysplasia/Cancer History:  10/24/12:   -Endocervical curettings: Squamous carcinoma, invasion cannot be assessed.   -Ectocervical biopsies: HSIL (CIN3).   -Vaginal biopsy, posterior: HSIL (VaIN3).  2012: Robotic-assisted laparoscopic lysis of adhesions, left ureterolysis with  conversion to laparotomy, total abdominal hysterectomy, and an upper  Vaginectomy by gynecologic  oncologist Dr. Arciniega.   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      7/22/13: Vaginal biopsy: HSIL, cannot exclude invasion.   -Consultation with gynecologic oncologist Dr. Johnson. Upper vaginectomy and CO2 laser ablation recommended, patient declined.      [id number]: No vaginal dysplasia treatment.      5/20/22: Pelvic MRI: I have personally reviewed the MRI images.   Centrally hypoenhancing heterogeneous midline pelvic mass along  the superior margin of the vaginal vault measures 8.5 x 6.7 x 8.1 cm.  The mass abut the bladder and there is mucosal irregularity at the  site of abutment. The mass indents the  decompressed rectum but there is no definite invasion or mucosal  Irregularity.  6/9/22: CT-guided biopsy of pelvic mass: Poorly-differentiated squamous cell carcinoma, HPV-associated.   7/1/22: Staging PET/CT: I have personally reviewed the PET/CT images.   Large heterogenous and hypermetabolic mass seen within the midline of  the pelvis measuring approximately 8.5 x 7.2 x 8.1 cm with an SUV max  measuring up to 13.2. Additionally, there is central hypoattenuation  with relative lack of metabolic activity, suggestive of central  Necrosis (previously 8.5 x 6.7 x 8.1 cm on prior  MRI). No inguinal lymphadenopathy.  Redemonstration of hypermetabolic left pelvic lymphadenopathy, the  largest lymph node measuring up to 3.3 x 2.4 cm with an SUV max of  12.2 (previously measuring 2.5 x 2.1 cm). Additional conglomeration of  hypermetabolic lymph nodes at the aortic bifurcation measuring 2.9 x  1.6 cm with an SUV max of 14.3 (previously measuring 1.9 x 1.7 cm).  Unchanged enlarged mildly metabolic right pelvic lymph node measuring  2.5 x 1.1 cm with an SUV max of 4.2, favored to represent a reactive  lymph node given the relative lack of hypermetabolic activity compared  to the left pelvic lymph node.  7/5/2022: Cisplatin 50 mg/m2 + paclitaxel 175 mg/m2  + bevacizumab 15 mg/kg every 21 days. Get Caris on tumor biopsy. If PD-L1+ (CPS 1% or greater), then will add pembrolizumab 200 mg every 21 days to therapy. Plan for 3 cycles of therapy followed by repeat PET/CT to assess disease response.      7/20/2022: Cycle 1 Cisplatin, Paclitaxel, bevazicumab   8/8/2022: Cycle 2 Cisplatin, Paclitaxel, bevacizumab  8/31/2022: Cycle 3 Cisplatin, Paclitaxel, bevacizumab planned      Genetic/Genomic/Molecular Testing History:  2006: Negative for germline BRCA1, BRCA2 pathogenic variants.                Today patient presents via video with her . Cordell reports that that she has gained 5 pounds since her last visit and that her appetite is great. She continues to fast for 48 hours around chemotherapy through a study with [facility]. Has upcoming breast MRI in October. Pt reports that pelvic pain has started again with achy pressure pain near sacrum. Pain is controlled with Tylenol 500 mg prn (TID) with CBD oil. Taking Senna one tablet nightly controlling constipation. Reports that she had one day of neuropathy. Started L-glutamine which helped her symptoms.     She denies any vaginal bleeding, no changes in her bowel or bladder habits, no nausea/emesis, no lower extremity edema, and no difficulties eating or sleeping. She denies any abdominal bloating, no fevers or chills, and no chest pain or shortness of breath. Has a hx of a-fib and is on Metoprolol. 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 Small mild tinnitus in right ear that was present prior to chemotherapy.                   Review of Systems:    Systemic           no weight changes; no fever; no chills; no night sweats; no appetite changes  Skin           no rashes, or lesions  Eye           no irritation; no changes in vision  Ramona-Laryngeal           no dysphagia; no hoarseness   Pulmonary    no cough; no shortness of breath  Cardiovascular    no chest pain; no  palpitations  Gastrointestinal    no diarrhea; + constipation; + abdominal pain; no changes in bowel  habits; no blood in stool  Genitourinary   no urinary frequency; no urinary urgency; no dysuria; no pain; no abnormal vaginal discharge; no abnormal vaginal bleeding  Breast   no breast discharge; no breast changes; no breast pain  Musculoskeletal    no myalgias; no arthralgias; no back pain  Psychiatric           no depressed mood; no anxiety    Hematologic           no tender lymph nodes; no noticeable swellings or lumps   Endocrine    no hot flashes; no heat/cold intolerance         Neurological   no tremor; no numbness and tingling; no headaches; no difficulty sleeping      Past Medical History:    Past Medical History:   Diagnosis Date     Abnormal Pap smear     maybe 20 years ago     Cervical cancer (H)      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      History of breast cancer 2003    lumpectomy and radiation offerred chemo and patient declined at time but had oncogene test and low risk     Hyperlipidemia LDL goal < 160      Osteopenia      Paroxysmal A-fib (H)      Severe vaginal dysplasia          Past Surgical History:    Past Surgical History:   Procedure Laterality Date     BIOPSY       BREAST SURGERY Left 2003    lumpectomy left, did radiation, 5 yr of tamoxifen     COLONOSCOPY  2018    repeat in 2028     Colposcopy Cervix with Loop Electrode Conization and Lser to Vagina  2008     COLPOSCOPY, BIOPSY, COMBINED  10/24/2012    Procedure: COMBINED COLPOSCOPY, BIOPSY;  Colposcopy, Biopsy of Cervix and Vagina, Ultrasound Guidance;  Surgeon: Colette Ng MD;  Location: [facility] OR     ENT SURGERY  01/23/2018    otoscelerosis, right side     HYSTERECTOMY, FRANCIA  12/2012    high grade cervical dysplasia, [place] [facility], Dr. Arciniega     INSERT PORT VASCULAR ACCESS Right 7/18/2022    Procedure: INSERTION, VASCULAR ACCESS PORT;  Surgeon: Donnie Elias MD;  Location: [facility] OR      CHEST  PORT PLACEMENT > 5 YRS OF AGE  7/18/2022     NV LAP VAGINECTOMY, PARTIAL REMOVAL OF VAGINAL WALL  10/2013    upper vaginectomy for dysplasia, Dr. Megan Arciniega         Health Maintenance Due   Topic Date Due     Pneumococcal Vaccine: 65+ Years (1 - PCV) Never done     ZOSTER IMMUNIZATION (2 of 2) 02/12/2020     COVID-19 Vaccine (4 - Booster for Pfizer series) 01/08/2022     INFLUENZA VACCINE (1) 09/01/2022     PAP  01/02/2023       Current Medications:     Current Outpatient Medications   Medication Sig Dispense Refill     Acetylcarnitine HCl 250 MG CAPS Take 500 mg by mouth 2 times daily        aspirin (ASA) 81 MG chewable tablet Take 81 mg by mouth daily       Bacillus Coagulans-Inulin (PROBIOTIC FORMULA) 1-250 BILLION-MG CAPS Take by mouth three times a week 25+billion CFUS       BENFOTIAMINE PO Take 150 mg by mouth 2 times daily       Cholecalciferol (VITAMIN D-3) 25 MCG (1000 UT) CAPS Take 1,000 Units by mouth daily 90 capsule 3     Coenzyme Q10 (CO Q 10 PO) Take 100 mg by mouth daily        dexamethasone (DECADRON) 4 MG tablet Take 2 tablets (8 mg) by mouth daily Take for 3 days, starting the day after chemo. Take with food. 6 tablet 3     Efraín Berry 550 MG CAPS Take 400 mg by mouth 2 times daily 60 capsule 11     MAGNESIUM OXIDE PO Take 500 mg by mouth       metoprolol succinate ER (TOPROL XL) 50 MG 24 hr tablet Take 1 tablet (50 mg) by mouth daily 90 tablet 1     Omega-3 Fatty Acids (OMEGA-3 FISH OIL PO) Take 630 mg by mouth 2 times daily        ondansetron (ZOFRAN) 8 MG tablet Take 1 tablet (8 mg) by mouth every 8 hours as needed for nausea (vomiting) 30 tablet 2     OVER-THE-COUNTER Turkey tail mushroom powder, use 3 times a day 1 Can 11     Pectin Cit-Inos-C-Bioflav-Soy (MODIFIED CITRUS PECTIN PO)        prochlorperazine (COMPAZINE) 10 MG tablet Take 1 tablet (10 mg) by mouth every 6 hours as needed for nausea or vomiting 30 tablet 2     Selenium 200 MCG CAPS Take 200 mg by mouth       Taurine 1000 MG  "CAPS Take one po twice daily 60 capsule 11     Turmeric 450 MG CAPS Take 2 capsules by mouth daily 60 capsule 11         Allergies:      No Known Allergies     Social History:     Social History     Tobacco Use     Smoking status: Former Smoker     Packs/day: 0.50     Years: 15.00     Pack years: 7.50     Smokeless tobacco: Never Used   Substance Use Topics     Alcohol use: Yes     Alcohol/week: 2.0 standard drinks     Types: 2 Standard drinks or equivalent per week     Comment: Socially        History   Drug Use No         Family History:     The patient's family history is notable for     Family History   Problem Relation Age of Onset     Myocardial Infarction Mother 73        Tob     Myocardial Infarction Father 68        Tob     Breast Cancer Sister 47         of breast cancer age 63; BRCA mutation testing negative     Cancer Maternal Grandmother         Unsure of type.     Breast Cancer Paternal Grandmother         Unsure of diagnosis--some type of \"women's issue\"         Physical Exam:     There were no vitals taken for this visit.  There is no height or weight on file to calculate BMI.    General Appearance:  healthy and alert, no distress     Eyes:  Eyes grossly normal to inspection.  No discharge or erythema, or obvious scleral/conjunctival abnormalities.     Respiratory:     No audible wheeze, cough, or visible cyanosis.  No visible retractions or increased work of breathing.      Musculoskeletal:         extremities non tender and without edema     Skin:    no lesions or rashes on visible skin     Neurological:   normal gait, no gross defects                Psychiatric:      appropriate mood and affect. Mentation appears normal, affect normal/bright, judgement and insight intact, normal speech and appearance well-groomed                            The rest of a comprehensive physical examination is deferred due to [facility] (public health emergency) video visit restrictions.         Assessment:      Cordell" DEIDRA Donaldson is a 72 year old woman with a diagnosis of a large pelvic mass and pelvic lymphadenopathy with biopsy showing recurrent cervical cancer vs a new vaginal cancer. She is here today for follow up and disease management. Video visit.     30 minutes spent on the date of the encounter doing chart review, history and exam, documentation, and further activities as noted above.         Plan:     1.)       Squamous cell carcinoma. Ok to proceed with planned treatment once labs have met parameters. Encouraged 5-6 small high protein meals a day and discussed nausea management and control. Encouraged 64 oz of fluids per day. Discussed neutropenic precautions and faviola period. Reviewed signs and symptoms for when she should contact the clinic or seek additional care. Patient to contact the clinic with any questions or concerns in the interim.  -PET CT ordered with labs, MD and infusion due in 2-3 weeks   - Discussed Caris results with pt and Dr. Raygoza regarding PD-L1 testing. Keytruda to be added per MD orders. MD to place orders.      2.)  Genetics: referral placed. Appt scheduled for 10/24/2022.     3.) Labs and/or tests ordered include: CBC, CMP, Mag to be collected 8/31/22; 24 hour reviewed with patient today      4.) Health maintenance issues addressed today include annual health maintenance and non-gynecologic issues with PCP.    5.)        Pelvic pain: largely controlled with APAP and CBD oil per patient. Reviewed that we can send in different pain medications if not controlled. Met with Palliative Care on 8/3/22.      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 Mtz). Consultation with Dr. Montana in Oncology on 7/25/2022 plan to obtain baseline breast MRI, and repeat MRI in approximately 2-3 months in order to monitor breast response to cervical cancer chemotherapy.      7.)      Weight  loss: per patient, she has gained 5 pounds since last visit. Will continue to monitor weight trends. Weight to be measured tomorrow with labs.               TE Head, NP-BC  Women's Health Nurse Practitioner  Division of Gynecologic Oncology  [facility]        CC  Patient Care Team:  Merry Lino MD as PCP - General (Internal Medicine)  Gui Mccollum MD Corfield, Aaron Daniel, DPM as MD (Podiatry)  Elise Huitron MD as Assigned Heart and Vascular Provider  Merry Lino MD as Assigned PCP  Tess Mcgowan, RN as Specialty Care Coordinator  Heather Ayon MD as MD (Urology)  Angela Montana MD as MD (Hematology & Oncology)  Yvette Pike MD as MD (Surgery)  Valeri Kirby, RN as Specialty Care Coordinator (Hematology & Oncology)  Yvette Pike MD as Assigned Surgical Provider  Jewels Oconnor APRN CNP as Assigned Cancer Care Provider  GUI MCCOLLUM

## 2022-08-30 ENCOUNTER — VIRTUAL VISIT (OUTPATIENT)
Dept: ONCOLOGY | Facility: CLINIC | Age: 72
End: 2022-08-30
Attending: OBSTETRICS & GYNECOLOGY
Payer: COMMERCIAL

## 2022-08-30 DIAGNOSIS — C53.9 RECURRENT CERVICAL CANCER (H): ICD-10-CM

## 2022-08-30 DIAGNOSIS — C53.0 MALIGNANT NEOPLASM OF ENDOCERVIX (H): Primary | ICD-10-CM

## 2022-08-30 PROCEDURE — G0463 HOSPITAL OUTPT CLINIC VISIT: HCPCS | Mod: PN,RTG | Performed by: OBSTETRICS & GYNECOLOGY

## 2022-08-30 PROCEDURE — 99214 OFFICE O/P EST MOD 30 MIN: CPT | Mod: 95 | Performed by: OBSTETRICS & GYNECOLOGY

## 2022-08-30 RX ORDER — METHYLPREDNISOLONE SODIUM SUCCINATE 125 MG/2ML
125 INJECTION, POWDER, LYOPHILIZED, FOR SOLUTION INTRAMUSCULAR; INTRAVENOUS
Status: CANCELLED
Start: 2022-08-31

## 2022-08-30 RX ORDER — LORAZEPAM 2 MG/ML
0.5 INJECTION INTRAMUSCULAR EVERY 4 HOURS PRN
Status: CANCELLED | OUTPATIENT
Start: 2022-08-31

## 2022-08-30 RX ORDER — DIPHENHYDRAMINE HYDROCHLORIDE 50 MG/ML
50 INJECTION INTRAMUSCULAR; INTRAVENOUS
Status: CANCELLED
Start: 2022-08-31

## 2022-08-30 RX ORDER — EPINEPHRINE 1 MG/ML
0.3 INJECTION, SOLUTION INTRAMUSCULAR; SUBCUTANEOUS EVERY 5 MIN PRN
Status: CANCELLED | OUTPATIENT
Start: 2022-08-31

## 2022-08-30 RX ORDER — MEPERIDINE HYDROCHLORIDE 25 MG/ML
25 INJECTION INTRAMUSCULAR; INTRAVENOUS; SUBCUTANEOUS EVERY 30 MIN PRN
Status: CANCELLED | OUTPATIENT
Start: 2022-08-31

## 2022-08-30 RX ORDER — ALBUTEROL SULFATE 90 UG/1
1-2 AEROSOL, METERED RESPIRATORY (INHALATION)
Status: CANCELLED
Start: 2022-08-31

## 2022-08-30 RX ORDER — ALBUTEROL SULFATE 0.83 MG/ML
2.5 SOLUTION RESPIRATORY (INHALATION)
Status: CANCELLED | OUTPATIENT
Start: 2022-08-31

## 2022-08-30 RX ORDER — HEPARIN SODIUM,PORCINE 10 UNIT/ML
5 VIAL (ML) INTRAVENOUS
Status: CANCELLED | OUTPATIENT
Start: 2022-08-31

## 2022-08-30 NOTE — LETTER
2022         RE: Cordell Donaldson  [address]  [facility] [address]        Dear Colleague,    Thank you for referring your patient, Cordell Donaldson, to the [facility]. Please see a copy of my visit note below.    Cordell is a 72 year old who is being evaluated via a billable video visit.      Patient stated she is in the state of [place] for the visit today.    How would you like to obtain your AVS? MyChart  If the video visit is dropped, the invitation should be resent by: Send to e-mail at: [email or web address], [email or web address]!"  Will anyone else be joining your video visit? No        Video-Visit Details    Video Start Time: 1314    Type of service:  Video Visit    Video End Time: 1330    Originating Location (pt. Location): Home    Distant Location (provider location):  [facility]     Platform used for Video Visit: Jono Nunn, Virtual Visit Facilitator                            Follow Up Notes on Referred Patient    Date: 2022         RE: Cordell Donaldson  : 1950  MCKINLEY: 2022        Cordell Donaldson is a 72 year old woman with a diagnosis of a large pelvic mass and pelvic lymphadenopathy with biopsy showing recurrent cervical cancer vs a new vaginal cancer. She is here today for follow up and disease management. Video visit.       History of Present Illness:   Cordell Donaldson (she/her/hers) is a 72 year old referred to the Gynecologic Cancer Clinic at the [facility] on 2022 by gynecologic oncologist Dr. Arciniega and radiation oncologist Dr. Stockton for management of a pelvic mass due to recurrent cervical cancer vs new vaginal cancer. History as follows:      Breast Cancer History:  2003: Left breast cancer.  -Lumpectomy.  -Radiation therapy.     -present: BRENNAN.         Cervical/Vaginal Dysplasia/Cancer History:  10/24/12:   -Endocervical curettings: Squamous carcinoma, invasion cannot be assessed.    -Ectocervical biopsies: HSIL (CIN3).   -Vaginal biopsy, posterior: HSIL (VaIN3).  12/2012: Robotic-assisted laparoscopic lysis of adhesions, left ureterolysis with  conversion to laparotomy, total abdominal hysterectomy, and an upper  Vaginectomy by gynecologic oncologist Dr. Arciniega.   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      7/22/13: Vaginal biopsy: HSIL, cannot exclude invasion.   -Consultation with gynecologic oncologist Dr. Johnson. Upper vaginectomy and CO2 laser ablation recommended, patient declined.      [id number]: No vaginal dysplasia treatment.      5/20/22: Pelvic MRI: I have personally reviewed the MRI images.   Centrally hypoenhancing heterogeneous midline pelvic mass along  the superior margin of the vaginal vault measures 8.5 x 6.7 x 8.1 cm.  The mass abut the bladder and there is mucosal irregularity at the  site of abutment. The mass indents the  decompressed rectum but there is no definite invasion or mucosal  Irregularity.  6/9/22: CT-guided biopsy of pelvic mass: Poorly-differentiated squamous cell carcinoma, HPV-associated.   7/1/22: Staging PET/CT: I have personally reviewed the PET/CT images.   Large heterogenous and hypermetabolic mass seen within the midline of  the pelvis measuring approximately 8.5 x 7.2 x 8.1 cm with an SUV max  measuring up to 13.2. Additionally, there is central hypoattenuation  with relative lack of metabolic activity, suggestive of central  Necrosis (previously 8.5 x 6.7 x 8.1 cm on prior  MRI). No inguinal lymphadenopathy.  Redemonstration of hypermetabolic left pelvic lymphadenopathy, the  largest lymph node measuring up to 3.3 x 2.4 cm with an SUV max of  12.2 (previously measuring 2.5 x 2.1 cm). Additional conglomeration of  hypermetabolic lymph nodes at the aortic bifurcation measuring 2.9 x  1.6 cm with an SUV max of 14.3 (previously measuring 1.9 x 1.7 cm).  Unchanged enlarged mildly  metabolic right pelvic lymph node measuring  2.5 x 1.1 cm with an SUV max of 4.2, favored to represent a reactive  lymph node given the relative lack of hypermetabolic activity compared  to the left pelvic lymph node.  7/5/2022: Cisplatin 50 mg/m2 + paclitaxel 175 mg/m2 + bevacizumab 15 mg/kg every 21 days. Get Caris on tumor biopsy. If PD-L1+ (CPS 1% or greater), then will add pembrolizumab 200 mg every 21 days to therapy. Plan for 3 cycles of therapy followed by repeat PET/CT to assess disease response.      7/20/2022: Cycle 1 Cisplatin, Paclitaxel, bevazicumab   8/8/2022: Cycle 2 Cisplatin, Paclitaxel, bevacizumab  8/31/2022: Cycle 3 Cisplatin, Paclitaxel, bevacizumab planned      Genetic/Genomic/Molecular Testing History:  2006: Negative for germline BRCA1, BRCA2 pathogenic variants.                Today patient presents via video with her . Cordell reports that that she has gained 5 pounds since her last visit and that her appetite is great. She continues to fast for 48 hours around chemotherapy through a study with [facility]. Has upcoming breast MRI in October. Pt reports that pelvic pain has started again with achy pressure pain near sacrum. Pain is controlled with Tylenol 500 mg prn (TID) with CBD oil. Taking Senna one tablet nightly controlling constipation. Reports that she had one day of neuropathy. Started L-glutamine which helped her symptoms.     She denies any vaginal bleeding, no changes in her bowel or bladder habits, no nausea/emesis, no lower extremity edema, and no difficulties eating or sleeping. She denies any abdominal bloating, no fevers or chills, and no chest pain or shortness of breath. Has a hx of a-fib and is on Metoprolol. 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 Small mild tinnitus in right ear that was present prior to chemotherapy.                   Review of Systems:    Systemic           no weight changes; no fever; no chills;  no night sweats; no appetite changes  Skin           no rashes, or lesions  Eye           no irritation; no changes in vision  Rmaona-Laryngeal           no dysphagia; no hoarseness   Pulmonary    no cough; no shortness of breath  Cardiovascular    no chest pain; no palpitations  Gastrointestinal    no diarrhea; + constipation; + abdominal pain; no changes in bowel  habits; no blood in stool  Genitourinary   no urinary frequency; no urinary urgency; no dysuria; no pain; no abnormal vaginal discharge; no abnormal vaginal bleeding  Breast   no breast discharge; no breast changes; no breast pain  Musculoskeletal    no myalgias; no arthralgias; no back pain  Psychiatric           no depressed mood; no anxiety    Hematologic           no tender lymph nodes; no noticeable swellings or lumps   Endocrine    no hot flashes; no heat/cold intolerance         Neurological   no tremor; no numbness and tingling; no headaches; no difficulty sleeping      Past Medical History:    Past Medical History:   Diagnosis Date     Abnormal Pap smear     maybe 20 years ago     Cervical cancer (H)      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      History of breast cancer 2003    lumpectomy and radiation offerred chemo and patient declined at time but had oncogene test and low risk     Hyperlipidemia LDL goal < 160      Osteopenia      Paroxysmal A-fib (H)      Severe vaginal dysplasia          Past Surgical History:    Past Surgical History:   Procedure Laterality Date     BIOPSY       BREAST SURGERY Left 2003    lumpectomy left, did radiation, 5 yr of tamoxifen     COLONOSCOPY  2018    repeat in 2028     Colposcopy Cervix with Loop Electrode Conization and Lser to Vagina  2008     COLPOSCOPY, BIOPSY, COMBINED  10/24/2012    Procedure: COMBINED COLPOSCOPY, BIOPSY;  Colposcopy, Biopsy of Cervix and Vagina, Ultrasound Guidance;  Surgeon: Colette Ng MD;  Location: [facility] OR     ENT SURGERY  01/23/2018     otoscelerosis, right side     HYSTERECTOMY, [facility]  12/2012    high grade cervical dysplasia, [place] [facility], Dr. Arciniega     INSERT PORT VASCULAR ACCESS Right 7/18/2022    Procedure: INSERTION, VASCULAR ACCESS PORT;  Surgeon: Donnie Elias MD;  Location: [facility] OR     IR CHEST PORT PLACEMENT > 5 YRS OF AGE  7/18/2022     NE LAP VAGINECTOMY, PARTIAL REMOVAL OF VAGINAL WALL  10/2013    upper vaginectomy for dysplasia, Dr. Megan Arciniega         Health Maintenance Due   Topic Date Due     Pneumococcal Vaccine: 65+ Years (1 - PCV) Never done     ZOSTER IMMUNIZATION (2 of 2) 02/12/2020     COVID-19 Vaccine (4 - Booster for Pfizer series) 01/08/2022     INFLUENZA VACCINE (1) 09/01/2022     PAP  01/02/2023       Current Medications:     Current Outpatient Medications   Medication Sig Dispense Refill     Acetylcarnitine HCl 250 MG CAPS Take 500 mg by mouth 2 times daily        aspirin (ASA) 81 MG chewable tablet Take 81 mg by mouth daily       Bacillus Coagulans-Inulin (PROBIOTIC FORMULA) 1-250 BILLION-MG CAPS Take by mouth three times a week 25+billion CFUS       BENFOTIAMINE PO Take 150 mg by mouth 2 times daily       Cholecalciferol (VITAMIN D-3) 25 MCG (1000 UT) CAPS Take 1,000 Units by mouth daily 90 capsule 3     Coenzyme Q10 (CO Q 10 PO) Take 100 mg by mouth daily        dexamethasone (DECADRON) 4 MG tablet Take 2 tablets (8 mg) by mouth daily Take for 3 days, starting the day after chemo. Take with food. 6 tablet 3     [place] Berry 550 MG CAPS Take 400 mg by mouth 2 times daily 60 capsule 11     MAGNESIUM OXIDE PO Take 500 mg by mouth       metoprolol succinate ER (TOPROL XL) 50 MG 24 hr tablet Take 1 tablet (50 mg) by mouth daily 90 tablet 1     Omega-3 Fatty Acids (OMEGA-3 FISH OIL PO) Take 630 mg by mouth 2 times daily        ondansetron (ZOFRAN) 8 MG tablet Take 1 tablet (8 mg) by mouth every 8 hours as needed for nausea (vomiting) 30 tablet 2     OVER-THE-COUNTER Turkey tail mushroom powder, use 3 times a day 1 Can 11      "Pectin Cit-Inos-C-Bioflav-Soy (MODIFIED CITRUS PECTIN PO)        prochlorperazine (COMPAZINE) 10 MG tablet Take 1 tablet (10 mg) by mouth every 6 hours as needed for nausea or vomiting 30 tablet 2     Selenium 200 MCG CAPS Take 200 mg by mouth       Taurine 1000 MG CAPS Take one po twice daily 60 capsule 11     Turmeric 450 MG CAPS Take 2 capsules by mouth daily 60 capsule 11         Allergies:      No Known Allergies     Social History:     Social History     Tobacco Use     Smoking status: Former Smoker     Packs/day: 0.50     Years: 15.00     Pack years: 7.50     Smokeless tobacco: Never Used   Substance Use Topics     Alcohol use: Yes     Alcohol/week: 2.0 standard drinks     Types: 2 Standard drinks or equivalent per week     Comment: Socially        History   Drug Use No         Family History:     The patient's family history is notable for     Family History   Problem Relation Age of Onset     Myocardial Infarction Mother 73        Tob     Myocardial Infarction Father 68        Tob     Breast Cancer Sister 47         of breast cancer age 63; BRCA mutation testing negative     Cancer Maternal Grandmother         Unsure of type.     Breast Cancer Paternal Grandmother         Unsure of diagnosis--some type of \"women's issue\"         Physical Exam:     There were no vitals taken for this visit.  There is no height or weight on file to calculate BMI.    General Appearance:  healthy and alert, no distress     Eyes:  Eyes grossly normal to inspection.  No discharge or erythema, or obvious scleral/conjunctival abnormalities.     Respiratory:     No audible wheeze, cough, or visible cyanosis.  No visible retractions or increased work of breathing.      Musculoskeletal:         extremities non tender and without edema     Skin:    no lesions or rashes on visible skin     Neurological:   normal gait, no gross defects                Psychiatric:      appropriate mood and affect. Mentation appears normal, affect " normal/bright, judgement and insight intact, normal speech and appearance well-groomed                            The rest of a comprehensive physical examination is deferred due to PHE (public health emergency) video visit restrictions.         Assessment:      Cordell Donaldson is a 72 year old woman with a diagnosis of a large pelvic mass and pelvic lymphadenopathy with biopsy showing recurrent cervical cancer vs a new vaginal cancer. She is here today for follow up and disease management. Video visit.     30 minutes spent on the date of the encounter doing chart review, history and exam, documentation, and further activities as noted above.         Plan:     1.)       Squamous cell carcinoma. Ok to proceed with planned treatment once labs have met parameters. Encouraged 5-6 small high protein meals a day and discussed nausea management and control. Encouraged 64 oz of fluids per day. Discussed neutropenic precautions and faviola period. Reviewed signs and symptoms for when she should contact the clinic or seek additional care. Patient to contact the clinic with any questions or concerns in the interim.  -PET CT ordered with labs, MD and infusion due in 2-3 weeks   - Discussed Caris results with pt and Dr. Raygoza regarding PD-L1 testing. Keytruda to be added per MD orders. MD to place orders.      2.)  Genetics: referral placed. Appt scheduled for 10/24/2022.     3.) Labs and/or tests ordered include: CBC, CMP, Mag to be collected 8/31/22; 24 hour reviewed with patient today      4.) Health maintenance issues addressed today include annual health maintenance and non-gynecologic issues with PCP.    5.)        Pelvic pain: largely controlled with APAP and CBD oil per patient. Reviewed that we can send in different pain medications if not controlled. Met with Palliative Care on 8/3/22.      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 Mtz). Consultation with Dr. Montana in Oncology on 7/25/2022 plan to obtain baseline breast MRI, and repeat MRI in approximately 2-3 months in order to monitor breast response to cervical cancer chemotherapy.      7.)      Weight loss: per patient, she has gained 5 pounds since last visit. Will continue to monitor weight trends. Weight to be measured tomorrow with labs.               ET Head, NP-BC  Women's Health Nurse Practitioner  Division of Gynecologic Oncology  [facility]        CC  Patient Care Team:  Merry Lino MD as PCP - General (Internal Medicine)  Gui Mccollum MD Corfield, Aaron Daniel, DPM as MD (Podiatry)  Elise Huitron MD as Assigned Heart and Vascular Provider  Merry Lino MD as Assigned PCP  Tess Mcgowan RN as Specialty Care Coordinator  Heather Ayon MD as MD (Urology)  Angela Montana MD as MD (Hematology & Oncology)  Yvette Pike MD as MD (Surgery)  Valeri Kirby, GEOFF as Specialty Care Coordinator (Hematology & Oncology)  Yvette Pike MD as Assigned Surgical Provider  Jewels Oconnor APRN CNP as Assigned Cancer Care Provider  GUI MCCOLLUM        Again, thank you for allowing me to participate in the care of your patient.        Sincerely,        TE Gaston CNP

## 2022-08-30 NOTE — LETTER
2022         RE: Cordell Donaldson  [address]  [facility] [id number]        Dear Colleague,    Thank you for referring your patient, Cordell Donaldson, to the [facility]. Please see a copy of my visit note below.    Cordell is a 72 year old who is being evaluated via a billable video visit.      Patient stated she is in the state of [place] for the visit today.    How would you like to obtain your AVS? MyChart  If the video visit is dropped, the invitation should be resent by: Send to e-mail at: [email or web address]  Will anyone else be joining your video visit? No        Video-Visit Details    Video Start Time: 1314    Type of service:  Video Visit    Video End Time: 1330    Originating Location (pt. Location): Home    Distant Location (provider location):  [facility]     Platform used for Video Visit: Jono Nunn, Virtual Visit Facilitator                            Follow Up Notes on Referred Patient    Date: 2022         RE: Cordell Donladson  : 1950  MCKINLEY: 2022        Cordell Donaldson is a 72 year old woman with a diagnosis of a large pelvic mass and pelvic lymphadenopathy with biopsy showing recurrent cervical cancer vs a new vaginal cancer. She is here today for follow up and disease management. Video visit.       History of Present Illness:   Cordell Donaldson (she/her/hers) is a 72 year old referred to the Gynecologic Cancer Clinic at the [facility] on 2022 by gynecologic oncologist Dr. Arciniega and radiation oncologist Dr. Stockton for management of a pelvic mass due to recurrent cervical cancer vs new vaginal cancer. History as follows:      Breast Cancer History:  2003: Left breast cancer.  -Lumpectomy.  -Radiation therapy.     -present: BRENNAN.         Cervical/Vaginal Dysplasia/Cancer History:  10/24/12:   -Endocervical curettings: Squamous carcinoma, invasion cannot be assessed.    -Ectocervical biopsies: HSIL (CIN3).   -Vaginal biopsy, posterior: HSIL (VaIN3).  12/2012: Robotic-assisted laparoscopic lysis of adhesions, left ureterolysis with  conversion to laparotomy, total abdominal hysterectomy, and an upper  Vaginectomy by gynecologic oncologist Dr. Arciniega.   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      7/22/13: Vaginal biopsy: HSIL, cannot exclude invasion.   -Consultation with gynecologic oncologist Dr. Johnson. Upper vaginectomy and CO2 laser ablation recommended, patient declined.      [id number]: No vaginal dysplasia treatment.      5/20/22: Pelvic MRI: I have personally reviewed the MRI images.   Centrally hypoenhancing heterogeneous midline pelvic mass along  the superior margin of the vaginal vault measures 8.5 x 6.7 x 8.1 cm.  The mass abut the bladder and there is mucosal irregularity at the  site of abutment. The mass indents the  decompressed rectum but there is no definite invasion or mucosal  Irregularity.  6/9/22: CT-guided biopsy of pelvic mass: Poorly-differentiated squamous cell carcinoma, HPV-associated.   7/1/22: Staging PET/CT: I have personally reviewed the PET/CT images.   Large heterogenous and hypermetabolic mass seen within the midline of  the pelvis measuring approximately 8.5 x 7.2 x 8.1 cm with an SUV max  measuring up to 13.2. Additionally, there is central hypoattenuation  with relative lack of metabolic activity, suggestive of central  Necrosis (previously 8.5 x 6.7 x 8.1 cm on prior  MRI). No inguinal lymphadenopathy.  Redemonstration of hypermetabolic left pelvic lymphadenopathy, the  largest lymph node measuring up to 3.3 x 2.4 cm with an SUV max of  12.2 (previously measuring 2.5 x 2.1 cm). Additional conglomeration of  hypermetabolic lymph nodes at the aortic bifurcation measuring 2.9 x  1.6 cm with an SUV max of 14.3 (previously measuring 1.9 x 1.7 cm).  Unchanged enlarged mildly  metabolic right pelvic lymph node measuring  2.5 x 1.1 cm with an SUV max of 4.2, favored to represent a reactive  lymph node given the relative lack of hypermetabolic activity compared  to the left pelvic lymph node.  7/5/2022: Cisplatin 50 mg/m2 + paclitaxel 175 mg/m2 + bevacizumab 15 mg/kg every 21 days. Get Caris on tumor biopsy. If PD-L1+ (CPS 1% or greater), then will add pembrolizumab 200 mg every 21 days to therapy. Plan for 3 cycles of therapy followed by repeat PET/CT to assess disease response.      7/20/2022: Cycle 1 Cisplatin, Paclitaxel, bevazicumab   8/8/2022: Cycle 2 Cisplatin, Paclitaxel, bevacizumab  8/31/2022: Cycle 3 Cisplatin, Paclitaxel, bevacizumab planned      Genetic/Genomic/Molecular Testing History:  2006: Negative for germline BRCA1, BRCA2 pathogenic variants.                Today patient presents via video with her . Cordell reports that that she has gained 5 pounds since her last visit and that her appetite is great. She continues to fast for 48 hours around chemotherapy through a study with [facility]. Has upcoming breast MRI in October. Pt reports that pelvic pain has started again with achy pressure pain near sacrum. Pain is controlled with Tylenol 500 mg prn (TID) with CBD oil. Taking Senna one tablet nightly controlling constipation. Reports that she had one day of neuropathy. Started L-glutamine which helped her symptoms.     She denies any vaginal bleeding, no changes in her bowel or bladder habits, no nausea/emesis, no lower extremity edema, and no difficulties eating or sleeping. She denies any abdominal bloating, no fevers or chills, and no chest pain or shortness of breath. Has a hx of a-fib and is on Metoprolol. 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 Small mild tinnitus in right ear that was present prior to chemotherapy.                   Review of Systems:    Systemic           no weight changes; no fever; no chills;  no night sweats; no appetite changes  Skin           no rashes, or lesions  Eye           no irritation; no changes in vision  Ramona-Laryngeal           no dysphagia; no hoarseness   Pulmonary    no cough; no shortness of breath  Cardiovascular    no chest pain; no palpitations  Gastrointestinal    no diarrhea; + constipation; + abdominal pain; no changes in bowel  habits; no blood in stool  Genitourinary   no urinary frequency; no urinary urgency; no dysuria; no pain; no abnormal vaginal discharge; no abnormal vaginal bleeding  Breast   no breast discharge; no breast changes; no breast pain  Musculoskeletal    no myalgias; no arthralgias; no back pain  Psychiatric           no depressed mood; no anxiety    Hematologic           no tender lymph nodes; no noticeable swellings or lumps   Endocrine    no hot flashes; no heat/cold intolerance         Neurological   no tremor; no numbness and tingling; no headaches; no difficulty sleeping      Past Medical History:    Past Medical History:   Diagnosis Date     Abnormal Pap smear     maybe 20 years ago     Cervical cancer (H)      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      History of breast cancer 2003    lumpectomy and radiation offerred chemo and patient declined at time but had oncogene test and low risk     Hyperlipidemia LDL goal < 160      Osteopenia      Paroxysmal A-fib (H)      Severe vaginal dysplasia          Past Surgical History:    Past Surgical History:   Procedure Laterality Date     BIOPSY       BREAST SURGERY Left 2003    lumpectomy left, did radiation, 5 yr of tamoxifen     COLONOSCOPY  2018    repeat in 2028     Colposcopy Cervix with Loop Electrode Conization and Lser to Vagina  2008     COLPOSCOPY, BIOPSY, COMBINED  10/24/2012    Procedure: COMBINED COLPOSCOPY, BIOPSY;  Colposcopy, Biopsy of Cervix and Vagina, Ultrasound Guidance;  Surgeon: Colette Ng MD;  Location: [facility] OR     ENT SURGERY  01/23/2018     otoscelerosis, right side     HYSTERECTOMY, [facility]  12/2012    high grade cervical dysplasia, [place] [facility], Dr. Arciniega     INSERT PORT VASCULAR ACCESS Right 7/18/2022    Procedure: INSERTION, VASCULAR ACCESS PORT;  Surgeon: Donnie Elias MD;  Location: [facility] OR     IR CHEST PORT PLACEMENT > 5 YRS OF AGE  7/18/2022     NV LAP VAGINECTOMY, PARTIAL REMOVAL OF VAGINAL WALL  10/2013    upper vaginectomy for dysplasia, Dr. Megan Arciniega         Health Maintenance Due   Topic Date Due     Pneumococcal Vaccine: 65+ Years (1 - PCV) Never done     ZOSTER IMMUNIZATION (2 of 2) 02/12/2020     COVID-19 Vaccine (4 - Booster for Pfizer series) 01/08/2022     INFLUENZA VACCINE (1) 09/01/2022     PAP  01/02/2023       Current Medications:     Current Outpatient Medications   Medication Sig Dispense Refill     Acetylcarnitine HCl 250 MG CAPS Take 500 mg by mouth 2 times daily        aspirin (ASA) 81 MG chewable tablet Take 81 mg by mouth daily       Bacillus Coagulans-Inulin (PROBIOTIC FORMULA) 1-250 BILLION-MG CAPS Take by mouth three times a week 25+billion CFUS       BENFOTIAMINE PO Take 150 mg by mouth 2 times daily       Cholecalciferol (VITAMIN D-3) 25 MCG (1000 UT) CAPS Take 1,000 Units by mouth daily 90 capsule 3     Coenzyme Q10 (CO Q 10 PO) Take 100 mg by mouth daily        dexamethasone (DECADRON) 4 MG tablet Take 2 tablets (8 mg) by mouth daily Take for 3 days, starting the day after chemo. Take with food. 6 tablet 3     [place] Berry 550 MG CAPS Take 400 mg by mouth 2 times daily 60 capsule 11     MAGNESIUM OXIDE PO Take 500 mg by mouth       metoprolol succinate ER (TOPROL XL) 50 MG 24 hr tablet Take 1 tablet (50 mg) by mouth daily 90 tablet 1     Omega-3 Fatty Acids (OMEGA-3 FISH OIL PO) Take 630 mg by mouth 2 times daily        ondansetron (ZOFRAN) 8 MG tablet Take 1 tablet (8 mg) by mouth every 8 hours as needed for nausea (vomiting) 30 tablet 2     OVER-THE-COUNTER Turkey tail mushroom powder, use 3 times a day 1 Can 11      "Pectin Cit-Inos-C-Bioflav-Soy (MODIFIED CITRUS PECTIN PO)        prochlorperazine (COMPAZINE) 10 MG tablet Take 1 tablet (10 mg) by mouth every 6 hours as needed for nausea or vomiting 30 tablet 2     Selenium 200 MCG CAPS Take 200 mg by mouth       Taurine 1000 MG CAPS Take one po twice daily 60 capsule 11     Turmeric 450 MG CAPS Take 2 capsules by mouth daily 60 capsule 11         Allergies:      No Known Allergies     Social History:     Social History     Tobacco Use     Smoking status: Former Smoker     Packs/day: 0.50     Years: 15.00     Pack years: 7.50     Smokeless tobacco: Never Used   Substance Use Topics     Alcohol use: Yes     Alcohol/week: 2.0 standard drinks     Types: 2 Standard drinks or equivalent per week     Comment: Socially        History   Drug Use No         Family History:     The patient's family history is notable for     Family History   Problem Relation Age of Onset     Myocardial Infarction Mother 73        Tob     Myocardial Infarction Father 68        Tob     Breast Cancer Sister 47         of breast cancer age 63; BRCA mutation testing negative     Cancer Maternal Grandmother         Unsure of type.     Breast Cancer Paternal Grandmother         Unsure of diagnosis--some type of \"women's issue\"         Physical Exam:     There were no vitals taken for this visit.  There is no height or weight on file to calculate BMI.    General Appearance:  healthy and alert, no distress     Eyes:  Eyes grossly normal to inspection.  No discharge or erythema, or obvious scleral/conjunctival abnormalities.     Respiratory:     No audible wheeze, cough, or visible cyanosis.  No visible retractions or increased work of breathing.      Musculoskeletal:         extremities non tender and without edema     Skin:    no lesions or rashes on visible skin     Neurological:   normal gait, no gross defects                Psychiatric:      appropriate mood and affect. Mentation appears normal, affect " normal/bright, judgement and insight intact, normal speech and appearance well-groomed                            The rest of a comprehensive physical examination is deferred due to PHE (public health emergency) video visit restrictions.         Assessment:      Cordell Donaldson is a 72 year old woman with a diagnosis of a large pelvic mass and pelvic lymphadenopathy with biopsy showing recurrent cervical cancer vs a new vaginal cancer. She is here today for follow up and disease management. Video visit.     30 minutes spent on the date of the encounter doing chart review, history and exam, documentation, and further activities as noted above.         Plan:     1.)       Squamous cell carcinoma. Ok to proceed with planned treatment once labs have met parameters. Encouraged 5-6 small high protein meals a day and discussed nausea management and control. Encouraged 64 oz of fluids per day. Discussed neutropenic precautions and faviola period. Reviewed signs and symptoms for when she should contact the clinic or seek additional care. Patient to contact the clinic with any questions or concerns in the interim.  -PET CT ordered with labs, MD and infusion due in 2-3 weeks   - Discussed Caris results with pt and Dr. Raygoza regarding PD-L1 testing. Keytruda to be added per MD orders. MD to place orders.      2.)  Genetics: referral placed. Appt scheduled for 10/24/2022.     3.) Labs and/or tests ordered include: CBC, CMP, Mag to be collected 8/31/22; 24 hour reviewed with patient today      4.) Health maintenance issues addressed today include annual health maintenance and non-gynecologic issues with PCP.    5.)        Pelvic pain: largely controlled with APAP and CBD oil per patient. Reviewed that we can send in different pain medications if not controlled. Met with Palliative Care on 8/3/22.      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 Mtz). Consultation with Dr. Montana in Oncology on 7/25/2022 plan to obtain baseline breast MRI, and repeat MRI in approximately 2-3 months in order to monitor breast response to cervical cancer chemotherapy.      7.)      Weight loss: per patient, she has gained 5 pounds since last visit. Will continue to monitor weight trends. Weight to be measured tomorrow with labs.               TE Head, NP-BC  Women's Health Nurse Practitioner  Division of Gynecologic Oncology  [facility]        CC  Patient Care Team:  Merry Lino MD as PCP - General (Internal Medicine)  Gui Mccollum MD Corfield, Aaron Daniel, DPM as MD (Podiatry)  Elise Huitron MD as Assigned Heart and Vascular Provider  Merry Lino MD as Assigned PCP  Tess Mcgowan RN as Specialty Care Coordinator  Heather Ayon MD as MD (Urology)  Angela Montana MD as MD (Hematology & Oncology)  Yvette Pike MD as MD (Surgery)  Valeri Kirby, GEOFF as Specialty Care Coordinator (Hematology & Oncology)  Yvette Pike MD as Assigned Surgical Provider  Jewels Oconnor APRN CNP as Assigned Cancer Care Provider  GUI MCCOLLMU        Again, thank you for allowing me to participate in the care of your patient.        Sincerely,        TE Gaston CNP

## 2022-08-30 NOTE — NURSING NOTE
Patient verified medications and allergies are correct via eCheck-in. Patient confirms no changes at this time and/or since last reviewed by clinic staff.    Patricia Nunn, Virtual Facilitator

## 2022-08-31 ENCOUNTER — INFUSION THERAPY VISIT (OUTPATIENT)
Dept: ONCOLOGY | Facility: CLINIC | Age: 72
End: 2022-08-31
Attending: OBSTETRICS & GYNECOLOGY
Payer: COMMERCIAL

## 2022-08-31 ENCOUNTER — APPOINTMENT (OUTPATIENT)
Dept: LAB | Facility: CLINIC | Age: 72
End: 2022-08-31
Attending: OBSTETRICS & GYNECOLOGY
Payer: COMMERCIAL

## 2022-08-31 VITALS
HEART RATE: 96 BPM | SYSTOLIC BLOOD PRESSURE: 132 MMHG | DIASTOLIC BLOOD PRESSURE: 82 MMHG | RESPIRATION RATE: 16 BRPM | WEIGHT: 109 LBS | TEMPERATURE: 97.4 F | BODY MASS INDEX: 19.56 KG/M2 | OXYGEN SATURATION: 99 %

## 2022-08-31 DIAGNOSIS — C53.0 MALIGNANT NEOPLASM OF ENDOCERVIX (H): Primary | ICD-10-CM

## 2022-08-31 LAB
ALBUMIN SERPL BCG-MCNC: 4 G/DL (ref 3.5–5.2)
ALBUMIN UR-MCNC: 30 MG/DL
ALP SERPL-CCNC: 89 U/L (ref 35–104)
ALT SERPL W P-5'-P-CCNC: 30 U/L (ref 10–35)
ANION GAP SERPL CALCULATED.3IONS-SCNC: 14 MMOL/L (ref 7–15)
AST SERPL W P-5'-P-CCNC: 24 U/L (ref 10–35)
BASOPHILS # BLD AUTO: 0 10E3/UL (ref 0–0.2)
BASOPHILS NFR BLD AUTO: 0 %
BILIRUB SERPL-MCNC: 0.6 MG/DL
BUN SERPL-MCNC: 13 MG/DL (ref 8–23)
CALCIUM SERPL-MCNC: 9.4 MG/DL (ref 8.8–10.2)
CHLORIDE SERPL-SCNC: 98 MMOL/L (ref 98–107)
CREAT SERPL-MCNC: 0.67 MG/DL (ref 0.51–0.95)
DEPRECATED HCO3 PLAS-SCNC: 23 MMOL/L (ref 22–29)
EOSINOPHIL # BLD AUTO: 0.1 10E3/UL (ref 0–0.7)
EOSINOPHIL NFR BLD AUTO: 1 %
ERYTHROCYTE [DISTWIDTH] IN BLOOD BY AUTOMATED COUNT: 19.4 % (ref 10–15)
GFR SERPL CREATININE-BSD FRML MDRD: >90 ML/MIN/1.73M2
GLUCOSE SERPL-MCNC: 94 MG/DL (ref 70–99)
HCT VFR BLD AUTO: 39.4 % (ref 35–47)
HGB BLD-MCNC: 12.2 G/DL (ref 11.7–15.7)
IMM GRANULOCYTES # BLD: 0 10E3/UL
IMM GRANULOCYTES NFR BLD: 0 %
LYMPHOCYTES # BLD AUTO: 1 10E3/UL (ref 0.8–5.3)
LYMPHOCYTES NFR BLD AUTO: 12 %
MAGNESIUM SERPL-MCNC: 1.7 MG/DL (ref 1.7–2.3)
MCH RBC QN AUTO: 24.3 PG (ref 26.5–33)
MCHC RBC AUTO-ENTMCNC: 31 G/DL (ref 31.5–36.5)
MCV RBC AUTO: 78 FL (ref 78–100)
MONOCYTES # BLD AUTO: 0.6 10E3/UL (ref 0–1.3)
MONOCYTES NFR BLD AUTO: 8 %
NEUTROPHILS # BLD AUTO: 6.6 10E3/UL (ref 1.6–8.3)
NEUTROPHILS NFR BLD AUTO: 79 %
NRBC # BLD AUTO: 0 10E3/UL
NRBC BLD AUTO-RTO: 0 /100
PLATELET # BLD AUTO: 244 10E3/UL (ref 150–450)
POTASSIUM SERPL-SCNC: 4.1 MMOL/L (ref 3.4–5.3)
PROT SERPL-MCNC: 6.6 G/DL (ref 6.4–8.3)
RBC # BLD AUTO: 5.03 10E6/UL (ref 3.8–5.2)
SODIUM SERPL-SCNC: 135 MMOL/L (ref 136–145)
T4 FREE SERPL-MCNC: 1.3 NG/DL (ref 0.9–1.7)
TSH SERPL DL<=0.005 MIU/L-ACNC: 6.18 UIU/ML (ref 0.3–4.2)
WBC # BLD AUTO: 8.4 10E3/UL (ref 4–11)

## 2022-08-31 PROCEDURE — 84439 ASSAY OF FREE THYROXINE: CPT

## 2022-08-31 PROCEDURE — 84443 ASSAY THYROID STIM HORMONE: CPT

## 2022-08-31 PROCEDURE — 258N000001 HC RX 258: Performed by: OBSTETRICS & GYNECOLOGY

## 2022-08-31 PROCEDURE — 258N000003 HC RX IP 258 OP 636: Performed by: OBSTETRICS & GYNECOLOGY

## 2022-08-31 PROCEDURE — 96415 CHEMO IV INFUSION ADDL HR: CPT

## 2022-08-31 PROCEDURE — 96367 TX/PROPH/DG ADDL SEQ IV INF: CPT

## 2022-08-31 PROCEDURE — 96361 HYDRATE IV INFUSION ADD-ON: CPT

## 2022-08-31 PROCEDURE — 250N000011 HC RX IP 250 OP 636: Performed by: OBSTETRICS & GYNECOLOGY

## 2022-08-31 PROCEDURE — 250N000013 HC RX MED GY IP 250 OP 250 PS 637: Performed by: OBSTETRICS & GYNECOLOGY

## 2022-08-31 PROCEDURE — 96375 TX/PRO/DX INJ NEW DRUG ADDON: CPT

## 2022-08-31 PROCEDURE — 81003 URINALYSIS AUTO W/O SCOPE: CPT | Performed by: OBSTETRICS & GYNECOLOGY

## 2022-08-31 PROCEDURE — 96417 CHEMO IV INFUS EACH ADDL SEQ: CPT

## 2022-08-31 PROCEDURE — 36591 DRAW BLOOD OFF VENOUS DEVICE: CPT

## 2022-08-31 PROCEDURE — 80053 COMPREHEN METABOLIC PANEL: CPT | Performed by: OBSTETRICS & GYNECOLOGY

## 2022-08-31 PROCEDURE — 96413 CHEMO IV INFUSION 1 HR: CPT

## 2022-08-31 PROCEDURE — 83735 ASSAY OF MAGNESIUM: CPT | Performed by: OBSTETRICS & GYNECOLOGY

## 2022-08-31 PROCEDURE — 85025 COMPLETE CBC W/AUTO DIFF WBC: CPT | Performed by: OBSTETRICS & GYNECOLOGY

## 2022-08-31 RX ORDER — DIPHENHYDRAMINE HCL 25 MG
50 CAPSULE ORAL ONCE
Status: COMPLETED | OUTPATIENT
Start: 2022-08-31 | End: 2022-08-31

## 2022-08-31 RX ORDER — PALONOSETRON 0.05 MG/ML
0.25 INJECTION, SOLUTION INTRAVENOUS ONCE
Status: COMPLETED | OUTPATIENT
Start: 2022-08-31 | End: 2022-08-31

## 2022-08-31 RX ORDER — HEPARIN SODIUM (PORCINE) LOCK FLUSH IV SOLN 100 UNIT/ML 100 UNIT/ML
500 SOLUTION INTRAVENOUS ONCE
Status: COMPLETED | OUTPATIENT
Start: 2022-08-31 | End: 2022-08-31

## 2022-08-31 RX ORDER — DEXTROSE, SODIUM CHLORIDE, AND POTASSIUM CHLORIDE 5; .45; .15 G/100ML; G/100ML; G/100ML
1000 INJECTION INTRAVENOUS ONCE
Status: COMPLETED | OUTPATIENT
Start: 2022-08-31 | End: 2022-08-31

## 2022-08-31 RX ORDER — HEPARIN SODIUM (PORCINE) LOCK FLUSH IV SOLN 100 UNIT/ML 100 UNIT/ML
5 SOLUTION INTRAVENOUS
Status: DISCONTINUED | OUTPATIENT
Start: 2022-08-31 | End: 2022-08-31 | Stop reason: HOSPADM

## 2022-08-31 RX ADMIN — CISPLATIN 80 MG: 1 INJECTION, SOLUTION INTRAVENOUS at 13:50

## 2022-08-31 RX ADMIN — FOSAPREPITANT: 150 INJECTION, POWDER, LYOPHILIZED, FOR SOLUTION INTRAVENOUS at 09:32

## 2022-08-31 RX ADMIN — Medication 5 ML: at 16:31

## 2022-08-31 RX ADMIN — FAMOTIDINE 20 MG: 10 INJECTION INTRAVENOUS at 09:23

## 2022-08-31 RX ADMIN — DIPHENHYDRAMINE HYDROCHLORIDE 50 MG: 25 CAPSULE ORAL at 09:21

## 2022-08-31 RX ADMIN — PALONOSETRON HYDROCHLORIDE 0.25 MG: 0.25 INJECTION INTRAVENOUS at 09:28

## 2022-08-31 RX ADMIN — SODIUM CHLORIDE 1000 ML: 9 INJECTION, SOLUTION INTRAVENOUS at 10:50

## 2022-08-31 RX ADMIN — FAMOTIDINE 20 MG: 10 INJECTION INTRAVENOUS at 09:24

## 2022-08-31 RX ADMIN — Medication 500 UNITS: at 08:15

## 2022-08-31 RX ADMIN — PACLITAXEL 268 MG: 6 INJECTION, SOLUTION INTRAVENOUS at 10:51

## 2022-08-31 RX ADMIN — SODIUM CHLORIDE 250 ML: 9 INJECTION, SOLUTION INTRAVENOUS at 09:20

## 2022-08-31 RX ADMIN — SODIUM CHLORIDE 800 MG: 9 INJECTION, SOLUTION INTRAVENOUS at 15:54

## 2022-08-31 RX ADMIN — POTASSIUM CHLORIDE, DEXTROSE MONOHYDRATE AND SODIUM CHLORIDE 1000 ML: 150; 5; 450 INJECTION, SOLUTION INTRAVENOUS at 13:49

## 2022-08-31 RX ADMIN — SODIUM CHLORIDE 200 MG: 9 INJECTION, SOLUTION INTRAVENOUS at 10:04

## 2022-08-31 ASSESSMENT — PAIN SCALES - GENERAL: PAINLEVEL: NO PAIN (0)

## 2022-08-31 NOTE — PROGRESS NOTES
Infusion Nursing Note:  Cordell Donaldson presents today for Day 1 Cycle 3 Keytruda (new), Taxol, Cisplatin, Avastin.    Patient seen by provider : Yes: Jewels Oconnor CNP  Date of visit: 8/30/22      present during visit today: Not Applicable.    Note: Cordell arrives to infusion today doing well overall. She has noted some increased burning and skin irritation between her vaginal and anus area. She is using a barrier cream with improvement. No other changes or concerns to report.     Intravenous Access:  Implanted Port.    Treatment Conditions:  Lab Results   Component Value Date    HGB 12.2 08/31/2022    WBC 8.4 08/31/2022    ANEU 1.8 08/22/2017    ANEUTAUTO 6.6 08/31/2022     08/31/2022      Lab Results   Component Value Date     (L) 08/31/2022    POTASSIUM 4.1 08/31/2022    MAG 1.7 08/31/2022    CR 0.67 08/31/2022    LINDA 9.4 08/31/2022    BILITOTAL 0.6 08/31/2022    ALBUMIN 4.0 08/31/2022    ALT 30 08/31/2022    AST 24 08/31/2022     Results reviewed, labs MET treatment parameters, ok to proceed with treatment.    Post Infusion Assessment:  Patient tolerated infusion without incident.  Blood return noted pre and post infusion.  Site patent and intact, free from redness, edema or discomfort.  No evidence of extravasations.  Access discontinued per protocol.     Discharge Plan:   Patient declined prescription refills.  AVS to patient via [employer].  Patient will return 10/6 for MRI and 10/10 for MD follow up appointment.   Patient discharged in stable condition accompanied by: self.  Departure Mode: Ambulatory.    Karissa Anne RN

## 2022-08-31 NOTE — NURSING NOTE
"Chief Complaint   Patient presents with     Port Draw     Labs drawn via port by RN. Vitals taken.     Port accessed with 20G 3/4\" flat needle by RN, labs collected, line flushed with saline and heparin.  Vitals taken. Pt checked in for appointment(s).    Thao Forman RN    "

## 2022-08-31 NOTE — PATIENT INSTRUCTIONS
Contact Numbers  [facility]: [phone] (for symptom and scheduling needs)    Please call the [facility] Triage line if you experience a temperature greater than or equal to 100.4, shaking chills, have uncontrolled nausea, vomiting and/or diarrhea, dizziness, shortness of breath, chest pain, bleeding, unexplained bruising, or if you have any other new/concerning symptoms, questions or concerns.     If you are having any concerning symptoms or wish to speak to a provider before your next infusion visit, please call your care coordinator or triage to notify them so we can adequately serve you.     If you need a refill on a narcotic prescription or other medication, please call triage before your infusion appointment.      Most Recent 3 CBC's:  Recent Labs   Lab Test 08/31/22  0822 08/10/22  0812 07/20/22  0731   WBC 8.4 13.3* 15.1*   HGB 12.2 12.1 10.4*   MCV 78 78 77*    326 397      Most Recent 3 BMP's:  Recent Labs   Lab Test 08/31/22  0822 08/10/22  0812 07/20/22  0731   * 138 136   POTASSIUM 4.1 4.0 3.9   CHLORIDE 98 104 101   CO2 23 24 26   BUN 13.0 8 9   CR 0.67 0.61 0.60   ANIONGAP 14 10 9   LINDA 9.4 9.3 9.7   GLC 94 103* 97     Most Recent 2 LFT's:  Recent Labs   Lab Test 08/31/22  0822 08/10/22  0812   AST 24 16   ALT 30 18   ALKPHOS 89 101   BILITOTAL 0.6 0.5

## 2022-09-02 ENCOUNTER — TELEPHONE (OUTPATIENT)
Dept: ONCOLOGY | Facility: CLINIC | Age: 72
End: 2022-09-02

## 2022-09-02 NOTE — TELEPHONE ENCOUNTER
Called Dr. Leslee Ramachandran from [facility] [[email or web address]] to complete a peer:peer for coverage of the PET/CT following chemotherapy to assess disease response to chemotherapy. Case # [id number].     Per Dr. Ramachandran, PET/CT has been approved.  Authorization #: [id number].   Expires 3/1/23.    Hina Raygoza MD, MS, FACOG, FACS  9/2/2022  3:19 PM

## 2022-09-22 ENCOUNTER — LAB (OUTPATIENT)
Dept: LAB | Facility: CLINIC | Age: 72
End: 2022-09-22
Attending: OBSTETRICS & GYNECOLOGY
Payer: COMMERCIAL

## 2022-09-22 ENCOUNTER — ANCILLARY PROCEDURE (OUTPATIENT)
Dept: PET IMAGING | Facility: CLINIC | Age: 72
End: 2022-09-22
Attending: OBSTETRICS & GYNECOLOGY
Payer: COMMERCIAL

## 2022-09-22 DIAGNOSIS — C53.9 RECURRENT CERVICAL CANCER (H): ICD-10-CM

## 2022-09-22 DIAGNOSIS — C53.0 MALIGNANT NEOPLASM OF ENDOCERVIX (H): ICD-10-CM

## 2022-09-22 DIAGNOSIS — C53.0 MALIGNANT NEOPLASM OF ENDOCERVIX (H): Primary | ICD-10-CM

## 2022-09-22 LAB
ALBUMIN SERPL BCG-MCNC: 4.1 G/DL (ref 3.5–5.2)
ALP SERPL-CCNC: 72 U/L (ref 35–104)
ALT SERPL W P-5'-P-CCNC: 51 U/L (ref 10–35)
ANION GAP SERPL CALCULATED.3IONS-SCNC: 10 MMOL/L (ref 7–15)
AST SERPL W P-5'-P-CCNC: 33 U/L (ref 10–35)
BASOPHILS # BLD AUTO: 0 10E3/UL (ref 0–0.2)
BASOPHILS NFR BLD AUTO: 0 %
BILIRUB SERPL-MCNC: 0.3 MG/DL
BUN SERPL-MCNC: 21.6 MG/DL (ref 8–23)
CALCIUM SERPL-MCNC: 9.5 MG/DL (ref 8.8–10.2)
CHLORIDE SERPL-SCNC: 105 MMOL/L (ref 98–107)
CREAT SERPL-MCNC: 0.67 MG/DL (ref 0.51–0.95)
DEPRECATED HCO3 PLAS-SCNC: 24 MMOL/L (ref 22–29)
EOSINOPHIL # BLD AUTO: 0.1 10E3/UL (ref 0–0.7)
EOSINOPHIL NFR BLD AUTO: 2 %
ERYTHROCYTE [DISTWIDTH] IN BLOOD BY AUTOMATED COUNT: 21 % (ref 10–15)
GFR SERPL CREATININE-BSD FRML MDRD: >90 ML/MIN/1.73M2
GLUCOSE SERPL-MCNC: 103 MG/DL (ref 70–99)
GLUCOSE SERPL-MCNC: 83 MG/DL (ref 70–99)
HCT VFR BLD AUTO: 39.8 % (ref 35–47)
HGB BLD-MCNC: 12.5 G/DL (ref 11.7–15.7)
IMM GRANULOCYTES # BLD: 0 10E3/UL
IMM GRANULOCYTES NFR BLD: 1 %
LYMPHOCYTES # BLD AUTO: 1 10E3/UL (ref 0.8–5.3)
LYMPHOCYTES NFR BLD AUTO: 17 %
MAGNESIUM SERPL-MCNC: 1.8 MG/DL (ref 1.7–2.3)
MCH RBC QN AUTO: 25.5 PG (ref 26.5–33)
MCHC RBC AUTO-ENTMCNC: 31.4 G/DL (ref 31.5–36.5)
MCV RBC AUTO: 81 FL (ref 78–100)
MONOCYTES # BLD AUTO: 0.5 10E3/UL (ref 0–1.3)
MONOCYTES NFR BLD AUTO: 9 %
NEUTROPHILS # BLD AUTO: 4.4 10E3/UL (ref 1.6–8.3)
NEUTROPHILS NFR BLD AUTO: 71 %
NRBC # BLD AUTO: 0 10E3/UL
NRBC BLD AUTO-RTO: 0 /100
PLATELET # BLD AUTO: 218 10E3/UL (ref 150–450)
POTASSIUM SERPL-SCNC: 4.3 MMOL/L (ref 3.4–5.3)
PROT SERPL-MCNC: 6.5 G/DL (ref 6.4–8.3)
RBC # BLD AUTO: 4.9 10E6/UL (ref 3.8–5.2)
SODIUM SERPL-SCNC: 139 MMOL/L (ref 136–145)
T4 FREE SERPL-MCNC: 1.21 NG/DL (ref 0.9–1.7)
TSH SERPL DL<=0.005 MIU/L-ACNC: 4.82 UIU/ML (ref 0.3–4.2)
WBC # BLD AUTO: 6.1 10E3/UL (ref 4–11)

## 2022-09-22 PROCEDURE — 250N000011 HC RX IP 250 OP 636: Performed by: OBSTETRICS & GYNECOLOGY

## 2022-09-22 PROCEDURE — 36591 DRAW BLOOD OFF VENOUS DEVICE: CPT

## 2022-09-22 PROCEDURE — 85025 COMPLETE CBC W/AUTO DIFF WBC: CPT | Performed by: OBSTETRICS & GYNECOLOGY

## 2022-09-22 PROCEDURE — 83735 ASSAY OF MAGNESIUM: CPT | Performed by: OBSTETRICS & GYNECOLOGY

## 2022-09-22 PROCEDURE — 84439 ASSAY OF FREE THYROXINE: CPT

## 2022-09-22 PROCEDURE — 80053 COMPREHEN METABOLIC PANEL: CPT | Performed by: OBSTETRICS & GYNECOLOGY

## 2022-09-22 PROCEDURE — 84443 ASSAY THYROID STIM HORMONE: CPT

## 2022-09-22 PROCEDURE — 82040 ASSAY OF SERUM ALBUMIN: CPT | Performed by: OBSTETRICS & GYNECOLOGY

## 2022-09-22 RX ORDER — HEPARIN SODIUM (PORCINE) LOCK FLUSH IV SOLN 100 UNIT/ML 100 UNIT/ML
500 SOLUTION INTRAVENOUS ONCE
Status: COMPLETED | OUTPATIENT
Start: 2022-09-22 | End: 2022-09-22

## 2022-09-22 RX ORDER — HEPARIN SODIUM (PORCINE) LOCK FLUSH IV SOLN 100 UNIT/ML 100 UNIT/ML
5 SOLUTION INTRAVENOUS EVERY 8 HOURS
Status: DISCONTINUED | OUTPATIENT
Start: 2022-09-22 | End: 2022-09-28 | Stop reason: HOSPADM

## 2022-09-22 RX ORDER — FUROSEMIDE 10 MG/ML
40 INJECTION INTRAMUSCULAR; INTRAVENOUS ONCE
Status: COMPLETED | OUTPATIENT
Start: 2022-09-22 | End: 2022-09-22

## 2022-09-22 RX ORDER — IOPAMIDOL 755 MG/ML
50-125 INJECTION, SOLUTION INTRAVASCULAR ONCE
Status: COMPLETED | OUTPATIENT
Start: 2022-09-22 | End: 2022-09-22

## 2022-09-22 RX ADMIN — HEPARIN SODIUM (PORCINE) LOCK FLUSH IV SOLN 100 UNIT/ML 500 UNITS: 100 SOLUTION at 10:33

## 2022-09-22 RX ADMIN — Medication 5 ML: at 09:06

## 2022-09-22 RX ADMIN — FUROSEMIDE 40 MG: 10 INJECTION INTRAMUSCULAR; INTRAVENOUS at 10:32

## 2022-09-22 RX ADMIN — IOPAMIDOL 65 ML: 755 INJECTION, SOLUTION INTRAVASCULAR at 10:32

## 2022-09-22 NOTE — DISCHARGE INSTRUCTIONS

## 2022-09-22 NOTE — NURSING NOTE
Chief Complaint   Patient presents with     Port Draw     Power needle. Heparin locked, left accessed for use in imaging     Christy Marie RN on 9/22/2022 at 9:08 AM

## 2022-09-26 NOTE — PROGRESS NOTES
Follow-up Note on Referred Patient      Date: 2022        Referring Provider/Gynecologic Oncologist:  Megan Arciniega MD  [facility]   Phone [phone]  [phone]      Radiation Oncologist:   Raymond Stockton MD, PhD  [facility]      Medical Oncologist:  Angela Montana MD  [facility].      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       History of Present Illness:   Cordell Donaldson (she/her/hers) is a 72 year old initially referred to the Gynecologic Cancer Clinic at the [facility] on 2022 by gynecologic oncologist Dr. Arciniega and radiation oncologist Dr. Stockton for management of a pelvic mass due to recurrent cervical cancer vs new vaginal cancer. History as follows:     Breast Cancer History:  : Left breast cancer.  -Lumpectomy.  -Radiation therapy.    [id number]: BRENNAN.     22: PET/CT to stage vaginal cancer (see below).   Mild soft tissue  thickening in the left retropectoral region with an SUV max of 2.7.  7/15/22: Invasive ductal carcinoma.   -Consultation with Dr. Montana. Recommendation to prioritize vaginal/recurrent cervix cancer. Plan to follow breast lesion with repeat MRI. Plan for endocrine therapy after completion of chemotherapy for vaginal/cervical cancer.       Cervical/Vaginal Dysplasia/Cancer History:  10/24/12:   -Endocervical curettings: Squamous carcinoma, invasion cannot be assessed.   -Ectocervical biopsies: HSIL (CIN3).   -Vaginal biopsy, posterior: HSIL (VaIN3).  2012: Robotic-assisted laparoscopic lysis of adhesions, left ureterolysis with  conversion to laparotomy, total abdominal hysterectomy, and an upper  Vaginectomy by gynecologic oncologist Dr. Arciniega.   -Pathology: Stage IA1 squamous cell carcinoma of the cervix with no  residual invasive cancer, residual HSIL.     7/22/13: Vaginal biopsy: HSIL, cannot exclude invasion.   -Consultation with gynecologic oncologist Dr. Johnson. Upper vaginectomy and CO2 laser ablation recommended, patient declined.   [id number]: No vaginal dysplasia treatment.   5/20/22: Pelvic MRI: I have personally reviewed the MRI images.   Centrally hypoenhancing heterogeneous midline pelvic mass along  the superior margin of the vaginal vault measures 8.5 x 6.7 x 8.1 cm.  The mass abut the bladder and there is mucosal irregularity at the  site of abutment. The mass indents the  decompressed rectum but there is no definite invasion or mucosal  Irregularity.  6/9/22: CT-guided biopsy of pelvic mass: Poorly-differentiated squamous cell carcinoma, HPV-associated.   7/1/22: Staging PET/CT: Stage III vaginal cancer (vs recurrent, metastatic cervical cancer). I have personally reviewed the PET/CT images.   Large heterogenous and hypermetabolic mass seen within the midline of  the pelvis measuring approximately 8.5 x 7.2 x 8.1 cm with an SUV max  measuring up to 13.2. Additionally, there is central hypoattenuation  with relative lack of metabolic activity, suggestive of central  Necrosis (previously 8.5 x 6.7 x 8.1 cm on prior  MRI). No inguinal lymphadenopathy.  Redemonstration of hypermetabolic left pelvic lymphadenopathy, the  largest lymph node measuring up to 3.3 x 2.4 cm with an SUV max of  12.2 (previously measuring 2.5 x 2.1 cm). Additional conglomeration of  hypermetabolic lymph nodes at the aortic bifurcation measuring 2.9 x  1.6 cm with an SUV max of 14.3 (previously measuring 1.9 x 1.7 cm).  Unchanged enlarged mildly metabolic right pelvic lymph node measuring  2.5 x 1.1 cm with an SUV max of 4.2, favored to represent a reactive  lymph node given the relative lack of hypermetabolic activity compared  to the left pelvic lymph node.    7/20/22, 8/10/22: Cycles #1-2 of first-line chemotherapy with IV cisplatin 50  m/m2 + paclitaxel 175 mg/m2 + bevacizumab 15 mg/kg every 21 days.  8/31/22: Cycle 3 Pembrolizumab 200 mg every 21 days added to triplet chemotherapy per the KEYNOTE-826 regimen.       Genetic/Genomic/Molecular Testing History:  2006: Negative for germline BRCA1, BRCA2 pathogenic variants.     8/14/22 (specimen from 6/9/22): [facility] Testing.   -PD-L1+ (CPS 10)  -Mismatch repair deficient/microsatellite instability-high  -TMB high (53 mut/Mb)  -NTRK 1/2/3 fusion not detected.       Subjective:  Cordell returns to the gyn onc clinic today for a scheduled follow-up visit, accompanied by her . Cordell reports that the first 5 days after chemotherapy are rough. She has some mild nosebleeds, described mainly as blood when she blows her nose. After the last cycle Cordell reports taht she had a shooting nerve pain down her lower legs and feet. She has used acupuncture and vitamin B6 and L-glutamine, which has improved that symptom. She has mild nausea after chemotherapy, resolved with strong beatris mint tea. Overall Cordell reports that she has learned how to deal with the side-effects. Cordell continues to eat well, and if anything is gaining weight. She continues to fast around chemotherapy as part of the study in which she is participating through [facility]. Cordell reports the pelvic pain has significantly increased. She no longer needs to use the donut to sit. She is able to drive again. Bowel and urinary habits improved as well. She stopped taking the senna, and is still having regular bowel movements. She denies any vaginal bleeding. No lower extremity edema, and no difficulties eating or sleeping.        Review of Systems:    ROS: 10 point ROS neg other than the symptoms noted above in the HPI.       Past Medical History:  Past Medical History:   Diagnosis Date     Abnormal Pap smear     maybe 20 years ago     Cervical cancer (H)      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       History of breast cancer 2003    lumpectomy and radiation offerred chemo and patient declined at time but had oncogene test and low risk     Hyperlipidemia LDL goal < 160      Osteopenia      Paroxysmal A-fib (H)      Severe vaginal dysplasia          Past Surgical History:  Past Surgical History:   Procedure Laterality Date     BIOPSY       BREAST SURGERY Left 2003    lumpectomy left, did radiation, 5 yr of tamoxifen     COLONOSCOPY  2018    repeat in 2028     Colposcopy Cervix with Loop Electrode Conization and Lser to Vagina  2008     COLPOSCOPY, BIOPSY, COMBINED  10/24/2012    Procedure: COMBINED COLPOSCOPY, BIOPSY;  Colposcopy, Biopsy of Cervix and Vagina, Ultrasound Guidance;  Surgeon: Colette Ng MD;  Location: [facility] OR     ENT SURGERY  01/23/2018    otoscelerosis, right side     HYSTERECTOMY, FRANCIA  12/2012    high grade cervical dysplasia, [facility], Dr. Arciniega     INSERT PORT VASCULAR ACCESS Right 7/18/2022    Procedure: INSERTION, VASCULAR ACCESS PORT;  Surgeon: Donnie Elias MD;  Location: [facility] OR     IR CHEST PORT PLACEMENT > 5 YRS OF AGE  7/18/2022     WY LAP VAGINECTOMY, PARTIAL REMOVAL OF VAGINAL WALL  10/2013    upper vaginectomy for dysplasia, Dr. Megan Arciniega       Current Medications:   Current Outpatient Medications   Medication     Acetylcarnitine HCl 250 MG CAPS     aspirin (ASA) 81 MG chewable tablet     Bacillus Coagulans-Inulin (PROBIOTIC FORMULA) 1-250 BILLION-MG CAPS     BENFOTIAMINE PO     Cholecalciferol (VITAMIN D-3) 25 MCG (1000 UT) CAPS     Coenzyme Q10 (CO Q 10 PO)     dexamethasone (DECADRON) 4 MG tablet     Efraín Ctoa 550 MG CAPS     MAGNESIUM OXIDE PO     metoprolol succinate ER (TOPROL XL) 50 MG 24 hr tablet     Omega-3 Fatty Acids (OMEGA-3 FISH OIL PO)     ondansetron (ZOFRAN) 8 MG tablet     OVER-THE-COUNTER     Pectin Cit-Inos-C-Bioflav-Soy (MODIFIED CITRUS PECTIN PO)     prochlorperazine (COMPAZINE) 10 MG tablet     Selenium 200 MCG CAPS     Taurine 1000 MG CAPS      "Turmeric 450 MG CAPS     No current facility-administered medications for this visit.     Facility-Administered Medications Ordered in Other Visits   Medication     heparin 100 UNIT/ML injection 5 mL        Allergies:   No Known Allergies       Social History:  Patient lives with her . 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 She walks daily, gardens, does yoga twice/week.  She does have Advanced Directives, but has identified her daughter and  as her desired [employer] of .   Social History     Tobacco Use     Smoking status: Former Smoker     Packs/day: 0.50     Years: 15.00     Pack years: 7.50     Smokeless tobacco: Never Used   Substance Use Topics     Alcohol use: Yes     Alcohol/week: 2.0 standard drinks     Types: 2 Standard drinks or equivalent per week     Comment: Socially        History   Drug Use No       Family History:   The patient's family history is notable for a first-degree and second-degree paternal relative with breast cancer.  No known family history of ovarian, uterine, colon, urothelial/renal, prostate or pancreatic cancers.  No known family history of melanoma.  Family History   Problem Relation Age of Onset     Myocardial Infarction Mother 73        Tob     Myocardial Infarction Father 68        Tob     Breast Cancer Sister 47         of breast cancer age 63; BRCA mutation testing negative     Cancer Maternal Grandmother         Unsure of type.     Breast Cancer Paternal Grandmother         Unsure of diagnosis--some type of \"women's issue\"       Physical Exam:   BP (!) 177/103   Pulse 82   Temp 97.5  F (36.4  C) (Oral)   Resp 16   Wt 52.6 kg (116 lb)   SpO2 99%   BMI 20.81 kg/m    Body mass index is 20.81 kg/m .    General Appearance: healthy and alert, no distress     Musculoskeletal: extremities non tender and without edema    Skin: no lesions or rashes     Neurological: normal gait, no gross defects     Psychiatric: appropriate mood and affect         "                         Radiographic Examination:  9/22/22: PET/CT: Partial response. I have personally reviewed the PET/CT images.   Stable 3 mm solid  nodules in the left upper lobe. Resolved FDG  avidity of a left retropectoral, presumably lymph node which is now  Calcified.  Multiple stable FDG avid iliac chain lymph nodes near the aortic  bifurcation with SUV max 14.0. Significant reduction in left internal  iliac chain nodes, with no single node with SUV max 5.5 which measures  up to 12 mm in short axis. Significantly reduced  size of FDG avid cervical mass with smaller area of enhancing  nodularity on the right side wall measuring 3.3 x 1.5 cm with SUV max  16.3. There is central necrosis and abutment of  the rectum. There is an additional FDG avid foci more inferiorly on  the anterior vaginal wall with SUV max 6.7, which is also decreased in  size.      Performance Status:  ECOG Grade 0.       Assessment:    Cordell Donaldson (she/her/hers) is a 72 year old woman with a diagnosis of stage III squamous cell carcinoma of the vagina (vs recurrent metastatic cervical cancer) currently receiving first-line chemotherapy with partial response per PET/CT 9/22/22.     Medical history significant for FIGO Stage IA1 squamous cell carcinoma of the cervix s/p hysterectomy with no residual disease, and a long history of vaginal HSIL.     A total of 30 minutes was spent with the patient, 100% of which were spent in counseling the patient and/or treatment planning.      Plan:     1.)    Squamous cell carcinoma: I reviewed the PET/CT images with Cordell and provided her with a copy of the PET/CT report; she was also previously provided with a copy of the report via [employer] [facility]. I have sent a message to her radiation oncologist and we will discuss further at our [facility] gyn onc/radiation oncology conference next week, but given the partial response I would favor continuing chemotherapy to maximize disease response prior to radiation  therapy; per Cordell's report, Dr. Stockton has sent her a message also recommending that she continue chemotherapy for now. Regardless, I would recommend proceeding with therapy next week as planned even if consensus is to add radiation therapy now as it will take a few weeks for simulation and planning.     After discussion of risks/benefits, plan to continue first-line chemotherapy with palliative intent with IV cisplatin 50 mg/m2 + paclitaxel 175 mg/m2 + bevacizumab 15 mg/kg + pembrolizumab 200 mg every 21 days per the KEYNOTE-826 regimen.   -Cordell will return to the infusion 10/3/22 for cycle #4 of therapy as planned.  -Will re-discuss her case at the [facility] gyn onc/radiation oncology tumor conference re: sequencing of radiation therapy, and whether pembrolizumab should be continued with platinum during radiation therapy.   -RTC prior to cycle #5 to continue disease management plan.     Side-effects:  -Grade 1 epistaxis (attributable to bevacizumab): Mild and intermittent, not clinically significant. Will monitor without intervention at this time.  -Grade 1 peripheral neuropathy (attributable to paclitaxel and cisplatin): Self-limited to within the first 5 days after infusion.Continue vitamin B6, L-glutamine.  Recommend pre-medicating with ibuprofen, acetaminophen night of chemotherapy to minimize pain. If pain not adequately controlled, consider adding prn oxycodone or gabapentin. Could also try menthol gel. Will monitor, and consider paclitaxel dose-reduction (first) and cisplatin dose-reduction (second) if progressing to Grade 2.      2.) Genetic risk factors were assessed and the patient does not meet the qualifications for a referral.      3.) Labs and/or tests ordered include:  1) Pre-chemotherapy labs: CBC with diff, CMP, magnesium, qualitative urine protein, TSH, free T4.     4.) Health maintenance issues addressed today include none.    5.) Code status:  Full-code.    6.) Prescriptions: None.    Hina MERINO  MD Idalmis, MS, FACOG, FACS  9/27/2022  11:38 AM            CC  Patient Care Team:  Merry Lino MD as PCP - General (Internal Medicine)  Bess Paredes MD Corfield, Aaron Daniel, MYAM as MD (Podiatry)  Tomy, Elise LATIF MD as Assigned Heart and Vascular Provider  Merry Lino MD as Assigned PCP  Tess Mcgowan RN as Specialty Care Coordinator  Nany, Heather Munguia MD as MD (Urology)  Nan, Angela Rowley MD as MD (Hematology & Oncology)  Yvette Pike MD as MD (Surgery)  Valeri Kirby, RN as Specialty Care Coordinator (Hematology & Oncology)  Yvette Pike MD as Assigned Surgical Provider  Jewels Oconnor APRN CNP as Assigned Cancer Care Provider  SELF, REFERRED

## 2022-09-26 NOTE — PATIENT INSTRUCTIONS
SCHEDULING:  -Proceed with cycle #4 10/3/22 as scheduled.   -Pre-chemotherapy labs: CBC with diff, CMP, magnesium, qualitative urine protein, TSH, free T4.  -RTC in 3 weeks to continue management discussion prior to cycle 5.       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   History of vaginal HSIL (pre-cancer).  Squamous cell carcinoma involving a pelvic mass with associated lymphadenopathy, likely stage III vaginal cancer (vs recurrent, metastatic cervical cancer).   Treatment History:  -12/2012: Robotic-assisted laparoscopic lysis of adhesions (take-down of scar tissue), left ureterolysis (freeing of the left ureter) with  conversion to laparotomy, total abdominal hysterectomy (removal of uterus/cervix), and an upper  Vaginectomy (removal of the upper vagina).  -7/20/22-present: First-line therapy with IV cisplatin + paclitaxel + bevacizumab + pembrolizumab (added cycles 3+).       PLAN:  1) Squamous cell carcinoma:   DRUGS: Cisplatin + paclitaxel  + bevacizumab  SCHEDULE: 1 day every 3 weeks  PLAN: Proceed with cycle #4 as scheduled. Follow-up prior to cycle #5 to continue discussion re: timing of radiation therapy.       Hina Raygoza MD, MS, FACOG, FACS  9/27/2022  11:39 AM

## 2022-09-27 ENCOUNTER — ONCOLOGY VISIT (OUTPATIENT)
Dept: ONCOLOGY | Facility: CLINIC | Age: 72
End: 2022-09-27
Attending: OBSTETRICS & GYNECOLOGY
Payer: COMMERCIAL

## 2022-09-27 VITALS
RESPIRATION RATE: 16 BRPM | DIASTOLIC BLOOD PRESSURE: 103 MMHG | TEMPERATURE: 97.5 F | BODY MASS INDEX: 20.81 KG/M2 | WEIGHT: 116 LBS | OXYGEN SATURATION: 99 % | SYSTOLIC BLOOD PRESSURE: 177 MMHG | HEART RATE: 82 BPM

## 2022-09-27 DIAGNOSIS — C53.9 RECURRENT CERVICAL CANCER (H): Primary | ICD-10-CM

## 2022-09-27 DIAGNOSIS — C53.0 MALIGNANT NEOPLASM OF ENDOCERVIX (H): ICD-10-CM

## 2022-09-27 PROCEDURE — 96375 TX/PRO/DX INJ NEW DRUG ADDON: CPT

## 2022-09-27 PROCEDURE — G0463 HOSPITAL OUTPT CLINIC VISIT: HCPCS

## 2022-09-27 PROCEDURE — 96413 CHEMO IV INFUSION 1 HR: CPT

## 2022-09-27 PROCEDURE — 96367 TX/PROPH/DG ADDL SEQ IV INF: CPT

## 2022-09-27 PROCEDURE — 96415 CHEMO IV INFUSION ADDL HR: CPT

## 2022-09-27 PROCEDURE — 99214 OFFICE O/P EST MOD 30 MIN: CPT | Performed by: OBSTETRICS & GYNECOLOGY

## 2022-09-27 PROCEDURE — 96417 CHEMO IV INFUS EACH ADDL SEQ: CPT

## 2022-09-27 PROCEDURE — 96368 THER/DIAG CONCURRENT INF: CPT

## 2022-09-27 RX ORDER — ALBUTEROL SULFATE 90 UG/1
1-2 AEROSOL, METERED RESPIRATORY (INHALATION)
Status: CANCELLED
Start: 2022-09-27

## 2022-09-27 RX ORDER — ALBUTEROL SULFATE 0.83 MG/ML
2.5 SOLUTION RESPIRATORY (INHALATION)
Status: CANCELLED | OUTPATIENT
Start: 2022-09-27

## 2022-09-27 RX ORDER — PALONOSETRON 0.05 MG/ML
0.25 INJECTION, SOLUTION INTRAVENOUS ONCE
Status: CANCELLED | OUTPATIENT
Start: 2022-09-27

## 2022-09-27 RX ORDER — LORAZEPAM 2 MG/ML
0.5 INJECTION INTRAMUSCULAR EVERY 4 HOURS PRN
Status: CANCELLED | OUTPATIENT
Start: 2022-09-27

## 2022-09-27 RX ORDER — EPINEPHRINE 1 MG/ML
0.3 INJECTION, SOLUTION INTRAMUSCULAR; SUBCUTANEOUS EVERY 5 MIN PRN
Status: CANCELLED | OUTPATIENT
Start: 2022-09-27

## 2022-09-27 RX ORDER — DIPHENHYDRAMINE HYDROCHLORIDE 50 MG/ML
50 INJECTION INTRAMUSCULAR; INTRAVENOUS
Status: CANCELLED
Start: 2022-09-27

## 2022-09-27 RX ORDER — DIPHENHYDRAMINE HCL 25 MG
50 CAPSULE ORAL ONCE
Status: CANCELLED | OUTPATIENT
Start: 2022-09-27

## 2022-09-27 RX ORDER — HEPARIN SODIUM (PORCINE) LOCK FLUSH IV SOLN 100 UNIT/ML 100 UNIT/ML
5 SOLUTION INTRAVENOUS
Status: CANCELLED | OUTPATIENT
Start: 2022-09-27

## 2022-09-27 RX ORDER — HEPARIN SODIUM,PORCINE 10 UNIT/ML
5 VIAL (ML) INTRAVENOUS
Status: CANCELLED | OUTPATIENT
Start: 2022-09-27

## 2022-09-27 RX ORDER — MEPERIDINE HYDROCHLORIDE 25 MG/ML
25 INJECTION INTRAMUSCULAR; INTRAVENOUS; SUBCUTANEOUS EVERY 30 MIN PRN
Status: CANCELLED | OUTPATIENT
Start: 2022-09-27

## 2022-09-27 RX ORDER — DEXTROSE, SODIUM CHLORIDE, AND POTASSIUM CHLORIDE 5; .45; .15 G/100ML; G/100ML; G/100ML
1000 INJECTION INTRAVENOUS ONCE
Status: CANCELLED | OUTPATIENT
Start: 2022-09-27

## 2022-09-27 RX ORDER — METHYLPREDNISOLONE SODIUM SUCCINATE 125 MG/2ML
125 INJECTION, POWDER, LYOPHILIZED, FOR SOLUTION INTRAMUSCULAR; INTRAVENOUS
Status: CANCELLED
Start: 2022-09-27

## 2022-09-27 ASSESSMENT — PAIN SCALES - GENERAL: PAINLEVEL: NO PAIN (0)

## 2022-09-27 NOTE — NURSING NOTE
Plan continue current treatment plan cisplatin/taxol/avastin/keytruda  Cycle 4 and 5  See md prior to cycle  MD to Consult with radiation group for possible tx

## 2022-09-27 NOTE — LETTER
2022         RE: Cordell Donaldson  [address]  [facility] [address]        Dear Colleague,    Thank you for referring your patient, Cordell Donaldson, to the [facility]. Please see a copy of my visit note below.                            Follow-up Note on Referred Patient      Date: 2022        Referring Provider/Gynecologic Oncologist:  Megan Arciniega MD  [facility]   Phone [phone]  [phone]      Radiation Oncologist:   Raymond Stockton MD, PhD  [facility]      Medical Oncologist:  Angela Montana MD  [facility].      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       History of Present Illness:   Cordell Donaldson (she/her/hers) is a 72 year old initially referred to the Gynecologic Cancer Clinic at the [facility] on 2022 by gynecologic oncologist Dr. Arciniega and radiation oncologist Dr. Stockton for management of a pelvic mass due to recurrent cervical cancer vs new vaginal cancer. History as follows:     Breast Cancer History:  : Left breast cancer.  -Lumpectomy.  -Radiation therapy.    [id number]: BRENNAN.     22: PET/CT to stage vaginal cancer (see below).   Mild soft tissue  thickening in the left retropectoral region with an SUV max of 2.7.  7/15/22: Invasive ductal carcinoma.   -Consultation with Dr. Montana. Recommendation to prioritize vaginal/recurrent cervix cancer. Plan to follow breast lesion with repeat MRI. Plan for endocrine therapy after completion of chemotherapy for vaginal/cervical cancer.       Cervical/Vaginal Dysplasia/Cancer History:  10/24/12:   -Endocervical curettings: Squamous carcinoma, invasion cannot be assessed.   -Ectocervical biopsies: HSIL (CIN3).   -Vaginal biopsy, posterior: HSIL (VaIN3).  2012:  Robotic-assisted laparoscopic lysis of adhesions, left ureterolysis with  conversion to laparotomy, total abdominal hysterectomy, and an upper  Vaginectomy by gynecologic oncologist Dr. Arciniega.   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     7/22/13: Vaginal biopsy: HSIL, cannot exclude invasion.   -Consultation with gynecologic oncologist Dr. Johnson. Upper vaginectomy and CO2 laser ablation recommended, patient declined.   [id number]: No vaginal dysplasia treatment.   5/20/22: Pelvic MRI: I have personally reviewed the MRI images.   Centrally hypoenhancing heterogeneous midline pelvic mass along  the superior margin of the vaginal vault measures 8.5 x 6.7 x 8.1 cm.  The mass abut the bladder and there is mucosal irregularity at the  site of abutment. The mass indents the  decompressed rectum but there is no definite invasion or mucosal  Irregularity.  6/9/22: CT-guided biopsy of pelvic mass: Poorly-differentiated squamous cell carcinoma, HPV-associated.   7/1/22: Staging PET/CT: Stage III vaginal cancer (vs recurrent, metastatic cervical cancer). I have personally reviewed the PET/CT images.   Large heterogenous and hypermetabolic mass seen within the midline of  the pelvis measuring approximately 8.5 x 7.2 x 8.1 cm with an SUV max  measuring up to 13.2. Additionally, there is central hypoattenuation  with relative lack of metabolic activity, suggestive of central  Necrosis (previously 8.5 x 6.7 x 8.1 cm on prior  MRI). No inguinal lymphadenopathy.  Redemonstration of hypermetabolic left pelvic lymphadenopathy, the  largest lymph node measuring up to 3.3 x 2.4 cm with an SUV max of  12.2 (previously measuring 2.5 x 2.1 cm). Additional conglomeration of  hypermetabolic lymph nodes at the aortic bifurcation measuring 2.9 x  1.6 cm with an SUV max of 14.3 (previously measuring 1.9 x 1.7 cm).  Unchanged enlarged mildly metabolic right pelvic lymph node  measuring  2.5 x 1.1 cm with an SUV max of 4.2, favored to represent a reactive  lymph node given the relative lack of hypermetabolic activity compared  to the left pelvic lymph node.    7/20/22, 8/10/22: Cycles #1-2 of first-line chemotherapy with IV cisplatin 50 m/m2 + paclitaxel 175 mg/m2 + bevacizumab 15 mg/kg every 21 days.  8/31/22: Cycle 3 Pembrolizumab 200 mg every 21 days added to triplet chemotherapy per the KEYNOTE-826 regimen.       Genetic/Genomic/Molecular Testing History:  2006: Negative for germline BRCA1, BRCA2 pathogenic variants.     8/14/22 (specimen from 6/9/22): [employer] [facility].   -PD-L1+ (CPS 10)  -Mismatch repair deficient/microsatellite instability-high  -TMB high (53 mut/Mb)  -NTRK 1/2/3 fusion not detected.       Subjective:  Crodell returns to the gyn onc clinic today for a scheduled follow-up visit, accompanied by her . Cordell reports that the first 5 days after chemotherapy are rough. She has some mild nosebleeds, described mainly as blood when she blows her nose. After the last cycle Cordell reports taht she had a shooting nerve pain down her lower legs and feet. She has used acupuncture and vitamin B6 and L-glutamine, which has improved that symptom. She has mild nausea after chemotherapy, resolved with strong beatris mint tea. Overall Cordell reports that she has learned how to deal with the side-effects. Cordell continues to eat well, and if anything is gaining weight. She continues to fast around chemotherapy as part of the study in which she is participating through [facility]. Cordell reports the pelvic pain has significantly increased. She no longer needs to use the donut to sit. She is able to drive again. Bowel and urinary habits improved as well. She stopped taking the senna, and is still having regular bowel movements. She denies any vaginal bleeding. No lower extremity edema, and no difficulties eating or sleeping.        Review of Systems:    ROS: 10 point ROS neg other  than the symptoms noted above in the HPI.       Past Medical History:  Past Medical History:   Diagnosis Date     Abnormal Pap smear     maybe 20 years ago     Cervical cancer (H)      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      History of breast cancer 2003    lumpectomy and radiation offerred chemo and patient declined at time but had oncogene test and low risk     Hyperlipidemia LDL goal < 160      Osteopenia      Paroxysmal A-fib (H)      Severe vaginal dysplasia          Past Surgical History:  Past Surgical History:   Procedure Laterality Date     BIOPSY       BREAST SURGERY Left 2003    lumpectomy left, did radiation, 5 yr of tamoxifen     COLONOSCOPY  2018    repeat in 2028     Colposcopy Cervix with Loop Electrode Conization and Lser to Vagina  2008     COLPOSCOPY, BIOPSY, COMBINED  10/24/2012    Procedure: COMBINED COLPOSCOPY, BIOPSY;  Colposcopy, Biopsy of Cervix and Vagina, Ultrasound Guidance;  Surgeon: Colette Ng MD;  Location: [facility] OR     ENT SURGERY  01/23/2018    otoscelerosis, right side     HYSTERECTOMY, FRANCIA  12/2012    high grade cervical dysplasia, [facility], Dr. Arciniega     INSERT PORT VASCULAR ACCESS Right 7/18/2022    Procedure: INSERTION, VASCULAR ACCESS PORT;  Surgeon: Donnie Elias MD;  [facility]     IR CHEST PORT PLACEMENT > 5 YRS OF AGE  7/18/2022     MA LAP VAGINECTOMY, PARTIAL REMOVAL OF VAGINAL WALL  10/2013    upper vaginectomy for dysplasia, Dr. Megan Arciniega       Current Medications:   Current Outpatient Medications   Medication     Acetylcarnitine HCl 250 MG CAPS     aspirin (ASA) 81 MG chewable tablet     Bacillus Coagulans-Inulin (PROBIOTIC FORMULA) 1-250 BILLION-MG CAPS     BENFOTIAMINE PO     Cholecalciferol (VITAMIN D-3) 25 MCG (1000 UT) CAPS     Coenzyme Q10 (CO Q 10 PO)     dexamethasone (DECADRON) 4 MG tablet     Charlotte Cota 550 MG CAPS     MAGNESIUM OXIDE PO     metoprolol succinate ER (TOPROL XL) 50 MG 24 hr tablet      "Omega-3 Fatty Acids (OMEGA-3 FISH OIL PO)     ondansetron (ZOFRAN) 8 MG tablet     OVER-THE-COUNTER     Pectin Cit-Inos-C-Bioflav-Soy (MODIFIED CITRUS PECTIN PO)     prochlorperazine (COMPAZINE) 10 MG tablet     Selenium 200 MCG CAPS     Taurine 1000 MG CAPS     Turmeric 450 MG CAPS     No current facility-administered medications for this visit.     Facility-Administered Medications Ordered in Other Visits   Medication     heparin 100 UNIT/ML injection 5 mL        Allergies:   No Known Allergies       Social History:  Patient lives with her . 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 She walks daily, gardens, does yoga twice/week.  She does have Advanced Directives, but has identified her daughter and  as her desired [employer] of .   Social History     Tobacco Use     Smoking status: Former Smoker     Packs/day: 0.50     Years: 15.00     Pack years: 7.50     Smokeless tobacco: Never Used   Substance Use Topics     Alcohol use: Yes     Alcohol/week: 2.0 standard drinks     Types: 2 Standard drinks or equivalent per week     Comment: Socially        History   Drug Use No       Family History:   The patient's family history is notable for a first-degree and second-degree paternal relative with breast cancer.  No known family history of ovarian, uterine, colon, urothelial/renal, prostate or pancreatic cancers.  No known family history of melanoma.  Family History   Problem Relation Age of Onset     Myocardial Infarction Mother 73        Tob     Myocardial Infarction Father 68        Tob     Breast Cancer Sister 47         of breast cancer age 63; BRCA mutation testing negative     Cancer Maternal Grandmother         Unsure of type.     Breast Cancer Paternal Grandmother         Unsure of diagnosis--some type of \"women's issue\"       Physical Exam:   BP (!) 177/103   Pulse 82   Temp 97.5  F (36.4  C) (Oral)   Resp 16   Wt 52.6 kg (116 lb)   SpO2 99%   BMI 20.81 kg/m    Body mass index " is 20.81 kg/m .    General Appearance: healthy and alert, no distress     Musculoskeletal: extremities non tender and without edema    Skin: no lesions or rashes     Neurological: normal gait, no gross defects     Psychiatric: appropriate mood and affect                                 Radiographic Examination:  9/22/22: PET/CT: Partial response. I have personally reviewed the PET/CT images.   Stable 3 mm solid  nodules in the left upper lobe. Resolved FDG  avidity of a left retropectoral, presumably lymph node which is now  Calcified.  Multiple stable FDG avid iliac chain lymph nodes near the aortic  bifurcation with SUV max 14.0. Significant reduction in left internal  iliac chain nodes, with no single node with SUV max 5.5 which measures  up to 12 mm in short axis. Significantly reduced  size of FDG avid cervical mass with smaller area of enhancing  nodularity on the right side wall measuring 3.3 x 1.5 cm with SUV max  16.3. There is central necrosis and abutment of  the rectum. There is an additional FDG avid foci more inferiorly on  the anterior vaginal wall with SUV max 6.7, which is also decreased in  size.      Performance Status:  ECOG Grade 0.       Assessment:    Cordell GAY Anika (she/her/hers) is a 72 year old woman with a diagnosis of stage III squamous cell carcinoma of the vagina (vs recurrent metastatic cervical cancer) currently receiving first-line chemotherapy with partial response per PET/CT 9/22/22.     Medical history significant for FIGO Stage IA1 squamous cell carcinoma of the cervix s/p hysterectomy with no residual disease, and a long history of vaginal HSIL.     A total of 30 minutes was spent with the patient, 100% of which were spent in counseling the patient and/or treatment planning.      Plan:     1.)    Squamous cell carcinoma: I reviewed the PET/CT images with Cordell and provided her with a copy of the PET/CT report; she was also previously provided with a copy of the report via  [employer]. I have sent a message to her radiation oncologist and we will discuss further at our [facility] gyn onc/radiation oncology conference next week, but given the partial response I would favor continuing chemotherapy to maximize disease response prior to radiation therapy; per Cordell's report, Dr. Stockton has sent her a message also recommending that she continue chemotherapy for now. Regardless, I would recommend proceeding with therapy next week as planned even if consensus is to add radiation therapy now as it will take a few weeks for simulation and planning.     After discussion of risks/benefits, plan to continue first-line chemotherapy with palliative intent with IV cisplatin 50 mg/m2 + paclitaxel 175 mg/m2 + bevacizumab 15 mg/kg + pembrolizumab 200 mg every 21 days per the KEYNOTE-826 regimen.   -Cordell will return to the infusion 10/3/22 for cycle #4 of therapy as planned.  -Will re-discuss her case at the [facility] gyn onc/radiation oncology tumor conference re: sequencing of radiation therapy, and whether pembrolizumab should be continued with platinum during radiation therapy.   -RTC prior to cycle #5 to continue disease management plan.     Side-effects:  -Grade 1 epistaxis (attributable to bevacizumab): Mild and intermittent, not clinically significant. Will monitor without intervention at this time.  -Grade 1 peripheral neuropathy (attributable to paclitaxel and cisplatin): Self-limited to within the first 5 days after infusion.Continue vitamin B6, L-glutamine.  Recommend pre-medicating with ibuprofen, acetaminophen night of chemotherapy to minimize pain. If pain not adequately controlled, consider adding prn oxycodone or gabapentin. Could also try menthol gel. Will monitor, and consider paclitaxel dose-reduction (first) and cisplatin dose-reduction (second) if progressing to Grade 2.      2.) Genetic risk factors were assessed and the patient does not meet the qualifications for a referral.      3.) Labs  and/or tests ordered include:  1) Pre-chemotherapy labs: CBC with diff, CMP, magnesium, qualitative urine protein, TSH, free T4.     4.) Health maintenance issues addressed today include none.    5.) Code status:  Full-code.    6.) Prescriptions: None.    Hina Raygoza MD, MS, FACOG, FACS  9/27/2022  11:38 AM            CC  Patient Care Team:  Merry Lino MD as PCP - General (Internal Medicine)  Bess Paredes MD Corfield, Aaron Daniel, DPM as MD (Podiatry)  Elise Huitron MD as Assigned Heart and Vascular Provider  Merry Lino MD as Assigned PCP  Tess Mcgowan, RN as Specialty Care Coordinator  Heather Ayon MD as MD (Urology)  Angela Montana MD as MD (Hematology & Oncology)  Yvette Pike MD as MD (Surgery)  Valeri Kirby, RN as Specialty Care Coordinator (Hematology & Oncology)  Yvette Pike MD as Assigned Surgical Provider  Jewels Oconnor APRN CNP as Assigned Cancer Care Provider

## 2022-09-27 NOTE — NURSING NOTE
"Oncology Rooming Note    September 27, 2022 8:32 AM   Cordell Donaldson is a 72 year old female who presents for:    Chief Complaint   Patient presents with     Oncology Clinic Visit     Cervical cancer     Initial Vitals: BP (!) 177/103   Pulse 82   Temp 97.5  F (36.4  C) (Oral)   Resp 16   Wt 52.6 kg (116 lb)   SpO2 99%   BMI 20.81 kg/m   Estimated body mass index is 20.81 kg/m  as calculated from the following:    Height as of 8/2/22: 1.59 m (5' 2.6\").    Weight as of this encounter: 52.6 kg (116 lb). Body surface area is 1.52 meters squared.  No Pain (0) Comment: Data Unavailable   No LMP recorded. Patient is postmenopausal.  Allergies reviewed: Yes  Medications reviewed: Yes    Medications: Medication refills not needed today.  Pharmacy name entered into [employer] Player:    [id number] - [place], [place] - [address]  [id number] - [place], [place] - [address] [facility] [place], [place] - [address]    Clinical concerns: none       Ifeoma Coffey CMA            "

## 2022-09-29 ENCOUNTER — MYC MEDICAL ADVICE (OUTPATIENT)
Dept: ONCOLOGY | Facility: CLINIC | Age: 72
End: 2022-09-29

## 2022-10-03 ENCOUNTER — INFUSION THERAPY VISIT (OUTPATIENT)
Dept: ONCOLOGY | Facility: CLINIC | Age: 72
End: 2022-10-03
Attending: OBSTETRICS & GYNECOLOGY
Payer: COMMERCIAL

## 2022-10-03 ENCOUNTER — APPOINTMENT (OUTPATIENT)
Dept: LAB | Facility: CLINIC | Age: 72
End: 2022-10-03
Attending: OBSTETRICS & GYNECOLOGY
Payer: COMMERCIAL

## 2022-10-03 VITALS
WEIGHT: 111.9 LBS | SYSTOLIC BLOOD PRESSURE: 144 MMHG | OXYGEN SATURATION: 99 % | HEART RATE: 95 BPM | RESPIRATION RATE: 14 BRPM | DIASTOLIC BLOOD PRESSURE: 81 MMHG | TEMPERATURE: 97.6 F | BODY MASS INDEX: 20.08 KG/M2

## 2022-10-03 DIAGNOSIS — C53.0 MALIGNANT NEOPLASM OF ENDOCERVIX (H): Primary | ICD-10-CM

## 2022-10-03 LAB
ALBUMIN SERPL BCG-MCNC: 4.2 G/DL (ref 3.5–5.2)
ALBUMIN UR-MCNC: NEGATIVE MG/DL
ALP SERPL-CCNC: 61 U/L (ref 35–104)
ALT SERPL W P-5'-P-CCNC: 31 U/L (ref 10–35)
ANION GAP SERPL CALCULATED.3IONS-SCNC: 12 MMOL/L (ref 7–15)
AST SERPL W P-5'-P-CCNC: 31 U/L (ref 10–35)
BASOPHILS # BLD AUTO: 0 10E3/UL (ref 0–0.2)
BASOPHILS NFR BLD AUTO: 1 %
BILIRUB SERPL-MCNC: 0.5 MG/DL
BUN SERPL-MCNC: 19.4 MG/DL (ref 8–23)
CALCIUM SERPL-MCNC: 9.6 MG/DL (ref 8.8–10.2)
CHLORIDE SERPL-SCNC: 104 MMOL/L (ref 98–107)
CREAT SERPL-MCNC: 0.76 MG/DL (ref 0.51–0.95)
DEPRECATED HCO3 PLAS-SCNC: 22 MMOL/L (ref 22–29)
EOSINOPHIL # BLD AUTO: 0.1 10E3/UL (ref 0–0.7)
EOSINOPHIL NFR BLD AUTO: 1 %
ERYTHROCYTE [DISTWIDTH] IN BLOOD BY AUTOMATED COUNT: 20.7 % (ref 10–15)
GFR SERPL CREATININE-BSD FRML MDRD: 83 ML/MIN/1.73M2
GLUCOSE SERPL-MCNC: 94 MG/DL (ref 70–99)
HCT VFR BLD AUTO: 43.4 % (ref 35–47)
HGB BLD-MCNC: 13.5 G/DL (ref 11.7–15.7)
IMM GRANULOCYTES # BLD: 0 10E3/UL
IMM GRANULOCYTES NFR BLD: 0 %
LYMPHOCYTES # BLD AUTO: 1 10E3/UL (ref 0.8–5.3)
LYMPHOCYTES NFR BLD AUTO: 16 %
MAGNESIUM SERPL-MCNC: 1.8 MG/DL (ref 1.7–2.3)
MCH RBC QN AUTO: 25.5 PG (ref 26.5–33)
MCHC RBC AUTO-ENTMCNC: 31.1 G/DL (ref 31.5–36.5)
MCV RBC AUTO: 82 FL (ref 78–100)
MONOCYTES # BLD AUTO: 0.4 10E3/UL (ref 0–1.3)
MONOCYTES NFR BLD AUTO: 7 %
NEUTROPHILS # BLD AUTO: 4.7 10E3/UL (ref 1.6–8.3)
NEUTROPHILS NFR BLD AUTO: 75 %
NRBC # BLD AUTO: 0 10E3/UL
NRBC BLD AUTO-RTO: 0 /100
PLATELET # BLD AUTO: 181 10E3/UL (ref 150–450)
POTASSIUM SERPL-SCNC: 4.3 MMOL/L (ref 3.4–5.3)
PROT SERPL-MCNC: 6.6 G/DL (ref 6.4–8.3)
RBC # BLD AUTO: 5.29 10E6/UL (ref 3.8–5.2)
SODIUM SERPL-SCNC: 138 MMOL/L (ref 136–145)
T4 FREE SERPL-MCNC: 1.45 NG/DL (ref 0.9–1.7)
TSH SERPL DL<=0.005 MIU/L-ACNC: 2.5 UIU/ML (ref 0.3–4.2)
WBC # BLD AUTO: 6.2 10E3/UL (ref 4–11)

## 2022-10-03 PROCEDURE — 96361 HYDRATE IV INFUSION ADD-ON: CPT

## 2022-10-03 PROCEDURE — 83735 ASSAY OF MAGNESIUM: CPT | Performed by: OBSTETRICS & GYNECOLOGY

## 2022-10-03 PROCEDURE — 84443 ASSAY THYROID STIM HORMONE: CPT | Performed by: OBSTETRICS & GYNECOLOGY

## 2022-10-03 PROCEDURE — 96413 CHEMO IV INFUSION 1 HR: CPT

## 2022-10-03 PROCEDURE — 96367 TX/PROPH/DG ADDL SEQ IV INF: CPT

## 2022-10-03 PROCEDURE — 80053 COMPREHEN METABOLIC PANEL: CPT | Performed by: OBSTETRICS & GYNECOLOGY

## 2022-10-03 PROCEDURE — 85025 COMPLETE CBC W/AUTO DIFF WBC: CPT | Performed by: OBSTETRICS & GYNECOLOGY

## 2022-10-03 PROCEDURE — 84439 ASSAY OF FREE THYROXINE: CPT | Performed by: OBSTETRICS & GYNECOLOGY

## 2022-10-03 PROCEDURE — 250N000011 HC RX IP 250 OP 636: Performed by: OBSTETRICS & GYNECOLOGY

## 2022-10-03 PROCEDURE — 258N000003 HC RX IP 258 OP 636: Performed by: OBSTETRICS & GYNECOLOGY

## 2022-10-03 PROCEDURE — 96417 CHEMO IV INFUS EACH ADDL SEQ: CPT

## 2022-10-03 PROCEDURE — 36591 DRAW BLOOD OFF VENOUS DEVICE: CPT

## 2022-10-03 PROCEDURE — 96375 TX/PRO/DX INJ NEW DRUG ADDON: CPT

## 2022-10-03 PROCEDURE — 258N000001 HC RX 258: Performed by: OBSTETRICS & GYNECOLOGY

## 2022-10-03 PROCEDURE — 250N000013 HC RX MED GY IP 250 OP 250 PS 637: Performed by: OBSTETRICS & GYNECOLOGY

## 2022-10-03 PROCEDURE — 81003 URINALYSIS AUTO W/O SCOPE: CPT | Performed by: OBSTETRICS & GYNECOLOGY

## 2022-10-03 PROCEDURE — 96415 CHEMO IV INFUSION ADDL HR: CPT

## 2022-10-03 RX ORDER — DEXTROSE, SODIUM CHLORIDE, AND POTASSIUM CHLORIDE 5; .45; .15 G/100ML; G/100ML; G/100ML
1000 INJECTION INTRAVENOUS ONCE
Status: COMPLETED | OUTPATIENT
Start: 2022-10-03 | End: 2022-10-03

## 2022-10-03 RX ORDER — PALONOSETRON 0.05 MG/ML
0.25 INJECTION, SOLUTION INTRAVENOUS ONCE
Status: COMPLETED | OUTPATIENT
Start: 2022-10-03 | End: 2022-10-03

## 2022-10-03 RX ORDER — HEPARIN SODIUM (PORCINE) LOCK FLUSH IV SOLN 100 UNIT/ML 100 UNIT/ML
5 SOLUTION INTRAVENOUS
Status: DISCONTINUED | OUTPATIENT
Start: 2022-10-03 | End: 2022-10-03 | Stop reason: HOSPADM

## 2022-10-03 RX ORDER — HEPARIN SODIUM (PORCINE) LOCK FLUSH IV SOLN 100 UNIT/ML 100 UNIT/ML
5 SOLUTION INTRAVENOUS DAILY PRN
Status: DISCONTINUED | OUTPATIENT
Start: 2022-10-03 | End: 2022-10-03 | Stop reason: HOSPADM

## 2022-10-03 RX ORDER — HEPARIN SODIUM,PORCINE 10 UNIT/ML
5 VIAL (ML) INTRAVENOUS
Status: DISCONTINUED | OUTPATIENT
Start: 2022-10-03 | End: 2022-10-03 | Stop reason: HOSPADM

## 2022-10-03 RX ORDER — DIPHENHYDRAMINE HCL 25 MG
50 CAPSULE ORAL ONCE
Status: COMPLETED | OUTPATIENT
Start: 2022-10-03 | End: 2022-10-03

## 2022-10-03 RX ADMIN — SODIUM CHLORIDE 1000 ML: 9 INJECTION, SOLUTION INTRAVENOUS at 09:23

## 2022-10-03 RX ADMIN — FOSAPREPITANT: 150 INJECTION, POWDER, LYOPHILIZED, FOR SOLUTION INTRAVENOUS at 09:59

## 2022-10-03 RX ADMIN — SODIUM CHLORIDE 200 MG: 9 INJECTION, SOLUTION INTRAVENOUS at 10:33

## 2022-10-03 RX ADMIN — POTASSIUM CHLORIDE, DEXTROSE MONOHYDRATE AND SODIUM CHLORIDE 1000 ML: 150; 5; 450 INJECTION, SOLUTION INTRAVENOUS at 14:14

## 2022-10-03 RX ADMIN — DIPHENHYDRAMINE HYDROCHLORIDE 50 MG: 25 CAPSULE ORAL at 10:28

## 2022-10-03 RX ADMIN — FAMOTIDINE 20 MG: 10 INJECTION INTRAVENOUS at 10:29

## 2022-10-03 RX ADMIN — PALONOSETRON HYDROCHLORIDE 0.25 MG: 0.25 INJECTION INTRAVENOUS at 09:57

## 2022-10-03 RX ADMIN — Medication 5 ML: at 16:52

## 2022-10-03 RX ADMIN — CISPLATIN 80 MG: 1 INJECTION, SOLUTION INTRAVENOUS at 14:16

## 2022-10-03 RX ADMIN — PACLITAXEL 268 MG: 6 INJECTION, SOLUTION INTRAVENOUS at 11:08

## 2022-10-03 RX ADMIN — SODIUM CHLORIDE 800 MG: 9 INJECTION, SOLUTION INTRAVENOUS at 16:13

## 2022-10-03 ASSESSMENT — PAIN SCALES - GENERAL: PAINLEVEL: NO PAIN (0)

## 2022-10-03 NOTE — NURSING NOTE
Chief Complaint   Patient presents with     Port Draw     Labs drawn via port by RN in lab. VS taken.      Labs drawn via Port accessed using 20g flat needle. Line flushed and Heparin locked. Vital signs taken. Checked into next appointment.     Niya Galan RN

## 2022-10-03 NOTE — PROGRESS NOTES
Infusion Nursing Note:  Cordell Donaldson presents today for Cycle 4 day 1 Keytruda, Taxol, Cisplatin, Avastin.    Patient seen by provider today: No   present during visit today: Not Applicable.    Note: Patient arrives feeling well, no new concerns. Pt fasting based off a study that showed increased effectiveness of chemotherapy while fasting.    Intravenous Access:  Implanted Port.    Treatment Conditions:  Lab Results   Component Value Date    HGB 13.5 10/03/2022    WBC 6.2 10/03/2022    ANEU 1.8 08/22/2017    ANEUTAUTO 4.7 10/03/2022     10/03/2022      Lab Results   Component Value Date     10/03/2022    POTASSIUM 4.3 10/03/2022    MAG 1.8 10/03/2022    CR 0.76 10/03/2022    LINDA 9.6 10/03/2022    BILITOTAL 0.5 10/03/2022    ALBUMIN 4.2 10/03/2022    ALT 31 10/03/2022    AST 31 10/03/2022     Results reviewed, labs MET treatment parameters, ok to proceed with treatment.  Urine protein negative.    Post Infusion Assessment:  Patient tolerated infusion without incident.  Blood return noted pre and post infusion.  Site patent and intact, free from redness, edema or discomfort.  No evidence of extravasations.  Access discontinued per protocol.     Discharge Plan:   Patient declined prescription refills.  Discharge instructions reviewed with: Patient.  Patient and/or family verbalized understanding of discharge instructions and all questions answered.  AVS to patient via [employer].  Patient will return 10/25/22 for next appointment.   Patient discharged in stable condition accompanied by: self.  Departure Mode: Ambulatory.      Brit Muhammad RN

## 2022-10-03 NOTE — PATIENT INSTRUCTIONS
[facility] Triage and after hours / weekends / holidays:  [phone]    Please call the triage or after hours line if you experience a temperature greater than or equal to 100.4, shaking chills, have uncontrolled nausea, vomiting and/or diarrhea, dizziness, shortness of breath, chest pain, bleeding, unexplained bruising, or if you have any other new/concerning symptoms, questions or concerns.      If you are having any concerning symptoms or wish to speak to a provider before your next infusion visit, please call your care coordinator or triage to notify them so we can adequately serve you.     If you need a refill on a narcotic prescription or other medication, please call before your infusion appointment.                October 2022 Sunday Monday Tuesday Wednesday Thursday Friday Saturday                                 1       2     3    LAB CENTRAL   8:30 AM   (15 min.)   [facility] LAB DRAW   [facility]    ONC INFUSION 6 HR (360 MIN)   9:00 AM   (360 min.)    ONC INFUSION NURSE   [facility] 4     5     6    MR BREAST BILATERAL WWO  10:30 AM   (60 min.)   [id number]   [facility] MRI [place] 7     8       9     10    RETURN   3:45 PM   (30 min.)   Angela Montana MD   [facility] 11     12     13     14     15       16     17     18     19     20     21     22       23     24    VIDEO VISIT NEW  11:45 AM   (75 min.)   Juliet Nagy GC   [facility] 25    LAB CENTRAL   9:00 AM   (15 min.)   [facility] DRAW   [facility]    RETURN   9:25 AM   (40 min.)   Niya Holden APRN CNP   [facility]    ONC INFUSION 6 HR (360 MIN)  11:00 AM   (360 min.)    ONC INFUSION NURSE   [facility] 26     27     28     29       30     31                                             November 2022 Sunday Monday Tuesday Wednesday Thursday Friday Saturday             1     2     3    RETURN   9:00 AM   (30 min.)   Yvette Pike MD   [facility] 4     5       6     7     8     9     10     11     12       13     14    LAB CENTRAL   9:30 AM   (15 min.)   [facility] LAB DRAW   [facility]    RETURN   9:55 AM   (40 min.)   Niya Holden APRN CNP   [facility]    ONC INFUSION 6 HR (360 MIN)  11:00 AM   (360 min.)    ONC INFUSION NURSE   [facility] 15     16     17     18     19       20     21     22     23     24     25     26       27     28     29     30                                      Lab Results:  Recent Results (from the past 12 hour(s))   Protein qualitative urine    Collection Time: 10/03/22  8:55 AM   Result Value Ref Range    Protein Albumin Urine Negative Negative mg/dL   Comprehensive metabolic panel    Collection Time: 10/03/22  8:55 AM   Result Value Ref Range    Sodium 138 136 - 145 mmol/L    Potassium 4.3 3.4 - 5.3 mmol/L    Chloride 104 98 - 107 mmol/L    Carbon Dioxide (CO2) 22 22 - 29 mmol/L    Anion Gap 12 7 - 15 mmol/L    Urea Nitrogen 19.4 8.0 - 23.0 mg/dL    Creatinine 0.76 0.51 - 0.95 mg/dL    Calcium 9.6 8.8 - 10.2 mg/dL    Glucose 94 70 - 99 mg/dL    Alkaline Phosphatase 61 35 - 104 U/L    AST 31 10 - 35 U/L    ALT 31 10 - 35 U/L    Protein Total 6.6 6.4 - 8.3 g/dL    Albumin 4.2 3.5 - 5.2 g/dL    Bilirubin Total 0.5 <=1.2 mg/dL    GFR Estimate 83 >60 mL/min/1.73m2   Magnesium    Collection Time: 10/03/22  8:55 AM   Result Value Ref Range    Magnesium 1.8 1.7 - 2.3 mg/dL   CBC with platelets and differential    Collection Time: 10/03/22  8:55 AM   Result Value Ref Range    WBC Count 6.2 4.0 - 11.0 10e3/uL    RBC Count 5.29 (H) 3.80 - 5.20 10e6/uL    Hemoglobin 13.5 11.7 - 15.7 g/dL    Hematocrit 43.4 35.0 - 47.0 %    MCV 82 78 - 100  fL    MCH 25.5 (L) 26.5 - 33.0 pg    MCHC 31.1 (L) 31.5 - 36.5 g/dL    RDW 20.7 (H) 10.0 - 15.0 %    Platelet Count 181 150 - 450 10e3/uL    % Neutrophils 75 %    % Lymphocytes 16 %    % Monocytes 7 %    % Eosinophils 1 %    % Basophils 1 %    % Immature Granulocytes 0 %    NRBCs per 100 WBC 0 <1 /100    Absolute Neutrophils 4.7 1.6 - 8.3 10e3/uL    Absolute Lymphocytes 1.0 0.8 - 5.3 10e3/uL    Absolute Monocytes 0.4 0.0 - 1.3 10e3/uL    Absolute Eosinophils 0.1 0.0 - 0.7 10e3/uL    Absolute Basophils 0.0 0.0 - 0.2 10e3/uL    Absolute Immature Granulocytes 0.0 <=0.4 10e3/uL    Absolute NRBCs 0.0 10e3/uL

## 2022-10-06 ENCOUNTER — ANCILLARY PROCEDURE (OUTPATIENT)
Dept: MRI IMAGING | Facility: CLINIC | Age: 72
End: 2022-10-06
Attending: INTERNAL MEDICINE
Payer: COMMERCIAL

## 2022-10-06 DIAGNOSIS — C50.912 RECURRENT BREAST CANCER, LEFT (H): ICD-10-CM

## 2022-10-06 PROCEDURE — A9585 GADOBUTROL INJECTION: HCPCS | Performed by: RADIOLOGY

## 2022-10-06 PROCEDURE — 77049 MRI BREAST C-+ W/CAD BI: CPT | Performed by: RADIOLOGY

## 2022-10-06 RX ORDER — GADOBUTROL 604.72 MG/ML
7.5 INJECTION INTRAVENOUS ONCE
Status: COMPLETED | OUTPATIENT
Start: 2022-10-06 | End: 2022-10-06

## 2022-10-06 RX ADMIN — GADOBUTROL 5 ML: 604.72 INJECTION INTRAVENOUS at 10:49

## 2022-10-10 ENCOUNTER — ONCOLOGY VISIT (OUTPATIENT)
Dept: ONCOLOGY | Facility: CLINIC | Age: 72
End: 2022-10-10
Attending: INTERNAL MEDICINE
Payer: COMMERCIAL

## 2022-10-10 VITALS
RESPIRATION RATE: 16 BRPM | BODY MASS INDEX: 20.83 KG/M2 | SYSTOLIC BLOOD PRESSURE: 154 MMHG | DIASTOLIC BLOOD PRESSURE: 80 MMHG | WEIGHT: 116.1 LBS | OXYGEN SATURATION: 100 % | TEMPERATURE: 97.7 F | HEART RATE: 76 BPM

## 2022-10-10 DIAGNOSIS — C53.9 RECURRENT CERVICAL CANCER (H): ICD-10-CM

## 2022-10-10 DIAGNOSIS — C50.412 MALIGNANT NEOPLASM OF UPPER-OUTER QUADRANT OF LEFT BREAST IN FEMALE, ESTROGEN RECEPTOR POSITIVE (H): Primary | ICD-10-CM

## 2022-10-10 DIAGNOSIS — T45.1X5A CHEMOTHERAPY-INDUCED NEUROPATHY (H): ICD-10-CM

## 2022-10-10 DIAGNOSIS — H93.11 TINNITUS, RIGHT: ICD-10-CM

## 2022-10-10 DIAGNOSIS — G62.0 CHEMOTHERAPY-INDUCED NEUROPATHY (H): ICD-10-CM

## 2022-10-10 DIAGNOSIS — M54.16 LUMBAR RADICULOPATHY: ICD-10-CM

## 2022-10-10 DIAGNOSIS — Z17.0 MALIGNANT NEOPLASM OF UPPER-OUTER QUADRANT OF LEFT BREAST IN FEMALE, ESTROGEN RECEPTOR POSITIVE (H): Primary | ICD-10-CM

## 2022-10-10 PROCEDURE — G0463 HOSPITAL OUTPT CLINIC VISIT: HCPCS

## 2022-10-10 PROCEDURE — 99215 OFFICE O/P EST HI 40 MIN: CPT | Performed by: INTERNAL MEDICINE

## 2022-10-10 ASSESSMENT — PAIN SCALES - GENERAL: PAINLEVEL: NO PAIN (0)

## 2022-10-10 NOTE — PROGRESS NOTES
"Oncology Follow Up:  Date on this visit: 10/10/2022    Diagnosis: Left breast invasive ductal carcinoma.    Primary Physician: Merry Lino     History Of Present Illness:  Ms. Donaldson is a 72 year old female with cervical and breast cancer.    She has a history of left breast cancer treated with lumpectomy and radiation therapy in 2003 (followed w/ Dr. Mtz at [facility] [place] Oncotype DX recurrence score was 10.  She took 5 years of tamoxifen. She had an incidental left breast lesion found on imaging for her pelvic mass. Ultrasound showed a 1.3 cm mass at 12:30, 8 cm from the nipple of the left breast.  Left breast biopsy at 12:30, 8 cm from the nipple showed grade 2 invasive ductal carcinoma, ER strong in 100%, MS negative, and HER2 negative.  Given concurrent recurrent pelvic mass with plan for cytotoxic chemotherapy, plan was made to monitor and initiate endocrine therapy when appropriate.    In regards to her cervical cancer history, she was initially determined to have squamous carcinoma in 10/2012.  She is s/p FRANCIA and upper vaginectomy in 12/2012 with no residual invasive cancer, but residual HSIL.  Biopsy on 7/22/2013 was c/w HSIL, cannot exclude invasion.  In 05/2022 she developed symptoms of pelvic heaviness (felt like she \"bruised her tailbone\") and progressive pain. She felt like her urination and bowel movements were constricted, needing to put in a lot of effort to urinate or have BM. Pelvic MRI showed a large midline pelvic mass measuring up to 8.5 cm and abutting the bladder and the rectum without definite invasion or left pelvic LAD.  These findings were confirmed on PET/CT. She was started on chemo with cisplatin, paclitaxel, and bevacizumab (7/11/22).  Caris testing showed PD-L1 CPS score of 10, MMR deficient, MSI high disease with a TMB of 53 mut/Mb. Pembrolizumab was added with C3.  PET/CT following 3 cycles was c/w a partial response to treatment.    Interval History:  Ms. Donaldson comes into " "clinic today for routine breast cancer follow-up.  She had a breast MRI performed last week and wishes to review this today.  She has continued on chemotherapy with paclitaxel, cisplatin, bevacizumab, and recently added pembrolizumab.  She states the chemo has become much more difficult for her.  She began to feel much more poorly following cycle 3.  She is having ringing of the ears.  It is especially prevalent in her right ear.  She reminds me that with prior cisplatin treatment, she had loss of hearing requiring hearing aid and she is concerned that this could occur again.  In addition, she has had shooting pains down the front of her shins wrapping around to the ankle and the great toe on both sides.  When she lies down at night these pains are particularly bothersome and keep her from sleep.  She had an episode this past cycle where she got up to walk in the middle the night and felt like her feet were large and numb, \"like moon feet\".  In general, she has had fatigue and lethargy.  She denies significant GI side effects.  She previously was having sensation of mouth sores, however, her dentist started her on a mouthwash which relieved the symptoms.  Following her last cycle, she had inflammation and pruritis of her left eyelid, this is now resolved.  She has had an intermittent nosebleed, but denies other bleeding or clotting.  The remainder of a complete 12 point review of systems was reviewed with patient was negative with exception that mentioned above.    Past Medical/Surgical History:  Past Medical History:   Diagnosis Date     Abnormal Pap smear     maybe 20 years ago     Cervical cancer (H)      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      History of breast cancer 2003    lumpectomy and radiation offerred chemo and patient declined at time but had oncogene test and low risk     Hyperlipidemia LDL goal < 160      Osteopenia      Paroxysmal A-fib (H)      Severe " vaginal dysplasia      Past Surgical History:   Procedure Laterality Date     BIOPSY       BREAST SURGERY Left 2003    lumpectomy left, did radiation, 5 yr of tamoxifen     COLONOSCOPY  2018    repeat in 2028     Colposcopy Cervix with Loop Electrode Conization and Lser to Vagina  2008     COLPOSCOPY, BIOPSY, COMBINED  10/24/2012    Procedure: COMBINED COLPOSCOPY, BIOPSY;  Colposcopy, Biopsy of Cervix and Vagina, Ultrasound Guidance;  Surgeon: Colette Ng MD;  Location: [facility] OR     ENT SURGERY  01/23/2018    otoscelerosis, right side     HYSTERECTOMY, FRANCIA  12/2012    high grade cervical dysplasia, [place] [facility], Dr. Arciniega     INSERT PORT VASCULAR ACCESS Right 7/18/2022    Procedure: INSERTION, VASCULAR ACCESS PORT;  Surgeon: Donnie Elias MD;  Location: [facility] OR     IR CHEST PORT PLACEMENT > 5 YRS OF AGE  7/18/2022     ME LAP VAGINECTOMY, PARTIAL REMOVAL OF VAGINAL WALL  10/2013    upper vaginectomy for dysplasia, Dr. Megan Arciniega     Allergies:  Allergies as of 10/10/2022     (No Known Allergies)     Current Medications:  Current Outpatient Medications   Medication Sig Dispense Refill     Acetylcarnitine HCl 250 MG CAPS Take 500 mg by mouth 2 times daily        aspirin (ASA) 81 MG chewable tablet Take 81 mg by mouth daily       Bacillus Coagulans-Inulin (PROBIOTIC FORMULA) 1-250 BILLION-MG CAPS Take by mouth three times a week 25+billion CFUS       BENFOTIAMINE PO Take 150 mg by mouth 2 times daily       Cholecalciferol (VITAMIN D-3) 25 MCG (1000 UT) CAPS Take 1,000 Units by mouth daily 90 capsule 3     Coenzyme Q10 (CO Q 10 PO) Take 100 mg by mouth daily        dexamethasone (DECADRON) 4 MG tablet Take 2 tablets (8 mg) by mouth daily Take for 3 days, starting the day after chemo. Take with food. 6 tablet 3     Efraín Berry 550 MG CAPS Take 400 mg by mouth 2 times daily 60 capsule 11     MAGNESIUM OXIDE PO Take 500 mg by mouth       metoprolol succinate ER (TOPROL XL) 50 MG 24 hr tablet Take 1 tablet (50 mg) by  mouth daily 90 tablet 1     Omega-3 Fatty Acids (OMEGA-3 FISH OIL PO) Take 630 mg by mouth 2 times daily        ondansetron (ZOFRAN) 8 MG tablet Take 1 tablet (8 mg) by mouth every 8 hours as needed for nausea (vomiting) 30 tablet 2     OVER-THE-COUNTER Turkey tail mushroom powder, use 3 times a day 1 Can 11     Pectin Cit-Inos-C-Bioflav-Soy (MODIFIED CITRUS PECTIN PO)        prochlorperazine (COMPAZINE) 10 MG tablet Take 1 tablet (10 mg) by mouth every 6 hours as needed for nausea or vomiting 30 tablet 2     Selenium 200 MCG CAPS Take 200 mg by mouth       Taurine 1000 MG CAPS Take one po twice daily 60 capsule 11     Turmeric 450 MG CAPS Take 2 capsules by mouth daily 60 capsule 11      Physical Exam:  BP (!) 154/80 (BP Location: Right arm, Patient Position: Sitting, Cuff Size: Adult Regular)   Pulse 76   Temp 97.7  F (36.5  C) (Oral)   Resp 16   Wt 52.7 kg (116 lb 1.6 oz)   SpO2 100%   BMI 20.83 kg/m       GENERAL APPEARANCE: Fatigue, but well appearing adult female in NAD.  Alert and oriented x 3.     HEENT: NC/AT, sclera anicteric.  MMM.  No lesions of the oropharynx.  (+) alopecia.     LYMPHATICS: No palpable cervical, supraclavicular, or axillary lymphadenopathy     RESP: lungs are CTA bilaterally, without wheezes or crackles.     CARDIOVASCULAR:  RRR.  No m/r/g.     BREAST:  Bilateral breasts are of normal fibroglandular density.  Previously palpable density at 12:30, 8 cm from the nipple of the left breast is not palpable on today's exam.  Bilateral nipples are everted and without discharge.     ABDOMEN:  soft, nondistended     MUSCULOSKELETAL: extremities normal- no gross deformities noted, no edema b/l LE.     SKIN: no suspicious lesions or rashes     PSYCHIATRIC: mentation appears normal and affect normal    Laboratory/Imaging Studies  I personally reviewed the below images and laboratory studies:    9/22/2022 PET/CT:  1. Significant interval reduction in the size and FDG avidity of large  cervical  mass with persistent FDG activity about the right cervical  side wall and anterior vaginal wall.  2. Significant reduction in size and number of FDG avid iliac chain  lymph nodes with multiple persistent FDG avid iliac chain lymph nodes.  3. Diffuse gastric hypermetabolism, correlate for symptoms of  gastritis.   4. Resolved FDG avidity of a left retropectoral lymph node, presumably  previously reactive.    10/3/2022 Labs:  Electrolytes, kidney, and liver function are wnl.  WBC, hemoglobin, and platelets are wnl.    10/6/2022 MRI Breast:  At 12:30 between the pectoralis major and minor a biopsy clip is present in the previously biopsied mass.  There is 7 mm of susceptibility artifact, which may potentially obscure residual enhancement, however there is no visualized residual enhancement.    Unchanged small left internal mammary lymph nodes.  The largest measures 3 mm.    ASSESSMENT/PLAN:  Ms. Donaldson is a 73 y/o woman with a history of left breast cancer (s/p lumpectomy, radiation in 2003 followed by 5 years of tamoxifen w/ Dr. Mtz) and cervical SCC (10/2012, s/p FRANCIA, upper vaginectomy) with pelvic mass and a second ER positive, CA negative, HER2 negative left breast cancer.     1. Left breast cancer.  -She is s/p 4 cycles Cisplatin 50 mg/m2 + paclitaxel 175 mg/m2 + bevacizumab 15 mg/kg every 21 days, pembrolizumab added cycles 3 and 4, for cervical cancer. This chemotherapy will also treat her breast cancer   -We reviewed the results of last week's breast MRI which shows no residual enhancement in the area of known breast malignancy, however, residual mass may be obscured by susceptibility artifact from the biopsy clip.  We spent some time discussing that although there does not appear to be a mass on imaging, there likely remains microscopic disease in this area, and in general breast cancer is not considered curable without surgery.  Continue to delay breast surgery in the setting of ongoing treatment of  cervical/vaginal carcinoma.  - Plan for cervical carcinoma is to continue chemotherapy, to be followed by radiation.  - Consider treatment with anastrozole 1 mg PO daily during radiation.  Would wait at least 1 month after last chemotherapy dose before starting.  We reviewed the potential side effects of anastrozole today including myalgias, arthralgias, hot flashes, vaginal dryness mood disorder, and thinning of the bones.  -Surgery options for her breast cancer in the future will depend on response of cervical cancer to chemotherapy and radiation therapy.    2. Recurrent cervical versus vaginal cancer   - She follows with GynOnc (Dr. Raygoza)  - Cisplatin 50 mg/m2 + paclitaxel 175 mg/m2 + bevacizumab 15 mg/kg + pembrolizumab every 21 days.  She is s/p 4 cycles.  (start 7/11)  - PET/CT on 9/22/22 with partial response.  - Plan to continue chemotherapy until maximal response, then radiation    3.  Tinnitus:  Secondary to cisplatin.  I relayed her hearing concerns to Dr. Raygoza.  Dr. Raygoza is considering a change from cisplatin to carboplatin with her next cycle.    4.  Bilateral shin/ankle/toe pains:  I suspect this is an L4/L5 radiculopathy.  We reviewed the images of the 9/22 PET/CT together which demonstrates significant loss of disc height and spondylolisthesis in this area.    - Could trial gabapentin 300 mg PO at bedtime for neuropathic pain and may also help her to sleep at night. She will discuss gabapentin with Dr. Raygoza.     - Recommended physical therapy.  She does not feel she has the energy for physical therapy at this time.      5.  Neuropathy:  Chemotherapy induced.  Grade 1 and intermittent, therefore discussed no need for dose adjustment in paclitaxel at this time.    6.  Cancer risk management:    - personal history of breast cancer.  Family history of breast cancer.    - appointment with genetic counseling on 10/24/2022.    7.  Bone Health:  Endocrine therapy can be associated with risk of thinning of  the bones.    - Obtain a baseline DEXA bone density scan at time of initiation of endocrine therapy.    8.  Follow Up:    Return to clinic in 3 months for visit with me.    50 minutes spent on the date of the encounter doing chart review, review of test results, interpretation of tests, patient visit, documentation, discussion with other provider(s) and discussion with family.    Angela Montana MD

## 2022-10-10 NOTE — NURSING NOTE
"Oncology Rooming Note    October 10, 2022 4:04 PM   Cordell Donaldson is a 72 year old female who presents for:    Chief Complaint   Patient presents with     Oncology Clinic Visit     Breast Cancer  (L)     Initial Vitals: BP (!) 154/80 (BP Location: Right arm, Patient Position: Sitting, Cuff Size: Adult Regular)   Pulse 76   Temp 97.7  F (36.5  C) (Oral)   Resp 16   Wt 52.7 kg (116 lb 1.6 oz)   SpO2 100%   BMI 20.83 kg/m   Estimated body mass index is 20.83 kg/m  as calculated from the following:    Height as of 8/2/22: 1.59 m (5' 2.6\").    Weight as of this encounter: 52.7 kg (116 lb 1.6 oz). Body surface area is 1.53 meters squared.  No Pain (0) Comment: Data Unavailable   No LMP recorded. Patient is postmenopausal.  Allergies reviewed: Yes  Medications reviewed: Yes    Medications: Medication refills not needed today.  Pharmacy name entered into [employer]:    [id number] - [place], [place] - [facility]  [id number] - [place], [place] - [facility] [place], [place] - [address] [place] [id number] [phone]    Clinical concerns: Pt presents today for f/u and exam with Dr Montana.       Autumn Ratliff LPN  10/10/2022              "

## 2022-10-10 NOTE — LETTER
"    10/10/2022         RE: Cordell Donaldson  [address]  [facility] [address]        Dear Colleague,    Thank you for referring your patient, Cordell Donaldson, to the [facility]. Please see a copy of my visit note below.    Oncology Follow Up:  Date on this visit: 10/10/2022    Diagnosis: Left breast invasive ductal carcinoma.    Primary Physician: Merry Lino     History Of Present Illness:  Ms. Donaldson is a 72 year old female with cervical and breast cancer.    She has a history of left breast cancer treated with lumpectomy and radiation therapy in 2003 (followed w/ Dr. Mtz at [facility]). Oncotype DX recurrence score was 10.  She took 5 years of tamoxifen. She had an incidental left breast lesion found on imaging for her pelvic mass. Ultrasound showed a 1.3 cm mass at 12:30, 8 cm from the nipple of the left breast.  Left breast biopsy at 12:30, 8 cm from the nipple showed grade 2 invasive ductal carcinoma, ER strong in 100%, LA negative, and HER2 negative.  Given concurrent recurrent pelvic mass with plan for cytotoxic chemotherapy, plan was made to monitor and initiate endocrine therapy when appropriate.    In regards to her cervical cancer history, she was initially determined to have squamous carcinoma in 10/2012.  She is s/p FRANCIA and upper vaginectomy in 12/2012 with no residual invasive cancer, but residual HSIL.  Biopsy on 7/22/2013 was c/w HSIL, cannot exclude invasion.  In 05/2022 she developed symptoms of pelvic heaviness (felt like she \"bruised her tailbone\") and progressive pain. She felt like her urination and bowel movements were constricted, needing to put in a lot of effort to urinate or have BM. Pelvic MRI showed a large midline pelvic mass measuring up to 8.5 cm and abutting the bladder and the rectum without definite invasion or left pelvic LAD.  These findings were confirmed on PET/CT. She was started on chemo with cisplatin, paclitaxel, and " "bevacizumab (7/11/22).  Caris testing showed PD-L1 CPS score of 10, MMR deficient, MSI high disease with a TMB of 53 mut/Mb. Pembrolizumab was added with C3.  PET/CT following 3 cycles was c/w a partial response to treatment.    Interval History:  Ms. Donaldson comes into clinic today for routine breast cancer follow-up.  She had a breast MRI performed last week and wishes to review this today.  She has continued on chemotherapy with paclitaxel, cisplatin, bevacizumab, and recently added pembrolizumab.  She states the chemo has become much more difficult for her.  She began to feel much more poorly following cycle 3.  She is having ringing of the ears.  It is especially prevalent in her right ear.  She reminds me that with prior cisplatin treatment, she had loss of hearing requiring hearing aid and she is concerned that this could occur again.  In addition, she has had shooting pains down the front of her shins wrapping around to the ankle and the great toe on both sides.  When she lies down at night these pains are particularly bothersome and keep her from sleep.  She had an episode this past cycle where she got up to walk in the middle the night and felt like her feet were large and numb, \"like moon feet\".  In general, she has had fatigue and lethargy.  She denies significant GI side effects.  She previously was having sensation of mouth sores, however, her dentist started her on a mouthwash which relieved the symptoms.  Following her last cycle, she had inflammation and pruritis of her left eyelid, this is now resolved.  She has had an intermittent nosebleed, but denies other bleeding or clotting.  The remainder of a complete 12 point review of systems was reviewed with patient was negative with exception that mentioned above.    Past Medical/Surgical History:  Past Medical History:   Diagnosis Date     Abnormal Pap smear     maybe 20 years ago     Cervical cancer (H)      NGUYỄN III (cervical intraepithelial " neoplasia grade III) with severe dysplasia      History of breast cancer 2003    lumpectomy and radiation offerred chemo and patient declined at time but had oncogene test and low risk     Hyperlipidemia LDL goal < 160      Osteopenia      Paroxysmal A-fib (H)      Severe vaginal dysplasia      Past Surgical History:   Procedure Laterality Date     BIOPSY       BREAST SURGERY Left 2003    lumpectomy left, did radiation, 5 yr of tamoxifen     COLONOSCOPY  2018    repeat in 2028     Colposcopy Cervix with Loop Electrode Conization and Lser to Vagina  2008     COLPOSCOPY, BIOPSY, COMBINED  10/24/2012    Procedure: COMBINED COLPOSCOPY, BIOPSY;  Colposcopy, Biopsy of Cervix and Vagina, Ultrasound Guidance;  Surgeon: Colette Ng MD;  Location: [facility] OR     ENT SURGERY  01/23/2018    otoscelerosis, right side     HYSTERECTOMY, FRANCIA  12/2012    high grade cervical dysplasia, [place] [facility], Dr. Arciniega     INSERT PORT VASCULAR ACCESS Right 7/18/2022    Procedure: INSERTION, VASCULAR ACCESS PORT;  Surgeon: Donnie Elias MD;  Location: [facility] OR     IR CHEST PORT PLACEMENT > 5 YRS OF AGE  7/18/2022     CA LAP VAGINECTOMY, PARTIAL REMOVAL OF VAGINAL WALL  10/2013    upper vaginectomy for dysplasia, Dr. Megan Arciniega     Allergies:  Allergies as of 10/10/2022     (No Known Allergies)     Current Medications:  Current Outpatient Medications   Medication Sig Dispense Refill     Acetylcarnitine HCl 250 MG CAPS Take 500 mg by mouth 2 times daily        aspirin (ASA) 81 MG chewable tablet Take 81 mg by mouth daily       Bacillus Coagulans-Inulin (PROBIOTIC FORMULA) 1-250 BILLION-MG CAPS Take by mouth three times a week 25+billion CFUS       BENFOTIAMINE PO Take 150 mg by mouth 2 times daily       Cholecalciferol (VITAMIN D-3) 25 MCG (1000 UT) CAPS Take 1,000 Units by mouth daily 90 capsule 3     Coenzyme Q10 (CO Q 10 PO) Take 100 mg by mouth daily        dexamethasone (DECADRON) 4 MG tablet Take 2 tablets (8 mg) by mouth daily Take for 3 days,  starting the day after chemo. Take with food. 6 tablet 3     Efraín Berry 550 MG CAPS Take 400 mg by mouth 2 times daily 60 capsule 11     MAGNESIUM OXIDE PO Take 500 mg by mouth       metoprolol succinate ER (TOPROL XL) 50 MG 24 hr tablet Take 1 tablet (50 mg) by mouth daily 90 tablet 1     Omega-3 Fatty Acids (OMEGA-3 FISH OIL PO) Take 630 mg by mouth 2 times daily        ondansetron (ZOFRAN) 8 MG tablet Take 1 tablet (8 mg) by mouth every 8 hours as needed for nausea (vomiting) 30 tablet 2     OVER-THE-COUNTER Turkey tail mushroom powder, use 3 times a day 1 Can 11     Pectin Cit-Inos-C-Bioflav-Soy (MODIFIED CITRUS PECTIN PO)        prochlorperazine (COMPAZINE) 10 MG tablet Take 1 tablet (10 mg) by mouth every 6 hours as needed for nausea or vomiting 30 tablet 2     Selenium 200 MCG CAPS Take 200 mg by mouth       Taurine 1000 MG CAPS Take one po twice daily 60 capsule 11     Turmeric 450 MG CAPS Take 2 capsules by mouth daily 60 capsule 11      Physical Exam:  BP (!) 154/80 (BP Location: Right arm, Patient Position: Sitting, Cuff Size: Adult Regular)   Pulse 76   Temp 97.7  F (36.5  C) (Oral)   Resp 16   Wt 52.7 kg (116 lb 1.6 oz)   SpO2 100%   BMI 20.83 kg/m       GENERAL APPEARANCE: Fatigue, but well appearing adult female in NAD.  Alert and oriented x 3.     HEENT: NC/AT, sclera anicteric.  MMM.  No lesions of the oropharynx.  (+) alopecia.     LYMPHATICS: No palpable cervical, supraclavicular, or axillary lymphadenopathy     RESP: lungs are CTA bilaterally, without wheezes or crackles.     CARDIOVASCULAR:  RRR.  No m/r/g.     BREAST:  Bilateral breasts are of normal fibroglandular density.  Previously palpable density at 12:30, 8 cm from the nipple of the left breast is not palpable on today's exam.  Bilateral nipples are everted and without discharge.     ABDOMEN:  soft, nondistended     MUSCULOSKELETAL: extremities normal- no gross deformities noted, no edema b/l LE.     SKIN: no suspicious  lesions or rashes     PSYCHIATRIC: mentation appears normal and affect normal    Laboratory/Imaging Studies  I personally reviewed the below images and laboratory studies:    9/22/2022 PET/CT:  1. Significant interval reduction in the size and FDG avidity of large  cervical mass with persistent FDG activity about the right cervical  side wall and anterior vaginal wall.  2. Significant reduction in size and number of FDG avid iliac chain  lymph nodes with multiple persistent FDG avid iliac chain lymph nodes.  3. Diffuse gastric hypermetabolism, correlate for symptoms of  gastritis.   4. Resolved FDG avidity of a left retropectoral lymph node, presumably  previously reactive.    10/3/2022 Labs:  Electrolytes, kidney, and liver function are wnl.  WBC, hemoglobin, and platelets are wnl.    10/6/2022 MRI Breast:  At 12:30 between the pectoralis major and minor a biopsy clip is present in the previously biopsied mass.  There is 7 mm of susceptibility artifact, which may potentially obscure residual enhancement, however there is no visualized residual enhancement.    Unchanged small left internal mammary lymph nodes.  The largest measures 3 mm.    ASSESSMENT/PLAN:  Ms. Donaldson is a 71 y/o woman with a history of left breast cancer (s/p lumpectomy, radiation in 2003 followed by 5 years of tamoxifen w/ Dr. Mtz) and cervical SCC (10/2012, s/p FRANCIA, upper vaginectomy) with pelvic mass and a second ER positive, TX negative, HER2 negative left breast cancer.     1. Left breast cancer.  -She is s/p 4 cycles Cisplatin 50 mg/m2 + paclitaxel 175 mg/m2 + bevacizumab 15 mg/kg every 21 days, pembrolizumab added cycles 3 and 4, for cervical cancer. This chemotherapy will also treat her breast cancer   -We reviewed the results of last week's breast MRI which shows no residual enhancement in the area of known breast malignancy, however, residual mass may be obscured by susceptibility artifact from the biopsy clip.  We spent some time  discussing that although there does not appear to be a mass on imaging, there likely remains microscopic disease in this area, and in general breast cancer is not considered curable without surgery.  Continue to delay breast surgery in the setting of ongoing treatment of cervical/vaginal carcinoma.  - Plan for cervical carcinoma is to continue chemotherapy, to be followed by radiation.  - Consider treatment with anastrozole 1 mg PO daily during radiation.  Would wait at least 1 month after last chemotherapy dose before starting.  We reviewed the potential side effects of anastrozole today including myalgias, arthralgias, hot flashes, vaginal dryness mood disorder, and thinning of the bones.  -Surgery options for her breast cancer in the future will depend on response of cervical cancer to chemotherapy and radiation therapy.    2. Recurrent cervical versus vaginal cancer   - She follows with GynOnc (Dr. Raygoza)  - Cisplatin 50 mg/m2 + paclitaxel 175 mg/m2 + bevacizumab 15 mg/kg + pembrolizumab every 21 days.  She is s/p 4 cycles.  (start 7/11)  - PET/CT on 9/22/22 with partial response.  - Plan to continue chemotherapy until maximal response, then radiation    3.  Tinnitus:  Secondary to cisplatin.  I relayed her hearing concerns to Dr. Raygoza.  Dr. Raygoza is considering a change from cisplatin to carboplatin with her next cycle.    4.  Bilateral shin/ankle/toe pains:  I suspect this is an L4/L5 radiculopathy.  We reviewed the images of the 9/22 PET/CT together which demonstrates significant loss of disc height and spondylolisthesis in this area.    - Could trial gabapentin 300 mg PO at bedtime for neuropathic pain and may also help her to sleep at night. She will discuss gabapentin with Dr. Raygoza.     - Recommended physical therapy.  She does not feel she has the energy for physical therapy at this time.      5.  Neuropathy:  Chemotherapy induced.  Grade 1 and intermittent, therefore discussed no need for dose adjustment  in paclitaxel at this time.    6.  Cancer risk management:    - personal history of breast cancer.  Family history of breast cancer.    - appointment with genetic counseling on 10/24/2022.    7.  Bone Health:  Endocrine therapy can be associated with risk of thinning of the bones.    - Obtain a baseline DEXA bone density scan at time of initiation of endocrine therapy.    8.  Follow Up:    Return to clinic in 3 months for visit with me.    50 minutes spent on the date of the encounter doing chart review, review of test results, interpretation of tests, patient visit, documentation, discussion with other provider(s) and discussion with family.          Again, thank you for allowing me to participate in the care of your patient.      Sincerely,    Angela Montana MD

## 2022-10-12 ENCOUNTER — MYC MEDICAL ADVICE (OUTPATIENT)
Dept: ONCOLOGY | Facility: CLINIC | Age: 72
End: 2022-10-12

## 2022-10-13 ENCOUNTER — DOCUMENTATION ONLY (OUTPATIENT)
Dept: ONCOLOGY | Facility: CLINIC | Age: 72
End: 2022-10-13

## 2022-10-13 NOTE — PROGRESS NOTES
Notified by Cordell's medical oncologist Dr. Montana that she has increasing tinnitus. She also has some intermittent peripheral neuropathy symptoms in her feet. Plan as follows:  -Change cisplatin to carboplatin AUC 6 starting next cycle (cycle 5).   -Continue to monitor.    Hina Raygoza MD, MS, FACOG, FACS  10/13/2022  12:17 PM

## 2022-10-24 ENCOUNTER — VIRTUAL VISIT (OUTPATIENT)
Dept: ONCOLOGY | Facility: CLINIC | Age: 72
End: 2022-10-24
Attending: INTERNAL MEDICINE
Payer: COMMERCIAL

## 2022-10-24 DIAGNOSIS — Z80.49 FAMILY HISTORY OF UTERINE CANCER: ICD-10-CM

## 2022-10-24 DIAGNOSIS — Z80.0 FAMILY HISTORY OF COLON CANCER: ICD-10-CM

## 2022-10-24 DIAGNOSIS — Z80.3 FAMILY HISTORY OF MALIGNANT NEOPLASM OF BREAST: ICD-10-CM

## 2022-10-24 DIAGNOSIS — C50.912 RECURRENT BREAST CANCER, LEFT (H): Primary | ICD-10-CM

## 2022-10-24 PROCEDURE — 96040 HC GENETIC COUNSELING, EACH 30 MINUTES: CPT | Mod: GT,95 | Performed by: GENETIC COUNSELOR, MS

## 2022-10-24 NOTE — LETTER
Cancer Risk Management  Program Locations    [facility]  [facility]  [facility]  Mailing Address  Cancer Risk Management Program  [facility]  [address]  [place] [address]    New patient appointments  [phone]  October 28, 2022    Cordell Donaldson  [address]  [facility] [address]      Dear Cordell,    It was a pleasure speaking with you over video for genetic counseling on 10/24/2022. Here is a copy of the progress note from our discussion. If you have any additional questions, please feel free to call.    Referring Provider: Angela Montana MD    Presenting Information:   I spoke with Cordell Josend over video today for genetic counseling to discuss her personal and family history of cancer. With her permission, this appointment was conducted virtually due to COVID-19 precautions. We talked today to review this history, cancer screening recommendations, and available genetic testing options.    Personal History:  Cordell is a 72 year old female. She was diagnosed with a left breast cancer at age 53 in December 2003 (IDC, ER positive, NC positive, HER2 negative). She had a lumpectomy on 1/5/04, followed by radiation, and tamoxifen. On 7/15/22, at age 72, she had a left breast biopsy which revealed invasive ductal carcinoma (ER positive, NC negative, HER2 negative).    She has a history of vaginal and cervical dysplasia and on 10/24/12 she underwent cervical and vaginal biopsies and endocervical curettage which revealed squamous carcinoma in-situ. On 12/13/12 she underwent robotic-assisted laparoscopic lysis of adhesions, left ureterolysis with conversion to laparotomy, total abdominal hysterectomy, and an upper vaginectomy. On 10/2/13 she underwent upper vaginectomy. She had a diagnosis of Stage IA1  squamous cell carcinoma of the cervix with no residual invasive cancer, residual HSIL. She had a pelvic MRI on 5/20/22, which showed a large pelvic mass. On 6/9/22 she had a CT-guided biopsy of the pelvic mass which revealed: Poorly differentiated squamous cell carcinoma, HPV-associated. She began chemotherapy in July 2022 (will also treat her breast cancer).    She had a skin biopsy of her left clavicular chest on 8/6/21 which revealed Basal cell carcinoma, nodular type. Excision on 2/4/22 was negative for residual basal cell carcinoma.    She has had BRCA1 and BRCA2 testing in 2006 (sequencing and BRCA1 5-site rearrangement panel, [facility]). Results were negative.    She had her first menstrual period at age ~14, her first child at age 27, and is postmenopausal. She reports that she has not used hormone replacement therapy. She used oral contraceptives for only a couple months in the past. Her most recent colonoscopy on 9/14/18 detected no polyps and follow-up was recommended in 10 years. She reports two prior colonoscopies with no polyps detected. Cordell reported former tobacco use and social alcohol use (very little now with chemotherapy). 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    Family History: (Please see scanned pedigree for detailed family history information)  Children:    She has one daughter who is 45 years old with no known history of cancer.   Siblings:    Her sister was diagnosed with breast cancer at age 47 and had a lot of treatment. This was metastatic to her brain. She passed away at age 62. She also reportedly had negative BRCA1/2 testing around the same time as Cordell.   Maternal:    Her mother passed away at age 73 with no known history of cancer.     Her aunt was diagnosed with colorectal cancer and blood disease (lack of platelets) in her late 70s and passed away at age 80.      Cordell's mother had nine total siblings. Five were maternal half-siblings, and four (including the aunt with cancer above) were full siblings. Cordell is not aware of any diagnoses of cancer in her other aunts/uncles. They are all . She does not have information about cancers in her cousins.    Her grandmother had an unknown cancer type and passed away at age 88.  Paternal:    Her father passed away at age 67 with no known history of cancer.     Her uncle was diagnosed with bladder cancer at age 89 and passed away at 89. He was a non-smoker and worked as a  and . No known significant exposures.     His daughter (Cordell's cousin) is 84 years old and was diagnosed with uterine cancer at age 59.     Her grandmother was diagnosed with both breast and colon cancer at ages unknown to Cordell. She passed away at age 70.     Her maternal ethnicity is Polish, [place]. Her paternal ethnicity is [place]. There is no known Ashkenazi Sabianist ancestry on either side of her family.     Discussion:    Cordell's personal and family history of cancer is suggestive of a hereditary cancer syndrome.    We reviewed the features of sporadic, familial, and hereditary cancers. In looking at Cordell's family history, it is possible that a cancer susceptibility gene is present due to her personal and family history of breast cancer.    We discussed the natural history and genetics of hereditary cancer. As noted above, she has already had some negative BRCA1/2 testing in . We discussed that this testing did not include full-gene rearrangement analysis, therefore updated BRCA1/2 testing is indicated. Additionally, we discussed that there are now additional genes in which mutations are known to cause increased risk for breast and other cancers. As many of these genes present with overlapping features in a family and accurate cancer risk cannot always be established based upon the pedigree analysis alone, it  would be reasonable for Cordell to consider updated panel genetic testing to analyze multiple genes at once.    A detailed handout regarding genes in which mutations are associated with an increased risk for breast cancer and the information we discussed will be provided to Cordell via [employer] and can be found in the after visit summary. Topics included: inheritance pattern, cancer risks, cancer screening recommendations, and also risks, benefits and limitations of testing.    Based on her personal and family history, Cordell meets current National Comprehensive Cancer Network (NCCN) criteria for updated genetic testing of high-penetrance breast cancer susceptibility genes, specifically, BRCA1, BRCA2, CDH1, PALB2, PTEN, and TP53.    We reviewed genetic testing options for Cordell based on her personal and family history: a panel of genes associated with an increased risk for certain cancers, or larger panel options to include genes associated with increased risk for multiple different cancer types.     Medical Management: For Cordell, we reviewed that the information from genetic testing may determine:    surgery to treat Cordell's active cancer diagnosis (i.e. lumpectomy versus bilateral mastectomy, etc.),    additional cancer screening for which Cordell may qualify (i.e. mammogram and breast MRI, more frequent colonoscopies, more frequent dermatologic exams, etc.),    options for risk reducing surgeries Cordell could consider (i.e. bilateral mastectomy, etc.),      and targeted chemotherapies for certain cancers (i.e. immunotherapy for individuals with Cui syndrome, PARP inhibitors, etc.).   At the end of our discussion today, Cordell elected to take some additional time to think about the option of genetic testing and implications for herself and her family members.   If she decides not to have genetic testing, she should continue with her cancer care and screenings based on her personal and family  history:  She should continue with her cancer care as recommended by her oncology team.   Cordell s close female relatives remain at increased risk for breast cancer given their family history. Breast cancer screening is generally recommended to begin approximately 10 years younger than the earliest age of breast cancer diagnosis in the family, or at age 40, whichever comes first. Breast screening options should be discussed with an individual's primary care provider and a genetic counselor, to determine at what age to begin screening, what screening is appropriate, and if additional screening (such as breast MRI) is necessary based on personal/family history factors.    Other population cancer screening options, such as those recommended by the American Cancer Society and the National Comprehensive Cancer Network (NCCN), are also appropriate for Cordell and her family. These screening recommendations may change if there are changes to Cordell's personal and/or family history of cancer. Final screening recommendations should be made by each individual's primary care provider.   Screening recommendations may change if there are changes in her personal or family history, or if there is a gene mutation detected in Cordell or her family members.    Plan:  1) No genetic testing was ordered today. Cordell elected to take some additional time to consider the option of genetic testing and implications for herself and her family.   2) She was encouraged to contact me with additional questions or if she decides that she would like to proceed with genetic testing.  3) If she decides not to have genetic testing, Cordell and her family members should continue with their cancer screenings based on personal and family history as outlined above.    Juliet Nagy MS, [facility]  Licensed, Certified Genetic Counselor  Office: [phone]  Email: [email or web address]

## 2022-10-24 NOTE — PROGRESS NOTES
Follow-up Note on Referred Patient      Date: 10/25/2022        Referring Provider/Gynecologic Oncologist:  Megan Arciniega MD  [facility]   Phone [phone]  [phone]      Radiation Oncologist:   Raymond Stockton MD, PhD  [facility]      Medical Oncologist:  Angela Montana MD  [facility].      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       History of Present Illness:   Cordell Donaldson (she/her/hers) is a 72 year old initially referred to the Gynecologic Cancer Clinic at the [facility] on 2022 by gynecologic oncologist Dr. Arciniega and radiation oncologist Dr. Stockton for management of a pelvic mass due to recurrent cervical cancer vs new vaginal cancer. History as follows:     Breast Cancer History:  : Left breast cancer.  -Lumpectomy.  -Radiation therapy.    2520-4608: BRENNAN.     22: PET/CT to stage vaginal cancer (see below).   Mild soft tissue  thickening in the left retropectoral region with an SUV max of 2.7.  7/15/22: Invasive ductal carcinoma.   -Consultation with Dr. Montana. Recommendation to prioritize vaginal/recurrent cervix cancer. Plan to follow breast lesion with repeat MRI. Plan for endocrine therapy after completion of chemotherapy for vaginal/cervical cancer.       Cervical/Vaginal Dysplasia/Cancer History:  10/24/12:   -Endocervical curettings: Squamous carcinoma, invasion cannot be assessed.   -Ectocervical biopsies: HSIL (CIN3).   -Vaginal biopsy, posterior: HSIL (VaIN3).  2012: Robotic-assisted laparoscopic lysis of adhesions, left ureterolysis with  conversion to laparotomy, total abdominal hysterectomy, and an upper  Vaginectomy by gynecologic oncologist Dr. Arciniega.   -Pathology: Stage IA1 squamous cell carcinoma of the cervix with no  residual invasive cancer, residual HSIL.     7/22/13: Vaginal biopsy: HSIL, cannot exclude invasion.   -Consultation with gynecologic oncologist Dr. Johnson. Upper vaginectomy and CO2 laser ablation recommended, patient declined.   [id number]: No vaginal dysplasia treatment.   5/20/22: Pelvic MRI: I have personally reviewed the MRI images.   Centrally hypoenhancing heterogeneous midline pelvic mass along  the superior margin of the vaginal vault measures 8.5 x 6.7 x 8.1 cm.  The mass abut the bladder and there is mucosal irregularity at the  site of abutment. The mass indents the  decompressed rectum but there is no definite invasion or mucosal  Irregularity.  6/9/22: CT-guided biopsy of pelvic mass: Poorly-differentiated squamous cell carcinoma, HPV-associated.   7/1/22: Staging PET/CT: Stage III vaginal cancer (vs recurrent, metastatic cervical cancer). I have personally reviewed the PET/CT images.     7/20/22, 8/10/22, 8/31/22,9/21/22: Cycles #1-4 of first-line chemotherapy with IV cisplatin 50 mg/m2 + paclitaxel 175 mg/m2 + bevacizumab 15 mg/kg every 21 days.  8/31/22: Cycle 3 Pembrolizumab 200 mg every 21 days added to triplet chemotherapy per the KEYNOTE-826 regimen.   -9/22/22: PET/CT: Partial response. I have personally reviewed the PET/CT images.   Stable 3 mm solid  nodules in the left upper lobe. Resolved FDG  avidity of a left retropectoral, presumably lymph node which is now  Calcified.  Multiple stable FDG avid iliac chain lymph nodes near the aortic  bifurcation with SUV max 14.0. Significant reduction in left internal  iliac chain nodes, with no single node with SUV max 5.5 which measures  up to 12 mm in short axis. Significantly reduced  size of FDG avid cervical mass with smaller area of enhancing  nodularity on the right side wall measuring 3.3 x 1.5 cm with SUV max  16.3. There is central necrosis and abutment of  the rectum. There is an additional FDG avid foci more inferiorly on  the anterior  "vaginal wall with SUV max 6.7, which is also decreased in  Size.  -Discussed with radiation oncologist Dr. Stockton and at the gyn onc/radiation oncology tumor conference. Recommendation for additional chemotherapy prior to radiation therapy.         Genetic/Genomic/Molecular Testing History:  2006: Negative for germline BRCA1, BRCA2 pathogenic variants.     8/14/22 (specimen from 6/9/22): [facility] Testing.   -PD-L1+ (CPS 10)  -Mismatch repair deficient/microsatellite instability-high  -TMB high (53 mut/Mb)  -NTRK 1/2/3 fusion not detected.       Subjective:  Cordell returns to the gyn onc clinic today for a scheduled follow-up visit to discuss chemotherapy management after being notified by her medical oncologist of increasing tinnitus and radiculopathy. Cordell reports that after her last cycle of chemotherapy she has increasingly frequent radiculopathy vs peripheral neuropathy. Reports tingling in the medial aspect of the left leg from knee down, sensitive to heat. Numbness felt \"like she had moon boots on.\" CBD lotion, oil helps, massage also helps. No motor deficits, although when walking on irregular terrain she reports shoes felt too small, but she dis not trip. She has some discomfort in her hands/wrists, most pronounced after yoga. Cordell reports a rash on lower back and spreading to the flanks, upper back over the past  2 weeks. She uses hydrocortisone cream and lotions with some relief.   Nose bleeds happens in night when her nose feels \"stuffed up\", clots come out, right red blood today but no clots, going on for 2-3wks. No heavy bleeding.   She reports new HTN, BP at home 140s.   Cordell also reports severe ringing in the ears after chemotherapy, decreased with time since her last chemotherapy. She was offered an audiology referral, but declined as she does not feel that she has had any significant hearing loss. She does report that she has an audiologist, and has hearing aids that she could use if necessary, " but has not felt the need to use them.   Cordell reports that she had her breast MRI as ordered by Dr. Montana, but that her insurance is declining coverage. She has until December to appeal.       Review of Systems:    ROS: 10 point ROS neg other than the symptoms noted above in the HPI.       Past Medical History:  Past Medical History:   Diagnosis Date     Abnormal Pap smear     maybe 20 years ago     Cervical cancer (H)      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      History of breast cancer 2003    lumpectomy and radiation offerred chemo and patient declined at time but had oncogene test and low risk     Hyperlipidemia LDL goal < 160      Osteopenia      Paroxysmal A-fib (H)      Severe vaginal dysplasia        Past Surgical History:  Past Surgical History:   Procedure Laterality Date     BIOPSY       BREAST SURGERY Left 2003    lumpectomy left, did radiation, 5 yr of tamoxifen     COLONOSCOPY  2018    repeat in 2028     Colposcopy Cervix with Loop Electrode Conization and Lser to Vagina  2008     COLPOSCOPY, BIOPSY, COMBINED  10/24/2012    Procedure: COMBINED COLPOSCOPY, BIOPSY;  Colposcopy, Biopsy of Cervix and Vagina, Ultrasound Guidance;  Surgeon: Colette Ng MD;  Location: [facility] OR     ENT SURGERY  01/23/2018    otoscelerosis, right side     HYSTERECTOMY, FRANCIA  12/2012    high grade cervical dysplasia, [facility], Dr. Arciniega     INSERT PORT VASCULAR ACCESS Right 7/18/2022    Procedure: INSERTION, VASCULAR ACCESS PORT;  Surgeon: Donnie Elias MD;  [place] [facility] [place]      CHEST PORT PLACEMENT > 5 YRS OF AGE  7/18/2022     NE LAP VAGINECTOMY, PARTIAL REMOVAL OF VAGINAL WALL  10/2013    upper vaginectomy for dysplasia, Dr. Megan Arciniega       Current Medications:   Current Outpatient Medications   Medication     Acetylcarnitine HCl 250 MG CAPS     aspirin (ASA) 81 MG chewable tablet     Bacillus Coagulans-Inulin (PROBIOTIC FORMULA) 1-250 BILLION-MG CAPS     BENFOTIAMINE PO  "    Cholecalciferol (VITAMIN D-3) 25 MCG (1000 UT) CAPS     Coenzyme Q10 (CO Q 10 PO)     dexamethasone (DECADRON) 4 MG tablet     Moira Cota 550 MG CAPS     MAGNESIUM OXIDE PO     metoprolol succinate ER (TOPROL XL) 50 MG 24 hr tablet     Omega-3 Fatty Acids (OMEGA-3 FISH OIL PO)     ondansetron (ZOFRAN) 8 MG tablet     OVER-THE-COUNTER     Pectin Cit-Inos-C-Bioflav-Soy (MODIFIED CITRUS PECTIN PO)     prochlorperazine (COMPAZINE) 10 MG tablet     Selenium 200 MCG CAPS     Taurine 1000 MG CAPS     Turmeric 450 MG CAPS     No current facility-administered medications for this visit.        Allergies:   No Known Allergies       Social History:  Patient lives with her . 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 She walks daily, gardens, does yoga twice/week.  She does have Advanced Directives, but has identified her daughter and  as her desired [employer] of .   Social History     Tobacco Use     Smoking status: Former     Packs/day: 0.50     Years: 15.00     Pack years: 7.50     Types: Cigarettes     Smokeless tobacco: Never   Substance Use Topics     Alcohol use: Yes     Alcohol/week: 2.0 standard drinks     Types: 2 Standard drinks or equivalent per week     Comment: Socially        History   Drug Use No       Family History:   The patient's family history is notable for a first-degree and second-degree paternal relative with breast cancer.  No known family history of ovarian, uterine, colon, urothelial/renal, prostate or pancreatic cancers.  No known family history of melanoma.  Family History   Problem Relation Age of Onset     Myocardial Infarction Mother 73        Tob     Myocardial Infarction Father 68        Tob     Breast Cancer Sister 47         of breast cancer age 63; BRCA mutation testing negative     Cancer Maternal Grandmother         Unsure of type.     Breast Cancer Paternal Grandmother         Unsure of diagnosis--some type of \"women's issue\"       Physical Exam:    BP " (!) 156/97   Pulse (!) 125; repeat manual pulse 90 bpm   Temp 97.5  F (36.4  C) (Oral)   Resp 16   Wt 53.6 kg (118 lb 3.2 oz)   SpO2 98%   BMI 21.21 kg/m    Body mass index is 21.21 kg/m .    General Appearance: healthy and alert, no distress       Cardiovascular: Regular rate (<100 bpm on exam) and rhythm, no gallops, rubs or murmurs     Respiratory: lungs clear, no rales, rhonchi or wheezes, normal diaphragmatic excursion    Musculoskeletal: extremities non tender and without edema    Skin: No visible rash, but fine bumps on the back to palpation.     Neurological: normal gait, no gross defects     Psychiatric: appropriate mood and affect                               Genitourinary: Deferred.      Laboratory Examination:  WBC 5.0 with ANC 3.5.  Hemoglobin 13.0.  Platelets 487.  Creatinine 0.66.  Potassium 4.5.  Magnesium 1.9.  Remainder of electrolytes within normal limits.  AST 72, ALT 49, alkaline phosphatase 65, total bilirubin 0.4.  Albumin 4.0.   TSH 0.05, Free T4 3.30.   Urine protein negative.       Performance Status:  ECOG Grade 0.       Assessment:    Cordell Donaldson (she/her/hers) is a 72 year old woman with a diagnosis of stage III squamous cell carcinoma of the vagina (vs recurrent metastatic cervical cancer) currently receiving first-line chemotherapy with partial response per PET/CT 9/22/22.     Medical history significant for FIGO Stage IA1 squamous cell carcinoma of the cervix s/p hysterectomy with no residual disease, and a long history of vaginal HSIL.     A total of 30 minutes was spent with the patient, 25 minutes of which were spent in counseling the patient and/or treatment planning.      Plan:     1.)    Squamous cell carcinoma: Continue first-line chemotherapy with palliative intent with IV carboplatin AUC 6 + paclitaxel 135 mg/m2 + bevacizumab 15 mg/kg + pembrolizumab 200 mg every 21 days per a modified KEYNOTE-826 regimen, with change in platinum due to tinnitus, and change in  platinum and 1-level dose-decrease in paclitaxel due to peripheral neuropathy.   -Cordell will return to the infusion center tomorrow  for cycle #5 of therapy as planned.  -Plan for a total of 6 cycles of chemotherapy followed by repeat PET/CT to reassess disease response and for re-consideration of radiation therapy.     Side-effects:   -Grade 1 epistaxis (attributable to bevacizumab): Mild and intermittent, not clinically significant. Will monitor without intervention at this time.  -Grade 1 peripheral neuropathy (attributable to paclitaxel and cisplatin): Worsening, but not currently interfering with activity. Will change cisplatin to carboplatin and dose-reduce paclitaxel as per above.   -Grade 1 tinnitus (attributable to cisplatin): Change to carboplatin and monitor. Offered audiology consultation, but patient declines at this time as she is not having any hearing changes.   -Grade 2 hypertension (attributable to bevacizumab): Currently mild. Continue monitoring.   -Grade 1 rash (attributable to pembrolizumab): Mild, continue topical hydrocortisone cream and monitor.   -Grade 1 AST elevation, Grade 1 ALT elevation (attributable to paclitaxel, possibly pembrolizumab): Mild elevation, no intervention indicated, will monitor.   -Grade 1 hyperthyroidism (attributable to pebrolizumab): Free T4 trending up, but asymptomatic (tachycardia resolved on recheck). No intervention indicated at this time, continue monitoring per ASCO guidelines.     2.) Breast cancer: Continue management per medical oncologist Dr. Montana. Radiographic response of breast cancer to current chemotherapy,continue monitoring for now with consideration of endocrine therapy after completion of chemotherapy.  Will contact Dr. Montana's clinic re: appeal for breast MRI.      3.) Genetic risk factors were assessed and the patient does not meet the qualifications for a referral.    4.) Labs and/or tests ordered include:  1) Pre-chemotherapy labs:  CBC with diff, CMP, magnesium, qualitative urine protein, TSH, free T4.     5.) Health maintenance issues addressed today include none.    6.) Code status:  Full-code.    7.) Prescriptions: None.      Hina Raygoza MD, MS, FACOG, FACS  10/25/2022  9:48 AM              CC  Patient Care Team:  Merry Lino MD as PCP - General (Internal Medicine)  Bess Paredes MD Corfield, Aaron Daniel, MYAM as MD (Podiatry)  Elise Huitron MD as Assigned Heart and Vascular Provider  Merry Lino MD as Assigned PCP  Tess Mcgowan RN as Specialty Care Coordinator  Nany, Heather Munguia MD as MD (Urology)  Angela Montana MD as MD (Hematology & Oncology)  Yvette Pike MD as MD (Surgery)  Valeri Kirby, GEOFF as Specialty Care Coordinator (Hematology & Oncology)  Yvette Pike MD as Assigned Surgical Provider  Jewels Oconnor APRN CNP as Assigned Cancer Care Provider  Nurys Horton RN as Specialty Care Coordinator (Hematology & Oncology)  SELF, REFERRED

## 2022-10-24 NOTE — PROGRESS NOTES
Cordell is a 72 year old who is being evaluated via a billable video visit.      How would you like to obtain your AVS? MyChart  If the video visit is dropped, the invitation should be resent by: Text to cell phone: [phone]  Will anyone else be joining your video visit?   Yes,  will sit in on video visit    [facility]    Video-Visit Details    Video Start Time: 12:02 pm    Type of service:  Video Visit    Video End Time:12:45 pm    Originating Location (pt. Location): Home    Distant Location (provider location):  Off-site    Platform used for Video Visit: Diagonal View    10/24/2022    Referring Provider: Angela Montana MD    Presenting Information:   I spoke with Cordell Donaldson over video today for genetic counseling to discuss her personal and family history of cancer. With her permission, this appointment was conducted virtually due to COVID-19 precautions. We talked today to review this history, cancer screening recommendations, and available genetic testing options.    Personal History:  Cordell is a 72 year old female. She was diagnosed with a left breast cancer at age 53 in December 2003 (IDC, ER positive, FL positive, HER2 negative). She had a lumpectomy on 1/5/04, followed by radiation, and tamoxifen. On 7/15/22, at age 72, she had a left breast biopsy which revealed invasive ductal carcinoma (ER positive, FL negative, HER2 negative).    She has a history of vaginal and cervical dysplasia and on 10/24/12 she underwent cervical and vaginal biopsies and endocervical curettage which revealed squamous carcinoma in-situ. On 12/13/12 she underwent robotic-assisted laparoscopic lysis of adhesions, left ureterolysis with conversion to laparotomy, total abdominal hysterectomy, and an upper vaginectomy. On 10/2/13 she underwent upper vaginectomy. 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 She had a  pelvic MRI on 5/20/22, which showed a large pelvic mass. On 6/9/22 she had a CT-guided biopsy of the pelvic mass which revealed: Poorly differentiated squamous cell carcinoma, HPV-associated. She began chemotherapy in July 2022 (will also treat her breast cancer).    She had a skin biopsy of her left clavicular chest on 8/6/21 which revealed Basal cell carcinoma, nodular type. Excision on 2/4/22 was negative for residual basal cell carcinoma.    She has had BRCA1 and BRCA2 testing in 2006 (sequencing and BRCA1 5-site rearrangement panel, [facility]). Results were negative.    She had her first menstrual period at age ~14, her first child at age 27, and is postmenopausal. She reports that she has not used hormone replacement therapy. She used oral contraceptives for only a couple months in the past. Her most recent colonoscopy on 9/14/18 detected no polyps and follow-up was recommended in 10 years. She reports two prior colonoscopies with no polyps detected. Cordell reported former tobacco use and social alcohol use (very little now with chemotherapy). 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    Family History: (Please see scanned pedigree for detailed family history information)  Children:    She has one daughter who is 45 years old with no known history of cancer.   Siblings:    Her sister was diagnosed with breast cancer at age 47 and had a lot of treatment. This was metastatic to her brain. She passed away at age 62. She also reportedly had negative BRCA1/2 testing around the same time as Cordell.   Maternal:    Her mother passed away at age 73 with no known history of cancer.     Her aunt was diagnosed with colorectal cancer and blood disease (lack of platelets) in her late 70s and passed away at age 80.     Cordell's mother had nine total siblings. Five were maternal half-siblings, and four (including  the aunt with cancer above) were full siblings. Cordell is not aware of any diagnoses of cancer in her other aunts/uncles. They are all . She does not have information about cancers in her cousins.    Her grandmother had an unknown cancer type and passed away at age 88.  Paternal:    Her father passed away at age 67 with no known history of cancer.     Her uncle was diagnosed with bladder cancer at age 89 and passed away at 89. He was a non-smoker and worked as a  and . No known significant exposures.     His daughter (Cordell's cousin) is 84 years old and was diagnosed with uterine cancer at age 59.     Her grandmother was diagnosed with both breast and colon cancer at ages unknown to Cordell. She passed away at age 70.     Her maternal ethnicity is Polish, [place]. Her paternal ethnicity is [place]. There is no known Ashkenazi Jainism ancestry on either side of her family.     Discussion:    Coredll's personal and family history of cancer is suggestive of a hereditary cancer syndrome.    We reviewed the features of sporadic, familial, and hereditary cancers. In looking at Cordell's family history, it is possible that a cancer susceptibility gene is present due to her personal and family history of breast cancer.    We discussed the natural history and genetics of hereditary cancer. As noted above, she has already had some negative BRCA1/2 testing in . We discussed that this testing did not include full-gene rearrangement analysis, therefore updated BRCA1/2 testing is indicated. Additionally, we discussed that there are now additional genes in which mutations are known to cause increased risk for breast and other cancers. As many of these genes present with overlapping features in a family and accurate cancer risk cannot always be established based upon the pedigree analysis alone, it would be reasonable for Cordell to consider updated panel genetic testing to analyze multiple genes  at once.    A detailed handout regarding genes in which mutations are associated with an increased risk for breast cancer and the information we discussed will be provided to Cordell via [employer] and can be found in the after visit summary. Topics included: inheritance pattern, cancer risks, cancer screening recommendations, and also risks, benefits and limitations of testing.    Based on her personal and family history, Cordell meets current National Comprehensive Cancer Network (NCCN) criteria for updated genetic testing of high-penetrance breast cancer susceptibility genes, specifically, BRCA1, BRCA2, CDH1, PALB2, PTEN, and TP53.    We reviewed genetic testing options for Cordell based on her personal and family history: a panel of genes associated with an increased risk for certain cancers, or larger panel options to include genes associated with increased risk for multiple different cancer types.     Medical Management: For Cordell, we reviewed that the information from genetic testing may determine:    surgery to treat Cordell's active cancer diagnosis (i.e. lumpectomy versus bilateral mastectomy, etc.),    additional cancer screening for which Cordell may qualify (i.e. mammogram and breast MRI, more frequent colonoscopies, more frequent dermatologic exams, etc.),    options for risk reducing surgeries Cordell could consider (i.e. bilateral mastectomy, etc.),      and targeted chemotherapies for certain cancers (i.e. immunotherapy for individuals with Cui syndrome, PARP inhibitors, etc.).   At the end of our discussion today, Cordell elected to take some additional time to think about the option of genetic testing and implications for herself and her family members.   If she decides not to have genetic testing, she should continue with her cancer care and screenings based on her personal and family history:  She should continue with her cancer care as recommended by her oncology team.   Cordell s close female  relatives remain at increased risk for breast cancer given their family history. Breast cancer screening is generally recommended to begin approximately 10 years younger than the earliest age of breast cancer diagnosis in the family, or at age 40, whichever comes first. Breast screening options should be discussed with an individual's primary care provider and a genetic counselor, to determine at what age to begin screening, what screening is appropriate, and if additional screening (such as breast MRI) is necessary based on personal/family history factors.    Other population cancer screening options, such as those recommended by the American Cancer Society and the National Comprehensive Cancer Network (NCCN), are also appropriate for Cordell and her family. These screening recommendations may change if there are changes to Cordell's personal and/or family history of cancer. Final screening recommendations should be made by each individual's primary care provider.   Screening recommendations may change if there are changes in her personal or family history, or if there is a gene mutation detected in Cordell or her family members.    Plan:  1) No genetic testing was ordered today. Cordell elected to take some additional time to consider the option of genetic testing and implications for herself and her family.   2) She was encouraged to contact me with additional questions or if she decides that she would like to proceed with genetic testing.  3) If she decides not to have genetic testing, Cordell and her family members should continue with their cancer screenings based on personal and family history as outlined above.    Juliet Nagy MS, [facility] [place]  Licensed, Certified Genetic Counselor  Office: [phone]  Email: [email or web address]

## 2022-10-24 NOTE — PATIENT INSTRUCTIONS
SCHEDULING:  -Proceed with cycle #5 as scheduled.  -Pre-chemotherapy labs: CBC with diff, CMP, magnesium, qualitative urine protein, TSH, free T4.  -RTC in 3 weeks for consideration of cycle #6 of therapy (NP).      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   History of vaginal HSIL (pre-cancer).  Squamous cell carcinoma involving a pelvic mass with associated lymphadenopathy, likely stage III vaginal cancer (vs recurrent, metastatic cervical cancer).   Treatment History:  -12/2012: Robotic-assisted laparoscopic lysis of adhesions (take-down of scar tissue), left ureterolysis (freeing of the left ureter) with  conversion to laparotomy, total abdominal hysterectomy (removal of uterus/cervix), and an upper  Vaginectomy (removal of the upper vagina).  -7/20/22-present: First-line therapy with IV cisplatin + paclitaxel + bevacizumab + pembrolizumab (added cycles 3+).       PLAN:  1) Squamous cell carcinoma:   DRUGS: Carboplatin (change in platinum to minimize tinnitus, neuropathy) + paclitaxel (decreased dose due to neuropathy)  + bevacizumab  SCHEDULE: 1 day every 3 weeks  PLAN: Plan for 6 cycles of chemotherapy followed by repeat PET/CT to assess disease response and for re-consideration of radiation therapy.      Hina Raygoza MD, MS, FACOG, FACS  10/25/2022  9:52 AM

## 2022-10-25 ENCOUNTER — LAB (OUTPATIENT)
Dept: LAB | Facility: CLINIC | Age: 72
End: 2022-10-25
Attending: OBSTETRICS & GYNECOLOGY
Payer: COMMERCIAL

## 2022-10-25 ENCOUNTER — ONCOLOGY VISIT (OUTPATIENT)
Dept: ONCOLOGY | Facility: CLINIC | Age: 72
End: 2022-10-25
Attending: OBSTETRICS & GYNECOLOGY
Payer: COMMERCIAL

## 2022-10-25 VITALS
DIASTOLIC BLOOD PRESSURE: 97 MMHG | BODY MASS INDEX: 21.21 KG/M2 | SYSTOLIC BLOOD PRESSURE: 156 MMHG | WEIGHT: 118.2 LBS | RESPIRATION RATE: 16 BRPM | HEART RATE: 125 BPM | OXYGEN SATURATION: 98 % | TEMPERATURE: 97.5 F

## 2022-10-25 DIAGNOSIS — G62.2 POLYNEUROPATHY DUE TO OTHER TOXIC AGENTS (H): ICD-10-CM

## 2022-10-25 DIAGNOSIS — C53.9 RECURRENT CERVICAL CANCER (H): ICD-10-CM

## 2022-10-25 DIAGNOSIS — H93.13 TINNITUS, BILATERAL: ICD-10-CM

## 2022-10-25 DIAGNOSIS — C53.0 MALIGNANT NEOPLASM OF ENDOCERVIX (H): Primary | ICD-10-CM

## 2022-10-25 LAB
ALBUMIN SERPL BCG-MCNC: 4 G/DL (ref 3.5–5.2)
ALBUMIN UR-MCNC: NEGATIVE MG/DL
ALP SERPL-CCNC: 65 U/L (ref 35–104)
ALT SERPL W P-5'-P-CCNC: 72 U/L (ref 10–35)
ANION GAP SERPL CALCULATED.3IONS-SCNC: 9 MMOL/L (ref 7–15)
AST SERPL W P-5'-P-CCNC: 49 U/L (ref 10–35)
BASOPHILS # BLD AUTO: 0 10E3/UL (ref 0–0.2)
BASOPHILS NFR BLD AUTO: 0 %
BILIRUB SERPL-MCNC: 0.4 MG/DL
BUN SERPL-MCNC: 36.8 MG/DL (ref 8–23)
CALCIUM SERPL-MCNC: 9.8 MG/DL (ref 8.8–10.2)
CHLORIDE SERPL-SCNC: 105 MMOL/L (ref 98–107)
CREAT SERPL-MCNC: 0.66 MG/DL (ref 0.51–0.95)
DEPRECATED HCO3 PLAS-SCNC: 26 MMOL/L (ref 22–29)
EOSINOPHIL # BLD AUTO: 0.1 10E3/UL (ref 0–0.7)
EOSINOPHIL NFR BLD AUTO: 2 %
ERYTHROCYTE [DISTWIDTH] IN BLOOD BY AUTOMATED COUNT: 18.5 % (ref 10–15)
GFR SERPL CREATININE-BSD FRML MDRD: >90 ML/MIN/1.73M2
GLUCOSE SERPL-MCNC: 112 MG/DL (ref 70–99)
HCT VFR BLD AUTO: 41.3 % (ref 35–47)
HGB BLD-MCNC: 13 G/DL (ref 11.7–15.7)
HOLD SPECIMEN: NORMAL
HOLD SPECIMEN: NORMAL
IMM GRANULOCYTES # BLD: 0 10E3/UL
IMM GRANULOCYTES NFR BLD: 0 %
LYMPHOCYTES # BLD AUTO: 1 10E3/UL (ref 0.8–5.3)
LYMPHOCYTES NFR BLD AUTO: 19 %
MAGNESIUM SERPL-MCNC: 1.9 MG/DL (ref 1.7–2.3)
MCH RBC QN AUTO: 26.7 PG (ref 26.5–33)
MCHC RBC AUTO-ENTMCNC: 31.5 G/DL (ref 31.5–36.5)
MCV RBC AUTO: 85 FL (ref 78–100)
MONOCYTES # BLD AUTO: 0.5 10E3/UL (ref 0–1.3)
MONOCYTES NFR BLD AUTO: 10 %
NEUTROPHILS # BLD AUTO: 3.5 10E3/UL (ref 1.6–8.3)
NEUTROPHILS NFR BLD AUTO: 69 %
NRBC # BLD AUTO: 0 10E3/UL
NRBC BLD AUTO-RTO: 0 /100
PLATELET # BLD AUTO: 187 10E3/UL (ref 150–450)
POTASSIUM SERPL-SCNC: 4.5 MMOL/L (ref 3.4–5.3)
PROT SERPL-MCNC: 6.4 G/DL (ref 6.4–8.3)
RBC # BLD AUTO: 4.87 10E6/UL (ref 3.8–5.2)
SODIUM SERPL-SCNC: 140 MMOL/L (ref 136–145)
T4 FREE SERPL-MCNC: 3.3 NG/DL (ref 0.9–1.7)
TSH SERPL DL<=0.005 MIU/L-ACNC: 0.05 UIU/ML (ref 0.3–4.2)
WBC # BLD AUTO: 5 10E3/UL (ref 4–11)

## 2022-10-25 PROCEDURE — 250N000011 HC RX IP 250 OP 636: Performed by: OBSTETRICS & GYNECOLOGY

## 2022-10-25 PROCEDURE — G0463 HOSPITAL OUTPT CLINIC VISIT: HCPCS

## 2022-10-25 PROCEDURE — 84439 ASSAY OF FREE THYROXINE: CPT | Performed by: OBSTETRICS & GYNECOLOGY

## 2022-10-25 PROCEDURE — 83735 ASSAY OF MAGNESIUM: CPT | Performed by: OBSTETRICS & GYNECOLOGY

## 2022-10-25 PROCEDURE — 36591 DRAW BLOOD OFF VENOUS DEVICE: CPT | Performed by: OBSTETRICS & GYNECOLOGY

## 2022-10-25 PROCEDURE — 81003 URINALYSIS AUTO W/O SCOPE: CPT | Performed by: OBSTETRICS & GYNECOLOGY

## 2022-10-25 PROCEDURE — 80053 COMPREHEN METABOLIC PANEL: CPT | Performed by: OBSTETRICS & GYNECOLOGY

## 2022-10-25 PROCEDURE — 85025 COMPLETE CBC W/AUTO DIFF WBC: CPT | Performed by: OBSTETRICS & GYNECOLOGY

## 2022-10-25 PROCEDURE — 84443 ASSAY THYROID STIM HORMONE: CPT | Performed by: OBSTETRICS & GYNECOLOGY

## 2022-10-25 PROCEDURE — 99215 OFFICE O/P EST HI 40 MIN: CPT | Performed by: OBSTETRICS & GYNECOLOGY

## 2022-10-25 RX ORDER — PALONOSETRON 0.05 MG/ML
0.25 INJECTION, SOLUTION INTRAVENOUS ONCE
Status: CANCELLED | OUTPATIENT
Start: 2022-10-26

## 2022-10-25 RX ORDER — METHYLPREDNISOLONE SODIUM SUCCINATE 125 MG/2ML
125 INJECTION, POWDER, LYOPHILIZED, FOR SOLUTION INTRAMUSCULAR; INTRAVENOUS
Status: CANCELLED
Start: 2022-10-26

## 2022-10-25 RX ORDER — LORAZEPAM 2 MG/ML
0.5 INJECTION INTRAMUSCULAR EVERY 4 HOURS PRN
Status: CANCELLED | OUTPATIENT
Start: 2022-10-26

## 2022-10-25 RX ORDER — HEPARIN SODIUM (PORCINE) LOCK FLUSH IV SOLN 100 UNIT/ML 100 UNIT/ML
5 SOLUTION INTRAVENOUS ONCE
Status: COMPLETED | OUTPATIENT
Start: 2022-10-25 | End: 2022-10-25

## 2022-10-25 RX ORDER — DIPHENHYDRAMINE HCL 25 MG
50 CAPSULE ORAL ONCE
Status: CANCELLED | OUTPATIENT
Start: 2022-10-26

## 2022-10-25 RX ORDER — EPINEPHRINE 1 MG/ML
0.3 INJECTION, SOLUTION INTRAMUSCULAR; SUBCUTANEOUS EVERY 5 MIN PRN
Status: CANCELLED | OUTPATIENT
Start: 2022-10-26

## 2022-10-25 RX ORDER — MEPERIDINE HYDROCHLORIDE 25 MG/ML
25 INJECTION INTRAMUSCULAR; INTRAVENOUS; SUBCUTANEOUS EVERY 30 MIN PRN
Status: CANCELLED | OUTPATIENT
Start: 2022-10-26

## 2022-10-25 RX ORDER — HEPARIN SODIUM (PORCINE) LOCK FLUSH IV SOLN 100 UNIT/ML 100 UNIT/ML
5 SOLUTION INTRAVENOUS
Status: CANCELLED | OUTPATIENT
Start: 2022-10-26

## 2022-10-25 RX ORDER — ALBUTEROL SULFATE 0.83 MG/ML
2.5 SOLUTION RESPIRATORY (INHALATION)
Status: CANCELLED | OUTPATIENT
Start: 2022-10-26

## 2022-10-25 RX ORDER — DIPHENHYDRAMINE HYDROCHLORIDE 50 MG/ML
50 INJECTION INTRAMUSCULAR; INTRAVENOUS
Status: CANCELLED
Start: 2022-10-26

## 2022-10-25 RX ORDER — HEPARIN SODIUM,PORCINE 10 UNIT/ML
5 VIAL (ML) INTRAVENOUS
Status: CANCELLED | OUTPATIENT
Start: 2022-10-26

## 2022-10-25 RX ORDER — ALBUTEROL SULFATE 90 UG/1
1-2 AEROSOL, METERED RESPIRATORY (INHALATION)
Status: CANCELLED
Start: 2022-10-26

## 2022-10-25 RX ADMIN — Medication 5 ML: at 09:05

## 2022-10-25 RX ADMIN — Medication 5 ML: at 07:08

## 2022-10-25 ASSESSMENT — PAIN SCALES - GENERAL: PAINLEVEL: NO PAIN (0)

## 2022-10-25 NOTE — NURSING NOTE
Chief Complaint   Patient presents with     Oncology Clinic Visit     Malignant neoplasm of endocervix (H)      Port Draw     Port accessed with 20g gripper needle by RN, labs collected, line flushed with saline and heparin.  Vitals taken. Pt checked in for appointment(s).      Millicent COATS RN PHN BSN  BMT/Oncology Lab

## 2022-10-25 NOTE — LETTER
10/25/2022      RE: Cordell Donaldson  [address]  [facility] [address]                               Follow-up Note on Referred Patient      Date: 10/25/2022        Referring Provider/Gynecologic Oncologist:  Megan Arciniega MD  [facility]   Phone [phone]  [phone]      Radiation Oncologist:   Raymond Stockton MD, PhD  [facility]      Medical Oncologist:  Angela Montana MD  [facility].      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       History of Present Illness:   Cordell Donaldson (she/her/hers) is a 72 year old initially referred to the Gynecologic Cancer Clinic at the [facility] on 2022 by gynecologic oncologist Dr. Arciniega and radiation oncologist Dr. Stockton for management of a pelvic mass due to recurrent cervical cancer vs new vaginal cancer. History as follows:     Breast Cancer History:  : Left breast cancer.  -Lumpectomy.  -Radiation therapy.    [id number]: BRENNAN.     22: PET/CT to stage vaginal cancer (see below).   Mild soft tissue  thickening in the left retropectoral region with an SUV max of 2.7.  7/15/22: Invasive ductal carcinoma.   -Consultation with Dr. Montana. Recommendation to prioritize vaginal/recurrent cervix cancer. Plan to follow breast lesion with repeat MRI. Plan for endocrine therapy after completion of chemotherapy for vaginal/cervical cancer.       Cervical/Vaginal Dysplasia/Cancer History:  10/24/12:   -Endocervical curettings: Squamous carcinoma, invasion cannot be assessed.   -Ectocervical biopsies: HSIL (CIN3).   -Vaginal biopsy, posterior: HSIL (VaIN3).  2012: Robotic-assisted laparoscopic lysis of adhesions, left ureterolysis with  conversion to laparotomy, total abdominal hysterectomy, and an upper  Vaginectomy by gynecologic  oncologist Dr. Arciniega.   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     7/22/13: Vaginal biopsy: HSIL, cannot exclude invasion.   -Consultation with gynecologic oncologist Dr. Johnson. Upper vaginectomy and CO2 laser ablation recommended, patient declined.   [id number]: No vaginal dysplasia treatment.   5/20/22: Pelvic MRI: I have personally reviewed the MRI images.   Centrally hypoenhancing heterogeneous midline pelvic mass along  the superior margin of the vaginal vault measures 8.5 x 6.7 x 8.1 cm.  The mass abut the bladder and there is mucosal irregularity at the  site of abutment. The mass indents the  decompressed rectum but there is no definite invasion or mucosal  Irregularity.  6/9/22: CT-guided biopsy of pelvic mass: Poorly-differentiated squamous cell carcinoma, HPV-associated.   7/1/22: Staging PET/CT: Stage III vaginal cancer (vs recurrent, metastatic cervical cancer). I have personally reviewed the PET/CT images.     7/20/22, 8/10/22, 8/31/22,9/21/22: Cycles #1-4 of first-line chemotherapy with IV cisplatin 50 mg/m2 + paclitaxel 175 mg/m2 + bevacizumab 15 mg/kg every 21 days.  8/31/22: Cycle 3 Pembrolizumab 200 mg every 21 days added to triplet chemotherapy per the KEYNOTE-826 regimen.   -9/22/22: PET/CT: Partial response. I have personally reviewed the PET/CT images.   Stable 3 mm solid  nodules in the left upper lobe. Resolved FDG  avidity of a left retropectoral, presumably lymph node which is now  Calcified.  Multiple stable FDG avid iliac chain lymph nodes near the aortic  bifurcation with SUV max 14.0. Significant reduction in left internal  iliac chain nodes, with no single node with SUV max 5.5 which measures  up to 12 mm in short axis. Significantly reduced  size of FDG avid cervical mass with smaller area of enhancing  nodularity on the right side wall measuring 3.3 x 1.5 cm with SUV max  16.3. There is central necrosis and abutment  "of  the rectum. There is an additional FDG avid foci more inferiorly on  the anterior vaginal wall with SUV max 6.7, which is also decreased in  Size.  -Discussed with radiation oncologist Dr. Stockton and at the gyn onc/radiation oncology tumor conference. Recommendation for additional chemotherapy prior to radiation therapy.         Genetic/Genomic/Molecular Testing History:  2006: Negative for germline BRCA1, BRCA2 pathogenic variants.     8/14/22 (specimen from 6/9/22): [facility] Testing.   -PD-L1+ (CPS 10)  -Mismatch repair deficient/microsatellite instability-high  -TMB high (53 mut/Mb)  -NTRK 1/2/3 fusion not detected.       Subjective:  Cordell returns to the gyn onc clinic today for a scheduled follow-up visit to discuss chemotherapy management after being notified by her medical oncologist of increasing tinnitus and radiculopathy. Cordell reports that after her last cycle of chemotherapy she has increasingly frequent radiculopathy vs peripheral neuropathy. Reports tingling in the medial aspect of the left leg from knee down, sensitive to heat. Numbness felt \"like she had moon boots on.\" CBD lotion, oil helps, massage also helps. No motor deficits, although when walking on irregular terrain she reports shoes felt too small, but she dis not trip. She has some discomfort in her hands/wrists, most pronounced after yoga. Cordell reports a rash on lower back and spreading to the flanks, upper back over the past  2 weeks. She uses hydrocortisone cream and lotions with some relief.   Nose bleeds happens in night when her nose feels \"stuffed up\", clots come out, right red blood today but no clots, going on for 2-3wks. No heavy bleeding.   She reports new HTN, BP at home 140s.   Cordell also reports severe ringing in the ears after chemotherapy, decreased with time since her last chemotherapy. She was offered an audiology referral, but declined as she does not feel that she has had any significant hearing loss. She does " report that she has an audiologist, and has hearing aids that she could use if necessary, but has not felt the need to use them.   Cordell reports that she had her breast MRI as ordered by Dr. Montana, but that her insurance is declining coverage. She has until December to appeal.       Review of Systems:    ROS: 10 point ROS neg other than the symptoms noted above in the HPI.       Past Medical History:  Past Medical History:   Diagnosis Date     Abnormal Pap smear     maybe 20 years ago     Cervical cancer (H)      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      History of breast cancer 2003    lumpectomy and radiation offerred chemo and patient declined at time but had oncogene test and low risk     Hyperlipidemia LDL goal < 160      Osteopenia      Paroxysmal A-fib (H)      Severe vaginal dysplasia        Past Surgical History:  Past Surgical History:   Procedure Laterality Date     BIOPSY       BREAST SURGERY Left 2003    lumpectomy left, did radiation, 5 yr of tamoxifen     COLONOSCOPY  2018    repeat in 2028     Colposcopy Cervix with Loop Electrode Conization and Lser to Vagina  2008     COLPOSCOPY, BIOPSY, COMBINED  10/24/2012    Procedure: COMBINED COLPOSCOPY, BIOPSY;  Colposcopy, Biopsy of Cervix and Vagina, Ultrasound Guidance;  Surgeon: Colette Ng MD;  Location: [facility] OR     ENT SURGERY  01/23/2018    otoscelerosis, right side     HYSTERECTOMY, FRANCIA  12/2012    high grade cervical dysplasia, [facility], Dr. Arciniega     INSERT PORT VASCULAR ACCESS Right 7/18/2022    Procedure: INSERTION, VASCULAR ACCESS PORT;  Surgeon: Donnie Elias MD;  Location: [facility] OR     IR CHEST PORT PLACEMENT > 5 YRS OF AGE  7/18/2022     [place] LAP VAGINECTOMY, PARTIAL REMOVAL OF VAGINAL WALL  10/2013    upper vaginectomy for dysplasia, Dr. Megan Arciniega       Current Medications:   Current Outpatient Medications   Medication     Acetylcarnitine HCl 250 MG CAPS     aspirin (ASA) 81 MG chewable tablet      Bacillus Coagulans-Inulin (PROBIOTIC FORMULA) 1-250 BILLION-MG CAPS     BENFOTIAMINE PO     Cholecalciferol (VITAMIN D-3) 25 MCG (1000 UT) CAPS     Coenzyme Q10 (CO Q 10 PO)     dexamethasone (DECADRON) 4 MG tablet     Efraín Cota 550 MG CAPS     MAGNESIUM OXIDE PO     metoprolol succinate ER (TOPROL XL) 50 MG 24 hr tablet     Omega-3 Fatty Acids (OMEGA-3 FISH OIL PO)     ondansetron (ZOFRAN) 8 MG tablet     OVER-THE-COUNTER     Pectin Cit-Inos-C-Bioflav-Soy (MODIFIED CITRUS PECTIN PO)     prochlorperazine (COMPAZINE) 10 MG tablet     Selenium 200 MCG CAPS     Taurine 1000 MG CAPS     Turmeric 450 MG CAPS     No current facility-administered medications for this visit.        Allergies:   No Known Allergies       Social History:  Patient lives with her . 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 She walks daily, gardens, does yoga twice/week.  She does have Advanced Directives, but has identified her daughter and  as her desired [employer] of .   Social History     Tobacco Use     Smoking status: Former     Packs/day: 0.50     Years: 15.00     Pack years: 7.50     Types: Cigarettes     Smokeless tobacco: Never   Substance Use Topics     Alcohol use: Yes     Alcohol/week: 2.0 standard drinks     Types: 2 Standard drinks or equivalent per week     Comment: Socially        History   Drug Use No       Family History:   The patient's family history is notable for a first-degree and second-degree paternal relative with breast cancer.  No known family history of ovarian, uterine, colon, urothelial/renal, prostate or pancreatic cancers.  No known family history of melanoma.  Family History   Problem Relation Age of Onset     Myocardial Infarction Mother 73        Tob     Myocardial Infarction Father 68        Tob     Breast Cancer Sister 47         of breast cancer age 63; BRCA mutation testing negative     Cancer Maternal Grandmother         Unsure of type.     Breast Cancer Paternal  "Grandmother         Unsure of diagnosis--some type of \"women's issue\"       Physical Exam:    BP (!) 156/97   Pulse (!) 125; repeat manual pulse 90 bpm   Temp 97.5  F (36.4  C) (Oral)   Resp 16   Wt 53.6 kg (118 lb 3.2 oz)   SpO2 98%   BMI 21.21 kg/m    Body mass index is 21.21 kg/m .    General Appearance: healthy and alert, no distress       Cardiovascular: Regular rate (<100 bpm on exam) and rhythm, no gallops, rubs or murmurs     Respiratory: lungs clear, no rales, rhonchi or wheezes, normal diaphragmatic excursion    Musculoskeletal: extremities non tender and without edema    Skin: No visible rash, but fine bumps on the back to palpation.     Neurological: normal gait, no gross defects     Psychiatric: appropriate mood and affect                               Genitourinary: Deferred.      Laboratory Examination:  WBC 5.0 with ANC 3.5.  Hemoglobin 13.0.  Platelets 487.  Creatinine 0.66.  Potassium 4.5.  Magnesium 1.9.  Remainder of electrolytes within normal limits.  AST 72, ALT 49, alkaline phosphatase 65, total bilirubin 0.4.  Albumin 4.0.   TSH 0.05, Free T4 3.30.   Urine protein negative.       Performance Status:  ECOG Grade 0.       Assessment:    Cordell Donaldson (she/her/hers) is a 72 year old woman with a diagnosis of stage III squamous cell carcinoma of the vagina (vs recurrent metastatic cervical cancer) currently receiving first-line chemotherapy with partial response per PET/CT 9/22/22.     Medical history significant for FIGO Stage IA1 squamous cell carcinoma of the cervix s/p hysterectomy with no residual disease, and a long history of vaginal HSIL.     A total of 30 minutes was spent with the patient, 25 minutes of which were spent in counseling the patient and/or treatment planning.      Plan:     1.)    Squamous cell carcinoma: Continue first-line chemotherapy with palliative intent with IV carboplatin AUC 6 + paclitaxel 135 mg/m2 + bevacizumab 15 mg/kg + pembrolizumab 200 mg every 21 " days per a modified KEYNOTE-826 regimen, with change in platinum due to tinnitus, and change in platinum and 1-level dose-decrease in paclitaxel due to peripheral neuropathy.   -Cordell will return to the infusion center tomorrow  for cycle #5 of therapy as planned.  -Plan for a total of 6 cycles of chemotherapy followed by repeat PET/CT to reassess disease response and for re-consideration of radiation therapy.     Side-effects:   -Grade 1 epistaxis (attributable to bevacizumab): Mild and intermittent, not clinically significant. Will monitor without intervention at this time.  -Grade 1 peripheral neuropathy (attributable to paclitaxel and cisplatin): Worsening, but not currently interfering with activity. Will change cisplatin to carboplatin and dose-reduce paclitaxel as per above.   -Grade 1 tinnitus (attributable to cisplatin): Change to carboplatin and monitor. Offered audiology consultation, but patient declines at this time as she is not having any hearing changes.   -Grade 2 hypertension (attributable to bevacizumab): Currently mild. Continue monitoring.   -Grade 1 rash (attributable to pembrolizumab): Mild, continue topical hydrocortisone cream and monitor.   -Grade 1 AST elevation, Grade 1 ALT elevation (attributable to paclitaxel, possibly pembrolizumab): Mild elevation, no intervention indicated, will monitor.   -Grade 1 hyperthyroidism (attributable to pebrolizumab): Free T4 trending up, but asymptomatic (tachycardia resolved on recheck). No intervention indicated at this time, continue monitoring per ASCO guidelines.     2.) Breast cancer: Continue management per medical oncologist Dr. Montana. Radiographic response of breast cancer to current chemotherapy,continue monitoring for now with consideration of endocrine therapy after completion of chemotherapy.  Will contact Dr. Montana's clinic re: appeal for breast MRI.      3.) Genetic risk factors were assessed and the patient does not meet the  qualifications for a referral.    4.) Labs and/or tests ordered include:  1) Pre-chemotherapy labs: CBC with diff, CMP, magnesium, qualitative urine protein, TSH, free T4.     5.) Health maintenance issues addressed today include none.    6.) Code status:  Full-code.    7.) Prescriptions: None.      Hina Raygoza MD, MS, FACOG, FACS  10/25/2022  9:48 AM              CC  Patient Care Team:  Merry Lino MD as PCP - General (Internal Medicine)  Bess Paredes MD Corfield, Aaron Daniel, MYAM as MD (Podiatry)  Elise Huitron MD as Assigned Heart and Vascular Provider  Merry Lino MD as Assigned PCP  Tess Mcgowan RN as Specialty Care Coordinator  Heather Ayon MD as MD (Urology)  Angela Montana MD as MD (Hematology & Oncology)  Yvette Pike MD as MD (Surgery)  Valeri Kirby, GEOFF as Specialty Care Coordinator (Hematology & Oncology)  Yvette Pike MD as Assigned Surgical Provider  Jewels Oconnor, APRN CNP as Assigned Cancer Care Provider  Nurys Horton RN as Specialty Care Coordinator (Hematology & Oncology)  SELF, REFERRED        Hina Raygoza MD

## 2022-10-25 NOTE — LETTER
10/25/2022         RE: Cordell Donaldson  [address]  [facility] [address]        Dear Colleague,    Thank you for referring your patient, Cordell Donaldson, to the [facility]. Please see a copy of my visit note below.                            Follow-up Note on Referred Patient      Date: 10/25/2022        Referring Provider/Gynecologic Oncologist:  Megan Arciniega MD  [facility]   Phone [phone]  [phone]      Radiation Oncologist:   Raymond Stockton MD, PhD  [facility]      Medical Oncologist:  Angela Montana MD  [facility].      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       History of Present Illness:   Cordell Donaldson (she/her/hers) is a 72 year old initially referred to the Gynecologic Cancer Clinic at the [facility] on 2022 by gynecologic oncologist Dr. Arciniega and radiation oncologist Dr. Stockton for management of a pelvic mass due to recurrent cervical cancer vs new vaginal cancer. History as follows:     Breast Cancer History:  : Left breast cancer.  -Lumpectomy.  -Radiation therapy.    [id number]: BRENNAN.     22: PET/CT to stage vaginal cancer (see below).   Mild soft tissue  thickening in the left retropectoral region with an SUV max of 2.7.  7/15/22: Invasive ductal carcinoma.   -Consultation with Dr. Montana. Recommendation to prioritize vaginal/recurrent cervix cancer. Plan to follow breast lesion with repeat MRI. Plan for endocrine therapy after completion of chemotherapy for vaginal/cervical cancer.       Cervical/Vaginal Dysplasia/Cancer History:  10/24/12:   -Endocervical curettings: Squamous carcinoma, invasion cannot be assessed.   -Ectocervical biopsies: HSIL (CIN3).   -Vaginal biopsy, posterior: HSIL (VaIN3).  2012:  Robotic-assisted laparoscopic lysis of adhesions, left ureterolysis with  conversion to laparotomy, total abdominal hysterectomy, and an upper  Vaginectomy by gynecologic oncologist Dr. Arciniega.   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     7/22/13: Vaginal biopsy: HSIL, cannot exclude invasion.   -Consultation with gynecologic oncologist Dr. Johnson. Upper vaginectomy and CO2 laser ablation recommended, patient declined.   [id number]: No vaginal dysplasia treatment.   5/20/22: Pelvic MRI: I have personally reviewed the MRI images.   Centrally hypoenhancing heterogeneous midline pelvic mass along  the superior margin of the vaginal vault measures 8.5 x 6.7 x 8.1 cm.  The mass abut the bladder and there is mucosal irregularity at the  site of abutment. The mass indents the  decompressed rectum but there is no definite invasion or mucosal  Irregularity.  6/9/22: CT-guided biopsy of pelvic mass: Poorly-differentiated squamous cell carcinoma, HPV-associated.   7/1/22: Staging PET/CT: Stage III vaginal cancer (vs recurrent, metastatic cervical cancer). I have personally reviewed the PET/CT images.     7/20/22, 8/10/22, 8/31/22,9/21/22: Cycles #1-4 of first-line chemotherapy with IV cisplatin 50 mg/m2 + paclitaxel 175 mg/m2 + bevacizumab 15 mg/kg every 21 days.  8/31/22: Cycle 3 Pembrolizumab 200 mg every 21 days added to triplet chemotherapy per the KEYNOTE-826 regimen.   -9/22/22: PET/CT: Partial response. I have personally reviewed the PET/CT images.   Stable 3 mm solid  nodules in the left upper lobe. Resolved FDG  avidity of a left retropectoral, presumably lymph node which is now  Calcified.  Multiple stable FDG avid iliac chain lymph nodes near the aortic  bifurcation with SUV max 14.0. Significant reduction in left internal  iliac chain nodes, with no single node with SUV max 5.5 which measures  up to 12 mm in short axis. Significantly reduced  size of  "FDG avid cervical mass with smaller area of enhancing  nodularity on the right side wall measuring 3.3 x 1.5 cm with SUV max  16.3. There is central necrosis and abutment of  the rectum. There is an additional FDG avid foci more inferiorly on  the anterior vaginal wall with SUV max 6.7, which is also decreased in  Size.  -Discussed with radiation oncologist Dr. Stockton and at the gyn onc/radiation oncology tumor conference. Recommendation for additional chemotherapy prior to radiation therapy.         Genetic/Genomic/Molecular Testing History:  2006: Negative for germline BRCA1, BRCA2 pathogenic variants.     8/14/22 (specimen from 6/9/22): Matchup Testing.   -PD-L1+ (CPS 10)  -Mismatch repair deficient/microsatellite instability-high  -TMB high (53 mut/Mb)  -NTRK 1/2/3 fusion not detected.       Subjective:  Cordell returns to the gyn onc clinic today for a scheduled follow-up visit to discuss chemotherapy management after being notified by her medical oncologist of increasing tinnitus and radiculopathy. Cordell reports that after her last cycle of chemotherapy she has increasingly frequent radiculopathy vs peripheral neuropathy. Reports tingling in the medial aspect of the left leg from knee down, sensitive to heat. Numbness felt \"like she had moon boots on.\" CBD lotion, oil helps, massage also helps. No motor deficits, although when walking on irregular terrain she reports shoes felt too small, but she dis not trip. She has some discomfort in her hands/wrists, most pronounced after yoga. Cordell reports a rash on lower back and spreading to the flanks, upper back over the past  2 weeks. She uses hydrocortisone cream and lotions with some relief.   Nose bleeds happens in night when her nose feels \"stuffed up\", clots come out, right red blood today but no clots, going on for 2-3wks. No heavy bleeding.   She reports new HTN, BP at home 140s.   Cordell also reports severe ringing in the ears after chemotherapy, decreased " with time since her last chemotherapy. She was offered an audiology referral, but declined as she does not feel that she has had any significant hearing loss. She does report that she has an audiologist, and has hearing aids that she could use if necessary, but has not felt the need to use them.   Cordell reports that she had her breast MRI as ordered by Dr. Montana, but that her insurance is declining coverage. She has until December to appeal.       Review of Systems:    ROS: 10 point ROS neg other than the symptoms noted above in the HPI.       Past Medical History:  Past Medical History:   Diagnosis Date     Abnormal Pap smear     maybe 20 years ago     Cervical cancer (H)      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      History of breast cancer 2003    lumpectomy and radiation offerred chemo and patient declined at time but had oncogene test and low risk     Hyperlipidemia LDL goal < 160      Osteopenia      Paroxysmal A-fib (H)      Severe vaginal dysplasia        Past Surgical History:  Past Surgical History:   Procedure Laterality Date     BIOPSY       BREAST SURGERY Left 2003    lumpectomy left, did radiation, 5 yr of tamoxifen     COLONOSCOPY  2018    repeat in 2028     Colposcopy Cervix with Loop Electrode Conization and Lser to Vagina  2008     COLPOSCOPY, BIOPSY, COMBINED  10/24/2012    Procedure: COMBINED COLPOSCOPY, BIOPSY;  Colposcopy, Biopsy of Cervix and Vagina, Ultrasound Guidance;  Surgeon: Colette Ng MD;  Location: [facility] OR     ENT SURGERY  01/23/2018    otoscelerosis, right side     HYSTERECTOMY, FRANCIA  12/2012    high grade cervical dysplasia, [facility], Dr. Arciniega     INSERT PORT VASCULAR ACCESS Right 7/18/2022    Procedure: INSERTION, VASCULAR ACCESS PORT;  Surgeon: Donnie Elias MD;  Location: [facility] OR     IR CHEST PORT PLACEMENT > 5 YRS OF AGE  7/18/2022     OR LAP VAGINECTOMY, PARTIAL REMOVAL OF VAGINAL WALL  10/2013    upper vaginectomy for  dysplasia, Dr. Megan Arciniega       Current Medications:   Current Outpatient Medications   Medication     Acetylcarnitine HCl 250 MG CAPS     aspirin (ASA) 81 MG chewable tablet     Bacillus Coagulans-Inulin (PROBIOTIC FORMULA) 1-250 BILLION-MG CAPS     BENFOTIAMINE PO     Cholecalciferol (VITAMIN D-3) 25 MCG (1000 UT) CAPS     Coenzyme Q10 (CO Q 10 PO)     dexamethasone (DECADRON) 4 MG tablet     [place] 550 MG CAPS     MAGNESIUM OXIDE PO     metoprolol succinate ER (TOPROL XL) 50 MG 24 hr tablet     Omega-3 Fatty Acids (OMEGA-3 FISH OIL PO)     ondansetron (ZOFRAN) 8 MG tablet     OVER-THE-COUNTER     Pectin Cit-Inos-C-Bioflav-Soy (MODIFIED CITRUS PECTIN PO)     prochlorperazine (COMPAZINE) 10 MG tablet     Selenium 200 MCG CAPS     Taurine 1000 MG CAPS     Turmeric 450 MG CAPS     No current facility-administered medications for this visit.        Allergies:   No Known Allergies       Social History:  Patient lives with her . 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 She walks daily, gardens, does yoga twice/week.  She does have Advanced Directives, but has identified her daughter and  as her desired [employer] of .   Social History     Tobacco Use     Smoking status: Former     Packs/day: 0.50     Years: 15.00     Pack years: 7.50     Types: Cigarettes     Smokeless tobacco: Never   Substance Use Topics     Alcohol use: Yes     Alcohol/week: 2.0 standard drinks     Types: 2 Standard drinks or equivalent per week     Comment: Socially        History   Drug Use No       Family History:   The patient's family history is notable for a first-degree and second-degree paternal relative with breast cancer.  No known family history of ovarian, uterine, colon, urothelial/renal, prostate or pancreatic cancers.  No known family history of melanoma.  Family History   Problem Relation Age of Onset     Myocardial Infarction Mother 73        Tob     Myocardial Infarction Father 68        Tob      "Breast Cancer Sister 47         of breast cancer age 63; BRCA mutation testing negative     Cancer Maternal Grandmother         Unsure of type.     Breast Cancer Paternal Grandmother         Unsure of diagnosis--some type of \"women's issue\"       Physical Exam:    BP (!) 156/97   Pulse (!) 125; repeat manual pulse 90 bpm   Temp 97.5  F (36.4  C) (Oral)   Resp 16   Wt 53.6 kg (118 lb 3.2 oz)   SpO2 98%   BMI 21.21 kg/m    Body mass index is 21.21 kg/m .    General Appearance: healthy and alert, no distress       Cardiovascular: Regular rate (<100 bpm on exam) and rhythm, no gallops, rubs or murmurs     Respiratory: lungs clear, no rales, rhonchi or wheezes, normal diaphragmatic excursion    Musculoskeletal: extremities non tender and without edema    Skin: No visible rash, but fine bumps on the back to palpation.     Neurological: normal gait, no gross defects     Psychiatric: appropriate mood and affect                               Genitourinary: Deferred.      Laboratory Examination:  WBC 5.0 with ANC 3.5.  Hemoglobin 13.0.  Platelets 487.  Creatinine 0.66.  Potassium 4.5.  Magnesium 1.9.  Remainder of electrolytes within normal limits.  AST 72, ALT 49, alkaline phosphatase 65, total bilirubin 0.4.  Albumin 4.0.   TSH 0.05, Free T4 3.30.   Urine protein negative.       Performance Status:  ECOG Grade 0.       Assessment:    Cordell Donaldson (she/her/hers) is a 72 year old woman with a diagnosis of stage III squamous cell carcinoma of the vagina (vs recurrent metastatic cervical cancer) currently receiving first-line chemotherapy with partial response per PET/CT 22.     Medical history significant for FIGO Stage IA1 squamous cell carcinoma of the cervix s/p hysterectomy with no residual disease, and a long history of vaginal HSIL.     A total of 30 minutes was spent with the patient, 25 minutes of which were spent in counseling the patient and/or treatment planning.      Plan:     1.)    Squamous " cell carcinoma: Continue first-line chemotherapy with palliative intent with IV carboplatin AUC 6 + paclitaxel 135 mg/m2 + bevacizumab 15 mg/kg + pembrolizumab 200 mg every 21 days per a modified KEYNOTE-826 regimen, with change in platinum due to tinnitus, and change in platinum and 1-level dose-decrease in paclitaxel due to peripheral neuropathy.   -Cordell will return to the infusion center tomorrow  for cycle #5 of therapy as planned.  -Plan for a total of 6 cycles of chemotherapy followed by repeat PET/CT to reassess disease response and for re-consideration of radiation therapy.     Side-effects:   -Grade 1 epistaxis (attributable to bevacizumab): Mild and intermittent, not clinically significant. Will monitor without intervention at this time.  -Grade 1 peripheral neuropathy (attributable to paclitaxel and cisplatin): Worsening, but not currently interfering with activity. Will change cisplatin to carboplatin and dose-reduce paclitaxel as per above.   -Grade 1 tinnitus (attributable to cisplatin): Change to carboplatin and monitor. Offered audiology consultation, but patient declines at this time as she is not having any hearing changes.   -Grade 2 hypertension (attributable to bevacizumab): Currently mild. Continue monitoring.   -Grade 1 rash (attributable to pembrolizumab): Mild, continue topical hydrocortisone cream and monitor.   -Grade 1 AST elevation, Grade 1 ALT elevation (attributable to paclitaxel, possibly pembrolizumab): Mild elevation, no intervention indicated, will monitor.   -Grade 1 hyperthyroidism (attributable to pebrolizumab): Free T4 trending up, but asymptomatic (tachycardia resolved on recheck). No intervention indicated at this time, continue monitoring per ASCO guidelines.     2.) Breast cancer: Continue management per medical oncologist Dr. Montana. Radiographic response of breast cancer to current chemotherapy,continue monitoring for now with consideration of endocrine therapy after  completion of chemotherapy.  Will contact Dr. Montana's clinic re: appeal for breast MRI.      3.) Genetic risk factors were assessed and the patient does not meet the qualifications for a referral.    4.) Labs and/or tests ordered include:  1) Pre-chemotherapy labs: CBC with diff, CMP, magnesium, qualitative urine protein, TSH, free T4.     5.) Health maintenance issues addressed today include none.    6.) Code status:  Full-code.    7.) Prescriptions: None.      Hina Raygoza MD, MS, FACOG, FACS  10/25/2022  9:48 AM              CC  Patient Care Team:  Merry Lino MD as PCP - General (Internal Medicine)  Bess Paredes MD Corfield, Aaron Daniel, DPM as MD (Podiatry)  Elise Huitron MD as Assigned Heart and Vascular Provider  Merry Lino MD as Assigned PCP  Tess Mcgowan RN as Specialty Care Coordinator  Heather Ayon MD as MD (Urology)  Angela Montana MD as MD (Hematology & Oncology)  Yvette Pike MD as MD (Surgery)  Valeri Kirby RN as Specialty Care Coordinator (Hematology & Oncology)  Yvette Pike MD as Assigned Surgical Provider  Jewels Oconnor APRN CNP as Assigned Cancer Care Provider  Nurys Horton RN as Specialty Care Coordinator (Hematology & Oncology)  SELF, REFERRED        Again, thank you for allowing me to participate in the care of your patient.        Sincerely,        Hina Raygoza MD

## 2022-10-25 NOTE — NURSING NOTE
"Chief Complaint   Patient presents with     Port Draw     Labs drawn via port by RN.     Port accessed with 20g 3/4\" gripper needle and labs drawn by rn.  Port flushed with NS and heparin.  Pt tolerated well. De-accessed.    Pt provided urine sample.    Vince Wei RN  "

## 2022-10-26 ENCOUNTER — ONCOLOGY VISIT (OUTPATIENT)
Dept: ONCOLOGY | Facility: CLINIC | Age: 72
End: 2022-10-26
Attending: OBSTETRICS & GYNECOLOGY
Payer: COMMERCIAL

## 2022-10-26 VITALS
SYSTOLIC BLOOD PRESSURE: 157 MMHG | HEART RATE: 133 BPM | DIASTOLIC BLOOD PRESSURE: 117 MMHG | OXYGEN SATURATION: 97 % | RESPIRATION RATE: 14 BRPM | TEMPERATURE: 97.5 F

## 2022-10-26 DIAGNOSIS — C53.0 MALIGNANT NEOPLASM OF ENDOCERVIX (H): Primary | ICD-10-CM

## 2022-10-26 DIAGNOSIS — I48.11 LONGSTANDING PERSISTENT ATRIAL FIBRILLATION (H): ICD-10-CM

## 2022-10-26 PROCEDURE — G0463 HOSPITAL OUTPT CLINIC VISIT: HCPCS | Mod: 25

## 2022-10-26 PROCEDURE — 96417 CHEMO IV INFUS EACH ADDL SEQ: CPT

## 2022-10-26 PROCEDURE — 258N000003 HC RX IP 258 OP 636: Performed by: OBSTETRICS & GYNECOLOGY

## 2022-10-26 PROCEDURE — 96413 CHEMO IV INFUSION 1 HR: CPT

## 2022-10-26 PROCEDURE — 96415 CHEMO IV INFUSION ADDL HR: CPT

## 2022-10-26 PROCEDURE — 96375 TX/PRO/DX INJ NEW DRUG ADDON: CPT

## 2022-10-26 PROCEDURE — 250N000013 HC RX MED GY IP 250 OP 250 PS 637: Performed by: OBSTETRICS & GYNECOLOGY

## 2022-10-26 PROCEDURE — 93010 ELECTROCARDIOGRAM REPORT: CPT | Performed by: INTERNAL MEDICINE

## 2022-10-26 PROCEDURE — 99215 OFFICE O/P EST HI 40 MIN: CPT | Performed by: NURSE PRACTITIONER

## 2022-10-26 PROCEDURE — 250N000011 HC RX IP 250 OP 636: Performed by: OBSTETRICS & GYNECOLOGY

## 2022-10-26 PROCEDURE — 93005 ELECTROCARDIOGRAM TRACING: CPT

## 2022-10-26 PROCEDURE — 96367 TX/PROPH/DG ADDL SEQ IV INF: CPT

## 2022-10-26 RX ORDER — DIPHENHYDRAMINE HCL 25 MG
50 CAPSULE ORAL ONCE
Status: COMPLETED | OUTPATIENT
Start: 2022-10-26 | End: 2022-10-26

## 2022-10-26 RX ORDER — PALONOSETRON 0.05 MG/ML
0.25 INJECTION, SOLUTION INTRAVENOUS ONCE
Status: COMPLETED | OUTPATIENT
Start: 2022-10-26 | End: 2022-10-26

## 2022-10-26 RX ORDER — HEPARIN SODIUM (PORCINE) LOCK FLUSH IV SOLN 100 UNIT/ML 100 UNIT/ML
5 SOLUTION INTRAVENOUS
Status: DISCONTINUED | OUTPATIENT
Start: 2022-10-26 | End: 2022-10-26 | Stop reason: HOSPADM

## 2022-10-26 RX ADMIN — DEXAMETHASONE SODIUM PHOSPHATE: 10 INJECTION, SOLUTION INTRAMUSCULAR; INTRAVENOUS at 08:23

## 2022-10-26 RX ADMIN — DIPHENHYDRAMINE HYDROCHLORIDE 50 MG: 25 CAPSULE ORAL at 08:15

## 2022-10-26 RX ADMIN — CARBOPLATIN 550 MG: 10 INJECTION, SOLUTION INTRAVENOUS at 13:07

## 2022-10-26 RX ADMIN — Medication 5 ML: at 13:45

## 2022-10-26 RX ADMIN — SODIUM CHLORIDE 200 MG: 9 INJECTION, SOLUTION INTRAVENOUS at 08:51

## 2022-10-26 RX ADMIN — PACLITAXEL 207 MG: 6 INJECTION, SOLUTION INTRAVENOUS at 09:31

## 2022-10-26 RX ADMIN — PALONOSETRON HYDROCHLORIDE 0.25 MG: 0.25 INJECTION INTRAVENOUS at 08:16

## 2022-10-26 RX ADMIN — SODIUM CHLORIDE 250 ML: 9 INJECTION, SOLUTION INTRAVENOUS at 08:16

## 2022-10-26 RX ADMIN — FAMOTIDINE 20 MG: 10 INJECTION INTRAVENOUS at 08:16

## 2022-10-26 ASSESSMENT — PAIN SCALES - GENERAL: PAINLEVEL: NO PAIN (0)

## 2022-10-26 NOTE — PROGRESS NOTES
"Gynecologic Oncology Brief Infusion Visit Note    S: Paged to infusion to assess patient for A. fib and ability to continue chemotherapy today.  Patient reports that she has been having increased episodes of feeling her heartbeat fast over the last couple months more persistently over the last 24 hours.  Sees Dr. Huitron with cardiology who prescribes Metroprolol (dose in chart 50 mg daily, patient reports taking 100 mg daily) and patient has been rate controlled with this.  Since starting her most recent chemotherapy her BP overall has increased from 110s-120s/70s - 80s to 140s to 150s/80s to 90s typically while in clinic.  She has also been told previously by Dr. Huitron that she can take an extra dose of metoprolol when she feels as though she is in A. fib.  She took her usual dose 100 mg of metoprolol yesterday morning and during the night was awoken feeling as though she had an elevated heart rate again so took another 100 mg dose of Metroprolol.  Today while in infusion felt as though she had A. fib again so took another 100 mg dose of Metroprolol.  Heart rate documented at that time 133 with a BP of 157/117.  She plans to take her usual scheduled 100 mg dose of Metroprolol again this evening.  No shortness of breath.  No dizziness.  No headaches.  No chest pain though does endorse \"muscle discomfort\" under her left breast.  Had a visit with Dr. Raygoza yesterday to review chemotherapy plan and side effects.  She is still skeptical about the amount of treatment she is receiving and whether all the different medications are necessary.  She questions effectiveness of treatment versus side effects.    O:      Vital Signs 10/25/2022 10/26/2022 10/26/2022   Systolic 156 134 159   Diastolic 97 102 105   Pulse 125 97    Temperature 97.5 97.5    Respirations 16 14    Weight (LB) 118 lb 3.2 oz     Height      BMI (Calculated)      Pain Score 0 0    O2 98 99    Weight (Patient Reported)      Height (Patient Reported)    "   BMI (Based on Pt Reported Ht/Wt)        Vital Signs 10/26/2022 10/26/2022   Systolic 152 157   Diastolic 94 117   Pulse  133   Temperature     Respirations     Weight (LB)     Height     BMI (Calculated)     Pain Score     O2  97   Weight (Patient Reported)     Height (Patient Reported)     BMI (Based on Pt Reported Ht/Wt)         General Appearance: healthy and alert       Cardiovascular: Tachycardic roughly 100 bpm, regularly irregular heart rhythm with occasional irregular beats, no rubs or murmurs     Respiratory: lungs clear, no rales, rhonchi or wheezes    Musculoskeletal: extremities non tender and without edema    Skin: no lesions or rashes     Neurological: normal gait, no gross defects     Psychiatric: appropriate mood and affect             EKG: A. fib with heart rate in 120s                        A/P:    1.) A. fib: Persistent A. fib despite increasing metoprolol over baseline dose.  Currently her HR by my count is in the 100s.  Extensively discussed my concern for persistent A. fib no longer rate controlled with episodes of tachycardia documented in the 120s and 130s.  Discussed my concern for requirement of such a large increase in metoprolol still not controlling her rhythm.  Reviewed consequences of uncontrolled A. fib.  She declines further assessment or management in the ED at this time.  Currently her rate has decreased and she appears to have stabilized.  Would recommend she be further assessed in the ED with cardiac monitoring and consideration for cardioversion if she develops another run of tachycardia.  Minimally she should reach out to Dr. Huitron's office to schedule immediate follow-up.  HTN potentially contributing to her A. Fib.  Message sent to Dr. Huitron.    2.) Cervical cancer: Has already received Keytruda today.  Paclitaxel infusion is almost complete and not likely contributing to cardiac symptoms.  Okay to continue with carboplatin as this also has low potential to contribute to  cardiac symptoms.  HOLD Avastin today.  Discussed the benefits of each medication and how research has identified that pembrolizumab and Avastin increase survival benefit.  Goal would be to control her blood pressure with medication and continue with Avastin.    50 minutes spent on the date of the encounter doing chart review, history and exam, documentation, and further activities as noted above.    No charge for visit.    Niya Holden DNP, APRN, FNP-C  Nurse Practitioner  Division of Gynecologic Oncology  Pager: [phone]

## 2022-10-26 NOTE — LETTER
"    10/26/2022         RE: Cordell Donaldson  [address]  [facility] [address]        Dear Colleague,    Thank you for referring your patient, Cordell Donaldson, to the [facility]. Please see a copy of my visit note below.    Gynecologic Oncology Brief Infusion Visit Note    S: Paged to infusion to assess patient for A. fib and ability to continue chemotherapy today.  Patient reports that she has been having increased episodes of feeling her heartbeat fast over the last couple months more persistently over the last 24 hours.  Sees Dr. Huitron with cardiology who prescribes Metroprolol (dose in chart 50 mg daily, patient reports taking 100 mg daily) and patient has been rate controlled with this.  Since starting her most recent chemotherapy her BP overall has increased from 110s-120s/70s - 80s to 140s to 150s/80s to 90s typically while in clinic.  She has also been told previously by Dr. Huitron that she can take an extra dose of metoprolol when she feels as though she is in A. fib.  She took her usual dose 100 mg of metoprolol yesterday morning and during the night was awoken feeling as though she had an elevated heart rate again so took another 100 mg dose of Metroprolol.  Today while in infusion felt as though she had A. fib again so took another 100 mg dose of Metroprolol.  Heart rate documented at that time 133 with a BP of 157/117.  She plans to take her usual scheduled 100 mg dose of Metroprolol again this evening.  No shortness of breath.  No dizziness.  No headaches.  No chest pain though does endorse \"muscle discomfort\" under her left breast.  Had a visit with Dr. Raygoza yesterday to review chemotherapy plan and side effects.  She is still skeptical about the amount of treatment she is receiving and whether all the different medications are necessary.  She questions effectiveness of treatment versus side effects.    O:      Vital Signs 10/25/2022 10/26/2022 10/26/2022 "   Systolic 156 134 159   Diastolic 97 102 105   Pulse 125 97    Temperature 97.5 97.5    Respirations 16 14    Weight (LB) 118 lb 3.2 oz     Height      BMI (Calculated)      Pain Score 0 0    O2 98 99    Weight (Patient Reported)      Height (Patient Reported)      BMI (Based on Pt Reported Ht/Wt)        Vital Signs 10/26/2022 10/26/2022   Systolic 152 157   Diastolic 94 117   Pulse  133   Temperature     Respirations     Weight (LB)     Height     BMI (Calculated)     Pain Score     O2  97   Weight (Patient Reported)     Height (Patient Reported)     BMI (Based on Pt Reported Ht/Wt)         General Appearance: healthy and alert       Cardiovascular: Tachycardic roughly 100 bpm, regularly irregular heart rhythm with occasional irregular beats, no rubs or murmurs     Respiratory: lungs clear, no rales, rhonchi or wheezes    Musculoskeletal: extremities non tender and without edema    Skin: no lesions or rashes     Neurological: normal gait, no gross defects     Psychiatric: appropriate mood and affect             EKG: A. fib with heart rate in 120s                        A/P:    1.) A. fib: Persistent A. fib despite increasing metoprolol over baseline dose.  Currently her HR by my count is in the 100s.  Extensively discussed my concern for persistent A. fib no longer rate controlled with episodes of tachycardia documented in the 120s and 130s.  Discussed my concern for requirement of such a large increase in metoprolol still not controlling her rhythm.  Reviewed consequences of uncontrolled A. fib.  She declines further assessment or management in the ED at this time.  Currently her rate has decreased and she appears to have stabilized.  Would recommend she be further assessed in the ED with cardiac monitoring and consideration for cardioversion if she develops another run of tachycardia.  Minimally she should reach out to Dr. Huitron's office to schedule immediate follow-up.  HTN potentially contributing to her A.  Fib.  Message sent to Dr. Huitron.    2.) Cervical cancer: Has already received Keytruda today.  Paclitaxel infusion is almost complete and not likely contributing to cardiac symptoms.  Okay to continue with carboplatin as this also has low potential to contribute to cardiac symptoms.  HOLD Avastin today.  Discussed the benefits of each medication and how research has identified that pembrolizumab and Avastin increase survival benefit.  Goal would be to control her blood pressure with medication and continue with Avastin.    50 minutes spent on the date of the encounter doing chart review, history and exam, documentation, and further activities as noted above.    No charge for visit.    Niya Holden, DNP, APRN, FNP-C  Nurse Practitioner  Division of Gynecologic Oncology  Pager: [phone]

## 2022-10-26 NOTE — PROGRESS NOTES
Infusion Nursing Note:  Cordell Donaldson presents today for C5D1 Keytruda, Taxol, Carboplatin, Avastin.    Patient seen by provider: Yes: Dr. Raygoza, 10/25   present during visit today: Not Applicable.    Note: Cordell presents to infusion today feeling ok. She states that overnight her heart was racing and she felt her a-fib was uncontrolled. Pulse 97, /94. Notified Dr. Raygoza. Denies any pain or infectious symptoms.    TORB Dr. Raygoza/Amanda Stearns, RN @ 0800:  -OK to proceed with chemo today, patient's pulse is 97 so likely not in RVR currently    Allison through Taxol infusion, RN checked on patient. Patient stated her heart felt like it was uncontrollably racing and she just took an extra Metoprolol. Notified Dr. Raygoza.    TORB Dr. Raygoza/Amanda Stearns, RN @1115:  -Get EKG, follow up with NP    Notified Niya Holden NP of EKG results and RN assessment of irregular heart rate. Pulse 133, /117.    TORB Niya Holden/Amanda Stearns RN @1125:  -OK to continue with Taxol   -Will come assess patient in infusion.     VORB Niya Holden/Amanda Stearns RN @1200:  -Patient has taken multiple extra doses of metoprolol during the last 24 hours. RVR has happened at least 3 times now. Continue with infusions today except hold Avastin. Elevated BP not helping with A-fib. Instructed patient to go to the ER if she feels this happening again as she should not take another metoprolol dose.     Patient states she will follow up with her cardiologist tomorrow.    Intravenous Access:  Implanted Port.    Treatment Conditions:  Lab Results   Component Value Date    HGB 13.0 10/25/2022    WBC 5.0 10/25/2022    ANEU 1.8 08/22/2017    ANEUTAUTO 3.5 10/25/2022     10/25/2022      Lab Results   Component Value Date     10/25/2022    POTASSIUM 4.5 10/25/2022    MAG 1.9 10/25/2022    CR 0.66 10/25/2022    LINDA 9.8 10/25/2022    BILITOTAL 0.4 10/25/2022    ALBUMIN 4.0 10/25/2022    ALT 72 (H) 10/25/2022    AST 49  (H) 10/25/2022     Results reviewed, labs MET treatment parameters, ok to proceed with treatment.  Urine: Negative.  BP: 152/94.    Post Infusion Assessment:  Patient tolerated infusion without incident.  Blood return noted pre and post infusion.  Site patent and intact, free from redness, edema or discomfort.  No evidence of extravasations.  Access discontinued per protocol.     Discharge Plan:   Patient declined prescription refills.  Discharge instructions reviewed with: Patient and Family.  Patient and/or family verbalized understanding of discharge instructions and all questions answered.  AVS to patient via [employer].  Patient will return 11/14 for next appointment.   Patient discharged in stable condition accompanied by: friend.  Departure Mode: Ambulatory.      Amanda Stearns RN

## 2022-10-28 ENCOUNTER — MYC MEDICAL ADVICE (OUTPATIENT)
Dept: ONCOLOGY | Facility: CLINIC | Age: 72
End: 2022-10-28

## 2022-10-28 NOTE — PATIENT INSTRUCTIONS
Assessing Cancer Risk  Only about 5-10% of cancers are thought to be due to an inherited cancer susceptibility gene.    These families often have:  Several people with the same or related types of cancer  Cancers diagnosed at a young age (before age 50)  Individuals with more than one primary cancer  Multiple generations of the family affected with cancer    Some people may be candidates for genetic testing of more than one gene.  For these families, genetic testing using a cancer panel may be offered.  These panels will test different genes known to increase the risk for breast, ovarian, uterine, and/or other cancers. All of the genes discussed below have published clinical management guidelines for individuals who are found to carry a mutation. The purpose of this handout is to serve as a brief summary of the genes analyzed by the panels used to inquire about hereditary breast and gynecologic cancer:  KOLTON, BRCA1, BRCA2, BRIP1, CDH1, CHEK2, MLH1, MSH2, MSH6, PMS2, EPCAM, PTEN, PALB2, RAD51C, RAD51D, and TP53.  ______________________________________________________________________________  Hereditary Breast and Ovarian Cancer Syndrome   (BRCA1 and BRCA2)  A single mutation in one of the copies of BRCA1 or BRCA2 increases the risk for breast and ovarian cancer, among others.  The risk for pancreatic cancer and melanoma may also be slightly increased in some families.  The chart below shows the chance that someone with a BRCA mutation would develop cancer in his or her lifetime1,2,3,4.        A person s ethnic background is also important to consider, as individuals of Ashkenazi [place] ancestry have a higher chance of having a BRCA gene mutation.  There are three BRCA mutations that occur more frequently in this population.    Cui Syndrome   (MLH1, MSH2, MSH6, PMS2, and EPCAM)  Currently five genes are known to cause Cui Syndrome: MLH1, MSH2, MSH6, PMS2, and EPCAM.  A single mutation in one of the Cui  Syndrome genes increases the risk for colon, endometrial, ovarian, and stomach cancers.  Other cancers that occur less commonly in Cui Syndrome include urinary tract, skin, and brain cancers.  The chart below shows the chance that a person with Cui syndrome would develop cancer in his or her lifetime5.      *Cancer risk varies depending on Cui syndrome gene found    Cowden Syndrome   (PTEN)  Cowden syndrome is a hereditary condition that increases the risk for breast, thyroid, endometrial, colon, and kidney cancer.  Cowden syndrome is caused by a mutation in the PTEN gene.  A single mutation in one of the copies of PTEN causes Cowden syndrome and increases cancer risk.  The chart below shows the chance that someone with a PTEN mutation would develop cancer in their lifetime6,7.  Other benign features seen in some individuals with Cowden syndrome include benign skin lesions (facial papules, keratoses, lipomas), learning disability, autism, thyroid nodules, colon polyps, and larger head size.      *One recent study found breast cancer risk to be increased to 85%    Li-Fraumeni Syndrome   (TP53)  Li-Fraumeni Syndrome (LFS) is a cancer predisposition syndrome caused by a mutation in the TP53 gene. A single mutation in one of the copies of TP53 increases the risk for multiple cancers. Individuals with LFS are at an increased risk for developing cancer at a young age. The lifetime risk for development of a LFS-associated cancer is 50% by age 30 and 90% by age 60.   Core Cancers: Sarcomas, Breast, Brain, Lung, Leukemias/Lymphomas, Adrenocortical carcinomas  Other Cancers: Gastrointestinal, Thyroid, Skin, Genitourinary    Hereditary Diffuse Gastric Cancer   (CDH1)  Currently, one gene is known to cause hereditary diffuse gastric cancer (HDGC): CDH1.  Individuals with HDGC are at increased risk for diffuse gastric cancer and lobular breast cancer. Of people diagnosed with HDGC, 30-50% have a mutation in the CDH1 gene.   This suggests there are likely other genes that may cause HDGC that have not been identified yet.      Lifetime Cancer Risks    General Population HDGC    Diffuse Gastric  <1% ~80%   Breast 12% 39-52%         Additional Genes  KOLTON  KOLTON is a moderate-risk breast cancer gene. Women who have a mutation in KOLTON can have between a 2-4 fold increased risk for breast cancer compared to the general population8. KOLTON mutations have also been associated with increased risk for pancreatic cancer, however an estimate of this cancer risk is not well understood9. Individuals who inherit two KOLTNO mutations have a condition called ataxia-telangiectasia (AT).  This rare autosomal recessive condition affects the nervous system and immune system, and is associated with progressive cerebellar ataxia beginning in childhood.  Individuals with ataxia-telangiectasia often have a weakened immune system and have an increased risk for childhood cancers.    PALB2  Mutations in PALB2 have been shown to increase the risk of breast cancer up to 33-58% in some families; where individuals fall within this risk range is dependent upon family qojllqm23. PALB2 mutations have also been associated with increased risk for pancreatic cancer, although this risk has not been quantified yet.  Individuals who inherit two PALB2 mutations--one from their mother and one from their father--have a condition called Fanconi Anemia.  This rare autosomal recessive condition is associated with short stature, developmental delay, bone marrow failure, and increased risk for childhood cancers.    CHEK2   CHEK2 is a moderate-risk breast cancer gene.  Women who have a mutation in CHEK2 have around a 2-fold increased risk for breast cancer compared to the general population, and this risk may be higher depending upon family history.11,12,13 Mutations in CHEK2 have also been shown to increase the risk of a number of other cancers, including colon and prostate, however these  cancer risks are currently not well understood.    BRIP1, RAD51C and RAD51D  Mutations in BRIP1, RAD51C, and RAD51D have been shown to increase the risk of ovarian cancer and possibly female breast cancer as well14,15 .       Lifetime Cancer Risk    General Population BRIP1 RAD51C RAD51D   Ovarian 1-2% ~5-8% ~5-9% ~7-15%           Inheritance  All of the cancer syndromes reviewed above are inherited in an autosomal dominant pattern.  This means that if a parent has a mutation, each of his or her children will have a 50% chance of inheriting that same mutation.  Therefore, each child--male or female--would have a 50% chance of being at increased risk for developing cancer.      Image obtained from Genetics Home Reference, 2013     Mutations in some genes can occur de samanta, which means that a person s mutation occurred for the first time in them and was not inherited from a parent.  Now that they have the mutation, however, it can be passed on to future generations.    Genetic Testing  Genetic testing involves a blood test and will look at the genetic information in the KOLTON, BRCA1, BRCA2, BRIP1, CDH1, CHEK2, MLH1, MSH2, MSH6, PMS2, EPCAM, PTEN, PALB2, RAD51C, RAD51D, and TP53 genes for any harmful mutations that are associated with increased cancer risk.  If possible, it is recommended that the person(s) who has had cancer be tested before other family members.  That person will give us the most useful information about whether or not a specific gene is associated with the cancer in the family.    Results  There are three possible results of genetic testing:  Positive--a harmful mutation was identified in one or more of the genes  Negative--no mutation was identified in any of the genes on this panel  Variant of unknown significance--a variation in one of the genes was identified, but it is unclear how this impacts cancer risk in the family    Advantages and Disadvantages   There are advantages and disadvantages to  genetic testing.    Advantages  May clarify your cancer risk  Can help you make medical decisions  May explain the cancers in your family  May give useful information to your family members (if you share your results)    Disadvantages  Possible negative emotional impact of learning about inherited cancer risk  Uncertainty in interpreting a negative test result in some situations  Possible genetic discrimination concerns (see below)    Genetic Information Nondiscrimination Act (KUN)  KUN is a federal law that protects individuals from health insurance or employment discrimination based on a genetic test result alone.  Although rare, there are currently no legal discrimination protections in terms of life insurance, long term care, or disability insurances.  Visit the [employer] website to learn more.    Reducing Cancer Risk  All of the genes described above have nationally recognized cancer screening guidelines that would be recommended for individuals who test positive.  In addition to increased cancer screening, surgeries may be offered or recommended to reduce cancer risk.  Recommendations are based upon an individual s genetic test result as well as their personal and family history of cancer.    Questions to Think About Regarding Genetic Testing:  What effect will the test result have on me and my relationship with my family members if I have an inherited gene mutation?  If I don t have a gene mutation?  Should I share my test results, and how will my family react to this news, which may also affect them?  Are my children ready to learn new information that may one day affect their own health?    Hereditary Cancer Resources    FORCE: Facing Our Risk of Cancer Empowered facingourrisk.org   Bright Pink bebrightpink.org   Li-Fraumeni Syndrome Association lfsassociation.org   PTEN World [email or web address].[email or web address]   No stomach for cancer, Inc. nostomachforcancer.org   Stomach cancer relief network  Scrnet.org   Collaborative Group of the Americas on Inherited Colorectal Cancer (CGA) cgaicc.com    Cancer Care cancercare.org   American Cancer Society (ACS) cancer.org   National Cancer [place] (NCI) cancer.gov     Please call us if you have any questions or concerns.   Cancer Risk Management Program [phone] ([phone])  Nicola Arellano, MS [facility]  [phone]  Shaylee Lucio, MS, [facility] [phone]  Candi Roland, MS, [facility]  [phone]  Kat Bullock, MS, [facility]  [phone]  Juliet Yakov, MS, [facility]  [phone]  Oralia Woodson, MS, [facility] [phone]  Arleen Steward, MS, [facility] [phone]    References  Lynsey Vargas PDP, Ayde S, Emre RODRIGUEZ, Chela JE, Slim JL, Dutch N, Ferny H, Ephraim O, Wilbert A, Luis B, Deonna P, Manfabrice S, Jeison DM, Brayden N, Josafat E, Lissett H, Tang E, Lubinski J, Gronwald J, Juno B, Beto H, Thorlacius S, Eerola H, Laurence H, Andrae K, Keri OP. Average risks of breast and ovarian cancer associated with BRCA1 or BRCA2 mutations detected in case series unselected for family history: a combined analysis of 222 studies. Am J Hum Ally. 2003;72:1117-30.  Tana N, Tali M, Yamil G.  BRCA1 and BRCA2 Hereditary Breast and Ovarian Cancer. Gene Reviews online. 2013.  Karson YC, Chad S, Blayne G, Coronel S. Breast cancer risk among male BRCA1 and BRCA2 mutation carriers. J Natl Cancer Inst. 2007;99:1811-4.  Alex JULIEN, Shaheen I, Richie J, Petrona E, Anamika ER, Yazmin F. Risk of breast cancer in male BRCA2 carriers. J Med Ally. 2010;47:710-1.  National Comprehensive Cancer Network. Clinical practice guidelines in oncology, colorectal cancer screening. Available online (registration required). 2015.  Demian MORRIS, Marco J, Robel J, Ariana LA, Yayo MS, Eng C. Lifetime cancer risks in individuals with germline PTEN mutations. Clin Cancer Res. 2012;18:400-7.  Andrew WHEAT. Cowden Syndrome: A Critical Review of the Clinical Literature. J Ally .  2009:18:13-27.  Florencia A, Tj D, Ligia S, Jewels P, Luis T, Tyler M, Paul B, Andrew H, Светлана R, Diaz K, Karthikeyan L, Alex DG, Jeison D, Roberto DF, Aleksandr MR, The Breast Cancer Susceptibility Collaboration () & Courtney GARCIA. KOLTON mutations that cause ataxia-telangiectasia are breast cancer susceptibility alleles. Nature Genetics. 2006;38:873-875  Samuel N , Akbar Y, Stephanie J, Jace L, She GM , Cassie ML, Gallinger S, Luke AG, Syngal S, Nehemiah ML, Phong J , Pola R, Yash SZ, Scar JR, Marcelina VE, Mik M, Voloida B, Jarrett N, Shama RH, Mckayla KW, and Pilo AP. KOLTON mutations in patients with hereditary pancreatic cancer. Cancer Discover. 2012;2:41-46  Georgette KELELY., et al. Breast-Cancer Risk in Families with Mutations in PALB2. NEJM. 2014; 371(6):497-506.  CHEK2 Breast Cancer Case-Control Consortium. CHEK2*1100delC and susceptibility to breast cancer: A collaborative analysis involving 10,860 breast cancer cases and 9,065 controls from 10 studies. Am J Hum Ally, 74 (2004), pp. 6736-1709  Yulissa T, Brooke S, Franklyn K, et al. Spectrum of Mutations in BRCA1, BRCA2, CHEK2, and TP53 in Families at High Risk of Breast Cancer. ZULY. 2006;295(12):4136-9761.   Sky C, mAor D, Veronica A, et al. Risk of breast cancer in women with a CHEK2 mutation with and without a family history of breast cancer. J Clin Oncol. 2011;29:3988-1059.  Jose H, Herlinda E, Eusebia SJ, et al. Contribution of germline mutations in the RAD51B, RAD51C, and RAD51D genes to ovarian cancer in the population. J Clin Oncol. 2015;33(26):9012-8985. Doi:10.1200/JCO.2015.61.2408.  Robyn Lujanon DF, Félix P, et al. Mutations in BRIP1 confer high risk of ovarian cancer. Tricia Ally. 2011;43(11):4667-2533. doi:10.1038/ng.955.

## 2022-10-29 ENCOUNTER — HEALTH MAINTENANCE LETTER (OUTPATIENT)
Age: 72
End: 2022-10-29

## 2022-10-31 ENCOUNTER — VIRTUAL VISIT (OUTPATIENT)
Dept: CARDIOLOGY | Facility: CLINIC | Age: 72
End: 2022-10-31
Attending: INTERNAL MEDICINE
Payer: COMMERCIAL

## 2022-10-31 DIAGNOSIS — I48.20 CHRONIC ATRIAL FIBRILLATION (H): Primary | ICD-10-CM

## 2022-10-31 DIAGNOSIS — I25.10 ASCVD (ARTERIOSCLEROTIC CARDIOVASCULAR DISEASE): ICD-10-CM

## 2022-10-31 DIAGNOSIS — Z85.3 PERSONAL HISTORY OF MALIGNANT NEOPLASM OF BREAST: ICD-10-CM

## 2022-10-31 DIAGNOSIS — C53.0 MALIGNANT NEOPLASM OF ENDOCERVIX (H): ICD-10-CM

## 2022-10-31 PROCEDURE — 99214 OFFICE O/P EST MOD 30 MIN: CPT | Mod: 95 | Performed by: INTERNAL MEDICINE

## 2022-10-31 NOTE — PATIENT INSTRUCTIONS
"Cardiology Providers you saw during your visit:  Dr. Huitron    Medication changes: None    Follow up:   Echocardiogram when appointment next available.   Follow up with Dr. Huitron in 6-8 weeks      Follow the American Heart Association Diet and Lifestyle recommendations:  Limit saturated fat, trans fat, sodium, red meat, sweets and sugar-sweetened beverages. If you choose to eat red meat, compare labels and select the leanest cuts available.  Aim for at least 150 minutes of moderate physical activity each week.    If you have any questions, contact  Pratik Iniguez RN. We are encouraging the use of Habbo to communicate with your HealthCare Provider     To contact the [facility], please call, [phone]  To schedule an appointment or to leave a message for your Care Team Press #1  If you are a physician calling for another physician Press #2  For Billing Press #3  For Medical Records Press #4\"    "

## 2022-10-31 NOTE — NURSING NOTE
No medication changes made at this time.     Plan for Echo at this time. We will contact pt when results have been reviewed.     Follow up with Dr. Huitron in 6-8 weeks.     Pratik Iniguez RN   Cardiology Nurse Coordinator

## 2022-10-31 NOTE — LETTER
10/31/2022      RE: Cordell Donaldson  [address]  [facility] [address]       Dear Colleague,    Thank you for the opportunity to participate in the care of your patient, Cordell oDnaldson, at the [facility] [place] [facility]. Please see a copy of my visit note below.    Cordell is a 72 year old who is being evaluated via a billable video visit.      How would you like to obtain your AVS? MyChart  If the video visit is dropped, the invitation should be resent by: Text to cell phone: [phone]  Will anyone else be joining your video visit? Isaac -   Patient states is currently in the [facility]: Yes    ------------------    I had the pleasure of participating in coordination of clinical cardiovascular care for patient, Cordell Donaldson, via [facility]'s virtual visit protocol during the COVID-19 Pandemic.    History:    Cordell Donaldson is a 72 year old woman with persistent atrial fibrillation. She has a history of breast cancer treated with lumpectomy and radiation who we first saw October 2016 for newly diagnosed, asymptomatic atrial fibrillation. She declined cardioversion because she was undecided about taking anticoagulation.  She has declined anticoagulation for CVA prophylaxis (CHADS2 Vasc = 2). She takes metoprolol for rate control.     Since her last visit with me in Feb 2022, she has been diagnosed with cervical cancer in June 2022 for which she is receiving chemotherapy including Keytruda, carboplatin (was cisplatin), Avastin and paclitaxel in July 2022 with good response. She has had episodes of tachycardia requiring higher doses of metoprolol. Her blood pressure has also been elevated since being on chemotherapy and she asked the Avastin to be held at her last infusion. ECG on 10/26/22 noted atrial fibrillation with RVR at 130 bpm. She declined going to the ED. She has also been fasting 24 hour pre and post  chemotherapy after she read that the chemotherapy would be more effective.     She has now been taking metoprolol   mg daily and her heart rate is in the 103 range and /89 mmHg. She denies any cardiac issues and remains active raking leaves. No chest pain, dyspnea, peripheral edema, lightheadedness or syncope. She has neuropathy and ringing in her ears. No recent hospitalization for HF/MI/stroke      Current Outpatient Medications   Medication Sig Dispense Refill     Acetylcarnitine HCl 250 MG CAPS Take 500 mg by mouth 2 times daily        aspirin (ASA) 81 MG chewable tablet Take 81 mg by mouth daily       Bacillus Coagulans-Inulin (PROBIOTIC FORMULA) 1-250 BILLION-MG CAPS Take by mouth three times a week 25+billion CFUS       BENFOTIAMINE PO Take 150 mg by mouth 2 times daily       Cholecalciferol (VITAMIN D-3) 25 MCG (1000 UT) CAPS Take 1,000 Units by mouth daily 90 capsule 3     Coenzyme Q10 (CO Q 10 PO) Take 100 mg by mouth daily        dexamethasone (DECADRON) 4 MG tablet Take 2 tablets (8 mg) by mouth daily Take for 3 days, starting the day after chemo. Take with food. 6 tablet 3     [place] Berry 550 MG CAPS Take 400 mg by mouth 2 times daily 60 capsule 11     MAGNESIUM OXIDE PO Take 500 mg by mouth       metoprolol succinate ER (TOPROL XL) 50 MG 24 hr tablet Take 1 tablet (50 mg) by mouth daily 90 tablet 1     Omega-3 Fatty Acids (OMEGA-3 FISH OIL PO) Take 630 mg by mouth 2 times daily        ondansetron (ZOFRAN) 8 MG tablet Take 1 tablet (8 mg) by mouth every 8 hours as needed for nausea (vomiting) 30 tablet 2     OVER-THE-COUNTER Turkey tail mushroom powder, use 3 times a day 1 Can 11     Pectin Cit-Inos-C-Bioflav-Soy (MODIFIED CITRUS PECTIN PO)        prochlorperazine (COMPAZINE) 10 MG tablet Take 1 tablet (10 mg) by mouth every 6 hours as needed for nausea or vomiting 30 tablet 2     Selenium 200 MCG CAPS Take 200 mg by mouth       Taurine 1000 MG CAPS Take one po twice daily 60 capsule 11  "    Turmeric 450 MG CAPS Take 2 capsules by mouth daily 60 capsule 11       Allergies - reviewed   No Known Allergies    Past history -reviewed  Active Ambulatory Problems     Diagnosis Date Noted     Dupuytren contracture 2014     Abnormal electrocardiogram 2016     Atrial fibrillation (H) 10/03/2016     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 2012     Hyperlipidemia LDL goal <130 2020     Malignant neoplasm of breast (H) 2009     Rectocele 2022     Pelvic pain in female 2022     History of intraepithelial neoplasia of vagina 2022     Retroperitoneal lymphadenopathy 2022     Malignant neoplasm of endocervix (H) 2022     Recurrent cervical cancer (H) 2022     Resolved Ambulatory Problems     Diagnosis Date Noted     Bursal abscess 2012     Vaginal atrophy 2012     HSIL on Pap smear 10/12/2012     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     VAIN III (vaginal intraepithelial neoplasia III) 2013     Conductive hearing loss in right ear 2018     Achilles tendinitis, left leg 2019     Past Medical History:   Diagnosis Date     Abnormal Pap smear      Cervical cancer (H)      History of breast cancer      Hyperlipidemia LDL goal < 160      Osteopenia      Paroxysmal A-fib (H)      Severe vaginal dysplasia         Social history - reviewed    Former smoker   Social alcohol    Family history -reviewed  Family History   Problem Relation Age of Onset     Myocardial Infarction Mother 73        Tob     Myocardial Infarction Father 68        Tob     Breast Cancer Sister 47         of breast cancer age 63; BRCA mutation testing negative     Cancer Maternal Grandmother         Unsure of type.     Breast Cancer Paternal Grandmother         Unsure of diagnosis--some type of \"women's issue\"       ROS: non contributory on the 10-point review of system    Exam: " limited due to the nature of this visit   In general, the patient is in no acute distress.    Breathing is unlabored.   HEENT: Sclerae white, not injected.    Neck: No jugular venous distension.    Extremities: No edema, per patient.   Neurologic: Alert and oriented to person/place/time, normal speech and affect  Skin: No rash on exposed skin    Data:    Chemistry panel: Recent Labs   Lab Test 10/25/22  0904 10/03/22  0855    138   POTASSIUM 4.5 4.3   CHLORIDE 105 104   CO2 26 22   ANIONGAP 9 12   * 94   BUN 36.8* 19.4   CR 0.66 0.76   LINDA 9.8 9.6   MAG 1.9 1.8   GFRESTIMATED >90 83   AST 49* 31   ALT 72* 31       CBC:   Recent Labs   Lab Test 10/25/22  0904 10/03/22  0855   WBC 5.0 6.2   RBC 4.87 5.29*   HGB 13.0 13.5   HCT 41.3 43.4   MCV 85 82   MCH 26.7 25.5*   MCHC 31.5 31.1*   RDW 18.5* 20.7*    181       Lipid Panel:  Lab Test 04/06/22  1544 05/12/21  1146   CHOL 202* 246*   HDL 41* 55   * 167*   TRIG 86 120   CHOLHDLRATIO  --   --        Thyroid:   TSH   Date Value Ref Range Status   10/25/2022 0.05 (L) 0.30 - 4.20 uIU/mL Final   08/22/2017 2.16 0.40 - 4.00 mU/L Final     Free T4   Date Value Ref Range Status   10/25/2022 3.30 (H) 0.90 - 1.70 ng/dL Final       Patient's all available and relevant cardiac investigations were personally reviewed and discussed with the patient today.    ECG 10/26/22 - atrial fibrillation with a rapid ventricular response average 120 bpm     ECG Feb 2022 - atrial fibrillation with an average ventricular response 107     ECHO 10/3/16  The patient's rhythm is atrial fibrillation.  Left ventricular function, chamber size, wall motion, and wall thickness are normal.The EF is 60-65%. Global right ventricular function is normal. A small patent foramen ovale is present.       Assessment and Plan:  72 year old woman with    Persistent atrial fibrillation  Normal biventricular function   Dyslipidemia  Cervical cancer July 2022, on treatment  Invasive ductal  carcinoma July 2022  History of breast cancer 2003    Cordell is receiving chemotherapy for cervical cancer.  She has persistent atrial fibrillation with variable heart rate response.  Her ventricular response has been higher lately when she comes in for infusions.     Cordell is currently stable on metoprolol  mg daily which I will continue for rate control.  We can also give her additional doses of metoprolol tartrate - 25 mg oral q 4 hours as needed for HR >130 if SBP >110 mmHg.     She is reluctant to initiate additional pharmacotherapy at this point and declines anticoagulation, calcium channel blocker or statin therapy. Her ASCVD risk score is elevated and she will address this with lifestyle modification.  We discussed maintaining a good nutrition state with adequate hydration to avoid additional factors that would affect the heart rate.  I do not see any absolute contraindications to receiving her next cycle of chemotherapy.    I will arrange for an echocardiogram for reassurance given that she is on Avastin.    Recommendations:   1. Echocardiogram  2. Follow-up 6 to 8 weeks  3. Continue current cardiac medications.      The 10-year ASCVD risk score (Isra DK, et al., 2019) is: 16%    Values used to calculate the score:      Age: 72 years      Sex: Female      Is Non- : No      Diabetic: No      Tobacco smoker: No      Systolic Blood Pressure: 157 mmHg      Is BP treated: No      HDL Cholesterol: 41 mg/dL      Total Cholesterol: 202 mg/dL    Video Visit Details:    Type of service:  Video Visit    Video Start Time: 10.05 am  Video End Time: 10.25 am    Originating Location (pt. Location): Home    Distant Location (provider location):  [facility]     Platform used for Video Visit: [employer]    In addition to visit time documented above, I spent an additional 15 minutes on data review and documentation.      Elise Huitron MD, MS  Professor of Medicine  Cardiovascular  Medicine

## 2022-10-31 NOTE — PROGRESS NOTES
Cordell is a 72 year old who is being evaluated via a billable video visit.      How would you like to obtain your AVS? MyChart  If the video visit is dropped, the invitation should be resent by: Text to cell phone: [phone]  Will anyone else be joining your video visit? Isaac -   Patient states is currently in the [facility]: Yes    ------------------    I had the pleasure of participating in coordination of clinical cardiovascular care for patient, Cordell Donaldson, via  [facility]'s virtual visit protocol during the COVID-19 Pandemic.    History:    Cordell Donaldson is a 72 year old woman with persistent atrial fibrillation. She has a history of breast cancer treated with lumpectomy and radiation who we first saw October 2016 for newly diagnosed, asymptomatic atrial fibrillation. She declined cardioversion because she was undecided about taking anticoagulation.  She has declined anticoagulation for CVA prophylaxis (CHADS2 Vasc = 2). She takes metoprolol for rate control.     Since her last visit with me in Feb 2022, she has been diagnosed with cervical cancer in June 2022 for which she is receiving chemotherapy including Keytruda, carboplatin (was cisplatin), Avastin and paclitaxel in July 2022 with good response. She has had episodes of tachycardia requiring higher doses of metoprolol. Her blood pressure has also been elevated since being on chemotherapy and she asked the Avastin to be held at her last infusion. ECG on 10/26/22 noted atrial fibrillation with RVR at 130 bpm. She declined going to the ED. She has also been fasting 24 hour pre and post chemotherapy after she read that the chemotherapy would be more effective.     She has now been taking metoprolol   mg daily and her heart rate is in the 103 range and /89 mmHg. She denies any cardiac issues and remains active raking leaves. No chest pain, dyspnea, peripheral edema, lightheadedness or syncope. She has neuropathy and  ringing in her ears. No recent hospitalization for HF/MI/stroke      Current Outpatient Medications   Medication Sig Dispense Refill     Acetylcarnitine HCl 250 MG CAPS Take 500 mg by mouth 2 times daily        aspirin (ASA) 81 MG chewable tablet Take 81 mg by mouth daily       Bacillus Coagulans-Inulin (PROBIOTIC FORMULA) 1-250 BILLION-MG CAPS Take by mouth three times a week 25+billion CFUS       BENFOTIAMINE PO Take 150 mg by mouth 2 times daily       Cholecalciferol (VITAMIN D-3) 25 MCG (1000 UT) CAPS Take 1,000 Units by mouth daily 90 capsule 3     Coenzyme Q10 (CO Q 10 PO) Take 100 mg by mouth daily        dexamethasone (DECADRON) 4 MG tablet Take 2 tablets (8 mg) by mouth daily Take for 3 days, starting the day after chemo. Take with food. 6 tablet 3     [place] Berry 550 MG CAPS Take 400 mg by mouth 2 times daily 60 capsule 11     MAGNESIUM OXIDE PO Take 500 mg by mouth       metoprolol succinate ER (TOPROL XL) 50 MG 24 hr tablet Take 1 tablet (50 mg) by mouth daily 90 tablet 1     Omega-3 Fatty Acids (OMEGA-3 FISH OIL PO) Take 630 mg by mouth 2 times daily        ondansetron (ZOFRAN) 8 MG tablet Take 1 tablet (8 mg) by mouth every 8 hours as needed for nausea (vomiting) 30 tablet 2     OVER-THE-COUNTER Turkey tail mushroom powder, use 3 times a day 1 Can 11     Pectin Cit-Inos-C-Bioflav-Soy (MODIFIED CITRUS PECTIN PO)        prochlorperazine (COMPAZINE) 10 MG tablet Take 1 tablet (10 mg) by mouth every 6 hours as needed for nausea or vomiting 30 tablet 2     Selenium 200 MCG CAPS Take 200 mg by mouth       Taurine 1000 MG CAPS Take one po twice daily 60 capsule 11     Turmeric 450 MG CAPS Take 2 capsules by mouth daily 60 capsule 11       Allergies - reviewed   No Known Allergies    Past history -reviewed  Active Ambulatory Problems     Diagnosis Date Noted     Dupuytren contracture 09/05/2014     Abnormal electrocardiogram 09/30/2016     Atrial fibrillation (H) 10/03/2016     NGUYỄN III (cervical  "intraepithelial neoplasia grade III) with severe dysplasia 2012     Hyperlipidemia LDL goal <130 2020     Malignant neoplasm of breast (H) 2009     Rectocele 2022     Pelvic pain in female 2022     History of intraepithelial neoplasia of vagina 2022     Retroperitoneal lymphadenopathy 2022     Malignant neoplasm of endocervix (H) 2022     Recurrent cervical cancer (H) 2022     Resolved Ambulatory Problems     Diagnosis Date Noted     Bursal abscess 2012     Vaginal atrophy 2012     HSIL on Pap smear 10/12/2012     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     VAIN III (vaginal intraepithelial neoplasia III) 2013     Conductive hearing loss in right ear 2018     Achilles tendinitis, left leg 2019     Past Medical History:   Diagnosis Date     Abnormal Pap smear      Cervical cancer (H)      History of breast cancer      Hyperlipidemia LDL goal < 160      Osteopenia      Paroxysmal A-fib (H)      Severe vaginal dysplasia         Social history - reviewed    Former smoker   Social alcohol    Family history -reviewed  Family History   Problem Relation Age of Onset     Myocardial Infarction Mother 73        Tob     Myocardial Infarction Father 68        Tob     Breast Cancer Sister 47         of breast cancer age 63; BRCA mutation testing negative     Cancer Maternal Grandmother         Unsure of type.     Breast Cancer Paternal Grandmother         Unsure of diagnosis--some type of \"women's issue\"       ROS: non contributory on the 10-point review of system    Exam: limited due to the nature of this visit   In general, the patient is in no acute distress.    Breathing is unlabored.   HEENT: Sclerae white, not injected.    Neck: No jugular venous distension.    Extremities: No edema, per patient.   Neurologic: Alert and oriented to person/place/time, normal speech and affect  Skin: No rash on exposed " skin    Data:    Chemistry panel: Recent Labs   Lab Test 10/25/22  0904 10/03/22  0855    138   POTASSIUM 4.5 4.3   CHLORIDE 105 104   CO2 26 22   ANIONGAP 9 12   * 94   BUN 36.8* 19.4   CR 0.66 0.76   LINDA 9.8 9.6   MAG 1.9 1.8   GFRESTIMATED >90 83   AST 49* 31   ALT 72* 31       CBC:   Recent Labs   Lab Test 10/25/22  0904 10/03/22  0855   WBC 5.0 6.2   RBC 4.87 5.29*   HGB 13.0 13.5   HCT 41.3 43.4   MCV 85 82   MCH 26.7 25.5*   MCHC 31.5 31.1*   RDW 18.5* 20.7*    181       Lipid Panel:  Lab Test 04/06/22  1544 05/12/21  1146   CHOL 202* 246*   HDL 41* 55   * 167*   TRIG 86 120   CHOLHDLRATIO  --   --        Thyroid:   TSH   Date Value Ref Range Status   10/25/2022 0.05 (L) 0.30 - 4.20 uIU/mL Final   08/22/2017 2.16 0.40 - 4.00 mU/L Final     Free T4   Date Value Ref Range Status   10/25/2022 3.30 (H) 0.90 - 1.70 ng/dL Final       Patient's all available and relevant cardiac investigations were personally reviewed and discussed with the patient today.    ECG 10/26/22 - atrial fibrillation with a rapid ventricular response average 120 bpm     ECG Feb 2022 - atrial fibrillation with an average ventricular response 107     ECHO 10/3/16  The patient's rhythm is atrial fibrillation.  Left ventricular function, chamber size, wall motion, and wall thickness are normal.The EF is 60-65%. Global right ventricular function is normal. A small patent foramen ovale is present.       Assessment and Plan:  72 year old woman with    Persistent atrial fibrillation  Normal biventricular function   Dyslipidemia  Cervical cancer July 2022, on treatment  Invasive ductal carcinoma July 2022  History of breast cancer 2003    Cordell is receiving chemotherapy for cervical cancer.  She has persistent atrial fibrillation with variable heart rate response.  Her ventricular response has been higher lately when she comes in for infusions.     Cordell is currently stable on metoprolol  mg daily which I will  continue for rate control.  We can also give her additional doses of metoprolol tartrate - 25 mg oral q 4 hours as needed for HR >130 if SBP >110 mmHg.     She is reluctant to initiate additional pharmacotherapy at this point and declines anticoagulation, calcium channel blocker or statin therapy. Her ASCVD risk score is elevated and she will address this with lifestyle modification.  We discussed maintaining a good nutrition state with adequate hydration to avoid additional factors that would affect the heart rate.  I do not see any absolute contraindications to receiving her next cycle of chemotherapy.    I will arrange for an echocardiogram for reassurance given that she is on Avastin.    Recommendations:   1. Echocardiogram  2. Follow-up 6 to 8 weeks  3. Continue current cardiac medications.      The 10-year ASCVD risk score (Isra DK, et al., 2019) is: 16%    Values used to calculate the score:      Age: 72 years      Sex: Female      Is Non- : No      Diabetic: No      Tobacco smoker: No      Systolic Blood Pressure: 157 mmHg      Is BP treated: No      HDL Cholesterol: 41 mg/dL      Total Cholesterol: 202 mg/dL    Video Visit Details:    Type of service:  Video Visit    Video Start Time: 10.05 am  Video End Time: 10.25 am    Originating Location (pt. Location): Home    Distant Location (provider location):   [employer] [facility]     Platform used for Video Visit: [employer]    In addition to visit time documented above, I spent an additional 15 minutes on data review and documentation.      Elise Huitron MD, MS  Professor of Medicine  Cardiovascular Medicine

## 2022-10-31 NOTE — NURSING NOTE
Chief Complaint   Patient presents with     Follow Up       Patient confirms medications and allergies are accurate via patients echeck in completion, and or denies any changes since last reviewed/verified.       Jewels Shah, Virtual Facilitator

## 2022-11-03 NOTE — NURSING NOTE
"Oncology Rooming Note    November 3, 2022 11:11 AM   Cordell Donaldson is a 72 year old female who presents for:    Chief Complaint   Patient presents with     Oncology Clinic Visit     Malignant neoplasm of endocervix (H)      Port Draw     Port accessed with 20g gripper needle by RN, labs collected, line flushed with saline and heparin.  Vitals taken. Pt checked in for appointment(s).      Initial Vitals: BP (!) 156/97   Pulse (!) 125   Temp 97.5  F (36.4  C) (Oral)   Resp 16   Wt 53.6 kg (118 lb 3.2 oz)   SpO2 98%   BMI 21.21 kg/m   Estimated body mass index is 21.21 kg/m  as calculated from the following:    Height as of 8/2/22: 1.59 m (5' 2.6\").    Weight as of this encounter: 53.6 kg (118 lb 3.2 oz). Body surface area is 1.54 meters squared.  No Pain (0) Comment: Data Unavailable   No LMP recorded. Patient is postmenopausal.  Allergies reviewed: Yes  Medications reviewed: Yes    Medications: Medication refills not needed today.  Pharmacy name entered into [employer]:    [id number] - [place], [place] - [address]  [id number] - [place] [place] - [facility] [place], [place] - [phone]    Clinical concerns: None       George López            "

## 2022-11-07 ASSESSMENT — ENCOUNTER SYMPTOMS
PALPITATIONS: 1
HYPERTENSION: 1

## 2022-11-10 NOTE — PROGRESS NOTES
Cordell is a 72 year old who is being evaluated via a billable video visit.      How would you like to obtain your AVS? MyChart  If the video visit is dropped, the invitation should be resent by: Text to cell phone: [phone]  Will anyone else be joining your video visit? No        Video-Visit Details    Video Start Time: 2:00 pm    Type of service:  Video Visit    Video End Time:2:21 pm    Originating Location (pt. 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  She presents today for follow up and disease management by video.     Oncology History:    2003: Left breast cancer.  -Lumpectomy.  -Radiation therapy.     [id number]: BRENNAN breast cancer.     2006: Negative for germline BRCA1, BRCA2 pathogenic variants.      10/24/12:   -Endocervical curettings: Squamous carcinoma, invasion cannot be assessed.   -Ectocervical biopsies: HSIL (CIN3).   -Vaginal biopsy, posterior: HSIL (VaIN3).  2012: Robotic-assisted laparoscopic lysis of adhesions, left ureterolysis with  conversion to laparotomy, total abdominal hysterectomy, and an upper  Vaginectomy by gynecologic oncologist Dr. Arciniega.   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      13: Vaginal biopsy: HSIL, cannot exclude invasion.   -Consultation with gynecologic oncologist Dr. Johnson. Upper vaginectomy and CO2 laser  ablation recommended, patient declined.   [id number]: No vaginal dysplasia treatment.   5/20/22: Pelvic MRI:   Centrally hypoenhancing heterogeneous midline pelvic mass along  the superior margin of the vaginal vault measures 8.5 x 6.7 x 8.1 cm.  The mass abut the bladder and there is mucosal irregularity at the  site of abutment. The mass indents the  decompressed rectum but there is no definite invasion or mucosal  Irregularity.  6/9/22: CT-guided biopsy of pelvic mass: Poorly-differentiated squamous cell carcinoma, HPV-associated.   7/1/22: Staging PET/CT: Stage III vaginal cancer (vs recurrent, metastatic cervical cancer).     7/1/22: PET/CT to stage vaginal cancer (see below).   Mild soft tissue  thickening in the left retropectoral region with an SUV max of 2.7.  7/15/22: Invasive ductal carcinoma.   -Consultation with Dr. Montana. Recommendation to prioritize vaginal/recurrent cervix cancer. Plan to follow breast lesion with repeat MRI. Plan for endocrine therapy after completion of chemotherapy for vaginal/cervical cancer.         7/20/22, 8/10/22, 8/31/22,9/21/22: Cycles #1-4 of first-line chemotherapy with IV cisplatin 50 mg/m2 + paclitaxel 175 mg/m2 + bevacizumab 15 mg/kg every 21 days.  8/31/22: Cycle 3 Pembrolizumab 200 mg every 21 days added to triplet chemotherapy per the KEYNOTE-826 regimen.   -9/22/22: PET/CT: Partial response.    Stable 3 mm solid  nodules in the left upper lobe. Resolved FDG  avidity of a left retropectoral, presumably lymph node which is now  Calcified.  Multiple stable FDG avid iliac chain lymph nodes near the aortic  bifurcation with SUV max 14.0. Significant reduction in left internal  iliac chain nodes, with no single node with SUV max 5.5 which measures  up to 12 mm in short axis. Significantly reduced  size of FDG avid cervical mass with smaller area of enhancing  nodularity on the right side wall measuring 3.3 x 1.5 cm with SUV max  16.3. There is central necrosis and  abutment of  the rectum. There is an additional FDG avid foci more inferiorly on  the anterior vaginal wall with SUV max 6.7, which is also decreased in  Size.  -Discussed with radiation oncologist Dr. Stockton and at the gyn onc/radiation oncology tumor conference. Recommendation for additional chemotherapy prior to radiation therapy.       8/14/22 (specimen from 6/9/22): [facility] Testing.   -PD-L1+ (CPS 10)  -Mismatch repair deficient/microsatellite instability-high  -TMB high (53 mut/Mb)  -NTRK 1/2/3 fusion not detected.     Plan: First-line chemotherapy with palliative intent with IV cisplatin 50 mg/m2 + paclitaxel 175 mg/m2 + bevacizumab 15 mg/kg + pembrolizumab 200 mg every 21 days x 3 cycles followed by PET CT and follow up with Dr. Raygoza.      7/20/2022: Cycle 1 Cisplatin, Paclitaxel, bevazicumab  8/8/2022: Cycle 2 Cisplatin, Paclitaxel, bevacizumab   8/31/2022: Cycle 3 Cisplatin, Paclitaxel, bevacizumab, pembrolizumab    9/22/22: PET CT    IMPRESSION: In this patient with history of cervical cancer:  1. Significant interval reduction in the size and FDG avidity of large  cervical mass with persistent FDG activity about the right cervical  side wall and anterior vaginal wall.  2. Significant reduction in size and number of FDG avid iliac chain  lymph nodes with multiple persistent FDG avid iliac chain lymph nodes.  3. Diffuse gastric hypermetabolism, correlate for symptoms of  gastritis.   4. Resolved FDG avidity of a left retropectoral lymph node, presumably  previously reactive.    Plan: First-line chemotherapy with palliative intent with IV cisplatin 50 mg/m2 + paclitaxel 175 mg/m2 + bevacizumab 15 mg/kg + pembrolizumab 200 mg every 21 days x 3 cycles followed by PET CT and follow up with Dr. Raygoza.    10/3/22: Cycle 4 cisplatin, paclitaxel, bevacizumab, pembrolizumab.  10/26/22: Cycle 5 carboplatin AUC 6 (switched due to side effects), paclitaxel 135 mg/m2 (dose reduced due to side effects), bevacizumab,  pembrolizumab. Hold bevacizumab due to elevated BP (157/117) and afib with RVR.  11/14/22: Cycle 6 carboplatin, paclitaxel, bevacizumab, pembrolizumab.                           Today she reports feeling well overall and is without concern.  She reports that her HR has been good since her last cycle.  Today it was 86 and a couple days ago it was also in the 80s.  Her BPs have been running 120s - 130s/90s.  She did see Dr. Huitron after her last visit who has ordered an echo which will be completed on Sunday.  She continues to have nosebleeds in the morning with clots.  Bleeding resolved on its own.  She has been placing Vitamine E on her nares at night but feels as though the bleeding is coming from in her sinuses.  She does have a humidifier and plans to use this.  She continues with an itchy rash on her lower back and on her upper back.  She has tried her hydrocortisone cream on this in the past and it did not seem to help.  Currently she is using aloe vera cream and frankincense/lavender essential oils.  She feels as though this is helping and her  reports that it is looking better.  She continues with neuropathy in the bottoms of her feet and on the insides of her legs.  This seems worse at night.  She is using CBD cream which is helpful.  She feels as though this is improved since her last cycle.  She does have some nausea after treatment and have 1 episode of vomiting since starting chemotherapy.  She is not using any antiemetics.  She is using beatris products such as beatris tea and fresh beatris etc.  She has been trying to gain weight but currently weight is stable.  She is eating small frequent meals.  She denies any changes in her bowel or bladder habits, no fevers or chills, and no chest pain or shortness of breath.                         Review of Systems     Constitutional:  Negative for fever, chills, weight loss, weight gain, fatigue, decreased appetite, night sweats, recent stressors, height  gain, height loss, post-operative complications, incisional pain, hallucinations, increased energy, hyperactivity and confused.   HENT:  Positive for nosebleeds.    Eyes:  Negative for double vision, pain, redness, eye pain, decreased vision, eye watering, eye bulging, eye dryness, flashing lights, spots, floaters, strabismus, tunnel vision, jaundice and eye irritation.   Respiratory:   Negative for cough, hemoptysis, sputum production, shortness of breath, wheezing, snores loudly, respiratory pain, dyspnea on exertion, cough disturbing sleep and postural dyspnea.    Cardiovascular:  Positive for palpitations and hypertension. Negative for dyspnea on exertion.   Gastrointestinal:  Negative for heartburn, nausea, vomiting, abdominal pain, diarrhea, constipation, blood in stool, melena, rectal pain, bloating, hemorrhoids, bowel incontinence, jaundice, rectal bleeding, coffee ground emesis and change in stool.   Genitourinary:  Negative for bladder incontinence, dysuria, urgency, hematuria, flank pain, vaginal discharge, difficulty urinating, genital sores, dyspareunia, decreased libido, nocturia, voiding less frequently, arousal difficulty, abnormal vaginal bleeding, excessive menstruation, menstrual changes, hot flashes, vaginal dryness and postmenopausal bleeding.   Musculoskeletal:  Negative for myalgias, back pain, joint swelling, arthralgias, stiffness, muscle cramps, neck pain, bone pain, muscle weakness and fracture.   Skin:  Positive for rash.   Neurological:  Negative for dizziness, tingling, tremors, speech change, seizures, loss of consciousness, weakness, numbness, headaches, disturbances in coordination, extremity numbness, memory loss, difficulty walking and paralysis.   Endo/Heme:  Negative for anemia, swollen glands and bruises/bleeds easily.   Psychiatric/Behavioral:  Negative for depression, hallucinations, memory loss, decreased concentration, mood swings and panic attacks.    Breast:  Negative for  breast discharge, breast mass, breast pain and nipple retraction.   Endocrine:  Negative for altered temperature regulation, polyphagia and polydipsia.        Past Medical History:    Past Medical History:   Diagnosis Date     Abnormal Pap smear     maybe 20 years ago     Cervical cancer (H)      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      History of breast cancer 2003    lumpectomy and radiation offerred chemo and patient declined at time but had oncogene test and low risk     Hyperlipidemia LDL goal < 160      Osteopenia      Paroxysmal A-fib (H)      Severe vaginal dysplasia          Past Surgical History:    Past Surgical History:   Procedure Laterality Date     BIOPSY       BREAST SURGERY Left 2003    lumpectomy left, did radiation, 5 yr of tamoxifen     COLONOSCOPY  2018    repeat in 2028     Colposcopy Cervix with Loop Electrode Conization and Lser to Vagina  2008     COLPOSCOPY, BIOPSY, COMBINED  10/24/2012    Procedure: COMBINED COLPOSCOPY, BIOPSY;  Colposcopy, Biopsy of Cervix and Vagina, Ultrasound Guidance;  Surgeon: Colette Ng MD;  Location: [facility] OR     ENT SURGERY  01/23/2018    otoscelerosis, right side     HYSTERECTOMY, FRANCIA  12/2012    high grade cervical dysplasia, [facility], Dr. Arciniega     INSERT PORT VASCULAR ACCESS Right 7/18/2022    Procedure: INSERTION, VASCULAR ACCESS PORT;  Surgeon: Donnie Elias MD;  Location: [facility] OR     IR CHEST PORT PLACEMENT > 5 YRS OF AGE  7/18/2022     MO LAP VAGINECTOMY, PARTIAL REMOVAL OF VAGINAL WALL  10/2013    upper vaginectomy for dysplasia, Dr. Megan Arciniega         Health Maintenance Due   Topic Date Due     Pneumococcal Vaccine: 65+ Years (1 - PCV) Never done     ZOSTER IMMUNIZATION (2 of 2) 02/12/2020     COVID-19 Vaccine (4 - Booster for Pfizer series) 12/03/2021     INFLUENZA VACCINE (1) 09/01/2022     PAP  01/02/2023       Current Medications:     Current Outpatient Medications   Medication Sig Dispense Refill      Acetylcarnitine HCl 250 MG CAPS Take 500 mg by mouth 2 times daily        aspirin (ASA) 81 MG chewable tablet Take 81 mg by mouth daily       Bacillus Coagulans-Inulin (PROBIOTIC FORMULA) 1-250 BILLION-MG CAPS Take by mouth three times a week 25+billion CFUS       BENFOTIAMINE PO Take 150 mg by mouth 2 times daily       Cholecalciferol (VITAMIN D-3) 25 MCG (1000 UT) CAPS Take 1,000 Units by mouth daily 90 capsule 3     Coenzyme Q10 (CO Q 10 PO) Take 100 mg by mouth daily        dexamethasone (DECADRON) 4 MG tablet Take 2 tablets (8 mg) by mouth daily Take for 3 days, starting the day after chemo. Take with food. 6 tablet 3     Hudson Berry 550 MG CAPS Take 400 mg by mouth 2 times daily 60 capsule 11     MAGNESIUM OXIDE PO Take 500 mg by mouth       metoprolol succinate ER (TOPROL XL) 50 MG 24 hr tablet Take 1 tablet (50 mg) by mouth daily 90 tablet 1     Omega-3 Fatty Acids (OMEGA-3 FISH OIL PO) Take 630 mg by mouth 2 times daily        ondansetron (ZOFRAN) 8 MG tablet Take 1 tablet (8 mg) by mouth every 8 hours as needed for nausea (vomiting) 30 tablet 2     OVER-THE-COUNTER Turkey tail mushroom powder, use 3 times a day 1 Can 11     Pectin Cit-Inos-C-Bioflav-Soy (MODIFIED CITRUS PECTIN PO)        prochlorperazine (COMPAZINE) 10 MG tablet Take 1 tablet (10 mg) by mouth every 6 hours as needed for nausea or vomiting 30 tablet 2     Selenium 200 MCG CAPS Take 200 mg by mouth       STATIN NOT PRESCRIBED (INTENTIONAL) Please choose reason not prescribed from choices below.       Taurine 1000 MG CAPS Take one po twice daily 60 capsule 11     Turmeric 450 MG CAPS Take 2 capsules by mouth daily 60 capsule 11         Allergies:      No Known Allergies     Social History:     Social History     Tobacco Use     Smoking status: Former     Packs/day: 0.50     Years: 15.00     Pack years: 7.50     Types: Cigarettes     Smokeless tobacco: Never   Substance Use Topics     Alcohol use: Yes     Alcohol/week: 2.0 standard drinks  "    Types: 2 Standard drinks or equivalent per week     Comment: Socially        History   Drug Use No         Family History:     The patient's family history is notable for:    Family History   Problem Relation Age of Onset     Myocardial Infarction Mother 73        Tob     Myocardial Infarction Father 68        Tob     Breast Cancer Sister 47         of breast cancer age 63; BRCA mutation testing negative     Cancer Maternal Grandmother         Unsure of type.     Breast Cancer Paternal Grandmother         Unsure of diagnosis--some type of \"women's issue\"         Physical Exam:     There were no vitals taken for this visit.  There is no height or weight on file to calculate BMI.    General Appearance: healthy and alert, no distress    Eyes:  Eyes grossly normal to inspection.  No discharge or erythema, or obvious scleral/conjunctival abnormalities.    Respiratory: No audible wheeze, cough, or visible cyanosis.  No visible retractions or increased work of breathing.     Musculoskeletal: extremities non tender and without edema    Skin: no lesions or rashes on visible skin    Neurological: normal gait, no gross defects     Psychiatric: appropriate mood and affect. Mentation appears normal, affect normal/bright, judgement and insight intact, normal speech and appearance well-groomed                            The rest of a comprehensive physical examination is deferred due to PHE (public health emergency) video visit restrictions.       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  She presents today for follow up and disease management by video.    40 minutes spent on the date of the encounter doing chart review, history and exam, documentation, and further activities as noted above.      Plan:     1.) Cervical vs vaginal cancer:  OK to " proceed with planned treatment Monday if labs and BP are WDL.  RTC as scheduled for PET CT and follow up with Dr. Raygoza.  Still awaiting scheduling of follow-up with Dr. Raygoza.  She did not want to schedule an additional cycle in case she needed it.  Reviewed signs and symptoms for when she should contact the clinic or seek additional care.  Patient to contact the clinic with any questions or concerns in the interim.        Genetic risk factors were assessed and she does not meet qualification for referral for cervical/vaginal cancer.  Previously tested negative to BRCA1/BRCA2.      Labs and/or tests ordered include:  CBC. CMP. Mag. Protein urine.  TSH. T4.     2.) Health maintenance:  Issues addressed today include following up with PCP for annual health maintenance and non-gynecologic issues.     3.) Neuropathy: Tolerable symptoms not inhibiting function.  Okay to continue Taxol at its current dose.  We will continue to monitor symptoms.  Okay to continue CBD oil as it is helpful per patient.    4.) Epistaxis: Per Dr. Raygoza potentially related to Avastin.  Discussed using humidifier at night in bedroom as symptoms worsen in the morning.  She is agreeable to try this.  Okay to continue vitamin E oil on naris still bleeding appears to be more significant than nasal septum irritation.    5.) Altered thyroid function: Thyroid function will be drawn Monday with treatment.  TSH has been elevated and most recently decreased with elevated T4.  Currently asymptomatic we will continue to monitor.    6.) A. Fib: Symptoms much improved and managed with medications currently.  Patient reports that Dr. Huitron will be in clinic during her infusion on Monday and can be paged if she is having cardiac changes.  Currently heart rate has been low and stable without symptoms.    7.) HTN: BP much improved since last treatment.  Okay to continue with Avastin as scheduled on Monday if BP is <160/100.  Per Dr. Huitron's note patient is  resistant to starting HTN medication.  BPs at home have been elevated but okay currently.    8.) Dermatitis: Likely related to pembrolizumab.  Improving per patient report.  Okay to continue aloe vera and essential oils as she feels this is helpful.  Discussed trying a hydrocortisone ointment instead of a hydrocortisone cream.      Niya Holden, DNP, APRN, FNP-C  Nurse Practitioner  Division of Gynecologic Oncology  Pager: [phone]     CC  Patient Care Team:  Merry Lino MD as PCP - General (Internal Medicine)  Gui Mccollum MD [place], Aaron Daniel, DPM as MD (Podiatry)  Elise Huitron MD as Assigned Heart and Vascular Provider  Merry Lino MD as Assigned PCP  Tess Mcgowan, RN as Specialty Care Coordinator  Heather Ayon MD as MD (Urology)  Angela Montana MD as MD (Hematology & Oncology)  Yvette Pike MD as MD (Surgery)  Valeri Kirby, GEOFF as Specialty Care Coordinator (Hematology & Oncology)  Yvette Pike MD as Assigned Surgical Provider  Jewels Oconnor APRN CNP as Assigned Cancer Care Provider  Nurys Horton RN as Specialty Care Coordinator (Hematology & Oncology)  GUI MCCOLLUM

## 2022-11-11 ENCOUNTER — VIRTUAL VISIT (OUTPATIENT)
Dept: ONCOLOGY | Facility: CLINIC | Age: 72
End: 2022-11-11
Attending: NURSE PRACTITIONER
Payer: COMMERCIAL

## 2022-11-11 DIAGNOSIS — C53.0 MALIGNANT NEOPLASM OF ENDOCERVIX (H): Primary | ICD-10-CM

## 2022-11-11 DIAGNOSIS — Z51.11 ENCOUNTER FOR ANTINEOPLASTIC CHEMOTHERAPY: ICD-10-CM

## 2022-11-11 DIAGNOSIS — C53.9 RECURRENT CERVICAL CANCER (H): ICD-10-CM

## 2022-11-11 PROCEDURE — G0463 HOSPITAL OUTPT CLINIC VISIT: HCPCS | Mod: PN,RTG | Performed by: NURSE PRACTITIONER

## 2022-11-11 PROCEDURE — 99215 OFFICE O/P EST HI 40 MIN: CPT | Mod: 95 | Performed by: NURSE PRACTITIONER

## 2022-11-11 ASSESSMENT — ENCOUNTER SYMPTOMS
HYPERTENSION: 1
TINGLING: 0
MEMORY LOSS: 0
VOMITING: 0
ABDOMINAL PAIN: 0
JAUNDICE: 0
DISTURBANCES IN COORDINATION: 0
CONSTIPATION: 0
EYE WATERING: 0
FEVER: 0
DECREASED APPETITE: 0
BLOOD IN STOOL: 0
NIGHT SWEATS: 0
PANIC: 0
PALPITATIONS: 1
RESPIRATORY PAIN: 0
HALLUCINATIONS: 0
DOUBLE VISION: 0
DIFFICULTY URINATING: 0
SEIZURES: 0
HEARTBURN: 0
HEADACHES: 0
EYE PAIN: 0
WEIGHT LOSS: 0
SHORTNESS OF BREATH: 0
DEPRESSION: 0
RECTAL PAIN: 0
HOT FLASHES: 0
CHILLS: 0
NAUSEA: 0
WEAKNESS: 0
HEMATURIA: 0
WEIGHT GAIN: 0
DYSPNEA ON EXERTION: 0
SPUTUM PRODUCTION: 0
FLANK PAIN: 0
NUMBNESS: 0
DECREASED CONCENTRATION: 0
BACK PAIN: 0
BREAST PAIN: 0
ARTHRALGIAS: 0
POLYDIPSIA: 0
BLOATING: 0
SNORES LOUDLY: 0
STIFFNESS: 0
POSTURAL DYSPNEA: 0
HEMOPTYSIS: 0
DECREASED LIBIDO: 0
RECTAL BLEEDING: 0
NERVOUS/ANXIOUS: 0
DIARRHEA: 0
INSOMNIA: 0
COUGH: 0
LOSS OF CONSCIOUSNESS: 0
JOINT SWELLING: 0
WHEEZING: 0
MUSCLE WEAKNESS: 0
SWOLLEN GLANDS: 0
NECK PAIN: 0
MYALGIAS: 0
INCREASED ENERGY: 0
EYE REDNESS: 0
BREAST MASS: 0
DIZZINESS: 0
COUGH DISTURBING SLEEP: 0
POLYPHAGIA: 0
EYE IRRITATION: 0
PARALYSIS: 0
BRUISES/BLEEDS EASILY: 0
FATIGUE: 0
SPEECH CHANGE: 0
DYSURIA: 0
EXTREMITY NUMBNESS: 0
BOWEL INCONTINENCE: 0
TREMORS: 0
MUSCLE CRAMPS: 0
ALTERED TEMPERATURE REGULATION: 0

## 2022-11-11 NOTE — LETTER
2022         RE: Cordell Donaldson  [address]  [facility] [id number]        Dear Colleague,    Thank you for referring your patient, Cordell Donaldson, to the [facility]. Please see a copy of my visit note below.        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  She presents today for follow up and disease management by video.     Oncology History:    2003: Left breast cancer.  -Lumpectomy.  -Radiation therapy.     [id number]: BRENNAN breast cancer.     2006: Negative for germline BRCA1, BRCA2 pathogenic variants.      10/24/12:   -Endocervical curettings: Squamous carcinoma, invasion cannot be assessed.   -Ectocervical biopsies: HSIL (CIN3).   -Vaginal biopsy, posterior: HSIL (VaIN3).  2012: Robotic-assisted laparoscopic lysis of adhesions, left ureterolysis with  conversion to laparotomy, total abdominal hysterectomy, and an upper  Vaginectomy by gynecologic oncologist Dr. Arciniega.   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      13: Vaginal biopsy: HSIL, cannot exclude invasion.   -Consultation with gynecologic oncologist Dr. Johnson. Upper vaginectomy and CO2 laser ablation recommended, patient declined.   [id number]: No vaginal dysplasia treatment.   22: Pelvic MRI:   Centrally hypoenhancing heterogeneous midline pelvic mass along  the superior margin of the vaginal vault measures 8.5 x 6.7 x 8.1 cm.  The mass abut the  bladder and there is mucosal irregularity at the  site of abutment. The mass indents the  decompressed rectum but there is no definite invasion or mucosal  Irregularity.  6/9/22: CT-guided biopsy of pelvic mass: Poorly-differentiated squamous cell carcinoma, HPV-associated.   7/1/22: Staging PET/CT: Stage III vaginal cancer (vs recurrent, metastatic cervical cancer).     7/1/22: PET/CT to stage vaginal cancer (see below).   Mild soft tissue  thickening in the left retropectoral region with an SUV max of 2.7.  7/15/22: Invasive ductal carcinoma.   -Consultation with Dr. Montana. Recommendation to prioritize vaginal/recurrent cervix cancer. Plan to follow breast lesion with repeat MRI. Plan for endocrine therapy after completion of chemotherapy for vaginal/cervical cancer.         7/20/22, 8/10/22, 8/31/22,9/21/22: Cycles #1-4 of first-line chemotherapy with IV cisplatin 50 mg/m2 + paclitaxel 175 mg/m2 + bevacizumab 15 mg/kg every 21 days.  8/31/22: Cycle 3 Pembrolizumab 200 mg every 21 days added to triplet chemotherapy per the KEYNOTE-826 regimen.   -9/22/22: PET/CT: Partial response.    Stable 3 mm solid  nodules in the left upper lobe. Resolved FDG  avidity of a left retropectoral, presumably lymph node which is now  Calcified.  Multiple stable FDG avid iliac chain lymph nodes near the aortic  bifurcation with SUV max 14.0. Significant reduction in left internal  iliac chain nodes, with no single node with SUV max 5.5 which measures  up to 12 mm in short axis. Significantly reduced  size of FDG avid cervical mass with smaller area of enhancing  nodularity on the right side wall measuring 3.3 x 1.5 cm with SUV max  16.3. There is central necrosis and abutment of  the rectum. There is an additional FDG avid foci more inferiorly on  the anterior vaginal wall with SUV max 6.7, which is also decreased in  Size.  -Discussed with radiation oncologist Dr. Stockton and at the gyn onc/radiation oncology tumor conference.  Recommendation for additional chemotherapy prior to radiation therapy.       8/14/22 (specimen from 6/9/22): [facility] Testing.   -PD-L1+ (CPS 10)  -Mismatch repair deficient/microsatellite instability-high  -TMB high (53 mut/Mb)  -NTRK 1/2/3 fusion not detected.     Plan: First-line chemotherapy with palliative intent with IV cisplatin 50 mg/m2 + paclitaxel 175 mg/m2 + bevacizumab 15 mg/kg + pembrolizumab 200 mg every 21 days x 3 cycles followed by PET CT and follow up with Dr. Raygoza.      7/20/2022: Cycle 1 Cisplatin, Paclitaxel, bevazicumab  8/8/2022: Cycle 2 Cisplatin, Paclitaxel, bevacizumab   8/31/2022: Cycle 3 Cisplatin, Paclitaxel, bevacizumab, pembrolizumab    9/22/22: PET CT    IMPRESSION: In this patient with history of cervical cancer:  1. Significant interval reduction in the size and FDG avidity of large  cervical mass with persistent FDG activity about the right cervical  side wall and anterior vaginal wall.  2. Significant reduction in size and number of FDG avid iliac chain  lymph nodes with multiple persistent FDG avid iliac chain lymph nodes.  3. Diffuse gastric hypermetabolism, correlate for symptoms of  gastritis.   4. Resolved FDG avidity of a left retropectoral lymph node, presumably  previously reactive.    Plan: First-line chemotherapy with palliative intent with IV cisplatin 50 mg/m2 + paclitaxel 175 mg/m2 + bevacizumab 15 mg/kg + pembrolizumab 200 mg every 21 days x 3 cycles followed by PET CT and follow up with Dr. Raygoza.    10/3/22: Cycle 4 cisplatin, paclitaxel, bevacizumab, pembrolizumab.  10/26/22: Cycle 5 carboplatin AUC 6 (switched due to side effects), paclitaxel 135 mg/m2 (dose reduced due to side effects), bevacizumab, pembrolizumab. Hold bevacizumab due to elevated BP (157/117) and afib with RVR.  11/14/22: Cycle 6 carboplatin, paclitaxel, bevacizumab, pembrolizumab.                           Today she reports feeling well overall and is without concern.  She reports that her HR has  been good since her last cycle.  Today it was 86 and a couple days ago it was also in the 80s.  Her BPs have been running 120s - 130s/90s.  She did see Dr. Huitron after her last visit who has ordered an echo which will be completed on Sunday.  She continues to have nosebleeds in the morning with clots.  Bleeding resolved on its own.  She has been placing Vitamine E on her nares at night but feels as though the bleeding is coming from in her sinuses.  She does have a humidifier and plans to use this.  She continues with an itchy rash on her lower back and on her upper back.  She has tried her hydrocortisone cream on this in the past and it did not seem to help.  Currently she is using aloe vera cream and frankincense/lavender essential oils.  She feels as though this is helping and her  reports that it is looking better.  She continues with neuropathy in the bottoms of her feet and on the insides of her legs.  This seems worse at night.  She is using CBD cream which is helpful.  She feels as though this is improved since her last cycle.  She does have some nausea after treatment and have 1 episode of vomiting since starting chemotherapy.  She is not using any antiemetics.  She is using beatris products such as beatris tea and fresh beatris etc.  She has been trying to gain weight but currently weight is stable.  She is eating small frequent meals.  She denies any changes in her bowel or bladder habits, no fevers or chills, and no chest pain or shortness of breath.                         Review of Systems     Constitutional:  Negative for fever, chills, weight loss, weight gain, fatigue, decreased appetite, night sweats, recent stressors, height gain, height loss, post-operative complications, incisional pain, hallucinations, increased energy, hyperactivity and confused.   HENT:  Positive for nosebleeds.    Eyes:  Negative for double vision, pain, redness, eye pain, decreased vision, eye watering, eye bulging,  eye dryness, flashing lights, spots, floaters, strabismus, tunnel vision, jaundice and eye irritation.   Respiratory:   Negative for cough, hemoptysis, sputum production, shortness of breath, wheezing, snores loudly, respiratory pain, dyspnea on exertion, cough disturbing sleep and postural dyspnea.    Cardiovascular:  Positive for palpitations and hypertension. Negative for dyspnea on exertion.   Gastrointestinal:  Negative for heartburn, nausea, vomiting, abdominal pain, diarrhea, constipation, blood in stool, melena, rectal pain, bloating, hemorrhoids, bowel incontinence, jaundice, rectal bleeding, coffee ground emesis and change in stool.   Genitourinary:  Negative for bladder incontinence, dysuria, urgency, hematuria, flank pain, vaginal discharge, difficulty urinating, genital sores, dyspareunia, decreased libido, nocturia, voiding less frequently, arousal difficulty, abnormal vaginal bleeding, excessive menstruation, menstrual changes, hot flashes, vaginal dryness and postmenopausal bleeding.   Musculoskeletal:  Negative for myalgias, back pain, joint swelling, arthralgias, stiffness, muscle cramps, neck pain, bone pain, muscle weakness and fracture.   Skin:  Positive for rash.   Neurological:  Negative for dizziness, tingling, tremors, speech change, seizures, loss of consciousness, weakness, numbness, headaches, disturbances in coordination, extremity numbness, memory loss, difficulty walking and paralysis.   Endo/Heme:  Negative for anemia, swollen glands and bruises/bleeds easily.   Psychiatric/Behavioral:  Negative for depression, hallucinations, memory loss, decreased concentration, mood swings and panic attacks.    Breast:  Negative for breast discharge, breast mass, breast pain and nipple retraction.   Endocrine:  Negative for altered temperature regulation, polyphagia and polydipsia.        Past Medical History:    Past Medical History:   Diagnosis Date     Abnormal Pap smear     maybe 20 years ago      Cervical cancer (H)      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      History of breast cancer 2003    lumpectomy and radiation offerred chemo and patient declined at time but had oncogene test and low risk     Hyperlipidemia LDL goal < 160      Osteopenia      Paroxysmal A-fib (H)      Severe vaginal dysplasia          Past Surgical History:    Past Surgical History:   Procedure Laterality Date     BIOPSY       BREAST SURGERY Left 2003    lumpectomy left, did radiation, 5 yr of tamoxifen     COLONOSCOPY  2018    repeat in 2028     Colposcopy Cervix with Loop Electrode Conization and Lser to Vagina  2008     COLPOSCOPY, BIOPSY, COMBINED  10/24/2012    Procedure: COMBINED COLPOSCOPY, BIOPSY;  Colposcopy, Biopsy of Cervix and Vagina, Ultrasound Guidance;  Surgeon: Colette Ng MD;  Location: [facility] OR     ENT SURGERY  01/23/2018    otoscelerosis, right side     HYSTERECTOMY, FRANCIA  12/2012    high grade cervical dysplasia, [facility], Dr. Arciniega     INSERT PORT VASCULAR ACCESS Right 7/18/2022    Procedure: INSERTION, VASCULAR ACCESS PORT;  Surgeon: Donnie Elias MD;  Location: [facility] OR     IR CHEST PORT PLACEMENT > 5 YRS OF AGE  7/18/2022     WI LAP VAGINECTOMY, PARTIAL REMOVAL OF VAGINAL WALL  10/2013    upper vaginectomy for dysplasia, Dr. Megan Arciniega         Health Maintenance Due   Topic Date Due     Pneumococcal Vaccine: 65+ Years (1 - PCV) Never done     ZOSTER IMMUNIZATION (2 of 2) 02/12/2020     COVID-19 Vaccine (4 - Booster for Pfizer series) 12/03/2021     INFLUENZA VACCINE (1) 09/01/2022     PAP  01/02/2023       Current Medications:     Current Outpatient Medications   Medication Sig Dispense Refill     Acetylcarnitine HCl 250 MG CAPS Take 500 mg by mouth 2 times daily        aspirin (ASA) 81 MG chewable tablet Take 81 mg by mouth daily       Bacillus Coagulans-Inulin (PROBIOTIC FORMULA) 1-250 BILLION-MG CAPS Take by mouth three times a week 25+billion CFUS        BENFOTIAMINE PO Take 150 mg by mouth 2 times daily       Cholecalciferol (VITAMIN D-3) 25 MCG (1000 UT) CAPS Take 1,000 Units by mouth daily 90 capsule 3     Coenzyme Q10 (CO Q 10 PO) Take 100 mg by mouth daily        dexamethasone (DECADRON) 4 MG tablet Take 2 tablets (8 mg) by mouth daily Take for 3 days, starting the day after chemo. Take with food. 6 tablet 3     Metamora Berry 550 MG CAPS Take 400 mg by mouth 2 times daily 60 capsule 11     MAGNESIUM OXIDE PO Take 500 mg by mouth       metoprolol succinate ER (TOPROL XL) 50 MG 24 hr tablet Take 1 tablet (50 mg) by mouth daily 90 tablet 1     Omega-3 Fatty Acids (OMEGA-3 FISH OIL PO) Take 630 mg by mouth 2 times daily        ondansetron (ZOFRAN) 8 MG tablet Take 1 tablet (8 mg) by mouth every 8 hours as needed for nausea (vomiting) 30 tablet 2     OVER-THE-COUNTER Turkey tail mushroom powder, use 3 times a day 1 Can 11     Pectin Cit-Inos-C-Bioflav-Soy (MODIFIED CITRUS PECTIN PO)        prochlorperazine (COMPAZINE) 10 MG tablet Take 1 tablet (10 mg) by mouth every 6 hours as needed for nausea or vomiting 30 tablet 2     Selenium 200 MCG CAPS Take 200 mg by mouth       STATIN NOT PRESCRIBED (INTENTIONAL) Please choose reason not prescribed from choices below.       Taurine 1000 MG CAPS Take one po twice daily 60 capsule 11     Turmeric 450 MG CAPS Take 2 capsules by mouth daily 60 capsule 11         Allergies:      No Known Allergies     Social History:     Social History     Tobacco Use     Smoking status: Former     Packs/day: 0.50     Years: 15.00     Pack years: 7.50     Types: Cigarettes     Smokeless tobacco: Never   Substance Use Topics     Alcohol use: Yes     Alcohol/week: 2.0 standard drinks     Types: 2 Standard drinks or equivalent per week     Comment: Socially        History   Drug Use No         Family History:     The patient's family history is notable for:    Family History   Problem Relation Age of Onset     Myocardial Infarction Mother 73      "   Tob     Myocardial Infarction Father 68        Tob     Breast Cancer Sister 47         of breast cancer age 63; BRCA mutation testing negative     Cancer Maternal Grandmother         Unsure of type.     Breast Cancer Paternal Grandmother         Unsure of diagnosis--some type of \"women's issue\"         Physical Exam:     There were no vitals taken for this visit.  There is no height or weight on file to calculate BMI.    General Appearance: healthy and alert, no distress    Eyes:  Eyes grossly normal to inspection.  No discharge or erythema, or obvious scleral/conjunctival abnormalities.    Respiratory: No audible wheeze, cough, or visible cyanosis.  No visible retractions or increased work of breathing.     Musculoskeletal: extremities non tender and without edema    Skin: no lesions or rashes on visible skin    Neurological: normal gait, no gross defects     Psychiatric: appropriate mood and affect. Mentation appears normal, affect normal/bright, judgement and insight intact, normal speech and appearance well-groomed                            The rest of a comprehensive physical examination is deferred due to PHE (public health emergency) video visit restrictions.       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  She presents today for follow up and disease management by video.    40 minutes spent on the date of the encounter doing chart review, history and exam, documentation, and further activities as noted above.      Plan:     1.) Cervical vs vaginal cancer:  OK to proceed with planned treatment Monday if labs and BP are WDL.  RTC as scheduled for PET CT and follow up with Dr. Raygoza.  Still awaiting scheduling of follow-up with Dr. Raygoza.  She did not want to schedule an additional cycle in case she needed it.  Reviewed signs and " symptoms for when she should contact the clinic or seek additional care.  Patient to contact the clinic with any questions or concerns in the interim.        Genetic risk factors were assessed and she does not meet qualification for referral for cervical/vaginal cancer.  Previously tested negative to BRCA1/BRCA2.      Labs and/or tests ordered include:  CBC. CMP. Mag. Protein urine.  TSH. T4.     2.) Health maintenance:  Issues addressed today include following up with PCP for annual health maintenance and non-gynecologic issues.     3.) Neuropathy: Tolerable symptoms not inhibiting function.  Okay to continue Taxol at its current dose.  We will continue to monitor symptoms.  Okay to continue CBD oil as it is helpful per patient.    4.) Epistaxis: Per Dr. Raygoza potentially related to Avastin.  Discussed using humidifier at night in bedroom as symptoms worsen in the morning.  She is agreeable to try this.  Okay to continue vitamin E oil on naris still bleeding appears to be more significant than nasal septum irritation.    5.) Altered thyroid function: Thyroid function will be drawn Monday with treatment.  TSH has been elevated and most recently decreased with elevated T4.  Currently asymptomatic we will continue to monitor.    6.) A. Fib: Symptoms much improved and managed with medications currently.  Patient reports that Dr. Huitron will be in clinic during her infusion on Monday and can be paged if she is having cardiac changes.  Currently heart rate has been low and stable without symptoms.    7.) HTN: BP much improved since last treatment.  Okay to continue with Avastin as scheduled on Monday if BP is <160/100.  Per Dr. Huitron's note patient is resistant to starting HTN medication.  BPs at home have been elevated but okay currently.    8.) Dermatitis: Likely related to pembrolizumab.  Improving per patient report.  Okay to continue aloe vera and essential oils as she feels this is helpful.  Discussed trying a  hydrocortisone ointment instead of a hydrocortisone cream.      Niya Holden, DNP, APRN, FNP-C  Nurse Practitioner  Division of Gynecologic Oncology  Pager: [phone]     CC  Patient Care Team:  Merry Lino MD as PCP - General (Internal Medicine)  Gui Mccollum MD [place], Aaron Daniel, MYAM as MD (Podiatry)  Elise Huitron MD as Assigned Heart and Vascular Provider  Merry Lino MD as Assigned PCP  Tess Mcgowan, RN as Specialty Care Coordinator  Heather Ayon MD as MD (Urology)  Angela Montana MD as MD (Hematology & Oncology)  Yvette Pike MD as MD (Surgery)  Valeri Kirby, GEOFF as Specialty Care Coordinator (Hematology & Oncology)  Yvette Pike MD as Assigned Surgical Provider  Jewels Oconnor, APRN CNP as Assigned Cancer Care Provider  Nurys Horton RN as Specialty Care Coordinator (Hematology & Oncology)  GUI MCCOLLUM

## 2022-11-11 NOTE — NURSING NOTE
Patient declined individual medication review by support staff because - pt stated data was up to date except for removing the 81 MG  aspirin.

## 2022-11-13 ENCOUNTER — ANCILLARY PROCEDURE (OUTPATIENT)
Dept: CARDIOLOGY | Facility: CLINIC | Age: 72
End: 2022-11-13
Attending: INTERNAL MEDICINE
Payer: COMMERCIAL

## 2022-11-13 DIAGNOSIS — I48.20 CHRONIC ATRIAL FIBRILLATION (H): ICD-10-CM

## 2022-11-13 LAB — LVEF ECHO: NORMAL

## 2022-11-13 PROCEDURE — 93306 TTE W/DOPPLER COMPLETE: CPT | Performed by: INTERNAL MEDICINE

## 2022-11-13 NOTE — PROGRESS NOTES
Patient arrived for echocardiogram. Test performed without complications. Writer noted Afib w/ RVR during exam with rates from 100-140bpm. Patient informed of AFib with RVR. States she believes this is due to anxiety/stress. Otherwise asymptomatic, no CP/SOB/edema. She declined to be evaluated by a physician and feels comfortable going home, says BP and HR are within normal limits at home. Advised patient to go to ED if symptoms worsen.     Rosalind Polanco RDCS

## 2022-11-14 ENCOUNTER — APPOINTMENT (OUTPATIENT)
Dept: LAB | Facility: CLINIC | Age: 72
End: 2022-11-14
Attending: NURSE PRACTITIONER
Payer: COMMERCIAL

## 2022-11-14 ENCOUNTER — INFUSION THERAPY VISIT (OUTPATIENT)
Dept: ONCOLOGY | Facility: CLINIC | Age: 72
End: 2022-11-14
Attending: OBSTETRICS & GYNECOLOGY
Payer: COMMERCIAL

## 2022-11-14 VITALS
DIASTOLIC BLOOD PRESSURE: 86 MMHG | WEIGHT: 114.1 LBS | BODY MASS INDEX: 20.47 KG/M2 | OXYGEN SATURATION: 98 % | HEART RATE: 84 BPM | RESPIRATION RATE: 18 BRPM | SYSTOLIC BLOOD PRESSURE: 145 MMHG | TEMPERATURE: 97.7 F

## 2022-11-14 DIAGNOSIS — C53.0 MALIGNANT NEOPLASM OF ENDOCERVIX (H): Primary | ICD-10-CM

## 2022-11-14 LAB
ALBUMIN SERPL BCG-MCNC: 4.1 G/DL (ref 3.5–5.2)
ALBUMIN UR-MCNC: ABNORMAL MG/DL
ALP SERPL-CCNC: 61 U/L (ref 35–104)
ALT SERPL W P-5'-P-CCNC: 48 U/L (ref 10–35)
ANION GAP SERPL CALCULATED.3IONS-SCNC: 11 MMOL/L (ref 7–15)
AST SERPL W P-5'-P-CCNC: 37 U/L (ref 10–35)
BASOPHILS # BLD AUTO: 0 10E3/UL (ref 0–0.2)
BASOPHILS NFR BLD AUTO: 1 %
BILIRUB SERPL-MCNC: 0.3 MG/DL
BUN SERPL-MCNC: 26.6 MG/DL (ref 8–23)
CALCIUM SERPL-MCNC: 9.7 MG/DL (ref 8.8–10.2)
CHLORIDE SERPL-SCNC: 107 MMOL/L (ref 98–107)
CREAT SERPL-MCNC: 0.77 MG/DL (ref 0.51–0.95)
DEPRECATED HCO3 PLAS-SCNC: 24 MMOL/L (ref 22–29)
EOSINOPHIL # BLD AUTO: 0 10E3/UL (ref 0–0.7)
EOSINOPHIL NFR BLD AUTO: 0 %
ERYTHROCYTE [DISTWIDTH] IN BLOOD BY AUTOMATED COUNT: 16.6 % (ref 10–15)
GFR SERPL CREATININE-BSD FRML MDRD: 82 ML/MIN/1.73M2
GLUCOSE SERPL-MCNC: 112 MG/DL (ref 70–99)
HCT VFR BLD AUTO: 40.6 % (ref 35–47)
HGB BLD-MCNC: 13.1 G/DL (ref 11.7–15.7)
IMM GRANULOCYTES # BLD: 0 10E3/UL
IMM GRANULOCYTES NFR BLD: 0 %
LYMPHOCYTES # BLD AUTO: 1 10E3/UL (ref 0.8–5.3)
LYMPHOCYTES NFR BLD AUTO: 39 %
MAGNESIUM SERPL-MCNC: 1.8 MG/DL (ref 1.7–2.3)
MCH RBC QN AUTO: 27.3 PG (ref 26.5–33)
MCHC RBC AUTO-ENTMCNC: 32.3 G/DL (ref 31.5–36.5)
MCV RBC AUTO: 85 FL (ref 78–100)
MONOCYTES # BLD AUTO: 0.4 10E3/UL (ref 0–1.3)
MONOCYTES NFR BLD AUTO: 17 %
NEUTROPHILS # BLD AUTO: 1.1 10E3/UL (ref 1.6–8.3)
NEUTROPHILS NFR BLD AUTO: 43 %
NRBC # BLD AUTO: 0 10E3/UL
NRBC BLD AUTO-RTO: 0 /100
PLATELET # BLD AUTO: 143 10E3/UL (ref 150–450)
POTASSIUM SERPL-SCNC: 4.2 MMOL/L (ref 3.4–5.3)
PROT SERPL-MCNC: 6.3 G/DL (ref 6.4–8.3)
RBC # BLD AUTO: 4.79 10E6/UL (ref 3.8–5.2)
SODIUM SERPL-SCNC: 142 MMOL/L (ref 136–145)
T4 FREE SERPL-MCNC: 2.03 NG/DL (ref 0.9–1.7)
TSH SERPL DL<=0.005 MIU/L-ACNC: 0.04 UIU/ML (ref 0.3–4.2)
WBC # BLD AUTO: 2.6 10E3/UL (ref 4–11)

## 2022-11-14 PROCEDURE — 84443 ASSAY THYROID STIM HORMONE: CPT

## 2022-11-14 PROCEDURE — 83735 ASSAY OF MAGNESIUM: CPT | Performed by: OBSTETRICS & GYNECOLOGY

## 2022-11-14 PROCEDURE — 250N000011 HC RX IP 250 OP 636: Performed by: OBSTETRICS & GYNECOLOGY

## 2022-11-14 PROCEDURE — 36593 DECLOT VASCULAR DEVICE: CPT

## 2022-11-14 PROCEDURE — 85025 COMPLETE CBC W/AUTO DIFF WBC: CPT | Performed by: OBSTETRICS & GYNECOLOGY

## 2022-11-14 PROCEDURE — 36415 COLL VENOUS BLD VENIPUNCTURE: CPT

## 2022-11-14 PROCEDURE — 84439 ASSAY OF FREE THYROXINE: CPT

## 2022-11-14 PROCEDURE — 80053 COMPREHEN METABOLIC PANEL: CPT | Performed by: OBSTETRICS & GYNECOLOGY

## 2022-11-14 PROCEDURE — 81003 URINALYSIS AUTO W/O SCOPE: CPT | Performed by: OBSTETRICS & GYNECOLOGY

## 2022-11-14 RX ORDER — HEPARIN SODIUM (PORCINE) LOCK FLUSH IV SOLN 100 UNIT/ML 100 UNIT/ML
5 SOLUTION INTRAVENOUS
Status: DISCONTINUED | OUTPATIENT
Start: 2022-11-14 | End: 2022-11-14

## 2022-11-14 RX ORDER — HEPARIN SODIUM (PORCINE) LOCK FLUSH IV SOLN 100 UNIT/ML 100 UNIT/ML
5 SOLUTION INTRAVENOUS ONCE
Status: COMPLETED | OUTPATIENT
Start: 2022-11-14 | End: 2022-11-14

## 2022-11-14 RX ADMIN — SODIUM CHLORIDE, PRESERVATIVE FREE 5 ML: 5 INJECTION INTRAVENOUS at 08:41

## 2022-11-14 RX ADMIN — ALTEPLASE 2 MG: 2.2 INJECTION, POWDER, LYOPHILIZED, FOR SOLUTION INTRAVENOUS at 07:57

## 2022-11-14 RX ADMIN — SODIUM CHLORIDE, PRESERVATIVE FREE 5 ML: 5 INJECTION INTRAVENOUS at 07:14

## 2022-11-14 ASSESSMENT — PAIN SCALES - GENERAL: PAINLEVEL: NO PAIN (0)

## 2022-11-14 NOTE — PROGRESS NOTES
Infusion Nursing Note:  Cordell Donaldson presents today for TPA, cycle 6, day 1 keytruda, taxol, carbo, avastin--DEFERRED.    Patient seen by provider today: No   present during visit today: Not Applicable.    Note: Patient had a visit with Niya Holden on 11/11.  Denies any changes over the weekend.  Denies any fevers or chills. States the bleeding from her nose has actually improved over the past couple of days. Had an echocardiogram yesterday which showed a-fib with RVR.  Patient denies any palpitations or other symptoms. Reports HR has been in the 80s at home and BP has been well controlled 120s-140s/80as at home.  Heart rate in the 80s in infusion today with /86.  Patient will continue to monitor.      Intravenous Access:  Implanted Port.  Scant blood return in lab and no blood return on arrival to infusion, flushed with ease. TPA instilled.  After 45 minutes, TPA withdrawn with brisk blood return.      Treatment Conditions:  Lab Results   Component Value Date    HGB 13.1 11/14/2022    WBC 2.6 (L) 11/14/2022    ANEU 1.8 08/22/2017    ANEUTAUTO 1.1 (L) 11/14/2022     (L) 11/14/2022      Lab Results   Component Value Date     11/14/2022    POTASSIUM 4.2 11/14/2022    MAG 1.8 11/14/2022    CR 0.77 11/14/2022    LINDA 9.7 11/14/2022    BILITOTAL 0.3 11/14/2022    ALBUMIN 4.1 11/14/2022    ALT 48 (H) 11/14/2022    AST 37 (H) 11/14/2022     Results reviewed, labs did NOT meet treatment parameters: ANC 1.1.    11/14/2022 0805 ANITA Raygoza MD/Gali Jones,RN  -defer treatment one week for ANC 1.1 today     Neutropenic precautions reviewed with patient and spouse    Post Infusion Assessment:  Patient tolerated infusion without incident.  Blood return noted following TPA installation   Site patent and intact, free from redness, edema or discomfort.  No evidence of extravasations.  Access discontinued per protocol.     Discharge Plan:   Patient declined prescription refills.  Discharge  instructions reviewed with: Patient and Family.  Patient and/or family verbalized understanding of discharge instructions and all questions answered.  AVS to patient via [email or web address].  Patient will return in one week for next appointment. Message sent to scheduling and RNCC to get patient rescheduled.  Patient will [facility] for appointments int [facility]  Patient discharged in stable condition accompanied by: self and .  Departure Mode: Ambulatory.      Gali Jones RN

## 2022-11-14 NOTE — PATIENT INSTRUCTIONS
[facility] Triage and after hours / weekends / holidays:  [phone]    Please call the triage or after hours line if you experience a temperature greater than or equal to 100.4, shaking chills, have uncontrolled nausea, vomiting and/or diarrhea, dizziness, shortness of breath, chest pain, bleeding, unexplained bruising, or if you have any other new/concerning symptoms, questions or concerns.      If you are having any concerning symptoms or wish to speak to a provider before your next infusion visit, please call your care coordinator or triage to notify them so we can adequately serve you.     If you need a refill on a narcotic prescription or other medication, please call before your infusion appointment.           November 2022 Sunday Monday Tuesday Wednesday Thursday Friday Saturday             1     2     3     4     5       6     7     8     9     10     11    VIDEO VISIT RETURN   1:45 PM   (40 min.)   Niya Holden, TE CNP   [facility] 12       13    ECHO COMPLETE  10:00 AM   (60 min.)   [id number]   [facility] 14    LAB CENTRAL   7:00 AM   (15 min.)   [facility] LAB DRAW   [facility]    ONC INFUSION 6 HR (360 MIN)   7:30 AM   (360 min.)    ONC INFUSION NURSE   [facility] 15     16     17     18     19       20     21     22     23     24     25     26       27     28     29     30                                  December 2022 Sunday Monday Tuesday Wednesday Thursday Friday Saturday                       1    PET ONCOLOGY WHOLE BODY   8:30 AM   (60 min.)   [id number]   [facility] 2     3       4     5     6     7     8     9     10       11     12     13     14     15     16     17       18     19     20     21     22     23     24       25     26     27     28     29     30     31                        Recent Results (from the past 24 hour(s))    Echocardiogram Complete    Collection Time: 11/13/22 10:42 AM   Result Value Ref Range    LVEF  60-65%    Comprehensive metabolic panel    Collection Time: 11/14/22  7:29 AM   Result Value Ref Range    Sodium 142 136 - 145 mmol/L    Potassium 4.2 3.4 - 5.3 mmol/L    Chloride 107 98 - 107 mmol/L    Carbon Dioxide (CO2) 24 22 - 29 mmol/L    Anion Gap 11 7 - 15 mmol/L    Urea Nitrogen 26.6 (H) 8.0 - 23.0 mg/dL    Creatinine 0.77 0.51 - 0.95 mg/dL    Calcium 9.7 8.8 - 10.2 mg/dL    Glucose 112 (H) 70 - 99 mg/dL    Alkaline Phosphatase 61 35 - 104 U/L    AST 37 (H) 10 - 35 U/L    ALT 48 (H) 10 - 35 U/L    Protein Total 6.3 (L) 6.4 - 8.3 g/dL    Albumin 4.1 3.5 - 5.2 g/dL    Bilirubin Total 0.3 <=1.2 mg/dL    GFR Estimate 82 >60 mL/min/1.73m2   Magnesium    Collection Time: 11/14/22  7:29 AM   Result Value Ref Range    Magnesium 1.8 1.7 - 2.3 mg/dL   Protein qualitative urine    Collection Time: 11/14/22  7:29 AM   Result Value Ref Range    Protein Albumin Urine Trace (A) Negative mg/dL   TSH    Collection Time: 11/14/22  7:29 AM   Result Value Ref Range    TSH 0.04 (L) 0.30 - 4.20 uIU/mL   T4 free    Collection Time: 11/14/22  7:29 AM   Result Value Ref Range    Free T4 2.03 (H) 0.90 - 1.70 ng/dL   CBC with platelets and differential    Collection Time: 11/14/22  7:29 AM   Result Value Ref Range    WBC Count 2.6 (L) 4.0 - 11.0 10e3/uL    RBC Count 4.79 3.80 - 5.20 10e6/uL    Hemoglobin 13.1 11.7 - 15.7 g/dL    Hematocrit 40.6 35.0 - 47.0 %    MCV 85 78 - 100 fL    MCH 27.3 26.5 - 33.0 pg    MCHC 32.3 31.5 - 36.5 g/dL    RDW 16.6 (H) 10.0 - 15.0 %    Platelet Count 143 (L) 150 - 450 10e3/uL    % Neutrophils 43 %    % Lymphocytes 39 %    % Monocytes 17 %    % Eosinophils 0 %    % Basophils 1 %    % Immature Granulocytes 0 %    NRBCs per 100 WBC 0 <1 /100    Absolute Neutrophils 1.1 (L) 1.6 - 8.3 10e3/uL    Absolute Lymphocytes 1.0 0.8 - 5.3 10e3/uL    Absolute Monocytes 0.4 0.0 - 1.3 10e3/uL    Absolute Eosinophils 0.0  0.0 - 0.7 10e3/uL    Absolute Basophils 0.0 0.0 - 0.2 10e3/uL    Absolute Immature Granulocytes 0.0 <=0.4 10e3/uL    Absolute NRBCs 0.0 10e3/uL

## 2022-11-15 ENCOUNTER — MYC MEDICAL ADVICE (OUTPATIENT)
Dept: ONCOLOGY | Facility: CLINIC | Age: 72
End: 2022-11-15

## 2022-11-21 ENCOUNTER — INFUSION THERAPY VISIT (OUTPATIENT)
Dept: ONCOLOGY | Facility: CLINIC | Age: 72
End: 2022-11-21
Attending: INTERNAL MEDICINE
Payer: COMMERCIAL

## 2022-11-21 ENCOUNTER — APPOINTMENT (OUTPATIENT)
Dept: LAB | Facility: CLINIC | Age: 72
End: 2022-11-21
Attending: INTERNAL MEDICINE
Payer: COMMERCIAL

## 2022-11-21 VITALS
BODY MASS INDEX: 20.42 KG/M2 | SYSTOLIC BLOOD PRESSURE: 136 MMHG | WEIGHT: 113.8 LBS | HEART RATE: 95 BPM | TEMPERATURE: 97.5 F | RESPIRATION RATE: 16 BRPM | OXYGEN SATURATION: 99 % | DIASTOLIC BLOOD PRESSURE: 91 MMHG

## 2022-11-21 DIAGNOSIS — C53.0 MALIGNANT NEOPLASM OF ENDOCERVIX (H): Primary | ICD-10-CM

## 2022-11-21 LAB
ALBUMIN SERPL BCG-MCNC: 4.1 G/DL (ref 3.5–5.2)
ALBUMIN UR-MCNC: NEGATIVE MG/DL
ALP SERPL-CCNC: 54 U/L (ref 35–104)
ALT SERPL W P-5'-P-CCNC: 30 U/L (ref 10–35)
ANION GAP SERPL CALCULATED.3IONS-SCNC: 11 MMOL/L (ref 7–15)
AST SERPL W P-5'-P-CCNC: 27 U/L (ref 10–35)
BASOPHILS # BLD AUTO: 0 10E3/UL (ref 0–0.2)
BASOPHILS NFR BLD AUTO: 0 %
BILIRUB SERPL-MCNC: 0.4 MG/DL
BUN SERPL-MCNC: 26.4 MG/DL (ref 8–23)
CALCIUM SERPL-MCNC: 9.8 MG/DL (ref 8.8–10.2)
CHLORIDE SERPL-SCNC: 106 MMOL/L (ref 98–107)
CREAT SERPL-MCNC: 0.77 MG/DL (ref 0.51–0.95)
DEPRECATED HCO3 PLAS-SCNC: 25 MMOL/L (ref 22–29)
EOSINOPHIL # BLD AUTO: 0.1 10E3/UL (ref 0–0.7)
EOSINOPHIL NFR BLD AUTO: 3 %
ERYTHROCYTE [DISTWIDTH] IN BLOOD BY AUTOMATED COUNT: 16.3 % (ref 10–15)
GFR SERPL CREATININE-BSD FRML MDRD: 82 ML/MIN/1.73M2
GLUCOSE SERPL-MCNC: 106 MG/DL (ref 70–99)
HCT VFR BLD AUTO: 40 % (ref 35–47)
HGB BLD-MCNC: 13 G/DL (ref 11.7–15.7)
IMM GRANULOCYTES # BLD: 0 10E3/UL
IMM GRANULOCYTES NFR BLD: 0 %
LYMPHOCYTES # BLD AUTO: 1.2 10E3/UL (ref 0.8–5.3)
LYMPHOCYTES NFR BLD AUTO: 35 %
MAGNESIUM SERPL-MCNC: 1.9 MG/DL (ref 1.7–2.3)
MCH RBC QN AUTO: 27.6 PG (ref 26.5–33)
MCHC RBC AUTO-ENTMCNC: 32.5 G/DL (ref 31.5–36.5)
MCV RBC AUTO: 85 FL (ref 78–100)
MONOCYTES # BLD AUTO: 0.4 10E3/UL (ref 0–1.3)
MONOCYTES NFR BLD AUTO: 10 %
NEUTROPHILS # BLD AUTO: 1.8 10E3/UL (ref 1.6–8.3)
NEUTROPHILS NFR BLD AUTO: 52 %
NRBC # BLD AUTO: 0 10E3/UL
NRBC BLD AUTO-RTO: 0 /100
PLATELET # BLD AUTO: 158 10E3/UL (ref 150–450)
POTASSIUM SERPL-SCNC: 4 MMOL/L (ref 3.4–5.3)
PROT SERPL-MCNC: 6.4 G/DL (ref 6.4–8.3)
RBC # BLD AUTO: 4.71 10E6/UL (ref 3.8–5.2)
SODIUM SERPL-SCNC: 142 MMOL/L (ref 136–145)
T4 FREE SERPL-MCNC: 1.54 NG/DL (ref 0.9–1.7)
TSH SERPL DL<=0.005 MIU/L-ACNC: 0.06 UIU/ML (ref 0.3–4.2)
WBC # BLD AUTO: 3.5 10E3/UL (ref 4–11)

## 2022-11-21 PROCEDURE — 84439 ASSAY OF FREE THYROXINE: CPT

## 2022-11-21 PROCEDURE — 81003 URINALYSIS AUTO W/O SCOPE: CPT | Performed by: INTERNAL MEDICINE

## 2022-11-21 PROCEDURE — 96413 CHEMO IV INFUSION 1 HR: CPT

## 2022-11-21 PROCEDURE — 96415 CHEMO IV INFUSION ADDL HR: CPT

## 2022-11-21 PROCEDURE — 96375 TX/PRO/DX INJ NEW DRUG ADDON: CPT

## 2022-11-21 PROCEDURE — 84443 ASSAY THYROID STIM HORMONE: CPT

## 2022-11-21 PROCEDURE — 250N000013 HC RX MED GY IP 250 OP 250 PS 637: Performed by: OBSTETRICS & GYNECOLOGY

## 2022-11-21 PROCEDURE — 80053 COMPREHEN METABOLIC PANEL: CPT | Performed by: INTERNAL MEDICINE

## 2022-11-21 PROCEDURE — 250N000011 HC RX IP 250 OP 636: Performed by: INTERNAL MEDICINE

## 2022-11-21 PROCEDURE — 258N000003 HC RX IP 258 OP 636: Performed by: OBSTETRICS & GYNECOLOGY

## 2022-11-21 PROCEDURE — 250N000011 HC RX IP 250 OP 636: Performed by: OBSTETRICS & GYNECOLOGY

## 2022-11-21 PROCEDURE — 36591 DRAW BLOOD OFF VENOUS DEVICE: CPT | Performed by: INTERNAL MEDICINE

## 2022-11-21 PROCEDURE — 96367 TX/PROPH/DG ADDL SEQ IV INF: CPT

## 2022-11-21 PROCEDURE — 96417 CHEMO IV INFUS EACH ADDL SEQ: CPT

## 2022-11-21 PROCEDURE — 83735 ASSAY OF MAGNESIUM: CPT | Performed by: INTERNAL MEDICINE

## 2022-11-21 PROCEDURE — 85004 AUTOMATED DIFF WBC COUNT: CPT | Performed by: INTERNAL MEDICINE

## 2022-11-21 RX ORDER — LORAZEPAM 2 MG/ML
0.5 INJECTION INTRAMUSCULAR EVERY 4 HOURS PRN
Status: CANCELLED | OUTPATIENT
Start: 2022-11-21

## 2022-11-21 RX ORDER — EPINEPHRINE 1 MG/ML
0.3 INJECTION, SOLUTION INTRAMUSCULAR; SUBCUTANEOUS EVERY 5 MIN PRN
Status: CANCELLED | OUTPATIENT
Start: 2022-11-21

## 2022-11-21 RX ORDER — ALBUTEROL SULFATE 90 UG/1
1-2 AEROSOL, METERED RESPIRATORY (INHALATION)
Status: CANCELLED
Start: 2022-11-21

## 2022-11-21 RX ORDER — HEPARIN SODIUM,PORCINE 10 UNIT/ML
5 VIAL (ML) INTRAVENOUS
Status: CANCELLED | OUTPATIENT
Start: 2022-11-21

## 2022-11-21 RX ORDER — METHYLPREDNISOLONE SODIUM SUCCINATE 125 MG/2ML
125 INJECTION, POWDER, LYOPHILIZED, FOR SOLUTION INTRAMUSCULAR; INTRAVENOUS
Status: CANCELLED
Start: 2022-11-21

## 2022-11-21 RX ORDER — HEPARIN SODIUM (PORCINE) LOCK FLUSH IV SOLN 100 UNIT/ML 100 UNIT/ML
5 SOLUTION INTRAVENOUS
Status: DISCONTINUED | OUTPATIENT
Start: 2022-11-21 | End: 2022-11-21 | Stop reason: HOSPADM

## 2022-11-21 RX ORDER — DIPHENHYDRAMINE HCL 25 MG
50 CAPSULE ORAL ONCE
Status: COMPLETED | OUTPATIENT
Start: 2022-11-21 | End: 2022-11-21

## 2022-11-21 RX ORDER — HEPARIN SODIUM (PORCINE) LOCK FLUSH IV SOLN 100 UNIT/ML 100 UNIT/ML
500 SOLUTION INTRAVENOUS ONCE
Status: COMPLETED | OUTPATIENT
Start: 2022-11-21 | End: 2022-11-21

## 2022-11-21 RX ORDER — MEPERIDINE HYDROCHLORIDE 25 MG/ML
25 INJECTION INTRAMUSCULAR; INTRAVENOUS; SUBCUTANEOUS EVERY 30 MIN PRN
Status: CANCELLED | OUTPATIENT
Start: 2022-11-21

## 2022-11-21 RX ORDER — PALONOSETRON 0.05 MG/ML
0.25 INJECTION, SOLUTION INTRAVENOUS ONCE
Status: COMPLETED | OUTPATIENT
Start: 2022-11-21 | End: 2022-11-21

## 2022-11-21 RX ORDER — ALBUTEROL SULFATE 0.83 MG/ML
2.5 SOLUTION RESPIRATORY (INHALATION)
Status: CANCELLED | OUTPATIENT
Start: 2022-11-21

## 2022-11-21 RX ORDER — DIPHENHYDRAMINE HYDROCHLORIDE 50 MG/ML
50 INJECTION INTRAMUSCULAR; INTRAVENOUS
Status: CANCELLED
Start: 2022-11-21

## 2022-11-21 RX ADMIN — FAMOTIDINE 20 MG: 10 INJECTION INTRAVENOUS at 08:17

## 2022-11-21 RX ADMIN — SODIUM CHLORIDE 200 MG: 9 INJECTION, SOLUTION INTRAVENOUS at 08:49

## 2022-11-21 RX ADMIN — Medication 500 UNITS: at 07:04

## 2022-11-21 RX ADMIN — DEXAMETHASONE SODIUM PHOSPHATE: 10 INJECTION, SOLUTION INTRAMUSCULAR; INTRAVENOUS at 08:20

## 2022-11-21 RX ADMIN — Medication 5 ML: at 13:41

## 2022-11-21 RX ADMIN — DIPHENHYDRAMINE HYDROCHLORIDE 50 MG: 25 CAPSULE ORAL at 08:47

## 2022-11-21 RX ADMIN — PACLITAXEL 207 MG: 300 INJECTION, SOLUTION INTRAVENOUS at 09:28

## 2022-11-21 RX ADMIN — SODIUM CHLORIDE 800 MG: 9 INJECTION, SOLUTION INTRAVENOUS at 13:08

## 2022-11-21 RX ADMIN — SODIUM CHLORIDE 250 ML: 9 INJECTION, SOLUTION INTRAVENOUS at 08:02

## 2022-11-21 RX ADMIN — PALONOSETRON HYDROCHLORIDE 0.25 MG: 0.25 INJECTION INTRAVENOUS at 08:17

## 2022-11-21 RX ADMIN — CARBOPLATIN 400 MG: 10 INJECTION, SOLUTION INTRAVENOUS at 12:33

## 2022-11-21 ASSESSMENT — PAIN SCALES - GENERAL: PAINLEVEL: NO PAIN (0)

## 2022-11-21 NOTE — PROGRESS NOTES
"Infusion Nursing Note:  Cordell Donaldson presents today for Cycle 6 Day 1 Pembrolizumab, Taxol, Carboplatin (dose #6) and Bevacizumab-bvzr.    Patient seen by provider today: No   present during visit today: Not Applicable.    Note: Pt presents to infusion feeling well. Her a-fib has been controlled for the past week - HRs ranging 70-90s and BPs normal at home. VS stable today. Pt denies any palpitations or chest discomfort and continues to take Metoprolol as prescribed. The intermittent neuropathy in her feet/ankles is at baseline and not affecting her ADLs. Her nausea has been managed with beatris, and she continues to drink and hydrate well. She reports thickened oral secretions, which is only temporarily relieved with increased fluids. These secretions are clear/white (no s/s of thrush on assessment today) - RN recommended Biotene products and sent pt home with printout on managing dry mouth. Her dermatitis is at baseline (small patch on RLQ of abdomen) and managed with CBD or aloe vera cream. She denies fevers/chills, SOB, bruising or bleeding, pain or further concerns today.    Intravenous Access:  Implanted Port.  Pt sent [employer] message about \"raised\" Port last week. Port appears normal today - no swelling, tenderness, redness or issues with blood return.    Treatment Conditions:  Lab Results   Component Value Date    HGB 13.0 11/21/2022    WBC 3.5 (L) 11/21/2022    ANEU 1.8 08/22/2017    ANEUTAUTO 1.8 11/21/2022     11/21/2022      Lab Results   Component Value Date     11/21/2022    POTASSIUM 4.0 11/21/2022    MAG 1.9 11/21/2022    CR 0.77 11/21/2022    LINDA 9.8 11/21/2022    BILITOTAL 0.4 11/21/2022    ALBUMIN 4.1 11/21/2022    ALT 30 11/21/2022    AST 27 11/21/2022     Results reviewed, labs MET treatment parameters, ok to proceed with treatment.  Urine: Negative.  BP < 160/100    Post Infusion Assessment:  Patient tolerated infusion without incident.  Blood return noted pre and " post infusion.  Site patent and intact, free from redness, edema or discomfort.  No evidence of extravasations.  Access discontinued per protocol.     Discharge Plan:   Patient declined prescription refills.  Discharge instructions reviewed with: Patient.  Patient and/or family verbalized understanding of discharge instructions and all questions answered.  Copy of AVS reviewed with patient and/or family.  Patient will return 12/6/22 (f/u with Dr. Raygoza) for next appointment.  Patient discharged in stable condition accompanied by: self.  Departure Mode: Ambulatory.      Luz Gamez RN

## 2022-11-21 NOTE — PATIENT INSTRUCTIONS
Do not take your Ondansetron (Zofran) for the next 72 hours, as it interacts with the pre-medication we gave you today. You can resume this on Thursday (10/24) AM. Feel free to take Compazine in the meantime if you have any nausea.    [facility] Triage and after hours / weekends / holidays:  [phone]    Please call the triage or after hours line if you experience a temperature greater than or equal to 100.4, shaking chills, have uncontrolled nausea, vomiting and/or diarrhea, dizziness, shortness of breath, chest pain, bleeding, unexplained bruising, or if you have any other new/concerning symptoms, questions or concerns.      If you are having any concerning symptoms or wish to speak to a provider before your next infusion visit, please call your care coordinator or triage to notify them so we can adequately serve you.     If you need a refill on a narcotic prescription or other medication, please call before your infusion appointment.                November 2022 Sunday Monday Tuesday Wednesday Thursday Friday Saturday             1     2     3     4     5       6     7     8     9     10     11    VIDEO VISIT RETURN   1:45 PM   (40 min.)   Niya Holden APRN CNP   [facility] 12       13    ECHO COMPLETE  10:00 AM   (60 min.)   ECHCR2   [facility] 14    LAB CENTRAL   7:00 AM   (15 min.)   [facility] LAB DRAW   [facility]    ONC INFUSION 6 HR (360 MIN)   7:30 AM   (360 min.)    ONC INFUSION NURSE   [facility] 15     16     17     18     19       20     21    LAB CENTRAL   7:00 AM   (15 min.)   [facility] LAB DRAW   [facility]    ONC INFUSION 6 HR (360 MIN)   7:30 AM   (360 min.)    ONC INFUSION NURSE   [facility] 22     23     24     25     26       27     28     29     30                                  December 2022       Sunday Monday Tuesday Wednesday Thursday Friday Saturday                       1     2     3       4     5    PET ONCOLOGY WHOLE BODY   8:30 AM   (60 min.)   UMPPET1   [facility] 6    RETURN  10:15 AM   (30 min.)   Hina Raygoza MD   [facility] 7     8     9     10       11     12     13     14     15     16     17       18     19     20     21     22     23     24       25     26     27     28     29     30     31                       Lab Results:  Recent Results (from the past 12 hour(s))   Comprehensive metabolic panel    Collection Time: 11/21/22  7:10 AM   Result Value Ref Range    Sodium 142 136 - 145 mmol/L    Potassium 4.0 3.4 - 5.3 mmol/L    Chloride 106 98 - 107 mmol/L    Carbon Dioxide (CO2) 25 22 - 29 mmol/L    Anion Gap 11 7 - 15 mmol/L    Urea Nitrogen 26.4 (H) 8.0 - 23.0 mg/dL    Creatinine 0.77 0.51 - 0.95 mg/dL    Calcium 9.8 8.8 - 10.2 mg/dL    Glucose 106 (H) 70 - 99 mg/dL    Alkaline Phosphatase 54 35 - 104 U/L    AST 27 10 - 35 U/L    ALT 30 10 - 35 U/L    Protein Total 6.4 6.4 - 8.3 g/dL    Albumin 4.1 3.5 - 5.2 g/dL    Bilirubin Total 0.4 <=1.2 mg/dL    GFR Estimate 82 >60 mL/min/1.73m2   Magnesium    Collection Time: 11/21/22  7:10 AM   Result Value Ref Range    Magnesium 1.9 1.7 - 2.3 mg/dL   Protein qualitative urine    Collection Time: 11/21/22  7:10 AM   Result Value Ref Range    Protein Albumin Urine Negative Negative mg/dL   TSH    Collection Time: 11/21/22  7:10 AM   Result Value Ref Range    TSH 0.06 (L) 0.30 - 4.20 uIU/mL   T4 free    Collection Time: 11/21/22  7:10 AM   Result Value Ref Range    Free T4 1.54 0.90 - 1.70 ng/dL   CBC with platelets and differential    Collection Time: 11/21/22  7:10 AM   Result Value Ref Range    WBC Count 3.5 (L) 4.0 - 11.0 10e3/uL    RBC Count 4.71 3.80 - 5.20 10e6/uL    Hemoglobin 13.0 11.7 - 15.7 g/dL    Hematocrit 40.0 35.0 - 47.0 %    MCV 85 78 - 100 fL    MCH 27.6 26.5 - 33.0  pg    MCHC 32.5 31.5 - 36.5 g/dL    RDW 16.3 (H) 10.0 - 15.0 %    Platelet Count 158 150 - 450 10e3/uL    % Neutrophils 52 %    % Lymphocytes 35 %    % Monocytes 10 %    % Eosinophils 3 %    % Basophils 0 %    % Immature Granulocytes 0 %    NRBCs per 100 WBC 0 <1 /100    Absolute Neutrophils 1.8 1.6 - 8.3 10e3/uL    Absolute Lymphocytes 1.2 0.8 - 5.3 10e3/uL    Absolute Monocytes 0.4 0.0 - 1.3 10e3/uL    Absolute Eosinophils 0.1 0.0 - 0.7 10e3/uL    Absolute Basophils 0.0 0.0 - 0.2 10e3/uL    Absolute Immature Granulocytes 0.0 <=0.4 10e3/uL    Absolute NRBCs 0.0 10e3/uL

## 2022-11-21 NOTE — PROGRESS NOTES
Chemotherapy orders signed.  Please see updated hold parameters.  If labs do not meet parameters, please hold an additional week.    Hina Raygoza MD, MS, FACOG, FACS  11/21/2022  7:25 AM

## 2022-12-05 ENCOUNTER — ANCILLARY PROCEDURE (OUTPATIENT)
Dept: PET IMAGING | Facility: CLINIC | Age: 72
End: 2022-12-05
Attending: OBSTETRICS & GYNECOLOGY
Payer: COMMERCIAL

## 2022-12-05 DIAGNOSIS — C53.9 RECURRENT CERVICAL CANCER (H): ICD-10-CM

## 2022-12-05 DIAGNOSIS — C53.0 MALIGNANT NEOPLASM OF ENDOCERVIX (H): ICD-10-CM

## 2022-12-05 DIAGNOSIS — Z08 ENCOUNTER FOR FOLLOW-UP EXAMINATION AFTER COMPLETED TREATMENT FOR MALIGNANT NEOPLASM: ICD-10-CM

## 2022-12-05 LAB — GLUCOSE SERPL-MCNC: 99 MG/DL (ref 70–99)

## 2022-12-05 RX ORDER — IOPAMIDOL 755 MG/ML
50-125 INJECTION, SOLUTION INTRAVASCULAR ONCE
Status: COMPLETED | OUTPATIENT
Start: 2022-12-05 | End: 2022-12-05

## 2022-12-05 RX ORDER — FUROSEMIDE 10 MG/ML
40 INJECTION INTRAMUSCULAR; INTRAVENOUS ONCE
Status: COMPLETED | OUTPATIENT
Start: 2022-12-05 | End: 2022-12-05

## 2022-12-05 RX ORDER — HEPARIN SODIUM (PORCINE) LOCK FLUSH IV SOLN 100 UNIT/ML 100 UNIT/ML
500 SOLUTION INTRAVENOUS ONCE
Status: COMPLETED | OUTPATIENT
Start: 2022-12-05 | End: 2022-12-05

## 2022-12-05 RX ADMIN — FUROSEMIDE 40 MG: 10 INJECTION INTRAMUSCULAR; INTRAVENOUS at 09:37

## 2022-12-05 RX ADMIN — HEPARIN SODIUM (PORCINE) LOCK FLUSH IV SOLN 100 UNIT/ML 500 UNITS: 100 SOLUTION at 09:37

## 2022-12-05 RX ADMIN — IOPAMIDOL 68 ML: 755 INJECTION, SOLUTION INTRAVASCULAR at 09:37

## 2022-12-05 NOTE — PATIENT INSTRUCTIONS
SCHEDULING:  -Radiation oncology follow-up in the next 2 weeks.      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   History of vaginal HSIL (pre-cancer).  Squamous cell carcinoma involving a pelvic mass with associated lymphadenopathy, likely stage III vaginal cancer (vs recurrent, metastatic cervical cancer).   Treatment History:  -12/2012: Robotic-assisted laparoscopic lysis of adhesions (take-down of scar tissue), left ureterolysis (freeing of the left ureter) with  conversion to laparotomy, total abdominal hysterectomy (removal of uterus/cervix), and an upper  Vaginectomy (removal of the upper vagina).  -7/20/22-present: First-line therapy with IV cisplatin + paclitaxel + bevacizumab + pembrolizumab (added cycles 3+).       PLAN:  1) Squamous cell carcinoma:   -Plan for radiation therapy.  -Will review your case at our weekly gyn onc/rad onc tumor conference this week, but will likely administer radiation without concurrent chemotherapy to spare your blood counts.   -RTC after completion of radiation therapy for discussion of maintenance therapy with bevacizumab + pembrolizumab vs surveillance without additional systemic therapy given the palliative nature of both options.    2) Atrial fibrillation, enlarged heart on PET/CT: Follow-up with your cardiologist as scheduled for cardiac optimization prior to radiation brachytherapy.         Happy Holidays!      Hina Raygoza MD, MS, FACOG, FACS  12/6/2022  11:38 AM

## 2022-12-05 NOTE — DISCHARGE INSTRUCTIONS

## 2022-12-05 NOTE — PROGRESS NOTES
Follow-up Note on Referred Patient      Date: 2022        Referring Provider/Gynecologic Oncologist:  Megan Arciniega MD  [facility]   Phone [phone]  [phone]      Radiation Oncologist:   Raymond Stockton MD, PhD  [facility]      Medical Oncologist:  Angela Montana MD  [facility].      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       History of Present Illness:   Cordell Donaldson (she/her/hers) is a 72 year old initially referred to the Gynecologic Cancer Clinic at the [facility] on 2022 by gynecologic oncologist Dr. Arciniega and radiation oncologist Dr. Stockton for management of a pelvic mass due to recurrent cervical cancer vs new vaginal cancer. History as follows:     Breast Cancer History:  : Left breast cancer.  -Lumpectomy.  -Radiation therapy.    [id number]: BRENNAN.     22: PET/CT to stage vaginal cancer (see below).   Mild soft tissue  thickening in the left retropectoral region with an SUV max of 2.7.  7/15/22: Invasive ductal carcinoma.   -Consultation with Dr. Montana. Recommendation to prioritize vaginal/recurrent cervix cancer. Plan to follow breast lesion with repeat MRI. Plan for endocrine therapy after completion of chemotherapy for vaginal/cervical cancer.       Cervical/Vaginal Dysplasia/Cancer History:  10/24/12:   -Endocervical curettings: Squamous carcinoma, invasion cannot be assessed.   -Ectocervical biopsies: HSIL (CIN3).   -Vaginal biopsy, posterior: HSIL (VaIN3).  2012: Robotic-assisted laparoscopic lysis of adhesions, left ureterolysis with  conversion to laparotomy, total abdominal hysterectomy, and an upper  Vaginectomy by gynecologic oncologist Dr. Arciniega.   -Pathology: Stage  IA1 squamous cell carcinoma of the cervix with no residual invasive cancer, residual HSIL.     7/22/13: Vaginal biopsy: HSIL, cannot exclude invasion.   -Consultation with gynecologic oncologist Dr. Johnson. Upper vaginectomy and CO2 laser ablation recommended, patient declined.   [id number]: No vaginal dysplasia treatment.   5/20/22: Pelvic MRI: I have personally reviewed the MRI images.   Centrally hypoenhancing heterogeneous midline pelvic mass along  the superior margin of the vaginal vault measures 8.5 x 6.7 x 8.1 cm.  The mass abut the bladder and there is mucosal irregularity at the  site of abutment. The mass indents the  decompressed rectum but there is no definite invasion or mucosal  Irregularity.  6/9/22: CT-guided biopsy of pelvic mass: Poorly-differentiated squamous cell carcinoma, HPV-associated.   7/1/22: Staging PET/CT: Stage III vaginal cancer (vs recurrent, metastatic cervical cancer). I have personally reviewed the PET/CT images.     7/20/22, 8/10/22, 8/31/22,9/21/22, 10/26/22, 11/21/22: Cycles #1-6 of first-line chemotherapy with IV platinum + paclitaxel + bevacizumab 15 mg/kg + pembrolizumab 200 mg (added cycle 3) every 21 days.   -Cycles 1-4: Cisplatin 50 mg/m2, paclitaxel 175 mg/m2.   -Cycle 3:  Pembrolizumab added to all subsequent cycles due to PD-L1+ result per the KEYNOTE-826 regimen.  -9/22/22: PET/CT: Partial response. I have personally reviewed the PET/CT images.   Stable 3 mm solid  nodules in the left upper lobe. Resolved FDG  avidity of a left retropectoral, presumably lymph node which is now  Calcified.  Multiple stable FDG avid iliac chain lymph nodes near the aortic  bifurcation with SUV max 14.0. Significant reduction in left internal  iliac chain nodes, with no single node with SUV max 5.5 which measures  up to 12 mm in short axis. Significantly reduced  size of FDG avid cervical mass with smaller area of enhancing  nodularity on the right side wall measuring 3.3 x 1.5 cm with  SUV max  16.3. There is central necrosis and abutment of  the rectum. There is an additional FDG avid foci more inferiorly on  the anterior vaginal wall with SUV max 6.7, which is also decreased in  Size.  -Discussed with radiation oncologist Dr. Stockton and at the gyn onc/radiation oncology tumor conference. Recommendation for additional chemotherapy prior to radiation therapy.   -Cycles 5+: Platinum changed to carboplatin due to tinnitus, and paclitaxel dose-reduced to 135 mg/m2 due to peripheral neuropathy.       Genetic/Genomic/Molecular Testing History:  2006: Negative for germline BRCA1, BRCA2 pathogenic variants.     8/14/22 (specimen from 6/9/22): [facility] [facility].   -PD-L1+ (CPS 10)  -Mismatch repair deficient/microsatellite instability-high  -TMB high (53 mut/Mb)  -NTRK 1/2/3 fusion not detected.       Subjective:  Cordell returns to the gyn onc clinic today for her scheduled follow-up visit, accompanied by her  Isaac.  Cordell reports that after her last cycle of chemotherapy she continues to feel quite unwell overall. Does not feel like she could take any more chemotherapy infusions at this time.  During a recent infusion, Cordell reports an episode of chest pressure and pain in the left side of her body. At that time, she was found to be in a fib on EKG (she has a history of a fib). She does not endorse persistent cardiac symptoms outside of the context of active infusions. She is followed by a Cardiologist (Dr. Huitron), who she recently had a virtual visit with on 10/31/22. Reports they made a plan at that time to get through her most recent chemo infusion and then reassess with an in-person visit in January 2023.  She also endorses persistent neuropathy that is currently most bothersome in her BLE, manifesting as tingling and heat sensitivity in her feet. CBD lotion and massage continue to help some.  Cordell also reports ringing in the ears has been about the same, maybe a bit improved since her last  visit. She notices it most late in the evenings, or when she is home by herself and it is quiet.  Cordell reports an ongoing waxing/waning rash on lower back and spreading to the flanks, upper back. She uses hydrocortisone cream, aloe, lotions with essential oils, providing some relief. She notes that the rash seems to be gone today but still notes some bumps/scarred areas.  Nose bleeds continue to occur daily. No heavy bleeding.   Cordell reports that insurance is now covering her planned breast MRI as ordered by Dr. Montana.      Review of Systems:    ROS: 10 point ROS neg other than the symptoms noted above in the HPI.       Past Medical History:  Past Medical History:   Diagnosis Date     Abnormal Pap smear     maybe 20 years ago     Cervical cancer (H)      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      History of breast cancer 2003    lumpectomy and radiation offerred chemo and patient declined at time but had oncogene test and low risk     Hyperlipidemia LDL goal < 160      Osteopenia      Paroxysmal A-fib (H)      Severe vaginal dysplasia        Past Surgical History:  Past Surgical History:   Procedure Laterality Date     BIOPSY       BREAST SURGERY Left 2003    lumpectomy left, did radiation, 5 yr of tamoxifen     COLONOSCOPY  2018    repeat in 2028     Colposcopy Cervix with Loop Electrode Conization and Lser to Vagina  2008     COLPOSCOPY, BIOPSY, COMBINED  10/24/2012    Procedure: COMBINED COLPOSCOPY, BIOPSY;  Colposcopy, Biopsy of Cervix and Vagina, Ultrasound Guidance;  Surgeon: Colette Ng MD;  Location: [facility] OR     ENT SURGERY  01/23/2018    otoscelerosis, right side     HYSTERECTOMY, FRANCIA  12/2012    high grade cervical dysplasia, [facility], Dr. Arciniega     INSERT PORT VASCULAR ACCESS Right 7/18/2022    Procedure: INSERTION, VASCULAR ACCESS PORT;  Surgeon: Donnie Elias MD;  Location: [facility]     IR CHEST PORT PLACEMENT > 5 YRS OF AGE  7/18/2022     IL LAP  VAGINECTOMY, PARTIAL REMOVAL OF VAGINAL WALL  10/2013    upper vaginectomy for dysplasia, Dr. Megan Arciniega       Current Medications:   Current Outpatient Medications   Medication     Acetylcarnitine HCl 250 MG CAPS     aspirin (ASA) 81 MG chewable tablet     Bacillus Coagulans-Inulin (PROBIOTIC FORMULA) 1-250 BILLION-MG CAPS     BENFOTIAMINE PO     Cholecalciferol (VITAMIN D-3) 25 MCG (1000 UT) CAPS     Coenzyme Q10 (CO Q 10 PO)     dexamethasone (DECADRON) 4 MG tablet     Gardner Cota 550 MG CAPS     MAGNESIUM OXIDE PO     metoprolol succinate ER (TOPROL XL) 50 MG 24 hr tablet     Omega-3 Fatty Acids (OMEGA-3 FISH OIL PO)     OVER-THE-COUNTER     Pectin Cit-Inos-C-Bioflav-Soy (MODIFIED CITRUS PECTIN PO)     Selenium 200 MCG CAPS     Taurine 1000 MG CAPS     Turmeric 450 MG CAPS     ondansetron (ZOFRAN) 8 MG tablet     prochlorperazine (COMPAZINE) 10 MG tablet     No current facility-administered medications for this visit.        Allergies:   No Known Allergies       Social History:  Patient lives with her . 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 She walks daily, gardens, does yoga twice/week.  She does have Advanced Directives, but has identified her daughter and  as her desired [employer] of .   Social History     Tobacco Use     Smoking status: Former     Packs/day: 0.50     Years: 15.00     Pack years: 7.50     Types: Cigarettes     Smokeless tobacco: Never   Substance Use Topics     Alcohol use: Yes     Alcohol/week: 2.0 standard drinks     Types: 2 Standard drinks or equivalent per week     Comment: Socially        History   Drug Use No       Family History:   The patient's family history is notable for a first-degree and second-degree paternal relative with breast cancer.  No known family history of ovarian, uterine, colon, urothelial/renal, prostate or pancreatic cancers.  No known family history of melanoma.  Family History   Problem Relation Age of Onset     Myocardial  "Infarction Mother 73        Tob     Myocardial Infarction Father 68        Tob     Breast Cancer Sister 47         of breast cancer age 63; BRCA mutation testing negative     Cancer Maternal Grandmother         Unsure of type.     Breast Cancer Paternal Grandmother         Unsure of diagnosis--some type of \"women's issue\"       Physical Exam:    /85 (BP Location: Right arm, Patient Position: Sitting, Cuff Size: Adult Regular)   Pulse 82   Temp 97.7  F (36.5  C) (Oral)   Resp 18   Wt 52.6 kg (116 lb)   SpO2 99%   BMI 20.81 kg/m     Body mass index is 20.81 kg/m .    General Appearance: healthy and alert, no distress     HEENT:  Complete alopecia       Musculoskeletal: extremitieswithout edema    Skin: No visible lesions. On palpation of the skin on her back there are fine dry papules extending over her entire back and posterior shoulders bilaterally.     Neurological: normal gait, no gross defects     Psychiatric: appropriate mood and affect                                  Radiographic Examination:  22: PET/CT: Partial response. I have personally reviewed the PET/CT images.   Diffuse uptake throughout the thyroid gland with SUV max 5.5, likely  Thyroiditis.  A few small pulmonary nodules  are stable, including a 2 mm solid pulmonary nodule in the left upper  lobe. No new or enlarging pulmonary nodules.  The heart  remains enlarged. No significant pericardial effusion.   Prominent  precarinal lymph node without significant FDG uptake. No suspicious  lymphadenopathy in the mediastinum.  Decreased size of centrally hypodense, necrotic cervical mass, now  measuring 5.3 x 4.1 cm (previously 6.1 x 4.3 cm)  when measured similarly. There is resolution of previously seen  enhancing component along the right lateral wall and resolution of  previous hypermetabolic activity. There is also resolution of  previously seen hypermetabolic pelvic lymph nodes.      Performance Status:  ECOG Grade 2. "       Assessment:  Cordell Donaldson (she/her/hers) is a 72 year old woman with a diagnosis of stage III squamous cell carcinoma of the vagina (vs recurrent metastatic cervical cancer) currently receiving first-line chemotherapy with partial response per PET/CT 12/5/22.     Medical history significant for FIGO Stage IA1 squamous cell carcinoma of the cervix s/p hysterectomy with no residual disease, and a long history of vaginal HSIL.  Medical history significant for atrial fibrillation and cardiomegaly on imaging.      A total of 30 minutes was spent with the patient, 28 minutes of which were spent in counseling the patient and/or treatment planning.      Plan:     1.)    Squamous cell carcinoma: I reviewed the PET/CT images with Cordell and provided her with a copy of the PET/CT report. I also reviewed the findings with her radiation oncologist Dr. Stockton. Recommend proceeding with radiation therapy given excellent but decreasing tumor response to neoadjuvant chemotherapy. Plan as follows:  -Follow-up with Dr. Stockton for discussion of radiation plan.  -We will discuss her case at the gynecologic oncology/radiation oncology tumor conference this week regarding chemosensitization. I did discuss the plan briefly with Dr. Stockton, and favor radiation without concurrent chemotherapy given concerns for myelosuppression and overall tolerance.   -Cordell will follow-up in the gyn onc clinic 1 month after completion of radiation therapy. Will discuss switching to maintenance bevacizumab +/- pembrolizumab vs surveillance without maintenance therapy based on patient's goals of care and increasing debilitation with systemic therapy.     Side-effects:   -Grade 1 epistaxis (attributable to bevacizumab): Mild and intermittent, not clinically significant. Will monitor without intervention at this time.  -Grade 1 peripheral neuropathy (attributable to paclitaxel and cisplatin): Currently stable, not interfering with activity.Will  continue to monitor. Anticipate stabilization and possible some improvement with discontinuation of paclitaxel.   -Grade 1 tinnitus (attributable to cisplatin): Stable. Previously offered an audiology consultation, but Cordell declined since she was not having any hearing changes.   -Grade 1 hypertension (attributable to bevacizumab): Currently mild. Continue monitoring.   -Grade 1 rash (attributable to pembrolizumab): Mild, continue topical hydrocortisone cream and patient's aloe lotion infused with essential oils. Anticipate improvement with discontinuation of pembrolizumab.   -Grade 1 hyperthyroidism (attributable to pebrolizumab): Free T4 trending up, but asymptomatic (tachycardia resolved on recheck). No intervention indicated at this time, continue monitoring per ASCO guidelines.   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 Patient asymptomatic, but further evaluation planned with her cardiologist Dr. Huitron, who is also aware of these changes. Pembrolizumab HELD for radiation therapy regardless, and will discuss risks/benefits of starting post-radiation therapy as maintenance therapy pending additional evaluation and patient goals of care.     2.) Breast cancer: Continue management per medical oncologist Dr. Montana. Radiographic response of breast cancer to current chemotherapy,continue monitoring for now with consideration of endocrine therapy after completion of chemotherapy.     3.) Genetic risk factors were assessed and the patient does not meet the qualifications for a referral.      4.) Labs and/or tests ordered include: CBC with diff, CMP, TSH with reflex free T4.      5.) Health maintenance issues addressed today include:  -Follow-up with Dr. Huitron first week of January as scheduled for atrial fibrillation  management and evaluation/optimization of cardiac function prior to planned procedure for interstitial brachytherapy. Note sent to Dr. Huitron detailing cancer therapy plan.     6.) Code status:  Full-code.    7.) Prescriptions: None.        Hina Raygoza MD, MS, FACOG, FACS  12/6/2022  1:47 PM                CC  Patient Care Team:  Merry Lino MD as PCP - General (Internal Medicine)  Bess Paredes MD [place], Aaron Daniel, MYAM as MD (Podiatry)  Elise Huitron MD as Assigned Heart and Vascular Provider  Merry Lino MD as Assigned PCP  Tess Iniguez RN as Specialty Care Coordinator  Heather Ayon MD as MD (Urology)  Angela Montana MD as MD (Hematology & Oncology)  Yvette Pike MD as MD (Surgery)  Valeri Kirby, GEOFF as Specialty Care Coordinator (Hematology & Oncology)  Yvette Pike MD as Assigned Surgical Provider  Nurys Horton, RN as Specialty Care Coordinator (Hematology & Oncology)  Hina Raygoza MD as Assigned Cancer Care Provider  SELF, REFERRED

## 2022-12-06 ENCOUNTER — ONCOLOGY VISIT (OUTPATIENT)
Dept: ONCOLOGY | Facility: CLINIC | Age: 72
End: 2022-12-06
Attending: OBSTETRICS & GYNECOLOGY
Payer: COMMERCIAL

## 2022-12-06 ENCOUNTER — MYC MEDICAL ADVICE (OUTPATIENT)
Dept: CARDIOLOGY | Facility: CLINIC | Age: 72
End: 2022-12-06

## 2022-12-06 VITALS
HEART RATE: 82 BPM | RESPIRATION RATE: 18 BRPM | OXYGEN SATURATION: 99 % | DIASTOLIC BLOOD PRESSURE: 85 MMHG | TEMPERATURE: 97.7 F | BODY MASS INDEX: 20.81 KG/M2 | SYSTOLIC BLOOD PRESSURE: 124 MMHG | WEIGHT: 116 LBS

## 2022-12-06 DIAGNOSIS — I51.4 MYOCARDITIS (H): ICD-10-CM

## 2022-12-06 DIAGNOSIS — R94.31 ABNORMAL ELECTROCARDIOGRAM: ICD-10-CM

## 2022-12-06 DIAGNOSIS — I51.7 CARDIOMEGALY: ICD-10-CM

## 2022-12-06 DIAGNOSIS — I48.20 CHRONIC ATRIAL FIBRILLATION (H): ICD-10-CM

## 2022-12-06 DIAGNOSIS — E06.9 THYROIDITIS: ICD-10-CM

## 2022-12-06 DIAGNOSIS — I51.89 OTHER ILL-DEFINED HEART DISEASES: ICD-10-CM

## 2022-12-06 DIAGNOSIS — C53.9 RECURRENT CERVICAL CANCER (H): Primary | ICD-10-CM

## 2022-12-06 DIAGNOSIS — I25.10 ASCVD (ARTERIOSCLEROTIC CARDIOVASCULAR DISEASE): Primary | ICD-10-CM

## 2022-12-06 DIAGNOSIS — R06.9 UNSPECIFIED ABNORMALITIES OF BREATHING: ICD-10-CM

## 2022-12-06 PROCEDURE — 99214 OFFICE O/P EST MOD 30 MIN: CPT | Performed by: OBSTETRICS & GYNECOLOGY

## 2022-12-06 PROCEDURE — G0463 HOSPITAL OUTPT CLINIC VISIT: HCPCS

## 2022-12-06 ASSESSMENT — PAIN SCALES - GENERAL: PAINLEVEL: NO PAIN (0)

## 2022-12-06 NOTE — LETTER
2022         RE: Cordell Donaldson  [address]  [facility] [address]        Dear Colleague,    Thank you for referring your patient, Cordell Donaldson, to the [facility]. Please see a copy of my visit note below.                            Follow-up Note on Referred Patient      Date: 2022        Referring Provider/Gynecologic Oncologist:  Megan Arciniega MD  [facility]   Phone [phone]  [phone]      Radiation Oncologist:   Raymond Stockton MD, PhD  [facility]      Medical Oncologist:  Angela Montana MD  [facility].      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       History of Present Illness:   Cordell Donaldson (she/her/hers) is a 72 year old initially referred to the Gynecologic Cancer Clinic at the [facility] on 2022 by gynecologic oncologist Dr. Arciniega and radiation oncologist Dr. Stockton for management of a pelvic mass due to recurrent cervical cancer vs new vaginal cancer. History as follows:     Breast Cancer History:  : Left breast cancer.  -Lumpectomy.  -Radiation therapy.    [id number]: BRENNAN.     22: PET/CT to stage vaginal cancer (see below).   Mild soft tissue  thickening in the left retropectoral region with an SUV max of 2.7.  7/15/22: Invasive ductal carcinoma.   -Consultation with Dr. Montana. Recommendation to prioritize vaginal/recurrent cervix cancer. Plan to follow breast lesion with repeat MRI. Plan for endocrine therapy after completion of chemotherapy for vaginal/cervical cancer.       Cervical/Vaginal Dysplasia/Cancer History:  10/24/12:   -Endocervical curettings: Squamous carcinoma, invasion cannot be assessed.   -Ectocervical biopsies: HSIL (CIN3).    -Vaginal biopsy, posterior: HSIL (VaIN3).  12/2012: Robotic-assisted laparoscopic lysis of adhesions, left ureterolysis with  conversion to laparotomy, total abdominal hysterectomy, and an upper  Vaginectomy by gynecologic oncologist Dr. Arciniega.   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     7/22/13: Vaginal biopsy: HSIL, cannot exclude invasion.   -Consultation with gynecologic oncologist Dr. Johnson. Upper vaginectomy and CO2 laser ablation recommended, patient declined.   [id number]: No vaginal dysplasia treatment.   5/20/22: Pelvic MRI: I have personally reviewed the MRI images.   Centrally hypoenhancing heterogeneous midline pelvic mass along  the superior margin of the vaginal vault measures 8.5 x 6.7 x 8.1 cm.  The mass abut the bladder and there is mucosal irregularity at the  site of abutment. The mass indents the  decompressed rectum but there is no definite invasion or mucosal  Irregularity.  6/9/22: CT-guided biopsy of pelvic mass: Poorly-differentiated squamous cell carcinoma, HPV-associated.   7/1/22: Staging PET/CT: Stage III vaginal cancer (vs recurrent, metastatic cervical cancer). I have personally reviewed the PET/CT images.     7/20/22, 8/10/22, 8/31/22,9/21/22, 10/26/22, 11/21/22: Cycles #1-6 of first-line chemotherapy with IV platinum + paclitaxel + bevacizumab 15 mg/kg + pembrolizumab 200 mg (added cycle 3) every 21 days.   -Cycles 1-4: Cisplatin 50 mg/m2, paclitaxel 175 mg/m2.   -Cycle 3:  Pembrolizumab added to all subsequent cycles due to PD-L1+ result per the KEYNOTE-826 regimen.  -9/22/22: PET/CT: Partial response. I have personally reviewed the PET/CT images.   Stable 3 mm solid  nodules in the left upper lobe. Resolved FDG  avidity of a left retropectoral, presumably lymph node which is now  Calcified.  Multiple stable FDG avid iliac chain lymph nodes near the aortic  bifurcation with SUV max 14.0. Significant reduction in  left internal  iliac chain nodes, with no single node with SUV max 5.5 which measures  up to 12 mm in short axis. Significantly reduced  size of FDG avid cervical mass with smaller area of enhancing  nodularity on the right side wall measuring 3.3 x 1.5 cm with SUV max  16.3. There is central necrosis and abutment of  the rectum. There is an additional FDG avid foci more inferiorly on  the anterior vaginal wall with SUV max 6.7, which is also decreased in  Size.  -Discussed with radiation oncologist Dr. Stockton and at the gyn onc/radiation oncology tumor conference. Recommendation for additional chemotherapy prior to radiation therapy.   -Cycles 5+: Platinum changed to carboplatin due to tinnitus, and paclitaxel dose-reduced to 135 mg/m2 due to peripheral neuropathy.       Genetic/Genomic/Molecular Testing History:  2006: Negative for germline BRCA1, BRCA2 pathogenic variants.     8/14/22 (specimen from 6/9/22): [facility] Testing.   -PD-L1+ (CPS 10)  -Mismatch repair deficient/microsatellite instability-high  -TMB high (53 mut/Mb)  -NTRK 1/2/3 fusion not detected.       Subjective:  Cordell returns to the gyn onc clinic today for her scheduled follow-up visit, accompanied by her  Isaac.  Cordell reports that after her last cycle of chemotherapy she continues to feel quite unwell overall. Does not feel like she could take any more chemotherapy infusions at this time.  During a recent infusion, Cordell reports an episode of chest pressure and pain in the left side of her body. At that time, she was found to be in a fib on EKG (she has a history of a fib). She does not endorse persistent cardiac symptoms outside of the context of active infusions. She is followed by a Cardiologist (Dr. Huitron), who she recently had a virtual visit with on 10/31/22. Reports they made a plan at that time to get through her most recent chemo infusion and then reassess with an in-person visit in January 2023.  She also endorses persistent  neuropathy that is currently most bothersome in her BLE, manifesting as tingling and heat sensitivity in her feet. CBD lotion and massage continue to help some.  Cordell also reports ringing in the ears has been about the same, maybe a bit improved since her last visit. She notices it most late in the evenings, or when she is home by herself and it is quiet.  Cordell reports an ongoing waxing/waning rash on lower back and spreading to the flanks, upper back. She uses hydrocortisone cream, aloe, lotions with essential oils, providing some relief. She notes that the rash seems to be gone today but still notes some bumps/scarred areas.  Nose bleeds continue to occur daily. No heavy bleeding.   Cordell reports that insurance is now covering her planned breast MRI as ordered by Dr. Montana.      Review of Systems:    ROS: 10 point ROS neg other than the symptoms noted above in the HPI.       Past Medical History:  Past Medical History:   Diagnosis Date     Abnormal Pap smear     maybe 20 years ago     Cervical cancer (H)      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      History of breast cancer 2003    lumpectomy and radiation offerred chemo and patient declined at time but had oncogene test and low risk     Hyperlipidemia LDL goal < 160      Osteopenia      Paroxysmal A-fib (H)      Severe vaginal dysplasia        Past Surgical History:  Past Surgical History:   Procedure Laterality Date     BIOPSY       BREAST SURGERY Left 2003    lumpectomy left, did radiation, 5 yr of tamoxifen     COLONOSCOPY  2018    repeat in 2028     Colposcopy Cervix with Loop Electrode Conization and Lser to Vagina  2008     COLPOSCOPY, BIOPSY, COMBINED  10/24/2012    Procedure: COMBINED COLPOSCOPY, BIOPSY;  Colposcopy, Biopsy of Cervix and Vagina, Ultrasound Guidance;  Surgeon: Colette Ng MD;  Location: [facility] OR     ENT SURGERY  01/23/2018    otoscelerosis, right side     HYSTERECTOMY, FRANCIA  12/2012     high grade cervical dysplasia, [facility], Dr. Arciniega     INSERT PORT VASCULAR ACCESS Right 7/18/2022    Procedure: INSERTION, VASCULAR ACCESS PORT;  Surgeon: Donnie Elias MD;  Location: [facility] OR     IR CHEST PORT PLACEMENT > 5 YRS OF AGE  7/18/2022     [place] LAP VAGINECTOMY, PARTIAL REMOVAL OF VAGINAL WALL  10/2013    upper vaginectomy for dysplasia, Dr. Megan Arciniega       Current Medications:   Current Outpatient Medications   Medication     Acetylcarnitine HCl 250 MG CAPS     aspirin (ASA) 81 MG chewable tablet     Bacillus Coagulans-Inulin (PROBIOTIC FORMULA) 1-250 BILLION-MG CAPS     BENFOTIAMINE PO     Cholecalciferol (VITAMIN D-3) 25 MCG (1000 UT) CAPS     Coenzyme Q10 (CO Q 10 PO)     dexamethasone (DECADRON) 4 MG tablet     Efraín Cota 550 MG CAPS     MAGNESIUM OXIDE PO     metoprolol succinate ER (TOPROL XL) 50 MG 24 hr tablet     Omega-3 Fatty Acids (OMEGA-3 FISH OIL PO)     OVER-THE-COUNTER     Pectin Cit-Inos-C-Bioflav-Soy (MODIFIED CITRUS PECTIN PO)     Selenium 200 MCG CAPS     Taurine 1000 MG CAPS     Turmeric 450 MG CAPS     ondansetron (ZOFRAN) 8 MG tablet     prochlorperazine (COMPAZINE) 10 MG tablet     No current facility-administered medications for this visit.        Allergies:   No Known Allergies       Social History:  Patient lives with her . 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 She walks daily, gardens, does yoga twice/week.  She does have Advanced Directives, but has identified her daughter and  as her desired [employer] of .   Social History     Tobacco Use     Smoking status: Former     Packs/day: 0.50     Years: 15.00     Pack years: 7.50     Types: Cigarettes     Smokeless tobacco: Never   Substance Use Topics     Alcohol use: Yes     Alcohol/week: 2.0 standard drinks     Types: 2 Standard drinks or equivalent per week     Comment: Socially        History   Drug Use No       Family History:   The patient's family history is notable for a first-degree and  "second-degree paternal relative with breast cancer.  No known family history of ovarian, uterine, colon, urothelial/renal, prostate or pancreatic cancers.  No known family history of melanoma.  Family History   Problem Relation Age of Onset     Myocardial Infarction Mother 73        Tob     Myocardial Infarction Father 68        Tob     Breast Cancer Sister 47         of breast cancer age 63; BRCA mutation testing negative     Cancer Maternal Grandmother         Unsure of type.     Breast Cancer Paternal Grandmother         Unsure of diagnosis--some type of \"women's issue\"       Physical Exam:    /85 (BP Location: Right arm, Patient Position: Sitting, Cuff Size: Adult Regular)   Pulse 82   Temp 97.7  F (36.5  C) (Oral)   Resp 18   Wt 52.6 kg (116 lb)   SpO2 99%   BMI 20.81 kg/m     Body mass index is 20.81 kg/m .    General Appearance: healthy and alert, no distress     HEENT:  Complete alopecia       Musculoskeletal: extremitieswithout edema    Skin: No visible lesions. On palpation of the skin on her back there are fine dry papules extending over her entire back and posterior shoulders bilaterally.     Neurological: normal gait, no gross defects     Psychiatric: appropriate mood and affect                                  Radiographic Examination:  22: PET/CT: Partial response. I have personally reviewed the PET/CT images.   Diffuse uptake throughout the thyroid gland with SUV max 5.5, likely  Thyroiditis.  A few small pulmonary nodules  are stable, including a 2 mm solid pulmonary nodule in the left upper  lobe. No new or enlarging pulmonary nodules.  The heart  remains enlarged. No significant pericardial effusion.   Prominent  precarinal lymph node without significant FDG uptake. No suspicious  lymphadenopathy in the mediastinum.  Decreased size of centrally hypodense, necrotic cervical mass, now  measuring 5.3 x 4.1 cm (previously 6.1 x 4.3 cm)  when measured similarly. There is resolution " of previously seen  enhancing component along the right lateral wall and resolution of  previous hypermetabolic activity. There is also resolution of  previously seen hypermetabolic pelvic lymph nodes.      Performance Status:  ECOG Grade 2.       Assessment:  Cordell Donaldson (she/her/hers) is a 72 year old woman with a diagnosis of stage III squamous cell carcinoma of the vagina (vs recurrent metastatic cervical cancer) currently receiving first-line chemotherapy with partial response per PET/CT 12/5/22.     Medical history significant for FIGO Stage IA1 squamous cell carcinoma of the cervix s/p hysterectomy with no residual disease, and a long history of vaginal HSIL.  Medical history significant for atrial fibrillation and cardiomegaly on imaging.      A total of 30 minutes was spent with the patient, 28 minutes of which were spent in counseling the patient and/or treatment planning.      Plan:     1.)    Squamous cell carcinoma: I reviewed the PET/CT images with Cordell and provided her with a copy of the PET/CT report. I also reviewed the findings with her radiation oncologist Dr. Stockton. Recommend proceeding with radiation therapy given excellent but decreasing tumor response to neoadjuvant chemotherapy. Plan as follows:  -Follow-up with Dr. Stockton for discussion of radiation plan.  -We will discuss her case at the gynecologic oncology/radiation oncology tumor conference this week regarding chemosensitization. I did discuss the plan briefly with Dr. Stockton, and favor radiation without concurrent chemotherapy given concerns for myelosuppression and overall tolerance.   -Cordell will follow-up in the gyn onc clinic 1 month after completion of radiation therapy. Will discuss switching to maintenance bevacizumab +/- pembrolizumab vs surveillance without maintenance therapy based on patient's goals of care and increasing debilitation with systemic therapy.     Side-effects:   -Grade 1 epistaxis (attributable to  bevacizumab): Mild and intermittent, not clinically significant. Will monitor without intervention at this time.  -Grade 1 peripheral neuropathy (attributable to paclitaxel and cisplatin): Currently stable, not interfering with activity.Will continue to monitor. Anticipate stabilization and possible some improvement with discontinuation of paclitaxel.   -Grade 1 tinnitus (attributable to cisplatin): Stable. Previously offered an audiology consultation, but Cordell declined since she was not having any hearing changes.   -Grade 1 hypertension (attributable to bevacizumab): Currently mild. Continue monitoring.   -Grade 1 rash (attributable to pembrolizumab): Mild, continue topical hydrocortisone cream and patient's aloe lotion infused with essential oils. Anticipate improvement with discontinuation of pembrolizumab.   -Grade 1 hyperthyroidism (attributable to pebrolizumab): Free T4 trending up, but asymptomatic (tachycardia resolved on recheck). No intervention indicated at this time, continue monitoring per ASCO guidelines.   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 Patient asymptomatic, but further evaluation planned with her cardiologist Dr. Huitron, who is also aware of these changes. Pembrolizumab HELD for radiation therapy regardless, and will discuss risks/benefits of starting post-radiation therapy as maintenance therapy pending additional evaluation and patient goals of care.     2.) Breast cancer: Continue management per medical oncologist Dr. Montana. Radiographic response of breast cancer to current chemotherapy,continue monitoring for now with consideration of endocrine therapy after completion of chemotherapy.     3.) Genetic risk factors were assessed and the patient does not meet the qualifications for a  referral.      4.) Labs and/or tests ordered include: CBC with diff, CMP, TSH with reflex free T4.      5.) Health maintenance issues addressed today include:  -Follow-up with Dr. Huitron first week of January as scheduled for atrial fibrillation management and evaluation/optimization of cardiac function prior to planned procedure for interstitial brachytherapy. Note sent to Dr. Huitron detailing cancer therapy plan.     6.) Code status:  Full-code.    7.) Prescriptions: None.        Hina Raygoza MD, MS, FACOG, FACS  12/6/2022  1:47 PM    CC  Patient Care Team:  Merry Lino MD as PCP - General (Internal Medicine)  Bess Paredes MD Corfield, Aaron Daniel, DPM as MD (Podiatry)  Elise Huitron MD as Assigned Heart and Vascular Provider  Merry Lino MD as Assigned PCP  Tess Iniguez RN as Specialty Care Coordinator  Heather Ayon MD as MD (Urology)  Angela Montana MD as MD (Hematology & Oncology)  Yvette Pike MD as MD (Surgery)  Valeri Kirby RN as Specialty Care Coordinator (Hematology & Oncology)  Yvette Pike MD as Assigned Surgical Provider  Nurys Horton RN as Specialty Care Coordinator (Hematology & Oncology)  Hina Raygoza MD as Assigned Cancer Care Provider

## 2022-12-06 NOTE — NURSING NOTE
"Oncology Rooming Note    December 6, 2022 10:42 AM   Cordell Donaldson is a 72 year old female who presents for:    Chief Complaint   Patient presents with     Oncology Clinic Visit     RETURN - CERVICAL CANCER     Initial Vitals: /85 (BP Location: Right arm, Patient Position: Sitting, Cuff Size: Adult Regular)   Pulse 82   Temp 97.7  F (36.5  C) (Oral)   Resp 18   Wt 52.6 kg (116 lb)   SpO2 99%   BMI 20.81 kg/m   Estimated body mass index is 20.81 kg/m  as calculated from the following:    Height as of 8/2/22: 1.59 m (5' 2.6\").    Weight as of this encounter: 52.6 kg (116 lb). Body surface area is 1.52 meters squared.  No Pain (0) Comment: Data Unavailable   No LMP recorded. Patient is postmenopausal.  Allergies reviewed: Yes  Medications reviewed: Yes    Medications: Medication refills not needed today.  Pharmacy name entered into [employer]:    [id number] - [place], [place] - [facility]  [id number] - [place], [place] - [facility] [facility] [place], [place] - [address] [facility] [phone]    Clinical concerns: No new clinical concerns other than reason for visit today.      Anitra Stahl CMA            "

## 2022-12-06 NOTE — Clinical Note
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 She was also noted to have cardiac enlargement on her most recent PET/CT. Regarding her vaginal cancer treatment, plan is to take a break from chemotherapy and administer radiation, which will comprise ~5 weeks of external beam followed by a surgical procedure to insert brachytherapy needles, and 3 days in the hospital for brachytherapy. Please let me know if you have any concerns about this plan, or if there is other optimization that is needed before the planned procedure. I have also cc'd her radiation oncologist Dr. Stockton here. Thank you.  --jade Raygoza MD, MS, FACOG, FACS 12/6/2022 11:41 AM

## 2022-12-07 ENCOUNTER — TELEPHONE (OUTPATIENT)
Dept: CARDIOLOGY | Facility: CLINIC | Age: 72
End: 2022-12-07

## 2022-12-07 ENCOUNTER — LAB (OUTPATIENT)
Dept: LAB | Facility: CLINIC | Age: 72
End: 2022-12-07
Payer: COMMERCIAL

## 2022-12-07 DIAGNOSIS — R06.9 UNSPECIFIED ABNORMALITIES OF BREATHING: ICD-10-CM

## 2022-12-07 DIAGNOSIS — C53.9 RECURRENT CERVICAL CANCER (H): ICD-10-CM

## 2022-12-07 DIAGNOSIS — E06.9 THYROIDITIS: ICD-10-CM

## 2022-12-07 DIAGNOSIS — I25.10 ASCVD (ARTERIOSCLEROTIC CARDIOVASCULAR DISEASE): ICD-10-CM

## 2022-12-07 DIAGNOSIS — R07.9 CHEST PAIN, UNSPECIFIED TYPE: Primary | ICD-10-CM

## 2022-12-07 DIAGNOSIS — R94.31 ABNORMAL ELECTROCARDIOGRAM: ICD-10-CM

## 2022-12-07 DIAGNOSIS — I51.89 OTHER ILL-DEFINED HEART DISEASES: ICD-10-CM

## 2022-12-07 DIAGNOSIS — I48.20 CHRONIC ATRIAL FIBRILLATION (H): ICD-10-CM

## 2022-12-07 LAB
ALBUMIN SERPL BCG-MCNC: 4.5 G/DL (ref 3.5–5.2)
ALP SERPL-CCNC: 43 U/L (ref 35–104)
ALT SERPL W P-5'-P-CCNC: 29 U/L (ref 10–35)
ANION GAP SERPL CALCULATED.3IONS-SCNC: 10 MMOL/L (ref 7–15)
AST SERPL W P-5'-P-CCNC: 32 U/L (ref 10–35)
BASOPHILS # BLD AUTO: 0 10E3/UL (ref 0–0.2)
BASOPHILS NFR BLD AUTO: 0 %
BILIRUB SERPL-MCNC: 0.3 MG/DL
BUN SERPL-MCNC: 14.6 MG/DL (ref 8–23)
CALCIUM SERPL-MCNC: 9.8 MG/DL (ref 8.8–10.2)
CHLORIDE SERPL-SCNC: 100 MMOL/L (ref 98–107)
CREAT SERPL-MCNC: 0.82 MG/DL (ref 0.51–0.95)
DEPRECATED HCO3 PLAS-SCNC: 28 MMOL/L (ref 22–29)
EOSINOPHIL # BLD AUTO: 0 10E3/UL (ref 0–0.7)
EOSINOPHIL NFR BLD AUTO: 1 %
ERYTHROCYTE [DISTWIDTH] IN BLOOD BY AUTOMATED COUNT: 16.9 % (ref 10–15)
GFR SERPL CREATININE-BSD FRML MDRD: 76 ML/MIN/1.73M2
GLUCOSE SERPL-MCNC: 105 MG/DL (ref 70–99)
HCT VFR BLD AUTO: 41.2 % (ref 35–47)
HGB BLD-MCNC: 13.5 G/DL (ref 11.7–15.7)
IMM GRANULOCYTES # BLD: 0 10E3/UL
IMM GRANULOCYTES NFR BLD: 0 %
LYMPHOCYTES # BLD AUTO: 1.3 10E3/UL (ref 0.8–5.3)
LYMPHOCYTES NFR BLD AUTO: 50 %
MCH RBC QN AUTO: 28.4 PG (ref 26.5–33)
MCHC RBC AUTO-ENTMCNC: 32.8 G/DL (ref 31.5–36.5)
MCV RBC AUTO: 87 FL (ref 78–100)
MONOCYTES # BLD AUTO: 0.4 10E3/UL (ref 0–1.3)
MONOCYTES NFR BLD AUTO: 16 %
NEUTROPHILS # BLD AUTO: 0.9 10E3/UL (ref 1.6–8.3)
NEUTROPHILS NFR BLD AUTO: 33 %
NRBC # BLD AUTO: 0 10E3/UL
NRBC BLD AUTO-RTO: 0 /100
NT-PROBNP SERPL-MCNC: 444 PG/ML (ref 0–900)
PLATELET # BLD AUTO: 185 10E3/UL (ref 150–450)
POTASSIUM SERPL-SCNC: 4.8 MMOL/L (ref 3.4–5.3)
PROT SERPL-MCNC: 6.8 G/DL (ref 6.4–8.3)
RBC # BLD AUTO: 4.76 10E6/UL (ref 3.8–5.2)
SODIUM SERPL-SCNC: 138 MMOL/L (ref 136–145)
T4 FREE SERPL-MCNC: 0.44 NG/DL (ref 0.9–1.7)
TROPONIN T SERPL HS-MCNC: 6 NG/L
TSH SERPL DL<=0.005 MIU/L-ACNC: 35.54 UIU/ML (ref 0.3–4.2)
WBC # BLD AUTO: 2.6 10E3/UL (ref 4–11)

## 2022-12-07 PROCEDURE — 84439 ASSAY OF FREE THYROXINE: CPT | Performed by: PATHOLOGY

## 2022-12-07 PROCEDURE — 84484 ASSAY OF TROPONIN QUANT: CPT | Performed by: PATHOLOGY

## 2022-12-07 PROCEDURE — 36415 COLL VENOUS BLD VENIPUNCTURE: CPT | Performed by: PATHOLOGY

## 2022-12-07 PROCEDURE — 83880 ASSAY OF NATRIURETIC PEPTIDE: CPT | Performed by: PATHOLOGY

## 2022-12-07 PROCEDURE — 80053 COMPREHEN METABOLIC PANEL: CPT | Performed by: PATHOLOGY

## 2022-12-07 PROCEDURE — 84443 ASSAY THYROID STIM HORMONE: CPT | Performed by: PATHOLOGY

## 2022-12-07 PROCEDURE — 85027 COMPLETE CBC AUTOMATED: CPT | Performed by: PATHOLOGY

## 2022-12-08 ENCOUNTER — HOSPITAL ENCOUNTER (OUTPATIENT)
Dept: MRI IMAGING | Facility: CLINIC | Age: 72
Discharge: HOME | End: 2022-12-08
Attending: INTERNAL MEDICINE | Admitting: INTERNAL MEDICINE
Payer: COMMERCIAL

## 2022-12-08 VITALS
SYSTOLIC BLOOD PRESSURE: 130 MMHG | DIASTOLIC BLOOD PRESSURE: 89 MMHG | RESPIRATION RATE: 16 BRPM | HEART RATE: 95 BPM | OXYGEN SATURATION: 100 %

## 2022-12-08 DIAGNOSIS — E03.2 HYPOTHYROIDISM DUE TO MEDICATION: Primary | ICD-10-CM

## 2022-12-08 DIAGNOSIS — R07.9 CHEST PAIN, UNSPECIFIED TYPE: ICD-10-CM

## 2022-12-08 DIAGNOSIS — C53.9 RECURRENT CERVICAL CANCER (H): Primary | ICD-10-CM

## 2022-12-08 PROCEDURE — A9585 GADOBUTROL INJECTION: HCPCS | Performed by: INTERNAL MEDICINE

## 2022-12-08 PROCEDURE — 93010 ELECTROCARDIOGRAM REPORT: CPT | Mod: 76 | Performed by: INTERNAL MEDICINE

## 2022-12-08 PROCEDURE — 255N000002 HC RX 255 OP 636: Performed by: INTERNAL MEDICINE

## 2022-12-08 PROCEDURE — 93018 CV STRESS TEST I&R ONLY: CPT | Performed by: INTERNAL MEDICINE

## 2022-12-08 PROCEDURE — 250N000011 HC RX IP 250 OP 636: Performed by: INTERNAL MEDICINE

## 2022-12-08 PROCEDURE — 75563 CARD MRI W/STRESS IMG & DYE: CPT

## 2022-12-08 PROCEDURE — 93005 ELECTROCARDIOGRAM TRACING: CPT

## 2022-12-08 PROCEDURE — 75563 CARD MRI W/STRESS IMG & DYE: CPT | Mod: 26 | Performed by: INTERNAL MEDICINE

## 2022-12-08 PROCEDURE — 93016 CV STRESS TEST SUPVJ ONLY: CPT | Performed by: INTERNAL MEDICINE

## 2022-12-08 RX ORDER — ACYCLOVIR 200 MG/1
0-1 CAPSULE ORAL
Status: DISCONTINUED | OUTPATIENT
Start: 2022-12-08 | End: 2022-12-09 | Stop reason: HOSPADM

## 2022-12-08 RX ORDER — REGADENOSON 0.08 MG/ML
0.4 INJECTION, SOLUTION INTRAVENOUS ONCE
Status: COMPLETED | OUTPATIENT
Start: 2022-12-08 | End: 2022-12-08

## 2022-12-08 RX ORDER — METHYLPREDNISOLONE SODIUM SUCCINATE 125 MG/2ML
125 INJECTION, POWDER, LYOPHILIZED, FOR SOLUTION INTRAMUSCULAR; INTRAVENOUS
Status: DISCONTINUED | OUTPATIENT
Start: 2022-12-08 | End: 2022-12-09 | Stop reason: HOSPADM

## 2022-12-08 RX ORDER — DIPHENHYDRAMINE HYDROCHLORIDE 50 MG/ML
25-50 INJECTION INTRAMUSCULAR; INTRAVENOUS
Status: DISCONTINUED | OUTPATIENT
Start: 2022-12-08 | End: 2022-12-09 | Stop reason: HOSPADM

## 2022-12-08 RX ORDER — GADOBUTROL 604.72 MG/ML
12 INJECTION INTRAVENOUS ONCE
Status: COMPLETED | OUTPATIENT
Start: 2022-12-08 | End: 2022-12-08

## 2022-12-08 RX ORDER — DIAZEPAM 5 MG
5 TABLET ORAL EVERY 30 MIN PRN
Status: DISCONTINUED | OUTPATIENT
Start: 2022-12-08 | End: 2022-12-09 | Stop reason: HOSPADM

## 2022-12-08 RX ORDER — AMINOPHYLLINE 25 MG/ML
100 INJECTION, SOLUTION INTRAVENOUS ONCE
Status: COMPLETED | OUTPATIENT
Start: 2022-12-08 | End: 2022-12-08

## 2022-12-08 RX ORDER — CAFFEINE CITRATE 20 MG/ML
60 SOLUTION INTRAVENOUS
Status: DISCONTINUED | OUTPATIENT
Start: 2022-12-08 | End: 2022-12-09 | Stop reason: HOSPADM

## 2022-12-08 RX ORDER — DIPHENHYDRAMINE HCL 25 MG
25 CAPSULE ORAL
Status: DISCONTINUED | OUTPATIENT
Start: 2022-12-08 | End: 2022-12-09 | Stop reason: HOSPADM

## 2022-12-08 RX ORDER — ONDANSETRON 2 MG/ML
4 INJECTION INTRAMUSCULAR; INTRAVENOUS
Status: DISCONTINUED | OUTPATIENT
Start: 2022-12-08 | End: 2022-12-09 | Stop reason: HOSPADM

## 2022-12-08 RX ORDER — ALBUTEROL SULFATE 90 UG/1
2 AEROSOL, METERED RESPIRATORY (INHALATION) EVERY 5 MIN PRN
Status: DISCONTINUED | OUTPATIENT
Start: 2022-12-08 | End: 2022-12-09 | Stop reason: HOSPADM

## 2022-12-08 RX ORDER — LEVOTHYROXINE SODIUM 25 UG/1
25 TABLET ORAL DAILY
Qty: 30 TABLET | Refills: 6 | Status: SHIPPED | OUTPATIENT
Start: 2022-12-08 | End: 2023-01-02

## 2022-12-08 RX ADMIN — GADOBUTROL 12 ML: 604.72 INJECTION INTRAVENOUS at 14:54

## 2022-12-08 RX ADMIN — AMINOPHYLLINE 100 MG: 25 INJECTION, SOLUTION INTRAVENOUS at 14:22

## 2022-12-08 RX ADMIN — REGADENOSON 0.4 MG: 0.08 INJECTION, SOLUTION INTRAVENOUS at 14:19

## 2022-12-08 NOTE — TELEPHONE ENCOUNTER
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    Patient has chronic atrial fibrillation.  She is under treatment for vaginal/cervical cancer with chemotherapy and immunotherapy. The last 2 cycles have resulted in left-sided chest discomfort with pain going down her arm.  The therapy is currently on hold due to concern for immunotherapy mediated cardiac injury.    Troponin T high-sensitivity= 6   NT proBNP = 444    I have advised a stress cardiac MRI to assess for myocarditis and ischemia.  I advised the patient to refrain from caffeine use starting tonite and we will plan on obtaining this test in the morning tomorrow if there are no issues with her insurance.    Elise Huitron MD

## 2022-12-09 LAB
ATRIAL RATE - MUSE: 288 BPM
ATRIAL RATE - MUSE: 96 BPM
DIASTOLIC BLOOD PRESSURE - MUSE: NORMAL MMHG
DIASTOLIC BLOOD PRESSURE - MUSE: NORMAL MMHG
INTERPRETATION ECG - MUSE: NORMAL
INTERPRETATION ECG - MUSE: NORMAL
P AXIS - MUSE: NORMAL DEGREES
P AXIS - MUSE: NORMAL DEGREES
PR INTERVAL - MUSE: NORMAL MS
PR INTERVAL - MUSE: NORMAL MS
QRS DURATION - MUSE: 70 MS
QRS DURATION - MUSE: 72 MS
QT - MUSE: 352 MS
QT - MUSE: 362 MS
QTC - MUSE: 417 MS
QTC - MUSE: 442 MS
R AXIS - MUSE: 23 DEGREES
R AXIS - MUSE: 64 DEGREES
SYSTOLIC BLOOD PRESSURE - MUSE: NORMAL MMHG
SYSTOLIC BLOOD PRESSURE - MUSE: NORMAL MMHG
T AXIS - MUSE: -39 DEGREES
T AXIS - MUSE: 16 DEGREES
VENTRICULAR RATE- MUSE: 80 BPM
VENTRICULAR RATE- MUSE: 95 BPM

## 2022-12-14 DIAGNOSIS — I48.0 PAROXYSMAL ATRIAL FIBRILLATION (H): ICD-10-CM

## 2022-12-14 NOTE — PROGRESS NOTES
"Department of Therapeutic Radiology--Radiation Oncology                   [place] [address]  [address] [place]  [address]  Office:  [phone]  Fax:  [phone]   Radiation Oncology Clinic  [address] [place] [address]  Phone:  [phone]  Fax:  [phone]     RE: Cordell Donaldson : 1950   MRN: [id number] MCKINLEY: 2022     OUTPATIENT VISIT NOTE       DIAGNOSIS: Recurrent cervical cancer vs. new primary vaginal cancer with bulky adenopathy    SUBJECTIVE: Ms. Donaldson is a 73 yo female who I initially saw on 2022. She previously had a breast cancer and an early stage cervical cancer. She has a long standing history of abnormal Pap smear with positive HPV. She ultimately opted for definitive surgery and underwent laparoscopic converted to laparotomy with FRANCAI for presumed FIGO IA1 cancer with Dr. Megan Arciniega in [facility] on 2012. Hysterectomy specimen showed NGUYỄN III, HSIL, but no invasive cancer was found. She had an upper vaginectomy with Dr. Arciniega on 10/2/2013 due to abnormal vaginal apex biopsy . 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 Her subsequent Pap had been negative for intraepithelial lesion or malignancy, with last on 2020.     In 2022, she developed urinary frequency and feeling of pressure in her pelvis/bladder. She also noted a firm mass in her vagina. A pelvic MRI on 2022 demonstrated a centrally hypoenhancing heterogenous midline pelvic mass along the superior margin of the vaginal vault measuring 8.5 x 6.7 x 8.1 cm, abutting the bladder and indenting the rectum but without definite invasion. There were multiple enlarged lymph nodes with evidence of extracapsular extension. Exam by Dr. Megan Arciniega, showed that   \"the vaginal apex was descending midway down the vagina with a bulging mass pushing from the pelvic aspect. At this distended cuff, there was no clear lesion, maybe a small " "area of sclerosis about 2-3 mm.\" On bimanual exam, the mass felt fixed in the low pelvis.     CT guided biopsy was performed on 6/9/2022. 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  This was reviewed at [facility] which additionally noted perineural invasion. PET/CT scan on 7/1/2022 showed the pelvic mass to measure 8.5 x 7.2 x 8.1 cm with max SUV up to 13.2 with central necrosis. Multiple pelvic lymph nodes were seen, with the largest on the left, up to 3.3 cm with max SUV of 12.2. Node at the aortic bifurcation measured 2.9 x 1.6 cm with max SUV of 1.9 x 1.7 cm. Also mentioned was mild soft tissue thickening in the left retropectoral region with low metabolic activity, which could represent a small reactive lymph node.      Her case was discussed at the multidisciplinary tumor board with the consensus of systemic therapy consisting of Cisplatin/Taxol/Avastin.     With regard to her breast lesion, she has a history of left breast cancer treated with lumpectomy and radiation therapy in 2003 followed by 5 years of Tamoxifen. Following PET identified pectoral node, she was evaluated with an ultrasound, which confirmed a 1.3 cm mass at 12:30 8 cm from the nipple. 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 Dr. Montana recommended chemo per Gyn Onc, with MRI imaging to monitor response (the mass was not visualized on the mammogram).  Her Caris testing showed PD-L1 CPS score of 10, MMR deficient, MSI high isease with a TMB of 53 mut/Mb. Thus Pembrolizumab was added starting cycle 3 of chemotherapy.     Ms. Donaldson had a repeat PET/CT on 9/22/2022 after 3 cycles of systemic therapy, which demonstrated significant response in both the cervical mass and the adenopathy. The left retropectoral lymph node had complete resolution of FDG uptake. MRI confirmed resolution of the enhancemnt with unchanged small left internal " mammary lymph nodes.     She continued with chemo for a total of 6 cycles with Cisplatin changed to Carboplatin due to Tinnitus and Pacliaxel dose reduced due to neuropathy.     PET/CT scan on 2022 showed continued partial response in the cervical mass and resolution of the FDG uptake in the lymph nodes. She was noted to have FDG uptake in the heart, representing myocarditis vs. Cardiac strain. She did have a cardiac MRI for further evaluation, which showed normal biventricular function with no evidence of ischemia, myocardial edema or fibrosis to indicate immunte checkpoint inhibitor myodarditis.     Ms. Donaldson is now referred for consideration of consolidation radiation therapy. On interview, she states that the last 2 cycles of the chemotherapy has been tough. She denies vaginal bleeding. Her urinary frequency has significant improved. She used to get up 8 times in the middle of the night to use the bathroom. Now, she can almost sleep through the night. She has occasional loose stools on chemo. She did have some mild nausea, which was relieved with home remedies with beatris.     OBJECTIVE:   BP (!) 152/102   Pulse 90   Resp 16   Wt 55.1 kg (121 lb 8 oz)   SpO2 100%   BMI 21.80 kg/m     Gen: Appears well  HEENT: alopecia  CV: well perfused  Resp: breathing comfortably on room air  Pelvic: deferred    IMAGIN2022: MRI      ASSESSMENT AND PLAN: In summary, Ms. Donaldson is a 71 yo female with a recurrent cervical cancer vs. a new vaginal primary. She has bulky disease at the vaginal cuff protruding into the pelvis. She also had multiple bulky pelvic and para-aortic adenopathy.    She has received 6 cycles of systemic therapy with significant response. I agree with Dr. Raygoza that now is a good time to consider radiation consolidation. I described a course of 4500 cGy in 5 weeks of external beam radiotherapy targeting the residual tumor, vagina and  regional lymphatics. The initially involved nodes will receive a slightly higher dose. I am hoping to be able to deliver at least 5000 cGy; however, small bowel toxicities could be a concern.     Dr. Raygoza and I discussed the pros and cons of adding platinum during external beam radiation therapy, and ultimately decided against it due to concern of marrow toxicity. She will require an extended field to include para-aortic chain and she is already experiencing cytopenia.     We discussed brachytherapy boost to the residual vaginal cuff tumor, which will require needles. I described the needle placement, epidural for pain control, inpatient admission and other associated ancillary procedures required for the interstitial brachytherapy.     I discussed the acute and late side effects of the radiation therapy, including the more serious bowel obstruction, fistula formation, and vaginal necrosis.      Ms. Donaldson would like to proceed and signed the consent form. She will be simulated today.          Raymond Stockton M.D./Ph.D.  Radiation Oncologist   Department of Therapeutic Radiology   [facility]  Phone: [phone]         I reviewed patient's chart, internal/external medical records, imaging studies (including actual images), labs and pathology reports.  I interviewed and counseled the patient face to face.  I additionally ordered tests and discussed the case with patient's referring physicians and care team.      80 minutes were spent on the date of the encounter doing chart review, history and exam, documentation and further activities as noted above.           Raymond Stockton MD

## 2022-12-15 ENCOUNTER — TELEPHONE (OUTPATIENT)
Dept: ONCOLOGY | Facility: CLINIC | Age: 72
End: 2022-12-15

## 2022-12-15 RX ORDER — METOPROLOL SUCCINATE 50 MG/1
TABLET, EXTENDED RELEASE ORAL
Qty: 90 TABLET | Refills: 2 | Status: SHIPPED | OUTPATIENT
Start: 2022-12-15 | End: 2023-09-10

## 2022-12-15 NOTE — TELEPHONE ENCOUNTER
----- Message from Nurys Horton RN sent at 12/14/2022 10:05 PM CST -----  Check to make sure pt saw md note  on thyroid  Start synthroid  25 mcg  Recheck with radiation  Cont to hold keytruda  Neutropenia will improve off chemo

## 2022-12-15 NOTE — TELEPHONE ENCOUNTER
Spoke with pt inquired if she got the message that md had sent via my chart  Pt states she had but has decided she does not want to start the synthroid given all of the side effects she read about.  She also states she does not want to add any other meds into her body that can cause problems and affect her heart.  She spoke with Dr Huitron on the tsh and thyroid med who also recommended she take it..    Pt states she is feeling well and has no symptoms of elevated TSH.  Feels it might be due to the keytruda medication,  Pt is wiiling to recheck TSh inabout a month once there is a plan for radiation.  Pt has also talked to her natro path provider and they are going to start at supplement to see if that helps    Info sent to MD  Will reach to pt after radiaiton appt to set up lab

## 2022-12-16 ENCOUNTER — OFFICE VISIT (OUTPATIENT)
Dept: RADIATION ONCOLOGY | Facility: CLINIC | Age: 72
End: 2022-12-16
Attending: RADIOLOGY
Payer: COMMERCIAL

## 2022-12-16 ENCOUNTER — HOSPITAL ENCOUNTER (OUTPATIENT)
Dept: MRI IMAGING | Facility: CLINIC | Age: 72
Discharge: HOME | End: 2022-12-16
Attending: RADIOLOGY | Admitting: RADIOLOGY
Payer: COMMERCIAL

## 2022-12-16 VITALS
BODY MASS INDEX: 21.8 KG/M2 | OXYGEN SATURATION: 100 % | DIASTOLIC BLOOD PRESSURE: 102 MMHG | HEART RATE: 90 BPM | RESPIRATION RATE: 16 BRPM | WEIGHT: 121.5 LBS | SYSTOLIC BLOOD PRESSURE: 152 MMHG

## 2022-12-16 DIAGNOSIS — C53.9 RECURRENT CERVICAL CANCER (H): Primary | ICD-10-CM

## 2022-12-16 DIAGNOSIS — C53.9 RECURRENT CERVICAL CANCER (H): ICD-10-CM

## 2022-12-16 PROCEDURE — G0463 HOSPITAL OUTPT CLINIC VISIT: HCPCS | Mod: 25 | Performed by: RADIOLOGY

## 2022-12-16 PROCEDURE — A9585 GADOBUTROL INJECTION: HCPCS | Performed by: RADIOLOGY

## 2022-12-16 PROCEDURE — 77334 RADIATION TREATMENT AID(S): CPT | Performed by: RADIOLOGY

## 2022-12-16 PROCEDURE — 99214 OFFICE O/P EST MOD 30 MIN: CPT | Mod: 25 | Performed by: RADIOLOGY

## 2022-12-16 PROCEDURE — 99215 OFFICE O/P EST HI 40 MIN: CPT | Mod: 25 | Performed by: RADIOLOGY

## 2022-12-16 PROCEDURE — 77263 THER RADIOLOGY TX PLNG CPLX: CPT | Performed by: RADIOLOGY

## 2022-12-16 PROCEDURE — 72197 MRI PELVIS W/O & W/DYE: CPT | Mod: 26 | Performed by: STUDENT IN AN ORGANIZED HEALTH CARE EDUCATION/TRAINING PROGRAM

## 2022-12-16 PROCEDURE — 255N000002 HC RX 255 OP 636: Performed by: RADIOLOGY

## 2022-12-16 PROCEDURE — 72197 MRI PELVIS W/O & W/DYE: CPT

## 2022-12-16 PROCEDURE — 77334 RADIATION TREATMENT AID(S): CPT | Mod: 26 | Performed by: RADIOLOGY

## 2022-12-16 RX ORDER — GADOBUTROL 604.72 MG/ML
5.5 INJECTION INTRAVENOUS ONCE
Status: COMPLETED | OUTPATIENT
Start: 2022-12-16 | End: 2022-12-16

## 2022-12-16 RX ORDER — HEPARIN SODIUM (PORCINE) LOCK FLUSH IV SOLN 100 UNIT/ML 100 UNIT/ML
500 SOLUTION INTRAVENOUS ONCE
Status: DISCONTINUED | OUTPATIENT
Start: 2022-12-16 | End: 2022-12-17 | Stop reason: HOSPADM

## 2022-12-16 RX ADMIN — GADOBUTROL 5.5 ML: 604.72 INJECTION INTRAVENOUS at 13:14

## 2022-12-16 ASSESSMENT — PAIN SCALES - GENERAL: PAINLEVEL: NO PAIN (0)

## 2022-12-16 NOTE — LETTER
12/16/2022         RE: Cordell Donaldson  [address]  [facility] [address]        Dear Colleague,    Thank you for referring your patient, Cordell Donaldson, to the [facility] RADIATION ONCOLOGY. Please see a copy of my visit note below.    Radiation Therapy Patient Education    Person involved with teaching: Patient    Patient educational needs for self management of treatment-related side effects assessment completed.  EPIC Patient Ed tab contains Patient Learning Assessment    Education Materials Given  Radiation Therapy and You and Radiation Therapy for GYN Patients    Educational Topics Discussed  Side effects expected, Pain management, Skin care, Activity, Nutrition and weight loss and When to call MD/RN    Response To Teaching  More review necessary and Verbalizes confusion- states she is overwhelmed right now. Will review at first OTV.     GYN Only  Vaginal Dilator-given and educated: N/A    Referrals sent: None    Chemotherapy?  No, chemo complete    Patient educated on how to prep for daily treatment with full bladder.         Again, thank you for allowing me to participate in the care of your patient.        Sincerely,        Raymond Stockton MD

## 2022-12-16 NOTE — PATIENT INSTRUCTIONS
Before Radiation treatment daily  Bladder preparation  One hour before your treatment time you should first urinate,  and then drink 20 ounces (two and a half cups) of water.     Do not urinate again until after treatment.  If you are unable to hold the urine for that full hour, please let your nurses  know.

## 2022-12-16 NOTE — PROGRESS NOTES
FOLLOW-UP VISIT    Patient Name: Cordell Donaldson      : 1950     Age: 72 year old        ______________________________________________________________________________     Chief Complaint   Patient presents with     Radiation Therapy     Follow up to consult for radiation therapy      BP (!) 147/104   Pulse 90   Resp 16   Wt 55.1 kg (121 lb 8 oz)   SpO2 100%   BMI 21.80 kg/m       DX: Recurrent cervical cancer vs. new primary vaginal cancer with bulky adenopathy    Pain  Denies    Labs  Other Labs: Yes: 22     Imaging  MRI: Pelvis 22    Cardiac device: No  Pregnant: No (Post menopausal)     Other Appointments:     MD Name: Cardiology  Appointment Date: 23   MD Name: Appointment Date:   MD Name: Appointment Date:   Other Appointment Notes:   Educated on avoiding antioxidants during radiation therapy, pt said she would speak to her neuropath for guidance on which supplements to come off of during treatment.     Pt has not taken metoprolol dose today, asked to take dose as soon as she gets home. Reviewed warning signs of hypertensive emergency and when to go to urgent care/ED. Dr. Mahin parekh with plan to send home with bp as pt is asymptomatic and has appointment with cardiology in two weeks.       Additional Instructions: Full bladder for radiation treatment

## 2022-12-16 NOTE — LETTER
2022         RE: Cordell Donaldson  [address]  [facility] [address]        Dear Colleague,    Thank you for referring your patient, Cordell Donaldson, to the [facility] RADIATION ONCOLOGY. Please see a copy of my visit note below.    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 Donaldson is a 73 yo female who I initially saw on 2022. She previously had a breast cancer and an early stage cervical cancer. She has a long standing history of abnormal Pap smear with positive HPV. She ultimately opted for definitive surgery and underwent laparoscopic converted to laparotomy with RFANCIA for presumed FIGO IA1 cancer with Dr. Megan Arciniega in [facility] on 2012. Hysterectomy specimen showed NGUYỄN III, HSIL, but no invasive cancer was found. She had an upper vaginectomy with Dr. Arciniega on 10/2/2013 due to abnormal vaginal apex biopsy . 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 Her subsequent Pap had been negative for intraepithelial lesion or malignancy, with last on 2020.     In 2022, she developed urinary frequency and feeling of pressure in her pelvis/bladder. She also noted a firm mass in her vagina. A pelvic MRI on 2022 demonstrated a centrally hypoenhancing heterogenous midline pelvic mass along the superior margin of the vaginal vault measuring 8.5 x 6.7 x 8.1 cm, abutting the bladder and indenting the rectum but without definite invasion. There were multiple enlarged  "lymph nodes with evidence of extracapsular extension. Exam by Dr. Megan Arciniega, showed that   \"the vaginal apex was descending midway down the vagina with a bulging mass pushing from the pelvic aspect. At this distended cuff, there was no clear lesion, maybe a small area of sclerosis about 2-3 mm.\" On bimanual exam, the mass felt fixed in the low pelvis.     CT guided biopsy was performed on 6/9/2022. 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  This was reviewed at [facility] which additionally noted perineural invasion. PET/CT scan on 7/1/2022 showed the pelvic mass to measure 8.5 x 7.2 x 8.1 cm with max SUV up to 13.2 with central necrosis. Multiple pelvic lymph nodes were seen, with the largest on the left, up to 3.3 cm with max SUV of 12.2. Node at the aortic bifurcation measured 2.9 x 1.6 cm with max SUV of 1.9 x 1.7 cm. Also mentioned was mild soft tissue thickening in the left retropectoral region with low metabolic activity, which could represent a small reactive lymph node.      Her case was discussed at the multidisciplinary tumor board with the consensus of systemic therapy consisting of Cisplatin/Taxol/Avastin.     With regard to her breast lesion, she has a history of left breast cancer treated with lumpectomy and radiation therapy in 2003 followed by 5 years of Tamoxifen. Following PET identified pectoral node, she was evaluated with an ultrasound, which confirmed a 1.3 cm mass at 12:30 8 cm from the nipple. 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 Dr. Montana recommended chemo per Gyn Onc, with MRI imaging to monitor response (the mass was not visualized on the mammogram).  Her Caris testing showed PD-L1 CPS score of 10, MMR deficient, MSI high isease with a TMB of 53 mut/Mb. Thus Pembrolizumab was added starting cycle 3 of chemotherapy.     Ms. Donaldson had a repeat PET/CT on 9/22/2022 after 3 " cycles of systemic therapy, which demonstrated significant response in both the cervical mass and the adenopathy. The left retropectoral lymph node had complete resolution of FDG uptake. MRI confirmed resolution of the enhancemnt with unchanged small left internal mammary lymph nodes.     She continued with chemo for a total of 6 cycles with Cisplatin changed to Carboplatin due to Tinnitus and Pacliaxel dose reduced due to neuropathy.     PET/CT scan on 2022 showed continued partial response in the cervical mass and resolution of the FDG uptake in the lymph nodes. She was noted to have FDG uptake in the heart, representing myocarditis vs. Cardiac strain. She did have a cardiac MRI for further evaluation, which showed normal biventricular function with no evidence of ischemia, myocardial edema or fibrosis to indicate immunte checkpoint inhibitor myodarditis.     Ms. Donaldson is now referred for consideration of consolidation radiation therapy. On interview, she states that the last 2 cycles of the chemotherapy has been tough. She denies vaginal bleeding. Her urinary frequency has significant improved. She used to get up 8 times in the middle of the night to use the bathroom. Now, she can almost sleep through the night. She has occasional loose stools on chemo. She did have some mild nausea, which was relieved with home remedies with beatris.     OBJECTIVE:   BP (!) 152/102   Pulse 90   Resp 16   Wt 55.1 kg (121 lb 8 oz)   SpO2 100%   BMI 21.80 kg/m     Gen: Appears well  HEENT: alopecia  CV: well perfused  Resp: breathing comfortably on room air  Pelvic: deferred    IMAGIN2022: MRI      ASSESSMENT AND PLAN: In summary, Ms. Donaldson is a 71 yo female with a recurrent cervical cancer vs. a new vaginal primary. She has bulky disease at the vaginal cuff protruding into the pelvis. She also had multiple bulky pelvic and para-aortic adenopathy.    She has  received 6 cycles of systemic therapy with significant response. I agree with Dr. Raygoza that now is a good time to consider radiation consolidation. I described a course of 4500 cGy in 5 weeks of external beam radiotherapy targeting the residual tumor, vagina and regional lymphatics. The initially involved nodes will receive a slightly higher dose. I am hoping to be able to deliver at least 5000 cGy; however, small bowel toxicities could be a concern.     Dr. Raygoza and I discussed the pros and cons of adding platinum during external beam radiation therapy, and ultimately decided against it due to concern of marrow toxicity. She will require an extended field to include para-aortic chain and she is already experiencing cytopenia.     We discussed brachytherapy boost to the residual vaginal cuff tumor, which will require needles. I described the needle placement, epidural for pain control, inpatient admission and other associated ancillary procedures required for the interstitial brachytherapy.     I discussed the acute and late side effects of the radiation therapy, including the more serious bowel obstruction, fistula formation, and vaginal necrosis.      Ms. Donaldson would like to proceed and signed the consent form. She will be simulated today.          Raymond Stockton M.D./Ph.D.  Radiation Oncologist   Department of Therapeutic Radiology   [facility]  Phone: [phone]         I reviewed patient's chart, internal/external medical records, imaging studies (including actual images), labs and pathology reports.  I interviewed and counseled the patient face to face.  I additionally ordered tests and discussed the case with patient's referring physicians and care team.      80 minutes were spent on the date of the encounter doing chart review, history and exam, documentation and further activities as noted above.           Raymond Stockton MD    FOLLOW-UP VISIT    Patient Name: Cordell Donaldson       : 1950     Age: 72 year old        ______________________________________________________________________________     Chief Complaint   Patient presents with     Radiation Therapy     Follow up to consult for radiation therapy      BP (!) 147/104   Pulse 90   Resp 16   Wt 55.1 kg (121 lb 8 oz)   SpO2 100%   BMI 21.80 kg/m       DX: Recurrent cervical cancer vs. new primary vaginal cancer with bulky adenopathy    Pain  Denies    Labs  Other Labs: Yes: 22     Imaging  MRI: Pelvis 22    Cardiac device: No  Pregnant: No (Post menopausal)     Other Appointments:     MD Name: Cardiology  Appointment Date: 23   MD Name: Appointment Date:   MD Name: Appointment Date:   Other Appointment Notes:   Educated on avoiding antioxidants during radiation therapy, pt said she would speak to her neuropath for guidance on which supplements to come off of during treatment.     Pt has not taken metoprolol dose today, asked to take dose as soon as she gets home. Reviewed warning signs of hypertensive emergency and when to go to urgent care/ED. Dr. Mahin parekh with plan to send home with bp as pt is asymptomatic and has appointment with cardiology in two weeks.       Additional Instructions: Full bladder for radiation treatment         Again, thank you for allowing me to participate in the care of your patient.        Sincerely,        Raymond Stockton MD

## 2022-12-16 NOTE — PROGRESS NOTES
Radiation Therapy Patient Education    Person involved with teaching: Patient    Patient educational needs for self management of treatment-related side effects assessment completed.  EPIC Patient Ed tab contains Patient Learning Assessment    Education Materials Given  Radiation Therapy and You and Radiation Therapy for GYN Patients    Educational Topics Discussed  Side effects expected, Pain management, Skin care, Activity, Nutrition and weight loss and When to call MD/RN    Response To Teaching  More review necessary and Verbalizes confusion- states she is overwhelmed right now. Will review at first OTV.     GYN Only  Vaginal Dilator-given and educated: N/A    Referrals sent: None    Chemotherapy?  No, chemo complete    Patient educated on how to prep for daily treatment with full bladder.

## 2022-12-20 DIAGNOSIS — R94.6 ABNORMAL RESULTS OF THYROID FUNCTION STUDIES: ICD-10-CM

## 2022-12-20 DIAGNOSIS — R79.89 ELEVATED TSH: Primary | ICD-10-CM

## 2022-12-21 ENCOUNTER — PREP FOR PROCEDURE (OUTPATIENT)
Dept: RADIATION ONCOLOGY | Facility: CLINIC | Age: 72
End: 2022-12-21

## 2022-12-21 DIAGNOSIS — C53.9 CERVICAL CANCER (H): Primary | ICD-10-CM

## 2022-12-28 ENCOUNTER — APPOINTMENT (OUTPATIENT)
Dept: RADIATION ONCOLOGY | Facility: CLINIC | Age: 72
End: 2022-12-28
Attending: RADIOLOGY
Payer: COMMERCIAL

## 2022-12-28 DIAGNOSIS — C53.9 RECURRENT CERVICAL CANCER (H): Primary | ICD-10-CM

## 2022-12-28 PROCEDURE — 77386 HC IMRT TREATMENT DELIVERY, COMPLEX: CPT | Performed by: RADIOLOGY

## 2022-12-28 PROCEDURE — 77014 PR CT GUIDE FOR PLACEMENT RADIATION THERAPY FIELDS: CPT | Mod: 26 | Performed by: RADIOLOGY

## 2022-12-29 ENCOUNTER — APPOINTMENT (OUTPATIENT)
Dept: RADIATION ONCOLOGY | Facility: CLINIC | Age: 72
End: 2022-12-29
Attending: RADIOLOGY
Payer: COMMERCIAL

## 2022-12-29 PROCEDURE — 77386 HC IMRT TREATMENT DELIVERY, COMPLEX: CPT | Performed by: RADIOLOGY

## 2022-12-29 PROCEDURE — 77014 PR CT GUIDE FOR PLACEMENT RADIATION THERAPY FIELDS: CPT | Mod: 26 | Performed by: RADIOLOGY

## 2022-12-29 NOTE — TELEPHONE ENCOUNTER
FUTURE VISIT INFORMATION      SURGERY INFORMATION:    Date: 23    Location: uu or    Surgeon:  Raymond Stockton MD    Anesthesia Type:  general    Procedure: INSERTION, NEEDLE, WITH ULTRASOUND GUIDANCE, FOR INTERSTITIAL BRACHYTHERAPY possible INSERTION, FIDUCIAL MARKER SEED, CERVIX, FOR RADIATION THERAPY    Consult: ov 22    RECORDS REQUESTED FROM:       Primary Care Provider: Merry Lino MD- [facility]    Pertinent Medical History: Atrial fibrillation    Most recent EKG+ Tracin22    Most recent ECHO: 22

## 2022-12-30 ENCOUNTER — ALLIED HEALTH/NURSE VISIT (OUTPATIENT)
Dept: RADIATION ONCOLOGY | Facility: CLINIC | Age: 72
End: 2022-12-30
Attending: RADIOLOGY
Payer: COMMERCIAL

## 2022-12-30 ENCOUNTER — APPOINTMENT (OUTPATIENT)
Dept: LAB | Facility: CLINIC | Age: 72
End: 2022-12-30
Attending: INTERNAL MEDICINE
Payer: COMMERCIAL

## 2022-12-30 DIAGNOSIS — R79.89 ELEVATED TSH: ICD-10-CM

## 2022-12-30 DIAGNOSIS — R94.6 ABNORMAL RESULTS OF THYROID FUNCTION STUDIES: ICD-10-CM

## 2022-12-30 LAB
T4 FREE SERPL-MCNC: <0.1 NG/DL (ref 0.9–1.7)
TSH SERPL DL<=0.005 MIU/L-ACNC: 73.7 UIU/ML (ref 0.3–4.2)

## 2022-12-30 PROCEDURE — 77386 HC IMRT TREATMENT DELIVERY, COMPLEX: CPT | Performed by: RADIOLOGY

## 2022-12-30 PROCEDURE — 84439 ASSAY OF FREE THYROXINE: CPT

## 2022-12-30 PROCEDURE — 36415 COLL VENOUS BLD VENIPUNCTURE: CPT

## 2022-12-30 PROCEDURE — 77427 RADIATION TX MANAGEMENT X5: CPT | Performed by: RADIOLOGY

## 2022-12-30 PROCEDURE — 84443 ASSAY THYROID STIM HORMONE: CPT

## 2022-12-30 PROCEDURE — 77014 PR CT GUIDE FOR PLACEMENT RADIATION THERAPY FIELDS: CPT | Mod: 26 | Performed by: RADIOLOGY

## 2023-01-02 ENCOUNTER — OFFICE VISIT (OUTPATIENT)
Dept: CARDIOLOGY | Facility: CLINIC | Age: 73
End: 2023-01-02
Attending: INTERNAL MEDICINE
Payer: COMMERCIAL

## 2023-01-02 ENCOUNTER — OFFICE VISIT (OUTPATIENT)
Dept: RADIATION ONCOLOGY | Facility: CLINIC | Age: 73
End: 2023-01-02
Attending: RADIOLOGY
Payer: COMMERCIAL

## 2023-01-02 VITALS
HEART RATE: 81 BPM | WEIGHT: 127.6 LBS | DIASTOLIC BLOOD PRESSURE: 98 MMHG | SYSTOLIC BLOOD PRESSURE: 158 MMHG | BODY MASS INDEX: 22.61 KG/M2 | HEIGHT: 63 IN | OXYGEN SATURATION: 99 %

## 2023-01-02 VITALS
DIASTOLIC BLOOD PRESSURE: 66 MMHG | OXYGEN SATURATION: 99 % | HEART RATE: 77 BPM | WEIGHT: 127.9 LBS | SYSTOLIC BLOOD PRESSURE: 166 MMHG | BODY MASS INDEX: 22.95 KG/M2

## 2023-01-02 DIAGNOSIS — I48.20 CHRONIC ATRIAL FIBRILLATION (H): Primary | ICD-10-CM

## 2023-01-02 DIAGNOSIS — I10 ESSENTIAL HYPERTENSION: ICD-10-CM

## 2023-01-02 DIAGNOSIS — C53.0 MALIGNANT NEOPLASM OF ENDOCERVIX (H): ICD-10-CM

## 2023-01-02 DIAGNOSIS — C53.9 RECURRENT CERVICAL CANCER (H): Primary | ICD-10-CM

## 2023-01-02 DIAGNOSIS — Z85.3 PERSONAL HISTORY OF MALIGNANT NEOPLASM OF BREAST: ICD-10-CM

## 2023-01-02 PROCEDURE — 99214 OFFICE O/P EST MOD 30 MIN: CPT | Performed by: INTERNAL MEDICINE

## 2023-01-02 PROCEDURE — G0463 HOSPITAL OUTPT CLINIC VISIT: HCPCS

## 2023-01-02 PROCEDURE — 77386 HC IMRT TREATMENT DELIVERY, COMPLEX: CPT | Performed by: RADIOLOGY

## 2023-01-02 RX ORDER — MULTIPLE VITAMINS W/ MINERALS TAB 9MG-400MCG
1 TAB ORAL EVERY MORNING
COMMUNITY
End: 2023-07-03

## 2023-01-02 ASSESSMENT — PAIN SCALES - GENERAL: PAINLEVEL: NO PAIN (0)

## 2023-01-02 NOTE — LETTER
Date:January 3, 2023      Provider requested that no letter be sent. Do not send.       [facility]

## 2023-01-02 NOTE — TELEPHONE ENCOUNTER
FUTURE VISIT INFORMATION        SURGERY INFORMATION:    Date: 23    Location: uu or    Surgeon:  Raymond Stockton MD    Anesthesia Type:  general    Procedure: INSERTION, NEEDLE, WITH ULTRASOUND GUIDANCE, FOR INTERSTITIAL BRACHYTHERAPY possible INSERTION, FIDUCIAL MARKER SEED, CERVIX, FOR RADIATION THERAPY    Consult: ov 22     RECORDS REQUESTED FROM:         Primary Care Provider: Merry Lino MD- [facility]     Pertinent Medical History: Atrial fibrillation     Most recent EKG+ Tracin22     Most recent ECHO: 22

## 2023-01-02 NOTE — PATIENT INSTRUCTIONS
"Cardiology Providers you saw during your visit:  Dr Elise gonzalez    Medication changes: No changes, continue current medications.     Follow up: 3-4 months with Dr Gonzalez  Monitor BP at home and send readings via PPI  Follow up with PCP to address thyroid status.     Labs: None.          Follow the American Heart Association Diet and Lifestyle recommendations:  Limit saturated fat, trans fat, sodium, red meat, sweets and sugar-sweetened beverages. If you choose to eat red meat, compare labels and select the leanest cuts available.  Aim for at least 150 minutes of moderate physical activity or 75 minutes of vigorous physical activity - or an equal combination of both - each week.    If you have any questions, contact  Pratik Iniguez RN. We are encouraging the use of PPI to communicate with your HealthCare Provider     To contact the [facility], please call, [phone]  To schedule an appointment or to leave a message for your Care Team Press #1  If you are a physician calling for another physician Press #2  For Billing Press #3  For Medical Records Press #4\"    "

## 2023-01-02 NOTE — LETTER
2023         RE: Cordell Donaldson  [address]  [facility] [address]        Dear Colleague,    Thank you for referring your patient, Cordell Donaldson, to the [facility] RADIATION ONCOLOGY. Please see a copy of my visit note below.    [facility] PHYSICIANS  SPECIALIZING IN BREAKTHROUGHS  Radiation Oncology    On Treatment Visit Note      Cordell Donaldson      Date: 2023   MRN: [id number]   : 1950  Diagnosis: Cervical cancer      Reason for Visit:  On Radiation Treatment Visit     Treatment Summary to Date  Treatment Site: PA + pelvis Current Dose: 172/4500 cGy Fractions:  + 5 HDR      Chemotherapy  Chemo concurrent with radx?: No    ED Visit/Hosiptal Admission: None    Treatment Breaks: None      Subjective:   Ms. Donaldson tolerated the first 4 treatments well. She did experience some low pelvis/hip pain on Thursday and Friday. The discomfort resolved after she had bowel movements. She denies vaginal bleeding or discharge. She has questions about the supplement use during the radiation. She also inquires about the details of her interstitial brachytherapy and whether she'd be able to read books, listen to the music etc. She is planning a trip between her EBRT and brachytherapy and wonders if this is feasible.     Nursing ROS:   Nutrition Alteration  Diet Type: Patient's Preference  Nutrition Note: appetite good  Skin  Skin Reaction: 0 - No changes        Cardiovascular  Respiratory effort: 1 - Normal - without distress  Gastrointestinal  GI Note: WNL  Genitourinary  Urinary Status: 0 - Normal   Note: No vaginal bleeding  Psychosocial  Psychosocial Note: Feeling tired, still healing from chemo  Pain Assessment  Pain Note: See above      Objective:   BP (!) 166/66   Pulse 77   Wt 58 kg (127 lb 14.4 oz)   SpO2 99%   BMI 22.95 kg/m    Gen: Appears well, in no acute distress  Skin: No erythema    Labs:  CBC RESULTS: Recent Labs   Lab Test 22  1056    WBC 2.6*   RBC 4.76   HGB 13.5   HCT 41.2   MCV 87   MCH 28.4   MCHC 32.8   RDW 16.9*        ELECTROLYTES:  Recent Labs   Lab Test 12/07/22  1056      POTASSIUM 4.8   CHLORIDE 100   LINDA 9.8   CO2 28   BUN 14.6   CR 0.82   *       Assessment:    Tolerating radiation therapy well.  All questions and concerns addressed.    Toxicities:  Fatigue: Grade 0: No toxicity  Diarrhea: Grade 0: No toxicity  Urinary frequency: Grade 0: No toxicity  Urinary tract pain: Grade 0: No toxicity  Urinary urgency: Grade 0: No toxicity    Plan:   1. Continue current therapy.    2. Discussed using Cavilon for skin care (Proshield no longer available).  3. Explained the brachytherapy inpatient stay.  4. Reviewed her supplement list. Discussed avoiding antioxidants during radiation therapy.  5. She could consider taking a trip between EBRT and interstitial brachytherapy. However, the fatigue and the diarrhea could make her trip less enjoyable. She will think about it.       Mosaiq chart and setup information reviewed  MVCT/IGRT images checked    Medication Review  Med Note: Supplements need to be added    Educational Topic Discussed  Education Instructions: Reviewed        Raymond Stockton MD/PhD  [phone] clinic  Pager [phone]    Please do not send letter to referring physician.        Again, thank you for allowing me to participate in the care of your patient.        Sincerely,        Raymond Stockton MD

## 2023-01-02 NOTE — LETTER
1/2/2023       RE: Cordell Donaldson  [address]  [facility] [address]       Dear Colleague,    Thank you for the opportunity to participate in the care of your patient, Cordell Donaldson, at the [facility] [place] at [facility]. Please see a copy of my visit note below.      History:    Cordell Donaldson is a 72 year old woman with persistent atrial fibrillation. She has a history of breast cancer treated with lumpectomy and radiation (2003).  She has persistent atrial fibrillation and takes metoprolol for rate control. She has declined cardioversion and anticoagulation.     She was diagnosed with cervical cancer in June 2022 and invasive ductal carcinoma of the left breast in July 2022. The cervical cancer treatment was prioritized and she received immunotherapy (Keytruda), carboplatin (was cisplatin), Avastin and paclitaxel with good response. She developed tachycardia and hypertension as a result of Avastin. On recent surveillance PET scan there was concern for possible immunotherapy associated myocarditis. Cardiac biomarkers were normal and a follow up cardiac MRI with stress was without ischemia or evidence of myocarditis.      Chemotherapy was stopped and she is undergoing radiation therapy and future brachytherapy.       She feels well now. BP is high today due to increased stress. Recent TSH is high with low T4 but very hesitant to start thyroid replacement. She has no fatigue or cold intolerance. She has been exercising with no limitations.      Current Outpatient Medications   Medication Sig Dispense Refill     Acetylcarnitine HCl 250 MG CAPS Take 500 mg by mouth 2 times daily        aspirin (ASA) 81 MG chewable tablet Take 81 mg by mouth daily       Bacillus Coagulans-Inulin (PROBIOTIC FORMULA) 1-250 BILLION-MG CAPS Take by mouth three times a week 25+billion CFUS       BENFOTIAMINE PO Take 150 mg by mouth 2 times daily        Cholecalciferol (VITAMIN D-3) 25 MCG (1000 UT) CAPS Take 1,000 Units by mouth daily 90 capsule 3     MAGNESIUM OXIDE PO Take 500 mg by mouth       melatonin 5 MG tablet Take 10 mg by mouth nightly as needed for sleep       metoprolol succinate ER (TOPROL XL) 50 MG 24 hr tablet TAKE 1 TABLET BY MOUTH EVERY DAY 90 tablet 2     multivitamin w/minerals (THERA-VIT-M) tablet Take 1 tablet by mouth daily       Omega-3 Fatty Acids (OMEGA-3 FISH OIL PO) Take 630 mg by mouth 2 times daily        OVER-THE-COUNTER Turkey tail mushroom powder, use 3 times a day 1 Can 11     Pectin Cit-Inos-C-Bioflav-Soy (MODIFIED CITRUS PECTIN PO)        Taurine 1000 MG CAPS Take one po twice daily 60 capsule 11     UNABLE TO FIND MEDICATION NAME: [facility] for 1000mg calcium  4 pills per day       UNABLE TO FIND 1 tablet daily MEDICATION NAME: Acetyl-L-Carnitine 500 mg       UNABLE TO FIND Take 1 tablet by mouth daily MEDICATION NAME: Dinndolylmethane Complex (DIM) 100mg tablet 1 table daily       ondansetron (ZOFRAN) 8 MG tablet Take 1 tablet (8 mg) by mouth every 8 hours as needed for nausea (vomiting) (Patient not taking: Reported on 11/14/2022) 30 tablet 2     prochlorperazine (COMPAZINE) 10 MG tablet Take 1 tablet (10 mg) by mouth every 6 hours as needed for nausea or vomiting (Patient not taking: Reported on 11/14/2022) 30 tablet 2       Allergies - reviewed   No Known Allergies    Past history -reviewed  Past Medical History:   Diagnosis Date     Abnormal Pap smear     maybe 20 years ago     Cervical cancer (H)      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      History of breast cancer 2003    lumpectomy and radiation offerred chemo and patient declined at time but had oncogene test and low risk     Hyperlipidemia LDL goal < 160      Osteopenia      Paroxysmal A-fib (H)      Severe vaginal dysplasia        Social history - reviewed    Former smoker   Social alcohol    Family history -reviewed  Family  "History   Problem Relation Age of Onset     Myocardial Infarction Mother 73        Tob     Myocardial Infarction Father 68        Tob     Breast Cancer Sister 47         of breast cancer age 63; BRCA mutation testing negative     Cancer Maternal Grandmother         Unsure of type.     Breast Cancer Paternal Grandmother         Unsure of diagnosis--some type of \"women's issue\"       ROS: non contributory on the 10-point review of system    EXAM:  BP (!) 158/98 (BP Location: Right arm, Patient Position: Chair, Cuff Size: Adult Regular)   Pulse 81   Ht 1.592 m (5' 2.68\")   Wt 57.9 kg (127 lb 9.6 oz)   SpO2 99%   BMI 22.84 kg/m      In general, the patient is in no apparent distress.        HEENT: Sclerae white, not injected.    Neck: No JVD. No thyromegaly  Heart: irregular. No murmur. No audible rub or gallop.    Lungs: Clear bilaterally.  No rhonchi, wheezes, rales.   Extremities: No edema.  The pulses are 2+at the radial bilaterally.  Psych: pleasant and conversant      Data:  Chemistry panel: Recent Labs   Lab Test 22  1056 22  0710    142   POTASSIUM 4.8 4.0   CHLORIDE 100 106   CO2 28 25   ANIONGAP 10 11   * 106*   BUN 14.6 26.4*   CR 0.82 0.77   LINDA 9.8 9.8   MAG  --  1.9   GFRESTIMATED 76 82   AST 32 27   ALT 29 30       CBC:   Recent Labs   Lab Test 22  1056 22  0710   WBC 2.6* 3.5*   RBC 4.76 4.71   HGB 13.5 13.0   HCT 41.2 40.0   MCV 87 85   MCH 28.4 27.6   MCHC 32.8 32.5   RDW 16.9* 16.3*    158       Lipid Panel:  Recent Labs   Lab Test 22  1544 21  1146   CHOL 202* 246*   HDL 41* 55   * 167*   TRIG 86 120       Thyroid:   TSH   Date Value Ref Range Status   2022 73.70 (H) 0.30 - 4.20 uIU/mL Final   2017 2.16 0.40 - 4.00 mU/L Final     Free T4   Date Value Ref Range Status   2022 <0.10 (L) 0.90 - 1.70 ng/dL Final     22  Troponin T high-sensitivity= 6   NT proBNP = 444     Patient's all available and relevant " cardiac investigations were personally reviewed and discussed with the patient today.    ECG 12/8/22 - atrial fibrillation with a ventricular response average 95 bpm     Stress cardiac MRI 12/8/22  The patient's rhythm is atrial fibrillation.  Normal biventricular function, LVEF 68% and RVEF 52%.   No evidence of ischemia.   No evidence of myocardial edema or fibrosis to indicate immune checkpoint inhibitor myocarditis.        Assessment and Plan:  72 year old woman with    Persistent atrial fibrillation  Normal biventricular function   Dyslipidemia  Cervical cancer July 2022, on treatment  Invasive ductal carcinoma L breast July 2022  History of breast cancer 2003    Cordell is on treatment for cervical cancer.  She had multiple side effects from chemotherapy and now is on radiation therapy. Cardiac MRI and cardiac biomarkers did not support a diagnosis of immunotherapy associated myocarditis. She has persistent atrial fibrillation with variable heart rate response.  She had tachycardia and hypertension with Avastin therapy.    Cordell is currently taking metoprolol XL 50 mg daily for heart rate control and hypertension.  I would favor increasing the dose to 50 mg bid if home BP readings continue to be elevated.      She has previously firmly declined anticoagulation and statin therapy. Her ASCVD risk score is elevated and wishes to address this with lifestyle modification.  We discussed maintaining a good nutrition state with adequate hydration to avoid additional factors that would affect the heart rate.  I do not see any contraindications to proceeding with brachytherapy.    Recommendations:   1. Follow-up 3-4 months  2. Monitor BP at home and send readings via [employer]  3. Follow up with PCP to address thyroid status.         The 10-year ASCVD risk score (Isra SHANE, et al., 2019) is: 16.2%    Values used to calculate the score:      Age: 72 years      Sex: Female      Is Non- : No       Diabetic: No      Tobacco smoker: No      Systolic Blood Pressure: 158 mmHg      Is BP treated: No      HDL Cholesterol: 41 mg/dL      Total Cholesterol: 202 mg/dL      Elise Huitron MD, MS  Professor of Medicine  Cardiovascular Medicine

## 2023-01-02 NOTE — NURSING NOTE
Chief Complaint   Patient presents with     Follow Up     Return for 9 week follow up       Vitals were taken and medications reconciled.    Hussein Cleaning, EMT  3:26 PM

## 2023-01-02 NOTE — NURSING NOTE
Medication changes: No changes, continue current medications.     Follow up: 3-4 months with Dr Huitron  Follow up with PCP for Thyroid problems  Check BPs at home and sent to Dr Huitron via [employer].     Labs: None.     Patient given AVS in clinic

## 2023-01-02 NOTE — PROGRESS NOTES
History:    Cordell Donaldson is a 72 year old woman with persistent atrial fibrillation. She has a history of breast cancer treated with lumpectomy and radiation (2003).  She has persistent atrial fibrillation and takes metoprolol for rate control. She has declined cardioversion and anticoagulation.     She was diagnosed with cervical cancer in June 2022 and invasive ductal carcinoma of the left breast in July 2022. The cervical cancer treatment was prioritized and she received immunotherapy (Keytruda), carboplatin (was cisplatin), Avastin and paclitaxel with good response. She developed tachycardia and hypertension as a result of Avastin. On recent surveillance PET scan there was concern for possible immunotherapy associated myocarditis. Cardiac biomarkers were normal and a follow up cardiac MRI with stress was without ischemia or evidence of myocarditis.      Chemotherapy was stopped and she is undergoing radiation therapy and future brachytherapy.       She feels well now. BP is high today due to increased stress. Recent TSH is high with low T4 but very hesitant to start thyroid replacement. She has no fatigue or cold intolerance. She has been exercising with no limitations.      Current Outpatient Medications   Medication Sig Dispense Refill     Acetylcarnitine HCl 250 MG CAPS Take 500 mg by mouth 2 times daily        aspirin (ASA) 81 MG chewable tablet Take 81 mg by mouth daily       Bacillus Coagulans-Inulin (PROBIOTIC FORMULA) 1-250 BILLION-MG CAPS Take by mouth three times a week 25+billion CFUS       BENFOTIAMINE PO Take 150 mg by mouth 2 times daily       Cholecalciferol (VITAMIN D-3) 25 MCG (1000 UT) CAPS Take 1,000 Units by mouth daily 90 capsule 3     MAGNESIUM OXIDE PO Take 500 mg by mouth       melatonin 5 MG tablet Take 10 mg by mouth nightly as needed for sleep       metoprolol succinate ER (TOPROL XL) 50 MG 24 hr tablet TAKE 1 TABLET BY MOUTH EVERY DAY 90 tablet 2     multivitamin w/minerals  (THERA-VIT-M) tablet Take 1 tablet by mouth daily       Omega-3 Fatty Acids (OMEGA-3 FISH OIL PO) Take 630 mg by mouth 2 times daily        OVER-THE-COUNTER Turkey tail mushroom powder, use 3 times a day 1 Can 11     Pectin Cit-Inos-C-Bioflav-Soy (MODIFIED CITRUS PECTIN PO)        Taurine 1000 MG CAPS Take one po twice daily 60 capsule 11     UNABLE TO FIND MEDICATION NAME: [facility] for 1000mg calcium  4 pills per day       UNABLE TO FIND 1 tablet daily MEDICATION NAME: Acetyl-L-Carnitine 500 mg       UNABLE TO FIND Take 1 tablet by mouth daily MEDICATION NAME: Dinndolylmethane Complex (DIM) 100mg tablet 1 table daily       ondansetron (ZOFRAN) 8 MG tablet Take 1 tablet (8 mg) by mouth every 8 hours as needed for nausea (vomiting) (Patient not taking: Reported on 2022) 30 tablet 2     prochlorperazine (COMPAZINE) 10 MG tablet Take 1 tablet (10 mg) by mouth every 6 hours as needed for nausea or vomiting (Patient not taking: Reported on 2022) 30 tablet 2       Allergies - reviewed   No Known Allergies    Past history -reviewed  Past Medical History:   Diagnosis Date     Abnormal Pap smear     maybe 20 years ago     Cervical cancer (H)      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      History of breast cancer     lumpectomy and radiation offerred chemo and patient declined at time but had oncogene test and low risk     Hyperlipidemia LDL goal < 160      Osteopenia      Paroxysmal A-fib (H)      Severe vaginal dysplasia        Social history - reviewed    Former smoker   Social alcohol    Family history -reviewed  Family History   Problem Relation Age of Onset     Myocardial Infarction Mother 73        Tob     Myocardial Infarction Father 68        Tob     Breast Cancer Sister 47         of breast cancer age 63; BRCA mutation testing negative     Cancer Maternal Grandmother         Unsure of type.     Breast Cancer Paternal Grandmother         Unsure of  "diagnosis--some type of \"women's issue\"       ROS: non contributory on the 10-point review of system    EXAM:  BP (!) 158/98 (BP Location: Right arm, Patient Position: Chair, Cuff Size: Adult Regular)   Pulse 81   Ht 1.592 m (5' 2.68\")   Wt 57.9 kg (127 lb 9.6 oz)   SpO2 99%   BMI 22.84 kg/m      In general, the patient is in no apparent distress.        HEENT: Sclerae white, not injected.    Neck: No JVD. No thyromegaly  Heart: irregular. No murmur. No audible rub or gallop.    Lungs: Clear bilaterally.  No rhonchi, wheezes, rales.   Extremities: No edema.  The pulses are 2+at the radial bilaterally.  Psych: pleasant and conversant      Data:  Chemistry panel: Recent Labs   Lab Test 12/07/22  1056 11/21/22  0710    142   POTASSIUM 4.8 4.0   CHLORIDE 100 106   CO2 28 25   ANIONGAP 10 11   * 106*   BUN 14.6 26.4*   CR 0.82 0.77   LINDA 9.8 9.8   MAG  --  1.9   GFRESTIMATED 76 82   AST 32 27   ALT 29 30       CBC:   Recent Labs   Lab Test 12/07/22  1056 11/21/22  0710   WBC 2.6* 3.5*   RBC 4.76 4.71   HGB 13.5 13.0   HCT 41.2 40.0   MCV 87 85   MCH 28.4 27.6   MCHC 32.8 32.5   RDW 16.9* 16.3*    158       Lipid Panel:  Recent Labs   Lab Test 04/06/22  1544 05/12/21  1146   CHOL 202* 246*   HDL 41* 55   * 167*   TRIG 86 120       Thyroid:   TSH   Date Value Ref Range Status   12/30/2022 73.70 (H) 0.30 - 4.20 uIU/mL Final   08/22/2017 2.16 0.40 - 4.00 mU/L Final     Free T4   Date Value Ref Range Status   12/30/2022 <0.10 (L) 0.90 - 1.70 ng/dL Final     12/7/22  Troponin T high-sensitivity= 6   NT proBNP = 444     Patient's all available and relevant cardiac investigations were personally reviewed and discussed with the patient today.    ECG 12/8/22 - atrial fibrillation with a ventricular response average 95 bpm     Stress cardiac MRI 12/8/22  The patient's rhythm is atrial fibrillation.  Normal biventricular function, LVEF 68% and RVEF 52%.   No evidence of ischemia.   No evidence of " myocardial edema or fibrosis to indicate immune checkpoint inhibitor myocarditis.        Assessment and Plan:  72 year old woman with    Persistent atrial fibrillation  Normal biventricular function   Dyslipidemia  Cervical cancer July 2022, on treatment  Invasive ductal carcinoma L breast July 2022  History of breast cancer 2003    Cordell is on treatment for cervical cancer.  She had multiple side effects from chemotherapy and now is on radiation therapy. Cardiac MRI and cardiac biomarkers did not support a diagnosis of immunotherapy associated myocarditis. She has persistent atrial fibrillation with variable heart rate response.  She had tachycardia and hypertension with Avastin therapy.    Cordell is currently taking metoprolol XL 50 mg daily for heart rate control and hypertension.  I would favor increasing the dose to 50 mg bid if home BP readings continue to be elevated.      She has previously firmly declined anticoagulation and statin therapy. Her ASCVD risk score is elevated and wishes to address this with lifestyle modification.  We discussed maintaining a good nutrition state with adequate hydration to avoid additional factors that would affect the heart rate.  I do not see any contraindications to proceeding with brachytherapy.    Recommendations:   1. Follow-up 3-4 months  2. Monitor BP at home and send readings via [employer]  3. Follow up with PCP to address thyroid status.         The 10-year ASCVD risk score (Isra SHANE, et al., 2019) is: 16.2%    Values used to calculate the score:      Age: 72 years      Sex: Female      Is Non- : No      Diabetic: No      Tobacco smoker: No      Systolic Blood Pressure: 158 mmHg      Is BP treated: No      HDL Cholesterol: 41 mg/dL      Total Cholesterol: 202 mg/dL      Elise Huitron MD, MS  Professor of Medicine  Cardiovascular Medicine

## 2023-01-02 NOTE — PROGRESS NOTES
[facility] PHYSICIANS  SPECIALIZING IN BREAKTHROUGHS  Radiation Oncology    On Treatment Visit Note      Cordell Donaldson      Date: 2023   MRN: [id number]   : 1950  Diagnosis: Cervical cancer      Reason for Visit:  On Radiation Treatment Visit     Treatment Summary to Date  Treatment Site: PA + pelvis Current Dose: 172/4500 cGy Fractions:  + 5 HDR      Chemotherapy  Chemo concurrent with radx?: No    ED Visit/Hosiptal Admission: None    Treatment Breaks: None      Subjective:   Ms. Donaldson tolerated the first 4 treatments well. She did experience some low pelvis/hip pain on Thursday and Friday. The discomfort resolved after she had bowel movements. She denies vaginal bleeding or discharge. She has questions about the supplement use during the radiation. She also inquires about the details of her interstitial brachytherapy and whether she'd be able to read books, listen to the music etc. She is planning a trip between her EBRT and brachytherapy and wonders if this is feasible.     Nursing ROS:   Nutrition Alteration  Diet Type: Patient's Preference  Nutrition Note: appetite good  Skin  Skin Reaction: 0 - No changes        Cardiovascular  Respiratory effort: 1 - Normal - without distress  Gastrointestinal  GI Note: WNL  Genitourinary  Urinary Status: 0 - Normal   Note: No vaginal bleeding  Psychosocial  Psychosocial Note: Feeling tired, still healing from chemo  Pain Assessment  Pain Note: See above      Objective:   BP (!) 166/66   Pulse 77   Wt 58 kg (127 lb 14.4 oz)   SpO2 99%   BMI 22.95 kg/m    Gen: Appears well, in no acute distress  Skin: No erythema    Labs:  CBC RESULTS: Recent Labs   Lab Test 22  1056   WBC 2.6*   RBC 4.76   HGB 13.5   HCT 41.2   MCV 87   MCH 28.4   MCHC 32.8   RDW 16.9*        ELECTROLYTES:  Recent Labs   Lab Test 22  1056      POTASSIUM 4.8   CHLORIDE 100   LINDA 9.8   CO2 28   BUN 14.6   CR 0.82   *       Assessment:     Tolerating radiation therapy well.  All questions and concerns addressed.    Toxicities:  Fatigue: Grade 0: No toxicity  Diarrhea: Grade 0: No toxicity  Urinary frequency: Grade 0: No toxicity  Urinary tract pain: Grade 0: No toxicity  Urinary urgency: Grade 0: No toxicity    Plan:   1. Continue current therapy.    2. Discussed using Cavilon for skin care (Proshield no longer available).  3. Explained the brachytherapy inpatient stay.  4. Reviewed her supplement list. Discussed avoiding antioxidants during radiation therapy.  5. She could consider taking a trip between EBRT and interstitial brachytherapy. However, the fatigue and the diarrhea could make her trip less enjoyable. She will think about it.       Mosaiq chart and setup information reviewed  MVCT/IGRT images checked    Medication Review  Med Note: Supplements need to be added    Educational Topic Discussed  Education Instructions: Reviewed        Raymond Stockton MD/PhD  [phone] clinic  Pager [phone]    Please do not send letter to referring physician.

## 2023-01-03 ENCOUNTER — APPOINTMENT (OUTPATIENT)
Dept: RADIATION ONCOLOGY | Facility: CLINIC | Age: 73
End: 2023-01-03
Attending: RADIOLOGY
Payer: COMMERCIAL

## 2023-01-03 PROCEDURE — 77014 PR CT GUIDE FOR PLACEMENT RADIATION THERAPY FIELDS: CPT | Mod: 26 | Performed by: RADIOLOGY

## 2023-01-03 PROCEDURE — 77336 RADIATION PHYSICS CONSULT: CPT | Performed by: RADIOLOGY

## 2023-01-03 PROCEDURE — 77386 HC IMRT TREATMENT DELIVERY, COMPLEX: CPT | Performed by: RADIOLOGY

## 2023-01-04 ENCOUNTER — APPOINTMENT (OUTPATIENT)
Dept: RADIATION ONCOLOGY | Facility: CLINIC | Age: 73
End: 2023-01-04
Attending: RADIOLOGY
Payer: COMMERCIAL

## 2023-01-04 PROCEDURE — 77386 HC IMRT TREATMENT DELIVERY, COMPLEX: CPT | Performed by: RADIOLOGY

## 2023-01-04 PROCEDURE — 77014 PR CT GUIDE FOR PLACEMENT RADIATION THERAPY FIELDS: CPT | Mod: 26 | Performed by: RADIOLOGY

## 2023-01-05 ENCOUNTER — APPOINTMENT (OUTPATIENT)
Dept: RADIATION ONCOLOGY | Facility: CLINIC | Age: 73
End: 2023-01-05
Attending: RADIOLOGY
Payer: COMMERCIAL

## 2023-01-05 PROCEDURE — 77014 PR CT GUIDE FOR PLACEMENT RADIATION THERAPY FIELDS: CPT | Mod: 26 | Performed by: RADIOLOGY

## 2023-01-05 PROCEDURE — 77386 HC IMRT TREATMENT DELIVERY, COMPLEX: CPT | Performed by: RADIOLOGY

## 2023-01-06 ENCOUNTER — APPOINTMENT (OUTPATIENT)
Dept: RADIATION ONCOLOGY | Facility: CLINIC | Age: 73
End: 2023-01-06
Attending: RADIOLOGY
Payer: COMMERCIAL

## 2023-01-06 PROCEDURE — 77014 PR CT GUIDE FOR PLACEMENT RADIATION THERAPY FIELDS: CPT | Mod: 26 | Performed by: RADIOLOGY

## 2023-01-06 PROCEDURE — 77386 HC IMRT TREATMENT DELIVERY, COMPLEX: CPT | Performed by: RADIOLOGY

## 2023-01-09 ENCOUNTER — OFFICE VISIT (OUTPATIENT)
Dept: RADIATION ONCOLOGY | Facility: CLINIC | Age: 73
End: 2023-01-09
Attending: RADIOLOGY
Payer: COMMERCIAL

## 2023-01-09 VITALS
HEART RATE: 62 BPM | OXYGEN SATURATION: 99 % | WEIGHT: 129.5 LBS | BODY MASS INDEX: 23.18 KG/M2 | SYSTOLIC BLOOD PRESSURE: 127 MMHG | DIASTOLIC BLOOD PRESSURE: 75 MMHG

## 2023-01-09 DIAGNOSIS — C53.9 RECURRENT CERVICAL CANCER (H): Primary | ICD-10-CM

## 2023-01-09 PROCEDURE — 77386 HC IMRT TREATMENT DELIVERY, COMPLEX: CPT | Performed by: RADIOLOGY

## 2023-01-09 RX ORDER — LEVOTHYROXINE SODIUM 25 UG/1
1 TABLET ORAL
Status: ON HOLD | COMMUNITY
Start: 2023-01-02 | End: 2023-02-15

## 2023-01-09 NOTE — LETTER
2023     RE: Cordell Donaldson  [address]  [facility] [address]    Dear Colleague,    Thank you for referring your patient, Cordell Donaldson, to the [facility] RADIATION ONCOLOGY. Please see a copy of my visit note below.    [facility] PHYSICIANS  SPECIALIZING IN BREAKTHROUGHS  Radiation Oncology    On Treatment Visit Note      Cordell Donaldson      Date:    MRN: [id number]   : 1950  Diagnosis: Cervical cancer      Reason for Visit:  On Radiation Treatment Visit     Treatment Summary to Date  Treatment Site: PA + pelvis Current Dose: 1800/4500 cGy Fractions: + 5 HDR      Chemotherapy  Chemo concurrent with radx?: No    ED Visit/Hosiptal Admission: None    Treatment Breaks: None    Subjective:  Doing well      Nursing ROS:   Nutrition Alteration  Diet Type: Patient's Preference  Nutrition Note: appetite good  Skin  Skin Reaction: 0 - No changes        Cardiovascular  Respiratory effort: 1 - Normal - without distress  Gastrointestinal  GI Note: WNL  Genitourinary  Urinary Status: 0 - Normal   Note: No vaginal bleeding  Psychosocial  Psychosocial Note: Feeling tired, still healing from chemo  Pain Assessment  Pain Note: See above      Objective:   /75   Pulse 62   Wt 58.7 kg (129 lb 8 oz)   SpO2 99%   BMI 23.18 kg/m    Gen: Appears well, in no acute distress  Skin: No erythema      Assessment:    Tolerating radiation therapy well.  All questions and concerns addressed.    Toxicities:  Fatigue: Grade 0: No toxicity  Diarrhea: Grade 0: No toxicity  Urinary frequency: Grade 0: No toxicity  Urinary tract pain: Grade 0: No toxicity  Urinary urgency: Grade 0: No toxicity    Plan:   1. Continue current therapy.        Mosaiq chart and setup information reviewed  MVCT/IGRT images checked    Medication Review  Med Note: Supplements need to be added    Educational Topic Discussed  Education Instructions: Reviewed      ATIF Spear           Again, thank  you for allowing me to participate in the care of your patient.        Sincerely,        Beckie Spear MD

## 2023-01-10 ENCOUNTER — APPOINTMENT (OUTPATIENT)
Dept: RADIATION ONCOLOGY | Facility: CLINIC | Age: 73
End: 2023-01-10
Attending: RADIOLOGY
Payer: COMMERCIAL

## 2023-01-10 PROCEDURE — 77014 PR CT GUIDE FOR PLACEMENT RADIATION THERAPY FIELDS: CPT | Mod: 26 | Performed by: RADIOLOGY

## 2023-01-10 PROCEDURE — 77427 RADIATION TX MANAGEMENT X5: CPT | Performed by: RADIOLOGY

## 2023-01-10 PROCEDURE — 77336 RADIATION PHYSICS CONSULT: CPT | Performed by: RADIOLOGY

## 2023-01-10 PROCEDURE — 77386 HC IMRT TREATMENT DELIVERY, COMPLEX: CPT | Performed by: RADIOLOGY

## 2023-01-11 ENCOUNTER — TELEPHONE (OUTPATIENT)
Dept: ONCOLOGY | Facility: CLINIC | Age: 73
End: 2023-01-11

## 2023-01-11 ENCOUNTER — APPOINTMENT (OUTPATIENT)
Dept: RADIATION ONCOLOGY | Facility: CLINIC | Age: 73
End: 2023-01-11
Attending: RADIOLOGY
Payer: COMMERCIAL

## 2023-01-11 PROCEDURE — 77014 PR CT GUIDE FOR PLACEMENT RADIATION THERAPY FIELDS: CPT | Mod: 26 | Performed by: RADIOLOGY

## 2023-01-11 PROCEDURE — 77386 HC IMRT TREATMENT DELIVERY, COMPLEX: CPT | Performed by: RADIOLOGY

## 2023-01-11 NOTE — PROGRESS NOTES
[facility] PHYSICIANS  SPECIALIZING IN BREAKTHROUGHS  Radiation Oncology    On Treatment Visit Note      Cordell Donaldson      Date:    MRN: [id number]   : 1950  Diagnosis: Cervical cancer      Reason for Visit:  On Radiation Treatment Visit     Treatment Summary to Date  Treatment Site: PA + pelvis Current Dose: 1800/4500 cGy Fractions: + 5 HDR      Chemotherapy  Chemo concurrent with radx?: No    ED Visit/Hosiptal Admission: None    Treatment Breaks: None    Subjective:  Doing well      Nursing ROS:   Nutrition Alteration  Diet Type: Patient's Preference  Nutrition Note: appetite good  Skin  Skin Reaction: 0 - No changes        Cardiovascular  Respiratory effort: 1 - Normal - without distress  Gastrointestinal  GI Note: WNL  Genitourinary  Urinary Status: 0 - Normal   Note: No vaginal bleeding  Psychosocial  Psychosocial Note: Feeling tired, still healing from chemo  Pain Assessment  Pain Note: See above      Objective:   /75   Pulse 62   Wt 58.7 kg (129 lb 8 oz)   SpO2 99%   BMI 23.18 kg/m    Gen: Appears well, in no acute distress  Skin: No erythema      Assessment:    Tolerating radiation therapy well.  All questions and concerns addressed.    Toxicities:  Fatigue: Grade 0: No toxicity  Diarrhea: Grade 0: No toxicity  Urinary frequency: Grade 0: No toxicity  Urinary tract pain: Grade 0: No toxicity  Urinary urgency: Grade 0: No toxicity    Plan:   1. Continue current therapy.        Mosaiq chart and setup information reviewed  MVCT/IGRT images checked    Medication Review  Med Note: Supplements need to be added    Educational Topic Discussed  Education Instructions: Reviewed      ATIF Spear

## 2023-01-11 NOTE — TELEPHONE ENCOUNTER
----- Message from Hina Raygoza MD sent at 1/3/2023  9:13 AM CST -----  Labs reviewed.   TSH continues to increase and Free T4 continues to decrease. Strongly recommend replacement with synthroid, as also recommended by endocrinologist Dr. Huitron.  Will have RN call patient with recommendation; patient has previously decided not to take synthroid despite recommendations.    Hina Raygoza MD, MS, FACOG, FACS  1/3/2023  9:13 AM

## 2023-01-12 ENCOUNTER — APPOINTMENT (OUTPATIENT)
Dept: RADIATION ONCOLOGY | Facility: CLINIC | Age: 73
End: 2023-01-12
Attending: RADIOLOGY
Payer: COMMERCIAL

## 2023-01-12 PROCEDURE — 77014 PR CT GUIDE FOR PLACEMENT RADIATION THERAPY FIELDS: CPT | Mod: 26 | Performed by: RADIOLOGY

## 2023-01-12 PROCEDURE — 77386 HC IMRT TREATMENT DELIVERY, COMPLEX: CPT | Performed by: RADIOLOGY

## 2023-01-12 NOTE — TELEPHONE ENCOUNTER
1/12  517 spoke with pt   Inquired if she got md my chart/test result message on TSH and the need to start synthroid    Pt reports she started synthroid 1 tab daily as directed on 1/2/23

## 2023-01-13 ENCOUNTER — APPOINTMENT (OUTPATIENT)
Dept: RADIATION ONCOLOGY | Facility: CLINIC | Age: 73
End: 2023-01-13
Attending: RADIOLOGY
Payer: COMMERCIAL

## 2023-01-13 PROCEDURE — 77014 PR CT GUIDE FOR PLACEMENT RADIATION THERAPY FIELDS: CPT | Mod: 26 | Performed by: RADIOLOGY

## 2023-01-13 PROCEDURE — 77386 HC IMRT TREATMENT DELIVERY, COMPLEX: CPT | Performed by: RADIOLOGY

## 2023-01-16 ENCOUNTER — APPOINTMENT (OUTPATIENT)
Dept: RADIATION ONCOLOGY | Facility: CLINIC | Age: 73
End: 2023-01-16
Attending: RADIOLOGY
Payer: COMMERCIAL

## 2023-01-16 VITALS
OXYGEN SATURATION: 99 % | WEIGHT: 130.9 LBS | BODY MASS INDEX: 23.43 KG/M2 | SYSTOLIC BLOOD PRESSURE: 141 MMHG | DIASTOLIC BLOOD PRESSURE: 87 MMHG | HEART RATE: 72 BPM

## 2023-01-16 DIAGNOSIS — C53.9 RECURRENT CERVICAL CANCER (H): Primary | ICD-10-CM

## 2023-01-16 PROCEDURE — 77386 HC IMRT TREATMENT DELIVERY, COMPLEX: CPT | Performed by: RADIOLOGY

## 2023-01-16 NOTE — LETTER
2023     RE: Cordell Donaldson  [address]  [facility] [address]    Dear Colleague,    Thank you for referring your patient, Cordell Donaldson, to the [facility] RADIATION ONCOLOGY. Please see a copy of my visit note below.    [facility] PHYSICIANS  SPECIALIZING IN BREAKTHROUGHS  Radiation Oncology    On Treatment Visit Note      Cordell Donaldson      Date: 2023   MRN: [id number]   : 1950  Diagnosis: Cervical cancer      Reason for Visit:  On Radiation Treatment Visit     Treatment Summary to Date  Treatment Site: PA + pelvis Current Dose: 2520/4500 (Dose entered incorrectly last week) cGy Fractions:  +  HDR      Chemotherapy  Chemo concurrent with radx?: No    ED Visit/Hosiptal Admission: None    Treatment Breaks: None      Subjective:   Cordell is doing well. She is mildly fatigued. She denies bladder irritation. No vaginal bleeding. She has been experiencing loose stools for the last 10 days or so. These episodes usually start in the evenings. Last night, she had to get up in the middle of the night. She has been taking 1/2 doses of liquid Imodium, up to 3 of those. She is worried about constipation if she takes too much.     Nursing ROS:   Nutrition Alteration  Diet Type: Patient's Preference  Nutrition Note: Trying a bland diet r/t diarrhea  Skin  Skin Note: No conplaints per pt        Cardiovascular  Respiratory effort: 1 - Normal - without distress  Gastrointestinal  Diarrhea: 2 - Three to five soft or liquid bowel movements per day (Talked about imodium before each meal and before bed)  GI Note: reviewed small meals and some electrolyte replacement  Genitourinary  Urinary Status: 0 - Normal   Note: No vaginal bleeding  Psychosocial  Psychosocial Note: some fatigue, resting PRN  Pain Assessment  Pain Note: See above      Objective:   BP (!) 141/87   Pulse 72   Wt 59.4 kg (130 lb 14.4 oz)   SpO2 99%   BMI 23.43 kg/m    Gen: Appears well, in no  acute distress  Skin: deferred    Labs:  CBC RESULTS: Recent Labs   Lab Test 12/07/22  1056   WBC 2.6*   RBC 4.76   HGB 13.5   HCT 41.2   MCV 87   MCH 28.4   MCHC 32.8   RDW 16.9*        ELECTROLYTES:  Recent Labs   Lab Test 12/07/22  1056      POTASSIUM 4.8   CHLORIDE 100   LINDA 9.8   CO2 28   BUN 14.6   CR 0.82   *       Assessment:    Tolerating radiation therapy well.  All questions and concerns addressed.    Toxicities:  Fatigue: Grade 1: Fatigue relieved by rest  Diarrhea: Grade 1: Increase of <4 stools per day over baseline; mild increase in ostomy output compared to baseline  Urinary frequency: Grade 0: No toxicity  Urinary tract pain: Grade 0: No toxicity  Urinary urgency: Grade 0: No toxicity    Plan:   1. Continue current therapy.    2. Diarrhea: discussed taking full doses of Imodium instead of half a dose since her diarrhea is not well controlled. She agrees to try.   3. MRI on 2/9. Will review imaging with her the same day to plan brachytherapy.       Convozineiq chart and setup information reviewed  MVCT/IGRT images checked         Educational Topic Discussed  Education Instructions: Reviewed        Raymond Stockton MD/PhD  [phone] clinic  Pager [phone]    Please do not send letter to referring physician.        Again, thank you for allowing me to participate in the care of your patient.        Sincerely,        Raymond Stockton MD

## 2023-01-16 NOTE — PROGRESS NOTES
[facility] PHYSICIANS  SPECIALIZING IN BREAKTHROUGHS  Radiation Oncology    On Treatment Visit Note      Cordell Donaldson      Date: 2023   MRN: [id number]   : 1950  Diagnosis: Cervical cancer      Reason for Visit:  On Radiation Treatment Visit     Treatment Summary to Date  Treatment Site: PA + pelvis Current Dose: 2520/4500 (Dose entered incorrectly last week) cGy Fractions:  + 5 HDR      Chemotherapy  Chemo concurrent with radx?: No    ED Visit/Hosiptal Admission: None    Treatment Breaks: None      Subjective:   Cordell is doing well. She is mildly fatigued. She denies bladder irritation. No vaginal bleeding. She has been experiencing loose stools for the last 10 days or so. These episodes usually start in the evenings. Last night, she had to get up in the middle of the night. She has been taking 1/2 doses of liquid Imodium, up to 3 of those. She is worried about constipation if she takes too much.     Nursing ROS:   Nutrition Alteration  Diet Type: Patient's Preference  Nutrition Note: Trying a bland diet r/t diarrhea  Skin  Skin Note: No conplaints per pt        Cardiovascular  Respiratory effort: 1 - Normal - without distress  Gastrointestinal  Diarrhea: 2 - Three to five soft or liquid bowel movements per day (Talked about imodium before each meal and before bed)  GI Note: reviewed small meals and some electrolyte replacement  Genitourinary  Urinary Status: 0 - Normal   Note: No vaginal bleeding  Psychosocial  Psychosocial Note: some fatigue, resting PRN  Pain Assessment  Pain Note: See above      Objective:   BP (!) 141/87   Pulse 72   Wt 59.4 kg (130 lb 14.4 oz)   SpO2 99%   BMI 23.43 kg/m    Gen: Appears well, in no acute distress  Skin: deferred    Labs:  CBC RESULTS: Recent Labs   Lab Test 22  1056   WBC 2.6*   RBC 4.76   HGB 13.5   HCT 41.2   MCV 87   MCH 28.4   MCHC 32.8   RDW 16.9*        ELECTROLYTES:  Recent Labs   Lab Test 22  1056       POTASSIUM 4.8   CHLORIDE 100   LINDA 9.8   CO2 28   BUN 14.6   CR 0.82   *       Assessment:    Tolerating radiation therapy well.  All questions and concerns addressed.    Toxicities:  Fatigue: Grade 1: Fatigue relieved by rest  Diarrhea: Grade 1: Increase of <4 stools per day over baseline; mild increase in ostomy output compared to baseline  Urinary frequency: Grade 0: No toxicity  Urinary tract pain: Grade 0: No toxicity  Urinary urgency: Grade 0: No toxicity    Plan:   1. Continue current therapy.    2. Diarrhea: discussed taking full doses of Imodium instead of half a dose since her diarrhea is not well controlled. She agrees to try.   3. MRI on 2/9. Will review imaging with her the same day to plan brachytherapy.       Sylantroiq chart and setup information reviewed  MVCT/IGRT images checked         Educational Topic Discussed  Education Instructions: Reviewed        Raymond Stockton MD/PhD  [phone] clinic  Pager [phone]    Please do not send letter to referring physician.

## 2023-01-17 ENCOUNTER — APPOINTMENT (OUTPATIENT)
Dept: RADIATION ONCOLOGY | Facility: CLINIC | Age: 73
End: 2023-01-17
Attending: RADIOLOGY
Payer: COMMERCIAL

## 2023-01-17 PROCEDURE — 77427 RADIATION TX MANAGEMENT X5: CPT | Performed by: RADIOLOGY

## 2023-01-17 PROCEDURE — 77014 PR CT GUIDE FOR PLACEMENT RADIATION THERAPY FIELDS: CPT | Mod: 26 | Performed by: RADIOLOGY

## 2023-01-17 PROCEDURE — 77336 RADIATION PHYSICS CONSULT: CPT | Performed by: RADIOLOGY

## 2023-01-17 PROCEDURE — 77386 HC IMRT TREATMENT DELIVERY, COMPLEX: CPT | Performed by: RADIOLOGY

## 2023-01-18 ENCOUNTER — APPOINTMENT (OUTPATIENT)
Dept: RADIATION ONCOLOGY | Facility: CLINIC | Age: 73
End: 2023-01-18
Attending: RADIOLOGY
Payer: COMMERCIAL

## 2023-01-18 PROCEDURE — 77014 PR CT GUIDE FOR PLACEMENT RADIATION THERAPY FIELDS: CPT | Mod: 26 | Performed by: RADIOLOGY

## 2023-01-18 PROCEDURE — 77386 HC IMRT TREATMENT DELIVERY, COMPLEX: CPT | Performed by: RADIOLOGY

## 2023-01-19 ENCOUNTER — APPOINTMENT (OUTPATIENT)
Dept: RADIATION ONCOLOGY | Facility: CLINIC | Age: 73
End: 2023-01-19
Attending: RADIOLOGY
Payer: COMMERCIAL

## 2023-01-19 PROCEDURE — 77386 HC IMRT TREATMENT DELIVERY, COMPLEX: CPT | Performed by: RADIOLOGY

## 2023-01-19 PROCEDURE — 77014 PR CT GUIDE FOR PLACEMENT RADIATION THERAPY FIELDS: CPT | Mod: 26 | Performed by: STUDENT IN AN ORGANIZED HEALTH CARE EDUCATION/TRAINING PROGRAM

## 2023-01-20 ENCOUNTER — APPOINTMENT (OUTPATIENT)
Dept: RADIATION ONCOLOGY | Facility: CLINIC | Age: 73
End: 2023-01-20
Attending: RADIOLOGY
Payer: COMMERCIAL

## 2023-01-20 PROCEDURE — 77386 HC IMRT TREATMENT DELIVERY, COMPLEX: CPT | Performed by: RADIOLOGY

## 2023-01-20 PROCEDURE — 77014 PR CT GUIDE FOR PLACEMENT RADIATION THERAPY FIELDS: CPT | Mod: 26 | Performed by: RADIOLOGY

## 2023-01-23 ENCOUNTER — OFFICE VISIT (OUTPATIENT)
Dept: RADIATION ONCOLOGY | Facility: CLINIC | Age: 73
End: 2023-01-23
Attending: RADIOLOGY
Payer: COMMERCIAL

## 2023-01-23 VITALS
WEIGHT: 135.3 LBS | OXYGEN SATURATION: 100 % | DIASTOLIC BLOOD PRESSURE: 78 MMHG | HEART RATE: 68 BPM | BODY MASS INDEX: 24.21 KG/M2 | SYSTOLIC BLOOD PRESSURE: 136 MMHG

## 2023-01-23 DIAGNOSIS — C53.9 RECURRENT CERVICAL CANCER (H): Primary | ICD-10-CM

## 2023-01-23 PROCEDURE — 77386 HC IMRT TREATMENT DELIVERY, COMPLEX: CPT | Performed by: RADIOLOGY

## 2023-01-23 NOTE — PROGRESS NOTES
[facility] PHYSICIANS  SPECIALIZING IN BREAKTHROUGHS  Radiation Oncology    On Treatment Visit Note      Cordell Donaldson      Date: 2023   MRN: [id number]   : 1950  Diagnosis: Cervical cancer      Reason for Visit:  On Radiation Treatment Visit     Treatment Summary to Date  Treatment Site: PA + pelvis Current Dose: 3420/4500 cGy Fractions:  + 5 HDR      Chemotherapy  Chemo concurrent with radx?: No    ED Visit/Hosiptal Admission: none     Treatment Breaks: None      Subjective:   Cordell is tolerating treatment well. She continues to have loose stools and currently takes half doses of liquid Imodium three times a day. She has the most issues at night. She sometimes needs to get up at the middle of the night to use the bathroom. She denies dysuria or frequency. She has no vaginal bleeding or discharge.     Nursing ROS:   Nutrition Alteration  Diet Type: Patient's Preference  Nutrition Note: Trying a bland diet r/t diarrhea  Skin  Skin Note: No conplaints per pt        Cardiovascular  Respiratory effort: 1 - Normal - without distress  Gastrointestinal  Diarrhea: 2 - Three to five soft or liquid bowel movements per day (Taking 1/2 dose imodium b-4 Lunch/dinner and bed. Encouraged to increase HS dose to a full dose to prevent waking t night with diarrhea.)  Genitourinary  Urinary Status: 0 - Normal   Note: No vaginal bleeding  Psychosocial  Psychosocial Note: some fatigue, resting PRN  Pain Assessment  Pain Note: See above      Objective:   /78   Pulse 68   Wt 61.4 kg (135 lb 4.8 oz)   SpO2 100%   BMI 24.21 kg/m    Gen: Appears well, in no acute distress  Skin: deferred    Labs:  CBC RESULTS: Recent Labs   Lab Test 22  1056   WBC 2.6*   RBC 4.76   HGB 13.5   HCT 41.2   MCV 87   MCH 28.4   MCHC 32.8   RDW 16.9*        ELECTROLYTES:  Recent Labs   Lab Test 22  1056      POTASSIUM 4.8   CHLORIDE 100   LINDA 9.8   CO2 28   BUN 14.6   CR 0.82   *        Assessment:    Tolerating radiation therapy well.  All questions and concerns addressed.    Toxicities:  Fatigue: Grade 1: Fatigue relieved by rest  Diarrhea: Grade 1: Increase of <4 stools per day over baseline; mild increase in ostomy output compared to baseline  Urinary frequency: Grade 0: No toxicity  Urinary tract pain: Grade 0: No toxicity  Urinary urgency: Grade 0: No toxicity    Plan:   1. Continue current therapy.    2. Diarrhea: Discussed increasing evening dose of Imodium. She is agreeable to try.  3. Reviewed up coming schedules of the brachytherapy, including PAC, MRI, follow up and the actual needle insertions.       Mosaiq chart and setup information reviewed  MVCT/IGRT images checked         Educational Topic Discussed  Additional Instructions: Pt has no questions re: her HDR schedule and appointments  Education Instructions: Reviewed        Raymond Stockton MD/PhD  [phone] clinic  Pager [phone]    Please do not send letter to referring physician.

## 2023-01-23 NOTE — LETTER
2023     RE: Cordell Donaldson  [address]  [facility] [address]        Dear Colleague,    Thank you for referring your patient, Cordell Donaldson, to the [facility] RADIATION ONCOLOGY. Please see a copy of my visit note below.    [facility] PHYSICIANS  SPECIALIZING IN BREAKTHROUGHS  Radiation Oncology    On Treatment Visit Note      Cordell Donaldson      Date: 2023   MRN: [id number]   : 1950  Diagnosis: Cervical cancer      Reason for Visit:  On Radiation Treatment Visit     Treatment Summary to Date  Treatment Site: PA + pelvis Current Dose: 3420/4500 cGy Fractions:  +  HDR      Chemotherapy  Chemo concurrent with radx?: No    ED Visit/Hosiptal Admission: none     Treatment Breaks: None      Subjective:   Cordell is tolerating treatment well. She continues to have loose stools and currently takes half doses of liquid Imodium three times a day. She has the most issues at night. She sometimes needs to get up at the middle of the night to use the bathroom. She denies dysuria or frequency. She has no vaginal bleeding or discharge.     Nursing ROS:   Nutrition Alteration  Diet Type: Patient's Preference  Nutrition Note: Trying a bland diet r/t diarrhea  Skin  Skin Note: No conplaints per pt        Cardiovascular  Respiratory effort: 1 - Normal - without distress  Gastrointestinal  Diarrhea: 2 - Three to five soft or liquid bowel movements per day (Taking 1/2 dose imodium b-4 Lunch/dinner and bed. Encouraged to increase HS dose to a full dose to prevent waking t night with diarrhea.)  Genitourinary  Urinary Status: 0 - Normal   Note: No vaginal bleeding  Psychosocial  Psychosocial Note: some fatigue, resting PRN  Pain Assessment  Pain Note: See above      Objective:   /78   Pulse 68   Wt 61.4 kg (135 lb 4.8 oz)   SpO2 100%   BMI 24.21 kg/m    Gen: Appears well, in no acute distress  Skin: deferred    Labs:  CBC RESULTS: Recent Labs   Lab Test  12/07/22  1056   WBC 2.6*   RBC 4.76   HGB 13.5   HCT 41.2   MCV 87   MCH 28.4   MCHC 32.8   RDW 16.9*        ELECTROLYTES:  Recent Labs   Lab Test 12/07/22  1056      POTASSIUM 4.8   CHLORIDE 100   LINDA 9.8   CO2 28   BUN 14.6   CR 0.82   *       Assessment:    Tolerating radiation therapy well.  All questions and concerns addressed.    Toxicities:  Fatigue: Grade 1: Fatigue relieved by rest  Diarrhea: Grade 1: Increase of <4 stools per day over baseline; mild increase in ostomy output compared to baseline  Urinary frequency: Grade 0: No toxicity  Urinary tract pain: Grade 0: No toxicity  Urinary urgency: Grade 0: No toxicity    Plan:   1. Continue current therapy.    2. Diarrhea: Discussed increasing evening dose of Imodium. She is agreeable to try.  3. Reviewed up coming schedules of the brachytherapy, including PAC, MRI, follow up and the actual needle insertions.       Mosaiq chart and setup information reviewed  MVCT/IGRT images checked         Educational Topic Discussed  Additional Instructions: Pt has no questions re: her HDR schedule and appointments  Education Instructions: Reviewed        Raymond Stockton MD/PhD  [phone] clinic  Pager [phone]    Please do not send letter to referring physician.        Again, thank you for allowing me to participate in the care of your patient.        Sincerely,        Raymond Stockton MD

## 2023-01-24 ENCOUNTER — APPOINTMENT (OUTPATIENT)
Dept: RADIATION ONCOLOGY | Facility: CLINIC | Age: 73
End: 2023-01-24
Attending: RADIOLOGY
Payer: COMMERCIAL

## 2023-01-24 PROCEDURE — 77336 RADIATION PHYSICS CONSULT: CPT | Performed by: RADIOLOGY

## 2023-01-24 PROCEDURE — 77014 PR CT GUIDE FOR PLACEMENT RADIATION THERAPY FIELDS: CPT | Mod: 26 | Performed by: RADIOLOGY

## 2023-01-24 PROCEDURE — 77427 RADIATION TX MANAGEMENT X5: CPT | Performed by: RADIOLOGY

## 2023-01-24 PROCEDURE — 77386 HC IMRT TREATMENT DELIVERY, COMPLEX: CPT | Performed by: RADIOLOGY

## 2023-01-25 ENCOUNTER — APPOINTMENT (OUTPATIENT)
Dept: RADIATION ONCOLOGY | Facility: CLINIC | Age: 73
End: 2023-01-25
Attending: RADIOLOGY
Payer: COMMERCIAL

## 2023-01-25 PROCEDURE — 77386 HC IMRT TREATMENT DELIVERY, COMPLEX: CPT | Performed by: RADIOLOGY

## 2023-01-25 PROCEDURE — 77014 PR CT GUIDE FOR PLACEMENT RADIATION THERAPY FIELDS: CPT | Mod: 26 | Performed by: RADIOLOGY

## 2023-01-26 ENCOUNTER — APPOINTMENT (OUTPATIENT)
Dept: RADIATION ONCOLOGY | Facility: CLINIC | Age: 73
End: 2023-01-26
Attending: RADIOLOGY
Payer: COMMERCIAL

## 2023-01-26 PROCEDURE — 77386 HC IMRT TREATMENT DELIVERY, COMPLEX: CPT | Performed by: RADIOLOGY

## 2023-01-26 PROCEDURE — 77014 PR CT GUIDE FOR PLACEMENT RADIATION THERAPY FIELDS: CPT | Mod: 26 | Performed by: RADIOLOGY

## 2023-01-27 ENCOUNTER — APPOINTMENT (OUTPATIENT)
Dept: RADIATION ONCOLOGY | Facility: CLINIC | Age: 73
End: 2023-01-27
Attending: RADIOLOGY
Payer: COMMERCIAL

## 2023-01-27 PROCEDURE — 77014 PR CT GUIDE FOR PLACEMENT RADIATION THERAPY FIELDS: CPT | Mod: 26 | Performed by: RADIOLOGY

## 2023-01-27 PROCEDURE — 77386 HC IMRT TREATMENT DELIVERY, COMPLEX: CPT | Performed by: RADIOLOGY

## 2023-01-28 NOTE — PROGRESS NOTES
"Oncology Follow Up:  Date on this visit: 1/30/2023    Diagnosis: Left breast invasive ductal carcinoma.    Primary Physician: Merry Lino     History Of Present Illness:  Ms. Donaldson is a 72 year old female with cervical and breast cancer.    She has a history of left breast cancer treated with lumpectomy and radiation therapy in 2003 (followed w/ Dr. Mtz at [facility] [place]). Oncotype DX recurrence score was 10.  She took 5 years of tamoxifen. She had an incidental left breast lesion found on imaging for her pelvic mass. Ultrasound showed a 1.3 cm mass at 12:30, 8 cm from the nipple of the left breast.  Left breast biopsy at 12:30, 8 cm from the nipple showed grade 2 invasive ductal carcinoma, ER strong in 100%, AL negative, and HER2 negative.  Given concurrent recurrent pelvic mass with plan for cytotoxic chemotherapy, plan was made to monitor and initiate endocrine therapy when appropriate.    In regards to her cervical cancer history, she was initially determined to have squamous carcinoma in 10/2012.  She is s/p FRANCIA and upper vaginectomy in 12/2012 with no residual invasive cancer, but residual HSIL.  Biopsy on 7/22/2013 was c/w HSIL, cannot exclude invasion.  In 05/2022 she developed symptoms of pelvic heaviness (felt like she \"bruised her tailbone\") and progressive pain. She felt like her urination and bowel movements were constricted, needing to put in a lot of effort to urinate or have BM. Pelvic MRI showed a large midline pelvic mass measuring up to 8.5 cm and abutting the bladder and the rectum without definite invasion or left pelvic LAD.  These findings were confirmed on PET/CT. She was started on chemo with cisplatin, paclitaxel, and bevacizumab (7/11/22).  Caris testing showed PD-L1 CPS score of 10, MMR deficient, MSI high disease with a TMB of 53 mut/Mb. Pembrolizumab was added with C3.  Cisplatin was changed to carboplatin starting with C5 due to tinnitus.  After a total of 6 cycles imaging " "demonstrated good response.    She is receiving radiation to the pelvis and para-aortic lymph nodes.  Interstitial brachytherapy to the vaginal cuff is planned.      Interval History:  Ms. Donaldson comes into clinic today, alongside her spouse, for routine breast cancer follow-up.  Since her last visit, she completed planned neoadjuvant chemotherapy plus immunotherapy treatment of her cervical carcinoma and has started radiation therapy to the pelvis and regional lymph nodes.  She is tolerating treatment okay.  She has diarrhea, however, is able to manage it with Imodium.  She has significant fatigue from her treatment.  Her fatigue is such that she is, \"not ready to jump into any other treatments at this time\".  She states her entire treatment course has been difficult and she needs some return of quality of life.  She has had no fevers, chills, or symptoms of infection.  She continues to have tinnitus and symptoms of neuropathy.  She denies notable breast changes.  The remainder of a complete 12 point review of systems was reviewed with patient was negative with exception that mentioned above.    Past Medical/Surgical History:  Past Medical History:   Diagnosis Date     Abnormal Pap smear     maybe 20 years ago     Cervical cancer (H)      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      History of breast cancer 2003    lumpectomy and radiation offerred chemo and patient declined at time but had oncogene test and low risk     Hyperlipidemia LDL goal < 160      Osteopenia      Paroxysmal A-fib (H)      Severe vaginal dysplasia      Past Surgical History:   Procedure Laterality Date     BIOPSY       BREAST SURGERY Left 2003    lumpectomy left, did radiation, 5 yr of tamoxifen     COLONOSCOPY  2018    repeat in 2028     Colposcopy Cervix with Loop Electrode Conization and Lser to Vagina  2008     COLPOSCOPY, BIOPSY, COMBINED  10/24/2012    Procedure: COMBINED COLPOSCOPY, BIOPSY;  " Colposcopy, Biopsy of Cervix and Vagina, Ultrasound Guidance;  Surgeon: Colette Ng MD;  Location: [facility] OR     ENT SURGERY  01/23/2018    otoscelerosis, right side     HYSTERECTOMY, FRANCIA  12/2012    high grade cervical dysplasia, [facility], Dr. Arciniega     INSERT PORT VASCULAR ACCESS Right 7/18/2022    Procedure: INSERTION, VASCULAR ACCESS PORT;  Surgeon: Donnie Elias MD;  Location: [facility] OR     IR CHEST PORT PLACEMENT > 5 YRS OF AGE  7/18/2022     OR LAP VAGINECTOMY, PARTIAL REMOVAL OF VAGINAL WALL  10/2013    upper vaginectomy for dysplasia, Dr. Megan Arciniega     Allergies:  Allergies as of 01/30/2023     (No Known Allergies)     Current Medications:  Current Outpatient Medications   Medication Sig Dispense Refill     Acetylcarnitine HCl 250 MG CAPS Take 500 mg by mouth 2 times daily        aspirin (ASA) 81 MG chewable tablet Take 81 mg by mouth daily       Bacillus Coagulans-Inulin (PROBIOTIC FORMULA) 1-250 BILLION-MG CAPS Take by mouth three times a week 25+billion CFUS       BENFOTIAMINE PO Take 150 mg by mouth 2 times daily       Cholecalciferol (VITAMIN D-3) 25 MCG (1000 UT) CAPS Take 1,000 Units by mouth daily 90 capsule 3     levothyroxine (SYNTHROID/LEVOTHROID) 25 MCG tablet Take 1 tablet by mouth daily before breakfast Take on empty stomach        MAGNESIUM OXIDE PO Take 500 mg by mouth       melatonin 5 MG tablet Take 10 mg by mouth nightly as needed for sleep       metoprolol succinate ER (TOPROL XL) 50 MG 24 hr tablet TAKE 1 TABLET BY MOUTH EVERY DAY 90 tablet 2     multivitamin w/minerals (THERA-VIT-M) tablet Take 1 tablet by mouth daily       Omega-3 Fatty Acids (OMEGA-3 FISH OIL PO) Take 630 mg by mouth 2 times daily        ondansetron (ZOFRAN) 8 MG tablet Take 1 tablet (8 mg) by mouth every 8 hours as needed for nausea (vomiting) (Patient not taking: Reported on 11/14/2022) 30 tablet 2     OVER-THE-COUNTER Turkey tail mushroom powder, use 3 times a day 1 Can 11     Pectin Cit-Inos-C-Bioflav-Soy  (MODIFIED CITRUS PECTIN PO)        prochlorperazine (COMPAZINE) 10 MG tablet Take 1 tablet (10 mg) by mouth every 6 hours as needed for nausea or vomiting (Patient not taking: Reported on 11/14/2022) 30 tablet 2     Taurine 1000 MG CAPS Take one po twice daily 60 capsule 11     UNABLE TO FIND MEDICATION NAME: [facility] for 1000mg calcium  4 pills per day       UNABLE TO FIND 1 tablet daily MEDICATION NAME: Acetyl-L-Carnitine 500 mg       UNABLE TO FIND Take 1 tablet by mouth daily MEDICATION NAME: Dinndolylmethane Complex (DIM) 100mg tablet 1 table daily        Physical Exam:  /78 (BP Location: Right arm, Patient Position: Sitting, Cuff Size: Adult Regular)   Pulse 71   Temp 98  F (36.7  C) (Oral)   Wt 59.2 kg (130 lb 9.6 oz)   SpO2 99%   BMI 23.37 kg/m       GENERAL APPEARANCE: Fatigued, but well appearing adult female in NAD.  Alert and oriented x 3.     HEENT: NC/AT, sclera anicteric.  MMM.  EOMI.  No lesions of the oropharynx.  (+) alopecia.     LYMPHATICS: No palpable cervical, supraclavicular, or axillary lymphadenopathy     RESP: lungs are CTA bilaterally, without wheezes or crackles.     CARDIOVASCULAR:  RRR.  No audible m/r/g.     BREAST:  Bilateral breasts are of normal fibroglandular density.  At 12:30, 8 cm from the nipple is a 2 x 1.5 cm flat density of the left breast is not palpable on today's exam.  Bilateral nipples are everted and without discharge.     ABDOMEN:  soft, nondistended     MUSCULOSKELETAL: extremities normal- no gross deformities noted, no edema b/l LE.     SKIN: no suspicious lesions or rashes     PSYCHIATRIC: Normal mood and affect.    Laboratory/Imaging Studies  12/16/2022 Pelvic MRI (I personally reviewed these images):  1. Decreased size of recurrent cervical cancer, now measuring up to  5.7 cm. Continued abutment of mid/low rectum and bladder without  obvious urinary bladder invasion; no discernible fat plane posteriorly  with the rectum. No hydroureter.  2. Previously seen  multiple enlarged pelvic lymph nodes significantly  decreased/nearly resolved. No new pelvic lymphadenopathy.    12/5/2022 PET/CT (I personally reviewed these images):  In summary:   - cervical cancer - partial response (decreased/resolved  hypermetabolism)  - 4 chamber cardiac uptake - myocarditis vs. cardiac strain  - possible thyroiditis and gastritis - consider immunotherapy induced     In detail:  1. Evidence for partial response to therapy. There is decreased size of the centrally necrotic cervical mass with resolution of previously seen hypermetabolic activity in the mass and resolution of previously seen hypermetabolic, enlarged pelvic lymph nodes.  Remaining mild FDG uptake may represent posttreatment inflammation or residual disease.  2. Cardiomegaly.  While FDG uptake in left ventricle is a normal  finding, the patient also has uptake in the right atrium, left atrium,  and right ventricle which is abnormal. There is very mild uptake on  7/1/2022 but increased hypermetabolism on 9/22/2022 and the current exam.  Differential would include myocardial strain versus an inflammatory process.     2a. Pembrolizumab can cause a Myocarditis however the increased hypermetabolism was on 9/22/2022 and this would have to be correlated with the timing of initiation of immunotherapy.  2b. Continued follow up with cardiology is recommended.    2c. To distinguish between myocardial glucose consumption and  inflammation consider future PET scans be done with a 3-day cardiac sarcoid preparation (ketogenic diet).  This will suppress myocardial glucose metabolism so if there is remaining uptake would indicate an inflammatory etiology.     3. Three findings concerning for possible immunotherapy mediated  inflammation-gastric uptake (9/22/2022 and current exam) thyroid  uptake (new), and cardiac uptake as previously mentioned.     ASSESSMENT/PLAN:  Ms. Donaldson is a 71 y/o woman with a history of left breast cancer (s/p  lumpectomy, radiation in 2003 followed by 5 years of tamoxifen w/ Dr. Mtz) and cervical SCC (10/2012, s/p FRANCIA, upper vaginectomy) with pelvic mass and a second ER positive, AL negative, HER2 negative left breast cancer.     1. Left breast cancer.  -She is s/p 6 cycles platinum, Taxol, and bevacizumab; pembrolizumab added with C3-6. This chemotherapy regimen is effective against breast cancer.  Review of the left breast images on 12/2022 PET/CT is without demonstration of left breast mass.  - She continues to have palpable right breast mass on breast exam.  - Recommend starting anastrozole 1 mg PO daily two weeks after completion of planned radiation.  We reviewed the potential side effects of anastrozole today including myalgias, arthralgias, hot flashes, vaginal dryness mood disorder, and thinning of the bones.  - Due to residual side effects from prior chemotherapy and radiation therapy, she declines to start anastrozole two weeks after radiation.  She states she needs some time off treatment to recover some quality of life.  This is reasonable given the extensive treatment and associated side effects she has had.  Her breast cancer at this point has been pretty indolent.  She was agreeable to my providing a prescription for anastrozole today.  She will reassess her symptoms 1 month after completion of radiation and determine if she is ready to start taking it.    - I will schedule follow up in our clinic in 2 months.  - Left breast ultrasound same day as return visit for objective measurement of right breast cancer.  - Recommend consideration of surgical excision of breast cancer when timing is appropriate from a cervical cancer perspective.    2. Recurrent cervical versus vaginal cancer   - She follows with Dr. Raygoza of [facility]   - 6 cycles neodjuvant Cisplatin 50 mg/m2 + paclitaxel 175 mg/m2 + bevacizumab 15 mg/kg + pembrolizumab every 21 days 7/11 - 11/21/2022.  Pembrolizumab added C3 and beyond; Cisplatin  changed to carboplatin for cycles 5 and 6 due to tinnitus.    - Currently receiving radiation to the pelvis and para-aortic lymph nodes.  This will be followed by interstitial brachytherapy to the vaginal cuff.  - Follow radiation, plan is for maintenance bevacizumab +/- pembrolizumab.  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    3.  Bilateral shin/ankle/toe pains:  L4/L5 radiculopathy.   - She does not feel she has the energy for physical therapy.      4.  Neuropathy:  Chemotherapy induced.  Grade 1.  - there is no reversal agent  - can consider symptomatic treatment with gabapentin, duloxetine, or amitriptyline.    5.  Tinnitus:  Patient though this was secondary to bevacizumab and therefore expressed reservations about bevacizumab in the maintenance setting.  I reassured her that tinnitus was likely from prior Cisplatin treatment rather than bevacizumab.    6.  Cancer risk management:    - personal history of breast cancer.  Family history of breast cancer.    - I reviewed genetic counseling note from 10/24/2022 in which additional genetic testing was recommended.  Patient declined testing and will notify GC is she changes her mind.    7.  Bone Health:  Endocrine therapy can be associated with risk of thinning of the bones.    - Will discuss obtaining a DEXA bone density scan at time of return visit.    8.  Follow Up:  Return to clinic in 2 months for left breast ultrasound and visit with me.    I personally spent 60 minutes on the date of the encounter doing chart review, review of test results, interpretation of tests, patient visit, documentation and discussion with family.

## 2023-01-30 ENCOUNTER — APPOINTMENT (OUTPATIENT)
Dept: RADIATION ONCOLOGY | Facility: CLINIC | Age: 73
End: 2023-01-30
Attending: RADIOLOGY
Payer: COMMERCIAL

## 2023-01-30 ENCOUNTER — ONCOLOGY VISIT (OUTPATIENT)
Dept: ONCOLOGY | Facility: CLINIC | Age: 73
End: 2023-01-30
Attending: INTERNAL MEDICINE
Payer: COMMERCIAL

## 2023-01-30 VITALS
SYSTOLIC BLOOD PRESSURE: 132 MMHG | WEIGHT: 130.6 LBS | HEART RATE: 71 BPM | DIASTOLIC BLOOD PRESSURE: 78 MMHG | OXYGEN SATURATION: 99 % | TEMPERATURE: 98 F | BODY MASS INDEX: 23.37 KG/M2

## 2023-01-30 VITALS
BODY MASS INDEX: 23.98 KG/M2 | SYSTOLIC BLOOD PRESSURE: 145 MMHG | WEIGHT: 134 LBS | HEART RATE: 80 BPM | OXYGEN SATURATION: 95 % | DIASTOLIC BLOOD PRESSURE: 96 MMHG

## 2023-01-30 DIAGNOSIS — H93.13 TINNITUS, BILATERAL: ICD-10-CM

## 2023-01-30 DIAGNOSIS — T45.1X5A CHEMOTHERAPY-INDUCED PERIPHERAL NEUROPATHY (H): ICD-10-CM

## 2023-01-30 DIAGNOSIS — C53.9 RECURRENT CERVICAL CANCER (H): ICD-10-CM

## 2023-01-30 DIAGNOSIS — Z17.0 MALIGNANT NEOPLASM OF UPPER-OUTER QUADRANT OF LEFT BREAST IN FEMALE, ESTROGEN RECEPTOR POSITIVE (H): Primary | ICD-10-CM

## 2023-01-30 DIAGNOSIS — Z71.89 ENCOUNTER FOR MEDICATION COUNSELING: ICD-10-CM

## 2023-01-30 DIAGNOSIS — C53.9 RECURRENT CERVICAL CANCER (H): Primary | ICD-10-CM

## 2023-01-30 DIAGNOSIS — C50.412 MALIGNANT NEOPLASM OF UPPER-OUTER QUADRANT OF LEFT BREAST IN FEMALE, ESTROGEN RECEPTOR POSITIVE (H): Primary | ICD-10-CM

## 2023-01-30 DIAGNOSIS — G62.0 CHEMOTHERAPY-INDUCED PERIPHERAL NEUROPATHY (H): ICD-10-CM

## 2023-01-30 PROCEDURE — G0463 HOSPITAL OUTPT CLINIC VISIT: HCPCS | Performed by: INTERNAL MEDICINE

## 2023-01-30 PROCEDURE — 77386 HC IMRT TREATMENT DELIVERY, COMPLEX: CPT | Performed by: RADIOLOGY

## 2023-01-30 PROCEDURE — 99215 OFFICE O/P EST HI 40 MIN: CPT | Performed by: INTERNAL MEDICINE

## 2023-01-30 PROCEDURE — G0463 HOSPITAL OUTPT CLINIC VISIT: HCPCS

## 2023-01-30 RX ORDER — LETROZOLE 2.5 MG/1
2.5 TABLET, FILM COATED ORAL DAILY
Qty: 30 TABLET | Refills: 11 | Status: SHIPPED | OUTPATIENT
Start: 2023-03-01 | End: 2023-03-01

## 2023-01-30 ASSESSMENT — PAIN SCALES - GENERAL: PAINLEVEL: NO PAIN (0)

## 2023-01-30 NOTE — PROGRESS NOTES
[facility]YS[facility]IANS  SPECIALIZING IN BREAKTHROUGHS  Radiation Oncology    On Treatment Visit Note      Cordell Donaldson      Date: 2023   MRN: [id number]   : 1950  Diagnosis: Cervical cancer      Reason for Visit:  On Radiation Treatment Visit     Treatment Summary to Date  Treatment Site: PA + pelvis Current Dose: 4320/4500 cGy Fractions: 24/25 + 5 HDR      Chemotherapy  Chemo concurrent with radx?: No    ED Visit/Hosiptal Admission: None     Treatment Breaks: None      Subjective:  Cordell will complete external beam radiotherapy tomorrow. She states that her diarrhea is better. She takes 1/2 a dose of liquid Imodium before lunch, before dinner and at bed time. Her stools are not running anymore. They are soft, though not completely formed. She denies problem with urination. She has no vaginal bleeding or discharge.      Nursing ROS:   Nutrition Alteration  Diet Type: Patient's Preference  Nutrition Note: Trying a bland diet r/t diarrhea  Skin  Skin Note: No conplaints per pt        Cardiovascular  Respiratory effort: 1 - Normal - without distress  Gastrointestinal  Diarrhea: 2 - Three to five soft or liquid bowel movements per day (Taking 1/2 dose imodium b-4 Lunch/dinner and bed. Encouraged to increase HS dose to a full dose to prevent waking t night with diarrhea.)  Genitourinary  Urinary Status: 0 - Normal   Note: No vaginal bleeding  Psychosocial  Psychosocial Note: some fatigue, resting PRN  Pain Assessment  Pain Note: See above      Objective:   BP (!) 145/96   Pulse 80   Wt 60.8 kg (134 lb)   SpO2 95%   BMI 23.98 kg/m    Gen: Appears well, in no acute distress  Pelvis: Edematous and erythematous labia. Mild rectocele. On speculum exam, vagina is foreshortened. The cuff has some erythema and telangiectasia, but no tumor on the mucosal surface. On bimanual exam, the vaginal feels smooth. The small size dilator can be inserted 6 cm into the vagina.     Labs:  CBC RESULTS:  Recent Labs   Lab Test 12/07/22  1056   WBC 2.6*   RBC 4.76   HGB 13.5   HCT 41.2   MCV 87   MCH 28.4   MCHC 32.8   RDW 16.9*        ELECTROLYTES:  Recent Labs   Lab Test 12/07/22  1056      POTASSIUM 4.8   CHLORIDE 100   LINDA 9.8   CO2 28   BUN 14.6   CR 0.82   *       Assessment:    Tolerating radiation therapy well.  All questions and concerns addressed.    Toxicities:  Fatigue: Grade 1: Fatigue relieved by rest  Diarrhea: Grade 1: Increase of <4 stools per day over baseline; mild increase in ostomy output compared to baseline  Urinary frequency: Grade 0: No toxicity  Urinary tract pain: Grade 0: No toxicity  Urinary urgency: Grade 0: No toxicity  Dermatitis: Grade 1: Faint erythema or dry desquamation    Plan:   1. Continue current therapy.  Will finish EBRT in one more fraction tomorrow.  2. Diarrhea: continue with Imodium as needed.  3. MRI later this week with follow up to go over the result.  4. PAC visit for interstitial brachytherapy..       Mosaiq chart and setup information reviewed  MVCT/IGRT images checked    Medication Review  Med Note: No changes per pt    Educational Topic Discussed  Additional Instructions: 2/9/23 FUT for pt HDR  Education Instructions: Reviewed        Raymond Stockton MD/PhD  [phone] clinic  Pager [phone]    Please do not send letter to referring physician.

## 2023-01-30 NOTE — LETTER
Date:January 31, 2023      Provider requested that no letter be sent. Do not send.       [facility]

## 2023-01-30 NOTE — LETTER
"    1/30/2023         RE: Cordell Donaldson  [address]  [facility] [address]        Dear Colleague,    Thank you for referring your patient, Cordell Donaldson, to the [facility]. Please see a copy of my visit note below.    Oncology Follow Up:  Date on this visit: 1/30/2023    Diagnosis: Left breast invasive ductal carcinoma.    Primary Physician: Merry Lino     History Of Present Illness:  Ms. Donaldson is a 72 year old female with cervical and breast cancer.    She has a history of left breast cancer treated with lumpectomy and radiation therapy in 2003 [place] [facility] [place] DX recurrence score was 10.  She took 5 years of tamoxifen. She had an incidental left breast lesion found on imaging for her pelvic mass. Ultrasound showed a 1.3 cm mass at 12:30, 8 cm from the nipple of the left breast.  Left breast biopsy at 12:30, 8 cm from the nipple showed grade 2 invasive ductal carcinoma, ER strong in 100%, [place] negative, and HER2 negative.  Given concurrent recurrent pelvic mass with plan for cytotoxic chemotherapy, plan was made to monitor and initiate endocrine therapy when appropriate.    In regards to her cervical cancer history, she was initially determined to have squamous carcinoma in 10/2012.  She is s/p FRANCIA and upper vaginectomy in 12/2012 with no residual invasive cancer, but residual HSIL.  Biopsy on 7/22/2013 was c/w HSIL, cannot exclude invasion.  In 05/2022 she developed symptoms of pelvic heaviness (felt like she \"bruised her tailbone\") and progressive pain. She felt like her urination and bowel movements were constricted, needing to put in a lot of effort to urinate or have BM. Pelvic MRI showed a large midline pelvic mass measuring up to 8.5 cm and abutting the bladder and the rectum without definite invasion or left pelvic LAD.  These findings were confirmed on PET/CT. She was started on chemo with cisplatin, paclitaxel, and " "bevacizumab (7/11/22).  Caris testing showed PD-L1 CPS score of 10, MMR deficient, MSI high disease with a TMB of 53 mut/Mb. Pembrolizumab was added with C3.  Cisplatin was changed to carboplatin starting with C5 due to tinnitus.  After a total of 6 cycles imaging demonstrated good response.    She is receiving radiation to the pelvis and para-aortic lymph nodes.  Interstitial brachytherapy to the vaginal cuff is planned.      Interval History:  Ms. Donaldson comes into clinic today, alongside her spouse, for routine breast cancer follow-up.  Since her last visit, she completed planned neoadjuvant chemotherapy plus immunotherapy treatment of her cervical carcinoma and has started radiation therapy to the pelvis and regional lymph nodes.  She is tolerating treatment okay.  She has diarrhea, however, is able to manage it with Imodium.  She has significant fatigue from her treatment.  Her fatigue is such that she is, \"not ready to jump into any other treatments at this time\".  She states her entire treatment course has been difficult and she needs some return of quality of life.  She has had no fevers, chills, or symptoms of infection.  She continues to have tinnitus and symptoms of neuropathy.  She denies notable breast changes.  The remainder of a complete 12 point review of systems was reviewed with patient was negative with exception that mentioned above.    Past Medical/Surgical History:  Past Medical History:   Diagnosis Date     Abnormal Pap smear     maybe 20 years ago     Cervical cancer (H)      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      History of breast cancer 2003    lumpectomy and radiation offerred chemo and patient declined at time but had oncogene test and low risk     Hyperlipidemia LDL goal < 160      Osteopenia      Paroxysmal A-fib (H)      Severe vaginal dysplasia      Past Surgical History:   Procedure Laterality Date     BIOPSY       BREAST SURGERY Left 2003 "    lumpectomy left, did radiation, 5 yr of tamoxifen     COLONOSCOPY  2018    repeat in 2028     Colposcopy Cervix with Loop Electrode Conization and Lser to Vagina  2008     COLPOSCOPY, BIOPSY, COMBINED  10/24/2012    Procedure: COMBINED COLPOSCOPY, BIOPSY;  Colposcopy, Biopsy of Cervix and Vagina, Ultrasound Guidance;  Surgeon: Colette Ng MD;  Location: [facility] OR     ENT SURGERY  01/23/2018    otoscelerosis, right side     HYSTERECTOMY, FRANCIA  12/2012    high grade cervical dysplasia, [place] [facility], Dr. Arciniega     INSERT PORT VASCULAR ACCESS Right 7/18/2022    Procedure: INSERTION, VASCULAR ACCESS PORT;  Surgeon: Donnie Elias MD;  Location: [facility] OR     IR CHEST PORT PLACEMENT > 5 YRS OF AGE  7/18/2022     PA LAP VAGINECTOMY, PARTIAL REMOVAL OF VAGINAL WALL  10/2013    upper vaginectomy for dysplasia, Dr. Megan Arciniega     Allergies:  Allergies as of 01/30/2023     (No Known Allergies)     Current Medications:  Current Outpatient Medications   Medication Sig Dispense Refill     Acetylcarnitine HCl 250 MG CAPS Take 500 mg by mouth 2 times daily        aspirin (ASA) 81 MG chewable tablet Take 81 mg by mouth daily       Bacillus Coagulans-Inulin (PROBIOTIC FORMULA) 1-250 BILLION-MG CAPS Take by mouth three times a week 25+billion CFUS       BENFOTIAMINE PO Take 150 mg by mouth 2 times daily       Cholecalciferol (VITAMIN D-3) 25 MCG (1000 UT) CAPS Take 1,000 Units by mouth daily 90 capsule 3     levothyroxine (SYNTHROID/LEVOTHROID) 25 MCG tablet Take 1 tablet by mouth daily before breakfast Take on empty stomach        MAGNESIUM OXIDE PO Take 500 mg by mouth       melatonin 5 MG tablet Take 10 mg by mouth nightly as needed for sleep       metoprolol succinate ER (TOPROL XL) 50 MG 24 hr tablet TAKE 1 TABLET BY MOUTH EVERY DAY 90 tablet 2     multivitamin w/minerals (THERA-VIT-M) tablet Take 1 tablet by mouth daily       Omega-3 Fatty Acids (OMEGA-3 FISH OIL PO) Take 630 mg by mouth 2 times daily        ondansetron (ZOFRAN) 8  MG tablet Take 1 tablet (8 mg) by mouth every 8 hours as needed for nausea (vomiting) (Patient not taking: Reported on 11/14/2022) 30 tablet 2     OVER-THE-COUNTER Turkey tail mushroom powder, use 3 times a day 1 Can 11     Pectin Cit-Inos-C-Bioflav-Soy (MODIFIED CITRUS PECTIN PO)        prochlorperazine (COMPAZINE) 10 MG tablet Take 1 tablet (10 mg) by mouth every 6 hours as needed for nausea or vomiting (Patient not taking: Reported on 11/14/2022) 30 tablet 2     Taurine 1000 MG CAPS Take one po twice daily 60 capsule 11     UNABLE TO FIND MEDICATION NAME: [facility] for 1000mg calcium  4 pills per day       UNABLE TO FIND 1 tablet daily MEDICATION NAME: Acetyl-L-Carnitine 500 mg       UNABLE TO FIND Take 1 tablet by mouth daily MEDICATION NAME: Dinndolylmethane Complex (DIM) 100mg tablet 1 table daily        Physical Exam:  /78 (BP Location: Right arm, Patient Position: Sitting, Cuff Size: Adult Regular)   Pulse 71   Temp 98  F (36.7  C) (Oral)   Wt 59.2 kg (130 lb 9.6 oz)   SpO2 99%   BMI 23.37 kg/m       GENERAL APPEARANCE: Fatigued, but well appearing adult female in NAD.  Alert and oriented x 3.     HEENT: NC/AT, sclera anicteric.  MMM.  EOMI.  No lesions of the oropharynx.  (+) alopecia.     LYMPHATICS: No palpable cervical, supraclavicular, or axillary lymphadenopathy     RESP: lungs are CTA bilaterally, without wheezes or crackles.     CARDIOVASCULAR:  RRR.  No audible m/r/g.     BREAST:  Bilateral breasts are of normal fibroglandular density.  At 12:30, 8 cm from the nipple is a 2 x 1.5 cm flat density of the left breast is not palpable on today's exam.  Bilateral nipples are everted and without discharge.     ABDOMEN:  soft, nondistended     MUSCULOSKELETAL: extremities normal- no gross deformities noted, no edema b/l LE.     SKIN: no suspicious lesions or rashes     PSYCHIATRIC: Normal mood and affect.    Laboratory/Imaging Studies  12/16/2022 Pelvic MRI (I personally reviewed these images):  1.  Decreased size of recurrent cervical cancer, now measuring up to  5.7 cm. Continued abutment of mid/low rectum and bladder without  obvious urinary bladder invasion; no discernible fat plane posteriorly  with the rectum. No hydroureter.  2. Previously seen multiple enlarged pelvic lymph nodes significantly  decreased/nearly resolved. No new pelvic lymphadenopathy.    12/5/2022 PET/CT (I personally reviewed these images):  In summary:   - cervical cancer - partial response (decreased/resolved  hypermetabolism)  - 4 chamber cardiac uptake - myocarditis vs. cardiac strain  - possible thyroiditis and gastritis - consider immunotherapy induced     In detail:  1. Evidence for partial response to therapy. There is decreased size of the centrally necrotic cervical mass with resolution of previously seen hypermetabolic activity in the mass and resolution of previously seen hypermetabolic, enlarged pelvic lymph nodes.  Remaining mild FDG uptake may represent posttreatment inflammation or residual disease.  2. Cardiomegaly.  While FDG uptake in left ventricle is a normal  finding, the patient also has uptake in the right atrium, left atrium,  and right ventricle which is abnormal. There is very mild uptake on  7/1/2022 but increased hypermetabolism on 9/22/2022 and the current exam.  Differential would include myocardial strain versus an inflammatory process.     2a. Pembrolizumab can cause a Myocarditis however the increased hypermetabolism was on 9/22/2022 and this would have to be correlated with the timing of initiation of immunotherapy.  2b. Continued follow up with cardiology is recommended.    2c. To distinguish between myocardial glucose consumption and  inflammation consider future PET scans be done with a 3-day cardiac sarcoid preparation (ketogenic diet).  This will suppress myocardial glucose metabolism so if there is remaining uptake would indicate an inflammatory etiology.     3. Three findings concerning for  possible immunotherapy mediated  inflammation-gastric uptake (9/22/2022 and current exam) thyroid  uptake (new), and cardiac uptake as previously mentioned.     ASSESSMENT/PLAN:  Ms. Donaldson is a 73 y/o woman with a history of left breast cancer (s/p lumpectomy, radiation in 2003 followed by 5 years of tamoxifen w/ Dr. Mtz) and cervical SCC (10/2012, s/p FRANCIA, upper vaginectomy) with pelvic mass and a second ER positive, [place] negative, HER2 negative left breast cancer.     1. Left breast cancer.  -She is s/p 6 cycles platinum, Taxol, and bevacizumab; pembrolizumab added with C3-6. This chemotherapy regimen is effective against breast cancer.  Review of the left breast images on 12/2022 PET/CT is without demonstration of left breast mass.  - She continues to have palpable right breast mass on breast exam.  - Recommend starting anastrozole 1 mg PO daily two weeks after completion of planned radiation.  We reviewed the potential side effects of anastrozole today including myalgias, arthralgias, hot flashes, vaginal dryness mood disorder, and thinning of the bones.  - Due to residual side effects from prior chemotherapy and radiation therapy, she declines to start anastrozole two weeks after radiation.  She states she needs some time off treatment to recover some quality of life.  This is reasonable given the extensive treatment and associated side effects she has had.  Her breast cancer at this point has been pretty indolent.  She was agreeable to my providing a prescription for anastrozole today.  She will reassess her symptoms 1 month after completion of radiation and determine if she is ready to start taking it.    - I will schedule follow up in our clinic in 2 months.  - Left breast ultrasound same day as return visit for objective measurement of right breast cancer.  - Recommend consideration of surgical excision of breast cancer when timing is appropriate from a cervical cancer perspective.    2. Recurrent cervical  versus vaginal cancer   - She follows with Dr. Raygoza of [facility]   - 6 cycles neodjuvant Cisplatin 50 mg/m2 + paclitaxel 175 mg/m2 + bevacizumab 15 mg/kg + pembrolizumab every 21 days 7/11 - 11/21/2022.  Pembrolizumab added C3 and beyond; Cisplatin changed to carboplatin for cycles 5 and 6 due to tinnitus.    - Currently receiving radiation to the pelvis and para-aortic lymph nodes.  This will be followed by interstitial brachytherapy to the vaginal cuff.  - Follow radiation, plan is for maintenance bevacizumab +/- pembrolizumab.  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    3.  Bilateral shin/ankle/toe pains:  L4/L5 radiculopathy.   - She does not feel she has the energy for physical therapy.      4.  Neuropathy:  Chemotherapy induced.  Grade 1.  - there is no reversal agent  - can consider symptomatic treatment with gabapentin, duloxetine, or amitriptyline.    5.  Tinnitus:  Patient though this was secondary to bevacizumab and therefore expressed reservations about bevacizumab in the maintenance setting.  I reassured her that tinnitus was likely from prior Cisplatin treatment rather than bevacizumab.    6.  Cancer risk management:    - personal history of breast cancer.  Family history of breast cancer.    - I reviewed genetic counseling note from 10/24/2022 in which additional genetic testing was recommended.  Patient declined testing and will notify GC is she changes her mind.    7.  Bone Health:  Endocrine therapy can be associated with risk of thinning of the bones.    - Will discuss obtaining a DEXA bone density scan at time of return visit.    8.  Follow Up:  Return to clinic in 2 months for left breast ultrasound and visit with me.    I personally spent 60 minutes on the date of the encounter doing chart review, review of test results, interpretation of tests, patient visit, documentation and discussion with family.      Angela Montana MD

## 2023-01-30 NOTE — LETTER
2023         RE: Cordell Donaldson  [address]  [facility] [address]        Dear Colleague,    Thank you for referring your patient, Cordell Donaldson, to the [facility] RADIATION [facility]COL[facility]. Please see a copy of my visit note below.    [facility]  SPECIALIZING IN BREAKTHROUGHS  Radiation Oncology    On Treatment Visit Note      Cordell Donaldson      Date: 2023   MRN: [id number]   : 1950  Diagnosis: Cervical cancer      Reason for Visit:  On Radiation Treatment Visit     Treatment Summary to Date  Treatment Site: PA + pelvis Current Dose: 4320/4500 cGy Fractions: 24/25 + 5 HDR      Chemotherapy  Chemo concurrent with radx?: No    ED Visit/Hosiptal Admission: None     Treatment Breaks: None      Subjective:  Cordell will complete external beam radiotherapy tomorrow. She states that her diarrhea is better. She takes 1/2 a dose of liquid Imodium before lunch, before dinner and at bed time. Her stools are not running anymore. They are soft, though not completely formed. She denies problem with urination. She has no vaginal bleeding or discharge.      Nursing ROS:   Nutrition Alteration  Diet Type: Patient's Preference  Nutrition Note: Trying a bland diet r/t diarrhea  Skin  Skin Note: No conplaints per pt        Cardiovascular  Respiratory effort: 1 - Normal - without distress  Gastrointestinal  Diarrhea: 2 - Three to five soft or liquid bowel movements per day (Taking 1/2 dose imodium b-4 Lunch/dinner and bed. Encouraged to increase HS dose to a full dose to prevent waking t night with diarrhea.)  Genitourinary  Urinary Status: 0 - Normal   Note: No vaginal bleeding  Psychosocial  Psychosocial Note: some fatigue, resting PRN  Pain Assessment  Pain Note: See above      Objective:   BP (!) 145/96   Pulse 80   Wt 60.8 kg (134 lb)   SpO2 95%   BMI 23.98 kg/m    Gen: Appears well, in no acute distress  Pelvis: Edematous and erythematous labia. Mild  rectocele. On speculum exam, vagina is foreshortened. The cuff has some erythema and telangiectasia, but no tumor on the mucosal surface. On bimanual exam, the vaginal feels smooth. The small size dilator can be inserted 6 cm into the vagina.     Labs:  CBC RESULTS: Recent Labs   Lab Test 12/07/22  1056   WBC 2.6*   RBC 4.76   HGB 13.5   HCT 41.2   MCV 87   MCH 28.4   MCHC 32.8   RDW 16.9*        ELECTROLYTES:  Recent Labs   Lab Test 12/07/22  1056      POTASSIUM 4.8   CHLORIDE 100   LINDA 9.8   CO2 28   BUN 14.6   CR 0.82   *       Assessment:    Tolerating radiation therapy well.  All questions and concerns addressed.    Toxicities:  Fatigue: Grade 1: Fatigue relieved by rest  Diarrhea: Grade 1: Increase of <4 stools per day over baseline; mild increase in ostomy output compared to baseline  Urinary frequency: Grade 0: No toxicity  Urinary tract pain: Grade 0: No toxicity  Urinary urgency: Grade 0: No toxicity  Dermatitis: Grade 1: Faint erythema or dry desquamation    Plan:   1. Continue current therapy.  Will finish EBRT in one more fraction tomorrow.  2. Diarrhea: continue with Imodium as needed.  3. MRI later this week with follow up to go over the result.  4. PAC visit for interstitial brachytherapy..       Mosaiq chart and setup information reviewed  MVCT/IGRT images checked    Medication Review  Med Note: No changes per pt    Educational Topic Discussed  Additional Instructions: 2/9/23 FUT for pt HDR  Education Instructions: Reviewed        Raymond Stockton MD/PhD  [phone] clinic  Pager [phone]    Please do not send letter to referring physician.        Again, thank you for allowing me to participate in the care of your patient.        Sincerely,        Raymond Stockton MD

## 2023-01-30 NOTE — NURSING NOTE
"Oncology Rooming Note    January 30, 2023 4:58 PM   Cordell Donaldson is a 72 year old female who presents for:    Chief Complaint   Patient presents with     Oncology Clinic Visit     Malignant neoplasm of upper-outer quadrant of left breast      Initial Vitals: /78 (BP Location: Right arm, Patient Position: Sitting, Cuff Size: Adult Regular)   Pulse 71   Temp 98  F (36.7  C) (Oral)   Wt 59.2 kg (130 lb 9.6 oz)   SpO2 99%   BMI 23.37 kg/m   Estimated body mass index is 23.37 kg/m  as calculated from the following:    Height as of 1/2/23: 1.592 m (5' 2.68\").    Weight as of this encounter: 59.2 kg (130 lb 9.6 oz). Body surface area is 1.62 meters squared.  No Pain (0) Comment: Data Unavailable   No LMP recorded. Patient is postmenopausal.  Allergies reviewed: Yes  Medications reviewed: Yes    Medications: Medication refills not needed today.  Pharmacy name entered into [employer]:    [id number] - [place], [place] - [facility]  [id number] - [place], [place] - [facility] [place], [place] - [address] [place] [phone]    Clinical concerns: none.      Lebron Addison"

## 2023-01-31 ENCOUNTER — APPOINTMENT (OUTPATIENT)
Dept: RADIATION ONCOLOGY | Facility: CLINIC | Age: 73
End: 2023-01-31
Attending: RADIOLOGY
Payer: COMMERCIAL

## 2023-01-31 PROCEDURE — 77386 HC IMRT TREATMENT DELIVERY, COMPLEX: CPT | Performed by: RADIOLOGY

## 2023-01-31 PROCEDURE — 77336 RADIATION PHYSICS CONSULT: CPT | Performed by: RADIOLOGY

## 2023-01-31 PROCEDURE — 77427 RADIATION TX MANAGEMENT X5: CPT | Performed by: RADIOLOGY

## 2023-01-31 PROCEDURE — 77014 PR CT GUIDE FOR PLACEMENT RADIATION THERAPY FIELDS: CPT | Mod: 26 | Performed by: RADIOLOGY

## 2023-02-09 ENCOUNTER — OFFICE VISIT (OUTPATIENT)
Dept: RADIATION ONCOLOGY | Facility: CLINIC | Age: 73
End: 2023-02-09
Attending: RADIOLOGY
Payer: COMMERCIAL

## 2023-02-09 ENCOUNTER — ANESTHESIA EVENT (OUTPATIENT)
Dept: SURGERY | Facility: CLINIC | Age: 73
DRG: 755 | End: 2023-02-09
Payer: COMMERCIAL

## 2023-02-09 ENCOUNTER — LAB (OUTPATIENT)
Dept: LAB | Facility: CLINIC | Age: 73
End: 2023-02-09
Payer: COMMERCIAL

## 2023-02-09 ENCOUNTER — PRE VISIT (OUTPATIENT)
Dept: SURGERY | Facility: CLINIC | Age: 73
End: 2023-02-09

## 2023-02-09 ENCOUNTER — HOSPITAL ENCOUNTER (OUTPATIENT)
Dept: CT IMAGING | Facility: CLINIC | Age: 73
Discharge: HOME | End: 2023-02-09
Attending: RADIOLOGY
Payer: COMMERCIAL

## 2023-02-09 ENCOUNTER — OFFICE VISIT (OUTPATIENT)
Dept: SURGERY | Facility: CLINIC | Age: 73
End: 2023-02-09
Payer: COMMERCIAL

## 2023-02-09 ENCOUNTER — HOSPITAL ENCOUNTER (OUTPATIENT)
Dept: MRI IMAGING | Facility: CLINIC | Age: 73
Discharge: HOME | End: 2023-02-09
Attending: RADIOLOGY
Payer: COMMERCIAL

## 2023-02-09 VITALS — WEIGHT: 126.8 LBS | SYSTOLIC BLOOD PRESSURE: 119 MMHG | BODY MASS INDEX: 22.68 KG/M2 | DIASTOLIC BLOOD PRESSURE: 77 MMHG

## 2023-02-09 VITALS
TEMPERATURE: 97.5 F | RESPIRATION RATE: 16 BRPM | HEART RATE: 65 BPM | HEIGHT: 63 IN | BODY MASS INDEX: 22.7 KG/M2 | SYSTOLIC BLOOD PRESSURE: 119 MMHG | OXYGEN SATURATION: 100 % | DIASTOLIC BLOOD PRESSURE: 77 MMHG | WEIGHT: 128.1 LBS

## 2023-02-09 DIAGNOSIS — Z01.818 PREOP EXAMINATION: ICD-10-CM

## 2023-02-09 DIAGNOSIS — C53.9 MALIGNANT NEOPLASM OF CERVIX, UNSPECIFIED SITE (H): ICD-10-CM

## 2023-02-09 DIAGNOSIS — C53.9 RECURRENT CERVICAL CANCER (H): ICD-10-CM

## 2023-02-09 DIAGNOSIS — R18.8 PELVIC FLUID COLLECTION: ICD-10-CM

## 2023-02-09 DIAGNOSIS — Z01.818 PREOP EXAMINATION: Primary | ICD-10-CM

## 2023-02-09 DIAGNOSIS — R18.8 PELVIC FLUID COLLECTION: Primary | ICD-10-CM

## 2023-02-09 LAB
ANION GAP SERPL CALCULATED.3IONS-SCNC: 9 MMOL/L (ref 7–15)
BASOPHILS # BLD AUTO: 0 10E3/UL (ref 0–0.2)
BASOPHILS NFR BLD AUTO: 0 %
BUN SERPL-MCNC: 9.4 MG/DL (ref 8–23)
CALCIUM SERPL-MCNC: 9.7 MG/DL (ref 8.8–10.2)
CHLORIDE SERPL-SCNC: 96 MMOL/L (ref 98–107)
CREAT SERPL-MCNC: 0.85 MG/DL (ref 0.51–0.95)
DEPRECATED HCO3 PLAS-SCNC: 28 MMOL/L (ref 22–29)
EOSINOPHIL # BLD AUTO: 0.1 10E3/UL (ref 0–0.7)
EOSINOPHIL NFR BLD AUTO: 2 %
ERYTHROCYTE [DISTWIDTH] IN BLOOD BY AUTOMATED COUNT: 19.6 % (ref 10–15)
GFR SERPL CREATININE-BSD FRML MDRD: 73 ML/MIN/1.73M2
GLUCOSE SERPL-MCNC: 91 MG/DL (ref 70–99)
HCT VFR BLD AUTO: 34.1 % (ref 35–47)
HGB BLD-MCNC: 11.3 G/DL (ref 11.7–15.7)
IMM GRANULOCYTES # BLD: 0 10E3/UL
IMM GRANULOCYTES NFR BLD: 0 %
LYMPHOCYTES # BLD AUTO: 0.3 10E3/UL (ref 0.8–5.3)
LYMPHOCYTES NFR BLD AUTO: 14 %
MAGNESIUM SERPL-MCNC: 1.9 MG/DL (ref 1.7–2.3)
MCH RBC QN AUTO: 31.7 PG (ref 26.5–33)
MCHC RBC AUTO-ENTMCNC: 33.1 G/DL (ref 31.5–36.5)
MCV RBC AUTO: 96 FL (ref 78–100)
MONOCYTES # BLD AUTO: 0.4 10E3/UL (ref 0–1.3)
MONOCYTES NFR BLD AUTO: 16 %
NEUTROPHILS # BLD AUTO: 1.6 10E3/UL (ref 1.6–8.3)
NEUTROPHILS NFR BLD AUTO: 68 %
NRBC # BLD AUTO: 0 10E3/UL
NRBC BLD AUTO-RTO: 0 /100
PLATELET # BLD AUTO: 152 10E3/UL (ref 150–450)
POTASSIUM SERPL-SCNC: 4.6 MMOL/L (ref 3.4–5.3)
RBC # BLD AUTO: 3.57 10E6/UL (ref 3.8–5.2)
SODIUM SERPL-SCNC: 133 MMOL/L (ref 136–145)
WBC # BLD AUTO: 2.4 10E3/UL (ref 4–11)

## 2023-02-09 PROCEDURE — 99417 PROLNG OP E/M EACH 15 MIN: CPT | Performed by: RADIOLOGY

## 2023-02-09 PROCEDURE — 74177 CT ABD & PELVIS W/CONTRAST: CPT | Mod: 26 | Performed by: RADIOLOGY

## 2023-02-09 PROCEDURE — 99204 OFFICE O/P NEW MOD 45 MIN: CPT | Performed by: PHYSICIAN ASSISTANT

## 2023-02-09 PROCEDURE — G0463 HOSPITAL OUTPT CLINIC VISIT: HCPCS | Mod: 25 | Performed by: RADIOLOGY

## 2023-02-09 PROCEDURE — 255N000002 HC RX 255 OP 636: Performed by: RADIOLOGY

## 2023-02-09 PROCEDURE — 83735 ASSAY OF MAGNESIUM: CPT | Performed by: PATHOLOGY

## 2023-02-09 PROCEDURE — 74177 CT ABD & PELVIS W/CONTRAST: CPT

## 2023-02-09 PROCEDURE — 250N000011 HC RX IP 250 OP 636: Performed by: RADIOLOGY

## 2023-02-09 PROCEDURE — 36415 COLL VENOUS BLD VENIPUNCTURE: CPT | Performed by: PATHOLOGY

## 2023-02-09 PROCEDURE — 85025 COMPLETE CBC W/AUTO DIFF WBC: CPT | Performed by: PATHOLOGY

## 2023-02-09 PROCEDURE — 99215 OFFICE O/P EST HI 40 MIN: CPT | Mod: 24 | Performed by: RADIOLOGY

## 2023-02-09 PROCEDURE — A9585 GADOBUTROL INJECTION: HCPCS | Performed by: RADIOLOGY

## 2023-02-09 PROCEDURE — 72197 MRI PELVIS W/O & W/DYE: CPT | Mod: 26 | Performed by: RADIOLOGY

## 2023-02-09 PROCEDURE — 72197 MRI PELVIS W/O & W/DYE: CPT

## 2023-02-09 PROCEDURE — 80048 BASIC METABOLIC PNL TOTAL CA: CPT | Performed by: PATHOLOGY

## 2023-02-09 PROCEDURE — 99213 OFFICE O/P EST LOW 20 MIN: CPT | Performed by: RADIOLOGY

## 2023-02-09 RX ORDER — HEPARIN SODIUM (PORCINE) LOCK FLUSH IV SOLN 100 UNIT/ML 100 UNIT/ML
500 SOLUTION INTRAVENOUS ONCE
Status: DISCONTINUED | OUTPATIENT
Start: 2023-02-09 | End: 2023-02-10 | Stop reason: HOSPADM

## 2023-02-09 RX ORDER — PHENAZOPYRIDINE HYDROCHLORIDE 200 MG/1
200 TABLET, FILM COATED ORAL ONCE
Status: CANCELLED | OUTPATIENT
Start: 2023-02-09 | End: 2023-02-09

## 2023-02-09 RX ORDER — IOPAMIDOL 755 MG/ML
70 INJECTION, SOLUTION INTRAVASCULAR ONCE
Status: COMPLETED | OUTPATIENT
Start: 2023-02-09 | End: 2023-02-09

## 2023-02-09 RX ORDER — GADOBUTROL 604.72 MG/ML
6 INJECTION INTRAVENOUS ONCE
Status: COMPLETED | OUTPATIENT
Start: 2023-02-09 | End: 2023-02-09

## 2023-02-09 RX ADMIN — Medication 5 ML: at 14:18

## 2023-02-09 RX ADMIN — IOPAMIDOL 70 ML: 755 INJECTION, SOLUTION INTRAVENOUS at 14:00

## 2023-02-09 RX ADMIN — GADOBUTROL 6 ML: 604.72 INJECTION INTRAVENOUS at 12:17

## 2023-02-09 ASSESSMENT — PAIN SCALES - GENERAL
PAINLEVEL: NO PAIN (0)
PAINLEVEL: NO PAIN (0)

## 2023-02-09 ASSESSMENT — LIFESTYLE VARIABLES: TOBACCO_USE: 1

## 2023-02-09 ASSESSMENT — ENCOUNTER SYMPTOMS: SEIZURES: 1

## 2023-02-09 NOTE — PROGRESS NOTES
Department of Therapeutic Radiology--Radiation Oncology                   [place] [address]  [address] [place]  [address]  Office:  [phone]  Fax:  [phone]   Radiation Oncology Clinic  [address] [place] [address]  Phone:  [phone]  Fax:  [phone]     RE: Cordell Donaldson : 1950   MRN: [id number] MCKINLEY: 2023     OUTPATIENT VISIT NOTE       DIAGNOSIS: Recurrent cervical cancer    SUBJECTIVE: Ms. Donaldson is a 71 yo female with a recurrent cervical cancer in the pelvis, status post previous hysterectomy. Briefly, she has a long standing history of abnormal Pap smear with positive HPV and ultimately opted for definitive surgery and underwent laparoscopic converted to laparotomy with FRANCIA for presumed FIGO IA1 cancer with Dr. Megan Arciniega in [facility] on 2012. Hysterectomy specimen showed NGUYỄN III, HSIL, but no invasive cancer was found. She had an upper vaginectomy with Dr. Arciniega on 10/2/2013 due to abnormal vaginal apex biopsy . 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 Her subsequent Pap had been negative for intraepithelial lesion or malignancy, with last on 2020.     In 2022, she developed urinary frequency and feeling of pressure in her pelvis/bladder. She also noted a firm mass in her vagina. A pelvic MRI on 2022 demonstrated a centrally hypoenhancing heterogenous midline pelvic mass along the superior margin of the vaginal vault measuring 8.5 x 6.7 x 8.1 cm, abutting the bladder and indenting the rectum but without definite invasion. There were multiple enlarged lymph nodes with evidence of extracapsular extension. 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 PET/CT scan also revealed multiple pelvic lymph nodes, with the largest on the left, up to 3.3 cm with max SUV of 12.2. Node at the aortic bifurcation measured 2.9 x 1.6 cm with max SUV of 1.9  "x 1.7 cm.     She began Cisplatin/Taxol/Avastin with Pembrolizumab added after starting cycle 3. Repeat PET/CT on 9/22/2022 after 3 cycles demonstrating significant response in both the cervical mass and the adenopathy. She continued with chemo for a total of 6 cycles with Cisplatin changed to Carboplatin due to Tinnitus and Pacliaxel dose reduced due to neuropathy. PET/CT scan on 12/5/2022 showed continued partial response in the cervical mass and resolution of the FDG uptake in the lymph nodes.     Ms. Donaldson was then referred to us for consolidation/salvage radiation therapy. She received 4500 cGy in 25 fractions to the pelvis and para-aortic chain with SIB of 5000 cGy to previously involved brenden region. She completed this on 1/31/2022. She developed mild fatigue, diarrhea and radiation dermatitis. Overall, she tolerated the treatment well. During the treatment, she was noted to have increased abdominal girth on cone beam CT. This was attributed to bowel gas. An adaptive plan was completed, and dosimetry was felt reasonable without the need to replan.     Also of note, Ms. Donaldson has a history of left breast cancer treated with lumpectomy and radiation therapy in 2003 followed by 5 years of Tamoxifen. Her staging PET identified a pectoral node. Ultimately, biopsy of this was consistent with grade 2 invasive ductal carcinoma, %, OK negative, HER2 nonamplified. Dr. Montana recommended treatment with chemotherapy per Gyn Onc with imaging follow up.     Ms. Donaldson had a follow up pelvic MRI earlier today and is here to review the results and finalize her brachytherapy plan.     On interview today, Maritza states that her diarrhea mostly resolved. She stopped taking Imodium. She only had one episode of loose stools yesterday morning. She denies any urinary symptoms. She has no vaginal bleeding or discharge. She did notice swelling of the tissues on her abdomen, describing seeing two \"rolls\" on her belly. " "    RADIATION HISTORY:    4500 cGy in 25 fractions with SIB of 5000 cGy in 25 fractions to previously involved brenden region.   Completed on 1/31/2022    OBJECTIVE:    /77   Wt 57.5 kg (126 lb 12.8 oz)   LMP  (LMP Unknown)   BMI 22.68 kg/m     Gen: Appears well, NAD  Pelvic: deferred  Abdomen: mild soft tissue edema in the anterior abdomen.     IMAGING:  MRI pre-chemo       MRI post-chemo, pre-RT    Pelvic fluid (small amount)        MRI today    Substantial pelvic fluid:       CT of the Abd/Pelvis:        ASSESSMENT AND PLAN: In summary, Ms. Donaldson is a 71 yo female with a recurrent cervical cancer (vs. new vaginal cancer) at the vaginal cuff and a recurrent breast cancer. She has substantial disease in the pelvis and is status post 6 cycles of Cisplatin (replaced with Carbo)/Taxol/Avastin with Pembrolizumab added for the last 4 cycles. She is status post external beam radiation therapy.    Review of the MRI showed some response in the pelvic mass, though not substantial as I had hoped. Nevertheless, the length and the width of the tumor is such that the entirety of the residual disease can be encompassed by the needles. I will ask for a 12 cm length vaginal obturator instead of the usual 15 cm.     However, I was concerned about the substantial pelvic fluid seen on the MRI. In retrospect, there was a small amount back in December. During the treatment, she did experience increase of abdominal girth, which at the time was attributed to diarrhea and bowel gas. I did request an urgent CT of the abdomen and pelvis for further evaluation, which confirmed the presence of moderate ascites, simple fluid density throughout, suggestive of fluid third spacing. There was diffuse mesenteric edema. There was no evidence of peritoneal carcinomatosis.     I did discuss these findings with Dr. Raygoza and PAC service. Per PAC: \"I discussed this patient w/ our anesthesiologist in clinic today, Dr. Velazquez. She could certainly " "proceed as planned from an anesthesia standpoint.\"    The etiology of the ascites is unclear. The chance of this being malignant is low given the squamous histology of her cervical cancer. It is highly unlikely to be secondary to breast cancer. 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 Radiation can cause colitis, but generally does not lead to ascites. Her last Albumin in December was within the normal range.     Given that the ascites is less likely to be malignant, and that she still has substantial residual pelvic mass, Dr. Raygoza and I both feel that it is in the patient's best interest to proceed with interstitial brachytherapy to optimize local control. While inpatient, the Gyn Onc team will obtain labs and potentially a GI consult. After the brachytherapy, we can also consider tapping for fluid cytology.     The above information was discussed with Anika via phone after her visit. She voiced an understanding and is comfortable with the plan.          Raymond Stockton M.D./Ph.D.  Radiation Oncologist   Department of Therapeutic Radiology   [facility]  Phone: [phone]         I reviewed patient's chart, internal/external medical records, imaging studies (including actual images), labs.  I interviewed and counseled the patient face to face.  I additionally ordered tests and discussed the case with patient's referring physicians and care team.      90 minutes were spent on the date of the encounter doing chart review, history and exam, documentation and further activities as noted above.           Raymond Stockton MD    "

## 2023-02-09 NOTE — LETTER
2023     RE: Cordell Donaldson  [address]  [facility] [address]    Dear Colleague,    Thank you for referring your patient, Cordell Donaldson, to the [facility] RADIATION ONCOLOGY. Please see a copy of my visit note below.    Department of Therapeutic Radiology--Radiation Oncology                   [place] [address]  [address] [place]  [address]  Office:  [phone]  Fax:  [phone]   Radiation Oncology Clinic  [address] [place] [address]  Phone:  [phone]  Fax:  [phone]     RE: Cordell Donaldson : 1950   MRN: [id number] MCKINLEY: 2023     OUTPATIENT VISIT NOTE       DIAGNOSIS: Recurrent cervical cancer    SUBJECTIVE: Ms. Donaldson is a 73 yo female with a recurrent cervical cancer in the pelvis, status post previous hysterectomy. Briefly, she has a long standing history of abnormal Pap smear with positive HPV and ultimately opted for definitive surgery and underwent laparoscopic converted to laparotomy with FRANCIA for presumed FIGO IA1 cancer with Dr. Megan Arciniega in [facility] on 2012. Hysterectomy specimen showed NGUYỄN III, HSIL, but no invasive cancer was found. She had an upper vaginectomy with Dr. Arciniega on 10/2/2013 due to abnormal vaginal apex biopsy . 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 Her subsequent Pap had been negative for intraepithelial lesion or malignancy, with last on 2020.     In 2022, she developed urinary frequency and feeling of pressure in her pelvis/bladder. She also noted a firm mass in her vagina. A pelvic MRI on 2022 demonstrated a centrally hypoenhancing heterogenous midline pelvic mass along the superior margin of the vaginal vault measuring 8.5 x 6.7 x 8.1 cm, abutting the bladder and indenting the rectum but without definite invasion. There were multiple enlarged lymph nodes with evidence of extracapsular extension. CT guided biopsy showed  squamous cell carcinoma, poorly differentiated, HPV associated. PET/CT scan also revealed multiple pelvic lymph nodes, with the largest on the left, up to 3.3 cm with max SUV of 12.2. Node at the aortic bifurcation measured 2.9 x 1.6 cm with max SUV of 1.9 x 1.7 cm.     She began Cisplatin/Taxol/Avastin with Pembrolizumab added after starting cycle 3. Repeat PET/CT on 9/22/2022 after 3 cycles demonstrating significant response in both the cervical mass and the adenopathy. She continued with chemo for a total of 6 cycles with Cisplatin changed to Carboplatin due to Tinnitus and Pacliaxel dose reduced due to neuropathy. PET/CT scan on 12/5/2022 showed continued partial response in the cervical mass and resolution of the FDG uptake in the lymph nodes.     Ms. Donaldson was then referred to us for consolidation/salvage radiation therapy. She received 4500 cGy in 25 fractions to the pelvis and para-aortic chain with SIB of 5000 cGy to previously involved brenden region. She completed this on 1/31/2022. She developed mild fatigue, diarrhea and radiation dermatitis. Overall, she tolerated the treatment well. During the treatment, she was noted to have increased abdominal girth on cone beam CT. This was attributed to bowel gas. An adaptive plan was completed, and dosimetry was felt reasonable without the need to replan.     Also of note, Ms. Donaldson has a history of left breast cancer treated with lumpectomy and radiation therapy in 2003 followed by 5 years of Tamoxifen. Her staging PET identified a pectoral node. Ultimately, biopsy of this was consistent with grade 2 invasive ductal carcinoma, %, NE negative, HER2 nonamplified. Dr. Montana recommended treatment with chemotherapy per Gyn Onc with imaging follow up.     Ms. Donaldson had a follow up pelvic MRI earlier today and is here to review the results and finalize her brachytherapy plan.     On interview today, Maritza states that her diarrhea mostly resolved. She  "stopped taking Imodium. She only had one episode of loose stools yesterday morning. She denies any urinary symptoms. She has no vaginal bleeding or discharge. She did notice swelling of the tissues on her abdomen, describing seeing two \"rolls\" on her belly.     RADIATION HISTORY:    4500 cGy in 25 fractions with SIB of 5000 cGy in 25 fractions to previously involved brenden region.   Completed on 1/31/2022    OBJECTIVE:    /77   Wt 57.5 kg (126 lb 12.8 oz)   LMP  (LMP Unknown)   BMI 22.68 kg/m     Gen: Appears well, NAD  Pelvic: deferred  Abdomen: mild soft tissue edema in the anterior abdomen.     IMAGING:  MRI pre-chemo       MRI post-chemo, pre-RT    Pelvic fluid (small amount)        MRI today    Substantial pelvic fluid:       CT of the Abd/Pelvis:        ASSESSMENT AND PLAN: In summary, Ms. Donaldson is a 71 yo female with a recurrent cervical cancer (vs. new vaginal cancer) at the vaginal cuff and a recurrent breast cancer. She has substantial disease in the pelvis and is status post 6 cycles of Cisplatin (replaced with Carbo)/Taxol/Avastin with Pembrolizumab added for the last 4 cycles. She is status post external beam radiation therapy.    Review of the MRI showed some response in the pelvic mass, though not substantial as I had hoped. Nevertheless, the length and the width of the tumor is such that the entirety of the residual disease can be encompassed by the needles. I will ask for a 12 cm length vaginal obturator instead of the usual 15 cm.     However, I was concerned about the substantial pelvic fluid seen on the MRI. In retrospect, there was a small amount back in December. During the treatment, she did experience increase of abdominal girth, which at the time was attributed to diarrhea and bowel gas. I did request an urgent CT of the abdomen and pelvis for further evaluation, which confirmed the presence of moderate ascites, simple fluid density throughout, suggestive of fluid third spacing. " "There was diffuse mesenteric edema. There was no evidence of peritoneal carcinomatosis.     I did discuss these findings with Dr. Raygoza and PAC service. Per PAC: \"I discussed this patient w/ our anesthesiologist in clinic today, Dr. Velazquez. She could certainly proceed as planned from an anesthesia standpoint.\"    The etiology of the ascites is unclear. The chance of this being malignant is low given the squamous histology of her cervical cancer. It is highly unlikely to be secondary to breast cancer. 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 Radiation can cause colitis, but generally does not lead to ascites. Her last Albumin in December was within the normal range.     Given that the ascites is less likely to be malignant, and that she still has substantial residual pelvic mass, Dr. Raygoza and I both feel that it is in the patient's best interest to proceed with interstitial brachytherapy to optimize local control. While inpatient, the Gyn Onc team will obtain labs and potentially a GI consult. After the brachytherapy, we can also consider tapping for fluid cytology.     The above information was discussed with Anika via phone after her visit. She voiced an understanding and is comfortable with the plan.        Raymond Stockton M.D./Ph.D.  Radiation Oncologist   Department of Therapeutic Radiology   [facility]  Phone: [phone]     I reviewed patient's chart, internal/external medical records, imaging studies (including actual images), labs.  I interviewed and counseled the patient face to face.  I additionally ordered tests and discussed the case with patient's referring physicians and care team.      90 minutes were spent on the date of the encounter doing chart review, history and exam, documentation and further activities as noted above.       Raymond Stockton MD      FOLLOW-UP VISIT    Patient Name: " Cordell Donaldson      : 1950     Age: 72 year old        ______________________________________________________________________________     Chief Complaint   Patient presents with     Radiation Therapy     Follow up to radiation therapy      LMP  (LMP Unknown)      Date Radiation Completed: Radiation Pelvis 4500 cGy 22-23.     Pain  Denies    Labs  Other Labs: Yes: 23    Imaging  MRI: Pelvis 23        Diarrhea: 2- Three to five soft or liquid bowel movements per day    Constipation: 0- None    Nausea: 0- None    Vomitin- No vomiting    Genitourinary system: 0- Normal    Dysuria: 0- None    Vaginal dilator use: did not start yet    STAT order for CT CAP placed per Dr. Stockton based on MRI results, appointment obtained for 1:20 here at [place].     Again, thank you for allowing me to participate in the care of your patient.      Sincerely,      Raymond Stockton MD

## 2023-02-09 NOTE — H&P
Pre-Operative H & P     CC:  Preoperative exam to assess for increased cardiopulmonary risk while undergoing surgery and anesthesia.    Date of Encounter: 2/9/2023  Primary Care Physician:  Merry Lino     Reason for visit: Cervical cancer    HPI  Cordell Donaldson is a 72 year old female who presents for pre-operative H & P in preparation for  Procedure Information     Case: [id number] Date/Time: 02/13/23 0730    Procedures:       INSERTION, NEEDLE, WITH ULTRASOUND GUIDANCE, FOR INTERSTITIAL BRACHYTHERAPY (Vagina)      possible INSERTION, FIDUCIAL MARKER SEED, CERVIX, FOR RADIATION THERAPY (Vagina)    Anesthesia type: General    Diagnosis: Cervical cancer (H) [C53.9]    Pre-op diagnosis: Cervical cancer (H) [C53.9]    Location:  OR [address] [place] [address] OR    Providers: Raymond Stockton MD          Patient is being evaluated for comorbid conditions of A-fib, HLD, hypothyroidism, h/o breast cancer w/ radiation.    Ms. Donaldson has a history of cervical cancer. She completed complete external beam radiotherapy tomorrow on 1/31/23. She now presents for the above procedure.    History was obtained from patient & chart review. She is accompanied by her .       Hx of abnormal bleeding or anti-platelet use: on ASA 81 mg    Menstrual history: No LMP recorded (lmp unknown). Patient has had a hysterectomy.:       Past Medical History  Past Medical History:   Diagnosis Date     Abnormal Pap smear     maybe 20 years ago     Cervical cancer (H)      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      History of breast cancer 2003    lumpectomy and radiation offerred chemo and patient declined at time but had oncogene test and low risk     Hyperlipidemia LDL goal < 160      Osteopenia      Paroxysmal A-fib (H)      Severe vaginal dysplasia        Past Surgical History  Past Surgical History:   Procedure Laterality Date     BIOPSY       BREAST SURGERY Left 2003    lumpectomy left, did radiation, 5  yr of tamoxifen     COLONOSCOPY  2018    repeat in 2028     Colposcopy Cervix with Loop Electrode Conization and Lser to Vagina  2008     COLPOSCOPY, BIOPSY, COMBINED  10/24/2012    Procedure: COMBINED COLPOSCOPY, BIOPSY;  Colposcopy, Biopsy of Cervix and Vagina, Ultrasound Guidance;  Surgeon: Colette Ng MD;  Location: [facility] OR     ENT SURGERY  01/23/2018    otoscelerosis, right side     HYSTERECTOMY, FRANCIA  12/2012    high grade cervical dysplasia, [place] [facility], Dr. Arciniega     INSERT PORT VASCULAR ACCESS Right 7/18/2022    Procedure: INSERTION, VASCULAR ACCESS PORT;  Surgeon: Donnie Elias MD;  Location: [facility] OR     IR CHEST PORT PLACEMENT > 5 YRS OF AGE  7/18/2022     [place] LAP VAGINECTOMY, PARTIAL REMOVAL OF VAGINAL WALL  10/2013    upper vaginectomy for dysplasia, Dr. Megan Arciniega       Prior to Admission Medications  Current Outpatient Medications   Medication Sig Dispense Refill     Acetylcarnitine HCl 250 MG CAPS Take 500 mg by mouth 2 times daily        aspirin (ASA) 81 MG chewable tablet Take 81 mg by mouth every evening       Bacillus Coagulans-Inulin (PROBIOTIC FORMULA) 1-250 BILLION-MG CAPS Take by mouth every morning 25+billion CFUS       BENFOTIAMINE PO Take 150 mg by mouth every morning       Cholecalciferol (VITAMIN D-3) 25 MCG (1000 UT) CAPS Take 1,000 Units by mouth every morning 90 capsule 3     levothyroxine (SYNTHROID/LEVOTHROID) 25 MCG tablet Take 1 tablet by mouth daily before breakfast Take on empty stomach        MAGNESIUM OXIDE PO Take 500 mg by mouth 2 times daily       melatonin 5 MG tablet Take 10 mg by mouth nightly as needed for sleep       metoprolol succinate ER (TOPROL XL) 50 MG 24 hr tablet TAKE 1 TABLET BY MOUTH EVERY DAY (Patient taking differently: Take 50 mg by mouth every evening) 90 tablet 2     multivitamin w/minerals (THERA-VIT-M) tablet Take 1 tablet by mouth every morning       Omega-3 Fatty Acids (OMEGA-3 FISH OIL PO) Take 630 mg by mouth 2 times daily        OVER-THE-COUNTER  Turkey tail mushroom powder, use 3 times a day 1 Can 11     Pectin Cit-Inos-C-Bioflav-Soy (MODIFIED CITRUS PECTIN PO) Take by mouth 2 times daily       Taurine 1000 MG CAPS every morning 60 capsule 11     UNABLE TO FIND 2 times daily MEDICATION NAME: [facility] for 1000mg calcium  4 pills per day       UNABLE TO FIND Take 1 tablet by mouth every morning MEDICATION NAME: Dinndolylmethane Complex (DIM) 100mg tablet 1 table daily       [START ON 3/1/2023] letrozole (FEMARA) 2.5 MG tablet Take 1 tablet (2.5 mg) by mouth daily (Patient not taking: Reported on 2023) 30 tablet 11       Allergies  No Known Allergies    Social History  Social History     Socioeconomic History     Marital status:      Spouse name: Not on file     Number of children: Not on file     Years of education: Not on file     Highest education level: Not on file   Occupational History     Not on file   Tobacco Use     Smoking status: Former     Packs/day: 0.50     Years: 15.00     Pack years: 7.50     Types: Cigarettes     Quit date:      Years since quittin.1     Smokeless tobacco: Never   Vaping Use     Vaping Use: Never used   Substance and Sexual Activity     Alcohol use: Not Currently     Alcohol/week: 2.0 standard drinks     Types: 2 Standard drinks or equivalent per week     Comment: Socially      Drug use: No     Sexual activity: Not Currently     Partners: Male     Birth control/protection: Post-menopausal   Other Topics Concern     Parent/sibling w/ CABG, MI or angioplasty before 65F 55M? 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      Social Determinants of Health     Financial Resource Strain: Not on file   Food Insecurity: Not on file   Transportation Needs: Not on file   Physical Activity: Not on file   Stress: Not on file   Social Connections: Not on file   Intimate Partner Violence: Not At Risk     Fear of Current or Ex-Partner: No      "Emotionally Abused: No     Physically Abused: No     Sexually Abused: No   Housing Stability: Not on file       Family History  Family History   Problem Relation Age of Onset     Myocardial Infarction Mother 73        Tob     Myocardial Infarction Father 68        Tob     Breast Cancer Sister 47         of breast cancer age 63; BRCA mutation testing negative     Cancer Maternal Grandmother         Unsure of type.     Breast Cancer Paternal Grandmother         Unsure of diagnosis--some type of \"women's issue\"     Anesthesia Reaction No family hx of      Deep Vein Thrombosis (DVT) No family hx of        Review of Systems  The complete review of systems is negative other than noted in the HPI or here.     Anesthesia Evaluation   Pt has had prior anesthetic.     No history of anesthetic complications       ROS/MED HX  ENT/Pulmonary:     (+) tobacco use, Past use,  (-) asthma and sleep apnea   Neurologic:     (+) no peripheral neuropathy seizures, last seizure: single sz following fall down stairs - age 21,  (-) no CVA   Cardiovascular:     (+) Dyslipidemia -----Taking blood thinners Instructions Given to patient: ASA 81 mg. Previous cardiac testing   Echo: Date: 22 Results:  AFib with RVR. Rate uo to 120s.  Global and regional left ventricular function is normal with an EF of 60-65%.  Right ventricular function, chamber size, wall motion, and thickness are normal.  Severe left atrial enlargement is present.  Mild bileaflet prolapse with mild MR.  Pulmonary artery systolic pressure is normal.  Ascending aorta 3.2 cm.  Mild dilatation of the aorta is present for BSA of 1.5m2.  The inferior vena cava is normal.  No pericardial effusion is present.  Stress Test: Date: Results:    ECG Reviewed: Date: 22 Results:  Atrial fibrillation  Anterior infarct (cited on or before 2022)  T wave abnormality, consider inferior ischemia  Abnormal ECG  When compared with ECG of 08-DEC-2022 13:45, " "(unconfirmed)  Inverted T waves have replaced nonspecific T wave abnormality in Inferior leads  Inverted T waves have replaced nonspecific T wave abnormality in Anterior leads  Ventricular rate 95 bpm  Cath: Date: Results:      METS/Exercise Tolerance: 4 - Raking leaves, gardening Comment: Walked around [place] yesterday. Walks ~ 2 miles per day.     Hematologic:  - neg hematologic  ROS  (-) history of blood clots and history of blood transfusion   Musculoskeletal:  - neg musculoskeletal ROS     GI/Hepatic:  - neg GI/hepatic ROS  (-) GERD and liver disease   Renal/Genitourinary:  - neg Renal ROS  (-) renal disease   Endo:     (+) thyroid problem, hypothyroidism,  (-) Type II DM   Psychiatric/Substance Use:  - neg psychiatric ROS     Infectious Disease:  - neg infectious disease ROS     Malignancy: Comment: Breast cancer 2003  (+) Malignancy, History of Other and Breast.Breast CA Remission status post Radiation and Surgery.  Other CA endocervical Active status post Chemo and Radiation.    Other:  - neg other ROS          /77 (BP Location: Right arm, Patient Position: Sitting, Cuff Size: Adult Regular)   Pulse 65   Temp 97.5  F (36.4  C) (Oral)   Resp 16   Ht 1.593 m (5' 2.7\")   Wt 58.1 kg (128 lb 1.6 oz)   LMP  (LMP Unknown)   SpO2 100%   Breastfeeding No   BMI 22.91 kg/m      Physical Exam  Constitutional: Awake, alert, cooperative, no apparent distress, and appears stated age.  Eyes: Pupils equal, round and reactive to light, extra ocular muscles intact, sclera clear, conjunctiva normal.  HENT: Normocephalic, oral pharynx with moist mucus membranes, good dentition. No goiter appreciated. No removable dental hardware.  Respiratory: Clear to auscultation bilaterally, no crackles or wheezing. No SOB when supine.  Cardiovascular: A-fib, normal S1 and S2, and no murmur noted.  Carotids +2, no bruits. No edema. Palpable pulses to radial, DP and PT arteries.   GI: Normal bowel sounds, soft, non-distended, " non-tender, no masses palpated.    Lymph/Hematologic: No cervical lymphadenopathy and no supraclavicular lymphadenopathy.  Genitourinary:  deferred  Skin: Warm and dry.  No rashes.   Musculoskeletal: full ROM of neck. There is no redness, warmth, or swelling of the joints. Gross motor strength is normal.    Neurologic: Awake, alert, oriented to name, place and time. Cranial nerves II-XII are grossly intact. Gait is normal. Ambulates from chair to exam table, seats self, lies supine and sits back up w/o assistance.  Neuropsychiatric: Calm, cooperative. Normal affect. Pleasant. Answers questions appropriately, follows commands w/o difficulty.        PRIOR LABS/DIAGNOSTIC STUDIES:    All labs and imaging personally reviewed      MRI CARDIAC 12/8/22  1. The left ventricle is normal in cavity size and wall thickness. There are no regional wall motion  abnormalities. The global systolic function is normal. The LVEF is 68%.    2. The right ventricle is normal in cavity size. The global systolic function is normal. The RVEF is 52%.      3.  The right atrium is moderately enlarged and the left atrium is severely enlarged.    4. There is no significant valvular disease.  The aortic valve is trileaflet.      5. There is no evidence of myocardial edema on T2 weighted imaging.      6. There is no ischemia on stress perfusion imaging.    7. There is no late gadolinium enhancement to indicate myocardial fibrosis or infiltrative disease.  ECV is    30% (normal).    8. There is trivial pericardial effusion.    9. There is no intracardiac thrombus.    CONCLUSIONS:    Normal biventricular function, LVEF 68% and RVEF 52%.    No evidence of ischemia.    No evidence of myocardial edema or fibrosis to indicate immune checkpoint inhibitor myocarditis.         MR PELVIS (GYN) W/O & W CONTRAST, 12/16/2022    IMPRESSION: In this patient with recurrent cervical cancer, the    current scan compared to MRI 5/20/2022 shows:    1. Decreased size  of recurrent cervical cancer, now measuring up to    5.7 cm. Continued abutment of mid/low rectum and bladder without    obvious urinary bladder invasion; no discernible fat plane posteriorly    with the rectum. No hydroureter.    2. Previously seen multiple enlarged pelvic lymph nodes significantly    decreased/nearly resolved. No new pelvic lymphadenopathy.       EKG/ echocardiogram -  please see in ROS above       The patient's records and results personally reviewed by this provider.       LAB/DIAGNOSTIC STUDIES TODAY:  BMP, CBC, magnesium    WBC Count 4.0 - 11.0 10e3/uL 2.4 Low   2.6 Low   3.5 Low   2.6 Low   5.0  6.2  6.1      RBC Count 3.80 - 5.20 10e6/uL 3.57 Low   4.76  4.71  4.79  4.87  5.29 High   4.90     Hemoglobin 11.7 - 15.7 g/dL 11.3 Low   13.5  13.0  13.1  13.0  13.5  12.5     Hematocrit 35.0 - 47.0 % 34.1 Low   41.2  40.0  40.6  41.3  43.4  39.8     MCV 78 - 100 fL 96  87  85  85  85  82  81     MCH 26.5 - 33.0 pg 31.7  28.4  27.6  27.3  26.7  25.5 Low   25.5 Low      MCHC 31.5 - 36.5 g/dL 33.1  32.8  32.5  32.3  31.5  31.1 Low   31.4 Low      RDW 10.0 - 15.0 % 19.6 High   16.9 High   16.3 High   16.6 High   18.5 High   20.7 High   21.0 High      Platelet Count 150 - 450 10e3/uL 152  185  158  143 Low   187  181  218     % Neutrophils % 68  33  52  43  69  75  71     % Lymphocytes % 14  50  35  39  19  16  17     % Monocytes % 16  16  10  17  10  7  9     % Eosinophils % 2  1  3  0  2  1  2     % Basophils % 0  0  0  1  0  1  0     % Immature Granulocytes % 0  0  0  0  0  0  1     NRBCs per 100 WBC <1 /100 0  0  0  0  0  0  0     Absolute Neutrophils 1.6 - 8.3 10e3/uL 1.6  0.9 Low   1.8  1.1 Low   3.5  4.7  4.4     Absolute Lymphocytes 0.8 - 5.3 10e3/uL 0.3 Low   1.3  1.2  1.0  1.0  1.0  1.0     Absolute Monocytes 0.0 - 1.3 10e3/uL 0.4  0.4  0.4  0.4  0.5  0.4  0.5     Absolute Eosinophils 0.0 - 0.7 10e3/uL 0.1  0.0  0.1  0.0  0.1  0.1  0.1     Absolute Basophils 0.0 - 0.2 10e3/uL 0.0  0.0  0.0   0.0  0.0  0.0  0.0     Absolute Immature Granulocytes <=0.4 10e3/uL 0.0  0.0  0.0  0.0  0.0  0.0  0.0     Absolute NRBCs 10e3/uL 0.0  0.0  0.0          Sodium 136 - 145 mmol/L 133 Low   138  142  142  140  138  139      Potassium 3.4 - 5.3 mmol/L 4.6  4.8  4.0  4.2  4.5  4.3  4.3     Chloride 98 - 107 mmol/L 96 Low   100  106  107  105  104  105     Carbon Dioxide (CO2) 22 - 29 mmol/L 28  28  25  24  26  22  24     Anion Gap 7 - 15 mmol/L 9  10  11  11  9  12  10     Urea Nitrogen 8.0 - 23.0 mg/dL 9.4  14.6  26.4 High   26.6 High   36.8 High   19.4  21.6     Creatinine 0.51 - 0.95 mg/dL 0.85  0.82  0.77  0.77  0.66  0.76  0.67     Calcium 8.8 - 10.2 mg/dL 9.7  9.8  9.8  9.7  9.8  9.6  9.5     Glucose 70 - 99 mg/dL 91  105 High   106 High   112 High   112 High   94  103 High      GFR Estimate >60 mL/min/1.73m2 73  76 CM  82 CM  82 CM         Magnesium 1.7 - 2.3 mg/dL 1.9  1.9  1.8  1.9          Assessment      Cordell Donaldson is a 72 year old female seen as a PAC referral for risk assessment and optimization for anesthesia.    Plan/Recommendations  Pt will be optimized for the proposed procedure.  See below for details on the assessment, risk, and preoperative recommendations    NEUROLOGY  - No history of TIA, CVA or seizure    -Post Op delirium risk factors:  No risk identified    ENT  - No current airway concerns.  Will need to be reassessed day of surgery.  Mallampati: II  TM: > 3    CARDIAC  - No history of CAD and Hypertension   - A-fib, taking ASA 81 mg, will hold DOS. (Pt previously firmly declined anticoagulation.)  - Stress cardiac MRI 12/8/22: A-fib, EF 68%, no ischemia, no fibrosis to indicate myocarditis. Seen by cardiology on 1/2/23.     - METS (Metabolic Equivalents)  Walked around [place] yesterday. Walks ~ 2 miles per day.    Patient performs 4 or more METS exercise without symptoms            Total Score: 0      RCRI-Very low risk: Class 1 0.4% complication rate            Total Score: 0     "    PULMONARY  BROCK Low Risk            Total Score: 1    BROCK: Over 50 ys old      - Denies asthma or inhaler use  - Tobacco History      History   Smoking Status     Former     Packs/day: 0.50     Years: 15.00     Types: Cigarettes     Quit date: 1990   Smokeless Tobacco     Never       GI  - Denies GERD  PONV High Risk  Total Score: 3           1 AN PONV: Pt is Female    1 AN PONV: Patient is not a current smoker    1 AN PONV: Intended Post Op Opioids        /RENAL  - Endocervical cancer, s/p chemoradiation    ENDOCRINE    - BMI: Estimated body mass index is 22.91 kg/m  as calculated from the following:    Height as of this encounter: 1.593 m (5' 2.7\").    Weight as of this encounter: 58.1 kg (128 lb 1.6 oz).  Healthy Weight (BMI 18.5-24.9)  - No history of Diabetes Mellitus   - Hypothyroidism    HEME  VTE Medium Risk 1.8%            Total Score: 6    VTE: Greater than 59 yrs old    VTE: Current cancer      - On ASA 81 mg, will hold DOS      The patient is aware that the final anesthesia plan will be decided by the assigned anesthesia provider on the date of service.    The patient is optimized for their procedure. AVS with information on surgery time/arrival time, meds and NPO status given by nursing staff. No further diagnostic testing indicated.      On the day of service:     Prep time: 14 minutes  Visit time: 25 minutes  Documentation time: 13 minutes  ------------------------------------------  Total time: 52 minutes      Merle Herring PA-C  Preoperative Assessment Center  [facility]  Clinic and [facility] Center  Phone: [phone]  Fax: [phone]  "

## 2023-02-09 NOTE — TELEPHONE ENCOUNTER
Levothyroxine 25mcg tablet  Last prescribing provider: unknown    Last clinic visit date: 1/30/23 w/ Dr. [place]    Recommendations for requested medication (if none, N/A): NA    Any other pertinent information (if none, N/A): NA    Refilled: Y/N, if NO, why?    Routed to Dr. Raygoza.

## 2023-02-09 NOTE — PATIENT INSTRUCTIONS
Preparing for Your Surgery      Name:  Cordell Donaldson   MRN:  [id number]   :  1950   Today's Date:  2023       Arriving for surgery:  Surgery date:  23  Arrival time:  5:30 am     Surgeries and procedures: Adult patients can have 2 visitors all through the surgery process.   Visiting hours: 8 a.m. to 8:30 p.m.   Hospital: Adult patients and children under age 18 can have 4 visitor at a time     No visitors under the age of 5 are allowed for hospital patients.  Double occupancy rooms: Patients can have only two visitors at a time.   Patients with disabilities: Can have a support person with them (family member, service provider     Or someone well informed about their needs) plus the allowed number of visitors   Patients confirmed or suspected to have symptoms of COVID 19 or flu:     No visitors allowed for adult patients.   Children (under age 18) can have 1 named visitor.   People who are sick or showing symptoms of COVID 19 or flu:    Are not allowed to visit patients--we can only make exceptions in special situations.     Please follow these guidelines for your visit:   Arrive wearing a mask over your mouth and nose; we will give you a medical mask to wear    If you arrive wearing a cloth mask.   Keep it on during your entire visit, even when in patient's room.   If you don't wear a mask we'll ask you to leave.   Clean your hands with alcohol hand . Do this when you arrive at and leave the building and patient room,    And again after you touch your mask or anything in the room.   You can t visit if you have a fever, cough, shortness of breath, muscle aches, headaches, sore throat    Or diarrhea    Stay 6 feet away from others during your visit and between visits   Go directly to and from the room you are visiting.   Stay in the patient s room during your visit. Limit going to other places in the hospital as much as possible   Leave bags and jackets at home or in the car.   For  everyone s health, please don t come and go during your visit. That includes for smoking   during your visit.     Please come to:     [facility] [address]  [address] [place]  [address]    - ? parking is available in front of the hospital      -   Parking is available in the Patient Visitor Ramp on [address] and [facility].     -   When entering the hospital you will be asked COVID screening questions, you will then be directed to Registration.  Registration will direct you to the 3rd floor Surgery waiting room.     -   Please ask if you need an escort or a wheelchair to the Surgery Waiting Room.  Preop number- [phone]      -    Please proceed to [address] on the 3rd floor. [phone]?     - ?If you are in need of directions, wheelchair or escort please stop at the Information Desk in the lobby.  Inform the information person that you are here for surgery; a wheelchair and escort to [address] will be provided.?     What can I eat or drink?  -  You may eat and drink normally up to 8 hours prior to arrival time. (Until 9:30 pm)  -  You may have clear liquids until 2 hours prior to arrival time. (Until 3:30 am)    Examples of clear liquids:  Water  Clear broth  Juices (apple, white grape, white cranberry  and cider) without pulp  Noncarbonated, powder based beverages  (lemonade and Guilherme-Aid)  Sodas (Sprite, 7-Up, ginger ale and seltzer)  Coffee or tea (without milk or cream)  Gatorade    -  No Alcohol for at least 24 hours before surgery.     Which medicines can I take?    Hold Multivitamins for 7 days before surgery.  Hold Supplements for 7 days before surgery.  Hold Ibuprofen (Advil, Motrin) for 1 day before surgery--unless otherwise directed by surgeon.  Hold Naproxen (Aleve) for 4 days before surgery.      -  DO NOT take these medications the day of surgery:  Acetylcarnitine  Aspirin - may be taken the night before  surgery  Probiotic  Benfotiamine  Vtiamin D  Magnesium  Multivitamin - stop now  Omega 3 fatty acids - stop now  Turkey tail - stop now  Modified citrus pectin  Taurine      -  PLEASE TAKE these medications the day of surgery or the night before depending on your usual routine:  Levothyroxine  Melatonin  Metoprolol  Ondansetron if needed  Tamoxifen  Compazine    How do I prepare myself?  - Please take 2 showers before surgery using Scrubcare or Hibiclens soap.    Use this soap only from the neck to your toes.     Leave the soap on your skin for one minute--then rinse thoroughly.      You may use your own shampoo and conditioner. No other hair products.   - Please remove all jewelry and body piercings.  - No lotions, deodorants or fragrance.  - No makeup or fingernail polish.   - Bring your ID and insurance card.        Covid testing policy as of 12/06/2022  Your surgeon will notify and schedule you for a COVID test if one is needed before surgery--please direct any questions or COVID symptoms to your surgeon    Questions or Concerns:    - For any questions regarding the day of surgery or your hospital stay, please contact the Pre Admission Nursing Office at [phone].     - If you have health changes between today and your surgery, please call your surgeon.     - For questions after surgery, please call your surgeons office.

## 2023-02-09 NOTE — PROGRESS NOTES
FOLLOW-UP VISIT    Patient Name: Cordell Donaldson      : 1950     Age: 72 year old        ______________________________________________________________________________     Chief Complaint   Patient presents with     Radiation Therapy     Follow up to radiation therapy      LMP  (LMP Unknown)      Date Radiation Completed: Radiation Pelvis 4500 cGy 22-23.     Pain  Denies    Labs  Other Labs: Yes: 23    Imaging  MRI: Pelvis 23        Diarrhea: 2- Three to five soft or liquid bowel movements per day    Constipation: 0- None    Nausea: 0- None    Vomitin- No vomiting    Genitourinary system: 0- Normal    Dysuria: 0- None    Vaginal dilator use: did not start yet    STAT order for CT CAP placed per Dr. Stockton based on MRI results, appointment obtained for 1:20 here at [facility].

## 2023-02-10 ENCOUNTER — PRE VISIT (OUTPATIENT)
Dept: SURGERY | Facility: CLINIC | Age: 73
End: 2023-02-10

## 2023-02-10 RX ORDER — LEVOTHYROXINE SODIUM 25 UG/1
25 TABLET ORAL
OUTPATIENT
Start: 2023-02-10

## 2023-02-10 NOTE — TELEPHONE ENCOUNTER
RX sent to pharmacy on 12/8/22  # 30  With 6 refills    Per pharmacy pt got refill on 1/30/23 #90 , have her on file for another refill in April    Lm on pt voice mail clarifying if anything is needed on as far as a thyroid refill

## 2023-02-13 ENCOUNTER — HOSPITAL ENCOUNTER (INPATIENT)
Facility: CLINIC | Age: 73
LOS: 2 days | Discharge: HOME | DRG: 755 | End: 2023-02-15
Attending: RADIOLOGY | Admitting: RADIOLOGY
Payer: COMMERCIAL

## 2023-02-13 ENCOUNTER — APPOINTMENT (OUTPATIENT)
Dept: RADIATION ONCOLOGY | Facility: CLINIC | Age: 73
End: 2023-02-13
Attending: RADIOLOGY
Payer: COMMERCIAL

## 2023-02-13 ENCOUNTER — APPOINTMENT (OUTPATIENT)
Dept: ULTRASOUND IMAGING | Facility: CLINIC | Age: 73
DRG: 755 | End: 2023-02-13
Attending: RADIOLOGY
Payer: COMMERCIAL

## 2023-02-13 ENCOUNTER — ANESTHESIA (OUTPATIENT)
Dept: SURGERY | Facility: CLINIC | Age: 73
DRG: 755 | End: 2023-02-13
Payer: COMMERCIAL

## 2023-02-13 ENCOUNTER — ANESTHESIA EVENT (OUTPATIENT)
Dept: SURGERY | Facility: CLINIC | Age: 73
DRG: 755 | End: 2023-02-13
Payer: COMMERCIAL

## 2023-02-13 DIAGNOSIS — D06.9 CIN III (CERVICAL INTRAEPITHELIAL NEOPLASIA GRADE III) WITH SEVERE DYSPLASIA: Primary | ICD-10-CM

## 2023-02-13 DIAGNOSIS — E03.9 HYPOTHYROIDISM, UNSPECIFIED TYPE: ICD-10-CM

## 2023-02-13 DIAGNOSIS — C53.9 RECURRENT CERVICAL CANCER (H): Primary | ICD-10-CM

## 2023-02-13 DIAGNOSIS — C53.0 MALIGNANT NEOPLASM OF ENDOCERVIX (H): ICD-10-CM

## 2023-02-13 LAB
ALBUMIN SERPL BCG-MCNC: 3.8 G/DL (ref 3.5–5.2)
ALP SERPL-CCNC: 27 U/L (ref 35–104)
ALT SERPL W P-5'-P-CCNC: 19 U/L (ref 10–35)
AST SERPL W P-5'-P-CCNC: 34 U/L (ref 10–35)
BILIRUB DIRECT SERPL-MCNC: <0.2 MG/DL (ref 0–0.3)
BILIRUB SERPL-MCNC: 0.4 MG/DL
CREAT SERPL-MCNC: 0.73 MG/DL (ref 0.51–0.95)
GFR SERPL CREATININE-BSD FRML MDRD: 87 ML/MIN/1.73M2
GLUCOSE BLDC GLUCOMTR-MCNC: 95 MG/DL (ref 70–99)
LACTATE SERPL-SCNC: 0.7 MMOL/L (ref 0.7–2)
PROT SERPL-MCNC: 5.4 G/DL (ref 6.4–8.3)
SARS-COV-2 RNA RESP QL NAA+PROBE: NEGATIVE
T4 FREE SERPL-MCNC: 0.37 NG/DL (ref 0.9–1.7)
TSH SERPL DL<=0.005 MIU/L-ACNC: 35.5 UIU/ML (ref 0.3–4.2)

## 2023-02-13 PROCEDURE — 36415 COLL VENOUS BLD VENIPUNCTURE: CPT | Performed by: NURSE PRACTITIONER

## 2023-02-13 PROCEDURE — 258N000003 HC RX IP 258 OP 636

## 2023-02-13 PROCEDURE — C1717 BRACHYTX, NON-STR,HDR IR-192: HCPCS | Performed by: RADIOLOGY

## 2023-02-13 PROCEDURE — 250N000013 HC RX MED GY IP 250 OP 250 PS 637: Performed by: NURSE PRACTITIONER

## 2023-02-13 PROCEDURE — 80076 HEPATIC FUNCTION PANEL: CPT | Performed by: NURSE PRACTITIONER

## 2023-02-13 PROCEDURE — 370N000017 HC ANESTHESIA TECHNICAL FEE, PER MIN: Performed by: RADIOLOGY

## 2023-02-13 PROCEDURE — 77332 RADIATION TREATMENT AID(S): CPT | Performed by: RADIOLOGY

## 2023-02-13 PROCEDURE — 250N000009 HC RX 250

## 2023-02-13 PROCEDURE — 272N000001 HC OR GENERAL SUPPLY STERILE: Performed by: RADIOLOGY

## 2023-02-13 PROCEDURE — 250N000011 HC RX IP 250 OP 636: Performed by: RADIOLOGY

## 2023-02-13 PROCEDURE — 258N000003 HC RX IP 258 OP 636: Performed by: STUDENT IN AN ORGANIZED HEALTH CARE EDUCATION/TRAINING PROGRAM

## 2023-02-13 PROCEDURE — 77772 HDR RDNCL NTRSTL/ICAV BRCHTX: CPT | Mod: 26 | Performed by: RADIOLOGY

## 2023-02-13 PROCEDURE — 250N000011 HC RX IP 250 OP 636: Performed by: STUDENT IN AN ORGANIZED HEALTH CARE EDUCATION/TRAINING PROGRAM

## 2023-02-13 PROCEDURE — 710N000009 HC RECOVERY PHASE 1, LEVEL 1, PER MIN: Performed by: RADIOLOGY

## 2023-02-13 PROCEDURE — 83605 ASSAY OF LACTIC ACID: CPT | Performed by: RADIOLOGY

## 2023-02-13 PROCEDURE — 120N000002 HC R&B MED SURG/OB UMMC

## 2023-02-13 PROCEDURE — 55920 PLACE NEEDLES PELVIC FOR RT: CPT | Performed by: RADIOLOGY

## 2023-02-13 PROCEDURE — 84439 ASSAY OF FREE THYROXINE: CPT | Performed by: NURSE PRACTITIONER

## 2023-02-13 PROCEDURE — 77470 SPECIAL RADIATION TREATMENT: CPT | Mod: 26 | Performed by: RADIOLOGY

## 2023-02-13 PROCEDURE — 77295 3-D RADIOTHERAPY PLAN: CPT | Mod: 26 | Performed by: RADIOLOGY

## 2023-02-13 PROCEDURE — 84443 ASSAY THYROID STIM HORMONE: CPT | Performed by: NURSE PRACTITIONER

## 2023-02-13 PROCEDURE — 77332 RADIATION TREATMENT AID(S): CPT | Mod: 26 | Performed by: RADIOLOGY

## 2023-02-13 PROCEDURE — 77772 HDR RDNCL NTRSTL/ICAV BRCHTX: CPT | Performed by: RADIOLOGY

## 2023-02-13 PROCEDURE — 76857 US EXAM PELVIC LIMITED: CPT

## 2023-02-13 PROCEDURE — U0003 INFECTIOUS AGENT DETECTION BY NUCLEIC ACID (DNA OR RNA); SEVERE ACUTE RESPIRATORY SYNDROME CORONAVIRUS 2 (SARS-COV-2) (CORONAVIRUS DISEASE [COVID-19]), AMPLIFIED PROBE TECHNIQUE, MAKING USE OF HIGH THROUGHPUT TECHNOLOGIES AS DESCRIBED BY CMS-2020-01-R: HCPCS | Performed by: ANESTHESIOLOGY

## 2023-02-13 PROCEDURE — 77295 3-D RADIOTHERAPY PLAN: CPT | Performed by: RADIOLOGY

## 2023-02-13 PROCEDURE — 360N000076 HC SURGERY LEVEL 3, PER MIN: Performed by: RADIOLOGY

## 2023-02-13 PROCEDURE — 250N000011 HC RX IP 250 OP 636

## 2023-02-13 PROCEDURE — 76857 US EXAM PELVIC LIMITED: CPT | Mod: 26 | Performed by: RADIOLOGY

## 2023-02-13 PROCEDURE — 250N000013 HC RX MED GY IP 250 OP 250 PS 637: Performed by: RADIOLOGY

## 2023-02-13 PROCEDURE — DU1198Z HIGH DOSE RATE (HDR) BRACHYTHERAPY OF CERVIX USING IRIDIUM 192 (IR-192): ICD-10-PCS | Performed by: RADIOLOGY

## 2023-02-13 PROCEDURE — 999N000141 HC STATISTIC PRE-PROCEDURE NURSING ASSESSMENT: Performed by: RADIOLOGY

## 2023-02-13 PROCEDURE — 250N000025 HC SEVOFLURANE, PER MIN: Performed by: RADIOLOGY

## 2023-02-13 PROCEDURE — 77470 SPECIAL RADIATION TREATMENT: CPT | Performed by: RADIOLOGY

## 2023-02-13 PROCEDURE — 82565 ASSAY OF CREATININE: CPT | Performed by: NURSE PRACTITIONER

## 2023-02-13 DEVICE — KIT MARKER FIDUCIAL 17GAX20CM MTNW887825
Type: IMPLANTABLE DEVICE | Site: VAGINA | Status: NON-FUNCTIONAL
Removed: 2023-02-15

## 2023-02-13 RX ORDER — NALBUPHINE HYDROCHLORIDE 10 MG/ML
2.5-5 INJECTION, SOLUTION INTRAMUSCULAR; INTRAVENOUS; SUBCUTANEOUS EVERY 6 HOURS PRN
Status: DISCONTINUED | OUTPATIENT
Start: 2023-02-13 | End: 2023-02-14

## 2023-02-13 RX ORDER — ONDANSETRON 2 MG/ML
INJECTION INTRAMUSCULAR; INTRAVENOUS PRN
Status: DISCONTINUED | OUTPATIENT
Start: 2023-02-13 | End: 2023-02-13

## 2023-02-13 RX ORDER — FENTANYL CITRATE 50 UG/ML
50 INJECTION, SOLUTION INTRAMUSCULAR; INTRAVENOUS EVERY 5 MIN PRN
Status: DISCONTINUED | OUTPATIENT
Start: 2023-02-13 | End: 2023-02-13 | Stop reason: HOSPADM

## 2023-02-13 RX ORDER — PHENAZOPYRIDINE HYDROCHLORIDE 200 MG/1
200 TABLET, FILM COATED ORAL ONCE
Status: COMPLETED | OUTPATIENT
Start: 2023-02-13 | End: 2023-02-13

## 2023-02-13 RX ORDER — ENOXAPARIN SODIUM 100 MG/ML
40 INJECTION SUBCUTANEOUS EVERY 24 HOURS
Status: COMPLETED | OUTPATIENT
Start: 2023-02-14 | End: 2023-02-14

## 2023-02-13 RX ORDER — HYDROMORPHONE HCL IN WATER/PF 6 MG/30 ML
0.2 PATIENT CONTROLLED ANALGESIA SYRINGE INTRAVENOUS EVERY 5 MIN PRN
Status: DISCONTINUED | OUTPATIENT
Start: 2023-02-13 | End: 2023-02-13 | Stop reason: HOSPADM

## 2023-02-13 RX ORDER — FLUMAZENIL 0.1 MG/ML
0.2 INJECTION, SOLUTION INTRAVENOUS
Status: DISCONTINUED | OUTPATIENT
Start: 2023-02-13 | End: 2023-02-13 | Stop reason: HOSPADM

## 2023-02-13 RX ORDER — OXYCODONE HYDROCHLORIDE 5 MG/1
5 TABLET ORAL EVERY 4 HOURS PRN
Status: DISCONTINUED | OUTPATIENT
Start: 2023-02-13 | End: 2023-02-15 | Stop reason: HOSPADM

## 2023-02-13 RX ORDER — OXYCODONE HYDROCHLORIDE 10 MG/1
10 TABLET ORAL EVERY 4 HOURS PRN
Status: DISCONTINUED | OUTPATIENT
Start: 2023-02-13 | End: 2023-02-13 | Stop reason: HOSPADM

## 2023-02-13 RX ORDER — OXYCODONE HYDROCHLORIDE 5 MG/1
5 TABLET ORAL EVERY 4 HOURS PRN
Status: DISCONTINUED | OUTPATIENT
Start: 2023-02-13 | End: 2023-02-13 | Stop reason: HOSPADM

## 2023-02-13 RX ORDER — SODIUM CHLORIDE, SODIUM LACTATE, POTASSIUM CHLORIDE, CALCIUM CHLORIDE 600; 310; 30; 20 MG/100ML; MG/100ML; MG/100ML; MG/100ML
INJECTION, SOLUTION INTRAVENOUS CONTINUOUS
Status: DISCONTINUED | OUTPATIENT
Start: 2023-02-13 | End: 2023-02-13 | Stop reason: HOSPADM

## 2023-02-13 RX ORDER — ONDANSETRON 4 MG/1
4 TABLET, ORALLY DISINTEGRATING ORAL EVERY 30 MIN PRN
Status: DISCONTINUED | OUTPATIENT
Start: 2023-02-13 | End: 2023-02-13 | Stop reason: HOSPADM

## 2023-02-13 RX ORDER — SODIUM CHLORIDE, SODIUM LACTATE, POTASSIUM CHLORIDE, CALCIUM CHLORIDE 600; 310; 30; 20 MG/100ML; MG/100ML; MG/100ML; MG/100ML
INJECTION, SOLUTION INTRAVENOUS CONTINUOUS PRN
Status: DISCONTINUED | OUTPATIENT
Start: 2023-02-13 | End: 2023-02-13

## 2023-02-13 RX ORDER — ONDANSETRON 2 MG/ML
4 INJECTION INTRAMUSCULAR; INTRAVENOUS EVERY 30 MIN PRN
Status: DISCONTINUED | OUTPATIENT
Start: 2023-02-13 | End: 2023-02-13 | Stop reason: HOSPADM

## 2023-02-13 RX ORDER — HYDROMORPHONE HCL IN WATER/PF 6 MG/30 ML
0.4 PATIENT CONTROLLED ANALGESIA SYRINGE INTRAVENOUS EVERY 5 MIN PRN
Status: DISCONTINUED | OUTPATIENT
Start: 2023-02-13 | End: 2023-02-13 | Stop reason: HOSPADM

## 2023-02-13 RX ORDER — LEVOTHYROXINE SODIUM 25 UG/1
25 TABLET ORAL
Status: DISCONTINUED | OUTPATIENT
Start: 2023-02-14 | End: 2023-02-14

## 2023-02-13 RX ORDER — NALOXONE HYDROCHLORIDE 0.4 MG/ML
0.2 INJECTION, SOLUTION INTRAMUSCULAR; INTRAVENOUS; SUBCUTANEOUS
Status: DISCONTINUED | OUTPATIENT
Start: 2023-02-13 | End: 2023-02-15 | Stop reason: HOSPADM

## 2023-02-13 RX ORDER — ACETAMINOPHEN 325 MG/1
650 TABLET ORAL EVERY 4 HOURS PRN
Status: DISCONTINUED | OUTPATIENT
Start: 2023-02-13 | End: 2023-02-15 | Stop reason: HOSPADM

## 2023-02-13 RX ORDER — NALOXONE HYDROCHLORIDE 0.4 MG/ML
0.4 INJECTION, SOLUTION INTRAMUSCULAR; INTRAVENOUS; SUBCUTANEOUS
Status: DISCONTINUED | OUTPATIENT
Start: 2023-02-13 | End: 2023-02-13 | Stop reason: HOSPADM

## 2023-02-13 RX ORDER — FENTANYL CITRATE 50 UG/ML
25 INJECTION, SOLUTION INTRAMUSCULAR; INTRAVENOUS EVERY 5 MIN PRN
Status: DISCONTINUED | OUTPATIENT
Start: 2023-02-13 | End: 2023-02-13 | Stop reason: HOSPADM

## 2023-02-13 RX ORDER — METOPROLOL SUCCINATE 50 MG/1
50 TABLET, EXTENDED RELEASE ORAL EVERY EVENING
Status: DISCONTINUED | OUTPATIENT
Start: 2023-02-13 | End: 2023-02-15 | Stop reason: HOSPADM

## 2023-02-13 RX ORDER — HEPARIN SODIUM,PORCINE 10 UNIT/ML
5-10 VIAL (ML) INTRAVENOUS EVERY 24 HOURS
Status: DISCONTINUED | OUTPATIENT
Start: 2023-02-13 | End: 2023-02-15 | Stop reason: HOSPADM

## 2023-02-13 RX ORDER — DEXAMETHASONE SODIUM PHOSPHATE 4 MG/ML
INJECTION, SOLUTION INTRA-ARTICULAR; INTRALESIONAL; INTRAMUSCULAR; INTRAVENOUS; SOFT TISSUE PRN
Status: DISCONTINUED | OUTPATIENT
Start: 2023-02-13 | End: 2023-02-13

## 2023-02-13 RX ORDER — ONDANSETRON 2 MG/ML
4 INJECTION INTRAMUSCULAR; INTRAVENOUS EVERY 6 HOURS PRN
Status: DISCONTINUED | OUTPATIENT
Start: 2023-02-13 | End: 2023-02-15 | Stop reason: HOSPADM

## 2023-02-13 RX ORDER — NALOXONE HYDROCHLORIDE 0.4 MG/ML
0.2 INJECTION, SOLUTION INTRAMUSCULAR; INTRAVENOUS; SUBCUTANEOUS
Status: DISCONTINUED | OUTPATIENT
Start: 2023-02-13 | End: 2023-02-13 | Stop reason: HOSPADM

## 2023-02-13 RX ORDER — IBUPROFEN 200 MG
400 TABLET ORAL EVERY 6 HOURS PRN
Status: DISCONTINUED | OUTPATIENT
Start: 2023-02-13 | End: 2023-02-15 | Stop reason: HOSPADM

## 2023-02-13 RX ORDER — LIDOCAINE 40 MG/G
CREAM TOPICAL
Status: DISCONTINUED | OUTPATIENT
Start: 2023-02-13 | End: 2023-02-13 | Stop reason: HOSPADM

## 2023-02-13 RX ORDER — NALOXONE HYDROCHLORIDE 0.4 MG/ML
0.4 INJECTION, SOLUTION INTRAMUSCULAR; INTRAVENOUS; SUBCUTANEOUS
Status: DISCONTINUED | OUTPATIENT
Start: 2023-02-13 | End: 2023-02-15 | Stop reason: HOSPADM

## 2023-02-13 RX ORDER — VITAMIN B COMPLEX
25 TABLET ORAL EVERY MORNING
Status: DISCONTINUED | OUTPATIENT
Start: 2023-02-14 | End: 2023-02-15 | Stop reason: HOSPADM

## 2023-02-13 RX ORDER — LIDOCAINE HYDROCHLORIDE 20 MG/ML
INJECTION, SOLUTION INFILTRATION; PERINEURAL PRN
Status: DISCONTINUED | OUTPATIENT
Start: 2023-02-13 | End: 2023-02-13

## 2023-02-13 RX ORDER — PROPOFOL 10 MG/ML
INJECTION, EMULSION INTRAVENOUS PRN
Status: DISCONTINUED | OUTPATIENT
Start: 2023-02-13 | End: 2023-02-13

## 2023-02-13 RX ORDER — EPHEDRINE SULFATE 50 MG/ML
INJECTION, SOLUTION INTRAMUSCULAR; INTRAVENOUS; SUBCUTANEOUS PRN
Status: DISCONTINUED | OUTPATIENT
Start: 2023-02-13 | End: 2023-02-13

## 2023-02-13 RX ORDER — HEPARIN SODIUM (PORCINE) LOCK FLUSH IV SOLN 100 UNIT/ML 100 UNIT/ML
5-10 SOLUTION INTRAVENOUS
Status: DISCONTINUED | OUTPATIENT
Start: 2023-02-13 | End: 2023-02-15 | Stop reason: HOSPADM

## 2023-02-13 RX ORDER — HEPARIN SODIUM,PORCINE 10 UNIT/ML
5-10 VIAL (ML) INTRAVENOUS
Status: DISCONTINUED | OUTPATIENT
Start: 2023-02-13 | End: 2023-02-15 | Stop reason: HOSPADM

## 2023-02-13 RX ORDER — FENTANYL CITRATE 50 UG/ML
INJECTION, SOLUTION INTRAMUSCULAR; INTRAVENOUS PRN
Status: DISCONTINUED | OUTPATIENT
Start: 2023-02-13 | End: 2023-02-13

## 2023-02-13 RX ORDER — FENTANYL CITRATE 50 UG/ML
25-50 INJECTION, SOLUTION INTRAMUSCULAR; INTRAVENOUS
Status: DISCONTINUED | OUTPATIENT
Start: 2023-02-13 | End: 2023-02-13 | Stop reason: HOSPADM

## 2023-02-13 RX ADMIN — SODIUM CHLORIDE, POTASSIUM CHLORIDE, SODIUM LACTATE AND CALCIUM CHLORIDE: 600; 310; 30; 20 INJECTION, SOLUTION INTRAVENOUS at 08:30

## 2023-02-13 RX ADMIN — Medication 5 ML: at 18:51

## 2023-02-13 RX ADMIN — FENTANYL CITRATE 50 MCG: 50 INJECTION, SOLUTION INTRAMUSCULAR; INTRAVENOUS at 06:54

## 2023-02-13 RX ADMIN — DEXAMETHASONE SODIUM PHOSPHATE 4 MG: 4 INJECTION, SOLUTION INTRA-ARTICULAR; INTRALESIONAL; INTRAMUSCULAR; INTRAVENOUS; SOFT TISSUE at 07:45

## 2023-02-13 RX ADMIN — SODIUM CHLORIDE, POTASSIUM CHLORIDE, SODIUM LACTATE AND CALCIUM CHLORIDE: 600; 310; 30; 20 INJECTION, SOLUTION INTRAVENOUS at 07:30

## 2023-02-13 RX ADMIN — PHENYLEPHRINE HYDROCHLORIDE 100 MCG: 10 INJECTION INTRAVENOUS at 09:04

## 2023-02-13 RX ADMIN — Medication 5 MG: at 08:34

## 2023-02-13 RX ADMIN — BUPIVACAINE HYDROCHLORIDE 5 ML/HR: 7.5 INJECTION, SOLUTION EPIDURAL; RETROBULBAR at 08:08

## 2023-02-13 RX ADMIN — METOPROLOL SUCCINATE 50 MG: 50 TABLET, EXTENDED RELEASE ORAL at 18:51

## 2023-02-13 RX ADMIN — PHENYLEPHRINE HYDROCHLORIDE 100 MCG: 10 INJECTION INTRAVENOUS at 08:43

## 2023-02-13 RX ADMIN — PHENYLEPHRINE HYDROCHLORIDE 100 MCG: 10 INJECTION INTRAVENOUS at 07:52

## 2023-02-13 RX ADMIN — Medication 10 MG: at 08:58

## 2023-02-13 RX ADMIN — MIDAZOLAM 1 MG: 1 INJECTION INTRAMUSCULAR; INTRAVENOUS at 06:54

## 2023-02-13 RX ADMIN — Medication 40 MG: at 07:33

## 2023-02-13 RX ADMIN — FENTANYL CITRATE 50 MCG: 50 INJECTION, SOLUTION INTRAMUSCULAR; INTRAVENOUS at 08:40

## 2023-02-13 RX ADMIN — PHENYLEPHRINE HYDROCHLORIDE 200 MCG: 10 INJECTION INTRAVENOUS at 07:39

## 2023-02-13 RX ADMIN — Medication 5 MG: at 08:12

## 2023-02-13 RX ADMIN — Medication 5 MG: at 07:51

## 2023-02-13 RX ADMIN — ONDANSETRON 4 MG: 2 INJECTION INTRAMUSCULAR; INTRAVENOUS at 07:45

## 2023-02-13 RX ADMIN — PROPOFOL 50 MG: 10 INJECTION, EMULSION INTRAVENOUS at 08:40

## 2023-02-13 RX ADMIN — Medication 5 MG: at 07:48

## 2023-02-13 RX ADMIN — SUGAMMADEX 200 MG: 100 INJECTION, SOLUTION INTRAVENOUS at 09:16

## 2023-02-13 RX ADMIN — LIDOCAINE HYDROCHLORIDE 100 MG: 20 INJECTION, SOLUTION INFILTRATION; PERINEURAL at 07:32

## 2023-02-13 RX ADMIN — PROPOFOL 100 MG: 10 INJECTION, EMULSION INTRAVENOUS at 07:33

## 2023-02-13 RX ADMIN — FENTANYL CITRATE 50 MCG: 50 INJECTION, SOLUTION INTRAMUSCULAR; INTRAVENOUS at 07:32

## 2023-02-13 RX ADMIN — Medication 10 MG: at 08:40

## 2023-02-13 RX ADMIN — PHENAZOPYRIDINE 200 MG: 200 TABLET ORAL at 06:26

## 2023-02-13 ASSESSMENT — ACTIVITIES OF DAILY LIVING (ADL)
ADLS_ACUITY_SCORE: 18
ADLS_ACUITY_SCORE: 22
ADLS_ACUITY_SCORE: 18
ADLS_ACUITY_SCORE: 22
ADLS_ACUITY_SCORE: 18
ADLS_ACUITY_SCORE: 26
ADLS_ACUITY_SCORE: 18
ADLS_ACUITY_SCORE: 18
ADLS_ACUITY_SCORE: 22

## 2023-02-13 ASSESSMENT — ENCOUNTER SYMPTOMS: DYSRHYTHMIAS: 1

## 2023-02-13 NOTE — PROGRESS NOTES
Admitted/transferred from:   2 RN full   skin assessment completed by Angelic Man, RN and Vanessa TELLEZ.  Skin assessment finding: issues found Epidural, PIV, Bruised tongue, Interstitial needles    Interventions/actions: skin interventions Continue to monitor     Will continue to monitor.

## 2023-02-13 NOTE — ANESTHESIA CARE TRANSFER NOTE
Patient: Cordell Donaldson    Procedure: Procedure(s):  INSERTION, NEEDLE, WITH ULTRASOUND GUIDANCE, FOR INTERSTITIAL BRACHYTHERAPY       Diagnosis: Cervical cancer (H) [C53.9]  Diagnosis Additional Information: No value filed.    Anesthesia Type:   General     Note:    Oropharynx: oropharynx clear of all foreign objects and spontaneously breathing  Level of Consciousness: awake  Oxygen Supplementation: nasal cannula  Level of Supplemental Oxygen (L/min / FiO2): 3  Independent Airway: airway patency satisfactory and stable  Dentition: dentition unchanged  Vital Signs Stable: post-procedure vital signs reviewed and stable  Report to RN Given: handoff report given  Patient transferred to: PACU    Handoff Report: Identifed the Patient, Identified the Reponsible Provider, Reviewed the pertinent medical history, Discussed the surgical course, Reviewed Intra-OP anesthesia mangement and issues during anesthesia, Set expectations for post-procedure period and Allowed opportunity for questions and acknowledgement of understanding      Vitals:  Vitals Value Taken Time   BP 96/71 02/13/23 0929   Temp     Pulse 126 02/13/23 0935   Resp 11 02/13/23 0935   SpO2 95 % 02/13/23 0935   Vitals shown include unvalidated device data.    Electronically Signed By: TE Edmond CRNA  February 13, 2023  9:36 AM

## 2023-02-13 NOTE — LETTER
2/13/2023         RE: Cordell Donaldson  [address]  [facility] [address]        Dear Colleague,    Thank you for referring your patient, Cordell Donaldson, to the [facility] RADIATION ONCOLOGY. Please see a copy of my visit note below.    Brachytherapy.      Again, thank you for allowing me to participate in the care of your patient.        Sincerely,        Raymond Stockton MD

## 2023-02-13 NOTE — ANESTHESIA PROCEDURE NOTES
Airway       Patient location during procedure: OR       Procedure Start/Stop Times: 2/13/2023 7:37 AM  Staff -        CRNA: Zachary Kerr APRN CRNA       Performed By: CRNA  Consent for Airway        Urgency: elective  Indications and Patient Condition       Indications for airway management: leilani-procedural       Induction type:intravenous       Mask difficulty assessment: 1 - vent by mask    Final Airway Details       Final airway type: endotracheal airway       Successful airway: ETT - single  Endotracheal Airway Details        ETT size (mm): 7.0       Cuffed: yes       Successful intubation technique: direct laryngoscopy       DL Blade Type: MAC 3       Grade View of Cords: 2       Adjucts: stylet       Position: Right       Measured from: gums/teeth       Secured at (cm): 21       Bite block used: None    Post intubation assessment        Placement verified by: capnometry, equal breath sounds and chest rise        Number of attempts at approach: 1       Number of other approaches attempted: 0       Secured with: pink tape       Ease of procedure: easy       Dentition: Intact and Unchanged    Medication(s) Administered   Medication Administration Time: 2/13/2023 7:37 AM

## 2023-02-13 NOTE — ANESTHESIA POSTPROCEDURE EVALUATION
Patient: Cordell Donaldson    Procedure: Procedure(s):  INSERTION, NEEDLE, WITH ULTRASOUND GUIDANCE, FOR INTERSTITIAL BRACHYTHERAPY       Anesthesia Type:  General    Note:  Disposition: Inpatient   Postop Pain Control: Uneventful            Sign Out: Well controlled pain   PONV: No   Neuro/Psych: Uneventful            Sign Out: Acceptable/Baseline neuro status   Airway/Respiratory: Uneventful            Sign Out: Acceptable/Baseline resp. status   CV/Hemodynamics: Uneventful            Sign Out: Acceptable CV status; No obvious hypovolemia; No obvious fluid overload   Other NRE: NONE   DID A NON-ROUTINE EVENT OCCUR? No           Last vitals:  Vitals Value Taken Time   BP 99/70 02/13/23 1100   Temp 35.7  C (96.2  F) 02/13/23 0930   Pulse 71 02/13/23 1106   Resp 7 02/13/23 1106   SpO2 99 % 02/13/23 1106   Vitals shown include unvalidated device data.    Electronically Signed By: Jefe Martin MD  February 13, 2023  11:07 AM

## 2023-02-13 NOTE — PROGRESS NOTES
Gynecologic Oncology Postoperative Check Note  2/13/2023    S: Feeling ok. No HA, dizziness, CP, heart palp, SOB, N/V or pain. Had 3 soft bms this morning. Good sensation to feet bilaterally. RN reports slight sore to tongue/lip possibly from intubation at time of procedure. Patient triggered LA protocol.     O:  Vitals:    02/13/23 1115 02/13/23 1130 02/13/23 1145 02/13/23 1328   BP: 99/66 99/72 93/63 104/60   Pulse: 73 76 94 75   Resp: 12 14 14 17   Temp: 97.9  F (36.6  C)  97.5  F (36.4  C) 97.2  F (36.2  C)   TempSrc: Core  Core Temporal   SpO2: 98% 95% 94% 96%   O2 3L    Gen: NAD. Alert.  Cardio: HR regular  Resp: LS clear anteriorly. No resp distress.  Abdomen: Soft, NT, BS+  Incision: Lozano draining leanne colored urine. Interstitial needles in place. No bleeding noted.  Extremities: No edema. + dorsalis pedis pulses palp. Cool LLE.      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 7/2013 Vaginal biopsy HSIL was recommended upper vaginectomy and CO2 laser but declined. [id number] No treatment. 5/20/22: Pelvic MRI: Midline pelvic mass along the superior margin of the vaginal vault measures 8.5 x 6.7 x 8.1 cm. 6/9/22: CT-guided biopsy of pelvic mass: Poorly-differentiated squamous cell carcinoma, HPV-associated. 7/1/22: Staging PET/CT: Stage III vaginal cancer (vs recurrent, metastatic cervical cancer). 7/20/22 - 11/21/22: 6 Cycles of first-line chemotherapy with IV platinum + paclitaxel + bevacizumab 15 mg/kg + pembrolizumab 200 mg (added cycle 3) every 21 days. Now s/p EBRT 4500 cGy completed on 1/30/23. Here for HDR with  radiation oncology. HOB <15 degrees.   FEN: Regular diet. NPO Wednesday morning prior to interstitial needle removal.   Pain: Epidural in place with interstitial needles. Plan for removal Wednesday afternoon. PRN Acetaminophen, oxycodone.  Heme: Preop Hgb 11.3  CV: Hx of A. Fib and HLD. Continue home metoprolol. Hold ASA.  Pulm: Enc DBC and IS  GI: Hold stool softeners/laxatives while on bedrest. Immodium PRN  : Lozano to be continued until interstitial needles removed.   ID: S/p pre-op antibiotics  Endo: Treatment induced hypothyroidism. Continue home levothyroxine. Check TSH with reflex T4. H/o breast Ca s/p radiation. Declines letrozole  Psych/Neuro/MSK: Cool LLE, Right tongue sore and Left upper lip irritation - Anesthesia paged to assess. Discussed LLE coolness with Dr Stockton, no concerns from surgical procedure and follow up CT scan looked good.    PPX: IS, SCDs, lvx  Dispo: home after brachy Wednesday evening if meeting goals.      Katerine Garcia, APRN DNP, CNP  2/13/2023 1:40 PM

## 2023-02-13 NOTE — ANESTHESIA PROCEDURE NOTES
"Epidural catheter Procedure Note    Pre-Procedure   Staff -        Anesthesiologist:  Neela Ghosh MD       Resident/Fellow: Jaden Wang MD       Performed By: resident       Location: pre-op       Pre-Anesthestic Checklist: patient identified, IV checked, risks and benefits discussed, informed consent, monitors and equipment checked, pre-op evaluation, at physician/surgeon's request and post-op pain management  Timeout:       Correct Patient: Yes        Correct Procedure: Yes        Correct Site: Yes        Correct Position: Yes   Procedure Documentation  Procedure: epidural catheter       Patient Position: sitting       Patient Prep/Sterile Barriers: sterile gloves, mask, patient draped       Skin prep: Chloraprep       Local skin infiltrated with 3 mL of 1% lidocaine.        Insertion Site: L3-4. (midline approach).       Technique: LORT saline        STEPHANIE at 5.5 cm.       Needle Type: DRO Biosystemsy needle       Needle Gauge: 17.        Needle Length (Inches): 3.5        Catheter: 19 G.          Catheter threaded easily.         5.5 cm epidural space.         Threaded 11 cm at skin.         # of attempts: 1 and  # of redirects:  0    Assessment/Narrative         Paresthesias: No.       Test dose of 3 mL lidocaine 1.5% w/ 1:200,000 epinephrine at 07:01 CST.         Test dose negative, 3 minutes after injection, for signs of intravascular, subdural, or intrathecal injection.       Insertion/Infusion Method: LORT saline       Aspiration negative for Heme or CSF via Epidural Catheter.       Sensory Level Left: T7.       Sensory Level Right: T7.      FOR [facility] ([facility]) ONLY:   Pain Team Contact information: please page the Pain Team Via [employer]. Search \"Pain\". During daytime hours, please page the attending first. At night please page the resident first.    "

## 2023-02-13 NOTE — PLAN OF CARE
Goal Outcome Evaluation: HD1 admitted for interstitial needle therapy. A&Ox4, Pain is in her L hand due to her IV. Removed IV and accessed port. IVM running through Port. Epidural is intact, Dressing has bloody drainage under transparent dressing, reinforced with mepilex. Minimal drainage leaked prior to reinforcement. Interstitial needles intact. HOB <15 degrees AAT. Denies LAST. Tongue is purple bilaterally, unsure why. L upper lip is dry. On a regular diet but advised to eat finger food due to HOB restriction. Lozano has orange urine. L foot is cold, R food is warm. Anesthesia resident came to assess pt and she had left for a second treatment. They do not feel it is a result of the epidural, but will assess her later today. Page them when pt arrives from radiation.

## 2023-02-13 NOTE — LETTER
2/13/2023         RE: Cordell Donaldson  [address]  [facility] [address]        Dear Colleague,    Thank you for referring your patient, Cordell Donaldson, to the [facility] RADIATION ONCOLOGY. Please see a copy of my visit note below.    Simulation completed.           Again, thank you for allowing me to participate in the care of your patient.        Sincerely,        Raymond Stockton MD

## 2023-02-13 NOTE — OR NURSING
Epidural block performed by Dr. Ghosh and Dr. Wang without complications.  VSS on 4LPM NC.  Patient received 1 mg Versed and 50 mcg Fentanyl, tolerated well.  Will continue to monitor.

## 2023-02-13 NOTE — OP NOTE
PREOPERATIVE DIAGNOSIS: Recurrent squamous cell carcinoma of the cervix vs. a new vaginal primary  POSTOPERATIVE DIAGNOSIS: same  NAME OF THE OPERATION:   1. Examination under anesthesia.   2. Insertion of HDR tandem and interstitial needles under ultrasound guidance.   SURGEONS:   Raymond Stockton MD Radiation Oncology   Assistant:  Pritesh Noriega MD resident  ANESTHESIA: General.   ESTIMATED BLOOD LOSS: < 3 cmm  COMPLICATIONS: None.   OPERATIVE FINDINGS: Normal external genitalia with healing radiation dermatitis. Rectocele at the introitus. On speculum exam, the vagina is foreshortened. There is some erythema at the cuff, but no grossly abnormal lesions. On bimanual exam, firm mass felt above the cuff. Rectovaginal exam nor performed as the patient has already been prepped.     OPERATIVE PROCEDURE: The patient was brought to the operating room wearing TEDs and pneumo-boots and identified to be herself. The patient was placed under general anesthesia via endotracheal tube. She was then placed in dorsal lithotomy position. A Lozano catheter was placed into the bladder and 8 ml saline was used to inflate the balloon. A speculum was used to visualize the vaginal cuff.  A suture was placed at the vaginal cuff, which was then fitted through the obturator and tied the jesus alberto template into place. An extra rubber band was used to secure the obturator to the template.  The template was secured into the place with the four sutures to the perineum. A total of 22 interstitial needles were placed under US guidance. The tip of the needles were approximately 6 cm beyond the top of the obturator, and positions of the needles were verified with ultrasound. Duoderm patch was placed for comfort between the template and the patients legs. Anesthesia was then reversed and the patient was taken to the PACU in stable condition.  PLAN:  Patient will undergo a planning CT simulation in the radiation oncology department and then be treated with  5 fractions of HDR radiation.      Raymond Stocktno MD   Radiation Oncology

## 2023-02-13 NOTE — OR NURSING
Pt met goals of phase 1 care. Pt's questions were answered and care needs addressed. Pt denies any additional concerns at this time. Writer deemed pt stable and appropriate to transfer to Radiology. Writer released pt to transport staff.

## 2023-02-13 NOTE — ANESTHESIA PREPROCEDURE EVALUATION
Anesthesia Pre-Procedure Evaluation    Patient: Cordell Donaldson   MRN: [id number] : 1950        Procedure : Procedure(s):  INSERTION, NEEDLE, WITH ULTRASOUND GUIDANCE, FOR INTERSTITIAL BRACHYTHERAPY  possible INSERTION, FIDUCIAL MARKER SEED, CERVIX, FOR RADIATION THERAPY          Past Medical History:   Diagnosis Date     Abnormal Pap smear     maybe 20 years ago     Cervical cancer (H)      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      History of breast cancer     lumpectomy and radiation offerred chemo and patient declined at time but had oncogene test and low risk     Hyperlipidemia LDL goal < 160      Osteopenia      Paroxysmal A-fib (H)      Severe vaginal dysplasia       Past Surgical History:   Procedure Laterality Date     BIOPSY       BREAST SURGERY Left     lumpectomy left, did radiation, 5 yr of tamoxifen     COLONOSCOPY      repeat in      Colposcopy Cervix with Loop Electrode Conization and Lser to Vagina       COLPOSCOPY, BIOPSY, COMBINED  10/24/2012    Procedure: COMBINED COLPOSCOPY, BIOPSY;  Colposcopy, Biopsy of Cervix and Vagina, Ultrasound Guidance;  Surgeon: Colette Ng MD;  Location: [facility] OR     ENT SURGERY  2018    otoscelerosis, right side     HYSTERECTOMY, FRANCIA  2012    high grade cervical dysplasia, [place] [facility], Dr. Arciniega     INSERT PORT VASCULAR ACCESS Right 2022    Procedure: INSERTION, VASCULAR ACCESS PORT;  Surgeon: Donnie Elias MD;  Location: [facility] OR     IR CHEST PORT PLACEMENT > 5 YRS OF AGE  2022     TN LAP VAGINECTOMY, PARTIAL REMOVAL OF VAGINAL WALL  10/2013    upper vaginectomy for dysplasia, Dr. Megan Arciniega      No Known Allergies   Social History     Tobacco Use     Smoking status: Former     Packs/day: 0.50     Years: 15.00     Pack years: 7.50     Types: Cigarettes     Quit date:      Years since quittin.1     Smokeless tobacco: Never   Substance Use Topics     Alcohol use: Not  Currently     Alcohol/week: 2.0 standard drinks     Types: 2 Standard drinks or equivalent per week     Comment: Socially       Wt Readings from Last 1 Encounters:   02/13/23 58.6 kg (129 lb 3 oz)        Anesthesia Evaluation   Pt has had prior anesthetic. Type: General.        ROS/MED HX  ENT/Pulmonary:       Neurologic:       Cardiovascular:     (+) -----dysrhythmias, a-fib,     METS/Exercise Tolerance:     Hematologic:       Musculoskeletal:       GI/Hepatic:       Renal/Genitourinary:       Endo:     (+) thyroid problem, hypothyroidism,     Psychiatric/Substance Use:       Infectious Disease:       Malignancy:       Other:            Physical Exam    Airway        Mallampati: II    Neck ROM: full     Respiratory Devices and Support         Dental       (+) Minor Abnormalities - some fillings, tiny chips      Cardiovascular          Rhythm and rate: irregular     Pulmonary   pulmonary exam normal                OUTSIDE LABS:  CBC:   Lab Results   Component Value Date    WBC 2.4 (L) 02/09/2023    WBC 2.6 (L) 12/07/2022    HGB 11.3 (L) 02/09/2023    HGB 13.5 12/07/2022    HCT 34.1 (L) 02/09/2023    HCT 41.2 12/07/2022     02/09/2023     12/07/2022     BMP:   Lab Results   Component Value Date     (L) 02/09/2023     12/07/2022    POTASSIUM 4.6 02/09/2023    POTASSIUM 4.8 12/07/2022    CHLORIDE 96 (L) 02/09/2023    CHLORIDE 100 12/07/2022    CO2 28 02/09/2023    CO2 28 12/07/2022    BUN 9.4 02/09/2023    BUN 14.6 12/07/2022    CR 0.85 02/09/2023    CR 0.82 12/07/2022    GLC 91 02/09/2023     (H) 12/07/2022     COAGS: No results found for: PTT, INR, FIBR  POC:   Lab Results   Component Value Date     (H) 10/31/2012     HEPATIC:   Lab Results   Component Value Date    ALBUMIN 4.5 12/07/2022    PROTTOTAL 6.8 12/07/2022    ALT 29 12/07/2022    AST 32 12/07/2022    ALKPHOS 43 12/07/2022    BILITOTAL 0.3 12/07/2022     OTHER:   Lab Results   Component Value Date    LINDA 9.7 02/09/2023     MAG 1.9 02/09/2023    TSH 73.70 (H) 12/30/2022    T4 <0.10 (L) 12/30/2022       Anesthesia Plan    ASA Status:  3   NPO Status:  NPO Appropriate    Anesthesia Type: General.     - Airway: ETT   Induction: Intravenous.   Maintenance: Inhalation.   Techniques and Equipment:     - Lines/Monitors: 2nd IV     Consents    Anesthesia Plan(s) and associated risks, benefits, and realistic alternatives discussed. Questions answered and patient/representative(s) expressed understanding.     - Discussed: Risks, Benefits and Alternatives for BOTH SEDATION and the PROCEDURE were discussed     - Discussed with:  Patient    Use of blood products discussed: Yes.     - Discussed with: Patient.     Postoperative Care    Pain management: Neuraxial analgesia.   PONV prophylaxis: Ondansetron (or other 5HT-3), Dexamethasone or Solumedrol     Comments:                Jefe Martin MD

## 2023-02-13 NOTE — PROVIDER NOTIFICATION
Paged pain service:    7C 7-418-1 M.W. Her L foot is cold, her R foot is warm. numbness and tingling in feet increase from baseline. bloody drainage contained in dressing. Cassi DONNELLY RN [id number]

## 2023-02-13 NOTE — DISCHARGE SUMMARY
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   - Left breast cancer s/p lumpectomy and radiation. Now with recurrence to left retropectoral region.   - Afib  - HLD   - Treatment induced hypothyroidism  Discharge Dx:  - Stage IA1 squamous cell carcinoma of the cervix. 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   - Left breast cancer s/p lumpectomy and radiation. Now with recurrence to left retropectoral region.    - Afib  - HLD  - Treatment induced hypothyroidism  Patient Active Problem List   Diagnosis     Dupuytren contracture     Abnormal electrocardiogram     Atrial fibrillation (H)     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     Hyperlipidemia LDL goal <130     Malignant neoplasm of breast (H)     Rectocele     Pelvic pain in female     History of intraepithelial neoplasia of vagina     Retroperitoneal lymphadenopathy     Malignant neoplasm of endocervix (H)     Recurrent cervical cancer (H)       Procedures: EUA placement and removal of interstitial needles and epidural    Prior to Admission Medications:  Medications Prior to Admission   Medication Sig Dispense Refill Last Dose     Bacillus Coagulans-Inulin (PROBIOTIC FORMULA) 1-250 BILLION-MG CAPS Take by mouth every morning 25+billion CFUS   2/12/2023 at 0800     [START ON 3/1/2023] letrozole (FEMARA) 2.5 MG tablet Take 1 tablet (2.5 mg) by mouth daily 30 tablet 11 Unknown     levothyroxine (SYNTHROID/LEVOTHROID) 25 MCG tablet Take 1 tablet by mouth daily before breakfast Take on empty stomach    2/13/2023 at 0430     melatonin 5 MG tablet Take 10 mg by mouth  nightly as needed for sleep   2/12/2023 at 2100     metoprolol succinate ER (TOPROL XL) 50 MG 24 hr tablet TAKE 1 TABLET BY MOUTH EVERY DAY (Patient taking differently: Take 50 mg by mouth every evening) 90 tablet 2 2/12/2023 at 1800     Omega-3 Fatty Acids (OMEGA-3 FISH OIL PO) Take 630 mg by mouth 2 times daily    Past Week     OVER-THE-COUNTER Turkey tail mushroom powder, use 3 times a day 1 Can 11 Past Week     Pectin Cit-Inos-C-Bioflav-Soy (MODIFIED CITRUS PECTIN PO) Take by mouth 2 times daily   Past Week     UNABLE TO FIND 2 times daily MEDICATION NAME: [facility] for 1000mg calcium  4 pills per day   2/12/2023 at 1800     Acetylcarnitine HCl 250 MG CAPS Take 500 mg by mouth 2 times daily    2/11/2023 at 0800     aspirin (ASA) 81 MG chewable tablet Take 81 mg by mouth every evening   2/11/2023 at 1800     BENFOTIAMINE PO Take 150 mg by mouth every morning   2/11/2023 at 0800     Cholecalciferol (VITAMIN D-3) 25 MCG (1000 UT) CAPS Take 1,000 Units by mouth every morning 90 capsule 3 2/11/2023 at 0800     MAGNESIUM OXIDE PO Take 500 mg by mouth 2 times daily   2/11/2023 at 1800     multivitamin w/minerals (THERA-VIT-M) tablet Take 1 tablet by mouth every morning   2/11/2023 at 0800     Taurine 1000 MG CAPS every morning 60 capsule 11 2/11/2023 at 0800     UNABLE TO FIND Take 1 tablet by mouth every morning MEDICATION NAME: Dinndolylmethane Complex (DIM) 100mg tablet 1 table daily   2/11/2023 at 0800       Discharge Medications:  No current facility-administered medications on file prior to encounter.  Bacillus Coagulans-Inulin (PROBIOTIC FORMULA) 1-250 BILLION-MG CAPS, Take by mouth every morning 25+billion CFUS  metoprolol succinate ER (TOPROL XL) 50 MG 24 hr tablet, TAKE 1 TABLET BY MOUTH EVERY DAY (Patient taking differently: Take 50 mg by mouth every evening)  Omega-3 Fatty Acids (OMEGA-3 FISH OIL PO), Take 630 mg by mouth 2 times daily   OVER-THE-COUNTER, Turkey tail mushroom powder, use 3 times a day  Pectin  Cit-Inos-C-Bioflav-Soy (MODIFIED CITRUS PECTIN PO), Take by mouth 2 times daily  Acetylcarnitine HCl 250 MG CAPS, Take 500 mg by mouth 2 times daily   aspirin (ASA) 81 MG chewable tablet, Take 81 mg by mouth every evening  BENFOTIAMINE PO, Take 150 mg by mouth every morning  Cholecalciferol (VITAMIN D-3) 25 MCG (1000 UT) CAPS, Take 1,000 Units by mouth every morning  MAGNESIUM OXIDE PO, Take 500 mg by mouth 2 times daily  Taurine 1000 MG CAPS, every morning  UNABLE TO FIND, Take 1 tablet by mouth every morning MEDICATION NAME: Dinndolylmethane Complex (DIM) 100mg tablet 1 table daily      Consultations:   RAPS, Radiation oncology    Brief History of Illness:  72 year old with stage 1A1 squamous cell carcinoma of the cervix s/p robotic-assisted laparoscopic lysis of adhesions, left ureterolysis with conversion to laparotomy, total abdominal hysterectomy, and an upper vaginectomy. 7/2013 Vaginal biopsy HSIL was recommended upper vaginectomy and CO2 laser but declined. [id number] No treatment. 5/20/22: Pelvic MRI: Midline pelvic mass along the superior margin of the vaginal vault measures 8.5 x 6.7 x 8.1 cm. 6/9/22: CT-guided biopsy of pelvic mass: Poorly-differentiated squamous cell carcinoma, HPV-associated. 7/1/22: Staging PET/CT: Stage III vaginal cancer (vs recurrent, metastatic cervical cancer). 7/20/22 - 11/21/22: 6 Cycles of first-line chemotherapy with IV platinum + paclitaxel + bevacizumab 15 mg/kg + pembrolizumab 200 mg (added cycle 3) every 21 days. Now s/p EBRT 4500 cGy completed on 1/30/23. Presented for HDR.     Hospital Course:  Dz:   - Preoperative diagnosis was stage IA1 squamous cell carcinoma of the cervix. 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 She will follow-up with Dr Raygoza for a care plan.  FEN:   - She was maintained on IVF until tolerating PO intake.  By discharge, she was tolerating a regular diet without nausea and vomiting and able to  maintain her hydration without IVF supplementation.  Pain:   - Her pain was initially controlled with an epidural, PRN acetaminophen and oxycodone. Her pain was well controlled on this. Her epidural was removed after once interstitial needles were removed. She will discharge home with acetaminophen PRN.  CV:   - She has a history of afib and HLD. Continued on home metoprolol.  Her vital signs were stable while in house and she had no acute CV issues.  PULM:   - She has no history of pulmonary issues.  She was initially given O2 supplementation in to maintain her O2 sats in the immediate postop period and was transitioned off of this without difficulty.  By discharge, her O2 sats were greater than 94% on RA.  She was encouraged to use her bedside IS while in house.  She had no acute pulmonary issues while in house.  HEME:   - Her preoperative Hgb was 11.3.  Her hgb dropped to 10.0 postoperatively.  She had no other acute heme issues while in house.  GI:   - She was made NPO prior to the procedures.  On POD#0, her diet was advanced slowly as tolerated.  At the time of discharge, she was tolerating a regular diet without nausea and vomiting.  She had no acute GI issues while in house.  :    -  A bocanegra catheter was placed at the time of the surgery and removed when interstitial needles were removed. Prior to discharge, the patient was voiding spontaneously without difficulty.  She had no acute  issues while in house.  ID:   - The patient was AF during her hospitalization.  She received standard preoperative antibiotics without incident.    ENDO:   - Hx of treatment induced hypothyroidism. Continued home med. TSH with reflex T4 completed and levothyroxine increased to 50 mcg. She will follow up in 4-6 weeks for repeat labs.  PSYCH/NEURO:   - No acute issues  PPX:    - She was given SCDs, IS, and lovenox during her hospital course.  She tolerated these prophylactic interventions without incident.  They were  discontinued at the time of her discharge.      Discharge Instructions and Follow up:  Ms. Cordell Donaldson was discharged from the hospital with follow up for     Discharge Diet: Regular  Discharge Activity: Activity as tolerated  Discharge Follow up: 4 weeks Dr Raygoza, 4-6 weeks for labs. Message sent to RN care coordinator to schedule      Discharge Disposition:  Discharged to home    Discharge Staff: MD Shannan Hogan DO, MS  [facility]  Gynecology Oncology Resident, PGY-2  02/15/2023 7:26 PM    To reach the GYNECOLOGY ONCOLOGY team for this patient, please page [phone]    The patient was seen and examined with the resident, the labs and vital signs were reviewed.The discharge care plan outlined above was provided under my directives and direct supervision. Patient ready for discharge.     Total time of 40 minutes in discharge planning and coordination of follow-up    Colette Ng MD, MPH  Gynecologic Oncology

## 2023-02-14 ENCOUNTER — OFFICE VISIT (OUTPATIENT)
Dept: RADIATION ONCOLOGY | Facility: CLINIC | Age: 73
End: 2023-02-14
Attending: RADIOLOGY
Payer: COMMERCIAL

## 2023-02-14 DIAGNOSIS — C53.9 RECURRENT CERVICAL CANCER (H): Primary | ICD-10-CM

## 2023-02-14 LAB
HGB BLD-MCNC: 10 G/DL (ref 11.7–15.7)
HOLD SPECIMEN: NORMAL
PLATELET # BLD AUTO: 173 10E3/UL (ref 150–450)

## 2023-02-14 PROCEDURE — 36591 DRAW BLOOD OFF VENOUS DEVICE: CPT | Performed by: NURSE PRACTITIONER

## 2023-02-14 PROCEDURE — 85018 HEMOGLOBIN: CPT | Performed by: NURSE PRACTITIONER

## 2023-02-14 PROCEDURE — 99232 SBSQ HOSP IP/OBS MODERATE 35: CPT | Mod: GC | Performed by: OBSTETRICS & GYNECOLOGY

## 2023-02-14 PROCEDURE — 250N000013 HC RX MED GY IP 250 OP 250 PS 637: Performed by: OBSTETRICS & GYNECOLOGY

## 2023-02-14 PROCEDURE — 01996 DLY HOSP MGMT EDRL RX ADMIN: CPT | Performed by: NURSE PRACTITIONER

## 2023-02-14 PROCEDURE — 250N000011 HC RX IP 250 OP 636: Performed by: STUDENT IN AN ORGANIZED HEALTH CARE EDUCATION/TRAINING PROGRAM

## 2023-02-14 PROCEDURE — 250N000011 HC RX IP 250 OP 636: Performed by: RADIOLOGY

## 2023-02-14 PROCEDURE — 120N000002 HC R&B MED SURG/OB UMMC

## 2023-02-14 PROCEDURE — C1717 BRACHYTX, NON-STR,HDR IR-192: HCPCS | Performed by: RADIOLOGY

## 2023-02-14 PROCEDURE — 77772 HDR RDNCL NTRSTL/ICAV BRCHTX: CPT | Mod: XE | Performed by: RADIOLOGY

## 2023-02-14 PROCEDURE — 258N000003 HC RX IP 258 OP 636: Performed by: STUDENT IN AN ORGANIZED HEALTH CARE EDUCATION/TRAINING PROGRAM

## 2023-02-14 PROCEDURE — 250N000013 HC RX MED GY IP 250 OP 250 PS 637: Performed by: NURSE PRACTITIONER

## 2023-02-14 PROCEDURE — 85049 AUTOMATED PLATELET COUNT: CPT

## 2023-02-14 PROCEDURE — 250N000011 HC RX IP 250 OP 636: Performed by: NURSE PRACTITIONER

## 2023-02-14 PROCEDURE — 77280 THER RAD SIMULAJ FIELD SMPL: CPT | Mod: 26 | Performed by: RADIOLOGY

## 2023-02-14 PROCEDURE — 77772 HDR RDNCL NTRSTL/ICAV BRCHTX: CPT | Mod: 26 | Performed by: RADIOLOGY

## 2023-02-14 PROCEDURE — 77772 HDR RDNCL NTRSTL/ICAV BRCHTX: CPT | Performed by: RADIOLOGY

## 2023-02-14 PROCEDURE — 77280 THER RAD SIMULAJ FIELD SMPL: CPT | Performed by: RADIOLOGY

## 2023-02-14 RX ORDER — LEVOTHYROXINE SODIUM 50 UG/1
50 TABLET ORAL
Status: DISCONTINUED | OUTPATIENT
Start: 2023-02-14 | End: 2023-02-15 | Stop reason: HOSPADM

## 2023-02-14 RX ADMIN — OXYCODONE HYDROCHLORIDE 5 MG: 5 TABLET ORAL at 23:13

## 2023-02-14 RX ADMIN — ENOXAPARIN SODIUM 40 MG: 40 INJECTION SUBCUTANEOUS at 10:53

## 2023-02-14 RX ADMIN — BUPIVACAINE HYDROCHLORIDE: 7.5 INJECTION, SOLUTION EPIDURAL; RETROBULBAR at 18:25

## 2023-02-14 RX ADMIN — SODIUM CHLORIDE, PRESERVATIVE FREE 5 ML: 5 INJECTION INTRAVENOUS at 07:13

## 2023-02-14 RX ADMIN — Medication 5 ML: at 15:21

## 2023-02-14 RX ADMIN — ACETAMINOPHEN 650 MG: 325 TABLET ORAL at 15:48

## 2023-02-14 RX ADMIN — METOPROLOL SUCCINATE 50 MG: 50 TABLET, EXTENDED RELEASE ORAL at 18:46

## 2023-02-14 RX ADMIN — ACETAMINOPHEN 650 MG: 325 TABLET ORAL at 00:09

## 2023-02-14 RX ADMIN — LEVOTHYROXINE SODIUM 50 MCG: 0.05 TABLET ORAL at 06:50

## 2023-02-14 RX ADMIN — Medication 10 MG: at 20:29

## 2023-02-14 RX ADMIN — ACETAMINOPHEN 650 MG: 325 TABLET ORAL at 09:40

## 2023-02-14 RX ADMIN — Medication 10 MG: at 00:09

## 2023-02-14 RX ADMIN — Medication 25 MCG: at 09:03

## 2023-02-14 RX ADMIN — ACETAMINOPHEN 650 MG: 325 TABLET ORAL at 20:29

## 2023-02-14 ASSESSMENT — ACTIVITIES OF DAILY LIVING (ADL)
ADLS_ACUITY_SCORE: 22
ADLS_ACUITY_SCORE: 26
ADLS_ACUITY_SCORE: 22
ADLS_ACUITY_SCORE: 22
ADLS_ACUITY_SCORE: 26
ADLS_ACUITY_SCORE: 26
ADLS_ACUITY_SCORE: 22
ADLS_ACUITY_SCORE: 26
ADLS_ACUITY_SCORE: 26

## 2023-02-14 NOTE — PROGRESS NOTES
"Pain Service Progress Note  [facility]  Date: 02/14/2023       Patient Name: Cordell Donaldson  MRN: [id number]  Age: 72 year old  Sex: female      Assessment  Cordell Donaldson is a 72 year old female with PMH significant for stage IA1 squamous cell carcinoma of the cervix, stage III squamous cell carcinoma of the vagina (vs recurrent, metastatic cervical cancer)     Procedure: s/p EUA interstitial needle placement for brachytherapy    Date of Surgery: 2/13/23     Date of Catheter Placement: 2/13/23     Plan/Recommendations:  1. Regional Anesthesia/Analgesia  -Continuous Catheter Type/Site:  Epidural L3-4  Infusate: Bupivacaine 0.125%  Continuous Infusion at 7 mL/hr    Plan remove catheter tomorrow during OR to remove interstitial needles    2. Anticoagulation   Okay to give enoxaparin 40 mg S C x 1 dose today as ordered  -Please contact Inpatient Pain Service before ordering or making any anticoagulation changes       3. Multimodal Analgesia  - per primary service PRN Tylenol, oxycodone and ibuprofen    Pain Service will continue to follow.    Discussed with attending anesthesiologist    Please Page the Pain Team Via [employer]: \"PAIN MANAGEMENT ACUTE INPATIENT/ [facility]\"    Halley Segovia, APRN CNP  02/14/2023     Overnight Events: None    Tubes/Drains: Yes  Lozano catheter    Subjective:  I feel tender, but it's not painful.  Reports resolution of LLE coolness, notes RLE has decreased sensation and feels weaker with movement   Nausea: No  Vomiting: No  Pruritus: No  Symptoms of LAST: No    Pain Location:  perineum    Pain Intensity:    Pain at Rest: 0/10   Pain with Activity: 2-3/10  Satisfied with your level of pain control: Yes    Diet: Advance Diet as Tolerated: Regular Diet Adult  NPO for Medical/Clinical Reasons Except for: Meds    Relevant Labs:  Recent Labs   Lab Test 02/09/23  0932      BUN 9.4       Physical Exam:  Vitals: BP 96/51 (BP Location: Left arm)   " "Pulse 64   Temp 98.5  F (36.9  C) (Temporal)   Resp 13   Ht 1.6 m (5' 3\")   Wt 58.6 kg (129 lb 3 oz)   SpO2 97%   BMI 22.88 kg/m      Physical Exam:   Orientation:  Alert, oriented, and in no acute distress: Yes  Sedation: No    Motor Examination:  Bilateral LLE, + DP, normal (warm) to touch. LLE strength 5/5, RLE 4+/5 - able to dorsi/plantar flex, bend knee, weaker movement with abduction/adduction    Sensory Level:   Decrease in sensation: Yes    Catheter Site:   Catheter entry site is clean/dry/intact: Yes, scant red heme, and maroon staining under tegaderm dressing    Tender: No      Relevant Medications:  Current Pain Medications:  Medications related to Pain Management (From now, onward)    Start     Dose/Rate Route Frequency Ordered Stop    02/13/23 1348  ibuprofen (ADVIL/MOTRIN) tablet 400 mg         400 mg Oral EVERY 6 HOURS PRN 02/13/23 1349      02/13/23 1348  acetaminophen (TYLENOL) tablet 650 mg         650 mg Oral EVERY 4 HOURS PRN 02/13/23 1349      02/13/23 1336  oxyCODONE (ROXICODONE) tablet 5 mg         5 mg Oral EVERY 4 HOURS PRN 02/13/23 1337      02/13/23 0730  bupivacaine (MARCAINE) 0.125 % in sodium chloride 0.9 % 250 mL EPIDURAL Infusion         7 mL/hr  EPIDURAL CONTINUOUS 02/13/23 0721            Primary Service Contacted with Recommendations? No      Please see A&P for additional details of medical decision making.          "

## 2023-02-14 NOTE — LETTER
2/14/2023         RE: Cordell Donaldson  [address]  [facility] [address]        Dear Colleague,    Thank you for referring your patient, Cordell Donaldson, to the [facility] RADIATION ONCOLOGY. Please see a copy of my visit note below.    A radiation therapy treatment planning simulation was performed.  HDR brachytherapy Nleson interstitial fraction 2 of 5 was delivered.  Please see the [facility] record for documentation.    Beatris Bradley MD  Radiation Oncology      Again, thank you for allowing me to participate in the care of your patient.        Sincerely,        Beatris Bradley MD

## 2023-02-14 NOTE — PROGRESS NOTES
"Gynecology Oncology Progress Note  02/14/2023    POD# EUA interstitial needles 1/5 brachytherapy    Disease: Recurrent cervical cancer. S/p 6C cisplatin/taxol/avastin/pembro S/p external beam radiation. Recurrent breast cancer.    24 hour events:   - needles placed, 1/5 brachy  - triggered sepsis protocol: LA 0.7  - TSH 35, Free T4 0.37   - synthroid increased to 50 mcg    Subjective: Patient is doing well this morning.  Pain is well controlled as she states \"I just have a small vaginal ache\". She does state that she feels like she needs to have a bowel movement, though unsure of this. Otherwise patient reports that she is looking forward to continuing with the 2 rounds of therapy today. She has no other questions or complaints this morning.    Objective:   Patient Vitals for the past 24 hrs:   BP Temp Temp src Pulse Resp SpO2   02/13/23 2153 93/47 (!) 96.4  F (35.8  C) Oral 72 12 97 %   02/13/23 2000 -- -- -- -- -- 96 %   02/13/23 1700 -- -- -- -- -- 96 %   02/13/23 1633 102/47 (!) 96  F (35.6  C) Oral 77 10 99 %   02/13/23 1545 103/51 97.7  F (36.5  C) Temporal 71 13 98 %   02/13/23 1430 112/67 -- -- 68 -- --   02/13/23 1400 97/61 -- -- 76 -- --   02/13/23 1328 104/60 97.2  F (36.2  C) Temporal 75 17 96 %   02/13/23 1145 93/63 97.5  F (36.4  C) Core 94 14 94 %   02/13/23 1130 99/72 -- -- 76 14 95 %   02/13/23 1115 99/66 97.9  F (36.6  C) Core 73 12 98 %   02/13/23 1100 99/70 -- -- 95 10 99 %   02/13/23 1045 110/74 -- -- 76 12 99 %   02/13/23 1030 106/74 -- -- 80 12 100 %   02/13/23 1015 104/78 -- -- 75 12 97 %   02/13/23 1000 103/65 -- -- 87 13 97 %   02/13/23 0945 116/86 -- -- 88 14 (!) 84 %   02/13/23 0930 96/71 (!) 96.2  F (35.7  C) Core (!) 123 13 96 %   02/13/23 0715 111/79 -- -- 72 16 100 %   02/13/23 0712 113/78 -- -- 65 15 100 %   02/13/23 0709 105/79 -- -- 75 15 100 %   02/13/23 0706 107/74 -- -- 76 16 100 %   02/13/23 0703 (!) 125/92 -- -- 75 15 100 %   02/13/23 0700 (!) 131/94 -- -- 69 16 95 % "   02/13/23 0657 (!) 129/96 -- -- 76 15 97 %   02/13/23 0653 (!) 137/97 -- -- 77 16 98 %   02/13/23 0650 (!) 123/92 -- -- 77 18 98 %       General: NAD, appears comfortable in bed  CV: regular rate  Resp: Non labored breathing on room air  Abdomen: soft, nontender, non distended  Extremities: warm, well-perfused, nontender, no BLE edema, SCDs in place    I/Os  (Yesterday // Since Midnight)  IVF 1200cc // NR  Urine 1075cc // 150cc    New Labs/Imaging-   Results for orders placed or performed during the hospital encounter of 02/13/23 (from the past 24 hour(s))   Asymptomatic COVID-19 Virus (Coronavirus) by PCR Nose    Specimen: Nose; Swab   Result Value Ref Range    SARS CoV2 PCR Negative Negative    Narrative    Testing was performed using the Xpert Xpress SARS-CoV-2 Assay on the Cepheid Gene-Xpert Instrument Systems. Additional information about this Emergency Use Authorization (EUA) assay can be found via the Lab Guide. This test should be ordered for the detection of SARS-CoV-2 in individuals who meet SARS-CoV-2 clinical and/or epidemiological criteria as well as from individuals without symptoms or other reasons to suspect COVID-19. Test performance for asymptomatic patients has only been established in anterior nasal swab specimens. This test is for in vitro diagnostic use under the FDA EUA for laboratories certified under CLIA to perform high complexity testing. This test has not been FDA cleared or approved. A negative result does not rule out the presence of PCR inhibitors in the specimen or target RNA concentration below the limit of detection for the assay. The possibility of a false negative should be considered if the patient's recent exposure or clinical presentation suggests COVID-19. This test was validated by [email or web address]. These Laboratories are certified under the Clinical Laboratory Improvement Amendments (CLIA) as qualified to perform high complexity testing.     Glucose by meter    Result Value Ref Range    GLUCOSE BY METER POCT 95 70 - 99 mg/dL   Lactic Acid STAT   Result Value Ref Range    Lactic Acid 0.7 0.7 - 2.0 mmol/L   Creatinine   Result Value Ref Range    Creatinine 0.73 0.51 - 0.95 mg/dL    GFR Estimate 87 >60 mL/min/1.73m2   Hepatic panel   Result Value Ref Range    Protein Total 5.4 (L) 6.4 - 8.3 g/dL    Albumin 3.8 3.5 - 5.2 g/dL    Bilirubin Total 0.4 <=1.2 mg/dL    Alkaline Phosphatase 27 (L) 35 - 104 U/L    AST 34 10 - 35 U/L    ALT 19 10 - 35 U/L    Bilirubin Direct <0.20 0.00 - 0.30 mg/dL   TSH with free T4 reflex   Result Value Ref Range    TSH 35.50 (H) 0.30 - 4.20 uIU/mL   T4 free   Result Value Ref Range    Free T4 0.37 (L) 0.90 - 1.70 ng/dL     Assessment: Cordell Donaldson is a 72 year old who is POD#1 s/p placement of interstitial needles, 1/5 brachytherapy. Patient doing well this morning. Ready for next 2 treatments today.       Plan:     Disease:Recurrent cervical cancer. S/p 6C cisplatin/taxol/avastin/pembro S/p external beam radiation. Recurrent breast cancer.    # Recurrent Cervical Cancer  # Recurrent Breast Cancer  # Radiation Therapy/Brachytherapy  - patient s/p 1/5 treatments. Plan for treatment 2 and 3 today. NPO at 0800 in anticipation of this.   - for Breast cancer: plans to begin letrozole in Mar 2023   - pain: tylenol, ibuprofen, oxycodone prn. Epidural in place. Pain well controlled  - bocanegra to remain in place for duration of patient brachytherapy    #Sepsis Protocol Trigger  - Patient triggered [facility] sepsis protocol. As a result lactate was obtained and found to be wnl. Patient remains asymptomatic today with no active concerns for sepsis    Chronic conditions    Atrial fibrillation  Hyperlipidemia  - PTA metoprolol. ASA [HELD)    Hypothyroidism  - PTA synthroid 25 continued  - TSH 73.7 PTA > 35.5. Free T4 0.37.   - will plan to increase synthroid dose to 50mcg today    Dispo: Plan for discharge home on final day of brachytherapy       Shannan  DO Morgan, MS  [facility]  Gynecology Oncology Resident, PGY-2  02/14/2023 6:46 AM    To reach the GYNECOLOGY ONCOLOGY team for this patient, please page [phone]    The patient was seen and examined with the resident, the labs and vital signs were reviewed. The medical care plan outlined above was provided under my directives and direct supervision.     Colette Ng MD, MPH  Gynecologic Oncology

## 2023-02-14 NOTE — PLAN OF CARE
[id number]    Neuro: A/Ox4.  Cardiac:VSS.   Respiratory: O2 sats stable on 1.5 L NC, capno on  GI/: Bocanegra with leanne urine, no BM this shift  Diet/appetite: Regular diet  Activity:  Strict bedrest HOB 15 degrees max, log roll  Pain: At acceptable level on current regimen.   Skin: WDL  LDA's: Radiation needles intact, bocanegra, epidural at 7cc/hr- dressing with leakage under dressing- anesthesia assessed and reinforced. Port HL'd.     Plan: Continue with POC. Notify primary team with changes.

## 2023-02-14 NOTE — PLAN OF CARE
Cared for pt from [id number].  Pt c/o minor pain, controlled w/ current meds.  Pt's UOP slowed over night, push fluids this AM per providers.  No acute events, continue POC

## 2023-02-14 NOTE — PROGRESS NOTES
A radiation therapy treatment planning simulation was performed.  HDR brachytherapy Nelson interstitial fraction 2 of 5 was delivered.  Please see the [facility] record for documentation.    Beatris Bradley MD  Radiation Oncology

## 2023-02-14 NOTE — PLAN OF CARE
Goal Outcome Evaluation: HD2 s/p interstitial needles placed. A&Ox4, VSS on room air. Pain is well controlled with epidural and PO tylenol. Lozano has adequate UO. Log rolled and pillows shifted. On a regular diet. Passing gas, LBM prior to admit. Port hep locked.     Continue with POC.

## 2023-02-14 NOTE — PROGRESS NOTES
A radiation therapy treatment planning simulation was performed.  HDR brachytherapy Nelson interstitial fraction 3 of 5 was delivered.  Please see the [employer] record for documentation.    Beatris Bradley MD  Radiation Oncology

## 2023-02-14 NOTE — LETTER
2/14/2023         RE: Cordell Donaldson  [address]  [facility] [address]        Dear Colleague,    Thank you for referring your patient, Cordell Donaldson, to the [facility] RADIATION ONCOLOGY. Please see a copy of my visit note below.    A radiation therapy treatment planning simulation was performed.  HDR brachytherapy Nelson interstitial fraction 3 of 5 was delivered.  Please see the [facility] record for documentation.    Beatris Bradley MD  Radiation Oncology      Again, thank you for allowing me to participate in the care of your patient.        Sincerely,        Beatris Bradley MD

## 2023-02-15 ENCOUNTER — OFFICE VISIT (OUTPATIENT)
Dept: RADIATION ONCOLOGY | Facility: CLINIC | Age: 73
End: 2023-02-15
Attending: RADIOLOGY
Payer: COMMERCIAL

## 2023-02-15 ENCOUNTER — ANESTHESIA (OUTPATIENT)
Dept: SURGERY | Facility: CLINIC | Age: 73
DRG: 755 | End: 2023-02-15
Payer: COMMERCIAL

## 2023-02-15 VITALS
HEIGHT: 63 IN | DIASTOLIC BLOOD PRESSURE: 77 MMHG | SYSTOLIC BLOOD PRESSURE: 124 MMHG | WEIGHT: 129.19 LBS | TEMPERATURE: 97 F | OXYGEN SATURATION: 99 % | HEART RATE: 79 BPM | BODY MASS INDEX: 22.89 KG/M2 | RESPIRATION RATE: 15 BRPM

## 2023-02-15 DIAGNOSIS — C53.0 MALIGNANT NEOPLASM OF ENDOCERVIX (H): ICD-10-CM

## 2023-02-15 DIAGNOSIS — C53.9 CERVICAL CANCER (H): Primary | ICD-10-CM

## 2023-02-15 DIAGNOSIS — C53.9 RECURRENT CERVICAL CANCER (H): Primary | ICD-10-CM

## 2023-02-15 PROCEDURE — 250N000009 HC RX 250: Performed by: NURSE ANESTHETIST, CERTIFIED REGISTERED

## 2023-02-15 PROCEDURE — 77336 RADIATION PHYSICS CONSULT: CPT | Performed by: RADIOLOGY

## 2023-02-15 PROCEDURE — 77772 HDR RDNCL NTRSTL/ICAV BRCHTX: CPT | Mod: XE | Performed by: RADIOLOGY

## 2023-02-15 PROCEDURE — 01996 DLY HOSP MGMT EDRL RX ADMIN: CPT | Performed by: NURSE PRACTITIONER

## 2023-02-15 PROCEDURE — 77772 HDR RDNCL NTRSTL/ICAV BRCHTX: CPT | Mod: 26 | Performed by: RADIOLOGY

## 2023-02-15 PROCEDURE — 250N000011 HC RX IP 250 OP 636: Performed by: RADIOLOGY

## 2023-02-15 PROCEDURE — 250N000013 HC RX MED GY IP 250 OP 250 PS 637: Performed by: OBSTETRICS & GYNECOLOGY

## 2023-02-15 PROCEDURE — C1717 BRACHYTX, NON-STR,HDR IR-192: HCPCS | Performed by: RADIOLOGY

## 2023-02-15 PROCEDURE — 370N000017 HC ANESTHESIA TECHNICAL FEE, PER MIN: Performed by: RADIOLOGY

## 2023-02-15 PROCEDURE — 77772 HDR RDNCL NTRSTL/ICAV BRCHTX: CPT | Performed by: RADIOLOGY

## 2023-02-15 PROCEDURE — 710N000010 HC RECOVERY PHASE 1, LEVEL 2, PER MIN: Performed by: RADIOLOGY

## 2023-02-15 PROCEDURE — 77280 THER RAD SIMULAJ FIELD SMPL: CPT | Performed by: RADIOLOGY

## 2023-02-15 PROCEDURE — 250N000013 HC RX MED GY IP 250 OP 250 PS 637: Performed by: NURSE PRACTITIONER

## 2023-02-15 PROCEDURE — 77280 THER RAD SIMULAJ FIELD SMPL: CPT | Mod: 26 | Performed by: RADIOLOGY

## 2023-02-15 PROCEDURE — 250N000011 HC RX IP 250 OP 636: Performed by: NURSE ANESTHETIST, CERTIFIED REGISTERED

## 2023-02-15 PROCEDURE — 258N000003 HC RX IP 258 OP 636: Performed by: NURSE ANESTHETIST, CERTIFIED REGISTERED

## 2023-02-15 PROCEDURE — 999N000141 HC STATISTIC PRE-PROCEDURE NURSING ASSESSMENT: Performed by: RADIOLOGY

## 2023-02-15 PROCEDURE — 0UPHX1Z REMOVAL OF RADIOACTIVE ELEMENT FROM VAGINA AND CUL-DE-SAC, EXTERNAL APPROACH: ICD-10-PCS | Performed by: RADIOLOGY

## 2023-02-15 PROCEDURE — 258N000003 HC RX IP 258 OP 636: Performed by: STUDENT IN AN ORGANIZED HEALTH CARE EDUCATION/TRAINING PROGRAM

## 2023-02-15 PROCEDURE — 360N000076 HC SURGERY LEVEL 3, PER MIN: Performed by: RADIOLOGY

## 2023-02-15 PROCEDURE — 99239 HOSP IP/OBS DSCHRG MGMT >30: CPT | Mod: GC | Performed by: OBSTETRICS & GYNECOLOGY

## 2023-02-15 RX ORDER — FENTANYL CITRATE 50 UG/ML
25 INJECTION, SOLUTION INTRAMUSCULAR; INTRAVENOUS EVERY 5 MIN PRN
Status: CANCELLED | OUTPATIENT
Start: 2023-02-15

## 2023-02-15 RX ORDER — ACETAMINOPHEN 325 MG/1
650 TABLET ORAL EVERY 6 HOURS PRN
COMMUNITY
Start: 2023-02-15 | End: 2023-07-03

## 2023-02-15 RX ORDER — LEVOTHYROXINE SODIUM 50 UG/1
50 TABLET ORAL
Qty: 30 TABLET | Refills: 1 | Status: SHIPPED | OUTPATIENT
Start: 2023-02-15 | End: 2023-04-13

## 2023-02-15 RX ORDER — OXYCODONE HYDROCHLORIDE 10 MG/1
10 TABLET ORAL EVERY 4 HOURS PRN
Status: CANCELLED | OUTPATIENT
Start: 2023-02-15

## 2023-02-15 RX ORDER — ONDANSETRON 2 MG/ML
INJECTION INTRAMUSCULAR; INTRAVENOUS PRN
Status: DISCONTINUED | OUTPATIENT
Start: 2023-02-15 | End: 2023-02-15

## 2023-02-15 RX ORDER — LIDOCAINE HYDROCHLORIDE 20 MG/ML
INJECTION, SOLUTION INFILTRATION; PERINEURAL PRN
Status: DISCONTINUED | OUTPATIENT
Start: 2023-02-15 | End: 2023-02-15

## 2023-02-15 RX ORDER — HYDROMORPHONE HCL IN WATER/PF 6 MG/30 ML
0.4 PATIENT CONTROLLED ANALGESIA SYRINGE INTRAVENOUS EVERY 5 MIN PRN
Status: CANCELLED | OUTPATIENT
Start: 2023-02-15

## 2023-02-15 RX ORDER — ONDANSETRON 4 MG/1
4 TABLET, ORALLY DISINTEGRATING ORAL EVERY 30 MIN PRN
Status: CANCELLED | OUTPATIENT
Start: 2023-02-15

## 2023-02-15 RX ORDER — SODIUM CHLORIDE, SODIUM LACTATE, POTASSIUM CHLORIDE, CALCIUM CHLORIDE 600; 310; 30; 20 MG/100ML; MG/100ML; MG/100ML; MG/100ML
INJECTION, SOLUTION INTRAVENOUS CONTINUOUS
Status: CANCELLED | OUTPATIENT
Start: 2023-02-15

## 2023-02-15 RX ORDER — LOPERAMIDE HCL 2 MG
2 CAPSULE ORAL 4 TIMES DAILY PRN
Status: DISCONTINUED | OUTPATIENT
Start: 2023-02-15 | End: 2023-02-15 | Stop reason: HOSPADM

## 2023-02-15 RX ORDER — PROPOFOL 10 MG/ML
INJECTION, EMULSION INTRAVENOUS PRN
Status: DISCONTINUED | OUTPATIENT
Start: 2023-02-15 | End: 2023-02-15

## 2023-02-15 RX ORDER — SODIUM CHLORIDE, SODIUM LACTATE, POTASSIUM CHLORIDE, CALCIUM CHLORIDE 600; 310; 30; 20 MG/100ML; MG/100ML; MG/100ML; MG/100ML
INJECTION, SOLUTION INTRAVENOUS CONTINUOUS
Status: DISCONTINUED | OUTPATIENT
Start: 2023-02-15 | End: 2023-02-15 | Stop reason: HOSPADM

## 2023-02-15 RX ORDER — ONDANSETRON 2 MG/ML
4 INJECTION INTRAMUSCULAR; INTRAVENOUS EVERY 30 MIN PRN
Status: CANCELLED | OUTPATIENT
Start: 2023-02-15

## 2023-02-15 RX ORDER — PROPOFOL 10 MG/ML
INJECTION, EMULSION INTRAVENOUS CONTINUOUS PRN
Status: DISCONTINUED | OUTPATIENT
Start: 2023-02-15 | End: 2023-02-15

## 2023-02-15 RX ORDER — HYDROMORPHONE HCL IN WATER/PF 6 MG/30 ML
0.2 PATIENT CONTROLLED ANALGESIA SYRINGE INTRAVENOUS EVERY 5 MIN PRN
Status: CANCELLED | OUTPATIENT
Start: 2023-02-15

## 2023-02-15 RX ORDER — SODIUM CHLORIDE, SODIUM LACTATE, POTASSIUM CHLORIDE, CALCIUM CHLORIDE 600; 310; 30; 20 MG/100ML; MG/100ML; MG/100ML; MG/100ML
INJECTION, SOLUTION INTRAVENOUS CONTINUOUS PRN
Status: DISCONTINUED | OUTPATIENT
Start: 2023-02-15 | End: 2023-02-15

## 2023-02-15 RX ORDER — OXYCODONE HYDROCHLORIDE 5 MG/1
5 TABLET ORAL EVERY 4 HOURS PRN
Status: CANCELLED | OUTPATIENT
Start: 2023-02-15

## 2023-02-15 RX ORDER — FENTANYL CITRATE 50 UG/ML
50 INJECTION, SOLUTION INTRAMUSCULAR; INTRAVENOUS EVERY 5 MIN PRN
Status: CANCELLED | OUTPATIENT
Start: 2023-02-15

## 2023-02-15 RX ORDER — LIDOCAINE 40 MG/G
CREAM TOPICAL
Status: DISCONTINUED | OUTPATIENT
Start: 2023-02-15 | End: 2023-02-15 | Stop reason: HOSPADM

## 2023-02-15 RX ADMIN — ACETAMINOPHEN 650 MG: 325 TABLET ORAL at 04:50

## 2023-02-15 RX ADMIN — SODIUM CHLORIDE, PRESERVATIVE FREE 5 ML: 5 INJECTION INTRAVENOUS at 18:56

## 2023-02-15 RX ADMIN — PHENYLEPHRINE HYDROCHLORIDE 200 MCG: 10 INJECTION INTRAVENOUS at 17:08

## 2023-02-15 RX ADMIN — SODIUM CHLORIDE, POTASSIUM CHLORIDE, SODIUM LACTATE AND CALCIUM CHLORIDE 250 ML: 600; 310; 30; 20 INJECTION, SOLUTION INTRAVENOUS at 01:18

## 2023-02-15 RX ADMIN — SODIUM CHLORIDE, PRESERVATIVE FREE 5 ML: 5 INJECTION INTRAVENOUS at 09:55

## 2023-02-15 RX ADMIN — LEVOTHYROXINE SODIUM 50 MCG: 0.05 TABLET ORAL at 09:52

## 2023-02-15 RX ADMIN — PROPOFOL 10 MG: 10 INJECTION, EMULSION INTRAVENOUS at 17:14

## 2023-02-15 RX ADMIN — PROPOFOL 20 MG: 10 INJECTION, EMULSION INTRAVENOUS at 17:07

## 2023-02-15 RX ADMIN — PROPOFOL 30 MCG/KG/MIN: 10 INJECTION, EMULSION INTRAVENOUS at 17:14

## 2023-02-15 RX ADMIN — ONDANSETRON 4 MG: 2 INJECTION INTRAMUSCULAR; INTRAVENOUS at 17:03

## 2023-02-15 RX ADMIN — SODIUM CHLORIDE, POTASSIUM CHLORIDE, SODIUM LACTATE AND CALCIUM CHLORIDE: 600; 310; 30; 20 INJECTION, SOLUTION INTRAVENOUS at 16:59

## 2023-02-15 RX ADMIN — Medication 25 MCG: at 09:52

## 2023-02-15 RX ADMIN — PROPOFOL 20 MG: 10 INJECTION, EMULSION INTRAVENOUS at 17:10

## 2023-02-15 RX ADMIN — PROPOFOL 30 MG: 10 INJECTION, EMULSION INTRAVENOUS at 17:03

## 2023-02-15 RX ADMIN — PHENYLEPHRINE HYDROCHLORIDE 200 MCG: 10 INJECTION INTRAVENOUS at 17:28

## 2023-02-15 RX ADMIN — LIDOCAINE HYDROCHLORIDE 20 MG: 20 INJECTION, SOLUTION INFILTRATION; PERINEURAL at 17:03

## 2023-02-15 ASSESSMENT — ENCOUNTER SYMPTOMS: DYSRHYTHMIAS: 1

## 2023-02-15 ASSESSMENT — ACTIVITIES OF DAILY LIVING (ADL)
ADLS_ACUITY_SCORE: 24
ADLS_ACUITY_SCORE: 22
ADLS_ACUITY_SCORE: 24

## 2023-02-15 NOTE — PROGRESS NOTES
"Gynecology Oncology Progress Note  02/15/2023    POD#2 EUA interstitial needles, now 3/5 brachytherapy    Disease: Recurrent cervical cancer. S/p 6C cisplatin/taxol/avastin/pembro S/p external beam radiation. Recurrent breast cancer.    24 hour events:   - Increased synthroid to 50  - Brachy 3/5  - persistent pain > epidural bolus > hypotension > Rapid called    Subjective: Cordell is doing okay this morning. She is feeling tired after the events of last night. She did have a bowel movement overnight after which she states \"I felt so much better\". Pain continues to be well controlled. She has questions about her pink tinged urine. She has no other complaints this morning. Does state that she strongly desires discharge home following completion of treatments today. Denies lightheadedness or dizziness.     Objective:   Patient Vitals for the past 24 hrs:   BP Temp Temp src Pulse Resp SpO2   02/15/23 0618 (!) 83/55 -- -- -- -- --   02/15/23 0543 104/59 98.5  F (36.9  C) Temporal 65 14 98 %   02/15/23 0512 (!) 89/60 -- -- 74 -- --   02/15/23 0451 100/56 -- -- 67 -- --   02/15/23 0405 (!) 87/56 -- -- 65 -- --   02/15/23 0349 96/52 -- -- 78 -- --   02/15/23 0317 92/48 -- -- -- -- --   02/15/23 0259 (!) 82/44 -- -- -- -- --   02/15/23 0232 (!) 89/51 -- -- 56 -- --   02/15/23 0230 (!) 84/52 -- -- 51 -- --   02/15/23 0200 -- -- -- -- -- 97 %   02/15/23 0143 91/48 -- -- 56 -- 94 %   02/15/23 0122 (!) 85/48 -- -- -- -- --   02/15/23 0058 (!) 76/55 97.5  F (36.4  C) Temporal 78 -- --   02/15/23 0040 (!) 83/47 -- -- 64 -- --   02/15/23 0035 91/54 -- -- 63 -- --   02/15/23 0030 (!) 73/38 -- -- -- -- --   02/15/23 0007 (!) 83/49 -- -- -- -- --   02/14/23 1849 -- 97.2  F (36.2  C) -- 69 12 95 %   02/14/23 1846 97/55 -- -- 67 -- --   02/14/23 1202 100/47 98.6  F (37  C) Temporal 70 16 96 %   02/14/23 0843 96/51 98.5  F (36.9  C) Temporal 64 13 97 %   02/14/23 0745 -- -- -- 75 17 96 %      General: NAD, appears comfortable in " bed  CV: regular rate  Resp: Non labored breathing on room air  Abdomen: soft, nontender, non distended  : bocanegra with pink tinged urine. Stable compared to ~0100.   Extremities: warm, well-perfused, nontender, no BLE edema, SCDs in place    I/Os  (Yesterday // Since Midnight)  IVF 20cc // NR  Urine 1325cc // 500ml  Stool x1    New Labs/Imaging-   Results for orders placed or performed during the hospital encounter of 02/13/23 (from the past 24 hour(s))   Hemoglobin   Result Value Ref Range    Hemoglobin 10.0 (L) 11.7 - 15.7 g/dL   Extra Tube    Narrative    The following orders were created for panel order Extra Tube.  Procedure                               Abnormality         Status                     ---------                               -----------         ------                     Extra Green Top (Lithium...[[id number]]                      Final result                 Please view results for these tests on the individual orders.   Extra Green Top (Lithium Heparin) Tube   Result Value Ref Range    Hold Specimen JIC    Platelet count   Result Value Ref Range    Platelet Count 173 150 - 450 10e3/uL     Assessment: Cordell Donaldson is a 72 year old who is POD#2 s/p placement of interstitial needles, 1/5 brachytherapy. Patient doing well this morning. Ready for final 2 treatments today.       Plan:     Disease:Recurrent cervical cancer. S/p 6C cisplatin/taxol/avastin/pembro S/p external beam radiation. Recurrent breast cancer.    # Recurrent Cervical Cancer  # Recurrent Breast Cancer  # Radiation Therapy/Brachytherapy  - patient s/p 3/5 treatments. Plan for final 2 treatments today NPO at 0800 in anticipation of this.   - for Breast cancer: plans to begin letrozole in Mar 2023   - pain: tylenol, ibuprofen, oxycodone prn. Epidural in place. Pain well controlled  - bocanegra to remain in place for duration of patient brachytherapy    # Rapid Response  - Called by evening RN due to patient hypotension following  epidural bolus. Patient evaluated and asymptomatic at time of rapid. Remains asymptomatic this morning  and is hemodynamically stable. Blood pressures continue to be in range/at baseline for patient    Chronic conditions    Atrial fibrillation  Hyperlipidemia  - PTA metoprolol. ASA [HELD)    Hypothyroidism   - Continue Synthroid 50  - TSH 73.7 PTA > 35.5. Free T4 0.37.     Dispo: Plan for discharge home at completion of treatments      Shannan Mora DO, MS  [facility]  Gynecology Oncology Resident, PGY-2  02/15/2023 6:25 AM    To reach the GYNECOLOGY ONCOLOGY team for this patient, please page [phone]    The patient was seen and examined with the resident, the labs and vital signs were reviewed. The medical care plan outlined above was provided under my directives and direct supervision.     Colette Ng MD, MPH  Gynecologic Oncology

## 2023-02-15 NOTE — PROGRESS NOTES
Paged re: low BP following epidural bolus. BP faviola to 83/49 following bolus, asymptomatic. Patient with intermittent MAPs <65, currently 64 mmHg at bedside. Patient remained asymptomatic throughoutt time of this writer's evaluation. Agreed w/ teams plan for IV fluid bolus. Following 250mL bolus, MAP galilea to 80. Discussed w/ bedside RN that we can decrease epidural rate if persistent hypotension and to page if this occurs. Will continue rate of 7 mL/hr given patient's pain, volume responsiveness, and stability on this rate for prior several days.     Bert Leos MD  Anesthesiology PGY4

## 2023-02-15 NOTE — PLAN OF CARE
Goal Outcome Evaluation: [id number]      Plan of Care Reviewed With: patient    Overall Patient Progress: no change    Outcome Evaluation:   Respiratory: WDL RA, Denies SOB  Cardiac: WDL hypotensive down to 70/30s after bolus of epidural, Rapid called due to BP team came and assessed want MAP 60-65 monitored all night, LR bolus given pending other bolus is MAP drops to less than 60. Denies Chest pain.  Neuro: A&Ox4. Calls appropriately.   GI/: Lozano in place with pink tinged to leanne output team aware, last BM 2/12   Diet: NPO at 0800  Skin: intact needles in place   Lines/drains: port TKO  Pain: PRN tylenol given for pain   Labs: reviewed   Activity:bedrest  Plan:  continue plan of care monitor BP

## 2023-02-15 NOTE — PROVIDER NOTIFICATION
02/15/23 0000   Call Information   Date of Call 02/15/23   Time of Call 0017   Name of person requesting the team [place]   Title of person requesting team RN   RRT Arrival time 0020   Time RRT ended 0050   Reason for call   Type of RRT Adult   Primary reason for call Cardiovascular   Cardiovascular SBP less than 90   Was patient transferred from the ED, ICU, or PACU within last 24 hours prior to RRT call? Yes  (PACU)   SBAR   Situation BP low 83/49, had a bolus by pain MD at 0000 in her epidural   Background history of cervical cancer. She completed complete external beam radiotherapy tomorrow on 1/31/23. per MD admission note   Notable History/Conditions Cancer;Cardiac   Assessment AOx4, AVSS except low BP, denies lightheadedness or dizziness, pain now more controlled since getting epidural bolus   Interventions Other (describe)  (having pt drink fluid, check freq blood pressures)   Patient Outcome   Patient Outcome Stabilized on unit   RRT Team   Attending/Primary/Covering Physician Gyn Onc   Date Attending Physician notified 02/15/23   Time Attending Physician notified 0017   Physician(s) Lay Matthews PA-C   Lead RN Miguelina Be   RT Terry ARMIJO   Post RRT Intervention Assessment   Post RRT Assessment Other (see comment)   Date Follow Up Done 02/15/23   Time Follow Up Done 0300   Comments Map has been staying above 60s, 1 bolus of 250 given        ----- Message from TAMMY Lindo sent at 1/28/2021  7:55 AM CST -----  Please call-lab work without serious concerns, wait on CT scan results.

## 2023-02-15 NOTE — PLAN OF CARE
Goal Outcome Evaluation: POD1 EUA, interstitial needles placed. A&Ox4, VSS on room air. Pain is controlled with an epidural and PO tylenol. On bedrest with HOB <15 degrees. Epidural dressing has bloody drainage that is unchanged, CDI. Lozano has adequate output but turned orange and is now red. Team aware. Passing gas, LBM prior to admit. Plan is to NPO at 0800 for needle removal tomorrow after 5th dose of radiation. Turns self, BLE elevated, Bruised tongue improving. BLE warm, denies LAST. On a regular diet.

## 2023-02-15 NOTE — PROVIDER NOTIFICATION
Paged anesthesia    MW 7418-1 7C    Aftab TELLEZ [id number]    Pt had a BP drop post bolus.  83/49.  Could you come assess the pt?  Thank you!    Dr. Leos attended to pt promptly

## 2023-02-15 NOTE — PROVIDER NOTIFICATION
MD notified     HR dipping to 49, there is not currently notifying parameters, we reset the machine the alarm at 45. Orders?  Thank you    MD not concerned about HR dipping down

## 2023-02-15 NOTE — LETTER
2/15/2023         RE: Cordell Donaldson  [address]  [facility] [address]        Dear Colleague,    Thank you for referring your patient, Cordell Donaldson, to the [facility] RADIATION ONCOLOGY. Please see a copy of my visit note below.    Interstitial brachytherapy fraction #4 out of 5.       Again, thank you for allowing me to participate in the care of your patient.        Sincerely,        Raymond Stockton MD

## 2023-02-15 NOTE — PROGRESS NOTES
Pt receiving brachytherapy, last day today and needles will be removed today in surgery. Pt wants to discharge later today if able. A&Ox4. BP has been soft with no issues this shift (116/74). Epidural running at 7ml/hr currently. Dry drainage to epidural site noted today. MD aware of pink/red tinged urine and not concerned due to needle placement near the bladder. Pt denies pain this shift. NPO for procedures today. Bm over night 2/15/23. Currently on bedrest and HOB no greater than 15 degrees. R port heparin locked.

## 2023-02-15 NOTE — PROVIDER NOTIFICATION
MD notified  1211    pt BP 83/49 pt just got epidural bolus, pt urine also pink tinged, please call to discuss     MD came and assessed pt and said BP ok and urine color is ok

## 2023-02-15 NOTE — ANESTHESIA CARE TRANSFER NOTE
Patient: Cordell Donaldson    Procedure: Procedure(s):  REMOVAL, INTERSTITIAL NEEDLE, AFTER TEMPORARY BRACHYTHERAPY       Diagnosis: Cervical cancer (H) [C53.9]  Diagnosis Additional Information: No value filed.    Anesthesia Type:   MAC     Note:    Oropharynx: oropharynx clear of all foreign objects and spontaneously breathing  Level of Consciousness: awake  Oxygen Supplementation: room air    Independent Airway: airway patency satisfactory and stable  Dentition: dentition unchanged  Vital Signs Stable: post-procedure vital signs reviewed and stable  Report to RN Given: handoff report given  Patient transferred to: PACU  Comments: Report called to receiving RN on 7C, care accepted, epidural removed in OR, Port saline-locked, VSS, pt awake and alert, on room air.  Handoff Report: Identifed the Patient, Identified the Reponsible Provider, Reviewed the pertinent medical history, Discussed the surgical course, Reviewed Intra-OP anesthesia mangement and issues during anesthesia, Set expectations for post-procedure period and Allowed opportunity for questions and acknowledgement of understanding      Vitals:  Vitals Value Taken Time   /81 02/15/23 1739   Temp     Pulse 57 02/15/23 1739   Resp     SpO2 95 % 02/15/23 1742   Vitals shown include unvalidated device data.    Electronically Signed By: TE Watt CRNA  February 15, 2023  5:43 PM

## 2023-02-15 NOTE — PROVIDER NOTIFICATION
MW 7418-1     Aftab TELLEZ [id number]    Additional juany blood in perineum noted after pt had a BM.  Scant to small amt.  Just wanted to FYI because of today's increased pain.  Thank you!

## 2023-02-15 NOTE — PROGRESS NOTES
Brief Interval Progress Note:    Was paged by bedside RN to discuss code status and regarding blood-tinged urine.    Discussed code status with Cordell. She prefers to be full code. Lozano catheter inspected, leanne colored urine visualized. Cordell reports she talked with Dr. Stockton about blood tinged urine and Dr. Stockton was not concerned. Likely related to needle proximity to bladder. Cordell is very motivated to discharge this evening following needle removal. Discussed discharge goals of ambulation and voiding spontaneously.    Pritesh Noriega MD  OB/GYN PGY-1  02/15/2023 11:21 AM

## 2023-02-15 NOTE — ANESTHESIA PREPROCEDURE EVALUATION
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      History of breast cancer     lumpectomy and radiation offerred chemo and patient declined at time but had oncogene test and low risk     Hyperlipidemia LDL goal < 160      Osteopenia      Paroxysmal A-fib (H)      Severe vaginal dysplasia       Past Surgical History:   Procedure Laterality Date     BIOPSY       BREAST SURGERY Left     lumpectomy left, did radiation, 5 yr of tamoxifen     COLONOSCOPY      repeat in      Colposcopy Cervix with Loop Electrode Conization and Lser to Vagina       COLPOSCOPY, BIOPSY, COMBINED  10/24/2012    Procedure: COMBINED COLPOSCOPY, BIOPSY;  Colposcopy, Biopsy of Cervix and Vagina, Ultrasound Guidance;  Surgeon: Colette Ng MD;  Location: [facility] OR     ENT SURGERY  2018    otoscelerosis, right side     HYSTERECTOMY, FRANCIA  2012    high grade cervical dysplasia, [place] [facility], Dr. Arciniega     INSERT INTERSTITIAL NEEDLE, ULTRASOUND GUIDED N/A 2023    Procedure: INSERTION, NEEDLE, WITH ULTRASOUND GUIDANCE, FOR INTERSTITIAL BRACHYTHERAPY;  Surgeon: Raymond Stockton MD;  Location: [facility] OR     INSERT PORT VASCULAR ACCESS Right 2022    Procedure: INSERTION, VASCULAR ACCESS PORT;  Surgeon: Donnie Elias MD;  Location: [facility] OR     IR CHEST PORT PLACEMENT > 5 YRS OF AGE  2022     NV LAP VAGINECTOMY, PARTIAL REMOVAL OF VAGINAL WALL  10/2013    upper vaginectomy for dysplasia, Dr. Megan Arciniega      No Known Allergies   Social History     Tobacco Use     Smoking status: Former     Packs/day: 0.50     Years: 15.00     Pack years: 7.50     Types: Cigarettes     Quit date:       Years since quittin.1     Smokeless tobacco: Never   Substance Use Topics     Alcohol use: Not Currently     Alcohol/week: 2.0 standard drinks     Types: 2 Standard drinks or equivalent per week     Comment: Socially       Wt Readings from Last 1 Encounters:   23 58.6 kg (129 lb 3 oz)        Anesthesia Evaluation   Pt has had prior anesthetic. Type: General and MAC.        ROS/MED HX  ENT/Pulmonary:  - neg pulmonary ROS     Neurologic:    (-) no CVA   Cardiovascular:     (+) -----dysrhythmias, a-fib,     METS/Exercise Tolerance: >4 METS    Hematologic:     (+) anemia,     Musculoskeletal:       GI/Hepatic:    (-) GERD   Renal/Genitourinary:       Endo:     (+) thyroid problem, hypothyroidism,     Psychiatric/Substance Use:       Infectious Disease:       Malignancy:   (+) Malignancy (Cervical),     Other:            Physical Exam    Airway        Mallampati: II   TM distance: > 3 FB    Mouth opening: > 3 cm    Respiratory Devices and Support         Dental       (+) Completely normal teeth      Cardiovascular          Rhythm and rate: regular and normal     Pulmonary           breath sounds clear to auscultation           OUTSIDE LABS:  CBC:   Lab Results   Component Value Date    WBC 2.4 (L) 2023    WBC 2.6 (L) 2022    HGB 10.0 (L) 2023    HGB 11.3 (L) 2023    HCT 34.1 (L) 2023    HCT 41.2 2022     2023     2023     BMP:   Lab Results   Component Value Date     (L) 2023     2022    POTASSIUM 4.6 2023    POTASSIUM 4.8 2022    CHLORIDE 96 (L) 2023    CHLORIDE 100 2022    CO2 28 2023    CO2 28 2022    BUN 9.4 2023    BUN 14.6 2022    CR 0.73 2023    CR 0.85 2023    GLC 95 2023    GLC 91 2023     COAGS: No results found for: PTT, INR, FIBR  POC:   Lab Results   Component Value Date     (H) 10/31/2012     HEPATIC:   Lab Results   Component  Value Date    ALBUMIN 3.8 02/13/2023    PROTTOTAL 5.4 (L) 02/13/2023    ALT 19 02/13/2023    AST 34 02/13/2023    ALKPHOS 27 (L) 02/13/2023    BILITOTAL 0.4 02/13/2023     OTHER:   Lab Results   Component Value Date    LACT 0.7 02/13/2023    LINDA 9.7 02/09/2023    MAG 1.9 02/09/2023    TSH 35.50 (H) 02/13/2023    T4 0.37 (L) 02/13/2023       Anesthesia Plan    ASA Status:  3   NPO Status:  NPO Appropriate    Anesthesia Type: MAC.     - Reason for MAC: straight local not clinically adequate      Maintenance: TIVA.        Consents    Anesthesia Plan(s) and associated risks, benefits, and realistic alternatives discussed. Questions answered and patient/representative(s) expressed understanding.    - Discussed:     - Discussed with:  Patient, Spouse         Postoperative Care    Pain management: IV analgesics, Multi-modal analgesia, Oral pain medications, Neuraxial analgesia.   PONV prophylaxis: Ondansetron (or other 5HT-3), Dexamethasone or Solumedrol, Background Propofol Infusion     Comments:    Other Comments: Plan to remove epidural post procedure                Tomi Krishna MD

## 2023-02-15 NOTE — PROGRESS NOTES
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 Regional Anesthesia/Analgesia  -Continuous Catheter Type/Site:  Epidural L3-4  Infusate: Bupivacaine 0.125%  Continuous Infusion at 7 mL/hr    Plan remove catheter today during OR to remove interstitial needles  Patient educated on postop -she should contact RN and wait for standby assist during first time to ambulate.    2. Anticoagulation   Last dose enoxaparin 40 mg given yesterday at 1053 am.  -Please contact Inpatient Pain Service before ordering or making any anticoagulation changes       3. Multimodal Analgesia  - per primary service PRN Tylenol, oxycodone and ibuprofen    Pain Service will follow until catheter removal    Discussed with attending anesthesiologist    Please Page the Pain Team Via Amcom: \"PAIN MANAGEMENT ACUTE INPATIENT/ [facility]\"    Halley Segovia, TE CNP  02/14/2023     Overnight Events: None    Tubes/Drains: Yes  Lozano catheter    Subjective:  I feel tender, but it's not painful.  Reports resolution of LLE coolness, notes RLE has decreased sensation and feels weaker with movement   Nausea: No  Vomiting: No  Pruritus: No  Symptoms of LAST: No    Pain Location:  perineum    Pain " "Intensity:    Pain at Rest: 0/10   Pain with Activity: 2-3/10  Satisfied with your level of pain control: Yes    Diet: Advance Diet as Tolerated: Regular Diet Adult  NPO for Medical/Clinical Reasons Except for: Meds    Relevant Labs:  Recent Labs   Lab Test 02/09/23  0932      BUN 9.4       Physical Exam:  Vitals: BP 96/51 (BP Location: Left arm)   Pulse 64   Temp 98.5  F (36.9  C) (Temporal)   Resp 13   Ht 1.6 m (5' 3\")   Wt 58.6 kg (129 lb 3 oz)   SpO2 97%   BMI 22.88 kg/m      Physical Exam:   Orientation:  Alert, oriented, and in no acute distress: Yes  Sedation: No    Motor Examination:  Bilateral LLE, + DP, normal (warm) to touch. LLE strength 5/5, RLE 4+/5 - able to dorsi/plantar flex, bend knee, weaker movement with abduction/adduction    Sensory Level:   Decrease in sensation: Yes    Catheter Site:   Catheter entry site is clean/dry/intact: Yes, scant red heme, and maroon staining under tegaderm dressing    Tender: No      Relevant Medications:  Current Pain Medications:  Medications related to Pain Management (From now, onward)    Start     Dose/Rate Route Frequency Ordered Stop    02/13/23 1348  ibuprofen (ADVIL/MOTRIN) tablet 400 mg         400 mg Oral EVERY 6 HOURS PRN 02/13/23 1349      02/13/23 1348  acetaminophen (TYLENOL) tablet 650 mg         650 mg Oral EVERY 4 HOURS PRN 02/13/23 1349      02/13/23 1336  oxyCODONE (ROXICODONE) tablet 5 mg         5 mg Oral EVERY 4 HOURS PRN 02/13/23 1337      02/13/23 0730  bupivacaine (MARCAINE) 0.125 % in sodium chloride 0.9 % 250 mL EPIDURAL Infusion         7 mL/hr  EPIDURAL CONTINUOUS 02/13/23 0721            Primary Service Contacted with Recommendations? No      Please see A&P for additional details of medical decision making.            Halley Segovia, TE CNP  02/15/2023     Overnight Events: hypotension and pink urine, rapid response called    Tubes/Drains: Yes  Lozano catheter.    Subjective:  I feel tired after last night's events  " "Reports adequate pain control overnight following epidural bolus, rating pain now at 0/10. States she'd never taken oxycodone before last night and it made her feel different, but that has worn off. Denies dizziness or lightheadedness. Eating pancakes and drinking fluids before going to radiation treatment this morning since she will be NPO after 8 am.  Nausea: No  Vomiting: No  Symptoms of LAST: No      Diet: NPO for Medical/Clinical Reasons Except for: Meds  Diet    Relevant Labs:  Recent Labs   Lab Test 02/14/23  0717 02/09/23  0932    152   BUN  --  9.4       Physical Exam:  Vitals: /74 (BP Location: Left arm)   Pulse 85   Temp 98.5  F (36.9  C) (Temporal)   Resp 14   Ht 1.6 m (5' 3\")   Wt 58.6 kg (129 lb 3 oz)   SpO2 98%   BMI 22.88 kg/m      Physical Exam:   Orientation:  Alert, oriented, and in no acute distress: Yes  Sedation: No    Motor Examination:  5/5 Full Strength in lower extremities: Yes    Catheter Site:   Catheter entry site is clean/dry/intact: Yes    Tender: No      Relevant Medications:  Current Pain Medications:  Medications related to Pain Management (From now, onward)    Start     Dose/Rate Route Frequency Ordered Stop    02/15/23 0000  acetaminophen (TYLENOL) 325 MG tablet         650 mg Oral EVERY 6 HOURS PRN 02/15/23 0831      02/13/23 1348  ibuprofen (ADVIL/MOTRIN) tablet 400 mg         400 mg Oral EVERY 6 HOURS PRN 02/13/23 1349      02/13/23 1348  acetaminophen (TYLENOL) tablet 650 mg         650 mg Oral EVERY 4 HOURS PRN 02/13/23 1349      02/13/23 1336  oxyCODONE (ROXICODONE) tablet 5 mg         5 mg Oral EVERY 4 HOURS PRN 02/13/23 1337      02/13/23 0730  bupivacaine (MARCAINE) 0.125 % in sodium chloride 0.9 % 250 mL EPIDURAL Infusion         7 mL/hr  EPIDURAL CONTINUOUS 02/13/23 0721            Primary Service Contacted with Recommendations? Yes      Please see A&P for additional details of medical decision making.          "

## 2023-02-15 NOTE — PROGRESS NOTES
"Pain Service Bolus Note  [facility]  Date: 02/15/2023       Patient Name: Cordell Donaldson  MRN: [id number]  Age: 72 year old  Sex: female      Assessment:  Cordell Donaldson was evaluated this evening, experiencing moderate/severe vaginal pain (7/10). B/L T9 level on exam, but appears to be sparing sacral dermatomes. Following negative aspiration, a bolus of 4 mL 0.125% bupivacaine was administered to the lumbar epidural catheter(s) at 12:00 AM. No symptoms of local anesthetic systemic toxicity (LAST) were observed. Remained with and assessed patient for 30 min post-injection (BP, P and MAP stable). Bedside RN aware of need to continue BP, P and MAP monitoring Q 10 min for an additional 30 min. Please contact the Inpatient Pain Service if any of the following: patient experiencing any untoward effects, SBP< 90, P < 50 or > 120, MAP < 60.       Please Page the Pain Team Via Amcom: \"PAIN MANAGEMENT ACUTE INPATIENT/ [facility]\"    "

## 2023-02-15 NOTE — LETTER
2/15/2023         RE: Cordell Donaldson  [address]  [facility] [address]        Dear Colleague,    Thank you for referring your patient, Cordell Donaldson, to the [facility] RADIATION ONCOLOGY. Please see a copy of my visit note below.    Interstitial treatment fraction 5 of 5 completed.       Again, thank you for allowing me to participate in the care of your patient.        Sincerely,        Raymond Stockton MD

## 2023-02-15 NOTE — CODE/RAPID RESPONSE
Rapid Response Team Note    Assessment   In assessment a rapid response was called on Cordell Donaldson due to hypotension. BP dropped to 83/49 likely caused by epidural bupivacaine bolus given to control severe pain.   Assessed patient at bedside:    Patient is fully oriented, denies chest pain, SoB or palpitation    Reports the severe pain she had is much better and would like to rest    Monitored BP every 5 minutes for about 30 minutes. MAP consistently above 63, sometimes into 70s. There was one time record of 59. Patient remained not tachycardic.     Plan   -  Plan was to give 500cc bolus to support volume but patient do not have IV access. Does have a port that is not accessed yet. Deferred bolus for now given acceptable MAP and asymptomatic stuatus. Please give the bolus if MAP drops less than 60.   -  The Gynecology and anesthesia team both came to the bedside and assessed the patient.  Anesthesia team aware of the decision and is planning to monitor.   -  Disposition: The patient will remain on the current unit. We will continue to monitor this patient closely.  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 (vs recurrent, metastatic cervical cancer) s/p EUA interstitial needle placement for brachytherapy  RRT called for hypotension   Admission Diagnosis:   Cervical cancer (H) [C53.9]  Cervical cancer (H) [C53.9]    Physical Exam   Temp: 97.2  F (36.2  C) Temp  Min: 97.2  F (36.2  C)  Max: 98.6  F (37  C)  Resp: 12 Resp  Min: 12  Max: 17  SpO2: 95 % SpO2  Min: 95 %  Max: 97 %  Pulse: 69 Pulse  Min: 64  Max: 75    No data recorded  BP: (!)  83/49 Systolic (24hrs), Av , Min:83 , Max:100   Diastolic (24hrs), Av, Min:47, Max:55     I/Os: I/O last 3 completed shifts:  In: 20 [I.V.:20]  Out: 1325 [Urine:1325]     Exam:   General: in no acute distress  Mental Status: AAOx4.  HEENT- Moist mucous membranes  Respiratory- stable and comfortable on RA  Cardiovascular- Regular rate and rhythm  GI: Soft, non-distended, non-tender, normal bowel sounds.      Significant Results and Procedures   Lactic Acid:   Recent Labs   Lab Test 23  1356   LACT 0.7     CBC:   Recent Labs   Lab Test 23  0717 23  0932 22  1056 22  0710   WBC  --  2.4* 2.6* 3.5*   HGB 10.0* 11.3* 13.5 13.0   HCT  --  34.1* 41.2 40.0    152 185 158        Sepsis Evaluation   The patient is not known to have an infection.  NO EVIDENCE OF SEPSIS at this time.  Vital sign, physical exam, and lab findings are due to likely epidural analgesic side effect .

## 2023-02-15 NOTE — OP NOTE
DIAGNOSIS: Recurrent cervical cancer  NAME OF THE OPERATION: Removal of HDR interstitial needles and template under MAC anesthesia   SURGEONS: Raymond Stockton MD Radiation Oncology   Assistant: Carla Boateng MD, Lauren Jones MD resident   ANESTHESIA: MAC  ESTIMATED BLOOD LOSS:  30 cc  COMPLICATIONS: None.   OPERATIVE PROCEDURE: The patient was brought to the operating room wearing TEDs and pneumo-boots and identified to be herself. The patient was placed under MAC anesthesia and ventilated with an oxygen mask . She was then placed in a frog legged position. The DuoDerm was removed from her inner thighs.  A total of 5 sutures were removed which held the HDR template and the obturator in place.  The template was removed with the interstitial needles and the vaginal cylinder from her perineal area.  Approximately 30cc of bleeding occurred.  Her vagina was packed and compression was applied and bleeding eventually stopped.  Epidural catheter was then removed by anesthesia.  The patient was taken to PACU in stable condition.  PLAN: The patient can be discharged per Gyn Onc team.  Follow with Radiation Oncology in 1 month.    Raymond Stockton MD

## 2023-02-15 NOTE — PLAN OF CARE
Back from PACU at 1810 for interstitial needle removal. Soft BP, not within notifying parameters. AOVSS on RA. HR irregular, known a fib. Denies pain and nausea. Bocanegra intact, pink/red urine. Waiting for orders to remove bocanegra/advance diet. Gyn Onc coming to talk to patient soon. Discharge medication picked up from discharge pharmacy by writer, currently in patient's med bin. Margaret BLANCO.

## 2023-02-15 NOTE — PROGRESS NOTES
Paged Pain team: [phone] M.W. Her pain is rated 7/10. It has been 2/10 all day. Gave PO oxy at 2300. Can she have a bolus? [place] TELLEZ [phone]

## 2023-02-15 NOTE — PROGRESS NOTES
Brief Interval Progress Note:    Was called to evaluate patient for blood tinged urine and hypotension following epidural bolus.    S:  Patient reports that she is feeling well at this time.  She denies having any lightheadedness or dizziness.  She describes earlier repositioning by daytime RN at patient request, but denies any other major movements since last treatment that could explain blood-tinged urine.  She was initially having increasing pain earlier this evening, but reports her pain is significantly better following oxycodone and epidural bolus by pain management team.  She reports that she feels very tired but otherwise has no other questions or concerns at this time.    O:  Patient Vitals for the past 24 hrs:   BP Temp Temp src Pulse Resp SpO2   02/15/23 0122 (!) 85/48 -- -- -- -- --   02/15/23 0058 (!) 76/55 97.5  F (36.4  C) Temporal 78 -- --   02/15/23 0040 (!) 83/47 -- -- 64 -- --   02/15/23 0035 91/54 -- -- 63 -- --   02/15/23 0007 (!) 83/49 -- -- -- -- --   02/14/23 1849 -- 97.2  F (36.2  C) -- 69 12 95 %   02/14/23 1846 97/55 -- -- 67 -- --   02/14/23 1202 100/47 98.6  F (37  C) Temporal 70 16 96 %   02/14/23 0843 96/51 98.5  F (36.9  C) Temporal 64 13 97 %   02/14/23 0745 -- -- -- 75 17 96 %        Gen: Tired appearing.  No acute distress  Cardiac: Regular rate and rhythm.  Resp: Lungs clear to auscultation bilaterally.  No wheezes.  Extremities: Warm to touch bilaterally.  No significant edema.  SCDs in place.      A/P:     Due to increase in patient pain, anesthesia team was consulted and epidural was bolused for additional pain management.  Shortly after this patient experienced episodes of hypotension.  RN called rapid response due to hypotension.  I presented to patient room once made aware rapid response was called, patient with no acute symptoms at this time.  Hypotension likely a result of epidural bolus.  I encouraged patient RN to page anesthesia team as according to parameters outlined  in their note.  On review of patient's vitals patient noted to have lower blood pressure at baseline.  Following my evaluation of the patient no acute concerns at this time.  Instructed patient on reasons to call nursing staff to room.  Encourage nursing staff to reach out should there be any additional concerns overnight.      Will monitor vitals closely overnight and plan to round on patient in the morning prior to next treatment.       Shannan Mora DO, MS  Obstetrics, Gynecology & Women's Health   Resident, PGY-2  02/15/2023 1:28 AM

## 2023-02-15 NOTE — PROGRESS NOTES
Brief Interval Progress Note:    Was called to evaluate vaginal bleeding post-interstitial needle removal.     On arrival Dr. Stockton had been holding pressure for > 5 minutes with one Kerlex role in the vagina and another pressed externally. The vaginal packing was removed and noted to be fully saturated. Minimal vaginal bleeding at the introitus but pulsatile bleeding on the left posterior labia majora was noted and pressure applied. After 1 minute of pressure the bleeding had ceased and the vaginal bleeding was scant. The patient was rolled for epidural removal and after this adjustment a final external exam found minimal further vaginal bleeding.     Meg Jones MD  Gynecologic Oncology, PGY-4  02/15/2023 5:46 PM

## 2023-02-16 ENCOUNTER — PATIENT OUTREACH (OUTPATIENT)
Dept: ONCOLOGY | Facility: CLINIC | Age: 73
End: 2023-02-16
Payer: COMMERCIAL

## 2023-02-16 ENCOUNTER — PATIENT OUTREACH (OUTPATIENT)
Dept: CARE COORDINATION | Facility: CLINIC | Age: 73
End: 2023-02-16
Payer: COMMERCIAL

## 2023-02-16 NOTE — DISCHARGE SUMMARY
Patient discharged home at 20:55PM, accompanied by her , discharge orders explained to the patient, patient understood her discharge orders and instructions.

## 2023-02-16 NOTE — PROGRESS NOTES
Brief Interval Progress Note:    In to see patient following needles removal, pt requesting discharge home    S:  Patient reports that she is doing really well.  Her pain is well controlled.  She is looking forward to eating some broth.  She denies any nausea or vomiting.  She has questions about removal of Lozano, de-accesing port, and discharging home.  She has no other complaints at this time and denies lightheadedness or dizziness, chest pain or shortness of breath.  She has ambulated.     O:  Patient Vitals for the past 24 hrs:   BP Temp Temp src Pulse Resp SpO2   02/15/23 2036 124/77 -- -- -- -- --   02/15/23 2020 114/79 -- -- 79 -- --   02/15/23 1840 113/68 -- -- 72 15 99 %   02/15/23 1811 99/46 -- -- 67 -- --   02/15/23 1808 100/61 97  F (36.1  C) Oral 65 16 99 %   02/15/23 1745 117/81 -- -- 58 16 96 %   02/15/23 1730 (!) 79/60 97.4  F (36.3  C) -- -- -- 97 %   02/15/23 1500 108/65 98.5  F (36.9  C) Oral 76 14 98 %   02/15/23 0730 116/74 -- -- 85 -- --   02/15/23 0635 (!) 88/54 -- -- 75 -- --   02/15/23 0618 (!) 83/55 -- -- -- -- --   02/15/23 0543 104/59 98.5  F (36.9  C) Temporal 65 14 98 %   02/15/23 0512 (!) 89/60 -- -- 74 -- --   02/15/23 0451 100/56 -- -- 67 -- --   02/15/23 0405 (!) 87/56 -- -- 65 -- --   02/15/23 0349 96/52 -- -- 78 -- --   02/15/23 0317 92/48 -- -- -- -- --   02/15/23 0259 (!) 82/44 -- -- -- -- --   02/15/23 0232 (!) 89/51 -- -- 56 -- --   02/15/23 0230 (!) 84/52 -- -- 51 -- --   02/15/23 0200 -- -- -- -- -- 97 %   02/15/23 0143 91/48 -- -- 56 -- 94 %   02/15/23 0122 (!) 85/48 -- -- -- -- --   02/15/23 0058 (!) 76/55 97.5  F (36.4  C) Temporal 78 -- --   02/15/23 0040 (!) 83/47 -- -- 64 -- --   02/15/23 0035 91/54 -- -- 63 -- --   02/15/23 0030 (!) 73/38 -- -- -- -- --   02/15/23 0007 (!) 83/49 -- -- -- -- --          Gen: Well-appearing.  No acute distress.   at bedside.  Abd: Soft, nontender, non distended  Cardiac: Regular rate, well-perfused  Resp: Clear to auscultation  bilaterally.  No wheezing.  : No signs of ongoing or active vaginal bleeding      A/P:     Reviewed with patient postoperative course including vaginal bleeding noted after removal of needles.  Explained that there is concern for possible bleeding intra-abdominally and our recommendation would be for the patient to stay overnight for observation of blood pressures/monitoring for any ongoing vaginal bleeding.  The patient expresses understanding of this, but reiterates her desire for discharge home tonight.  She is willing to stay for another 2 hours for serial blood pressure monitoring, and ensuring that she continues to meet all goals for discharge home (removal of Lozano and ability to void spontaneously), but would like to go home once all goals have been met.    We will plan to reevaluate patient once more prior to discharge home.  Contacted patient's nurse to start assisting with discharge process and for serial blood pressure monitoring.  Should patient have concerning vital signs such as significant hypotension, bradycardia, or signs of ongoing bleeding will again reiterate our desire for patient to be observed overnight.    Discussed the patient with Augie Overton, Gynecology Oncology Fellow who agrees with the above assessment/plan.       Shannan Mora DO, MS  [facility]  Gynecology Oncology Resident, PGY-2  02/15/2023 7:23 PM    To reach the GYNECOLOGY ONCOLOGY team for this patient, please page [phone] Addendum: Patient reassessed.  Again reiterated our recommendation for observation overnight.  Patient again stated her understanding of our concerns but that she would like to discharge home.  Blood pressures since last evaluation are within normal limit.  Patient continues to do well.  Plan for discharge home.

## 2023-02-16 NOTE — PROGRESS NOTES
[facility]    Background: Transitional Care Management program identified per system criteria and reviewed by [facility] team for possible outreach.    Assessment: Upon chart review, CCR Team member will not proceed with patient outreach related to this episode of Transitional Care Management program due to reason below:    Patient has active communication with a nurse, provider or care team for reason of post-hospital follow up plan.  Outreach call by CCR team not indicated to minimize duplicative efforts.     Plan: Transitional Care Management episode addressed appropriately per reason noted above.      Karen Aponte  Community Health Worker  [facility]  [phone]    *Connected Care Resource Team does NOT follow patient ongoing. Referrals are identified based on internal discharge reports and the outreach is to ensure patient has an understanding of their discharge instructions.

## 2023-02-16 NOTE — PLAN OF CARE
Lozano removed at 1910. Port deaccessed. Per Gyn Onc, notify when patient voids and continue to monitor BP.

## 2023-02-16 NOTE — DISCHARGE INSTRUCTIONS
GENERAL POST-OPERATIVE  PATIENT INSTRUCTIONS      FOLLOW-UP:    Call Surgeon if you have:  Significant vaginal bleeding (soaking more than 1 pad/hour)  Temperature greater than 100.4  Persistent nausea and vomiting  Severe uncontrolled pain  Redness, tenderness, or signs of infection (pain, swelling, redness, odor or green/yellow discharge around the site)  Difficulty breathing, headache or visual disturbances  Hives  Persistent dizziness or light-headedness  Extreme fatigue  Any other questions or concerns you may have after discharge    In an emergency, call 911 or go to an Emergency Department at a nearby hospital    DIET:  There are no dietary restrictions.  You may eat any foods that you can tolerate unless instructed otherwise.  It is a good idea to eat a high fiber diet and take in plenty of fluids to prevent constipation.  If you become constipated, please follow the instructions below.    ACTIVITY:  You are encouraged to cough, deep breath and use your incentive spirometer if you were given one, every 15-30 minutes when awake.  This will help prevent respiratory complications and low grade fevers post-operatively.     1.  No heavy lifting >20lbs or strenuous exercise for six-eight weeks.  No exercise in which you are using core muscles (yoga, pilates, swimming, weight lifting)  2.  You may walk as much as you wish.  You are encouraged to increase your activity each day after surgery.  Stairs are okay.   3.  Nothing per vagina for eight weeks.  No tampons, no intercourse, no douching.  You can expect some light vaginal spotting and discharge for up to six weeks.  If bleeding becomes heavy, please contact the office.     MEDICATIONS:  Try to take narcotic medications and anti-inflammatory medications, such as tylenol, ibuprofen, naprosyn, etc., with food.  This will minimize stomach upset from the medication.  Should you develop nausea and vomiting from the pain medication, or develop a rash, please  discontinue the medication and contact your physician.  You should not drive, make important decisions, or operate machinery when taking narcotic pain medication.    QUESTIONS:  Please feel free to call your physician or the hospital  if you have any questions, and they will be glad to assist you.

## 2023-02-16 NOTE — ANESTHESIA POSTPROCEDURE EVALUATION
Patient: Cordell Donaldson    Procedure: Procedure(s):  REMOVAL, INTERSTITIAL NEEDLE, AFTER TEMPORARY BRACHYTHERAPY       Anesthesia Type:  MAC    Note:  Disposition: Inpatient   Postop Pain Control: Uneventful            Sign Out: Well controlled pain   PONV: No   Neuro/Psych: Uneventful            Sign Out: Acceptable/Baseline neuro status   Airway/Respiratory: Uneventful            Sign Out: Acceptable/Baseline resp. status   CV/Hemodynamics: Uneventful            Sign Out: Acceptable CV status; No obvious hypovolemia; No obvious fluid overload   Other NRE: NONE   DID A NON-ROUTINE EVENT OCCUR? No           Last vitals:  Vitals Value Taken Time   /81 02/15/23 1745   Temp     Pulse 58 02/15/23 1745   Resp 16 02/15/23 1745   SpO2 96 % 02/15/23 1746   Vitals shown include unvalidated device data.    Electronically Signed By: TAMICA DAMON MD  February 15, 2023  6:28 PM

## 2023-02-16 NOTE — PROGRESS NOTES
Patient discharged on 2/15/23, please follow up per TCM workflow.    George López on 2/16/2023 at 8:59 AM

## 2023-02-16 NOTE — OR NURSING
Patient arrived to PACU at 1730, VSS, denies pain/nausea. Patient transported to  by writer and student nurse.  Report given directly to floor by SABA Escamilla.

## 2023-02-23 DIAGNOSIS — R19.7 DIARRHEA, UNSPECIFIED TYPE: Primary | ICD-10-CM

## 2023-02-23 PROCEDURE — 87493 C DIFF AMPLIFIED PROBE: CPT | Performed by: RADIOLOGY

## 2023-02-24 ENCOUNTER — ONCOLOGY VISIT (OUTPATIENT)
Dept: RADIATION ONCOLOGY | Facility: CLINIC | Age: 73
End: 2023-02-24
Payer: COMMERCIAL

## 2023-02-24 NOTE — LETTER
2/24/2023     RE: Cordell Donaldson  [address]  [facility] [address]    Dear Colleague,    Thank you for referring your patient, Cordell Donaldson, to the [facility] RADIATION ONCOLOGY. Please see a copy of my visit note below.    No notes on file    Again, thank you for allowing me to participate in the care of your patient.        Sincerely,        Raymond Stockton MD

## 2023-02-25 NOTE — PROCEDURES
Radiotherapy Treatment Summary          Date of Report: 2023     PATIENT: CATA SALAZAR  MEDICAL RECORD NO: [id number]  : 1950     DIAGNOSIS: C53.1 Malignant neoplasm of exocervix  INTENT OF RADIOTHERAPY: Cure  PATHOLOGY:    Squamous cell carcinoma                               STAGE: recurrent   CONCURRENT SYSTEMIC THERAPY:  None          Details of the treatments summarized below are found in records kept in the Department of Radiation Oncology at [facility].     Treatment Summary:  Radiation Oncology - Course: 1 Protocol:   Treatment Site Current Dose Modality From To Elapsed Days Fx.  1 PA and Pelvis  4,500 cGy 06 X 12/28/2022  2023  34 25  1 Vag Cuff  2,500 cGy   2023  2/15/2023   2  5            Dose per Fraction: 180 cGy/200 cGy (SIB) during EBRT; 500 cGy during brachytherapy     Total Dose:      7000 cGy        COMMENTS:       Ms. Salazar is a 73 yo female with a recurrent cervical cancer in the pelvis, status post   previous hysterectomy. Briefly, she has a long standing history of abnormal Pap smear with positive   HPV and ultimately opted for definitive surgery and underwent laparoscopic converted to laparotomy with FRANCIA   for presumed FIGO IA1 cancer with Dr. Megan Arciniega in [facility] on 2012. Hysterectomy   specimen showed NGUYỄN III, HSIL, but no invasive cancer was found. She had an upper vaginectomy with Dr. Arciniega on 10/2/2013 due to abnormal vaginal apex biopsy . 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 Her subsequent Pap had been negative for intraepithelial lesion or   malignancy, with last on 2020.     In 2022, she developed urinary frequency and feeling of pressure in her pelvis/bladder. She also noted   a firm mass in her vagina. A pelvic MRI on 2022 demonstrated a centrally hypoenhancing heterogenous   midline pelvic mass along the superior margin of the vaginal vault measuring 8.5 x 6.7 x  8.1 cm, abutting the   bladder and indenting the rectum but without definite invasion. There were multiple enlarged lymph nodes with   evidence of extracapsular extension. CT guided biopsy showed squamous cell carcinoma, poorly differentiated,   HPV associated. PET/CT scan also revealed multiple pelvic lymph nodes, with the largest on the left, up to 3.3   cm with max SUV of 12.2. Node at the aortic bifurcation measured 2.9 x 1.6 cm with max SUV of 1.9 x 1.7 cm.     She began Cisplatin/Taxol/Avastin with Pembrolizumab added after starting cycle 3. Repeat PET/CT on   9/22/2022 after 3 cycles demonstrating significant response in both the cervical mass and the adenopathy. She   continued with chemo for a total of 6 cycles with Cisplatin changed to Carboplatin due to Tinnitus and Pacliaxel   dose reduced due to neuropathy. PET/CT scan on 12/5/2022 showed continued partial response in the cervical   mass and resolution of the FDG uptake in the lymph nodes.      Ms. Donaldson was then referred to us for consolidation/salvage radiation therapy. She received 4500 cGy in 25   fractions to the pelvis and para-aortic chain with SIB of 5000 cGy to previously involved brenden region. She   completed this on 1/31/2022. She developed mild fatigue, diarrhea and radiation dermatitis. Overall, she   tolerated the treatment well. During the treatment, she was noted to have increased abdominal girth on cone   beam CT. This was attributed to bowel gas. An adaptive plan was completed, and dosimetry was felt reasonable   without the need to replan.      Her follow up MRI after external beam radiation showed minimal tumor regression. Additionally, substantial   pelvic fluid was seen. This was further evaluated with a CT of the abdomen and pelvis with finding of moderate   ascites, simple fluid signal, suggestive of third spacing rather than a result of peritoneal carcinomatosis. This   finding was discussed with Dr. Raygoza and Anesthesiology.  It was deemed appropriate to proceed with the   planned brachytherapy boost.      Her interstitial brachytherapy was uneventful. A shorter 12 cm obturator was used. Epidural adequately   controlled her pain. A total of 22 needles were inserted. She received 2500 cGy in 5 fractions given over 3 days.   A slightly higher dose was chosen due to significant residual tumor.     Of note, Ms. Donaldson has a history of left breast cancer treated with lumpectomy and radiation therapy in 2003   followed by 5 years of Tamoxifen. Her staging PET identified a pectoral node. Ultimately, biopsy of this was   consistent with grade 2 invasive ductal carcinoma, %, IN negative, HER2 nonamplified. Dr. Montana   recommended treatment with chemotherapy per Gyn Onc with imaging follow up.                   ED visits/hospitalizations: She received interstitial brachytherapy as an inpatient.      Missed treatments: None     Acute Toxicity Profile by CTC v5.0:  Fatigue: Grade 1: Fatigue relieved by rest  Diarrhea: Grade 1: Increase of <4 stools per day over baseline; mild increase in ostomy output compared to baseline  Urinary frequency: Grade 0: No toxicity  Urinary tract pain: Grade 0: No toxicity  Urinary urgency: Grade 0: No toxicity  Dermatitis: Grade 1: Faint erythema or dry desquamation        PAIN MANAGEMENT:          None required during external beam radiotherapy  Epidural analgesia during interstitial brachytherapy                      FOLLOW UP PLAN:      1. Follow up in 2-3 weeks in our department  2. Follow up with Dr. Raygoza and Dr. Montana as scheduled.                        Staff Physician: Raymond Stockton M.D.     CC:   MD Angela Candelaria MD              Radiation Oncology:  [facility] [address] [place] [address]

## 2023-02-28 NOTE — PROGRESS NOTES
Department of Therapeutic Radiology--Radiation Oncology                   [place] [address]  [address] [place]  [address]  Office:  [phone]  Fax:  [phone]   Radiation Oncology Clinic  [address] [place] [address]  Phone:  [phone]  Fax:  [phone]     RE: Cordell Donaldson : 1950   MRN: [id number] MCKINLEY: 3/1/2023     OUTPATIENT VISIT NOTE       DIAGNOSIS: Recurrent cervical cancer vs. new primary vaginal cancer with bulky adnenopathy    AREAS TREATED: Pelvis and paraoartic chain        DOSE:  4500 cGy to the pelvis and paraortic nodes with SIB of 5000 cGy to the previously invovled nodes.     This is followed by an interstitial brachytherapy boost of 2500 cGy in 5 fractions given BID.    TYPES OF RADIATION GIVEN:   1. Tomotherapy  2. HDR interstitial brachytherapy.     INTERVAL SINCE COMPLETION OF RADIATION THERAPY: 2 weeks (she completed the last dose of brachytherapy on 2/15/2023)    SUBJECTIVE:  Ms. Donaldson is a 71 yo female with a recurrent cervical cancer in the pelvis, status post previous hysterectomy. Briefly, she has a long standing history of abnormal Pap smear with positive   HPV and ultimately opted for definitive surgery and underwent laparoscopic converted to laparotomy with FRANCIA for presumed FIGO IA1 cancer with Dr. Megan Arciniega in [facility] on 2012. Hysterectomy specimen showed NGUYỄN III, HSIL, but no invasive cancer was found. She had an upper vaginectomy with Dr. Arciniega on 10/2/2013 due to abnormal vaginal apex biopsy . 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 Her subsequent Pap had been negative for intraepithelial lesion or malignancy, with last on 2020.     In 2022, she developed urinary frequency and feeling of pressure in her pelvis/bladder. She also noted a firm mass in her vagina. A pelvic MRI on 2022 demonstrated a centrally hypoenhancing heterogenous midline pelvic mass  along the superior margin of the vaginal vault measuring 8.5 x 6.7 x 8.1 cm, abutting the bladder and indenting the rectum but without definite invasion. There were multiple enlarged lymph nodes with evidence of extracapsular extension. 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 PET/CT scan also revealed multiple pelvic lymph nodes, with the largest on the left, up to 3.3 cm with max SUV of 12.2. Node at the aortic bifurcation measured 2.9 x 1.6 cm with max SUV of 1.9 x 1.7 cm.     She began Cisplatin/Taxol/Avastin with Pembrolizumab added after starting cycle 3. Repeat PET/CT on   9/22/2022 after 3 cycles demonstrating significant response in both the cervical mass and the adenopathy. She continued with chemo for a total of 6 cycles with Cisplatin changed to Carboplatin due to Tinnitus and Pacliaxel dose reduced due to neuropathy. PET/CT scan on 12/5/2022 showed continued partial response in the cervical mass and resolution of the FDG uptake in the lymph nodes.      Ms. Donaldson was then referred to us for consolidation/salvage radiation therapy. She received 4500 cGy in 25 fractions to the pelvis and para-aortic chain with SIB of 5000 cGy to previously involved brenden region. She completed this on 1/31/2022. She developed mild fatigue, diarrhea and radiation dermatitis. Overall, she tolerated the treatment well. During the treatment, she was noted to have increased abdominal girth on cone beam CT. This was attributed to bowel gas. An adaptive plan was completed, and dosimetry was felt reasonable without the need to replan.      Her follow up MRI after external beam radiation showed minimal tumor regression. Additionally, substantial pelvic fluid was seen. This was further evaluated with a CT of the abdomen and pelvis with finding of moderate ascites, simple fluid signal, suggestive of third spacing rather than a result of peritoneal carcinomatosis. This finding was  "discussed with Dr. Raygoza and Anesthesiology. It was deemed appropriate to proceed with the planned brachytherapy boost.      Her interstitial brachytherapy was uneventful. A shorter 12 cm obturator was used. Epidural adequately   controlled her pain. A total of 22 needles were inserted. She received 2500 cGy in 5 fractions given over 3 days. A slightly higher dose was chosen due to significant residual tumor.     Of note, Ms. Donaldson has a history of left breast cancer treated with lumpectomy and radiation therapy in 2003 followed by 5 years of Tamoxifen. Her staging PET identified a pectoral node. Ultimately, biopsy of this was consistent with grade 2 invasive ductal carcinoma, %, CT negative, HER2 nonamplified. Dr. Montana recommended treatment with chemotherapy per Gyn Onc with imaging follow up.     Ms. Donaldson is here today for a routine follow up visit. She complains of significant fatigue. She did have worsening diarrhea since completing the brachytherapy. We did check C diff, which was negative. She has since been taking a bit more Imodium, which seems to eliminate the \"explosive\" diarrhea. She has bouts of diarrhea twice a day, which she feels is improved and manageable. She has also noticed vaginal discharge that is foul odor. She denies any blood in her discharge. She continues to feel her abdomen is \"bloated\" and \"swollen\", though this is not worse. She denies any lower extremity swelling.     OBJECTIVE:   /76   Pulse (!) 124   Wt 59 kg (130 lb)   LMP  (LMP Unknown)   SpO2 98%   BMI 23.03 kg/m     Gen: Appears well, NAD  HEENT: alopecia  CV: well perfused  Resp: breathing comfortably on room air  Abd: slightly distended with subcutaneous edema  Pelvis: deferred     ASSESSMENT AND PLAN: In summary, Ms. Donaldson is a 71 yo female with a recurrent cervical cancer vs. new vaginal cancer in the setting of previous hysterectomy for microinvasive squamous cell carcinoma many years ago. She " received 6 cycles of systemic therapy (with Pembro added in the last 4 cycles) with significant response, although bulky disease was still present at the vaginal cuff. She is 1 month status post salvage radiation therapy. We addressed the following issues:    1. Fatigue: This is to be expected given the amount of chemo and radiation she received.     2. Diarrhea: She does not have C diff. Her symptoms are likely related to radiation therapy (vs. Some component of immune-mediated colitis). She is to continue with Imodium as needed and maintain hydration.     3. Ascites: Unlikely to be malignant based on fluid signal seen on the imaging and the histology of her tumor being squamous rather than serous. She has a low total protein with a low normal albumin. Dr. Raygoza will discuss possible paracentesis.     4. Lymphedema: she is at risk of developing lymphedema secondary to extensive brenden metastases and brenden irradiation. We will focus on recovery from acute toxicity for now and will refer her to lymphedema clinic at her next visit.    5. Vaginal discharge: likely from necrotic tumor. If she develops copious drainage and/or fever, she is to let us know.     6. Vaginal dilator: Will defer vaginal dilator use discussion to Dr. Raygoza when she will be seen in 2 weeks. Presumably, a pelvic exam will be performed then.     7. Follow up:  A): Follow up in our clinic in 3 months after PET/CT  B): Follow up with Dr. Raygoza as scheduled in March. Repeat PET on 6/8.  C): Follow up with Dr. Montana as scheduled in April.          Raymond Stockton M.D./Ph.D.  Radiation Oncologist   Department of Therapeutic Radiology   [facility]  Phone: [phone]           35 minutes were spent on the date of the encounter doing chart review, history and exam, documentation and further activities as noted above.           Raymond Stockton MD

## 2023-03-01 ENCOUNTER — OFFICE VISIT (OUTPATIENT)
Dept: RADIATION ONCOLOGY | Facility: CLINIC | Age: 73
End: 2023-03-01
Attending: RADIOLOGY
Payer: COMMERCIAL

## 2023-03-01 VITALS
OXYGEN SATURATION: 98 % | BODY MASS INDEX: 23.03 KG/M2 | DIASTOLIC BLOOD PRESSURE: 76 MMHG | HEART RATE: 124 BPM | WEIGHT: 130 LBS | SYSTOLIC BLOOD PRESSURE: 122 MMHG

## 2023-03-01 DIAGNOSIS — C53.9 RECURRENT CERVICAL CANCER (H): Primary | ICD-10-CM

## 2023-03-01 PROCEDURE — G0463 HOSPITAL OUTPT CLINIC VISIT: HCPCS | Performed by: RADIOLOGY

## 2023-03-01 PROCEDURE — 99024 POSTOP FOLLOW-UP VISIT: CPT | Performed by: RADIOLOGY

## 2023-03-01 RX ORDER — UBIDECARENONE 30 MG
1 CAPSULE ORAL DAILY
COMMUNITY

## 2023-03-01 NOTE — PROGRESS NOTES
FOLLOW-UP VISIT    Patient Name: Cordell Donaldson      : 1950     Age: 72 year old        ______________________________________________________________________________     Chief Complaint   Patient presents with     Cancer     Radiation follow up     /76   Pulse (!) 124   Wt 59 kg (130 lb)   LMP  (LMP Unknown)   SpO2 98%   BMI 23.03 kg/m       Date Radiation Completed: HDR: 23 PAC& GYN MRI, F/U w JY, interstitial brachytherapy 23-2/15/23     Pain  Denies  At this time.  Has some Rt pelvic cramping type pain that is relieved with Tylenol ES 1 tab q 5-6 hrs.     Labs  Other Labs: No    Imaging  None    Diarrhea: 1- Abdominal cramping: two or less soft or liquid bowel movements per day. Typical day episode in the am and pm. 1st episode full dose then after that 1/2 dose of imodium.    Constipation: 0- None    Nausea: 0- None    Vomitin- No vomiting    Genitourinary system: 0- Normal    Dysuria: 0- None    Vaginal dilator use: Has vaginal dilator but not using it at this time. Vaginal discharge started , wearing a pad. Has an odor but does not feel it is r/t an infection. No fever.    Other Appointments: No    Residual Radiation side effect: Energy getting better slowly. Napping daily.      Additional Instructions:     Nurse face-to-face time: Level 3:  10 min face to face time

## 2023-03-01 NOTE — LETTER
3/1/2023     RE: Cordell Donaldson  [address]  [facility] [address]    Dear Colleague,    Thank you for referring your patient, Cordell Donaldson, to the [facility] RADIATION ONCOLOGY. Please see a copy of my visit note below.    Department of Therapeutic Radiology--Radiation Oncology                   [place] [address]  [address] [place]  [address]  Office:  [phone]  Fax:  [phone]   Radiation Oncology Clinic  [address] [place] [address]  Phone:  [phone]  Fax:  [phone]     RE: Cordell Donaldson : 1950   MRN: [id number] MCKINLEY: 3/1/2023     OUTPATIENT VISIT NOTE       DIAGNOSIS: Recurrent cervical cancer vs. new primary vaginal cancer with bulky adnenopathy    AREAS TREATED: Pelvis and paraoartic chain        DOSE:  4500 cGy to the pelvis and paraortic nodes with SIB of 5000 cGy to the previously invovled nodes.     This is followed by an interstitial brachytherapy boost of 2500 cGy in 5 fractions given BID.    TYPES OF RADIATION GIVEN:   1. Tomotherapy  2. HDR interstitial brachytherapy.     INTERVAL SINCE COMPLETION OF RADIATION THERAPY: 2 weeks (she completed the last dose of brachytherapy on 2/15/2023)    SUBJECTIVE:  Ms. Donaldson is a 71 yo female with a recurrent cervical cancer in the pelvis, status post previous hysterectomy. Briefly, she has a long standing history of abnormal Pap smear with positive   HPV and ultimately opted for definitive surgery and underwent laparoscopic converted to laparotomy with FRANCIA for presumed FIGO IA1 cancer with Dr. Megan Arciniega in [facility] on 2012. Hysterectomy specimen showed NGUYỄN III, HSIL, but no invasive cancer was found. She had an upper vaginectomy with Dr. Arciniega on 10/2/2013 due to abnormal vaginal apex biopsy . 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 Her subsequent Pap had been negative for intraepithelial lesion or malignancy, with  last on 1/2/2020.     In Feb 2022, she developed urinary frequency and feeling of pressure in her pelvis/bladder. She also noted a firm mass in her vagina. A pelvic MRI on 5/20/2022 demonstrated a centrally hypoenhancing heterogenous midline pelvic mass along the superior margin of the vaginal vault measuring 8.5 x 6.7 x 8.1 cm, abutting the bladder and indenting the rectum but without definite invasion. There were multiple enlarged lymph nodes with evidence of extracapsular extension. 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 PET/CT scan also revealed multiple pelvic lymph nodes, with the largest on the left, up to 3.3 cm with max SUV of 12.2. Node at the aortic bifurcation measured 2.9 x 1.6 cm with max SUV of 1.9 x 1.7 cm.     She began Cisplatin/Taxol/Avastin with Pembrolizumab added after starting cycle 3. Repeat PET/CT on   9/22/2022 after 3 cycles demonstrating significant response in both the cervical mass and the adenopathy. She continued with chemo for a total of 6 cycles with Cisplatin changed to Carboplatin due to Tinnitus and Pacliaxel dose reduced due to neuropathy. PET/CT scan on 12/5/2022 showed continued partial response in the cervical mass and resolution of the FDG uptake in the lymph nodes.      Ms. Donaldson was then referred to us for consolidation/salvage radiation therapy. She received 4500 cGy in 25 fractions to the pelvis and para-aortic chain with SIB of 5000 cGy to previously involved brenden region. She completed this on 1/31/2022. She developed mild fatigue, diarrhea and radiation dermatitis. Overall, she tolerated the treatment well. During the treatment, she was noted to have increased abdominal girth on cone beam CT. This was attributed to bowel gas. An adaptive plan was completed, and dosimetry was felt reasonable without the need to replan.      Her follow up MRI after external beam radiation showed minimal tumor regression. Additionally,  "substantial pelvic fluid was seen. This was further evaluated with a CT of the abdomen and pelvis with finding of moderate ascites, simple fluid signal, suggestive of third spacing rather than a result of peritoneal carcinomatosis. This finding was discussed with Dr. Raygoza and Anesthesiology. It was deemed appropriate to proceed with the planned brachytherapy boost.      Her interstitial brachytherapy was uneventful. A shorter 12 cm obturator was used. Epidural adequately   controlled her pain. A total of 22 needles were inserted. She received 2500 cGy in 5 fractions given over 3 days. A slightly higher dose was chosen due to significant residual tumor.     Of note, Ms. Donaldson has a history of left breast cancer treated with lumpectomy and radiation therapy in 2003 followed by 5 years of Tamoxifen. Her staging PET identified a pectoral node. Ultimately, biopsy of this was consistent with grade 2 invasive ductal carcinoma, %, PA negative, HER2 nonamplified. Dr. Montana recommended treatment with chemotherapy per Gyn Onc with imaging follow up.     Ms. Donaldson is here today for a routine follow up visit. She complains of significant fatigue. She did have worsening diarrhea since completing the brachytherapy. We did check C diff, which was negative. She has since been taking a bit more Imodium, which seems to eliminate the \"explosive\" diarrhea. She has bouts of diarrhea twice a day, which she feels is improved and manageable. She has also noticed vaginal discharge that is foul odor. She denies any blood in her discharge. She continues to feel her abdomen is \"bloated\" and \"swollen\", though this is not worse. She denies any lower extremity swelling.     OBJECTIVE:   /76   Pulse (!) 124   Wt 59 kg (130 lb)   LMP  (LMP Unknown)   SpO2 98%   BMI 23.03 kg/m     Gen: Appears well, NAD  HEENT: alopecia  CV: well perfused  Resp: breathing comfortably on room air  Abd: slightly distended with subcutaneous " edema  Pelvis: deferred     ASSESSMENT AND PLAN: In summary, Ms. Donaldson is a 73 yo female with a recurrent cervical cancer vs. new vaginal cancer in the setting of previous hysterectomy for microinvasive squamous cell carcinoma many years ago. She received 6 cycles of systemic therapy (with Pembro added in the last 4 cycles) with significant response, although bulky disease was still present at the vaginal cuff. She is 1 month status post salvage radiation therapy. We addressed the following issues:    1. Fatigue: This is to be expected given the amount of chemo and radiation she received.     2. Diarrhea: She does not have C diff. Her symptoms are likely related to radiation therapy (vs. Some component of immune-mediated colitis). She is to continue with Imodium as needed and maintain hydration.     3. Ascites: Unlikely to be malignant based on fluid signal seen on the imaging and the histology of her tumor being squamous rather than serous. She has a low total protein with a low normal albumin. Dr. Raygoza will discuss possible paracentesis.     4. Lymphedema: she is at risk of developing lymphedema secondary to extensive brenden metastases and brenden irradiation. We will focus on recovery from acute toxicity for now and will refer her to lymphedema clinic at her next visit.    5. Vaginal discharge: likely from necrotic tumor. If she develops copious drainage and/or fever, she is to let us know.     6. Vaginal dilator: Will defer vaginal dilator use discussion to Dr. Raygoza when she will be seen in 2 weeks. Presumably, a pelvic exam will be performed then.     7. Follow up:  A): Follow up in our clinic in 3 months after PET/CT  B): Follow up with Dr. Raygoza as scheduled in March. Repeat PET on 6/8.  C): Follow up with Dr. Montana as scheduled in April.          Raymond Stockton M.D./Ph.D.  Radiation Oncologist   Department of Therapeutic Radiology   [facility]  Phone: [phone]           35 minutes  were spent on the date of the encounter doing chart review, history and exam, documentation and further activities as noted above.           Raymond Stockton MD    FOLLOW-UP VISIT    Patient Name: Cordell Donaldson      : 1950     Age: 72 year old        ______________________________________________________________________________     Chief Complaint   Patient presents with     Cancer     Radiation follow up     /76   Pulse (!) 124   Wt 59 kg (130 lb)   LMP  (LMP Unknown)   SpO2 98%   BMI 23.03 kg/m       Date Radiation Completed: HDR: 23 PAC& GYN MRI, F/U w JY, interstitial brachytherapy 23-2/15/23     Pain  Denies  At this time.  Has some Rt pelvic cramping type pain that is relieved with Tylenol ES 1 tab q 5-6 hrs.     Labs  Other Labs: No    Imaging  None    Diarrhea: 1- Abdominal cramping: two or less soft or liquid bowel movements per day. Typical day episode in the am and pm. 1st episode full dose then after that 1/2 dose of imodium.    Constipation: 0- None    Nausea: 0- None    Vomitin- No vomiting    Genitourinary system: 0- Normal    Dysuria: 0- None    Vaginal dilator use: Has vaginal dilator but not using it at this time. Vaginal discharge started , wearing a pad. Has an odor but does not feel it is r/t an infection. No fever.    Other Appointments: No    Residual Radiation side effect: Energy getting better slowly. Napping daily.      Additional Instructions:     Nurse face-to-face time: Level 3:  10 min face to face time    Again, thank you for allowing me to participate in the care of your patient.      Sincerely,      Raymond Stockton MD

## 2023-03-13 NOTE — PATIENT INSTRUCTIONS
SCHEDULING:  -RTC in 3 months for post-radiation disease surveillance (already scheduled)  -PET/CT in 3 months (already scheduled).   -Consultation in the Cancer Rehabilitation clinic.  -IR port-a-cath removal.      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   History of vaginal HSIL (pre-cancer).  Squamous cell carcinoma involving a pelvic mass with associated lymphadenopathy, likely stage III vaginal cancer (vs recurrent, metastatic cervical cancer).   Treatment History:  -12/2012: Robotic-assisted laparoscopic lysis of adhesions (take-down of scar tissue), left ureterolysis (freeing of the left ureter) with  conversion to laparotomy, total abdominal hysterectomy (removal of uterus/cervix), and an upper  Vaginectomy (removal of the upper vagina).  -7/20/22-present: First-line therapy with IV cisplatin + paclitaxel + bevacizumab + pembrolizumab (added cycles 3+).   -12/28/22-2/15/23: First-line pelvic external beam radiation therapy then vaginal brachytherapy.       PLAN:  1) Squamous cell carcinoma: Will plan to monitor without any treatment at this time.   -Repeat PET/CT in June as scheduled.  -Follow-up at PET/CT to review results and for exam to determine disease response.    2) Cancer Rehabilitation Clinic:  [email or web address]        Hina Raygoza MD, MS, FACOG, FACS  3/14/2023  8:27 AM

## 2023-03-13 NOTE — PROGRESS NOTES
Follow-up Note on Referred Patient      Date: 3/14/2023        Referring Provider/Gynecologic Oncologist:  Megan Arciniega MD  [facility]   Phone [phone]  [phone]      Radiation Oncologist:   Raymond Stockton MD, PhD  [facility]      Medical Oncologist:  Angela Montana MD  [facility].      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 Post-therapy visit.      History of Present Illness:   Cordell Donaldson (she/her/hers) is a 72 year old initially referred to the Gynecologic Cancer Clinic at the [facility] on 2022 by gynecologic oncologist Dr. Arciniega and radiation oncologist Dr. Stockton for management of a pelvic mass due to recurrent cervical cancer vs new vaginal cancer. History as follows:     Breast Cancer History:  : Left breast cancer.  -Lumpectomy.  -Radiation therapy.    0971-2332: BRENNAN.     22: PET/CT to stage vaginal cancer (see below).   Mild soft tissue  thickening in the left retropectoral region with an SUV max of 2.7.  7/15/22: Invasive ductal carcinoma.   -Consultation with Dr. Montana. Recommendation to prioritize vaginal/recurrent cervix cancer. Plan to follow breast lesion with repeat MRI. Plan for endocrine therapy after completion of chemotherapy for vaginal/cervical cancer.       Cervical/Vaginal Dysplasia/Cancer History:  10/24/12:   -Endocervical curettings: Squamous carcinoma, invasion cannot be assessed.   -Ectocervical biopsies: HSIL (CIN3).   -Vaginal biopsy, posterior: HSIL (VaIN3).  2012: Robotic-assisted laparoscopic lysis of adhesions, left ureterolysis with  conversion to laparotomy, total abdominal hysterectomy, and an upper  Vaginectomy by gynecologic oncologist Dr. Arciniega.    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     7/22/13: Vaginal biopsy: HSIL, cannot exclude invasion.   -Consultation with gynecologic oncologist Dr. Johnson. Upper vaginectomy and CO2 laser ablation recommended, patient declined.   8050-6023: No vaginal dysplasia treatment.   5/20/22: Pelvic MRI: I have personally reviewed the MRI images.   Centrally hypoenhancing heterogeneous midline pelvic mass along  the superior margin of the vaginal vault measures 8.5 x 6.7 x 8.1 cm.  The mass abut the bladder and there is mucosal irregularity at the  site of abutment. The mass indents the  decompressed rectum but there is no definite invasion or mucosal  Irregularity.  6/9/22: CT-guided biopsy of pelvic mass: Poorly-differentiated squamous cell carcinoma, HPV-associated.   7/1/22: Staging PET/CT: Stage III vaginal cancer (vs recurrent, metastatic cervical cancer). I have personally reviewed the PET/CT images.     7/20/22, 8/10/22, 8/31/22,9/21/22, 10/26/22, 11/21/22: Cycles #1-6 of first-line chemotherapy with IV platinum + paclitaxel + bevacizumab 15 mg/kg + pembrolizumab 200 mg (added cycle 3) every 21 days.   -Cycles 1-4: Cisplatin 50 mg/m2, paclitaxel 175 mg/m2.   -Cycle 3:  Pembrolizumab added to all subsequent cycles due to PD-L1+ result per the KEYNOTE-826 regimen.  -9/22/22: PET/CT: Partial response. I have personally reviewed the PET/CT images.   -Discussed with radiation oncologist Dr. Stockton and at the gyn onc/radiation oncology tumor conference. Recommendation for additional chemotherapy prior to radiation therapy.   -Cycles 5+: Platinum changed to carboplatin due to tinnitus, and paclitaxel dose-reduced to 135 mg/m2 due to peripheral neuropathy.   -12/5/22: PET/CT: Partial response. I have personally reviewed the PET/CT images.   Diffuse uptake throughout the thyroid gland with SUV max 5.5, likely  Thyroiditis.  A few small pulmonary nodules  are  stable, including a 2 mm solid pulmonary nodule in the left upper  lobe. No new or enlarging pulmonary nodules.  The heart  remains enlarged. No significant pericardial effusion.   Prominent  precarinal lymph node without significant FDG uptake. No suspicious  lymphadenopathy in the mediastinum.  Decreased size of centrally hypodense, necrotic cervical mass, now  measuring 5.3 x 4.1 cm (previously 6.1 x 4.3 cm)  when measured similarly. There is resolution of previously seen  enhancing component along the right lateral wall and resolution of  previous hypermetabolic activity. There is also resolution of  previously seen hypermetabolic pelvic lymph nodes.  12/28/22-2/15/23: First-line pelvic external beam radiation therapy with total dose 4500 cGy in 25 fractions then vaginal cuff brachytherapy with total dose 2500 cGy in 5 fractions. No concurrent chemosensitization due to myelosuppression and decreased patient tolerance of therapy.       Genetic/Genomic/Molecular Testing History:  2006: Negative for germline BRCA1, BRCA2 pathogenic variants.     8/14/22 (specimen from 6/9/22): [facility].   -PD-L1+ (CPS 10)  -Mismatch repair deficient/microsatellite instability-high  -TMB high (53 mut/Mb)  -NTRK 1/2/3 fusion not detected.       Subjective:  Cordell returns to the gyn onc clinic today for her scheduled post-therapy visit, accompanied by her .   Cordell reports that the brachytherapy and hospital stay were rough.  She is just starting to get her diarrhea better controlled. She notes that it is better when she eats a bland diet. She is not taking any imodium, as she felt that this would cause increased bloating and sudden explosions. She finds it difficult to quantify how many bowel movements/day.  She has a bloating in the abdomen. This has improved since discontinuation of the imodium, but is still present. She notes it increases after eating. No pain associated with the bloating, and in fact is slowly  improving. Of note, she did develop ascites of unclear etiology after discontinuation of systemic therapy and during external beam radiation therapy.   She has stable peripheral neuropathy, which improves with activity. Neuropathy is limited to her feet.   Cordell continues to have vaginal drainage. She has to wear a pad because the amount of fluid is similar to a moderate period. She reports that it was initially malodorous, but now the odor has resolved. No fevers/chills.   Cordell continues to have fatigue. She feels like she wants to lie around and rest most of the day. She takes 1-2 naps most days. She is able to accomplish the necessary activities, although notes her house is not as clean as it normally is. She was able to get her taxes done. She will walk for a few blocks when the weather is nice.   She does not some sores on her bottom due to the diarrhea and the vaginal discharge. She is using a barrier cream. She reports that they are currently better.   Cordell's  does note an increase in depression. He has a therapist, but Cordell does not. Cordell finds functional therapy useful, and has not been pursuing this recently. She has previously seen palliative care, but did not find them helpful and does not want to return.       Review of Systems:    ROS: 10 point ROS neg other than the symptoms noted above in the HPI.       Past Medical History:  Past Medical History:   Diagnosis Date     Abnormal Pap smear     maybe 20 years ago     Cervical cancer (H)      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      History of breast cancer 2003    lumpectomy and radiation offerred chemo and patient declined at time but had oncogene test and low risk     Hyperlipidemia LDL goal < 160      Osteopenia      Paroxysmal A-fib (H)      Severe vaginal dysplasia        Past Surgical History:  Past Surgical History:   Procedure Laterality Date     BIOPSY       BREAST SURGERY Left 2003     lumpectomy left, did radiation, 5 yr of tamoxifen     COLONOSCOPY  2018    repeat in 2028     Colposcopy Cervix with Loop Electrode Conization and Lser to Vagina  2008     COLPOSCOPY, BIOPSY, COMBINED  10/24/2012    Procedure: COMBINED COLPOSCOPY, BIOPSY;  Colposcopy, Biopsy of Cervix and Vagina, Ultrasound Guidance;  Surgeon: Colette Ng MD;  Location: [facility] OR     ENT SURGERY  01/23/2018    otoscelerosis, right side     HYSTERECTOMY, FRANCIA  12/2012    high grade cervical dysplasia, [facility], Dr. Arciniega     INSERT INTERSTITIAL NEEDLE, ULTRASOUND GUIDED N/A 2/13/2023    Procedure: INSERTION, NEEDLE, WITH ULTRASOUND GUIDANCE, FOR INTERSTITIAL BRACHYTHERAPY;  Surgeon: Raymond Stockton MD;  Location: [facility] OR     INSERT PORT VASCULAR ACCESS Right 7/18/2022    Procedure: INSERTION, VASCULAR ACCESS PORT;  Surgeon: Donnie Elias MD;  Location: [facility] OR     IR CHEST PORT PLACEMENT > 5 YRS OF AGE  7/18/2022     WY LAP VAGINECTOMY, PARTIAL REMOVAL OF VAGINAL WALL  10/2013    upper vaginectomy for dysplasia, Dr. Megan Arciniega     REMOVE INTERSTITIAL NEEDLE N/A 2/15/2023    Procedure: REMOVAL, INTERSTITIAL NEEDLE, AFTER TEMPORARY BRACHYTHERAPY;  Surgeon: Raymond Stockton MD;  Location: [facility] OR       Current Medications:   Current Outpatient Medications   Medication     acetaminophen (TYLENOL) 325 MG tablet     Acetylcarnitine HCl 250 MG CAPS     aspirin (ASA) 81 MG chewable tablet     Bacillus Coagulans-Inulin (PROBIOTIC FORMULA) 1-250 BILLION-MG CAPS     BENFOTIAMINE PO     Cholecalciferol (VITAMIN D-3) 25 MCG (1000 UT) CAPS     coenzyme Q-10 capsule     levothyroxine (SYNTHROID/LEVOTHROID) 50 MCG tablet     MAGNESIUM OXIDE PO     melatonin 5 MG tablet     metoprolol succinate ER (TOPROL XL) 50 MG 24 hr tablet     multivitamin w/minerals (THERA-VIT-M) tablet     Omega-3 Fatty Acids (OMEGA-3 FISH OIL PO)     OVER-THE-COUNTER     Pectin Cit-Inos-C-Bioflav-Soy (MODIFIED CITRUS PECTIN PO)     Taurine 1000 MG CAPS     UNABLE TO FIND      "UNABLE TO FIND     UNABLE TO FIND     UNABLE TO FIND     UNABLE TO FIND     No current facility-administered medications for this visit.        Allergies:   No Known Allergies       Social History:  Patient lives with her . 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 She walks daily, gardens, does yoga twice/week.  She does have Advanced Directives, but has identified her daughter and  as her desired [employer] of .   Social History     Tobacco Use     Smoking status: Former     Packs/day: 0.50     Years: 15.00     Pack years: 7.50     Types: Cigarettes     Quit date:      Years since quittin.2     Smokeless tobacco: Never   Substance Use Topics     Alcohol use: Not Currently     Alcohol/week: 2.0 standard drinks     Types: 2 Standard drinks or equivalent per week     Comment: Socially        History   Drug Use No       Family History:   The patient's family history is notable for a first-degree and second-degree paternal relative with breast cancer.  No known family history of ovarian, uterine, colon, urothelial/renal, prostate or pancreatic cancers.  No known family history of melanoma.  Family History   Problem Relation Age of Onset     Myocardial Infarction Mother 73        Tob     Myocardial Infarction Father 68        Tob     Breast Cancer Sister 47         of breast cancer age 63; BRCA mutation testing negative     Cancer Maternal Grandmother         Unsure of type.     Breast Cancer Paternal Grandmother         Unsure of diagnosis--some type of \"women's issue\"     Anesthesia Reaction No family hx of      Deep Vein Thrombosis (DVT) No family hx of        Physical Exam:    /76 (BP Location: Right arm, Patient Position: Sitting, Cuff Size: Adult Regular)   Pulse 112   Temp 98.2  F (36.8  C) (Oral)   Wt 57.4 kg (126 lb 8 oz)   LMP  (LMP Unknown)   SpO2 98%   BMI 22.41 kg/m     Body mass index is 22.41 kg/m .    General Appearance: healthy and alert, no distress   "   Musculoskeletal: extremities without edema    Skin: no lesions or rashes. Patient declined perineal exam of sores.     Neurological: normal gait, no gross defects     Psychiatric: appropriate mood and affect                               Hematological: normal cervical, supraclavicular and inguinal lymph nodes     Gastrointestinal:       abdomen moderately distended. No palpable masses.     Genitourinary:Deferred per patient preference.       Performance Status:  ECOG Grade 2.       Assessment:  Cordell Donaldson (she/her/hers) is a 72 year old woman with a diagnosis of stage III squamous cell carcinoma of the vagina (vs recurrent metastatic cervical cancer) currently receiving first-line chemotherapy with partial response per PET/CT 12/5/22.     Medical history significant for FIGO Stage IA1 squamous cell carcinoma of the cervix s/p hysterectomy with no residual disease, and a long history of vaginal HSIL.  Medical history significant for atrial fibrillation and cardiomegaly on imaging.      A total of 40 minutes was spent with the patient, 38 minutes of which were spent in counseling the patient and/or treatment planning.      Plan:     1.)    Squamous cell carcinoma: Discussed maintenance therapy with bevacizumab +/- pembrolizumab vs surveillance without maintenance therapy. Discussed patient's goals of care, especially given her history of increasing debilitation during chemotherapy.     After discussion of risks/benefits of each option, Cordell would like to proceed with surveillance without maintenance therapy. Plan as follows:  -Repeat PET/CT and follow-up with pelvic exam in 3 months to assess disease response to radiation therapy.   -Cordell will call in the interim if she has worsening bloating or other symptoms.     Side-effects:   -Grade 1 peripheral neuropathy (attributable to paclitaxel and cisplatin): Currently stable, not interfering with activity. Referral to [facility].  -Grade 1 rash (attributable to pembrolizumab): Mild and improving. Continue patient's aloe lotion infused with essential oils. Anticipate improvement with discontinuation of pembrolizumab.   -Grade 1 hyperthyroidism (attributable to pebrolizumab): Continue levothyroxine. Will continue to monitor.   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 Patient asymptomatic, followed by her cardiologist Dr. Huitron. No plan to restart pembrolizumab.  -Grade 1 bloating: Likely due to radiation-induced bowel changes and ascites. Will re-evaluate ascites on follow-up PET/CT. If bloating worsens, patient will call and we will get abdominal ultrasound with prn paracentesis in he interim.   -Grade 1 depression: Likely due to disease and therapy-induced debilitation. Offered referral to mental health therapist, but Cordell declines at this time. Also offered a referral to palliative care, but Cordell reports a negative consultation previously, and does not want to follow-up with palliative care. She will re-establish care with her functional medicine team. Encouraged her to call in the interim if that does not start to improve her depression, and will consider referral to a mental health therapist.     2.) Breast cancer: Continue management per medical oncologist Dr. Montana. Radiographic response of breast cancer to current chemotherapy,continue monitoring for now with consideration of endocrine therapy after completion of chemotherapy.     3.) Genetic risk factors were assessed and the patient does not meet the qualifications for a referral.      4.) Labs and/or tests ordered include: 1) PET/CT previously ordered. 2) IR port-a-cath removal per patient request.      5.) Health maintenance issues addressed today include:  none.    6.) Code status:  Full-code.    7.) Prescriptions: None.        Hina Raygoza MD, MS, FACOG, FACS  3/14/2023  8:52 AM            CC  Patient Care Team:  Merry Lino MD as PCP - General (Internal Medicine)  Bess Paredes MD Corfield, Aaron Daniel, DPM as MD (Podiatry)  Elise Huitron MD as Assigned Heart and Vascular Provider  Merry Lino MD as Assigned PCP  Tess Iniguez RN as Specialty Care Coordinator  [facility], Heather Munguia MD as MD (Urology)  Nan, Angela Rowley MD as MD (Hematology & Oncology)  Yvette Pike MD as MD (Surgery)  Valeri Kirby, GEOFF as Specialty Care Coordinator (Hematology & Oncology)  Yvette Pike MD as Assigned Surgical Provider  Skchely, Nurys GAY RN as Specialty Care Coordinator (Hematology & Oncology)  Felicia Thompson PA-C as Physician Assistant (Anesthesiology)  Raymond Stockton MD as Assigned Cancer Care Provider  SELF, REFERRED

## 2023-03-14 ENCOUNTER — ONCOLOGY VISIT (OUTPATIENT)
Dept: ONCOLOGY | Facility: CLINIC | Age: 73
End: 2023-03-14
Attending: OBSTETRICS & GYNECOLOGY
Payer: COMMERCIAL

## 2023-03-14 VITALS
SYSTOLIC BLOOD PRESSURE: 121 MMHG | BODY MASS INDEX: 22.41 KG/M2 | DIASTOLIC BLOOD PRESSURE: 76 MMHG | WEIGHT: 126.5 LBS | TEMPERATURE: 98.2 F | OXYGEN SATURATION: 98 % | HEART RATE: 112 BPM

## 2023-03-14 DIAGNOSIS — R53.83 FATIGUE, UNSPECIFIED TYPE: ICD-10-CM

## 2023-03-14 DIAGNOSIS — G62.0 DRUG-INDUCED POLYNEUROPATHY (H): ICD-10-CM

## 2023-03-14 DIAGNOSIS — F32.0 CURRENT MILD EPISODE OF MAJOR DEPRESSIVE DISORDER, UNSPECIFIED WHETHER RECURRENT (H): ICD-10-CM

## 2023-03-14 DIAGNOSIS — C53.9 RECURRENT CERVICAL CANCER (H): Primary | ICD-10-CM

## 2023-03-14 PROCEDURE — 99215 OFFICE O/P EST HI 40 MIN: CPT | Performed by: OBSTETRICS & GYNECOLOGY

## 2023-03-14 PROCEDURE — G0463 HOSPITAL OUTPT CLINIC VISIT: HCPCS | Performed by: OBSTETRICS & GYNECOLOGY

## 2023-03-14 ASSESSMENT — PAIN SCALES - GENERAL: PAINLEVEL: NO PAIN (0)

## 2023-03-14 NOTE — NURSING NOTE
Return appt in  june  Biopsies/pap smear done today, orders entered, specimen sent to pathology/cytology  Pet scan  orders entered, to be scheduled in june

## 2023-03-14 NOTE — NURSING NOTE
"Oncology Rooming Note    March 14, 2023 7:56 AM   Cordell Donaldson is a 72 year old female who presents for:    Chief Complaint   Patient presents with     Oncology Clinic Visit     Cervical Cancer      Initial Vitals: /76 (BP Location: Right arm, Patient Position: Sitting, Cuff Size: Adult Regular)   Pulse 112   Temp 98.2  F (36.8  C) (Oral)   Wt 57.4 kg (126 lb 8 oz)   LMP  (LMP Unknown)   SpO2 98%   BMI 22.41 kg/m   Estimated body mass index is 22.41 kg/m  as calculated from the following:    Height as of 2/13/23: 1.6 m (5' 3\").    Weight as of this encounter: 57.4 kg (126 lb 8 oz). Body surface area is 1.6 meters squared.  No Pain (0) Comment: Data Unavailable   No LMP recorded (lmp unknown). Patient has had a hysterectomy.  Allergies reviewed: Yes  Medications reviewed: Yes    Medications: Medication refills not needed today.  Pharmacy name entered into [facility]:    [id number] - [place], [place] - [address] [place] [address]  [id number] - [place], [place] - [address] [facility] [place], [place] - [address] [facility] [phone]    Clinical concerns: none.       Lebron Addison"

## 2023-03-14 NOTE — LETTER
3/14/2023         RE: Cordell Donaldson  [address]  [facility] [id number]        Dear Colleague,    Thank you for referring your patient, Cordell Donaldson, to the [facility]. Please see a copy of my visit note below.                            Follow-up Note on Referred Patient      Date: 3/14/2023        Referring Provider/Gynecologic Oncologist:  Megan Arciniega MD  [facility]   Phone [phone]  [phone]      Radiation Oncologist:   Raymond Stockton MD, PhD  [facility]      Medical Oncologist:  Angela Montana MD  [facility].      Cardiologist:  Elise Huitron MD  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 Post-therapy visit.      History of Present Illness:   Cordell Donaldson (she/her/hers) is a 72 year old initially referred to the Gynecologic Cancer Clinic at the [facility] on 2022 by gynecologic oncologist Dr. Arciniega and radiation oncologist Dr. Stockton for management of a pelvic mass due to recurrent cervical cancer vs new vaginal cancer. History as follows:     Breast Cancer History:  : Left breast cancer.  -Lumpectomy.  -Radiation therapy.    [id number]: BR[facility]NAN.     22: PET/CT to stage vaginal cancer (see below).   Mild soft tissue  thickening in the left retropectoral region with an SUV max of 2.7.  7/15/22: Invasive ductal carcinoma.   -Consultation with Dr. Montana. Recommendation to prioritize vaginal/recurrent cervix cancer. Plan to follow breast lesion with repeat MRI. Plan for endocrine therapy after completion of chemotherapy for vaginal/cervical cancer.       Cervical/Vaginal Dysplasia/Cancer History:  10/24/12:   -Endocervical curettings: Squamous carcinoma, invasion cannot be assessed.   -Ectocervical  biopsies: HSIL (CIN3).   -Vaginal biopsy, posterior: HSIL (VaIN3).  12/2012: Robotic-assisted laparoscopic lysis of adhesions, left ureterolysis with  conversion to laparotomy, total abdominal hysterectomy, and an upper  Vaginectomy by gynecologic oncologist Dr. Arciniega.   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     7/22/13: Vaginal biopsy: HSIL, cannot exclude invasion.   -Consultation with gynecologic oncologist Dr. Johnson. Upper vaginectomy and CO2 laser ablation recommended, patient declined.   [id number]: No vaginal dysplasia treatment.   5/20/22: Pelvic MRI: I have personally reviewed the MRI images.   Centrally hypoenhancing heterogeneous midline pelvic mass along  the superior margin of the vaginal vault measures 8.5 x 6.7 x 8.1 cm.  The mass abut the bladder and there is mucosal irregularity at the  site of abutment. The mass indents the  decompressed rectum but there is no definite invasion or mucosal  Irregularity.  6/9/22: CT-guided biopsy of pelvic mass: Poorly-differentiated squamous cell carcinoma, HPV-associated.   7/1/22: Staging PET/CT: Stage III vaginal cancer (vs recurrent, metastatic cervical cancer). I have personally reviewed the PET/CT images.     7/20/22, 8/10/22, 8/31/22,9/21/22, 10/26/22, 11/21/22: Cycles #1-6 of first-line chemotherapy with IV platinum + paclitaxel + bevacizumab 15 mg/kg + pembrolizumab 200 mg (added cycle 3) every 21 days.   -Cycles 1-4: Cisplatin 50 mg/m2, paclitaxel 175 mg/m2.   -Cycle 3:  Pembrolizumab added to all subsequent cycles due to PD-L1+ result per the KEYNOTE-826 regimen.  -9/22/22: PET/CT: Partial response. I have personally reviewed the PET/CT images.   -Discussed with radiation oncologist Dr. Stockton and at the gyn onc/radiation oncology tumor conference. Recommendation for additional chemotherapy prior to radiation therapy.   -Cycles 5+: Platinum changed to carboplatin due to tinnitus, and  paclitaxel dose-reduced to 135 mg/m2 due to peripheral neuropathy.   -12/5/22: PET/CT: Partial response. I have personally reviewed the PET/CT images.   Diffuse uptake throughout the thyroid gland with SUV max 5.5, likely  Thyroiditis.  A few small pulmonary nodules  are stable, including a 2 mm solid pulmonary nodule in the left upper  lobe. No new or enlarging pulmonary nodules.  The heart  remains enlarged. No significant pericardial effusion.   Prominent  precarinal lymph node without significant FDG uptake. No suspicious  lymphadenopathy in the mediastinum.  Decreased size of centrally hypodense, necrotic cervical mass, now  measuring 5.3 x 4.1 cm (previously 6.1 x 4.3 cm)  when measured similarly. There is resolution of previously seen  enhancing component along the right lateral wall and resolution of  previous hypermetabolic activity. There is also resolution of  previously seen hypermetabolic pelvic lymph nodes.  12/28/22-2/15/23: First-line pelvic external beam radiation therapy with total dose 4500 cGy in 25 fractions then vaginal cuff brachytherapy with total dose 2500 cGy in 5 fractions. No concurrent chemosensitization due to myelosuppression and decreased patient tolerance of therapy.       Genetic/Genomic/Molecular Testing History:  2006: Negative for germline BRCA1, BRCA2 pathogenic variants.     8/14/22 (specimen from 6/9/22): [facility].   -PD-L1+ (CPS 10)  -Mismatch repair deficient/microsatellite instability-high  -TMB high (53 mut/Mb)  -NTRK 1/2/3 fusion not detected.       Subjective:  Cordell returns to the gyn onc clinic today for her scheduled post-therapy visit, accompanied by her .   Cordell reports that the brachytherapy and hospital stay were rough.  She is just starting to get her diarrhea better controlled. She notes that it is better when she eats a bland diet. She is not taking any imodium, as she felt that this would cause increased bloating and sudden explosions. She  finds it difficult to quantify how many bowel movements/day.  She has a bloating in the abdomen. This has improved since discontinuation of the imodium, but is still present. She notes it increases after eating. No pain associated with the bloating, and in fact is slowly improving. Of note, she did develop ascites of unclear etiology after discontinuation of systemic therapy and during external beam radiation therapy.   She has stable peripheral neuropathy, which improves with activity. Neuropathy is limited to her feet.   Cordell continues to have vaginal drainage. She has to wear a pad because the amount of fluid is similar to a moderate period. She reports that it was initially malodorous, but now the odor has resolved. No fevers/chills.   Cordell continues to have fatigue. She feels like she wants to lie around and rest most of the day. She takes 1-2 naps most days. She is able to accomplish the necessary activities, although notes her house is not as clean as it normally is. She was able to get her taxes done. She will walk for a few blocks when the weather is nice.   She does not some sores on her bottom due to the diarrhea and the vaginal discharge. She is using a barrier cream. She reports that they are currently better.   Cordell's  does note an increase in depression. He has a therapist, but Cordell does not. Cordell finds functional therapy useful, and has not been pursuing this recently. She has previously seen palliative care, but did not find them helpful and does not want to return.       Review of Systems:    ROS: 10 point ROS neg other than the symptoms noted above in the HPI.       Past Medical History:  Past Medical History:   Diagnosis Date     Abnormal Pap smear     maybe 20 years ago     Cervical cancer (H)      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      History of breast cancer 2003    lumpectomy and radiation offerred chemo and patient declined at  time but had oncogene test and low risk     Hyperlipidemia LDL goal < 160      Osteopenia      Paroxysmal A-fib (H)      Severe vaginal dysplasia        Past Surgical History:  Past Surgical History:   Procedure Laterality Date     BIOPSY       BREAST SURGERY Left 2003    lumpectomy left, did radiation, 5 yr of tamoxifen     COLONOSCOPY  2018    repeat in 2028     Colposcopy Cervix with Loop Electrode Conization and Lser to Vagina  2008     COLPOSCOPY, BIOPSY, COMBINED  10/24/2012    Procedure: COMBINED COLPOSCOPY, BIOPSY;  Colposcopy, Biopsy of Cervix and Vagina, Ultrasound Guidance;  Surgeon: Colette Ng MD;  Location: [facility] OR     ENT SURGERY  01/23/2018    otoscelerosis, right side     HYSTERECTOMY, FRANCIA  12/2012    high grade cervical dysplasia, [facility], Dr. Arciniega     INSERT INTERSTITIAL NEEDLE, ULTRASOUND GUIDED N/A 2/13/2023    Procedure: INSERTION, NEEDLE, WITH ULTRASOUND GUIDANCE, FOR INTERSTITIAL BRACHYTHERAPY;  Surgeon: Raymond Stockton MD;  Location: [facility] OR     INSERT PORT VASCULAR ACCESS Right 7/18/2022    Procedure: INSERTION, VASCULAR ACCESS PORT;  Surgeon: Donnie Elias MD;  [place] [facility] [place]     IR CHEST PORT PLACEMENT > 5 YRS OF AGE  7/18/2022     DE LAP VAGINECTOMY, PARTIAL REMOVAL OF VAGINAL WALL  10/2013    upper vaginectomy for dysplasia, Dr. Megan Arciniega     REMOVE INTERSTITIAL NEEDLE N/A 2/15/2023    Procedure: REMOVAL, INTERSTITIAL NEEDLE, AFTER TEMPORARY BRACHYTHERAPY;  Surgeon: Raymond Stockton MD;  Location: [facility] OR       Current Medications:   Current Outpatient Medications   Medication     acetaminophen (TYLENOL) 325 MG tablet     Acetylcarnitine HCl 250 MG CAPS     aspirin (ASA) 81 MG chewable tablet     Bacillus Coagulans-Inulin (PROBIOTIC FORMULA) 1-250 BILLION-MG CAPS     BENFOTIAMINE PO     Cholecalciferol (VITAMIN D-3) 25 MCG (1000 UT) CAPS     coenzyme Q-10 capsule     levothyroxine (SYNTHROID/LEVOTHROID) 50 MCG tablet     MAGNESIUM OXIDE PO     melatonin 5 MG tablet     metoprolol  "succinate ER (TOPROL XL) 50 MG 24 hr tablet     multivitamin w/minerals (THERA-VIT-M) tablet     Omega-3 Fatty Acids (OMEGA-3 FISH OIL PO)     OVER-THE-COUNTER     Pectin Cit-Inos-C-Bioflav-Soy (MODIFIED CITRUS PECTIN PO)     Taurine 1000 MG CAPS     UNABLE TO FIND     UNABLE TO FIND     UNABLE TO FIND     UNABLE TO FIND     UNABLE TO FIND     No current facility-administered medications for this visit.        Allergies:   No Known Allergies       Social History:  Patient lives with her . 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 She walks daily, gardens, does yoga twice/week.  She does have Advanced Directives, but has identified her daughter and  as her desired [employer] of .   Social History     Tobacco Use     Smoking status: Former     Packs/day: 0.50     Years: 15.00     Pack years: 7.50     Types: Cigarettes     Quit date:      Years since quittin.2     Smokeless tobacco: Never   Substance Use Topics     Alcohol use: Not Currently     Alcohol/week: 2.0 standard drinks     Types: 2 Standard drinks or equivalent per week     Comment: Socially        History   Drug Use No       Family History:   The patient's family history is notable for a first-degree and second-degree paternal relative with breast cancer.  No known family history of ovarian, uterine, colon, urothelial/renal, prostate or pancreatic cancers.  No known family history of melanoma.  Family History   Problem Relation Age of Onset     Myocardial Infarction Mother 73        Tob     Myocardial Infarction Father 68        Tob     Breast Cancer Sister 47         of breast cancer age 63; BRCA mutation testing negative     Cancer Maternal Grandmother         Unsure of type.     Breast Cancer Paternal Grandmother         Unsure of diagnosis--some type of \"women's issue\"     Anesthesia Reaction No family hx of      Deep Vein Thrombosis (DVT) No family hx of        Physical Exam:    /76 (BP Location: Right arm, " Patient Position: Sitting, Cuff Size: Adult Regular)   Pulse 112   Temp 98.2  F (36.8  C) (Oral)   Wt 57.4 kg (126 lb 8 oz)   LMP  (LMP Unknown)   SpO2 98%   BMI 22.41 kg/m     Body mass index is 22.41 kg/m .    General Appearance: healthy and alert, no distress     Musculoskeletal: extremities without edema    Skin: no lesions or rashes. Patient declined perineal exam of sores.     Neurological: normal gait, no gross defects     Psychiatric: appropriate mood and affect                               Hematological: normal cervical, supraclavicular and inguinal lymph nodes     Gastrointestinal:       abdomen moderately distended. No palpable masses.     Genitourinary:Deferred per patient preference.       Performance Status:  ECOG Grade 2.       Assessment:  Cordell Donaldson (she/her/hers) is a 72 year old woman with a diagnosis of stage III squamous cell carcinoma of the vagina (vs recurrent metastatic cervical cancer) currently receiving first-line chemotherapy with partial response per PET/CT 12/5/22.     Medical history significant for FIGO Stage IA1 squamous cell carcinoma of the cervix s/p hysterectomy with no residual disease, and a long history of vaginal HSIL.  Medical history significant for atrial fibrillation and cardiomegaly on imaging.      A total of 40 minutes was spent with the patient, 38 minutes of which were spent in counseling the patient and/or treatment planning.      Plan:     1.)    Squamous cell carcinoma: Discussed maintenance therapy with bevacizumab +/- pembrolizumab vs surveillance without maintenance therapy. Discussed patient's goals of care, especially given her history of increasing debilitation during chemotherapy.     After discussion of risks/benefits of each option, Cordell would like to proceed with surveillance without maintenance therapy. Plan as follows:  -Repeat PET/CT and follow-up with pelvic exam in 3 months to assess disease response to radiation therapy.    -Cordell will call in the interim if she has worsening bloating or other symptoms.     Side-effects:   -Grade 1 peripheral neuropathy (attributable to paclitaxel and cisplatin): Currently stable, not interfering with activity. [facility]erral to [facility].  -Grade 1 rash (attributable to pembrolizumab): Mild and improving. Continue patient's aloe lotion infused with essential oils. Anticipate improvement with discontinuation of pembrolizumab.   -Grade 1 hyperthyroidism (attributable to pebrolizumab): Continue levothyroxine. Will continue to monitor.   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 Patient asymptomatic, followed by her cardiologist Dr. Huitron. No plan to restart pembrolizumab.  -Grade 1 bloating: Likely due to radiation-induced bowel changes and ascites. Will re-evaluate ascites on follow-up PET/CT. If bloating worsens, patient will call and we will get abdominal ultrasound with prn paracentesis in he interim.   -Grade 1 depression: Likely due to disease and therapy-induced debilitation. Offered referral to mental health therapist, but Cordell declines at this time. Also offered a referral to palliative care, but Cordell reports a negative consultation previously, and does not want to follow-up with palliative care. She will re-establish care with her functional medicine team. Encouraged her to call in the interim if that does not start to improve her depression, and will consider referral to a mental health therapist.     2.) Breast cancer: Continue management per medical oncologist Dr. Montana. Radiographic response of breast cancer to current chemotherapy,continue monitoring for now with consideration of endocrine therapy after completion of chemotherapy.     3.) Genetic risk factors  were assessed and the patient does not meet the qualifications for a referral.      4.) Labs and/or tests ordered include: 1) PET/CT previously ordered. 2) IR port-a-cath removal per patient request.      5.) Health maintenance issues addressed today include: none.    6.) Code status:  Full-code.    7.) Prescriptions: None.        Hina Raygoza MD, MS, FACOG, FACS  3/14/2023  8:52 AM            CC  Patient Care Team:  Merry Lino MD as PCP - General (Internal Medicine)  Bess Paredes MD [place], Aaron Daniel, DPM as MD (Podiatry)  Elise Huitron MD as Assigned Heart and Vascular Provider  Merry Lino MD as Assigned PCP  Tess Iniguez, RN as Specialty Care Coordinator  [facility], Heather Munguia MD as MD (Urology)  Angela Montana MD as MD (Hematology & Oncology)  Yvette Pike MD as MD (Surgery)  Valeri Kirby, RN as Specialty Care Coordinator (Hematology & Oncology)  Yvette Pike MD as Assigned Surgical Provider  [employer] mo, RN as Specialty Care Coordinator (Hematology & Oncology)  Felicia Thompson PA-C as Physician Assistant (Anesthesiology)  Raymond Stockton MD as Assigned Cancer Care Provider

## 2023-03-27 NOTE — PROGRESS NOTES
History:    Cordell Donaldson is a 73 year old delightful woman with persistent atrial fibrillation. She has a history of breast cancer treated with lumpectomy and radiation (2003).  For the persistent atrial fibrillation she takes metoprolol for rate control and has declined cardioversion and anticoagulation.     She was diagnosed with cervical cancer in June 2022 and invasive ductal carcinoma of the left breast in July 2022. The cervical cancer treatment was prioritized and she received immunotherapy (Keytruda), carboplatin (was cisplatin), Avastin and paclitaxel with good response. She developed tachycardia and hypertension as a result of Avastin. On recent surveillance PET scan there was concern for possible immunotherapy associated myocarditis. Cardiac biomarkers were normal and a follow up cardiac MRI with stress was without ischemia or evidence of myocarditis.      Chemotherapy was stopped and she received radiation therapy and brachytherapy.    Interval history  Developed ascites in Feb 2023  Paracentesis 3.2 L on 4/2123  Cytology is pending - patient is upset that it has been classified as a malignant ascites already  TSH still high  Left leg was swollen and US was negative for DVT     She feels well now after the paracentesis. BP is normal. TSH remains high with low T4 and she just started thyroid replacement. She has no fatigue or cold intolerance.     Current Outpatient Medications   Medication Sig Dispense Refill     acetaminophen (TYLENOL) 325 MG tablet Take 2 tablets (650 mg) by mouth every 6 hours as needed for mild pain (Patient taking differently: Take 500 mg by mouth every 6 hours as needed for mild pain)       Acetylcarnitine HCl 250 MG CAPS Take 500 mg by mouth 2 times daily        aspirin (ASA) 81 MG chewable tablet Take 81 mg by mouth every evening       Bacillus Coagulans-Inulin (PROBIOTIC FORMULA) 1-250 BILLION-MG CAPS Take by mouth every morning 25+billion CFUS       BENFOTIAMINE PO Take  150 mg by mouth every morning       Cholecalciferol (VITAMIN D-3) 25 MCG (1000 UT) CAPS Take 1,000 Units by mouth every morning 90 capsule 3     coenzyme Q-10 capsule Take 1 capsule by mouth daily       levothyroxine (SYNTHROID/LEVOTHROID) 50 MCG tablet Take 1 tablet (50 mcg) by mouth daily before breakfast 30 tablet 1     MAGNESIUM OXIDE PO Take 400 mg by mouth 2 times daily       melatonin 5 MG tablet Take 10 mg by mouth nightly as needed for sleep       metoprolol succinate ER (TOPROL XL) 50 MG 24 hr tablet TAKE 1 TABLET BY MOUTH EVERY DAY (Patient taking differently: Take 50 mg by mouth every evening) 90 tablet 2     multivitamin w/minerals (THERA-VIT-M) tablet Take 1 tablet by mouth every morning       Omega-3 Fatty Acids (OMEGA-3 FISH OIL PO) Take 630 mg by mouth 2 times daily        OVER-THE-COUNTER Turkey tail mushroom powder, use 3 times a day 1 Can 11     Pectin Cit-Inos-C-Bioflav-Soy (MODIFIED CITRUS PECTIN PO) Take by mouth 2 times daily       Taurine 1000 MG CAPS every morning 60 capsule 11     UNABLE TO FIND 1,200 mg 2 times daily MEDICATION NAME: Efraín       UNABLE TO FIND 150 mg daily MEDICATION NAME: Silymarin       UNABLE TO FIND 1 capsule 2 times daily MEDICATION NAME: Alkylgycerols       UNABLE TO FIND 2 times daily MEDICATION NAME: [facility] for 1000mg calcium  4 pills per day       UNABLE TO FIND Take 1 tablet by mouth every morning MEDICATION NAME: Dinndolylmethane Complex (DIM) 100mg tablet 1 table daily         Allergies - reviewed   No Known Allergies    Past history -reviewed  Past Medical History:   Diagnosis Date     Abnormal Pap smear     maybe 20 years ago     Cervical cancer (H)      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      History of breast cancer 2003    lumpectomy and radiation offerred chemo and patient declined at time but had oncogene test and low risk     Hyperlipidemia LDL goal < 160      Osteopenia      Paroxysmal A-fib (H)      Severe  "vaginal dysplasia        Social history - reviewed    Former smoker   Social alcohol    Family history -reviewed  Family History   Problem Relation Age of Onset     Myocardial Infarction Mother 73        Tob     Myocardial Infarction Father 68        Tob     Breast Cancer Sister 47         of breast cancer age 63; BRCA mutation testing negative     Cancer Maternal Grandmother         Unsure of type.     Breast Cancer Paternal Grandmother         Unsure of diagnosis--some type of \"women's issue\"     Anesthesia Reaction No family hx of      Deep Vein Thrombosis (DVT) No family hx of        ROS: non contributory on the 10-point review of system    EXAM:  LMP  (LMP Unknown)     In general, the patient is in no apparent distress.        HEENT: Sclerae white, not injected.    Neck: No JVD. No thyromegaly  Heart: irregular. No murmur. No audible rub or gallop.    Lungs: Clear bilaterally.  No rhonchi, wheezes, rales.   Extremities: L ankle edema.  The pulses are 2+at the radial bilaterally.  Psych: pleasant and conversant      Data:    Lab Test 04/10/23  1625 23  1356   *  --    POTASSIUM 5.2  --    CHLORIDE 96*  --    CO2 24  --    ANIONGAP 12  --    *  --    BUN 16.8  --    CR 0.78 0.73   LINDA 8.7*  --    MAG  --   --    GFRESTIMATED 81 87   AST 19 34   ALT 9* 19       CBC:   Recent Labs   Lab Test 04/10/23  1625 23  0932   WBC 7.7 2.4*   RBC 2.95* 3.57*   HGB 9.0* 11.3*   HCT 29.8* 34.1*   * 96   MCH 30.5 31.7   MCHC 30.2* 33.1   RDW 16.3* 19.6*    152       Lipid Panel:  Recent Labs   Lab Test 22  1544 21  1146   CHOL 202* 246*   HDL 41* 55   * 167*   TRIG 86 120       Thyroid:   TSH   Date Value Ref Range Status   04/10/2023 46.10 (H) 0.30 - 4.20 uIU/mL Final   2017 2.16 0.40 - 4.00 mU/L Final     Free T4   Date Value Ref Range Status   04/10/2023 0.64 (L) 0.90 - 1.70 ng/dL Final       22  Troponin T high-sensitivity= 6   NT proBNP = " 444     Patient's all available and relevant cardiac investigations were personally reviewed and discussed with the patient today.    ECG 4/24/23 - atrial fibrillation with a ventricular response average 83 bpm     Stress cardiac MRI 12/8/22  The patient's rhythm is atrial fibrillation.  Normal biventricular function, LVEF 68% and RVEF 52%.   No evidence of ischemia.   No evidence of myocardial edema or fibrosis to indicate immune checkpoint inhibitor myocarditis.        Assessment and Plan:  73 year old woman with    Persistent atrial fibrillation  Normal biventricular function   Dyslipidemia  Cervical cancer July 2022, post chemoradiation   Invasive ductal carcinoma L breast July 2022  History of breast cancer 2003  Ascites    Cordell has recently received chemoradiation therapy for cervical cancer.  There was concern for immunotherapy associated myocarditis but the cardiac MRI and biomarkers did not support that.     She has persistent atrial fibrillation with a controlled heart rate response. She  is currently taking metoprolol XL 50 mg daily for heart rate control and hypertension.   She has previously firmly declined anticoagulation and statin therapy.     Ascites is a new development.  Her clinical picture does not favor cardiac cirrhosis and she does not have right heart failure.  Cytology from the ascites is pending which would be critical to determine if this is malignant.  She is significantly hypothyroid which could also be a contributing factor to the ascites.  I have advised her to follow-up with her primary care physician for reassessment of her thyroid status.    Her ASCVD risk score is elevated and wishes to address this with lifestyle modification.     Recommendations:   1. Follow-up 4 months  2. Follow up with PCP to address thyroid status  .       The 10-year ASCVD risk score (Isra SHANE, et al., 2019) is: 15.3%    Values used to calculate the score:      Age: 73 years      Sex: Female      Is  Non- : No      Diabetic: No      Tobacco smoker: No      Systolic Blood Pressure: 125 mmHg      Is BP treated: Yes      HDL Cholesterol: 41 mg/dL      Total Cholesterol: 202 mg/dL      Elise Huitron MD, MS  Professor of Medicine  Cardiovascular Medicine

## 2023-03-29 ENCOUNTER — ANESTHESIA EVENT (OUTPATIENT)
Dept: SURGERY | Facility: AMBULATORY SURGERY CENTER | Age: 73
End: 2023-03-29
Payer: COMMERCIAL

## 2023-03-30 ENCOUNTER — ANESTHESIA (OUTPATIENT)
Dept: SURGERY | Facility: AMBULATORY SURGERY CENTER | Age: 73
End: 2023-03-30
Payer: COMMERCIAL

## 2023-03-30 ENCOUNTER — HOSPITAL ENCOUNTER (OUTPATIENT)
Facility: AMBULATORY SURGERY CENTER | Age: 73
Discharge: HOME | End: 2023-03-30
Attending: RADIOLOGY
Payer: COMMERCIAL

## 2023-03-30 ENCOUNTER — ANCILLARY PROCEDURE (OUTPATIENT)
Dept: RADIOLOGY | Facility: AMBULATORY SURGERY CENTER | Age: 73
End: 2023-03-30
Attending: OBSTETRICS & GYNECOLOGY
Payer: COMMERCIAL

## 2023-03-30 VITALS
OXYGEN SATURATION: 98 % | BODY MASS INDEX: 21.26 KG/M2 | WEIGHT: 120 LBS | TEMPERATURE: 98 F | HEART RATE: 70 BPM | DIASTOLIC BLOOD PRESSURE: 68 MMHG | RESPIRATION RATE: 16 BRPM | HEIGHT: 63 IN | SYSTOLIC BLOOD PRESSURE: 106 MMHG

## 2023-03-30 DIAGNOSIS — C53.9 RECURRENT CERVICAL CANCER (H): ICD-10-CM

## 2023-03-30 PROCEDURE — 36590 REMOVAL TUNNELED CV CATH: CPT | Performed by: RADIOLOGY

## 2023-03-30 PROCEDURE — 36590 REMOVAL TUNNELED CV CATH: CPT

## 2023-03-30 RX ORDER — ACETAMINOPHEN 325 MG/1
975 TABLET ORAL ONCE
Status: COMPLETED | OUTPATIENT
Start: 2023-03-30 | End: 2023-03-30

## 2023-03-30 RX ORDER — ONDANSETRON 2 MG/ML
4 INJECTION INTRAMUSCULAR; INTRAVENOUS EVERY 30 MIN PRN
Status: DISCONTINUED | OUTPATIENT
Start: 2023-03-30 | End: 2023-03-31 | Stop reason: HOSPADM

## 2023-03-30 RX ORDER — SODIUM CHLORIDE, SODIUM LACTATE, POTASSIUM CHLORIDE, CALCIUM CHLORIDE 600; 310; 30; 20 MG/100ML; MG/100ML; MG/100ML; MG/100ML
INJECTION, SOLUTION INTRAVENOUS CONTINUOUS
Status: DISCONTINUED | OUTPATIENT
Start: 2023-03-30 | End: 2023-03-31 | Stop reason: HOSPADM

## 2023-03-30 RX ORDER — LIDOCAINE 40 MG/G
CREAM TOPICAL
Status: DISCONTINUED | OUTPATIENT
Start: 2023-03-30 | End: 2023-03-31 | Stop reason: HOSPADM

## 2023-03-30 RX ORDER — LIDOCAINE HYDROCHLORIDE 20 MG/ML
INJECTION, SOLUTION INFILTRATION; PERINEURAL PRN
Status: DISCONTINUED | OUTPATIENT
Start: 2023-03-30 | End: 2023-03-30

## 2023-03-30 RX ORDER — FENTANYL CITRATE 50 UG/ML
25 INJECTION, SOLUTION INTRAMUSCULAR; INTRAVENOUS EVERY 5 MIN PRN
Status: DISCONTINUED | OUTPATIENT
Start: 2023-03-30 | End: 2023-03-31 | Stop reason: HOSPADM

## 2023-03-30 RX ORDER — FENTANYL CITRATE 50 UG/ML
50 INJECTION, SOLUTION INTRAMUSCULAR; INTRAVENOUS EVERY 5 MIN PRN
Status: DISCONTINUED | OUTPATIENT
Start: 2023-03-30 | End: 2023-03-31 | Stop reason: HOSPADM

## 2023-03-30 RX ORDER — ONDANSETRON 4 MG/1
4 TABLET, ORALLY DISINTEGRATING ORAL EVERY 30 MIN PRN
Status: DISCONTINUED | OUTPATIENT
Start: 2023-03-30 | End: 2023-03-31 | Stop reason: HOSPADM

## 2023-03-30 RX ORDER — HYDROMORPHONE HYDROCHLORIDE 1 MG/ML
0.2 INJECTION, SOLUTION INTRAMUSCULAR; INTRAVENOUS; SUBCUTANEOUS EVERY 5 MIN PRN
Status: DISCONTINUED | OUTPATIENT
Start: 2023-03-30 | End: 2023-03-31 | Stop reason: HOSPADM

## 2023-03-30 RX ORDER — HYDROMORPHONE HYDROCHLORIDE 1 MG/ML
0.4 INJECTION, SOLUTION INTRAMUSCULAR; INTRAVENOUS; SUBCUTANEOUS EVERY 5 MIN PRN
Status: DISCONTINUED | OUTPATIENT
Start: 2023-03-30 | End: 2023-03-31 | Stop reason: HOSPADM

## 2023-03-30 RX ORDER — PROPOFOL 10 MG/ML
INJECTION, EMULSION INTRAVENOUS CONTINUOUS PRN
Status: DISCONTINUED | OUTPATIENT
Start: 2023-03-30 | End: 2023-03-30

## 2023-03-30 RX ORDER — LIDOCAINE HYDROCHLORIDE 10 MG/ML
INJECTION, SOLUTION EPIDURAL; INFILTRATION; INTRACAUDAL; PERINEURAL DAILY PRN
Status: DISCONTINUED | OUTPATIENT
Start: 2023-03-30 | End: 2023-03-30 | Stop reason: HOSPADM

## 2023-03-30 RX ORDER — PROPOFOL 10 MG/ML
INJECTION, EMULSION INTRAVENOUS PRN
Status: DISCONTINUED | OUTPATIENT
Start: 2023-03-30 | End: 2023-03-30

## 2023-03-30 RX ADMIN — SODIUM CHLORIDE, SODIUM LACTATE, POTASSIUM CHLORIDE, CALCIUM CHLORIDE: 600; 310; 30; 20 INJECTION, SOLUTION INTRAVENOUS at 09:00

## 2023-03-30 RX ADMIN — PROPOFOL 150 MCG/KG/MIN: 10 INJECTION, EMULSION INTRAVENOUS at 09:41

## 2023-03-30 RX ADMIN — PROPOFOL 30 MG: 10 INJECTION, EMULSION INTRAVENOUS at 09:41

## 2023-03-30 RX ADMIN — Medication 100 MCG: at 09:53

## 2023-03-30 RX ADMIN — LIDOCAINE HYDROCHLORIDE 50 MG: 20 INJECTION, SOLUTION INFILTRATION; PERINEURAL at 09:41

## 2023-03-30 RX ADMIN — Medication 100 MCG: at 09:48

## 2023-03-30 RX ADMIN — ACETAMINOPHEN 975 MG: 325 TABLET ORAL at 08:59

## 2023-03-30 ASSESSMENT — ENCOUNTER SYMPTOMS: DYSRHYTHMIAS: 1

## 2023-03-30 NOTE — ANESTHESIA POSTPROCEDURE EVALUATION
Patient: Cordell Donaldson    Procedure: Procedure(s):  Remove port vascular access right       Anesthesia Type:  MAC    Note:  Disposition: Outpatient   Postop Pain Control: Uneventful            Sign Out: Well controlled pain   PONV: No   Neuro/Psych: Uneventful            Sign Out: Acceptable/Baseline neuro status   Airway/Respiratory: Uneventful            Sign Out: Acceptable/Baseline resp. status   CV/Hemodynamics: Uneventful            Sign Out: Acceptable CV status   Other NRE: NONE   DID A NON-ROUTINE EVENT OCCUR? No           Last vitals:  Vitals Value Taken Time   /68 03/30/23 1100   Temp 36.7  C (98  F) 03/30/23 1100   Pulse 70 03/30/23 1100   Resp 16 03/30/23 1100   SpO2 98 % 03/30/23 1100       Electronically Signed By: Donnie Gutierrez MD  March 30, 2023  1:35 PM

## 2023-03-30 NOTE — ANESTHESIA PREPROCEDURE EVALUATION
Anesthesia Pre-Procedure Evaluation    Patient: Cordell Donaldson   MRN: [id number] : 1950        Procedure : Procedure(s):  Remove port vascular access right          Past Medical History:   Diagnosis Date     Abnormal Pap smear     maybe 20 years ago     Cervical cancer (H)      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      History of breast cancer     lumpectomy and radiation offerred chemo and patient declined at time but had oncogene test and low risk     Hyperlipidemia LDL goal < 160      Osteopenia      Paroxysmal A-fib (H)      Severe vaginal dysplasia       Past Surgical History:   Procedure Laterality Date     BIOPSY       BREAST SURGERY Left     lumpectomy left, did radiation, 5 yr of tamoxifen     COLONOSCOPY      repeat in      Colposcopy Cervix with Loop Electrode Conization and Lser to Vagina       COLPOSCOPY, BIOPSY, COMBINED  10/24/2012    Procedure: COMBINED COLPOSCOPY, BIOPSY;  Colposcopy, Biopsy of Cervix and Vagina, Ultrasound Guidance;  Surgeon: Colette Ng MD;  Location: [facility] OR     ENT SURGERY  2018    otoscelerosis, right side     HYSTERECTOMY, FRANCIA  2012    high grade cervical dysplasia, [place] [facility], Dr. Arciniega     INSERT INTERSTITIAL NEEDLE, ULTRASOUND GUIDED N/A 2023    Procedure: INSERTION, NEEDLE, WITH ULTRASOUND GUIDANCE, FOR INTERSTITIAL BRACHYTHERAPY;  Surgeon: Raymond Stockton MD;  Location: [facility] OR     INSERT PORT VASCULAR ACCESS Right 2022    Procedure: INSERTION, VASCULAR ACCESS PORT;  Surgeon: Donnie Elias MD;  Location: [facility] OR     IR CHEST PORT PLACEMENT > 5 YRS OF AGE  2022     IL LAP VAGINECTOMY, PARTIAL REMOVAL OF VAGINAL WALL  10/2013    upper vaginectomy for dysplasia, Dr. Megan Arciniega     REMOVE INTERSTITIAL NEEDLE N/A 2/15/2023    Procedure: REMOVAL, INTERSTITIAL NEEDLE, AFTER TEMPORARY BRACHYTHERAPY;  Surgeon: Raymond Stockton MD;  Location: [facility] OR      No Known Allergies   Social History      Tobacco Use     Smoking status: Former     Packs/day: 0.50     Years: 15.00     Pack years: 7.50     Types: Cigarettes     Quit date:      Years since quittin.2     Smokeless tobacco: Never   Substance Use Topics     Alcohol use: Not Currently     Alcohol/week: 2.0 standard drinks     Types: 2 Standard drinks or equivalent per week     Comment: Socially       Wt Readings from Last 1 Encounters:   23 54.4 kg (120 lb)        Anesthesia Evaluation            ROS/MED HX  ENT/Pulmonary:       Neurologic:       Cardiovascular:     (+) Dyslipidemia -----dysrhythmias, a-fib,     METS/Exercise Tolerance:     Hematologic:       Musculoskeletal:       GI/Hepatic:       Renal/Genitourinary:       Endo:       Psychiatric/Substance Use:       Infectious Disease:       Malignancy:   (+) Malignancy, History of Other and Breast.Other CA cervical status post.    Other:            Physical Exam    Airway  airway exam normal      Mallampati: II   TM distance: > 3 FB   Neck ROM: full   Mouth opening: > 3 cm    Respiratory Devices and Support         Dental       (+) Completely normal teeth      Cardiovascular          Rhythm and rate: regular and normal     Pulmonary   pulmonary exam normal        breath sounds clear to auscultation           OUTSIDE LABS:  CBC:   Lab Results   Component Value Date    WBC 2.4 (L) 2023    WBC 2.6 (L) 2022    HGB 10.0 (L) 2023    HGB 11.3 (L) 2023    HCT 34.1 (L) 2023    HCT 41.2 2022     2023     2023     BMP:   Lab Results   Component Value Date     (L) 2023     2022    POTASSIUM 4.6 2023    POTASSIUM 4.8 2022    CHLORIDE 96 (L) 2023    CHLORIDE 100 2022    CO2 28 2023    CO2 28 2022    BUN 9.4 2023    BUN 14.6 2022    CR 0.73 2023    CR 0.85 2023    GLC 95 2023    GLC 91 2023     COAGS: No results found for: PTT, INR, FIBR  POC:    Lab Results   Component Value Date     (H) 10/31/2012     HEPATIC:   Lab Results   Component Value Date    ALBUMIN 3.8 02/13/2023    PROTTOTAL 5.4 (L) 02/13/2023    ALT 19 02/13/2023    AST 34 02/13/2023    ALKPHOS 27 (L) 02/13/2023    BILITOTAL 0.4 02/13/2023     OTHER:   Lab Results   Component Value Date    LACT 0.7 02/13/2023    LINDA 9.7 02/09/2023    MAG 1.9 02/09/2023    TSH 35.50 (H) 02/13/2023    T4 0.37 (L) 02/13/2023       Anesthesia Plan    ASA Status:  2   NPO Status:  NPO Appropriate    Anesthesia Type: MAC.     - Reason for MAC: immobility needed, straight local not clinically adequate   Induction: Intravenous.   Maintenance: TIVA.        Consents    Anesthesia Plan(s) and associated risks, benefits, and realistic alternatives discussed. Questions answered and patient/representative(s) expressed understanding.    - Discussed:     - Discussed with:  Patient      - Extended Intubation/Ventilatory Support Discussed: No.      - Patient is DNR/DNI Status: No    Use of blood products discussed: No .     Postoperative Care    Pain management: IV analgesics, Oral pain medications, Multi-modal analgesia.   PONV prophylaxis: Ondansetron (or other 5HT-3), Background Propofol Infusion     Comments:                Donnie Gutierrez MD

## 2023-03-30 NOTE — ANESTHESIA CARE TRANSFER NOTE
Patient: Cordell Donaldson    Procedure: Procedure(s):  Remove port vascular access right       Diagnosis: Malignant neoplasm of cervix, unspecified site (H) [C53.9]  Diagnosis Additional Information: No value filed.    Anesthesia Type:   MAC     Note:    Oropharynx: oropharynx clear of all foreign objects and spontaneously breathing  Level of Consciousness: awake      Independent Airway: airway patency satisfactory and stable    Vital Signs Stable: post-procedure vital signs reviewed and stable  Report to RN Given: handoff report given            Vitals:  Vitals Value Taken Time   BP     Temp     Pulse     Resp     SpO2         Electronically Signed By: TE Silvestre CRNA  March 30, 2023  10:05 AM

## 2023-03-30 NOTE — BRIEF OP NOTE
[facility]    Brief Operative Note    Pre-operative diagnosis: Malignant neoplasm of cervix, unspecified site (H) [C53.9]  Post-operative diagnosis Same as pre-operative diagnosis    Procedure: Procedure(s):  Remove port vascular access right  Surgeon: Surgeon(s) and Role:     * Donnie Elias MD - Primary  Anesthesia: MAC   Estimated Blood Loss: Minimal    Drains: None  Specimens: * No specimens in log *  Findings:   None.  Complications: None.  Implants:   Implant Name Type Inv. Item Serial No.  Lot No. LRB No. Used Action   CATH PORT POWERPORT CLEARVUE SLIM [id number] - [id number] Port CATH PORT POWERPORT CLEARVUE SLIM [id number] [id number]  CR [id number] 1 Explanted     Right port removed completely. 24 cm catheter length noted. Pocket liberally irrigated with sterile saline and dried. Closed primarily.

## 2023-03-30 NOTE — DISCHARGE INSTRUCTIONS
A collaboration between [facility] and [facility]  Experts in minimally invasive, targeted treatments performed using imaging guidance    Venous Access Device, Port Catheter or Tunneled Central Line Removal    Today you had your existing venous access device removed, either because it was no longer needed or because there was malfunction or infection issues.    After you go home:  Drink plenty of fluids.  Generally 6-8 (8 ounce) glasses a day is recommended.  Resume your regular diet unless otherwise ordered by a medical provider.  Keep any applied tape/gauze dressings clean and dry.  Change tape/gauze dressings if they get wet or soiled.  You may shower the following day after procedure, however cover and protect from moisture any tape/gauze dressings.  You may let water hit and run over dried skin glue, but do not scrub.  Pat the area dry after showering.  Port removal incisions are closed with absorbable suture, meaning they do not need to be removed at a later date, and a topical skin adhesive (skin glue).  This glue will wear off in 7-14 days.  Do not remove before this time.  If 14 days have passed and residual glue is present, you may gently remove it.  You may remove tape/gauze dressings after 5 days if the site looks closed and in the process of healing.  Do not apply gels, lotions, or ointments to the glue site for the first 10 days as this may cause the glue to prematurely soften and fail.  Do not perform strenuous activities or lift greater than 10 pounds for the next three days.  If there is bleeding or oozing from the procedure site, apply firm pressure to the area for 5-10 minutes.  If the bleeding continues seek medical advice at the numbers below.  Mild procedure site discomfort can be treated with an ice pack and over-the-counter pain relievers.              For 24 hours after any sedation used:  Relax and take it easy.  No strenuous  activities.  Do not drive or operate machines at home or at work.  No alcohol consumption.  Do not make any important or legal decisions.    Call our Interventional Radiology (IR) service if:  If you start bleeding from the procedure site.  If you do start to bleed from the site, lie down and hold some pressure on the site.  Our radiology provider can help you decide if you need to return to the hospital.  If you have new or worsening pain related to the procedure.  If you have concerning swelling at the procedure site.  If you develop persistent nausea or vomiting.  If you develop hives or a rash or any unexplained itching.  If you have a fever of greater than 100.5  F and chills in the first 5 days after procedure.  Any other concerns related to your procedure.      [facility]  Interventional Radiology (IR)  [address]  [address] [facility] [place] [address]    Contact Number:  [phone]  (IR control desk)  Monday - Friday 8:00 am - 4:30 pm    After hours for urgent concerns:  [phone]  After 4:30 pm Monday - Friday, Weekends and Holidays.   Ask for Interventional Radiology on-call.  Someone is available 24 hours a day.  [facility] toll free number:  [phone]

## 2023-04-05 ENCOUNTER — MYC MEDICAL ADVICE (OUTPATIENT)
Dept: ONCOLOGY | Facility: CLINIC | Age: 73
End: 2023-04-05
Payer: COMMERCIAL

## 2023-04-05 DIAGNOSIS — I89.0 LYMPHEDEMA: Primary | ICD-10-CM

## 2023-04-05 DIAGNOSIS — C53.9 CERVICAL CANCER (H): ICD-10-CM

## 2023-04-06 ENCOUNTER — ONCOLOGY VISIT (OUTPATIENT)
Dept: ONCOLOGY | Facility: CLINIC | Age: 73
End: 2023-04-06
Attending: OBSTETRICS & GYNECOLOGY
Payer: COMMERCIAL

## 2023-04-06 ENCOUNTER — PRE VISIT (OUTPATIENT)
Dept: ONCOLOGY | Facility: CLINIC | Age: 73
End: 2023-04-06
Payer: COMMERCIAL

## 2023-04-06 VITALS
SYSTOLIC BLOOD PRESSURE: 112 MMHG | BODY MASS INDEX: 22.67 KG/M2 | OXYGEN SATURATION: 97 % | TEMPERATURE: 97.5 F | HEART RATE: 85 BPM | RESPIRATION RATE: 16 BRPM | DIASTOLIC BLOOD PRESSURE: 79 MMHG | WEIGHT: 128 LBS

## 2023-04-06 DIAGNOSIS — C53.9 RECURRENT CERVICAL CANCER (H): ICD-10-CM

## 2023-04-06 DIAGNOSIS — R53.81 PHYSICAL DECONDITIONING: Primary | ICD-10-CM

## 2023-04-06 DIAGNOSIS — G62.0 DRUG-INDUCED POLYNEUROPATHY (H): ICD-10-CM

## 2023-04-06 DIAGNOSIS — R53.83 FATIGUE, UNSPECIFIED TYPE: ICD-10-CM

## 2023-04-06 PROCEDURE — 99417 PROLNG OP E/M EACH 15 MIN: CPT | Performed by: STUDENT IN AN ORGANIZED HEALTH CARE EDUCATION/TRAINING PROGRAM

## 2023-04-06 PROCEDURE — G0463 HOSPITAL OUTPT CLINIC VISIT: HCPCS | Performed by: STUDENT IN AN ORGANIZED HEALTH CARE EDUCATION/TRAINING PROGRAM

## 2023-04-06 PROCEDURE — 99205 OFFICE O/P NEW HI 60 MIN: CPT | Performed by: STUDENT IN AN ORGANIZED HEALTH CARE EDUCATION/TRAINING PROGRAM

## 2023-04-06 ASSESSMENT — PAIN SCALES - GENERAL: PAINLEVEL: NO PAIN (0)

## 2023-04-06 NOTE — PROGRESS NOTES
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    Oncology history:  Breast Cancer History:  : Left breast cancer.  -Lumpectomy.  -Radiation therapy.  [id number]: BRENNAN.   22: PET/CT to stage vaginal cancer (see below).   Mild soft tissue  thickening in the left retropectoral region with an SUV max of 2.7.  7/15/22: Invasive ductal carcinoma.   -Consultation with Dr. Montana. Recommendation to prioritize vaginal/recurrent cervix cancer. Plan to follow breast lesion with repeat MRI. Plan for endocrine therapy after completion of chemotherapy for vaginal/cervical cancer.   Cervical/Vaginal Dysplasia/Cancer History:  10/24/12:   -Endocervical curettings: Squamous carcinoma, invasion cannot be assessed.   -Ectocervical biopsies: HSIL (CIN3).   -Vaginal biopsy, posterior: HSIL (VaIN3).  2012: Robotic-assisted laparoscopic lysis of adhesions, left ureterolysis with  conversion to laparotomy, total abdominal hysterectomy, and an upper  Vaginectomy by gynecologic oncologist Dr. Arciniega.   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   13: Vaginal biopsy: HSIL, cannot exclude invasion.   -Consultation with gynecologic oncologist Dr. Johnson. Upper vaginectomy and CO2 laser ablation recommended, patient declined.   [id number]: No vaginal dysplasia treatment.   22: Pelvic MRI: I have personally reviewed the MRI images.   Centrally hypoenhancing heterogeneous midline pelvic mass along  the superior margin of the vaginal vault measures 8.5 x 6.7 x 8.1 cm.  The mass abut  the bladder and there is mucosal irregularity at the  site of abutment. The mass indents the  decompressed rectum but there is no definite invasion or mucosal  Irregularity.  6/9/22: CT-guided biopsy of pelvic mass: Poorly-differentiated squamous cell carcinoma, HPV-associated.   7/1/22: Staging PET/CT: Stage III vaginal cancer (vs recurrent, metastatic cervical cancer). I have personally reviewed the PET/CT images.   7/20/22, 8/10/22, 8/31/22,9/21/22, 10/26/22, 11/21/22: Cycles #1-6 of first-line chemotherapy with IV platinum + paclitaxel + bevacizumab 15 mg/kg + pembrolizumab 200 mg (added cycle 3) every 21 days.   -Cycles 1-4: Cisplatin 50 mg/m2, paclitaxel 175 mg/m2.   -Cycle 3:  Pembrolizumab added to all subsequent cycles due to PD-L1+ result per the KEYNOTE-826 regimen.  -9/22/22: PET/CT: Partial response. I have personally reviewed the PET/CT images.   -Discussed with radiation oncologist Dr. Stockton and at the gyn onc/radiation oncology tumor conference. Recommendation for additional chemotherapy prior to radiation therapy.   -Cycles 5+: Platinum changed to carboplatin due to tinnitus, and paclitaxel dose-reduced to 135 mg/m2 due to peripheral neuropathy.   -12/5/22: PET/CT: Partial response. I have personally reviewed the PET/CT images.   Diffuse uptake throughout the thyroid gland with SUV max 5.5, likely  Thyroiditis.  A few small pulmonary nodules  are stable, including a 2 mm solid pulmonary nodule in the left upper  lobe. No new or enlarging pulmonary nodules.  The heart  remains enlarged. No significant pericardial effusion.   Prominent  precarinal lymph node without significant FDG uptake. No suspicious  lymphadenopathy in the mediastinum.  Decreased size of centrally hypodense, necrotic cervical mass, now  measuring 5.3 x 4.1 cm (previously 6.1 x 4.3 cm)  when measured similarly. There is resolution of previously seen  enhancing component along the right lateral wall and resolution of  previous  hypermetabolic activity. There is also resolution of  previously seen hypermetabolic pelvic lymph nodes.  12/28/22-2/15/23: First-line pelvic external beam radiation therapy with total dose 4500 cGy in 25 fractions then vaginal cuff brachytherapy with total dose 2500 cGy in 5 fractions. No concurrent chemosensitization due to myelosuppression and decreased patient tolerance of therapy.   Genetic/Genomic/Molecular Testing History:  2006: Negative for germline BRCA1, BRCA2 pathogenic variants.   8/14/22 (specimen from 6/9/22): [facility] Testing.   -PD-L1+ (CPS 10)  -Mismatch repair deficient/microsatellite instability-high  -TMB high (53 mut/Mb)  -NTRK 1/2/3 fusion not detected.         Symptoms,  Patient presents for an initial evaluation today.  She has been experiencing peripheral neuropathy symptoms, and complains of mainly numbness in the bottom of her feet.  She denies any pain related to neuropathy symptoms like tingling, burning.  She does not  endorse any balance difficulties or falls/near falls at home or out in the community.  She is currently not driving due to her ongoing fatigue and treatments.  She also complains of cancer related fatigue which has been very challenging.  She is a very active person and continues to try to remain active, but states that the fatigue really gets her down.  She has an appointment with her primary care physician on Monday to evaluate  for anemia and thyroid function which may be contributing to her fatigue.   She continues to remain active and walks half a mile per day.  She also participates in yoga twice weekly.  She has pretty tense abdominal lymphedema, and has been followed by her oncology team.  She has a lymphedema therapy appointment set up in 2 weeks and the [place] clinic to help with therapy techniques such as MLD to reduce swelling.  She has not been using any assistive device for help with ambulation and transfers even in the setting of her generalized weakness and  fatigue.  She states that she has been hanging onto her  especially when walking outside on uneven terrain.  She does not possess an assistive device, and really does not want to have to get 1.  She has been struggling with her diet as she has had diarrhea for quite a while, but in the last 10 days it is getting better due to some measures that she has been taking.  She has been limiting certain types of foods, and has slowly been adding things back to her diet.  She has been a vegetarian or pescatarian and eats fish several days during the week.  She used to do whey powder for protein, but does not do this as regularly anymore.      Therapies/HEP,  She is not currently participating in outpatient physical therapy.  She is about to start lymphedema therapy in 2 weeks.      Functionally,   Remains independent with mobility, ADLs and IADLs.             Past Medical and Surgical History:     Past Medical History:   Diagnosis Date     Abnormal Pap smear     maybe 20 years ago     Cervical cancer (H)      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      History of breast cancer 2003    lumpectomy and radiation offerred chemo and patient declined at time but had oncogene test and low risk     Hyperlipidemia LDL goal < 160      Osteopenia      Paroxysmal A-fib (H)      Severe vaginal dysplasia      Past Surgical History:   Procedure Laterality Date     BIOPSY       BREAST SURGERY Left 2003    lumpectomy left, did radiation, 5 yr of tamoxifen     COLONOSCOPY  2018    repeat in 2028     Colposcopy Cervix with Loop Electrode Conization and Lser to Vagina  2008     COLPOSCOPY, BIOPSY, COMBINED  10/24/2012    Procedure: COMBINED COLPOSCOPY, BIOPSY;  Colposcopy, Biopsy of Cervix and Vagina, Ultrasound Guidance;  Surgeon: Colette Ng MD;  Location: [facility] OR     ENT SURGERY  01/23/2018    otoscelerosis, right side     HYSTERECTOMY, FRANCIA  12/2012    high grade cervical dysplasia, [place] [facility],   [facility]     INSERT INTERSTITIAL NEEDLE, ULTRASOUND GUIDED N/A 2023    Procedure: INSERTION, NEEDLE, WITH ULTRASOUND GUIDANCE, FOR INTERSTITIAL BRACHYTHERAPY;  Surgeon: Raymond Stockton MD;  Location: [facility] OR     INSERT PORT VASCULAR ACCESS Right 2022    Procedure: INSERTION, VASCULAR ACCESS PORT;  Surgeon: Donnie Elias MD;  Location: [facility] OR     IR CHEST PORT PLACEMENT > 5 YRS OF AGE  2022     IR PORT REMOVAL RIGHT  3/30/2023     SD LAP VAGINECTOMY, PARTIAL REMOVAL OF VAGINAL WALL  10/2013    upper vaginectomy for dysplasia, Dr. Megan Arciniega     REMOVE INTERSTITIAL NEEDLE N/A 2/15/2023    Procedure: REMOVAL, INTERSTITIAL NEEDLE, AFTER TEMPORARY BRACHYTHERAPY;  Surgeon: Raymond Stockton MD;  Location: [facility] OR     REMOVE PORT VASCULAR ACCESS Right 3/30/2023    Procedure: Remove port vascular access right;  Surgeon: Donnie Elias MD;  Location: [facility] OR            Social History:     Social History     Tobacco Use     Smoking status: Former     Packs/day: 0.50     Years: 15.00     Pack years: 7.50     Types: Cigarettes     Quit date:      Years since quittin.2     Smokeless tobacco: Never   Vaping Use     Vaping status: Never Used   Substance Use Topics     Alcohol use: Not Currently     Alcohol/week: 2.0 standard drinks of alcohol     Types: 2 Standard drinks or equivalent per week     Comment: Socially             Functional history:     Cordell Donaldson is independent with all aspects of her life.    ADLs: Independent  Assistive devices: None  iADLs (medication management and finances): Independent  Driving: Not driving           Family History:     Family History   Problem Relation Age of Onset     Myocardial Infarction Mother 73        Tob     Myocardial Infarction Father 68        Tob     Breast Cancer Sister 47         of breast cancer age 63; BRCA mutation testing negative     Cancer Maternal Grandmother         Unsure of type.     Breast Cancer Paternal Grandmother         Unsure of  "diagnosis--some type of \"women's issue\"     Anesthesia Reaction No family hx of      Deep Vein Thrombosis (DVT) No family hx of             Medications:     Current Outpatient Medications   Medication Sig Dispense Refill     acetaminophen (TYLENOL) 325 MG tablet Take 2 tablets (650 mg) by mouth every 6 hours as needed for mild pain (Patient taking differently: Take 500 mg by mouth every 6 hours as needed for mild pain)       Acetylcarnitine HCl 250 MG CAPS Take 500 mg by mouth 2 times daily        aspirin (ASA) 81 MG chewable tablet Take 81 mg by mouth every evening       Bacillus Coagulans-Inulin (PROBIOTIC FORMULA) 1-250 BILLION-MG CAPS Take by mouth every morning 25+billion CFUS       BENFOTIAMINE PO Take 150 mg by mouth every morning       Cholecalciferol (VITAMIN D-3) 25 MCG (1000 UT) CAPS Take 1,000 Units by mouth every morning 90 capsule 3     coenzyme Q-10 capsule Take 1 capsule by mouth daily       levothyroxine (SYNTHROID/LEVOTHROID) 50 MCG tablet Take 1 tablet (50 mcg) by mouth daily before breakfast 30 tablet 1     MAGNESIUM OXIDE PO Take 400 mg by mouth 2 times daily       melatonin 5 MG tablet Take 10 mg by mouth nightly as needed for sleep       metoprolol succinate ER (TOPROL XL) 50 MG 24 hr tablet TAKE 1 TABLET BY MOUTH EVERY DAY (Patient taking differently: Take 50 mg by mouth every evening) 90 tablet 2     multivitamin w/minerals (THERA-VIT-M) tablet Take 1 tablet by mouth every morning       Omega-3 Fatty Acids (OMEGA-3 FISH OIL PO) Take 630 mg by mouth 2 times daily        OVER-THE-COUNTER Turkey tail mushroom powder, use 3 times a day 1 Can 11     Taurine 1000 MG CAPS every morning 60 capsule 11     UNABLE TO FIND 1,200 mg 2 times daily MEDICATION NAME: Goltry       UNABLE TO FIND 150 mg daily MEDICATION NAME: Silymarin       UNABLE TO FIND 2 times daily MEDICATION NAME: [facility] for 1000mg calcium  4 pills per day       Pectin Cit-Inos-C-Bioflav-Soy (MODIFIED CITRUS PECTIN PO) Take by mouth 2 " "times daily (Patient not taking: Reported on 4/6/2023)       UNABLE TO FIND 1 capsule 2 times daily MEDICATION NAME: Alkylgycerols (Patient not taking: Reported on 4/6/2023)       UNABLE TO FIND Take 1 tablet by mouth every morning MEDICATION NAME: Dinndolylmethane Complex (DIM) 100mg tablet 1 table daily (Patient not taking: Reported on 4/6/2023)              Allergies:     No Known Allergies           ROS:     A focused ROS is negative other than the symptoms noted above in the HPI.           Physical Examiniation:     VITAL SIGNS: /79   Pulse 85   Temp 97.5  F (36.4  C) (Oral)   Resp 16   Wt 58.1 kg (128 lb)   LMP  (LMP Unknown)   SpO2 97%   BMI 22.67 kg/m    BMI: Estimated body mass index is 22.67 kg/m  as calculated from the following:    Height as of 3/30/23: 1.6 m (5' 3\").    Weight as of this encounter: 58.1 kg (128 lb).    Gen: NAD, pleasant and cooperative  HEENT: Normocephalic, atraumatic, extra-ocular movements appear intact  Pulm: non-labored breathing in room air  Ext: no edema in BLE, no tenderness in calves.  Edema present in abdomen, abdomen tense.  No lower extremity lymphedema present on exam.  Neuro/MSK:   Orientation: Oriented to person, place, time, situation. Exhibits good insight into her condition and ongoing management/symptoms.  Motor: 4 /5 with bilateral shoulder abduction, 5/5 elbow extension, wrist extension and  strength/finger intrinsics. 4/5 with bilateral hip flexion, 5/5 with bilateral knee extension, ankle dorsiflexion, EHL, plantar flexion.   Sensory: intact to light touch  throughout all dermatomes in bilateral upper and lower extremities.           Laboratory/Imaging:     CT Abdomen/Pelvis W Contrast (2/9/23):  IMPRESSION:     1. Moderate volume ascites, diffuse subcutaneous edema, small pleural  effusions, cardiomegaly and pericardial effusion. Overall suggestive  of fluid third spacing.  2. No evidence of peritoneal carcinomatosis.  3. Cervical mass and " bilateral tubular/cystic structures in the  adnexa. See same day MRI for detailed pelvic findings.           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  As discussed with Cordell, she would benefit from outpatient physical therapy to help with overall fatigue and generalized deconditioning based on physical exam today, so this referral was placed.  In addition we reviewed nutritional recommendations including recommendation to get an additional 30 to 60 g of protein in daily to help with supplementation.  Resources for protein were given to her at the visit today.  She is not experiencing any pain related neuropathic symptoms, so does not need any neuropathic pain medications at this point.  She should start lymphedema therapy as planned to help with therapy techniques to reduce abdominal swelling.  She was also instructed that if swelling gets worse to contact her oncology team for further evaluation.  We will plan a return visit in 3 months.  Patient is in agreement with this plan.    1. Patient education: In depth discussion and education was provided about the assessment and implications of each of the below recommendations for management. Patient indicated readiness to learn, all questions were answered and understanding of material presented was confirmed.  2. Work-up: Should obtain labs with PCP on Monday as planned.  3. Therapy/equipment/braces:  1. Physical therapy referral placed.  2. Start taking 30 to 60 g of protein supplementation daily.  As discussed, can try Premier protein shakes or Nestlé fair life core power protein shakes.  Patient can also restart whey protein.  3. Start lymphedema therapy as planned.  4. Follow up: 3 months.    Aleta Shea MD  Physical Medicine & Rehabilitation    I  appreciate the opportunity to participate in the care of your patient.     90 minutes spent on the date of the encounter doing chart review, history and exam, documentation and further activities as noted above.

## 2023-04-06 NOTE — NURSING NOTE
"Oncology Rooming Note    April 6, 2023 2:02 PM   Cordell Donaldson is a 72 year old female who presents for:    Chief Complaint   Patient presents with     Oncology Clinic Visit     Recurrent cervical cancer      Initial Vitals: /79   Pulse 85   Temp 97.5  F (36.4  C) (Oral)   Resp 16   Wt 58.1 kg (128 lb)   LMP  (LMP Unknown)   SpO2 97%   BMI 22.67 kg/m   Estimated body mass index is 22.67 kg/m  as calculated from the following:    Height as of 3/30/23: 1.6 m (5' 3\").    Weight as of this encounter: 58.1 kg (128 lb). Body surface area is 1.61 meters squared.  No Pain (0) Comment: Data Unavailable   No LMP recorded (lmp unknown). Patient has had a hysterectomy.  Allergies reviewed: Yes  Medications reviewed: Yes    Medications: Medication refills not needed today.  Pharmacy name entered into [employer]:    [id number] - [place], [place] - [address]  [id number] - [place], [place] - [facility] [place], [place] - [address] [place] [address] [phone]    Clinical concerns:  New pt consult.      Swetha James CMA              "

## 2023-04-06 NOTE — LETTER
2023         RE: Cordell Donaldson  [address]  [facility] [id number]        Dear Colleague,    Thank you for referring your patient, Cordell Donaldson, to the [facility]. Please see a copy of my visit note below.           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    Oncology history:  Breast Cancer History:  : Left breast cancer.  -Lumpectomy.  -Radiation therapy.  [id number]: BRENNAN.   22: PET/CT to stage vaginal cancer (see below).   Mild soft tissue  thickening in the left retropectoral region with an SUV max of 2.7.  7/15/22: Invasive ductal carcinoma.   -Consultation with Dr. Montana. Recommendation to prioritize vaginal/recurrent cervix cancer. Plan to follow breast lesion with repeat MRI. Plan for endocrine therapy after completion of chemotherapy for vaginal/cervical cancer.   Cervical/Vaginal Dysplasia/Cancer History:  10/24/12:   -Endocervical curettings: Squamous carcinoma, invasion cannot be assessed.   -Ectocervical biopsies: HSIL (CIN3).   -Vaginal biopsy, posterior: HSIL (VaIN3).  2012: Robotic-assisted laparoscopic lysis of adhesions, left ureterolysis with  conversion to laparotomy, total abdominal hysterectomy, and an upper  Vaginectomy by gynecologic oncologist Dr. Arciniega.   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   13: Vaginal biopsy: HSIL, cannot exclude invasion.   -Consultation with gynecologic oncologist Dr. Johnson. Upper vaginectomy and CO2 laser ablation recommended, patient declined.    [id number]: No vaginal dysplasia treatment.   5/20/22: Pelvic MRI: I have personally reviewed the MRI images.   Centrally hypoenhancing heterogeneous midline pelvic mass along  the superior margin of the vaginal vault measures 8.5 x 6.7 x 8.1 cm.  The mass abut the bladder and there is mucosal irregularity at the  site of abutment. The mass indents the  decompressed rectum but there is no definite invasion or mucosal  Irregularity.  6/9/22: CT-guided biopsy of pelvic mass: Poorly-differentiated squamous cell carcinoma, HPV-associated.   7/1/22: Staging PET/CT: Stage III vaginal cancer (vs recurrent, metastatic cervical cancer). I have personally reviewed the PET/CT images.   7/20/22, 8/10/22, 8/31/22,9/21/22, 10/26/22, 11/21/22: Cycles #1-6 of first-line chemotherapy with IV platinum + paclitaxel + bevacizumab 15 mg/kg + pembrolizumab 200 mg (added cycle 3) every 21 days.   -Cycles 1-4: Cisplatin 50 mg/m2, paclitaxel 175 mg/m2.   -Cycle 3:  Pembrolizumab added to all subsequent cycles due to PD-L1+ result per the KEYNOTE-826 regimen.  -9/22/22: PET/CT: Partial response. I have personally reviewed the PET/CT images.   -Discussed with radiation oncologist Dr. Stockton and at the gyn onc/radiation oncology tumor conference. Recommendation for additional chemotherapy prior to radiation therapy.   -Cycles 5+: Platinum changed to carboplatin due to tinnitus, and paclitaxel dose-reduced to 135 mg/m2 due to peripheral neuropathy.   -12/5/22: PET/CT: Partial response. I have personally reviewed the PET/CT images.   Diffuse uptake throughout the thyroid gland with SUV max 5.5, likely  Thyroiditis.  A few small pulmonary nodules  are stable, including a 2 mm solid pulmonary nodule in the left upper  lobe. No new or enlarging pulmonary nodules.  The heart  remains enlarged. No significant pericardial effusion.   Prominent  precarinal lymph node without significant FDG uptake. No suspicious  lymphadenopathy in the  mediastinum.  Decreased size of centrally hypodense, necrotic cervical mass, now  measuring 5.3 x 4.1 cm (previously 6.1 x 4.3 cm)  when measured similarly. There is resolution of previously seen  enhancing component along the right lateral wall and resolution of  previous hypermetabolic activity. There is also resolution of  previously seen hypermetabolic pelvic lymph nodes.  12/28/22-2/15/23: First-line pelvic external beam radiation therapy with total dose 4500 cGy in 25 fractions then vaginal cuff brachytherapy with total dose 2500 cGy in 5 fractions. No concurrent chemosensitization due to myelosuppression and decreased patient tolerance of therapy.   Genetic/Genomic/Molecular Testing History:  2006: Negative for germline BRCA1, BRCA2 pathogenic variants.   8/14/22 (specimen from 6/9/22): [facility] Testing.   -PD-L1+ (CPS 10)  -Mismatch repair deficient/microsatellite instability-high  -TMB high (53 mut/Mb)  -NTRK 1/2/3 fusion not detected.         Symptoms,  Patient presents for an initial evaluation today.  She has been experiencing peripheral neuropathy symptoms, and complains of mainly numbness in the bottom of her feet.  She denies any pain related to neuropathy symptoms like tingling, burning.  She does not  endorse any balance difficulties or falls/near falls at home or out in the community.  She is currently not driving due to her ongoing fatigue and treatments.  She also complains of cancer related fatigue which has been very challenging.  She is a very active person and continues to try to remain active, but states that the fatigue really gets her down.  She has an appointment with her primary care physician on Monday to evaluate  for anemia and thyroid function which may be contributing to her fatigue.   She continues to remain active and walks half a mile per day.  She also participates in yoga twice weekly.  She has pretty tense abdominal lymphedema, and has been followed by her oncology team.  She has  a lymphedema therapy appointment set up in 2 weeks and the [facility] to help with therapy techniques such as MLD to reduce swelling.  She has not been using any assistive device for help with ambulation and transfers even in the setting of her generalized weakness and fatigue.  She states that she has been hanging onto her  especially when walking outside on uneven terrain.  She does not possess an assistive device, and really does not want to have to get 1.  She has been struggling with her diet as she has had diarrhea for quite a while, but in the last 10 days it is getting better due to some measures that she has been taking.  She has been limiting certain types of foods, and has slowly been adding things back to her diet.  She has been a vegetarian or pescatarian and eats fish several days during the week.  She used to do whey powder for protein, but does not do this as regularly anymore.      Therapies/HEP,  She is not currently participating in outpatient physical therapy.  She is about to start lymphedema therapy in 2 weeks.      Functionally,   Remains independent with mobility, ADLs and IADLs.             Past Medical and Surgical History:     Past Medical History:   Diagnosis Date    Abnormal Pap smear     maybe 20 years ago    Cervical cancer (H)     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     History of breast cancer 2003    lumpectomy and radiation offerred chemo and patient declined at time but had oncogene test and low risk    Hyperlipidemia LDL goal < 160     Osteopenia     Paroxysmal A-fib (H)     Severe vaginal dysplasia      Past Surgical History:   Procedure Laterality Date    BIOPSY      BREAST SURGERY Left 2003    lumpectomy left, did radiation, 5 yr of tamoxifen    COLONOSCOPY  2018    repeat in 2028    Colposcopy Cervix with Loop Electrode Conization and Lser to Vagina  2008    COLPOSCOPY, BIOPSY, COMBINED  10/24/2012    Procedure: COMBINED  COLPOSCOPY, BIOPSY;  Colposcopy, Biopsy of Cervix and Vagina, Ultrasound Guidance;  Surgeon: Colette Ng MD;  Location: [facility] OR    ENT SURGERY  2018    otoscelerosis, right side    HYSTERECTOMY, FRANCIA  2012    high grade cervical dysplasia, [facility], Dr. Arciniega    INSERT INTERSTITIAL NEEDLE, ULTRASOUND GUIDED N/A 2023    Procedure: INSERTION, NEEDLE, WITH ULTRASOUND GUIDANCE, FOR INTERSTITIAL BRACHYTHERAPY;  Surgeon: Raymond Stockton MD;  Location: [facility] OR    INSERT PORT VASCULAR ACCESS Right 2022    Procedure: INSERTION, VASCULAR ACCESS PORT;  Surgeon: Donnie Elias MD;  Location: [facility] OR    IR CHEST PORT PLACEMENT > 5 YRS OF AGE  2022    IR PORT REMOVAL RIGHT  3/30/2023    MI LAP VAGINECTOMY, PARTIAL REMOVAL OF VAGINAL WALL  10/2013    upper vaginectomy for dysplasia, Dr. Megan Arciniega    REMOVE INTERSTITIAL NEEDLE N/A 2/15/2023    Procedure: REMOVAL, INTERSTITIAL NEEDLE, AFTER TEMPORARY BRACHYTHERAPY;  Surgeon: Raymond Stockton MD;  Location: [facility] OR    REMOVE PORT VASCULAR ACCESS Right 3/30/2023    Procedure: Remove port vascular access right;  Surgeon: Donnie Elias MD;  [place] [facility] [place]            Social History:     Social History     Tobacco Use    Smoking status: Former     Packs/day: 0.50     Years: 15.00     Pack years: 7.50     Types: Cigarettes     Quit date:      Years since quittin.2    Smokeless tobacco: Never   Vaping Use    Vaping status: Never Used   Substance Use Topics    Alcohol use: Not Currently     Alcohol/week: 2.0 standard drinks of alcohol     Types: 2 Standard drinks or equivalent per week     Comment: Socially             Functional history:     Cordell Donaldson is independent with all aspects of her life.    ADLs: Independent  Assistive devices: None  iADLs (medication management and finances): Independent  Driving: Not driving           Family History:     Family History   Problem Relation Age of Onset    Myocardial Infarction Mother 73        Tob     "Myocardial Infarction Father 68        Tob    Breast Cancer Sister 47         of breast cancer age 63; BRCA mutation testing negative    Cancer Maternal Grandmother         Unsure of type.    Breast Cancer Paternal Grandmother         Unsure of diagnosis--some type of \"women's issue\"    Anesthesia Reaction No family hx of     Deep Vein Thrombosis (DVT) No family hx of             Medications:     Current Outpatient Medications   Medication Sig Dispense Refill    acetaminophen (TYLENOL) 325 MG tablet Take 2 tablets (650 mg) by mouth every 6 hours as needed for mild pain (Patient taking differently: Take 500 mg by mouth every 6 hours as needed for mild pain)      Acetylcarnitine HCl 250 MG CAPS Take 500 mg by mouth 2 times daily       aspirin (ASA) 81 MG chewable tablet Take 81 mg by mouth every evening      Bacillus Coagulans-Inulin (PROBIOTIC FORMULA) 1-250 BILLION-MG CAPS Take by mouth every morning 25+billion CFUS      BENFOTIAMINE PO Take 150 mg by mouth every morning      Cholecalciferol (VITAMIN D-3) 25 MCG (1000 UT) CAPS Take 1,000 Units by mouth every morning 90 capsule 3    coenzyme Q-10 capsule Take 1 capsule by mouth daily      levothyroxine (SYNTHROID/LEVOTHROID) 50 MCG tablet Take 1 tablet (50 mcg) by mouth daily before breakfast 30 tablet 1    MAGNESIUM OXIDE PO Take 400 mg by mouth 2 times daily      melatonin 5 MG tablet Take 10 mg by mouth nightly as needed for sleep      metoprolol succinate ER (TOPROL XL) 50 MG 24 hr tablet TAKE 1 TABLET BY MOUTH EVERY DAY (Patient taking differently: Take 50 mg by mouth every evening) 90 tablet 2    multivitamin w/minerals (THERA-VIT-M) tablet Take 1 tablet by mouth every morning      Omega-3 Fatty Acids (OMEGA-3 FISH OIL PO) Take 630 mg by mouth 2 times daily       OVER-THE-COUNTER Turkey tail mushroom powder, use 3 times a day 1 Can 11    Taurine 1000 MG CAPS every morning 60 capsule 11    UNABLE TO FIND 1,200 mg 2 times daily MEDICATION NAME: Efraín      " "UNABLE TO FIND 150 mg daily MEDICATION NAME: Silymarin      UNABLE TO FIND 2 times daily MEDICATION NAME: [facility] for 1000mg calcium  4 pills per day      Pectin Cit-Inos-C-Bioflav-Soy (MODIFIED CITRUS PECTIN PO) Take by mouth 2 times daily (Patient not taking: Reported on 4/6/2023)      UNABLE TO FIND 1 capsule 2 times daily MEDICATION NAME: Alkylgycerols (Patient not taking: Reported on 4/6/2023)      UNABLE TO FIND Take 1 tablet by mouth every morning MEDICATION NAME: Dinndolylmethane Complex (DIM) 100mg tablet 1 table daily (Patient not taking: Reported on 4/6/2023)              Allergies:     No Known Allergies           ROS:     A focused ROS is negative other than the symptoms noted above in the HPI.           Physical Examiniation:     VITAL SIGNS: /79   Pulse 85   Temp 97.5  F (36.4  C) (Oral)   Resp 16   Wt 58.1 kg (128 lb)   LMP  (LMP Unknown)   SpO2 97%   BMI 22.67 kg/m    BMI: Estimated body mass index is 22.67 kg/m  as calculated from the following:    Height as of 3/30/23: 1.6 m (5' 3\").    Weight as of this encounter: 58.1 kg (128 lb).    Gen: NAD, pleasant and cooperative  HEENT: Normocephalic, atraumatic, extra-ocular movements appear intact  Pulm: non-labored breathing in room air  Ext: no edema in BLE, no tenderness in calves.  Edema present in abdomen, abdomen tense.  No lower extremity lymphedema present on exam.  Neuro/MSK:   Orientation: Oriented to person, place, time, situation. Exhibits good insight into her condition and ongoing management/symptoms.  Motor: 4 /5 with bilateral shoulder abduction, 5/5 elbow extension, wrist extension and  strength/finger intrinsics. 4/5 with bilateral hip flexion, 5/5 with bilateral knee extension, ankle dorsiflexion, EHL, plantar flexion.   Sensory: intact to light touch  throughout all dermatomes in bilateral upper and lower extremities.           Laboratory/Imaging:     CT Abdomen/Pelvis W Contrast (2/9/23):  IMPRESSION:     1. Moderate " volume ascites, diffuse subcutaneous edema, small pleural  effusions, cardiomegaly and pericardial effusion. Overall suggestive  of fluid third spacing.  2. No evidence of peritoneal carcinomatosis.  3. Cervical mass and bilateral tubular/cystic structures in the  adnexa. See same day MRI for detailed pelvic findings.           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  As discussed with Cordell, she would benefit from outpatient physical therapy to help with overall fatigue and generalized deconditioning based on physical exam today, so this referral was placed.  In addition we reviewed nutritional recommendations including recommendation to get an additional 30 to 60 g of protein in daily to help with supplementation.  Resources for protein were given to her at the visit today.  She is not experiencing any pain related neuropathic symptoms, so does not need any neuropathic pain medications at this point.  She should start lymphedema therapy as planned to help with therapy techniques to reduce abdominal swelling.  She was also instructed that if swelling gets worse to contact her oncology team for further evaluation.  We will plan a return visit in 3 months.  Patient is in agreement with this plan.    Patient education: In depth discussion and education was provided about the assessment and implications of each of the below recommendations for management. Patient indicated readiness to learn, all questions were answered and understanding of material presented was confirmed.  Work-up: Should obtain labs with PCP on Monday as planned.  Therapy/equipment/braces:  Physical therapy referral placed.  Start taking 30 to 60 g of protein supplementation daily.  As discussed, can try Premier protein shakes or Nestlé fair  life core power protein shakes.  Patient can also restart whey protein.  Start lymphedema therapy as planned.  Follow up: 3 months.    Aleta Shea MD  Physical Medicine & Rehabilitation    I appreciate the opportunity to participate in the care of your patient.     90 minutes spent on the date of the encounter doing chart review, history and exam, documentation and further activities as noted above.

## 2023-04-06 NOTE — PATIENT INSTRUCTIONS
1.  A physical therapy referral was placed.  2.  Start taking an extra 30 to 60 g of protein daily as we discussed.  3.  Keep scheduled lymphedema therapy appointment planned.  4.  Follow-up with Dr. Shea in 3 months.

## 2023-04-06 NOTE — TELEPHONE ENCOUNTER
Action April 6, 2023 7:59 AM IVANNA   Action Taken Next day appt - Internal Referral, no outside records

## 2023-04-10 ENCOUNTER — OFFICE VISIT (OUTPATIENT)
Dept: FAMILY MEDICINE | Facility: CLINIC | Age: 73
End: 2023-04-10
Payer: COMMERCIAL

## 2023-04-10 VITALS
HEIGHT: 63 IN | SYSTOLIC BLOOD PRESSURE: 108 MMHG | HEART RATE: 83 BPM | RESPIRATION RATE: 15 BRPM | WEIGHT: 129 LBS | OXYGEN SATURATION: 97 % | DIASTOLIC BLOOD PRESSURE: 75 MMHG | BODY MASS INDEX: 22.86 KG/M2 | TEMPERATURE: 97.7 F

## 2023-04-10 DIAGNOSIS — Z87.411: ICD-10-CM

## 2023-04-10 DIAGNOSIS — D64.9 ANEMIA, UNSPECIFIED TYPE: Primary | ICD-10-CM

## 2023-04-10 DIAGNOSIS — E03.8 OTHER SPECIFIED HYPOTHYROIDISM: ICD-10-CM

## 2023-04-10 DIAGNOSIS — R14.0 ABDOMINAL DISTENSION: ICD-10-CM

## 2023-04-10 DIAGNOSIS — I48.0 PAROXYSMAL ATRIAL FIBRILLATION (H): ICD-10-CM

## 2023-04-10 DIAGNOSIS — R60.0 LOWER EXTREMITY EDEMA: ICD-10-CM

## 2023-04-10 LAB
ALBUMIN SERPL BCG-MCNC: 3.4 G/DL (ref 3.5–5.2)
ALP SERPL-CCNC: 62 U/L (ref 35–104)
ALT SERPL W P-5'-P-CCNC: 9 U/L (ref 10–35)
ANION GAP SERPL CALCULATED.3IONS-SCNC: 12 MMOL/L (ref 7–15)
AST SERPL W P-5'-P-CCNC: 19 U/L (ref 10–35)
BILIRUB SERPL-MCNC: 0.2 MG/DL
BUN SERPL-MCNC: 16.8 MG/DL (ref 8–23)
CALCIUM SERPL-MCNC: 8.7 MG/DL (ref 8.8–10.2)
CHLORIDE SERPL-SCNC: 96 MMOL/L (ref 98–107)
CREAT SERPL-MCNC: 0.78 MG/DL (ref 0.51–0.95)
DEPRECATED HCO3 PLAS-SCNC: 24 MMOL/L (ref 22–29)
ERYTHROCYTE [DISTWIDTH] IN BLOOD BY AUTOMATED COUNT: 16.3 % (ref 10–15)
FERRITIN SERPL-MCNC: 275 NG/ML (ref 11–328)
GFR SERPL CREATININE-BSD FRML MDRD: 81 ML/MIN/1.73M2
GLUCOSE SERPL-MCNC: 104 MG/DL (ref 70–99)
HCT VFR BLD AUTO: 29.8 % (ref 35–47)
HGB BLD-MCNC: 9 G/DL (ref 11.7–15.7)
MCH RBC QN AUTO: 30.5 PG (ref 26.5–33)
MCHC RBC AUTO-ENTMCNC: 30.2 G/DL (ref 31.5–36.5)
MCV RBC AUTO: 101 FL (ref 78–100)
PLATELET # BLD AUTO: 392 10E3/UL (ref 150–450)
POTASSIUM SERPL-SCNC: 5.2 MMOL/L (ref 3.4–5.3)
PROT SERPL-MCNC: 5.8 G/DL (ref 6.4–8.3)
RBC # BLD AUTO: 2.95 10E6/UL (ref 3.8–5.2)
SODIUM SERPL-SCNC: 132 MMOL/L (ref 136–145)
T4 FREE SERPL-MCNC: 0.64 NG/DL (ref 0.9–1.7)
TSH SERPL DL<=0.005 MIU/L-ACNC: 46.1 UIU/ML (ref 0.3–4.2)
WBC # BLD AUTO: 7.7 10E3/UL (ref 4–11)

## 2023-04-10 PROCEDURE — 80053 COMPREHEN METABOLIC PANEL: CPT | Performed by: INTERNAL MEDICINE

## 2023-04-10 PROCEDURE — 82728 ASSAY OF FERRITIN: CPT | Performed by: INTERNAL MEDICINE

## 2023-04-10 PROCEDURE — 84443 ASSAY THYROID STIM HORMONE: CPT | Performed by: INTERNAL MEDICINE

## 2023-04-10 PROCEDURE — 85027 COMPLETE CBC AUTOMATED: CPT | Performed by: INTERNAL MEDICINE

## 2023-04-10 ASSESSMENT — ANXIETY QUESTIONNAIRES
IF YOU CHECKED OFF ANY PROBLEMS ON THIS QUESTIONNAIRE, HOW DIFFICULT HAVE THESE PROBLEMS MADE IT FOR YOU TO DO YOUR WORK, TAKE CARE OF THINGS AT HOME, OR GET ALONG WITH OTHER PEOPLE: NOT DIFFICULT AT ALL
1. FEELING NERVOUS, ANXIOUS, OR ON EDGE: NOT AT ALL
GAD7 TOTAL SCORE: 0
3. WORRYING TOO MUCH ABOUT DIFFERENT THINGS: NOT AT ALL
6. BECOMING EASILY ANNOYED OR IRRITABLE: NOT AT ALL
2. NOT BEING ABLE TO STOP OR CONTROL WORRYING: NOT AT ALL
7. FEELING AFRAID AS IF SOMETHING AWFUL MIGHT HAPPEN: NOT AT ALL
5. BEING SO RESTLESS THAT IT IS HARD TO SIT STILL: NOT AT ALL
GAD7 TOTAL SCORE: 0

## 2023-04-10 ASSESSMENT — PATIENT HEALTH QUESTIONNAIRE - PHQ9
SUM OF ALL RESPONSES TO PHQ QUESTIONS 1-9: 5
5. POOR APPETITE OR OVEREATING: NOT AT ALL

## 2023-04-10 NOTE — NURSING NOTE
"72 year old  Chief Complaint   Patient presents with     RECHECK     Follow up from oncology visit. Needs hemoglobin a1c and thyroid checked.        Blood pressure 108/75, pulse 83, temperature 97.7  F (36.5  C), temperature source Skin, resp. rate 15, height 1.6 m (5' 3\"), weight 58.5 kg (129 lb), SpO2 97 %, not currently breastfeeding. Body mass index is 22.85 kg/m .  Patient Active Problem List   Diagnosis     Dupuytren contracture     Abnormal electrocardiogram     Atrial fibrillation (H)     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     Hyperlipidemia LDL goal <130     Malignant neoplasm of breast (H)     Rectocele     Pelvic pain in female     History of intraepithelial neoplasia of vagina     Retroperitoneal lymphadenopathy     Malignant neoplasm of endocervix (H)     Recurrent cervical cancer (H)     Cervical cancer (H)       Wt Readings from Last 2 Encounters:   04/10/23 58.5 kg (129 lb)   04/06/23 58.1 kg (128 lb)     BP Readings from Last 3 Encounters:   04/10/23 108/75   04/06/23 112/79   03/30/23 106/68         Current Outpatient Medications   Medication     acetaminophen (TYLENOL) 325 MG tablet     Acetylcarnitine HCl 250 MG CAPS     aspirin (ASA) 81 MG chewable tablet     Bacillus Coagulans-Inulin (PROBIOTIC FORMULA) 1-250 BILLION-MG CAPS     BENFOTIAMINE PO     Cholecalciferol (VITAMIN D-3) 25 MCG (1000 UT) CAPS     coenzyme Q-10 capsule     levothyroxine (SYNTHROID/LEVOTHROID) 50 MCG tablet     MAGNESIUM OXIDE PO     melatonin 5 MG tablet     metoprolol succinate ER (TOPROL XL) 50 MG 24 hr tablet     Omega-3 Fatty Acids (OMEGA-3 FISH OIL PO)     OVER-THE-COUNTER     Taurine 1000 MG CAPS     UNABLE TO FIND     UNABLE TO FIND     UNABLE TO FIND     UNABLE TO FIND     multivitamin w/minerals (THERA-VIT-M) tablet     Pectin Cit-Inos-C-Bioflav-Soy (MODIFIED CITRUS PECTIN PO)     UNABLE TO FIND     No current facility-administered medications for this visit.       Social " History     Tobacco Use     Smoking status: Former     Packs/day: 0.50     Years: 15.00     Pack years: 7.50     Types: Cigarettes     Quit date:      Years since quittin.2     Smokeless tobacco: Never   Vaping Use     Vaping status: Never Used   Substance Use Topics     Alcohol use: Not Currently     Alcohol/week: 2.0 standard drinks of alcohol     Types: 2 Standard drinks or equivalent per week     Comment: Socially      Drug use: No       Health Maintenance Due   Topic Date Due     DEPRESSION ACTION PLAN  Never done     Pneumococcal Vaccine: 65+ Years (1 - PCV) Never done     ZOSTER IMMUNIZATION (2 of 2) 2020     INFLUENZA VACCINE (1) 2022     PAP  2023       Lab Results   Component Value Date    PAP NIL 2020         April 10, 2023 3:02 PM

## 2023-04-10 NOTE — PROGRESS NOTES
"Cordell Donaldson is a 72 year old female with hx of recurrent cervical and vaginal cancer, breast cancer, atrial fibrillation, hyperlipidemia, hypothyroidism here for the following issues:    Recurrent Cervical Cancer  Cordell was treated in 2012 with FRANCIA, due to high grade cervical dysplasia. Ovaries remained intact. In 10/2013, she underwent upper vaginectomy 10/2013, for vaginal dysplasia. In April 2022, I saw Maritza for symptoms of lower abdominal pain, urinary frequency and pressure over her pelvis. She was referred to uro/gyn team. Pelvic/transvaginal ultrasound 5/2022 showed large heterogenous mass, midline pelvis. She is currently following with gyn/onc, Dr. Raygoza and with Dr. Stockton, radiation oncologist.     She recently completed first-line pelvic external beam radiation therapy, then vaginal cuff brachytherapy.  No concurrent chemosensitization due to myelosuppression and decreased patient tolerance of therapy. Cordell reports vaginal drainage daily.  More drainage occurs if she lays down and relaxes rather than \"running around.\" Denies bloody vaginal discharge or blood in the stool.     Cordell reports struggling with the side effects of her cancer treatment. She describes lymphedema, diarrhea, and extreme fatigue. She feels that she can walk a maximum of 0.5 miles. She has loose stools but notes that her stool has begun to form a little in the last 2 weeks, after stopping dairy intake.  She thinks her tumor may be pressing on her anus.       Hypothyroidism  Cordell has hypothyroidism. Her most recent TSH was 35.50 on 2/13/2023 with a T4 of 0.37. She takes levothyroxine 50 mcg daily and has been consistent with this over the last few weeks. She takes this around 6 AM.  Her oncology team has recommended she get a TSH level and labs to check for anemia today.      TSH   Date Value Ref Range Status   02/13/2023 35.50 (H) 0.30 - 4.20 uIU/mL Final   08/22/2017 2.16 0.40 - 4.00 mU/L Final      Ascites  She " "also has noted abdominal ascites on recent scans with unclear significance. There was concern for malnutrition as she is a pescatarian. She notes that her left ankle and leg became swollen after she tried eating some whey powder, and she is now using Pea powder instead. She has her first appointment for lymphedema therapy with manual lymphatic drainage tomorrow, 4/11/2023.    Situational Stress  Cordell states that she feels positive about the \"work that has been done\" in terms of her chemo, but is not sure she is willing to undergo any further chemotherapy. She notes that her quality of life has decreased significantly and the side effects of chemo are making it near impossible to complete her daily activities. She has support from her family, but does not see a therapist. She does have a meditation support group and another support group. Neither of these are focused on cancer, but some of the other members are dealing with cancer. She thinks it would take 6 months to get in to see a health psychologist. She feels that working with PT to get her physical body healthier may be more helpful for her, as that is \"more who I am.\"         4/10/2023     2:59 PM   PHQ   PHQ-9 Total Score 5   Q9: Thoughts of better off dead/self-harm past 2 weeks Not at all         8/9/2018     1:46 PM 4/10/2023     2:59 PM   HEMANT-7 SCORE   Total Score 0 (minimal anxiety)    Total Score 0 0       Atrial Fibrillation  Cordell has a history of atrial fibrillation. She is relatively asymptomatic, and has declined anticoagulation.  She recalls being told she has an extra beat a long time ago. She takes metoprolol succinate ER 50 mg nightly and aspirin 81 mg daily. Her BP was 108/75 today and her pulse was 83 today. Denies palpitations.    BP Readings from Last 3 Encounters:   04/10/23 108/75   04/06/23 112/79   03/30/23 106/68        Current Outpatient Medications   Medication Sig Dispense Refill     acetaminophen (TYLENOL) 325 MG tablet Take 2 " tablets (650 mg) by mouth every 6 hours as needed for mild pain (Patient taking differently: Take 500 mg by mouth every 6 hours as needed for mild pain)       Acetylcarnitine HCl 250 MG CAPS Take 500 mg by mouth 2 times daily        aspirin (ASA) 81 MG chewable tablet Take 81 mg by mouth every evening       Bacillus Coagulans-Inulin (PROBIOTIC FORMULA) 1-250 BILLION-MG CAPS Take by mouth every morning 25+billion CFUS       BENFOTIAMINE PO Take 150 mg by mouth every morning       Cholecalciferol (VITAMIN D-3) 25 MCG (1000 UT) CAPS Take 1,000 Units by mouth every morning 90 capsule 3     coenzyme Q-10 capsule Take 1 capsule by mouth daily       levothyroxine (SYNTHROID/LEVOTHROID) 50 MCG tablet Take 1 tablet (50 mcg) by mouth daily before breakfast 30 tablet 1     MAGNESIUM OXIDE PO Take 400 mg by mouth 2 times daily       melatonin 5 MG tablet Take 10 mg by mouth nightly as needed for sleep       metoprolol succinate ER (TOPROL XL) 50 MG 24 hr tablet TAKE 1 TABLET BY MOUTH EVERY DAY (Patient taking differently: Take 50 mg by mouth every evening) 90 tablet 2     Omega-3 Fatty Acids (OMEGA-3 FISH OIL PO) Take 630 mg by mouth 2 times daily        OVER-THE-COUNTER Turkey tail mushroom powder, use 3 times a day 1 Can 11     Taurine 1000 MG CAPS every morning 60 capsule 11     UNABLE TO FIND 1,200 mg 2 times daily MEDICATION NAME: [place]       UNABLE TO FIND 150 mg daily MEDICATION NAME: Silymarin       UNABLE TO FIND 2 times daily MEDICATION NAME: [facility] for 1000mg calcium  4 pills per day       UNABLE TO FIND Take 1 tablet by mouth every morning MEDICATION NAME: Dinndolylmethane Complex (DIM) 100mg tablet 1 table daily       multivitamin w/minerals (THERA-VIT-M) tablet Take 1 tablet by mouth every morning (Patient not taking: Reported on 4/10/2023)       Pectin Cit-Inos-C-Bioflav-Soy (MODIFIED CITRUS PECTIN PO) Take by mouth 2 times daily (Patient not taking: Reported on 4/6/2023)       UNABLE TO FIND 1 capsule 2 times  "daily MEDICATION NAME: Alkylgycerols (Patient not taking: Reported on 4/6/2023)         No Known Allergies     EXAM  /75 (BP Location: Right arm, Patient Position: Sitting, Cuff Size: Adult Small)   Pulse 83   Temp 97.7  F (36.5  C) (Skin)   Resp 15   Ht 1.6 m (5' 3\")   Wt 58.5 kg (129 lb)   LMP  (LMP Unknown)   SpO2 97%   BMI 22.85 kg/m    Gen: Alert, pleasant, NAD  COR: irregularly irregular rhythm, no murmur  Lungs: CTA bilaterally, no rhonchi, wheezes or rales  Abdomen: Protuberant abdomen, + fluid wave, nontender  Left Leg: +1 to +2 pitting edema from foot to knee.   Right Leg: No edema.      Assessment:  (D64.9) Anemia, unspecified type  (primary encounter diagnosis)  Comment: Anemia of undetermined significance. Ferritin is 275.  DDX includes poor iron intake, anemia of chronic disease, B2 deficiency.   Hemoglobin   Date Value Ref Range Status   04/10/2023 9.0 (L) 11.7 - 15.7 g/dL Final   06/23/2021 14.7 11.7 - 15.7 g/dL Final   Plan: CBC with platelets, Ferritin        Will check iron and B12 at future visit    (E03.8) Other specified hypothyroidism  Comment:  Currently taking levothyroxine 50mcg daily  TSH   Date Value Ref Range Status   04/10/2023 46.10 (H) 0.30 - 4.20 uIU/mL Final   08/22/2017 2.16 0.40 - 4.00 mU/L Final   Plan: TSH with free T4 reflex        Increase dose to levothyroxine 75mcg daily, recheck in 6 wks    (R14.0) Abdominal distension  Comment: increasing girth, discomfort, + fluid wave, recent diagnosis of ascites on imaging  Plan: Comprehensive metabolic panel        Follow with lymphedema therapy as planned.     (I48.0) Paroxysmal atrial fibrillation (H)  Comment: currently in atrial fibrillation, she is on a baby aspirin.  She has met with cardiology and declines anticoagulation or cardioversion  Plan: continue with rate control, metoprolol 50mg daily and ASA 81mg daily.     (R60.0) Lower extremity edema  Comment: asymmetric edema, left leg vs right. Suspect this may be " from intraabdominal obstruction, meeting with lymphedema clinic this week  Plan: trial of therapy with lymphedema clinic . If worsening, may require paracentesis.     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  Plan: Adult Mental Health  Referral        Referral is entered.      45 minutes spend on the date of this encounter doing chart review, history and exam, documentation and further activities as noted above.     Merry Lino MD  Internal Medicine/Pediatrics      I, Swetha Lee, am serving as a scribe to document services personally performed by Dr. Merry Lino, based on data collection and the provider's statements to me. Dr. Lino has reviewed, edited, and approved the above note.

## 2023-04-11 ENCOUNTER — HOSPITAL ENCOUNTER (OUTPATIENT)
Dept: OCCUPATIONAL THERAPY | Facility: CLINIC | Age: 73
Discharge: HOME | End: 2023-04-11
Payer: COMMERCIAL

## 2023-04-11 DIAGNOSIS — C53.0 MALIGNANT NEOPLASM OF ENDOCERVIX (H): Primary | ICD-10-CM

## 2023-04-11 PROCEDURE — 97166 OT EVAL MOD COMPLEX 45 MIN: CPT | Mod: GO | Performed by: OCCUPATIONAL THERAPIST

## 2023-04-11 PROCEDURE — 97535 SELF CARE MNGMENT TRAINING: CPT | Mod: GO | Performed by: OCCUPATIONAL THERAPIST

## 2023-04-11 PROCEDURE — 97140 MANUAL THERAPY 1/> REGIONS: CPT | Mod: GO | Performed by: OCCUPATIONAL THERAPIST

## 2023-04-11 NOTE — PROGRESS NOTES
[facility]        OUTPATIENT OCCUPATIONAL THERAPY EDEMA EVALUATION  PLAN OF TREATMENT FOR OUTPATIENT REHABILITATION  (COMPLETE FOR INITIAL CLAIMS ONLY)  Patient's Last Name, First Name, Cordell Glez  DEIDRA                           Provider s Name:   [facility] Medical Record No.  [id number]     Start of Care Date:  04/11/23   Onset Date:  04/10/23   Type:  OT   Medical Diagnosis:  Lymphedema   Therapy Diagnosis:  Lymphdema in Trunk and LLE with mild inovlvedment of right Thigh Visits from SOC:  1                                     __________________________________________________________________________________   Plan of Treatment/Functional Goals:    Manual lymph drainage, Precautions to prevent infection / exacerbation, ADL training        GOALS  1. Goal description: pt will understand principles of skin care and infection prevention to help manage lymphedema and prevent hospitalization       Target date: 07/10/23  2. Goal description: pt will undestand what compression garments are needed for long term maintenance of Lymphedema in B LE and trunk ( possibly thigh highs and high waisted bike short       Target date: 07/10/23  3. Goal description: pt will have reductio in left thigh by 100 ml and will have visibly decreased swelling in abdomen.       Target date: 07/10/23  4. Goal description: pt will demo indep carry over of home program included self MLD, MFR of skin in lower abdomen, exercises and compression garments to  manage lymphedema long term       Target date: 07/10/23                Treatment Frequency: 1x/week   Treatment duration: x6 wks    LORY VELEZ, OT                                    I CERTIFY THE NEED FOR THESE SERVICES FURNISHED UNDER        THIS PLAN OF TREATMENT AND WHILE UNDER MY CARE     (Physician co-signature of this  document indicates review and certification of the therapy plan).                   Certification date from: 04/11/23       Certification date to: 07/10/23           Referring physician: Merry Lino MD   Initial Assessment  See Epic Evaluation- Start of care: 04/11/23

## 2023-04-11 NOTE — PROGRESS NOTES
"   04/11/23 1200   Quick Adds   Quick Adds Certification   Rehab Discipline   Discipline OT   Type of Visit   Type of visit Initial Edema Evaluation   General Information   Start of care 04/11/23   Referring physician Merry Lino MD   Orders Evaluate and treat as indicated   Order date 04/10/23   Medical diagnosis Lymphedema   Onset of illness / date of surgery 04/10/23   Edema onset 04/10/23   Affected body parts Trunk;LLE;RLE   Edema etiology Radiation;Surgery   Location - Radiation pelvic   Radiation comments 25 tx sessions completed at the beginning of 2023   Pertinent history of current problem (PT: include personal factors and/or comorbidities that impact the POC; OT: include additional occupational profile info) This patient has PMH:NGUYỄN III, Afib, Hyperlipiedemia  LDL, PElvic PAin, Rectocele, retroperitoneal lymphadenophathy.  72 year old F referred by Dr. Lino due to extreme adominal swelling/lymphedema and progression of lymphedema into B thighs and left lower leg/foot. Noted in Dr. Lino s referral was this info taken directly from his documentation Cordell has a history of recurrent cervical cancer. Most recently, she has completed \"First-line pelvic external beam radiation therapy with total dose 4500 cGy in 25 fractions then vaginal cuff brachytherapy with total dose 2500 cGy in 5 fractions. No concurrent chemosensitization due to myelosuppression and decreased patient tolerance of therapy,\" from 12/28/2022-2/15/2023. She notes that her tumor drains through her vagina daily. More drainage occurs if she lays down and relaxes rather than \"running around.\" Denies bloody vaginal discharge or blood in the stool. Cordell reports struggling with the side effects of her cancer treatment. She describes lymphedema, diarrhea, and extreme fatigue. She feels that she can walk a maximum of 0.5 miles   Surgical / medical history reviewed Yes   Prior level of functional mobility indep   Prior treatment " Elevation   Community support Family / friend caregiver   Patient role / employment history Retired   Psychosocial concerns Impaired body image;Impaired coping   Living environment House / townhome   Living environment comments lives with spouse   General observations This patient presents with a distended abdomen and tight tissue in lower abdomen /mons pubis area,   Abuse Screen (yes response referral indicated)   Feels Unsafe at Home or Work/School no   System Outcome Measures   Outcome Measures Lymphedema   Lymphedema Life Impact Scale (score range 0-72). A higher score indicates greater impairment. 29   Subjective Report   Patient report of symptoms skin tightness, large and full   Precautions   Precautions Active Cancer   Patient / Family Goals   Patient / family goals statement To learn how to manage edema   Pain   Patient currently in pain No;Denies   Cognitive Status   Orientation Orientation to person, place and time   Edema Exam / Assessment   Skin condition Pitting   Skin condition comments intact   Pitting 2+   Pitting location right thigh 1+ and left thigh 2+   Scar Yes   Location hysterectomy scar across Mons Pubis   Mobility thickened and moderately firm but not adhered   Stemmer sign Negative   Ulceration No   Activities of Daily Living   Activities of Daily Living indep   Bed Mobility   Bed mobility indep   Transfers   Transfers indep   Gait / Locomotion   Gait / Locomotion indep   Sensory   Sensory perception comments intact   Planned Edema Interventions   Planned edema interventions Manual lymph drainage;Precautions to prevent infection / exacerbation;ADL training   Clinical Impression   Criteria for skilled therapeutic intervention met Yes   Therapy diagnosis Lymphdema in Trunk and LLE with mild inovlvedment of right Thigh   Influenced by the following impairments / conditions Stage 1   Clinical Decision Making (Complexity) Moderate complexity   Treatment Frequency 1x/week   Treatment duration x6  wks   Patient / family and/or staff in agreement with plan of care Yes   Risks and benefits of therapy have been explained Yes   Clinical impression comments pt has good potential for meeting established goals.   Goals   Edema Eval Goals 1;2;3;4   Goal 1   Goal identifier education   Goal description pt will understand principles of skin care and infection prevention to help manage lymphedema and prevent hospitalization   Target date 07/10/23   Goal 2   Goal identifier compression garments PRN   Goal description pt will undestand what compression garments are needed for long term maintenance of Lymphedema in B LE and trunk ( possibly thigh highs and high waisted bike short   Target date 07/10/23   Goal 3   Goal identifier volume/symptoms   Goal description pt will have reductio in left thigh by 100 ml and will have visibly decreased swelling in abdomen.   Target date 07/10/23   Goal 4   Goal identifier home program   Goal description pt will demo indep carry over of home program included self MLD, MFR of skin in lower abdomen, exercises and compression garments to  manage lymphedema long term   Target date 07/10/23   Total Evaluation Time   OT Eval, Moderate Complexity Minutes (08538) 15   Certification   Certification date from 04/11/23   Certification date to 07/10/23   Medical Diagnosis Lymphedema   Certification I certify the need for these services furnished under this plan of treatment and while under my care.  (Physician co-signature of this document indicates review and certification of the therapy plan).

## 2023-04-12 ENCOUNTER — MYC MEDICAL ADVICE (OUTPATIENT)
Dept: ONCOLOGY | Facility: CLINIC | Age: 73
End: 2023-04-12
Payer: COMMERCIAL

## 2023-04-12 ENCOUNTER — MYC MEDICAL ADVICE (OUTPATIENT)
Dept: FAMILY MEDICINE | Facility: CLINIC | Age: 73
End: 2023-04-12

## 2023-04-12 DIAGNOSIS — C53.0 MALIGNANT NEOPLASM OF ENDOCERVIX (H): ICD-10-CM

## 2023-04-12 DIAGNOSIS — E03.8 OTHER SPECIFIED HYPOTHYROIDISM: Primary | ICD-10-CM

## 2023-04-13 RX ORDER — LEVOTHYROXINE SODIUM 75 UG/1
75 TABLET ORAL
Qty: 90 TABLET | Refills: 0 | Status: SHIPPED | OUTPATIENT
Start: 2023-04-13 | End: 2023-06-01

## 2023-04-14 NOTE — TELEPHONE ENCOUNTER
Spoke with pt discussed enlarging abdomen and concern for ascites vs lymphedema  md would like her evulated   Offered appt with NP 4/19

## 2023-04-18 NOTE — PROGRESS NOTES
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  She presents today for an acute visit.      Oncology History:     2003: Left breast cancer.  -Lumpectomy.  -Radiation therapy.     0550-5030: BRENNAN breast cancer.      2006: Negative for germline BRCA1, BRCA2 pathogenic variants.      10/24/12:   -Endocervical curettings: Squamous carcinoma, invasion cannot be assessed.   -Ectocervical biopsies: HSIL (CIN3).   -Vaginal biopsy, posterior: HSIL (VaIN3).  2012: Robotic-assisted laparoscopic lysis of adhesions, left ureterolysis with  conversion to laparotomy, total abdominal hysterectomy, and an upper  Vaginectomy by gynecologic oncologist Dr. Arciniega.   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      13: Vaginal biopsy: HSIL, cannot exclude invasion.   -Consultation with gynecologic oncologist Dr. Johnson. Upper vaginectomy and CO2 laser ablation recommended, patient declined.   2697-0113: No vaginal dysplasia treatment.   22: Pelvic MRI:   Centrally hypoenhancing heterogeneous midline pelvic mass along  the superior margin of the vaginal vault measures 8.5 x 6.7 x 8.1 cm.  The mass abut the bladder and there is mucosal irregularity at the  site of abutment. The mass indents the  decompressed rectum but there is no definite invasion or mucosal  Irregularity.  22: CT-guided biopsy of pelvic mass: Poorly-differentiated squamous cell carcinoma, HPV-associated.   22: Staging PET/CT: Stage III vaginal cancer  (vs recurrent, metastatic cervical cancer).      7/1/22: PET/CT to stage vaginal cancer (see below).   Mild soft tissue  thickening in the left retropectoral region with an SUV max of 2.7.  7/15/22: Invasive ductal carcinoma.   -Consultation with Dr. Montana. Recommendation to prioritize vaginal/recurrent cervix cancer. Plan to follow breast lesion with repeat MRI. Plan for endocrine therapy after completion of chemotherapy for vaginal/cervical cancer.         7/20/22, 8/10/22, 8/31/22,9/21/22: Cycles #1-4 of first-line chemotherapy with IV cisplatin 50 mg/m2 + paclitaxel 175 mg/m2 + bevacizumab 15 mg/kg every 21 days.  8/31/22: Cycle 3 Pembrolizumab 200 mg every 21 days added to triplet chemotherapy per the KEYNOTE-826 regimen.   -9/22/22: PET/CT: Partial response.    Stable 3 mm solid  nodules in the left upper lobe. Resolved FDG  avidity of a left retropectoral, presumably lymph node which is now  Calcified.  Multiple stable FDG avid iliac chain lymph nodes near the aortic  bifurcation with SUV max 14.0. Significant reduction in left internal  iliac chain nodes, with no single node with SUV max 5.5 which measures  up to 12 mm in short axis. Significantly reduced  size of FDG avid cervical mass with smaller area of enhancing  nodularity on the right side wall measuring 3.3 x 1.5 cm with SUV max  16.3. There is central necrosis and abutment of  the rectum. There is an additional FDG avid foci more inferiorly on  the anterior vaginal wall with SUV max 6.7, which is also decreased in  Size.  -Discussed with radiation oncologist Dr. Stockton and at the gyn onc/radiation oncology tumor conference. Recommendation for additional chemotherapy prior to radiation therapy.       8/14/22 (specimen from 6/9/22): [facility] Testing.   -PD-L1+ (CPS 10)  -Mismatch repair deficient/microsatellite instability-high  -TMB high (53 mut/Mb)  -NTRK 1/2/3 fusion not detected.      Plan: First-line chemotherapy with palliative intent with IV  cisplatin 50 mg/m2 + paclitaxel 175 mg/m2 + bevacizumab 15 mg/kg + pembrolizumab 200 mg every 21 days x 3 cycles followed by PET CT and follow up with Dr. Raygoza.      7/20/2022: Cycle 1 Cisplatin, Paclitaxel, bevazicumab    7/1/22: PET/CT to stage vaginal cancer (see below).   Mild soft tissue  thickening in the left retropectoral region with an SUV max of 2.7.  7/15/22: Invasive ductal carcinoma.   -Consultation with Dr. Montana. Recommendation to prioritize vaginal/recurrent cervix cancer. Plan to follow breast lesion with repeat MRI. Plan for endocrine therapy after completion of chemotherapy for vaginal/cervical cancer.     8/8/2022: Cycle 2 Cisplatin, Paclitaxel, bevacizumab   8/31/2022: Cycle 3 Cisplatin, Paclitaxel, bevacizumab, pembrolizumab     9/22/22: PET CT  IMPRESSION: In this patient with history of cervical cancer:  1. Significant interval reduction in the size and FDG avidity of large  cervical mass with persistent FDG activity about the right cervical  side wall and anterior vaginal wall.  2. Significant reduction in size and number of FDG avid iliac chain  lymph nodes with multiple persistent FDG avid iliac chain lymph nodes.  3. Diffuse gastric hypermetabolism, correlate for symptoms of  gastritis.   4. Resolved FDG avidity of a left retropectoral lymph node, presumably  previously reactive.     Plan: First-line chemotherapy with palliative intent with IV cisplatin 50 mg/m2 + paclitaxel 175 mg/m2 + bevacizumab 15 mg/kg + pembrolizumab 200 mg every 21 days x 3 cycles followed by PET CT and follow up with Dr. Raygoza.     10/3/22: Cycle 4 cisplatin, paclitaxel, bevacizumab, pembrolizumab.    10/6/22: Breast MRI  IMPRESSION: BI-RADS CATEGORY: 6 - Known Biopsy-Proven Malignancy.    No evidence of residual disease in the left interpectoral region by  MRI.     10/26/22: Cycle 5 carboplatin AUC 6 (switched due to side effects), paclitaxel 135 mg/m2 (dose reduced due to side effects), bevacizumab,  pembrolizumab. Hold bevacizumab due to elevated BP (157/117) and afib with RVR.  11/14/22: Cycle 6 carboplatin, paclitaxel, bevacizumab, pembrolizumab.  12/5/22: PET/CT: Partial response.   Diffuse uptake throughout the thyroid gland with SUV max 5.5, likely  Thyroiditis.  A few small pulmonary nodules  are stable, including a 2 mm solid pulmonary nodule in the left upper  lobe. No new or enlarging pulmonary nodules.  The heart  remains enlarged. No significant pericardial effusion.   Prominent  precarinal lymph node without significant FDG uptake. No suspicious  lymphadenopathy in the mediastinum.  Decreased size of centrally hypodense, necrotic cervical mass, now  measuring 5.3 x 4.1 cm (previously 6.1 x 4.3 cm)  when measured similarly. There is resolution of previously seen  enhancing component along the right lateral wall and resolution of  previous hypermetabolic activity. There is also resolution of  previously seen hypermetabolic pelvic lymph nodes.  12/28/22-2/15/23: First-line pelvic external beam radiation therapy with total dose 4500 cGy in 25 fractions then vaginal cuff brachytherapy with total dose 2500 cGy in 5 fractions. No concurrent chemosensitization due to myelosuppression and decreased patient tolerance of therapy.     4/19/23: Left breast US  IMPRESSION: BI-RADS CATEGORY: 6 - Known Biopsy-Proven Malignancy.            Today she comes to clinic with concern for increasing abdominal distension.  She notes this has gradually worsened since Feb when she was noted to have ascites on CT scan.  She has early satiety, can only eat in small amounts, and she is very uncomfortable.  She has no nausea or vomiting.  Weight is increased.  She has small soft frequent stools.  Passing flatus.  She also has left leg swelling that started 7-10 days ago.  She has not had an US of the left leg.  She is very frustrated as she feels like her symptoms have been worsening and not addressed.  No fevers or chills, and  no chest pain or shortness of breath.             Review of Systems:    Systemic           +weight gain; no fever; no chills; no night sweats; +decreased appetite  Skin           no rashes, or lesions  Eye           no irritation; no changes in vision  Ramona-Laryngeal           no dysphagia; no hoarseness   Pulmonary    no cough; no shortness of breath  Cardiovascular    no chest pain; no palpitations  Gastrointestinal    no diarrhea; no constipation; + abdominal pain; +abd distension; no changes in bowel habits; no blood in stool  Genitourinary   no urinary frequency; no urinary urgency; no dysuria; no pain; no abnormal vaginal discharge; no abnormal vaginal bleeding  Breast   no breast discharge; no breast changes; no breast pain  Musculoskeletal    no myalgias; no arthralgias; no back pain  Psychiatric           no depressed mood; no anxiety    Hematologic   no tender lymph nodes; +LLE swelling   Endocrine    no hot flashes; no heat/cold intolerance         Neurological   no tremor; no numbness and tingling; no headaches; no difficulty sleeping      Past Medical History:    Past Medical History:   Diagnosis Date     Abnormal Pap smear     maybe 20 years ago     Cervical cancer (H)      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      History of breast cancer 2003    lumpectomy and radiation offerred chemo and patient declined at time but had oncogene test and low risk     Hyperlipidemia LDL goal < 160      Osteopenia      Paroxysmal A-fib (H)      Severe vaginal dysplasia          Past Surgical History:    Past Surgical History:   Procedure Laterality Date     BIOPSY       BREAST SURGERY Left 2003    lumpectomy left, did radiation, 5 yr of tamoxifen     COLONOSCOPY  2018    repeat in 2028     Colposcopy Cervix with Loop Electrode Conization and Lser to Vagina  2008     COLPOSCOPY, BIOPSY, COMBINED  10/24/2012    Procedure: COMBINED COLPOSCOPY, BIOPSY;  Colposcopy, Biopsy of Cervix  and Vagina, Ultrasound Guidance;  Surgeon: Colette Ng MD;  Location: [facility] OR     ENT SURGERY  01/23/2018    otoscelerosis, right side     HYSTERECTOMY, FRANCIA  12/2012    high grade cervical dysplasia, [place] [facility], Dr. Arciniega     INSERT INTERSTITIAL NEEDLE, ULTRASOUND GUIDED N/A 2/13/2023    Procedure: INSERTION, NEEDLE, WITH ULTRASOUND GUIDANCE, FOR INTERSTITIAL BRACHYTHERAPY;  Surgeon: Raymond Stockton MD;  Location: [facility] OR     INSERT PORT VASCULAR ACCESS Right 7/18/2022    Procedure: INSERTION, VASCULAR ACCESS PORT;  Surgeon: Donnie Elias MD;  Location: [facility] OR     IR CHEST PORT PLACEMENT > 5 YRS OF AGE  7/18/2022     IR PORT REMOVAL RIGHT  3/30/2023     NC LAP VAGINECTOMY, PARTIAL REMOVAL OF VAGINAL WALL  10/2013    upper vaginectomy for dysplasia, Dr. Megan Arciniega     REMOVE INTERSTITIAL NEEDLE N/A 2/15/2023    Procedure: REMOVAL, INTERSTITIAL NEEDLE, AFTER TEMPORARY BRACHYTHERAPY;  Surgeon: Raymond Stockton MD;  Location: [facility] OR     REMOVE PORT VASCULAR ACCESS Right 3/30/2023    Procedure: Remove port vascular access right;  Surgeon: Donnie Elias MD;  Location: [facility] OR         Health Maintenance Due   Topic Date Due     DEPRESSION ACTION PLAN  Never done     Pneumococcal Vaccine: 65+ Years (1 - PCV) Never done     ZOSTER IMMUNIZATION (2 of 2) 02/12/2020     INFLUENZA VACCINE (1) 09/01/2022     PAP  01/02/2023       Current Medications:     Current Outpatient Medications   Medication Sig Dispense Refill     acetaminophen (TYLENOL) 325 MG tablet Take 2 tablets (650 mg) by mouth every 6 hours as needed for mild pain (Patient taking differently: Take 500 mg by mouth every 6 hours as needed for mild pain)       Acetylcarnitine HCl 250 MG CAPS Take 500 mg by mouth 2 times daily        aspirin (ASA) 81 MG chewable tablet Take 81 mg by mouth every evening       Bacillus Coagulans-Inulin (PROBIOTIC FORMULA) 1-250 BILLION-MG CAPS Take by mouth every morning 25+billion CFUS       BENFOTIAMINE PO Take 150 mg by mouth  every morning       Cholecalciferol (VITAMIN D-3) 25 MCG (1000 UT) CAPS Take 1,000 Units by mouth every morning 90 capsule 3     coenzyme Q-10 capsule Take 1 capsule by mouth daily       levothyroxine (SYNTHROID/LEVOTHROID) 75 MCG tablet Take 1 tablet (75 mcg) by mouth daily before breakfast 90 tablet 0     MAGNESIUM OXIDE PO Take 400 mg by mouth 2 times daily       melatonin 5 MG tablet Take 10 mg by mouth nightly as needed for sleep       metoprolol succinate ER (TOPROL XL) 50 MG 24 hr tablet TAKE 1 TABLET BY MOUTH EVERY DAY (Patient taking differently: Take 50 mg by mouth every evening) 90 tablet 2     multivitamin w/minerals (THERA-VIT-M) tablet Take 1 tablet by mouth every morning (Patient not taking: Reported on 4/10/2023)       Omega-3 Fatty Acids (OMEGA-3 FISH OIL PO) Take 630 mg by mouth 2 times daily        OVER-THE-COUNTER Turkey tail mushroom powder, use 3 times a day 1 Can 11     Pectin Cit-Inos-C-Bioflav-Soy (MODIFIED CITRUS PECTIN PO) Take by mouth 2 times daily (Patient not taking: Reported on 2023)       Taurine 1000 MG CAPS every morning 60 capsule 11     UNABLE TO FIND 1,200 mg 2 times daily MEDICATION NAME: [place]       UNABLE TO FIND 150 mg daily MEDICATION NAME: Silymarin       UNABLE TO FIND 1 capsule 2 times daily MEDICATION NAME: Alkylgycerols (Patient not taking: Reported on 2023)       UNABLE TO FIND 2 times daily MEDICATION NAME: MCHC for 1000mg calcium  4 pills per day       UNABLE TO FIND Take 1 tablet by mouth every morning MEDICATION NAME: Dinndolylmethane Complex (DIM) 100mg tablet 1 table daily           Allergies:      No Known Allergies     Social History:     Social History     Tobacco Use     Smoking status: Former     Packs/day: 0.50     Years: 15.00     Pack years: 7.50     Types: Cigarettes     Quit date:      Years since quittin.3     Smokeless tobacco: Never   Vaping Use     Vaping status: Never Used   Substance Use Topics     Alcohol use: Not Currently  "    Alcohol/week: 2.0 standard drinks of alcohol     Types: 2 Standard drinks or equivalent per week     Comment: Socially        History   Drug Use No         Family History:     The patient's family history is notable for:    Family History   Problem Relation Age of Onset     Myocardial Infarction Mother 73        Tob     Myocardial Infarction Father 68        Tob     Breast Cancer Sister 47         of breast cancer age 63; BRCA mutation testing negative     Cancer Maternal Grandmother         Unsure of type.     Breast Cancer Paternal Grandmother         Unsure of diagnosis--some type of \"women's issue\"     Anesthesia Reaction No family hx of      Deep Vein Thrombosis (DVT) No family hx of          Physical Exam:     /80 (BP Location: Right arm, Patient Position: Sitting, Cuff Size: Adult Small)   Pulse 86   Temp 97  F (36.1  C) (Tympanic)   Resp 16   Wt 60.1 kg (132 lb 6.4 oz)   LMP  (LMP Unknown)   SpO2 100%   BMI 23.45 kg/m    Body mass index is 23.45 kg/m .    General Appearance: healthy and alert, no distress     HEENT: no palpable nodules or masses        Cardiovascular: regular rate and rhythm, no gallops, rubs or murmurs     Respiratory: lungs clear, no rales, rhonchi or wheezes    Musculoskeletal: extremities non tender and LLE edema with stocking in place    Skin: no lesions or rashes     Neurological: normal gait, no gross defects     Psychiatric: appropriate mood and affect                               Hematological: normal cervical and supraclavicular lymph nodes     Gastrointestinal:       Abdomin firm and distended, +1 edema in lower abdomen    Genitourinary: Deferred.     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  She presents today for an acute visit.      40 minutes spent on the date of the encounter " doing chart review, history and exam, documentation, and further activities as noted above.      Plan:     1.) SCC:  RTC as scheduled for PET CT and follow up with Dr. Raygoza in June.  Reviewed signs and symptoms for when she should contact the clinic or seek additional care.  Patient to contact the clinic with any questions or concerns in the interim.        Genetic risk factors were assessed and she does not meet qualification for referral for cervical/vaginal cancer.  Previously tested negative to BRCA1/BRCA2.      Labs and/or tests ordered include:  LLE US.  Abd US. Paracentesis. Cytology     2.) Health maintenance:  Issues addressed today include following up with PCP for annual health maintenance and non-gynecologic issues.     3.)        Hypothyroid:  Appreciate TSH monitoring and synthroid management by PCP.      4.) Bloating/distention: Significant distension and firmness of abdomen consistent with ascites.  Will do an US today to confirm ascites and a window for drainage, paracentesis on Friday scheduled at [facility].  Discussed that ascites with vaginal cancer is not typical so orders placed for cytology to confirm if malignant ascites (and cancer type).  Other possible causes of ascites include liver and heart abnormalities (borderline enlargement of her heart also noted on CT in Feb).    5.) LLE edema: LLE swelling new over the last 7-10 days.  Possibly due to ascites and intraabdominal pressure.   LLE US to r/o DVT today.  OK to continue lymphedema therapy.    6.) Lower abdomen edema: +1 edema in her lower abdomen.  Also possibly due to ascites and intraabdominal pressure.  OK to continue lymphedema therapy.    Niya Holden, DNP, APRN, FNP-C  Nurse Practitioner  Division of Gynecologic Oncology  Pager: [phone]     CC  Patient Care Team:  Merry Lino MD as PCP - General (Internal Medicine)  Bess Paredes MD [place]field, Aaron Daniel, DPM as MD (Podiatry)  Elise Huitron MD as  Assigned Heart and Vascular Provider  Merry Lino MD as Assigned PCP  Tess Iniguez RN as Specialty Care Coordinator  Heather Ayon MD as MD (Urology)  Angela Montana MD as MD (Hematology & Oncology)  Yvette Pike MD as MD (Surgery)  Valeri Kirby, GEOFF as Specialty Care Coordinator (Hematology & Oncology)  Yvette Pike MD as Assigned Surgical Provider  Nurys Horton RN as Specialty Care Coordinator (Hematology & Oncology)  Felicia Thompson PA-C as Physician Assistant (Anesthesiology)  Raymond Stockton MD as Assigned Cancer Care Provider  SELF, REFERRED

## 2023-04-19 ENCOUNTER — ONCOLOGY VISIT (OUTPATIENT)
Dept: ONCOLOGY | Facility: CLINIC | Age: 73
End: 2023-04-19
Attending: NURSE PRACTITIONER
Payer: COMMERCIAL

## 2023-04-19 ENCOUNTER — ANCILLARY PROCEDURE (OUTPATIENT)
Dept: MAMMOGRAPHY | Facility: CLINIC | Age: 73
End: 2023-04-19
Attending: INTERNAL MEDICINE
Payer: COMMERCIAL

## 2023-04-19 ENCOUNTER — ANCILLARY PROCEDURE (OUTPATIENT)
Dept: ULTRASOUND IMAGING | Facility: CLINIC | Age: 73
End: 2023-04-19
Attending: NURSE PRACTITIONER
Payer: COMMERCIAL

## 2023-04-19 VITALS
WEIGHT: 132.4 LBS | SYSTOLIC BLOOD PRESSURE: 115 MMHG | TEMPERATURE: 97 F | DIASTOLIC BLOOD PRESSURE: 80 MMHG | RESPIRATION RATE: 16 BRPM | BODY MASS INDEX: 23.45 KG/M2 | OXYGEN SATURATION: 100 % | HEART RATE: 86 BPM

## 2023-04-19 DIAGNOSIS — M79.89 LEFT LEG SWELLING: ICD-10-CM

## 2023-04-19 DIAGNOSIS — C53.0 MALIGNANT NEOPLASM OF ENDOCERVIX (H): ICD-10-CM

## 2023-04-19 DIAGNOSIS — C50.412 MALIGNANT NEOPLASM OF UPPER-OUTER QUADRANT OF LEFT BREAST IN FEMALE, ESTROGEN RECEPTOR POSITIVE (H): ICD-10-CM

## 2023-04-19 DIAGNOSIS — C53.9 RECURRENT CERVICAL CANCER (H): ICD-10-CM

## 2023-04-19 DIAGNOSIS — C53.0 MALIGNANT NEOPLASM OF ENDOCERVIX (H): Primary | ICD-10-CM

## 2023-04-19 DIAGNOSIS — Z17.0 MALIGNANT NEOPLASM OF UPPER-OUTER QUADRANT OF LEFT BREAST IN FEMALE, ESTROGEN RECEPTOR POSITIVE (H): ICD-10-CM

## 2023-04-19 DIAGNOSIS — R18.0 MALIGNANT ASCITES (H): ICD-10-CM

## 2023-04-19 PROCEDURE — G0463 HOSPITAL OUTPT CLINIC VISIT: HCPCS | Performed by: NURSE PRACTITIONER

## 2023-04-19 PROCEDURE — 93971 EXTREMITY STUDY: CPT | Mod: LT | Performed by: RADIOLOGY

## 2023-04-19 PROCEDURE — 99215 OFFICE O/P EST HI 40 MIN: CPT | Performed by: NURSE PRACTITIONER

## 2023-04-19 PROCEDURE — 76705 ECHO EXAM OF ABDOMEN: CPT | Mod: GC | Performed by: RADIOLOGY

## 2023-04-19 PROCEDURE — 76642 ULTRASOUND BREAST LIMITED: CPT | Mod: LT | Performed by: RADIOLOGY

## 2023-04-19 ASSESSMENT — PAIN SCALES - GENERAL: PAINLEVEL: NO PAIN (0)

## 2023-04-19 NOTE — NURSING NOTE
"Oncology Rooming Note    April 19, 2023 1:34 PM   Cordell Donaldson is a 73 year old female who presents for:    Chief Complaint   Patient presents with     Oncology Clinic Visit     Cervical cancer      Initial Vitals: /80 (BP Location: Right arm, Patient Position: Sitting, Cuff Size: Adult Small)   Pulse 86   Temp 97  F (36.1  C) (Tympanic)   Resp 16   Wt 60.1 kg (132 lb 6.4 oz)   LMP  (LMP Unknown)   SpO2 100%   BMI 23.45 kg/m   Estimated body mass index is 23.45 kg/m  as calculated from the following:    Height as of 4/10/23: 1.6 m (5' 3\").    Weight as of this encounter: 60.1 kg (132 lb 6.4 oz). Body surface area is 1.63 meters squared.  No Pain (0) Comment: Data Unavailable   No LMP recorded (lmp unknown). Patient has had a hysterectomy.  Allergies reviewed: Yes  Medications reviewed: Yes    Medications: Medication refills not needed today.  Pharmacy name entered into [facility]:    [id number] - [place], [place] - [facility]  [id number] - [place], [place] - [address] [facility] [place], [place] - [address] [place] [address] [phone]    Clinical concerns: No additional clinical concerns.        Brit Stuart), LPN April 19, 2023 1:35 PM              "

## 2023-04-19 NOTE — LETTER
2023         RE: Cordell Donaldson  [address]  [facility] [address]        Dear Colleague,    Thank you for referring your patient, Cordell Donaldson, to the [facility]. Please see a copy of my visit note below.    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  She presents today for an acute visit.      Oncology History:     2003: Left breast cancer.  -Lumpectomy.  -Radiation therapy.     [id number]: BRENNAN breast cancer.      2006: Negative for germline BRCA1, BRCA2 pathogenic variants.      10/24/12:   -Endocervical curettings: Squamous carcinoma, invasion cannot be assessed.   -Ectocervical biopsies: HSIL (CIN3).   -Vaginal biopsy, posterior: HSIL (VaIN3).  2012: Robotic-assisted laparoscopic lysis of adhesions, left ureterolysis with  conversion to laparotomy, total abdominal hysterectomy, and an upper  Vaginectomy by gynecologic oncologist Dr. Arciniega.   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      13: Vaginal biopsy: HSIL, cannot exclude invasion.   -Consultation with gynecologic oncologist Dr. Johnson. Upper vaginectomy and CO2 laser ablation recommended, patient declined.   [id number]: No vaginal dysplasia treatment.   22: Pelvic MRI:   Centrally hypoenhancing heterogeneous midline pelvic mass along  the superior margin of the vaginal vault measures 8.5 x 6.7 x 8.1 cm.  The mass abut the bladder and there is mucosal irregularity at the  site of  abutment. The mass indents the  decompressed rectum but there is no definite invasion or mucosal  Irregularity.  6/9/22: CT-guided biopsy of pelvic mass: Poorly-differentiated squamous cell carcinoma, HPV-associated.   7/1/22: Staging PET/CT: Stage III vaginal cancer (vs recurrent, metastatic cervical cancer).      7/1/22: PET/CT to stage vaginal cancer (see below).   Mild soft tissue  thickening in the left retropectoral region with an SUV max of 2.7.  7/15/22: Invasive ductal carcinoma.   -Consultation with Dr. Montana. Recommendation to prioritize vaginal/recurrent cervix cancer. Plan to follow breast lesion with repeat MRI. Plan for endocrine therapy after completion of chemotherapy for vaginal/cervical cancer.         7/20/22, 8/10/22, 8/31/22,9/21/22: Cycles #1-4 of first-line chemotherapy with IV cisplatin 50 mg/m2 + paclitaxel 175 mg/m2 + bevacizumab 15 mg/kg every 21 days.  8/31/22: Cycle 3 Pembrolizumab 200 mg every 21 days added to triplet chemotherapy per the KEYNOTE-826 regimen.   -9/22/22: PET/CT: Partial response.    Stable 3 mm solid  nodules in the left upper lobe. Resolved FDG  avidity of a left retropectoral, presumably lymph node which is now  Calcified.  Multiple stable FDG avid iliac chain lymph nodes near the aortic  bifurcation with SUV max 14.0. Significant reduction in left internal  iliac chain nodes, with no single node with SUV max 5.5 which measures  up to 12 mm in short axis. Significantly reduced  size of FDG avid cervical mass with smaller area of enhancing  nodularity on the right side wall measuring 3.3 x 1.5 cm with SUV max  16.3. There is central necrosis and abutment of  the rectum. There is an additional FDG avid foci more inferiorly on  the anterior vaginal wall with SUV max 6.7, which is also decreased in  Size.  -Discussed with radiation oncologist Dr. Stockton and at the gyn onc/radiation oncology tumor conference. Recommendation for additional chemotherapy prior to radiation  therapy.       8/14/22 (specimen from 6/9/22): [facility] Testing.   -PD-L1+ (CPS 10)  -Mismatch repair deficient/microsatellite instability-high  -TMB high (53 mut/Mb)  -NTRK 1/2/3 fusion not detected.      Plan: First-line chemotherapy with palliative intent with IV cisplatin 50 mg/m2 + paclitaxel 175 mg/m2 + bevacizumab 15 mg/kg + pembrolizumab 200 mg every 21 days x 3 cycles followed by PET CT and follow up with Dr. Raygoza.      7/20/2022: Cycle 1 Cisplatin, Paclitaxel, bevazicumab    7/1/22: PET/CT to stage vaginal cancer (see below).   Mild soft tissue  thickening in the left retropectoral region with an SUV max of 2.7.  7/15/22: Invasive ductal carcinoma.   -Consultation with Dr. Montana. Recommendation to prioritize vaginal/recurrent cervix cancer. Plan to follow breast lesion with repeat MRI. Plan for endocrine therapy after completion of chemotherapy for vaginal/cervical cancer.     8/8/2022: Cycle 2 Cisplatin, Paclitaxel, bevacizumab   8/31/2022: Cycle 3 Cisplatin, Paclitaxel, bevacizumab, pembrolizumab     9/22/22: PET CT  IMPRESSION: In this patient with history of cervical cancer:  1. Significant interval reduction in the size and FDG avidity of large  cervical mass with persistent FDG activity about the right cervical  side wall and anterior vaginal wall.  2. Significant reduction in size and number of FDG avid iliac chain  lymph nodes with multiple persistent FDG avid iliac chain lymph nodes.  3. Diffuse gastric hypermetabolism, correlate for symptoms of  gastritis.   4. Resolved FDG avidity of a left retropectoral lymph node, presumably  previously reactive.     Plan: First-line chemotherapy with palliative intent with IV cisplatin 50 mg/m2 + paclitaxel 175 mg/m2 + bevacizumab 15 mg/kg + pembrolizumab 200 mg every 21 days x 3 cycles followed by PET CT and follow up with Dr. Raygoza.     10/3/22: Cycle 4 cisplatin, paclitaxel, bevacizumab, pembrolizumab.    10/6/22: Breast MRI  IMPRESSION: BI-RADS CATEGORY: 6  - Known Biopsy-Proven Malignancy.    No evidence of residual disease in the left interpectoral region by  MRI.     10/26/22: Cycle 5 carboplatin AUC 6 (switched due to side effects), paclitaxel 135 mg/m2 (dose reduced due to side effects), bevacizumab, pembrolizumab. Hold bevacizumab due to elevated BP (157/117) and afib with RVR.  11/14/22: Cycle 6 carboplatin, paclitaxel, bevacizumab, pembrolizumab.  12/5/22: PET/CT: Partial response.   Diffuse uptake throughout the thyroid gland with SUV max 5.5, likely  Thyroiditis.  A few small pulmonary nodules  are stable, including a 2 mm solid pulmonary nodule in the left upper  lobe. No new or enlarging pulmonary nodules.  The heart  remains enlarged. No significant pericardial effusion.   Prominent  precarinal lymph node without significant FDG uptake. No suspicious  lymphadenopathy in the mediastinum.  Decreased size of centrally hypodense, necrotic cervical mass, now  measuring 5.3 x 4.1 cm (previously 6.1 x 4.3 cm)  when measured similarly. There is resolution of previously seen  enhancing component along the right lateral wall and resolution of  previous hypermetabolic activity. There is also resolution of  previously seen hypermetabolic pelvic lymph nodes.  12/28/22-2/15/23: First-line pelvic external beam radiation therapy with total dose 4500 cGy in 25 fractions then vaginal cuff brachytherapy with total dose 2500 cGy in 5 fractions. No concurrent chemosensitization due to myelosuppression and decreased patient tolerance of therapy.     4/19/23: Left breast US  IMPRESSION: BI-RADS CATEGORY: 6 - Known Biopsy-Proven Malignancy.            Today she comes to clinic with concern for increasing abdominal distension.  She notes this has gradually worsened since Feb when she was noted to have ascites on CT scan.  She has early satiety, can only eat in small amounts, and she is very uncomfortable.  She has no nausea or vomiting.  Weight is increased.  She has small soft  frequent stools.  Passing flatus.  She also has left leg swelling that started 7-10 days ago.  She has not had an US of the left leg.  She is very frustrated as she feels like her symptoms have been worsening and not addressed.  No fevers or chills, and no chest pain or shortness of breath.             Review of Systems:    Systemic           +weight gain; no fever; no chills; no night sweats; +decreased appetite  Skin           no rashes, or lesions  Eye           no irritation; no changes in vision  Ramona-Laryngeal           no dysphagia; no hoarseness   Pulmonary    no cough; no shortness of breath  Cardiovascular    no chest pain; no palpitations  Gastrointestinal    no diarrhea; no constipation; + abdominal pain; +abd distension; no changes in bowel habits; no blood in stool  Genitourinary   no urinary frequency; no urinary urgency; no dysuria; no pain; no abnormal vaginal discharge; no abnormal vaginal bleeding  Breast   no breast discharge; no breast changes; no breast pain  Musculoskeletal    no myalgias; no arthralgias; no back pain  Psychiatric           no depressed mood; no anxiety    Hematologic   no tender lymph nodes; +LLE swelling   Endocrine    no hot flashes; no heat/cold intolerance         Neurological   no tremor; no numbness and tingling; no headaches; no difficulty sleeping      Past Medical History:    Past Medical History:   Diagnosis Date    Abnormal Pap smear     maybe 20 years ago    Cervical cancer (H)     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     History of breast cancer 2003    lumpectomy and radiation offerred chemo and patient declined at time but had oncogene test and low risk    Hyperlipidemia LDL goal < 160     Osteopenia     Paroxysmal A-fib (H)     Severe vaginal dysplasia          Past Surgical History:    Past Surgical History:   Procedure Laterality Date    BIOPSY      BREAST SURGERY Left 2003    lumpectomy left, did radiation, 5 yr of  tamoxifen    COLONOSCOPY  2018    repeat in 2028    Colposcopy Cervix with Loop Electrode Conization and Lser to Vagina  2008    COLPOSCOPY, BIOPSY, COMBINED  10/24/2012    Procedure: COMBINED COLPOSCOPY, BIOPSY;  Colposcopy, Biopsy of Cervix and Vagina, Ultrasound Guidance;  Surgeon: Colette Ng MD;  Location: [facility] OR    ENT SURGERY  01/23/2018    otoscelerosis, right side    HYSTERECTOMY, FRANCIA  12/2012    high grade cervical dysplasia, [place] [facility], Dr. Arciniega    INSERT INTERSTITIAL NEEDLE, ULTRASOUND GUIDED N/A 2/13/2023    Procedure: INSERTION, NEEDLE, WITH ULTRASOUND GUIDANCE, FOR INTERSTITIAL BRACHYTHERAPY;  Surgeon: Raymond Stockton MD;  Location: [facility] OR    INSERT PORT VASCULAR ACCESS Right 7/18/2022    Procedure: INSERTION, VASCULAR ACCESS PORT;  Surgeon: Donnie Elias MD;  [place] [facility] [place]    IR CHEST PORT PLACEMENT > 5 YRS OF AGE  7/18/2022    IR PORT REMOVAL RIGHT  3/30/2023    AK LAP VAGINECTOMY, PARTIAL REMOVAL OF VAGINAL WALL  10/2013    upper vaginectomy for dysplasia, Dr. Megan Arciniega    REMOVE INTERSTITIAL NEEDLE N/A 2/15/2023    Procedure: REMOVAL, INTERSTITIAL NEEDLE, AFTER TEMPORARY BRACHYTHERAPY;  Surgeon: Raymond Stockton MD;  Location: [facility] OR    REMOVE PORT VASCULAR ACCESS Right 3/30/2023    Procedure: Remove port vascular access right;  Surgeon: Donnie Elias MD;  [place] [facility] [place]         Health Maintenance Due   Topic Date Due    DEPRESSION ACTION PLAN  Never done    Pneumococcal Vaccine: 65+ Years (1 - PCV) Never done    ZOSTER IMMUNIZATION (2 of 2) 02/12/2020    INFLUENZA VACCINE (1) 09/01/2022    PAP  01/02/2023       Current Medications:     Current Outpatient Medications   Medication Sig Dispense Refill    acetaminophen (TYLENOL) 325 MG tablet Take 2 tablets (650 mg) by mouth every 6 hours as needed for mild pain (Patient taking differently: Take 500 mg by mouth every 6 hours as needed for mild pain)      Acetylcarnitine HCl 250 MG CAPS Take 500 mg by mouth 2 times daily       aspirin  (ASA) 81 MG chewable tablet Take 81 mg by mouth every evening      Bacillus Coagulans-Inulin (PROBIOTIC FORMULA) 1-250 BILLION-MG CAPS Take by mouth every morning 25+billion CFUS      BENFOTIAMINE PO Take 150 mg by mouth every morning      Cholecalciferol (VITAMIN D-3) 25 MCG (1000 UT) CAPS Take 1,000 Units by mouth every morning 90 capsule 3    coenzyme Q-10 capsule Take 1 capsule by mouth daily      levothyroxine (SYNTHROID/LEVOTHROID) 75 MCG tablet Take 1 tablet (75 mcg) by mouth daily before breakfast 90 tablet 0    MAGNESIUM OXIDE PO Take 400 mg by mouth 2 times daily      melatonin 5 MG tablet Take 10 mg by mouth nightly as needed for sleep      metoprolol succinate ER (TOPROL XL) 50 MG 24 hr tablet TAKE 1 TABLET BY MOUTH EVERY DAY (Patient taking differently: Take 50 mg by mouth every evening) 90 tablet 2    multivitamin w/minerals (THERA-VIT-M) tablet Take 1 tablet by mouth every morning (Patient not taking: Reported on 4/10/2023)      Omega-3 Fatty Acids (OMEGA-3 FISH OIL PO) Take 630 mg by mouth 2 times daily       OVER-THE-COUNTER Turkey tail mushroom powder, use 3 times a day 1 Can 11    Pectin Cit-Inos-C-Bioflav-Soy (MODIFIED CITRUS PECTIN PO) Take by mouth 2 times daily (Patient not taking: Reported on 4/6/2023)      Taurine 1000 MG CAPS every morning 60 capsule 11    UNABLE TO FIND 1,200 mg 2 times daily MEDICATION NAME: Efraín      UNABLE TO FIND 150 mg daily MEDICATION NAME: Silymarin      UNABLE TO FIND 1 capsule 2 times daily MEDICATION NAME: Alkylgycerols (Patient not taking: Reported on 4/6/2023)      UNABLE TO FIND 2 times daily MEDICATION NAME: [facility] for 1000mg calcium  4 pills per day      UNABLE TO FIND Take 1 tablet by mouth every morning MEDICATION NAME: Dinndolylmethane Complex (DIM) 100mg tablet 1 table daily           Allergies:      No Known Allergies     Social History:     Social History     Tobacco Use    Smoking status: Former     Packs/day: 0.50     Years: 15.00     Pack years:  "7.50     Types: Cigarettes     Quit date:      Years since quittin.3    Smokeless tobacco: Never   Vaping Use    Vaping status: Never Used   Substance Use Topics    Alcohol use: Not Currently     Alcohol/week: 2.0 standard drinks of alcohol     Types: 2 Standard drinks or equivalent per week     Comment: Socially        History   Drug Use No         Family History:     The patient's family history is notable for:    Family History   Problem Relation Age of Onset    Myocardial Infarction Mother 73        Tob    Myocardial Infarction Father 68        Tob    Breast Cancer Sister 47         of breast cancer age 63; BRCA mutation testing negative    Cancer Maternal Grandmother         Unsure of type.    Breast Cancer Paternal Grandmother         Unsure of diagnosis--some type of \"women's issue\"    Anesthesia Reaction No family hx of     Deep Vein Thrombosis (DVT) No family hx of          Physical Exam:     /80 (BP Location: Right arm, Patient Position: Sitting, Cuff Size: Adult Small)   Pulse 86   Temp 97  F (36.1  C) (Tympanic)   Resp 16   Wt 60.1 kg (132 lb 6.4 oz)   LMP  (LMP Unknown)   SpO2 100%   BMI 23.45 kg/m    Body mass index is 23.45 kg/m .    General Appearance: healthy and alert, no distress     HEENT: no palpable nodules or masses        Cardiovascular: regular rate and rhythm, no gallops, rubs or murmurs     Respiratory: lungs clear, no rales, rhonchi or wheezes    Musculoskeletal: extremities non tender and LLE edema with stocking in place    Skin: no lesions or rashes     Neurological: normal gait, no gross defects     Psychiatric: appropriate mood and affect                               Hematological: normal cervical and supraclavicular lymph nodes     Gastrointestinal:       Abdomin firm and distended, +1 edema in lower abdomen    Genitourinary: Deferred.     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  She presents today for an acute visit.      40 minutes spent on the date of the encounter doing chart review, history and exam, documentation, and further activities as noted above.      Plan:     1.) SCC:  RTC as scheduled for PET CT and follow up with Dr. Raygoza in June.  Reviewed signs and symptoms for when she should contact the clinic or seek additional care.  Patient to contact the clinic with any questions or concerns in the interim.      Genetic risk factors were assessed and she does not meet qualification for referral for cervical/vaginal cancer.  Previously tested negative to BRCA1/BRCA2.    Labs and/or tests ordered include:  LLE US.  Abd US. Paracentesis. Cytology     2.) Health maintenance:  Issues addressed today include following up with PCP for annual health maintenance and non-gynecologic issues.     3.)        Hypothyroid:  Appreciate TSH monitoring and synthroid management by PCP.      4.) Bloating/distention: Significant distension and firmness of abdomen consistent with ascites.  Will do an US today to confirm ascites and a window for drainage, paracentesis on Friday scheduled at [place].  Discussed that ascites with vaginal cancer is not typical so orders placed for cytology to confirm if malignant ascites (and cancer type).  Other possible causes of ascites include liver and heart abnormalities (borderline enlargement of her heart also noted on CT in Feb).    5.) LLE edema: LLE swelling new over the last 7-10 days.  Possibly due to ascites and intraabdominal pressure.   LLE US to r/o DVT today.  OK to continue lymphedema therapy.    6.) Lower abdomen edema: +1 edema in her lower abdomen.  Also possibly due to ascites and intraabdominal pressure.  OK to continue lymphedema therapy.    Niya Holden, DNP, APRN, FNP-C  Nurse Practitioner  Division of Gynecologic Oncology  Pager:  [phone]       Patient Care Team:  Merry Lino MD as PCP - General (Internal Medicine)  Bess Paredes MD [place], Aaron Daniel, PINKY as MD (Podiatry)  Elise Huitron MD as Assigned Heart and Vascular Provider  Merry Lino MD as Assigned PCP  Tess Iniguez RN as Specialty Care Coordinator  Heather Ayon MD as MD (Urology)  Angela Montana MD as MD (Hematology & Oncology)  Yvette Pike MD as MD (Surgery)  Valeri Kirby, GEOFF as Specialty Care Coordinator (Hematology & Oncology)  Yvette Pike MD as Assigned Surgical Provider  Nurys Horton RN as Specialty Care Coordinator (Hematology & Oncology)  Felicia Thompson PA-C as Physician Assistant (Anesthesiology)  Raymond Stockton MD as Assigned Cancer Care Provider  SELF, REFERRED        Again, thank you for allowing me to participate in the care of your patient.        Sincerely,        TE Rivers CNP

## 2023-04-20 ENCOUNTER — HOSPITAL ENCOUNTER (OUTPATIENT)
Dept: OCCUPATIONAL THERAPY | Facility: CLINIC | Age: 73
Discharge: HOME | End: 2023-04-20
Attending: OBSTETRICS & GYNECOLOGY
Payer: COMMERCIAL

## 2023-04-20 DIAGNOSIS — C53.0 MALIGNANT NEOPLASM OF ENDOCERVIX (H): Primary | ICD-10-CM

## 2023-04-20 DIAGNOSIS — I89.0 LYMPHEDEMA: ICD-10-CM

## 2023-04-20 PROCEDURE — 97140 MANUAL THERAPY 1/> REGIONS: CPT | Mod: GO

## 2023-04-21 ENCOUNTER — HOSPITAL ENCOUNTER (OUTPATIENT)
Dept: ULTRASOUND IMAGING | Facility: CLINIC | Age: 73
Discharge: HOME | End: 2023-04-21
Attending: NURSE PRACTITIONER | Admitting: NURSE PRACTITIONER
Payer: COMMERCIAL

## 2023-04-21 VITALS
SYSTOLIC BLOOD PRESSURE: 108 MMHG | OXYGEN SATURATION: 99 % | DIASTOLIC BLOOD PRESSURE: 71 MMHG | RESPIRATION RATE: 16 BRPM | HEART RATE: 90 BPM

## 2023-04-21 DIAGNOSIS — C53.9 RECURRENT CERVICAL CANCER (H): ICD-10-CM

## 2023-04-21 DIAGNOSIS — C53.0 MALIGNANT NEOPLASM OF ENDOCERVIX (H): ICD-10-CM

## 2023-04-21 DIAGNOSIS — R18.0 MALIGNANT ASCITES (H): ICD-10-CM

## 2023-04-21 PROCEDURE — 88112 CYTOPATH CELL ENHANCE TECH: CPT | Mod: TC

## 2023-04-21 PROCEDURE — 82042 OTHER SOURCE ALBUMIN QUAN EA: CPT | Performed by: INTERNAL MEDICINE

## 2023-04-21 PROCEDURE — 88305 TISSUE EXAM BY PATHOLOGIST: CPT | Mod: 26 | Performed by: PATHOLOGY

## 2023-04-21 PROCEDURE — 84157 ASSAY OF PROTEIN OTHER: CPT | Performed by: INTERNAL MEDICINE

## 2023-04-21 PROCEDURE — 89050 BODY FLUID CELL COUNT: CPT | Performed by: INTERNAL MEDICINE

## 2023-04-21 PROCEDURE — 272N000706 US PARACENTESIS WITHOUT ALBUMIN

## 2023-04-21 PROCEDURE — 88305 TISSUE EXAM BY PATHOLOGIST: CPT | Mod: TC

## 2023-04-21 NOTE — PROGRESS NOTES
Pt was in Radiology today for a paracentesis. Procedure performed by Dr Dey Procedure was tolerated well, vitals remained stable.3200 cc's of light clear strawcolored fluid removed.  Written and verbal instructions were reviewed here is no evidence of bleeding upon discharge.  Pt left department in stable satisfactory condition with friend Nilda

## 2023-04-21 NOTE — PROGRESS NOTES
Interventional Radiology Brief Post Procedure Note    Pre Procedure Diagnosis: ascites    Post Procedure Diagnosis: Same    Procedure: para    Proceduralist: Jenae Dey MD    Assistant: None    Time Out: Prior to the start of the procedure and with procedural staff participation, I verbally confirmed the patient s identity using two indicators, relevant allergies, that the procedure was appropriate and matched the consent or emergent situation, and that the correct equipment/implants were available. Immediately prior to starting the procedure I conducted the Time Out with the procedural staff and re-confirmed the patient s name, procedure, and site/side. (The Joint Commission universal protocol was followed.)  Yes    Sedation: None. Local Anesthestic used    Findings: 3.2 L removed     Estimated Blood Loss: None    Fluoroscopy Time: Not applicable    SPECIMENS: ascites    Complications: None    Condition: Stable    Plan: post procedure monitoring    Comments: See dictated procedure note for full details     Jenae Dey MD

## 2023-04-24 ENCOUNTER — ONCOLOGY VISIT (OUTPATIENT)
Dept: ONCOLOGY | Facility: CLINIC | Age: 73
End: 2023-04-24
Attending: INTERNAL MEDICINE
Payer: COMMERCIAL

## 2023-04-24 ENCOUNTER — OFFICE VISIT (OUTPATIENT)
Dept: CARDIOLOGY | Facility: CLINIC | Age: 73
End: 2023-04-24
Attending: INTERNAL MEDICINE
Payer: COMMERCIAL

## 2023-04-24 VITALS
OXYGEN SATURATION: 99 % | WEIGHT: 126.7 LBS | BODY MASS INDEX: 22.44 KG/M2 | DIASTOLIC BLOOD PRESSURE: 87 MMHG | SYSTOLIC BLOOD PRESSURE: 125 MMHG | HEART RATE: 88 BPM

## 2023-04-24 DIAGNOSIS — I10 ESSENTIAL HYPERTENSION: ICD-10-CM

## 2023-04-24 DIAGNOSIS — C53.9 RECURRENT CERVICAL CANCER (H): ICD-10-CM

## 2023-04-24 DIAGNOSIS — Z85.3 PERSONAL HISTORY OF MALIGNANT NEOPLASM OF BREAST: ICD-10-CM

## 2023-04-24 DIAGNOSIS — R18.8 OTHER ASCITES: ICD-10-CM

## 2023-04-24 DIAGNOSIS — I48.20 CHRONIC ATRIAL FIBRILLATION (H): Primary | ICD-10-CM

## 2023-04-24 DIAGNOSIS — C50.412 MALIGNANT NEOPLASM OF UPPER-OUTER QUADRANT OF LEFT BREAST IN FEMALE, ESTROGEN RECEPTOR POSITIVE (H): Primary | ICD-10-CM

## 2023-04-24 DIAGNOSIS — Z92.89 HISTORY OF IMMUNOTHERAPY: ICD-10-CM

## 2023-04-24 DIAGNOSIS — Z17.0 MALIGNANT NEOPLASM OF UPPER-OUTER QUADRANT OF LEFT BREAST IN FEMALE, ESTROGEN RECEPTOR POSITIVE (H): Primary | ICD-10-CM

## 2023-04-24 DIAGNOSIS — E06.3 ACQUIRED AUTOIMMUNE HYPOTHYROIDISM: ICD-10-CM

## 2023-04-24 DIAGNOSIS — C53.0 MALIGNANT NEOPLASM OF ENDOCERVIX (H): ICD-10-CM

## 2023-04-24 PROCEDURE — G0463 HOSPITAL OUTPT CLINIC VISIT: HCPCS | Mod: 27 | Performed by: INTERNAL MEDICINE

## 2023-04-24 PROCEDURE — 99214 OFFICE O/P EST MOD 30 MIN: CPT | Performed by: INTERNAL MEDICINE

## 2023-04-24 PROCEDURE — 93005 ELECTROCARDIOGRAM TRACING: CPT | Performed by: INTERNAL MEDICINE

## 2023-04-24 PROCEDURE — G0463 HOSPITAL OUTPT CLINIC VISIT: HCPCS | Performed by: INTERNAL MEDICINE

## 2023-04-24 PROCEDURE — 99215 OFFICE O/P EST HI 40 MIN: CPT | Performed by: INTERNAL MEDICINE

## 2023-04-24 NOTE — NURSING NOTE
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    Weight as of an earlier encounter on 4/24/23: 57.5 kg (126 lb 11.2 oz). There is no height or weight on file to calculate BSA.  Data Unavailable Comment: Data Unavailable   No LMP recorded (lmp unknown). Patient has had a hysterectomy.  Allergies reviewed: Yes  Medications reviewed: Yes    Medications: Medication refills not needed today.  Pharmacy name entered into [employer]:    [id number] - [place], [place] - [address] [place] [address]  [id number] - [place], [place] - [address] [place] [address] PHARMACY [place], [place] - [phone]    Clinical concerns: none.       Lebron Addison"

## 2023-04-24 NOTE — PROGRESS NOTES
Cordell Donaldson's goals for this visit include:     She requests these members of her care team be copied on today's visit information: PCP    PCP: Merry Lino    Referring Provider:  Elise Huitron MD  [address] [place] [address] [address]  [place] [address]    /87 (BP Location: Right arm, Patient Position: Sitting, Cuff Size: Adult Small)   Pulse 88   Wt 57.5 kg (126 lb 11.2 oz)   LMP  (LMP Unknown)   SpO2 99%   BMI 22.44 kg/m      Do you need any medication refills at today's visit? No.    Contreras Thompson, EMT  Clinic Support  [facility]    [phone]    Employed by [facility] [employer]

## 2023-04-24 NOTE — LETTER
"    4/24/2023         RE: Cordell Donaldson  [address]  [facility] [address]        Dear Colleague,    Thank you for referring your patient, Cordell Donaldson, to the [facility]. Please see a copy of my visit note below.    Oncology Follow Up:  Date on this visit: 4/24/2023    Diagnosis: Left breast invasive ductal carcinoma.    Primary Physician: Merry Lino     History Of Present Illness:  Ms. Donaldson is a 73 year old female with cervical and breast cancer.    She has a history of left breast cancer treated with lumpectomy and radiation therapy in 2003 ([place] w/ Dr. Mtz [place] [facility] [place] Oncotype DX recurrence score was 10.  She took 5 years of tamoxifen. She had an incidental left breast lesion found on imaging for her pelvic mass. Ultrasound showed a 1.3 cm mass at 12:30, 8 cm from the nipple of the left breast.  Left breast biopsy at 12:30, 8 cm from the nipple showed grade 2 invasive ductal carcinoma, ER strong in 100%, SD negative, and HER2 negative.  Given concurrent recurrent pelvic mass with plan for cytotoxic chemotherapy, plan was made to monitor and initiate endocrine therapy when appropriate.    In regards to her cervical cancer history, she was initially determined to have squamous carcinoma in 10/2012.  She is s/p FRANCIA and upper vaginectomy in 12/2012 with no residual invasive cancer, but residual HSIL.  Biopsy on 7/22/2013 was c/w HSIL, cannot exclude invasion.  In 05/2022 she developed symptoms of pelvic heaviness (felt like she \"bruised her tailbone\") and progressive pain. She felt like her urination and bowel movements were constricted, needing to put in a lot of effort to urinate or have BM. Pelvic MRI showed a large midline pelvic mass measuring up to 8.5 cm and abutting the bladder and the rectum without definite invasion or left pelvic LAD.  These findings were confirmed on PET/CT. She was started on chemo with cisplatin, paclitaxel, and " bevacizumab (7/11/22).  Caris testing showed PD-L1 CPS score of 10, MMR deficient, MSI high disease with a TMB of 53 mut/Mb. Pembrolizumab was added with C3.  Cisplatin was changed to carboplatin starting with C5 due to tinnitus.  After a total of 6 cycles imaging demonstrated good response.    She is s/p radiation to the pelvis and para-aortic lymph nodes and  interstitial brachytherapy to the vaginal cuff (12/28/2022 - 2/15/2023).      Interval History:  Recently developed increased abdominal distension.  Underwent a paracentesis on 4/21/2023.  She reports since the paracentesis on Friday, she has felt much better.  When her abdomen was full of fluid she had symptoms of abdominal pressure, frequent but small bowel movements, dyspnea, poor appetite, and symptom as if she had impending acid reflux.  Since having the fluid removed, nearly all of the symptoms have resolved.  Energy level has been good.  In fact, she has been increasing activity as part of recovery from her prior cancer therapy.  She denies yellowing of the eyes or skin.  She has had some drainage from the vaginal area which has mostly been mucousy.  She has had no bloody drainage and no debris.  She denies current cough, shortness of breath, or chest pain.  In the mornings, she has soreness in her bilateral hips.  If she moves, this improves.  For the last 1 to 2 weeks she has had increased lower extremity swelling, left greater than right.  An ultrasound evaluation was without thrombus.  This morning, she did some lymphedema exercises and then took a nap with her feet elevated.  Upon waking she noted significant improvement in the lower extremity swelling.  She has no current fevers or chills.    Past Medical/Surgical History:  Past Medical History:   Diagnosis Date    Abnormal Pap smear     maybe 20 years ago    Cervical cancer (H)     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     History of breast cancer 2003     lumpectomy and radiation offerred chemo and patient declined at time but had oncogene test and low risk    Hyperlipidemia LDL goal < 160     Osteopenia     Paroxysmal A-fib (H)     Severe vaginal dysplasia      Past Surgical History:   Procedure Laterality Date    BIOPSY      BREAST SURGERY Left 2003    lumpectomy left, did radiation, 5 yr of tamoxifen    COLONOSCOPY  2018    repeat in 2028    Colposcopy Cervix with Loop Electrode Conization and Lser to Vagina  2008    COLPOSCOPY, BIOPSY, COMBINED  10/24/2012    Procedure: COMBINED COLPOSCOPY, BIOPSY;  Colposcopy, Biopsy of Cervix and Vagina, Ultrasound Guidance;  Surgeon: Colette Ng MD;  Location: [facility] OR    ENT SURGERY  01/23/2018    otoscelerosis, right side    HYSTERECTOMY, FRANCIA  12/2012    high grade cervical dysplasia, [facility], Dr. Arciniega    INSERT INTERSTITIAL NEEDLE, ULTRASOUND GUIDED N/A 2/13/2023    Procedure: INSERTION, NEEDLE, WITH ULTRASOUND GUIDANCE, FOR INTERSTITIAL BRACHYTHERAPY;  Surgeon: Raymond Stockton MD;  Location: [facility] OR    INSERT PORT VASCULAR ACCESS Right 7/18/2022    Procedure: INSERTION, VASCULAR ACCESS PORT;  Surgeon: Donnie Elias MD;  Location: [facility] [place] OR    IR CHEST PORT PLACEMENT > 5 YRS OF AGE  7/18/2022    IR PORT REMOVAL RIGHT  3/30/2023    NH LAP VAGINECTOMY, PARTIAL REMOVAL OF VAGINAL WALL  10/2013    upper vaginectomy for dysplasia, Dr. Megan Arciniega    REMOVE INTERSTITIAL NEEDLE N/A 2/15/2023    Procedure: REMOVAL, INTERSTITIAL NEEDLE, AFTER TEMPORARY BRACHYTHERAPY;  Surgeon: Raymond Stockton MD;  Location: [facility] OR    REMOVE PORT VASCULAR ACCESS Right 3/30/2023    Procedure: Remove port vascular access right;  Surgeon: Donnie Elias MD;  Location: [facility] [place] OR     Allergies:  Allergies as of 04/24/2023    (No Known Allergies)     Current Medications:  Current Outpatient Medications   Medication Sig Dispense Refill    acetaminophen (TYLENOL) 325 MG tablet Take 2 tablets (650 mg) by mouth every 6 hours as needed for mild pain (Patient  taking differently: Take 500 mg by mouth every 6 hours as needed for mild pain)      Acetylcarnitine HCl 250 MG CAPS Take 500 mg by mouth 2 times daily       aspirin (ASA) 81 MG chewable tablet Take 81 mg by mouth every evening      Bacillus Coagulans-Inulin (PROBIOTIC FORMULA) 1-250 BILLION-MG CAPS Take by mouth every morning 25+billion CFUS      BENFOTIAMINE PO Take 150 mg by mouth every morning      Cholecalciferol (VITAMIN D-3) 25 MCG (1000 UT) CAPS Take 1,000 Units by mouth every morning 90 capsule 3    coenzyme Q-10 capsule Take 1 capsule by mouth daily      levothyroxine (SYNTHROID/LEVOTHROID) 75 MCG tablet Take 1 tablet (75 mcg) by mouth daily before breakfast 90 tablet 0    MAGNESIUM OXIDE PO Take 400 mg by mouth 2 times daily      melatonin 5 MG tablet Take 10 mg by mouth nightly as needed for sleep      metoprolol succinate ER (TOPROL XL) 50 MG 24 hr tablet TAKE 1 TABLET BY MOUTH EVERY DAY (Patient taking differently: Take 50 mg by mouth every evening) 90 tablet 2    multivitamin w/minerals (THERA-VIT-M) tablet Take 1 tablet by mouth every morning (Patient not taking: Reported on 4/10/2023)      Omega-3 Fatty Acids (OMEGA-3 FISH OIL PO) Take 630 mg by mouth 2 times daily       OVER-THE-COUNTER Turkey tail mushroom powder, use 3 times a day 1 Can 11    Pectin Cit-Inos-C-Bioflav-Soy (MODIFIED CITRUS PECTIN PO) Take by mouth 2 times daily (Patient not taking: Reported on 4/6/2023)      Taurine 1000 MG CAPS every morning 60 capsule 11    UNABLE TO FIND 1,200 mg 2 times daily MEDICATION NAME: Efraín      UNABLE TO FIND 150 mg daily MEDICATION NAME: Silymarin      UNABLE TO FIND 1 capsule 2 times daily MEDICATION NAME: Alkylgycerols (Patient not taking: Reported on 4/6/2023)      UNABLE TO FIND 2 times daily MEDICATION NAME: [facility] for 1000mg calcium  4 pills per day      UNABLE TO FIND Take 1 tablet by mouth every morning MEDICATION NAME: Dinndolylmethane Complex (DIM) 100mg tablet 1 table daily         Physical Exam:     GENERAL APPEARANCE: Well appearing adult female in NAD.  Alert and oriented x 3.  Wearing coat over the exam gown throughout the visit due to feeling cold.     HEENT: NC/AT, sclera anicteric.  MMM.  EOMI.  No lesions of the oropharynx.  (+) hair regrowth.     LYMPHATICS: No palpable cervical, supraclavicular, or axillary lymphadenopathy     RESP: Lungs are CTA bilaterally and without wheezes or crackles.     CARDIOVASCULAR:  RRR.  No audible m/r/g.     BREAST:  Bilateral breasts are of normal fibroglandular density.  At 12:30, 8 cm from the nipple of the left breast is a 1.5 x 1.1 cm flat density.  Bilateral nipples are everted and without discharge.     ABDOMEN:  soft, nondistended     MUSCULOSKELETAL: Full ROM of the bilateral upper extremities.     SKIN: no suspicious lesions or rashes     PSYCHIATRIC: Normal mood and affect.    Laboratory/Imaging Studies  4/19/2023 Left lower extremity ultrasound:  Left leg: No deep venous thrombosis.     4/19/2023 abdominal ultrasound:  Impression: Large volume ascites. Bilateral pleural effusions.    4/19/2023 Left breast ultrasound (I personally reviewed these images):  Targeted ultrasound evaluation was performed by the  technologist and radiologist.  In the left breast at 12:00, 8 cm and the nipple, there is an oval  hypoechoic mass in the interpectoral space with a biopsy marker. It  measures 1 x 1.1 x 0.4 cm. Initially in 2022, it measured 1.1 x 0.9 x  1.3 cm.  It is adjacent to a bundle of vessels, correlating with MRI  Findings.    4/10/2023 Labs:  Sodium is low at 132 mmol/L  Chloride is low at 96  Kidney function is wnl.  Calcium is low at 8.7 mg/dL  Albumin is low at 3.4 g/dL  Liver function tests are wnl.    TSH is elevated at 46.10  Free T4 is decreased at 0.64    WBC is wnl.  Hemoglobin is low at 9 g/dL  Platelets are wnl.    ASSESSMENT/PLAN:  Ms. Donaldson is a 74 y/o woman with a history of left breast cancer (s/p lumpectomy, radiation in 2003  followed by 5 years of tamoxifen w/ Dr. Mtz) and cervical SCC (10/2012, s/p FRANCIA, upper vaginectomy) with pelvic mass and a second ER positive, NE negative, HER2 negative left breast cancer.     1. Left breast cancer.  - At last visit (1/30/2023), I had recommend starting anastrozole upon completion of radiation.  Due to residual and prolonged side effects of chemotherapy and radiation therapy, she declined to start it.  - She continues to have palpable right breast mass on breast exam.  On ultrasound, the mass measures 1.1 cm and is essentially stable in size since completion of chemotherapy.  - She inquires whether the lump felt and imaged could just be scar tissue.  We again discussed that the breast cancer is not considered cured given she has not had surgery and radiation.  Certainly there are cancer cells within the mass and although they may be quiescent at this time, without further treatment, at some point they will grow and are at risk to spread to other areas such as the lymph nodes, bones, or visceral organs.  Given new ascites and discomfort associated with this, she declines to start anastrozole until the etiology and treatment recommendations for the ascites are made.  - Return to clinic in 2 months.  - Consider surgical excision of breast cancer when timing is appropriate from a cervical cancer perspective.    2. Recurrent cervical versus vaginal cancer   - She follows with Dr. Raygoza of [facility]   - s/p 6 cycles neodjuvant Cisplatin 50 mg/m2 + paclitaxel 175 mg/m2 + bevacizumab 15 mg/kg + pembrolizumab every 21 days 7/11 - 11/21/2022.  Pembrolizumab added C3 and beyond; Cisplatin changed to carboplatin for cycles 5 and 6 due to tinnitus.    - s/p radiation to the pelvis and para-aortic lymph nodes as well as interstitial brachytherapy to the vaginal cuff.  - scheduled for repeat PET/CT on 6/8/2023.    3.  Ascites:  S/p paracentesis on 4/21.  Cytology is pending.  We discussed differential for ascites is  broad and includes myxedema from thyroid disorder, liver failure, nephrotic syndrome, and CHF.  In fact, given concurrent lower extremity edema, it may be less likely that the ascites is due to malignancy.  Recommend additional laboratory evaluation.    - Will add on fluid albumin and total protein the 4/21 peritoneal ascites fluid.  - Levothyroxine dose was recently increased.  Recommend she follow up with Dr. Lino to ensure adequate timing of repeat TSH/free T4 and dose adjustment of levothyroxine as needed.    4.  Neuropathy:  Chemotherapy induced.  Grade 1.  No change since last visit.    5.  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  She is on levothyroxine 75 mcg PO daily since 4/13/2023.  Management per PCP, Dr. Lino.  - discussed will likely need repeat TSH/free T4 in 6 weeks with further dose adjustment of levothyroxine.    6.  Follow Up:  Return to clinic in 2 months.    45 minutes spent on the date of the encounter doing chart review, review of test results, interpretation of tests, patient visit, documentation and discussion with family.               Again, thank you for allowing me to participate in the care of your patient.        Sincerely,        Angela Montana MD

## 2023-04-24 NOTE — LETTER
4/24/2023      RE: Cordell Donaldson  [address]  [facility] [address]       Dear Colleague,    Thank you for the opportunity to participate in the care of your patient, Cordell Donaldson, at the [facility] at [facility]. Please see a copy of my visit note below.      History:    Cordell Donaldson is a 73 year old delightful woman with persistent atrial fibrillation. She has a history of breast cancer treated with lumpectomy and radiation (2003).  For the persistent atrial fibrillation she takes metoprolol for rate control and has declined cardioversion and anticoagulation.     She was diagnosed with cervical cancer in June 2022 and invasive ductal carcinoma of the left breast in July 2022. The cervical cancer treatment was prioritized and she received immunotherapy (Keytruda), carboplatin (was cisplatin), Avastin and paclitaxel with good response. She developed tachycardia and hypertension as a result of Avastin. On recent surveillance PET scan there was concern for possible immunotherapy associated myocarditis. Cardiac biomarkers were normal and a follow up cardiac MRI with stress was without ischemia or evidence of myocarditis.      Chemotherapy was stopped and she received radiation therapy and brachytherapy.    Interval history  Developed ascites in Feb 2023  Paracentesis 3.2 L on 4/2123  Cytology is pending - patient is upset that it has been classified as a malignant ascites already  TSH still high  Left leg was swollen and US was negative for DVT     She feels well now after the paracentesis. BP is normal. TSH remains high with low T4 and she just started thyroid replacement. She has no fatigue or cold intolerance.     Current Outpatient Medications   Medication Sig Dispense Refill    acetaminophen (TYLENOL) 325 MG tablet Take 2 tablets (650 mg) by mouth every 6 hours as needed for mild pain (Patient taking differently: Take  500 mg by mouth every 6 hours as needed for mild pain)      Acetylcarnitine HCl 250 MG CAPS Take 500 mg by mouth 2 times daily       aspirin (ASA) 81 MG chewable tablet Take 81 mg by mouth every evening      Bacillus Coagulans-Inulin (PROBIOTIC FORMULA) 1-250 BILLION-MG CAPS Take by mouth every morning 25+billion CFUS      BENFOTIAMINE PO Take 150 mg by mouth every morning      Cholecalciferol (VITAMIN D-3) 25 MCG (1000 UT) CAPS Take 1,000 Units by mouth every morning 90 capsule 3    coenzyme Q-10 capsule Take 1 capsule by mouth daily      levothyroxine (SYNTHROID/LEVOTHROID) 50 MCG tablet Take 1 tablet (50 mcg) by mouth daily before breakfast 30 tablet 1    MAGNESIUM OXIDE PO Take 400 mg by mouth 2 times daily      melatonin 5 MG tablet Take 10 mg by mouth nightly as needed for sleep      metoprolol succinate ER (TOPROL XL) 50 MG 24 hr tablet TAKE 1 TABLET BY MOUTH EVERY DAY (Patient taking differently: Take 50 mg by mouth every evening) 90 tablet 2    multivitamin w/minerals (THERA-VIT-M) tablet Take 1 tablet by mouth every morning      Omega-3 Fatty Acids (OMEGA-3 FISH OIL PO) Take 630 mg by mouth 2 times daily       OVER-THE-COUNTER Turkey tail mushroom powder, use 3 times a day 1 Can 11    Pectin Cit-Inos-C-Bioflav-Soy (MODIFIED CITRUS PECTIN PO) Take by mouth 2 times daily      Taurine 1000 MG CAPS every morning 60 capsule 11    UNABLE TO FIND 1,200 mg 2 times daily MEDICATION NAME: Efraín      UNABLE TO FIND 150 mg daily MEDICATION NAME: Silymarin      UNABLE TO FIND 1 capsule 2 times daily MEDICATION NAME: Alkylgycerols      UNABLE TO FIND 2 times daily MEDICATION NAME: [facility] for 1000mg calcium  4 pills per day      UNABLE TO FIND Take 1 tablet by mouth every morning MEDICATION NAME: Dinndolylmethane Complex (DIM) 100mg tablet 1 table daily         Allergies - reviewed   No Known Allergies    Past history -reviewed  Past Medical History:   Diagnosis Date    Abnormal Pap smear     maybe 20 years ago     "Cervical cancer (H)     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     History of breast cancer     lumpectomy and radiation offerred chemo and patient declined at time but had oncogene test and low risk    Hyperlipidemia LDL goal < 160     Osteopenia     Paroxysmal A-fib (H)     Severe vaginal dysplasia        Social history - reviewed    Former smoker   Social alcohol    Family history -reviewed  Family History   Problem Relation Age of Onset    Myocardial Infarction Mother 73        Tob    Myocardial Infarction Father 68        Tob    Breast Cancer Sister 47         of breast cancer age 63; BRCA mutation testing negative    Cancer Maternal Grandmother         Unsure of type.    Breast Cancer Paternal Grandmother         Unsure of diagnosis--some type of \"women's issue\"    Anesthesia Reaction No family hx of     Deep Vein Thrombosis (DVT) No family hx of        ROS: non contributory on the 10-point review of system    EXAM:  LMP  (LMP Unknown)     In general, the patient is in no apparent distress.        HEENT: Sclerae white, not injected.    Neck: No JVD. No thyromegaly  Heart: irregular. No murmur. No audible rub or gallop.    Lungs: Clear bilaterally.  No rhonchi, wheezes, rales.   Extremities: L ankle edema.  The pulses are 2+at the radial bilaterally.  Psych: pleasant and conversant      Data:    Lab Test 04/10/23  1625 23  1356   *  --    POTASSIUM 5.2  --    CHLORIDE 96*  --    CO2 24  --    ANIONGAP 12  --    *  --    BUN 16.8  --    CR 0.78 0.73   LINDA 8.7*  --    MAG  --   --    GFRESTIMATED 81 87   AST 19 34   ALT 9* 19       CBC:   Recent Labs   Lab Test 04/10/23  1625 23  0932   WBC 7.7 2.4*   RBC 2.95* 3.57*   HGB 9.0* 11.3*   HCT 29.8* 34.1*   * 96   MCH 30.5 31.7   MCHC 30.2* 33.1   RDW 16.3* 19.6*    152       Lipid Panel:  Recent Labs   Lab Test 22  1544 21  1146   CHOL 202* 246*   HDL 41* 55   LDL " 144* 167*   TRIG 86 120       Thyroid:   TSH   Date Value Ref Range Status   04/10/2023 46.10 (H) 0.30 - 4.20 uIU/mL Final   08/22/2017 2.16 0.40 - 4.00 mU/L Final     Free T4   Date Value Ref Range Status   04/10/2023 0.64 (L) 0.90 - 1.70 ng/dL Final       12/7/22  Troponin T high-sensitivity= 6   NT proBNP = 444     Patient's all available and relevant cardiac investigations were personally reviewed and discussed with the patient today.    ECG 4/24/23 - atrial fibrillation with a ventricular response average 83 bpm     Stress cardiac MRI 12/8/22  The patient's rhythm is atrial fibrillation.  Normal biventricular function, LVEF 68% and RVEF 52%.   No evidence of ischemia.   No evidence of myocardial edema or fibrosis to indicate immune checkpoint inhibitor myocarditis.        Assessment and Plan:  73 year old woman with    Persistent atrial fibrillation  Normal biventricular function   Dyslipidemia  Cervical cancer July 2022, post chemoradiation   Invasive ductal carcinoma L breast July 2022  History of breast cancer 2003  Ascites    Cordell has recently received chemoradiation therapy for cervical cancer.  There was concern for immunotherapy associated myocarditis but the cardiac MRI and biomarkers did not support that.     She has persistent atrial fibrillation with a controlled heart rate response. She  is currently taking metoprolol XL 50 mg daily for heart rate control and hypertension.   She has previously firmly declined anticoagulation and statin therapy.     Ascites is a new development.  Her clinical picture does not favor cardiac cirrhosis and she does not have right heart failure.  Cytology from the ascites is pending which would be critical to determine if this is malignant.  She is significantly hypothyroid which could also be a contributing factor to the ascites.  I have advised her to follow-up with her primary care physician for reassessment of her thyroid status.    Her ASCVD risk score is  elevated and wishes to address this with lifestyle modification.     Recommendations:   1. Follow-up 4 months  2. Follow up with PCP to address thyroid status  .       The 10-year ASCVD risk score (Isra DK, et al., 2019) is: 15.3%    Values used to calculate the score:      Age: 73 years      Sex: Female      Is Non- : No      Diabetic: No      Tobacco smoker: No      Systolic Blood Pressure: 125 mmHg      Is BP treated: Yes      HDL Cholesterol: 41 mg/dL      Total Cholesterol: 202 mg/dL      Elise Huitron MD, MS  Professor of Medicine  Cardiovascular Medicine                Please do not hesitate to contact me if you have any questions/concerns.     Sincerely,     Elise Huitron MD

## 2023-04-25 ENCOUNTER — PATIENT OUTREACH (OUTPATIENT)
Dept: ONCOLOGY | Facility: CLINIC | Age: 73
End: 2023-04-25
Payer: COMMERCIAL

## 2023-04-25 LAB
ALBUMIN BODY FLUID SOURCE: NORMAL
ALBUMIN FLD-MCNC: 2.4 G/DL
APPEARANCE FLD: CLEAR
ATRIAL RATE - MUSE: 88 BPM
CELL COUNT BODY FLUID SOURCE: NORMAL
COLOR FLD: YELLOW
DIASTOLIC BLOOD PRESSURE - MUSE: NORMAL MMHG
INTERPRETATION ECG - MUSE: NORMAL
LYMPHOCYTES NFR FLD MANUAL: 74 %
MONOS+MACROS NFR FLD MANUAL: 20 %
NEUTS BAND NFR FLD MANUAL: 6 %
P AXIS - MUSE: NORMAL DEGREES
PATH REPORT.COMMENTS IMP SPEC: NORMAL
PATH REPORT.FINAL DX SPEC: NORMAL
PATH REPORT.GROSS SPEC: NORMAL
PATH REPORT.MICROSCOPIC SPEC OTHER STN: NORMAL
PATH REPORT.RELEVANT HX SPEC: NORMAL
PR INTERVAL - MUSE: NORMAL MS
PROT FLD-MCNC: 3.9 G/DL
PROTEIN BODY FLUID SOURCE: NORMAL
QRS DURATION - MUSE: 66 MS
QT - MUSE: 316 MS
QTC - MUSE: 371 MS
R AXIS - MUSE: 69 DEGREES
SYSTOLIC BLOOD PRESSURE - MUSE: NORMAL MMHG
T AXIS - MUSE: -37 DEGREES
VENTRICULAR RATE- MUSE: 83 BPM
WBC # FLD AUTO: 29 /UL

## 2023-04-25 NOTE — PROGRESS NOTES
[facility]: Cancer Care                                                                                          Called first floor lab to see if they can add 3 additional tests on the paracentesis fluid from Fri. Lab is going to call to see if there is enough fluid and let us know.     Signature:  Kori Willis RN

## 2023-04-26 ENCOUNTER — THERAPY VISIT (OUTPATIENT)
Dept: PHYSICAL THERAPY | Facility: CLINIC | Age: 73
End: 2023-04-26
Attending: STUDENT IN AN ORGANIZED HEALTH CARE EDUCATION/TRAINING PROGRAM
Payer: COMMERCIAL

## 2023-04-26 DIAGNOSIS — R53.83 FATIGUE, UNSPECIFIED TYPE: ICD-10-CM

## 2023-04-26 DIAGNOSIS — G62.0 DRUG-INDUCED POLYNEUROPATHY (H): ICD-10-CM

## 2023-04-26 DIAGNOSIS — C53.9 RECURRENT CERVICAL CANCER (H): ICD-10-CM

## 2023-04-26 DIAGNOSIS — R53.81 PHYSICAL DECONDITIONING: ICD-10-CM

## 2023-04-26 PROCEDURE — 97116 GAIT TRAINING THERAPY: CPT | Mod: GP | Performed by: PHYSICAL THERAPIST

## 2023-04-26 PROCEDURE — 97110 THERAPEUTIC EXERCISES: CPT | Mod: GP | Performed by: PHYSICAL THERAPIST

## 2023-04-26 PROCEDURE — 97161 PT EVAL LOW COMPLEX 20 MIN: CPT | Mod: GP | Performed by: PHYSICAL THERAPIST

## 2023-04-26 NOTE — DISCHARGE INSTRUCTIONS
Go to [employer] and type in walking poles.  Make sure they have a lot of reviews and 4.5 stars or more  Start with a 10 minute walk every day at a slower speed  Add in new strength exercises

## 2023-05-01 ENCOUNTER — TELEPHONE (OUTPATIENT)
Dept: ONCOLOGY | Facility: CLINIC | Age: 73
End: 2023-05-01
Payer: COMMERCIAL

## 2023-05-01 NOTE — TELEPHONE ENCOUNTER
----- Message from Hina Raygoza MD sent at 4/27/2023  2:05 PM CDT -----  Hi Carina  Could you call Cordell and recommend that she follow-up with her PCP for evaluation of non-malignant causes of ascites? I do not have a good explanation for the ascites. Liver is normal in appearance on imaging, and cardiac work-up winter 2022 was also normal. Thanks.    --dt  Hina Raygoza MD, MS, FACOG, FACS  4/27/2023  2:06 PM        ----- Message -----  From: Niya Holden APRN CNP  Sent: 4/25/2023   7:22 PM CDT  To: Hina Raygoza MD

## 2023-05-01 NOTE — PROGRESS NOTES
Cordell Donaldson is a 73 year old female with hx of recurrent cervical and vaginal cancer, breast cancer, atrial fibrillation, hyperlipidemia, hypothyroidism here for the following issues:    Ascites  I last saw Maritza in clinic 4/10/23 to discuss care she was receiving for recurrent cervical cancer.  Marked increase of ascites was noted on pelvic MRI scan in February.  Maritza reported to me she had increasing abdominal discomfort and underwent paracentesis 4/21/23 with removal of 3.2L. Tap was clear, straw colored. No malignant cells were seen. She was referred to the lymphedema clinic and is now wearing support hose and Spanx.     She was referred to cardiology to rule out cardiac cause for ascites. She had a normal ECHO, no evidence for heart failure on ECHO or clinically.     She is referred back to see me by her oncologist for evaluation of the ascites.  Review of labs indicate that Maritza had abrupt change in serum protein and albumin in February, 2023. Maritza experienced a lot of loose stools during treatment with brachytherapy/radiation therapy. At her last visit in April she mentioned that stools were still loose and just beginning to become formed.   She had been adding Pea protein but switched recently to Pumpkin seed protein, 30gm per day. She reports diarrhea finally stopped about 10 day ago and BM are now formed. She has been able to add broccoli, lentils, beans and fish.  Her weight is down about 4 pounds from April.     Wt Readings from Last 4 Encounters:   05/02/23 56.7 kg (125 lb)   04/24/23 57.5 kg (126 lb 11.2 oz)   04/19/23 60.1 kg (132 lb 6.4 oz)   04/10/23 58.5 kg (129 lb)     Hypothyroidism  Maritza has hypothyroidism. Her TSH has been elevated.  She repoots she had been consistent with dosing since March, taking levothyroxine 50mcg dose.  I suspect that she may not have consistent absorption of her medication due to loose stools. Dose was increased to 75mcg daily.  She is  consistent with dosing.   TSH   Date Value Ref Range Status   04/10/2023 46.10 (H) 0.30 - 4.20 uIU/mL Final   08/22/2017 2.16 0.40 - 4.00 mU/L Final       Patient Active Problem List   Diagnosis     Dupuytren contracture     Abnormal electrocardiogram     Atrial fibrillation (H)     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     Hyperlipidemia LDL goal <130     Malignant neoplasm of breast (H)     Rectocele     Pelvic pain in female     History of intraepithelial neoplasia of vagina     Retroperitoneal lymphadenopathy     Malignant neoplasm of endocervix (H)     Recurrent cervical cancer (H)     Cervical cancer (H)       Current Outpatient Medications   Medication Sig Dispense Refill     acetaminophen (TYLENOL) 325 MG tablet Take 2 tablets (650 mg) by mouth every 6 hours as needed for mild pain (Patient taking differently: Take 500 mg by mouth every 6 hours as needed for mild pain)       Acetylcarnitine HCl 250 MG CAPS Take 500 mg by mouth 2 times daily        aspirin (ASA) 81 MG chewable tablet Take 81 mg by mouth every evening       Bacillus Coagulans-Inulin (PROBIOTIC FORMULA) 1-250 BILLION-MG CAPS Take by mouth every morning 25+billion CFUS       BENFOTIAMINE PO Take 150 mg by mouth every morning       Cholecalciferol (VITAMIN D-3) 25 MCG (1000 UT) CAPS Take 1,000 Units by mouth every morning 90 capsule 3     coenzyme Q-10 capsule Take 1 capsule by mouth daily       levothyroxine (SYNTHROID/LEVOTHROID) 75 MCG tablet Take 1 tablet (75 mcg) by mouth daily before breakfast 90 tablet 0     MAGNESIUM OXIDE PO Take 400 mg by mouth 2 times daily       melatonin 5 MG tablet Take 10 mg by mouth nightly as needed for sleep       metoprolol succinate ER (TOPROL XL) 50 MG 24 hr tablet TAKE 1 TABLET BY MOUTH EVERY DAY (Patient taking differently: Take 50 mg by mouth every evening) 90 tablet 2     multivitamin w/minerals (THERA-VIT-M) tablet Take 1 tablet by mouth every morning       Omega-3  "Fatty Acids (OMEGA-3 FISH OIL PO) Take 630 mg by mouth 2 times daily  (Patient not taking: Reported on 4/24/2023)       OVER-THE-COUNTER Turkey tail mushroom powder, use 3 times a day 1 Can 11     Pectin Cit-Inos-C-Bioflav-Soy (MODIFIED CITRUS PECTIN PO) Take by mouth 2 times daily (Patient not taking: Reported on 4/6/2023)       Taurine 1000 MG CAPS every morning 60 capsule 11     UNABLE TO FIND 1,200 mg 2 times daily MEDICATION NAME: Efraín       UNABLE TO FIND 150 mg daily MEDICATION NAME: Silymarin       UNABLE TO FIND 1 capsule 2 times daily MEDICATION NAME: Alkylgycerols (Patient not taking: Reported on 4/6/2023)       UNABLE TO FIND 2 times daily MEDICATION NAME: MCHC for 1000mg calcium  4 pills per day       UNABLE TO FIND Take 1 tablet by mouth every morning MEDICATION NAME: Dinndolylmethane Complex (DIM) 100mg tablet 1 table daily         No Known Allergies     EXAM  /75 (BP Location: Right arm, Patient Position: Sitting, Cuff Size: Adult Small)   Pulse 86   Temp 97.9  F (36.6  C) (Skin)   Resp 15   Ht 1.6 m (5' 3\")   Wt 56.7 kg (125 lb)   LMP  (LMP Unknown)   SpO2 100%   BMI 22.14 kg/m    Gen: Alert, pleasant, NAD  Remainder of exam deferred    Review of trend in labs, dating back to 12/2022 with Cordell  Review of MRI and ultrasounds dating back to 12/2022 with Cordell    Assessment:  (R18.8) Other ascites  (primary encounter diagnosis)  Comment: Clinical history of brachytherapy that likely triggered diarrhea, malnutrition. 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 Likely ascites is due to poor nutritional status.  She reports stools are now more formed and she has been able to expand her diet and consistently take in various forms of protein (Pea and Pumpkin seed). Underlying heart failure is ruled out. Metastatic seeding has been ruled out. DDX is very broad and includes inflammatory bowel disease,collagenous colitis,  Celiac disease, cirrhotic liver disease, connective " tissue disease, GI malignancy, pancreatitis, infection.   Plan: Albumin level, IgA, IgG, IgM, Fibrinogen         activity, Tissue transglutaminase antibody IgA,        Transferrin, Ceruloplasmin, Protein  random         urine, Vitamin B12, Folate        Our plan today is to hold on any formal lab testing. She will continue to work on consuming at least 30g of protein powder. She needs about 45 g of protein per day.    She will return to see me in 3-4 weeks for repeat hepatic panel. Will follow clinically for abdominal discomfort, she may need to repeat paracentesis for comfort during this time.  I will message her care team with my impression.    43 minutes spend on the date of this encounter doing chart review, history and exam, documentation and further activities as noted above.       Merry Lino MD  Internal Medicine/Pediatrics

## 2023-05-01 NOTE — TELEPHONE ENCOUNTER
Discussed test results with pt   Explained all basically normal, no evidence of cancer noted in fluid  md does not know cause of fluid, advised to follow-up with PCP for further work up   Pt reports understanding

## 2023-05-02 ENCOUNTER — HOSPITAL ENCOUNTER (OUTPATIENT)
Dept: OCCUPATIONAL THERAPY | Facility: CLINIC | Age: 73
Discharge: HOME | End: 2023-05-02
Payer: COMMERCIAL

## 2023-05-02 ENCOUNTER — OFFICE VISIT (OUTPATIENT)
Dept: FAMILY MEDICINE | Facility: CLINIC | Age: 73
End: 2023-05-02
Payer: COMMERCIAL

## 2023-05-02 VITALS
BODY MASS INDEX: 22.15 KG/M2 | OXYGEN SATURATION: 100 % | RESPIRATION RATE: 15 BRPM | SYSTOLIC BLOOD PRESSURE: 107 MMHG | HEIGHT: 63 IN | DIASTOLIC BLOOD PRESSURE: 75 MMHG | TEMPERATURE: 97.9 F | HEART RATE: 86 BPM | WEIGHT: 125 LBS

## 2023-05-02 DIAGNOSIS — I89.0 LYMPHEDEMA: Primary | ICD-10-CM

## 2023-05-02 DIAGNOSIS — C53.0 MALIGNANT NEOPLASM OF ENDOCERVIX (H): ICD-10-CM

## 2023-05-02 DIAGNOSIS — R18.8 OTHER ASCITES: Primary | ICD-10-CM

## 2023-05-02 PROCEDURE — 97140 MANUAL THERAPY 1/> REGIONS: CPT | Mod: GO

## 2023-05-02 NOTE — PATIENT INSTRUCTIONS
Thyroid supplement from the naturopath  Send me name and dose and how often you are taking      The main laboratory findings in protein-losing enteropathy are reduced serum concentrations of albumin, gamma globulins (IgA, IgG, IgM), fibrinogen, cholesterol, transferrin, and ceruloplasmin. Patients may have fat-soluble vitamin deficiencies due to their underlying small bowel disease. (See 'Laboratory findings' above.)  ?Protein-losing gastroenteropathy should be suspected in patients with edema and hypoalbuminemia in whom there is no other apparent cause of protein loss (eg, proteinuria), inadequate synthesis (eg, liver diseases), or supply (eg, protein malnutrition). The diagnosis of protein-losing gastroenteropathy is established by an increase in alpha-1 antitrypsin clearance. (See 'Diagnosis' above.)

## 2023-05-02 NOTE — Clinical Note
Late on getting my note completed, apologies.  I think that Maritza likely has protein losing enteropathy. She will be seeing me at the end of May and I will check hepatic panel again. I am hopeful that as nutrition status improves and diarrhea improves that accumulation of ascites will also decline/ cease.   Merry

## 2023-05-03 ENCOUNTER — THERAPY VISIT (OUTPATIENT)
Dept: PHYSICAL THERAPY | Facility: CLINIC | Age: 73
End: 2023-05-03
Attending: STUDENT IN AN ORGANIZED HEALTH CARE EDUCATION/TRAINING PROGRAM
Payer: COMMERCIAL

## 2023-05-03 DIAGNOSIS — C53.9 RECURRENT CERVICAL CANCER (H): Primary | ICD-10-CM

## 2023-05-03 PROCEDURE — 97110 THERAPEUTIC EXERCISES: CPT | Mod: GP | Performed by: PHYSICAL THERAPIST

## 2023-05-03 PROCEDURE — 97116 GAIT TRAINING THERAPY: CPT | Mod: GP | Performed by: PHYSICAL THERAPIST

## 2023-05-09 ENCOUNTER — MYC MEDICAL ADVICE (OUTPATIENT)
Dept: ONCOLOGY | Facility: CLINIC | Age: 73
End: 2023-05-09

## 2023-05-09 ENCOUNTER — HOSPITAL ENCOUNTER (OUTPATIENT)
Dept: OCCUPATIONAL THERAPY | Facility: CLINIC | Age: 73
Discharge: HOME | End: 2023-05-09
Payer: COMMERCIAL

## 2023-05-09 DIAGNOSIS — I89.0 LYMPHEDEMA: Primary | ICD-10-CM

## 2023-05-09 DIAGNOSIS — C53.0 MALIGNANT NEOPLASM OF ENDOCERVIX (H): ICD-10-CM

## 2023-05-09 PROCEDURE — 97140 MANUAL THERAPY 1/> REGIONS: CPT | Mod: GO

## 2023-05-10 ENCOUNTER — PATIENT OUTREACH (OUTPATIENT)
Dept: ONCOLOGY | Facility: CLINIC | Age: 73
End: 2023-05-10

## 2023-05-10 DIAGNOSIS — R18.8 OTHER ASCITES: Primary | ICD-10-CM

## 2023-05-10 NOTE — TELEPHONE ENCOUNTER
Spoke with pt   Inquired on PCP follow-up and plan  Per pt states PCP feels it is related to malnutrition  Orders to increase protein intake, keep up with PT and lymphedema therapy  Per pt PCP suggested she call  Dr Raygoza's office if paracentesis needed to be repeated    Discussed with pt that want to figure out who is the best to order this follow-up and make sure all the tests that are needed are ordered. Will talk to NP and call back    Per NP reviewed pt issues, labs and PCP notes  Orders entered as requested, same as previous orders with last paracentesis    Notified pt of plan

## 2023-05-11 ENCOUNTER — HOSPITAL ENCOUNTER (OUTPATIENT)
Dept: ULTRASOUND IMAGING | Facility: CLINIC | Age: 73
Discharge: HOME | End: 2023-05-11
Attending: OBSTETRICS & GYNECOLOGY
Payer: COMMERCIAL

## 2023-05-11 ENCOUNTER — HOSPITAL ENCOUNTER (OUTPATIENT)
Facility: CLINIC | Age: 73
Discharge: HOME | End: 2023-05-11
Admitting: RADIOLOGY
Payer: COMMERCIAL

## 2023-05-11 DIAGNOSIS — R18.8 OTHER ASCITES: ICD-10-CM

## 2023-05-11 LAB
ALBUMIN BODY FLUID SOURCE: NORMAL
ALBUMIN FLD-MCNC: 2.7 G/DL
APPEARANCE FLD: ABNORMAL
BASOPHILS NFR FLD MANUAL: 2 %
CELL COUNT BODY FLUID SOURCE: ABNORMAL
COLOR FLD: YELLOW
LYMPHOCYTES NFR FLD MANUAL: 77 %
MONOS+MACROS NFR FLD MANUAL: 12 %
NEUTS BAND NFR FLD MANUAL: 6 %
OTHER CELLS FLD MANUAL: 3 %
PROT FLD-MCNC: 4.4 G/DL
PROTEIN BODY FLUID SOURCE: NORMAL
WBC # FLD AUTO: 866 /UL

## 2023-05-11 PROCEDURE — 88112 CYTOPATH CELL ENHANCE TECH: CPT | Mod: TC | Performed by: OBSTETRICS & GYNECOLOGY

## 2023-05-11 PROCEDURE — 89051 BODY FLUID CELL COUNT: CPT | Performed by: OBSTETRICS & GYNECOLOGY

## 2023-05-11 PROCEDURE — 272N000706 US PARACENTESIS WITHOUT ALBUMIN

## 2023-05-11 PROCEDURE — 250N000009 HC RX 250: Performed by: RADIOLOGY

## 2023-05-11 PROCEDURE — 82042 OTHER SOURCE ALBUMIN QUAN EA: CPT | Performed by: OBSTETRICS & GYNECOLOGY

## 2023-05-11 PROCEDURE — 84157 ASSAY OF PROTEIN OTHER: CPT | Performed by: OBSTETRICS & GYNECOLOGY

## 2023-05-11 RX ORDER — LIDOCAINE HYDROCHLORIDE 10 MG/ML
10 INJECTION, SOLUTION EPIDURAL; INFILTRATION; INTRACAUDAL; PERINEURAL ONCE
Status: COMPLETED | OUTPATIENT
Start: 2023-05-11 | End: 2023-05-11

## 2023-05-11 RX ADMIN — LIDOCAINE HYDROCHLORIDE 10 ML: 10 INJECTION, SOLUTION EPIDURAL; INFILTRATION; INTRACAUDAL; PERINEURAL at 11:35

## 2023-05-15 ENCOUNTER — THERAPY VISIT (OUTPATIENT)
Dept: PHYSICAL THERAPY | Facility: CLINIC | Age: 73
End: 2023-05-15
Payer: COMMERCIAL

## 2023-05-15 DIAGNOSIS — C53.9 RECURRENT CERVICAL CANCER (H): Primary | ICD-10-CM

## 2023-05-15 DIAGNOSIS — R53.83 FATIGUE, UNSPECIFIED TYPE: ICD-10-CM

## 2023-05-15 DIAGNOSIS — G62.0 DRUG-INDUCED POLYNEUROPATHY (H): ICD-10-CM

## 2023-05-15 PROCEDURE — 97110 THERAPEUTIC EXERCISES: CPT | Mod: GP | Performed by: PHYSICAL THERAPIST

## 2023-05-16 LAB
PATH REPORT.COMMENTS IMP SPEC: NORMAL
PATH REPORT.FINAL DX SPEC: NORMAL
PATH REPORT.GROSS SPEC: NORMAL
PATH REPORT.MICROSCOPIC SPEC OTHER STN: NORMAL
PATH REPORT.RELEVANT HX SPEC: NORMAL

## 2023-05-16 PROCEDURE — 88341 IMHCHEM/IMCYTCHM EA ADD ANTB: CPT | Mod: 26 | Performed by: PATHOLOGY

## 2023-05-16 PROCEDURE — 88342 IMHCHEM/IMCYTCHM 1ST ANTB: CPT | Mod: 26 | Performed by: PATHOLOGY

## 2023-05-16 PROCEDURE — 88305 TISSUE EXAM BY PATHOLOGIST: CPT | Mod: 26 | Performed by: PATHOLOGY

## 2023-05-18 ENCOUNTER — THERAPY VISIT (OUTPATIENT)
Dept: OCCUPATIONAL THERAPY | Facility: CLINIC | Age: 73
End: 2023-05-18
Payer: COMMERCIAL

## 2023-05-18 DIAGNOSIS — C53.0 MALIGNANT NEOPLASM OF ENDOCERVIX (H): ICD-10-CM

## 2023-05-18 DIAGNOSIS — I89.0 LYMPHEDEMA: Primary | ICD-10-CM

## 2023-05-18 PROCEDURE — 97140 MANUAL THERAPY 1/> REGIONS: CPT | Mod: GO

## 2023-05-23 ENCOUNTER — THERAPY VISIT (OUTPATIENT)
Dept: OCCUPATIONAL THERAPY | Facility: CLINIC | Age: 73
End: 2023-05-23
Payer: COMMERCIAL

## 2023-05-23 DIAGNOSIS — C53.0 MALIGNANT NEOPLASM OF ENDOCERVIX (H): ICD-10-CM

## 2023-05-23 DIAGNOSIS — I89.0 LYMPHEDEMA: Primary | ICD-10-CM

## 2023-05-23 PROCEDURE — 97140 MANUAL THERAPY 1/> REGIONS: CPT | Mod: GO

## 2023-05-31 ENCOUNTER — OFFICE VISIT (OUTPATIENT)
Dept: FAMILY MEDICINE | Facility: CLINIC | Age: 73
End: 2023-05-31
Payer: COMMERCIAL

## 2023-05-31 VITALS
OXYGEN SATURATION: 100 % | HEIGHT: 63 IN | RESPIRATION RATE: 15 BRPM | DIASTOLIC BLOOD PRESSURE: 73 MMHG | SYSTOLIC BLOOD PRESSURE: 111 MMHG | TEMPERATURE: 98 F | WEIGHT: 113 LBS | HEART RATE: 74 BPM | BODY MASS INDEX: 20.02 KG/M2

## 2023-05-31 DIAGNOSIS — R18.8 OTHER ASCITES: Primary | ICD-10-CM

## 2023-05-31 DIAGNOSIS — E03.8 OTHER SPECIFIED HYPOTHYROIDISM: ICD-10-CM

## 2023-05-31 DIAGNOSIS — C53.9 RECURRENT CERVICAL CANCER (H): ICD-10-CM

## 2023-05-31 DIAGNOSIS — D64.9 ANEMIA, UNSPECIFIED TYPE: ICD-10-CM

## 2023-05-31 LAB
ERYTHROCYTE [DISTWIDTH] IN BLOOD BY AUTOMATED COUNT: 16.2 % (ref 10–15)
FOLATE SERPL-MCNC: 7 NG/ML (ref 4.6–34.8)
HCT VFR BLD AUTO: 33.1 % (ref 35–47)
HGB BLD-MCNC: 10.1 G/DL (ref 11.7–15.7)
MCH RBC QN AUTO: 29.3 PG (ref 26.5–33)
MCHC RBC AUTO-ENTMCNC: 30.5 G/DL (ref 31.5–36.5)
MCV RBC AUTO: 96 FL (ref 78–100)
PLATELET # BLD AUTO: 242 10E3/UL (ref 150–450)
RBC # BLD AUTO: 3.45 10E6/UL (ref 3.8–5.2)
WBC # BLD AUTO: 2.8 10E3/UL (ref 4–11)

## 2023-05-31 PROCEDURE — 84443 ASSAY THYROID STIM HORMONE: CPT | Performed by: INTERNAL MEDICINE

## 2023-05-31 PROCEDURE — 82607 VITAMIN B-12: CPT | Performed by: INTERNAL MEDICINE

## 2023-05-31 PROCEDURE — 80053 COMPREHEN METABOLIC PANEL: CPT | Performed by: INTERNAL MEDICINE

## 2023-05-31 PROCEDURE — 82746 ASSAY OF FOLIC ACID SERUM: CPT | Performed by: INTERNAL MEDICINE

## 2023-05-31 NOTE — NURSING NOTE
"73 year old  Chief Complaint   Patient presents with     RECHECK     Follow up on 05/02 ascites concerns.        Blood pressure 111/73, pulse 74, temperature 98  F (36.7  C), temperature source Skin, resp. rate 15, height 1.6 m (5' 3\"), weight 51.3 kg (113 lb), SpO2 100 %, not currently breastfeeding. Body mass index is 20.02 kg/m .  Patient Active Problem List   Diagnosis     Dupuytren contracture     Abnormal electrocardiogram     Atrial fibrillation (H)     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     Hyperlipidemia LDL goal <130     Malignant neoplasm of breast (H)     Rectocele     Pelvic pain in female     History of intraepithelial neoplasia of vagina     Retroperitoneal lymphadenopathy     Malignant neoplasm of endocervix (H)     Recurrent cervical cancer (H)     Cervical cancer (H)       Wt Readings from Last 2 Encounters:   05/31/23 51.3 kg (113 lb)   05/02/23 56.7 kg (125 lb)     BP Readings from Last 3 Encounters:   05/31/23 111/73   05/02/23 107/75   04/24/23 125/87         Current Outpatient Medications   Medication     Acetylcarnitine HCl 250 MG CAPS     aspirin (ASA) 81 MG chewable tablet     Bacillus Coagulans-Inulin (PROBIOTIC FORMULA) 1-250 BILLION-MG CAPS     BENFOTIAMINE PO     Cholecalciferol (VITAMIN D-3) 25 MCG (1000 UT) CAPS     coenzyme Q-10 capsule     levothyroxine (SYNTHROID/LEVOTHROID) 75 MCG tablet     MAGNESIUM OXIDE PO     melatonin 5 MG tablet     metoprolol succinate ER (TOPROL XL) 50 MG 24 hr tablet     Omega-3 Fatty Acids (OMEGA-3 FISH OIL PO)     OVER-THE-COUNTER     Pectin Cit-Inos-C-Bioflav-Soy (MODIFIED CITRUS PECTIN PO)     Taurine 1000 MG CAPS     UNABLE TO FIND     UNABLE TO FIND     UNABLE TO FIND     UNABLE TO FIND     acetaminophen (TYLENOL) 325 MG tablet     multivitamin w/minerals (THERA-VIT-M) tablet     UNABLE TO FIND     No current facility-administered medications for this visit.       Social History     Tobacco Use     Smoking " status: Former     Packs/day: 0.50     Years: 15.00     Pack years: 7.50     Types: Cigarettes     Quit date:      Years since quittin.4     Smokeless tobacco: Never   Vaping Use     Vaping status: Never Used   Substance Use Topics     Alcohol use: Not Currently     Alcohol/week: 2.0 standard drinks of alcohol     Types: 2 Standard drinks or equivalent per week     Comment: Socially      Drug use: No       Health Maintenance Due   Topic Date Due     DEPRESSION ACTION PLAN  Never done     Pneumococcal Vaccine: 65+ Years (1 - PCV) Never done     ZOSTER IMMUNIZATION (2 of 2) 2020     INFLUENZA VACCINE (1) 2022     PAP  2023     MEDICARE ANNUAL WELLNESS VISIT  2023     FALL RISK ASSESSMENT  2023       Lab Results   Component Value Date    PAP NIL 2020         May 31, 2023 10:37 AM

## 2023-05-31 NOTE — PROGRESS NOTES
Cordell Donaldson is a 73 year old female with hx of recurrent cervical and vaginal cancer, breast cancer, atrial fibrillation, hyperlipidemia, hypothyroidism here for the following issues:     Ascites  I saw Maritza in clinic 4/10/23 to discuss care she was receiving for recurrent cervical cancer.  Marked increase of ascites was noted on pelvic MRI scan in February.  Maritza reported to me she had increasing abdominal discomfort and underwent paracentesis 4/21/23 with removal of 3.2L. Tap was clear, straw colored. No malignant cells were seen. She was referred to the lymphedema clinic and is now wearing support hose and Spanx.      She was referred to cardiology to rule out cardiac cause for ascites. She had a normal ECHO, no evidence for heart failure on ECHO or clinically.       She was referred back to see me by her oncologist for evaluation of the ascites. I saw her on 5/2 for this. Review of labs indicated that Maritza had abrupt change in serum protein and albumin in February, 2023. Maritza experienced a lot of loose stools during treatment with brachytherapy/radiation therapy. At her previous visit in April she mentioned that stools were still loose and just beginning to become formed.   She had been adding Pea protein but had switched recently to Pumpkin seed protein, 30gm per day. She reported diarrhea finally stopped about 10 days prior and BM had become formed. She had been able to add broccoli, lentils, beans and fish.  Her weight was down about 4 pounds from April. Plan was to hold on any formal lab testing. She planned to continue to work on consuming at least 30g of protein powder. She needs about 45 g of protein per day.      She reports that ascites has improved, it has not worsened since the paracentesis. She his taking perfect amino acid and digestive enzymes with meals. Cordell previous reported 6 loose stools a day. Stools are now formed in the morning and less so as the day progresses.  Appetite is good, she is eating fish, and cooked veggies. Also eats yoghurt, nuts and seeds.   Today, she is due for a repeat hepatic panel to check albumin and protein levels.     Lymphedema   Maritza has been meeting with a lymphedema specialist due to increased abdominal girth and left leg edema. After her last visit the lymphedema provider suggested another paracentesis. After this was performed Maritza reports the edema of the left left leg completely resolved. She still feels some mild fullness over the right side of her abdomen.  She reports she is taking an enzyme, recommended by her naturopath, which causes her to urinate.     Wt Readings from Last 4 Encounters:   05/31/23 51.3 kg (113 lb)   05/02/23 56.7 kg (125 lb)   04/24/23 57.5 kg (126 lb 11.2 oz)   04/19/23 60.1 kg (132 lb 6.4 oz)     Hypothyroidism  Maritza has hypothyroidism. Her TSH has been elevated.  She reported she had been consistent with dosing since March, taking levothyroxine 50mcg dose. I suspected that she may not have consistent absorption of her medication due to loose stools. Dose was increased to 75mcg daily.  She is consistent with dosing. She also takes a thyroid supplement, see medication list.  She is due for TSH check today.     TSH   Date Value Ref Range Status   04/10/2023 46.10 (H) 0.30 - 4.20 uIU/mL Final   08/22/2017 2.16 0.40 - 4.00 mU/L Final     Recurrent cervical cancer  Cordell is currently followed by oncology for treatment of recurrent cervical cancer.  She was started on chemo with cisplatin, paclitaxel, and bevacizumab (7/11/22).   She is s/p radiation to the pelvis and para-aortic lymph nodes and  interstitial brachytherapy to the vaginal cuff (12/28/2022 - 2/15/2023).   At our last visit she indicated she had persistent bloody vaginal discharge, however that has since resolved.       Patient Active Problem List   Diagnosis     Dupuytren contracture     Abnormal electrocardiogram     Atrial fibrillation (H)      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     Hyperlipidemia LDL goal <130     Malignant neoplasm of breast (H)     Rectocele     Pelvic pain in female     History of intraepithelial neoplasia of vagina     Retroperitoneal lymphadenopathy     Malignant neoplasm of endocervix (H)     Recurrent cervical cancer (H)     Cervical cancer (H)       Current Outpatient Medications   Medication Sig Dispense Refill     acetaminophen (TYLENOL) 325 MG tablet Take 2 tablets (650 mg) by mouth every 6 hours as needed for mild pain (Patient not taking: Reported on 5/2/2023)       Acetylcarnitine HCl 250 MG CAPS Take 500 mg by mouth 2 times daily        aspirin (ASA) 81 MG chewable tablet Take 81 mg by mouth every evening       Bacillus Coagulans-Inulin (PROBIOTIC FORMULA) 1-250 BILLION-MG CAPS Take by mouth every morning 25+billion CFUS       BENFOTIAMINE PO Take 150 mg by mouth every morning       Cholecalciferol (VITAMIN D-3) 25 MCG (1000 UT) CAPS Take 1,000 Units by mouth every morning 90 capsule 3     coenzyme Q-10 capsule Take 1 capsule by mouth daily       levothyroxine (SYNTHROID/LEVOTHROID) 75 MCG tablet Take 1 tablet (75 mcg) by mouth daily before breakfast 90 tablet 0     MAGNESIUM OXIDE PO Take 400 mg by mouth 2 times daily       melatonin 5 MG tablet Take 10 mg by mouth nightly as needed for sleep       metoprolol succinate ER (TOPROL XL) 50 MG 24 hr tablet TAKE 1 TABLET BY MOUTH EVERY DAY (Patient taking differently: Take 50 mg by mouth every evening) 90 tablet 2     multivitamin w/minerals (THERA-VIT-M) tablet Take 1 tablet by mouth every morning (Patient not taking: Reported on 5/2/2023)       Omega-3 Fatty Acids (OMEGA-3 FISH OIL PO) Take 630 mg by mouth 2 times daily       OVER-THE-COUNTER Turkey tail mushroom powder, use 3 times a day 1 Can 11     Pectin Cit-Inos-C-Bioflav-Soy (MODIFIED CITRUS PECTIN PO) Take by mouth 2 times daily       Taurine 1000 MG CAPS every morning 60  "capsule 11     UNABLE TO FIND 1,200 mg 2 times daily MEDICATION NAME: Efraín       UNABLE TO FIND 150 mg daily MEDICATION NAME: Silymarin       UNABLE TO FIND 1 capsule 2 times daily MEDICATION NAME: Alkylgycerols (Patient not taking: Reported on 4/6/2023)       UNABLE TO FIND 2 times daily MEDICATION NAME: MCHC for 1000mg calcium  4 pills per day       UNABLE TO FIND Take 1 tablet by mouth every morning MEDICATION NAME: Dinndolylmethane Complex (DIM) 100mg tablet 1 table daily         No Known Allergies     EXAM  /73 (BP Location: Right arm, Patient Position: Sitting, Cuff Size: Adult Small)   Pulse 74   Temp 98  F (36.7  C) (Skin)   Resp 15   Ht 1.6 m (5' 3\")   Wt 51.3 kg (113 lb)   LMP  (LMP Unknown)   SpO2 100%   BMI 20.02 kg/m    Gen: Alert, pleasant, NAD  COR: S1,S2, no murmur  Lungs: CTA bilaterally, no rhonchi, wheezes or rales  Abdomen: Soft, mild distension but marked improvement since her last exam, normal bowel sounds, no HSM or mass  Ext: no peripheral edema, swelling of the left lower limb has resolved since our last visit.     Results for orders placed or performed in visit on 05/31/23   Comprehensive metabolic panel     Status: Abnormal   Result Value Ref Range    Sodium 135 (L) 136 - 145 mmol/L    Potassium 5.3 3.4 - 5.3 mmol/L    Chloride 97 (L) 98 - 107 mmol/L    Carbon Dioxide (CO2) 26 22 - 29 mmol/L    Anion Gap 12 7 - 15 mmol/L    Urea Nitrogen 20.6 8.0 - 23.0 mg/dL    Creatinine 0.82 0.51 - 0.95 mg/dL    Calcium 9.9 8.8 - 10.2 mg/dL    Glucose 93 70 - 99 mg/dL    Alkaline Phosphatase 48 35 - 104 U/L    AST 23 10 - 35 U/L    ALT 12 10 - 35 U/L    Protein Total 6.9 6.4 - 8.3 g/dL    Albumin 4.5 3.5 - 5.2 g/dL    Bilirubin Total 0.2 <=1.2 mg/dL    GFR Estimate 76 >60 mL/min/1.73m2   TSH with free T4 reflex     Status: Abnormal   Result Value Ref Range    TSH 32.60 (H) 0.30 - 4.20 uIU/mL   CBC with platelets     Status: Abnormal   Result Value Ref Range    WBC Count 2.8 (L) 4.0 - " 11.0 10e3/uL    RBC Count 3.45 (L) 3.80 - 5.20 10e6/uL    Hemoglobin 10.1 (L) 11.7 - 15.7 g/dL    Hematocrit 33.1 (L) 35.0 - 47.0 %    MCV 96 78 - 100 fL    MCH 29.3 26.5 - 33.0 pg    MCHC 30.5 (L) 31.5 - 36.5 g/dL    RDW 16.2 (H) 10.0 - 15.0 %    Platelet Count 242 150 - 450 10e3/uL   Vitamin B12     Status: Normal   Result Value Ref Range    Vitamin B12 475 232 - 1,245 pg/mL   Folate     Status: Normal   Result Value Ref Range    Folic Acid 7.0 4.6 - 34.8 ng/mL   T4 free     Status: Normal   Result Value Ref Range    Free T4 1.07 0.90 - 1.70 ng/dL     Assessment:  (R18.8) Other ascites  (primary encounter diagnosis)  Comment: improving, no significant reaccumulation of fluid after last paracentesis. Left lower extremity edema is resolved. Still some increased right sided fluid, fluid is loculated, per radiologist.  Albumin and protein levels are rising. She is having formed stools.   Plan: Comprehensive metabolic panel        Discussed she continue with high protein diet.  Monitor for any worsening of symptoms and contact me if she feels paracentesis is needed. At this time, I will defer extensive GI workup, given clinical improvement.     (E03.8) Other specified hypothyroidism  Comment: TSH remains elevated at 32.6  Plan: TSH with free T4 reflex        Increase levothyroxine dose to 100mcg daily, return in 6 wks for next TSH check    (D64.9) Anemia, unspecified type  Comment: mild dampening of Hgb 10.1.  Plan: CBC with platelets, Vitamin B12, Folate        Check for reversible causes.     (C53.9) Recurrent cervical cancer (H)  Comment: hx of recurrent cancer, vaginal discharge has resolved.   Plan: monitor for recurrence of symptoms.     37 minutes spend on the date of this encounter doing chart review, history and exam, documentation and further activities as noted above.       RTC 3 months, sooner if needed    Merry Lino MD  Internal Medicine/Pediatrics

## 2023-06-01 ENCOUNTER — THERAPY VISIT (OUTPATIENT)
Dept: OCCUPATIONAL THERAPY | Facility: CLINIC | Age: 73
End: 2023-06-01
Payer: COMMERCIAL

## 2023-06-01 DIAGNOSIS — E03.8 OTHER SPECIFIED HYPOTHYROIDISM: Primary | ICD-10-CM

## 2023-06-01 DIAGNOSIS — C53.0 MALIGNANT NEOPLASM OF ENDOCERVIX (H): ICD-10-CM

## 2023-06-01 DIAGNOSIS — I89.0 LYMPHEDEMA: Primary | ICD-10-CM

## 2023-06-01 LAB
ALBUMIN SERPL BCG-MCNC: 4.5 G/DL (ref 3.5–5.2)
ALP SERPL-CCNC: 48 U/L (ref 35–104)
ALT SERPL W P-5'-P-CCNC: 12 U/L (ref 10–35)
ANION GAP SERPL CALCULATED.3IONS-SCNC: 12 MMOL/L (ref 7–15)
AST SERPL W P-5'-P-CCNC: 23 U/L (ref 10–35)
BILIRUB SERPL-MCNC: 0.2 MG/DL
BUN SERPL-MCNC: 20.6 MG/DL (ref 8–23)
CALCIUM SERPL-MCNC: 9.9 MG/DL (ref 8.8–10.2)
CHLORIDE SERPL-SCNC: 97 MMOL/L (ref 98–107)
CREAT SERPL-MCNC: 0.82 MG/DL (ref 0.51–0.95)
DEPRECATED HCO3 PLAS-SCNC: 26 MMOL/L (ref 22–29)
GFR SERPL CREATININE-BSD FRML MDRD: 76 ML/MIN/1.73M2
GLUCOSE SERPL-MCNC: 93 MG/DL (ref 70–99)
POTASSIUM SERPL-SCNC: 5.3 MMOL/L (ref 3.4–5.3)
PROT SERPL-MCNC: 6.9 G/DL (ref 6.4–8.3)
SODIUM SERPL-SCNC: 135 MMOL/L (ref 136–145)
T4 FREE SERPL-MCNC: 1.07 NG/DL (ref 0.9–1.7)
TSH SERPL DL<=0.005 MIU/L-ACNC: 32.6 UIU/ML (ref 0.3–4.2)
VIT B12 SERPL-MCNC: 475 PG/ML (ref 232–1245)

## 2023-06-01 PROCEDURE — 97140 MANUAL THERAPY 1/> REGIONS: CPT | Mod: GO | Performed by: OCCUPATIONAL THERAPIST

## 2023-06-01 RX ORDER — LEVOTHYROXINE SODIUM 100 UG/1
100 TABLET ORAL
Qty: 90 TABLET | Refills: 0 | Status: SHIPPED | OUTPATIENT
Start: 2023-06-01 | End: 2023-07-14

## 2023-06-06 ENCOUNTER — THERAPY VISIT (OUTPATIENT)
Dept: OCCUPATIONAL THERAPY | Facility: CLINIC | Age: 73
End: 2023-06-06
Payer: COMMERCIAL

## 2023-06-06 DIAGNOSIS — I89.0 LYMPHEDEMA: Primary | ICD-10-CM

## 2023-06-06 DIAGNOSIS — C53.0 MALIGNANT NEOPLASM OF ENDOCERVIX (H): ICD-10-CM

## 2023-06-06 PROCEDURE — 97140 MANUAL THERAPY 1/> REGIONS: CPT | Mod: GO

## 2023-06-07 ENCOUNTER — THERAPY VISIT (OUTPATIENT)
Dept: PHYSICAL THERAPY | Facility: CLINIC | Age: 73
End: 2023-06-07
Payer: COMMERCIAL

## 2023-06-07 DIAGNOSIS — C53.9 RECURRENT CERVICAL CANCER (H): Primary | ICD-10-CM

## 2023-06-07 DIAGNOSIS — R53.81 PHYSICAL DECONDITIONING: ICD-10-CM

## 2023-06-07 DIAGNOSIS — R53.83 FATIGUE, UNSPECIFIED TYPE: ICD-10-CM

## 2023-06-07 PROCEDURE — 97110 THERAPEUTIC EXERCISES: CPT | Mod: GP | Performed by: PHYSICAL THERAPIST

## 2023-06-07 ASSESSMENT — 6 MINUTE WALK TEST (6MWT): OXYGEN DEVICE: NO

## 2023-06-07 NOTE — PROGRESS NOTES
DISCHARGE  Reason for Discharge: Patient has met all goals.    Equipment Issued:     Discharge Plan: Patient to continue home program.    Referring Provider:  Aleta Shea

## 2023-06-08 ENCOUNTER — ANCILLARY PROCEDURE (OUTPATIENT)
Dept: PET IMAGING | Facility: CLINIC | Age: 73
End: 2023-06-08
Attending: OBSTETRICS & GYNECOLOGY
Payer: COMMERCIAL

## 2023-06-08 DIAGNOSIS — Z08 ENCOUNTER FOR FOLLOW-UP EXAMINATION AFTER COMPLETED TREATMENT FOR MALIGNANT NEOPLASM: ICD-10-CM

## 2023-06-08 DIAGNOSIS — C53.0 MALIGNANT NEOPLASM OF ENDOCERVIX (H): ICD-10-CM

## 2023-06-08 DIAGNOSIS — C53.9 CERVICAL CANCER (H): ICD-10-CM

## 2023-06-08 LAB — GLUCOSE SERPL-MCNC: 101 MG/DL (ref 70–99)

## 2023-06-08 RX ORDER — IOPAMIDOL 755 MG/ML
50-125 INJECTION, SOLUTION INTRAVASCULAR ONCE
Status: COMPLETED | OUTPATIENT
Start: 2023-06-08 | End: 2023-06-08

## 2023-06-08 RX ORDER — FUROSEMIDE 10 MG/ML
40 INJECTION INTRAMUSCULAR; INTRAVENOUS ONCE
Status: COMPLETED | OUTPATIENT
Start: 2023-06-08 | End: 2023-06-08

## 2023-06-08 RX ADMIN — IOPAMIDOL 60 ML: 755 INJECTION, SOLUTION INTRAVASCULAR at 11:56

## 2023-06-08 RX ADMIN — FUROSEMIDE 40 MG: 10 INJECTION INTRAMUSCULAR; INTRAVENOUS at 12:10

## 2023-06-08 NOTE — DISCHARGE INSTRUCTIONS

## 2023-06-12 NOTE — PROGRESS NOTES
Follow-up Note on Referred Patient  Gynecologic Oncology Clinic      Date: 2023        Referring Provider/Gynecologic Oncologist:  Megan Arciniega MD  [facility]   Phone [phone]  [phone]      Radiation Oncologist:   Raymond Stockton MD, PhD  [facility]      Medical Oncologist:  Angela Montana MD  [facility].      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 Surveillance visit.      History of Present Illness:   Cordell Donaldson (she/her/hers) is a 73 year old initially referred to the Gynecologic Cancer Clinic at the [facility] on 2022 by gynecologic oncologist Dr. Arciniega and radiation oncologist Dr. Stockton for management of a pelvic mass due to recurrent cervical cancer vs new vaginal cancer. History as follows:     Breast Cancer History:  : Left breast cancer.  -Lumpectomy.  -Radiation therapy.    [id number]: BRENNAN.     22: PET/CT to stage vaginal cancer (see below).   Mild soft tissue  thickening in the left retropectoral region with an SUV max of 2.7.  7/15/22: Invasive ductal carcinoma.   -Consultation with Dr. Montana. Recommendation to prioritize vaginal/recurrent cervix cancer. Plan to follow breast lesion with repeat MRI. Plan for endocrine therapy after completion of chemotherapy for vaginal/cervical cancer.       Cervical/Vaginal Dysplasia/Cancer History:  10/24/12:   -Endocervical curettings: Squamous carcinoma, invasion cannot be assessed.   -Ectocervical biopsies: HSIL (CIN3).   -Vaginal biopsy, posterior: HSIL (VaIN3).  2012: Robotic-assisted laparoscopic lysis of adhesions, left ureterolysis with  conversion to laparotomy, total abdominal hysterectomy, and an upper  Vaginectomy by gynecologic oncologist   Demian.   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     7/22/13: Vaginal biopsy: HSIL, cannot exclude invasion.   -Consultation with gynecologic oncologist Dr. Johnson. Upper vaginectomy and CO2 laser ablation recommended, patient declined.   [id number]: No vaginal dysplasia treatment.   5/20/22: Pelvic MRI: I have personally reviewed the MRI images.   Centrally hypoenhancing heterogeneous midline pelvic mass along  the superior margin of the vaginal vault measures 8.5 x 6.7 x 8.1 cm.  The mass abut the bladder and there is mucosal irregularity at the  site of abutment. The mass indents the  decompressed rectum but there is no definite invasion or mucosal  Irregularity.  6/9/22: CT-guided biopsy of pelvic mass: Poorly-differentiated squamous cell carcinoma, HPV-associated.   7/1/22: Staging PET/CT: Stage III vaginal cancer (vs recurrent, metastatic cervical cancer). I have personally reviewed the PET/CT images.     7/20/22, 8/10/22, 8/31/22,9/21/22, 10/26/22, 11/21/22: Cycles #1-6 of first-line chemotherapy with IV platinum + paclitaxel + bevacizumab 15 mg/kg + pembrolizumab 200 mg (added cycle 3) every 21 days.   -Cycles 1-4: Cisplatin 50 mg/m2, paclitaxel 175 mg/m2.   -Cycle 3:  Pembrolizumab added to all subsequent cycles due to PD-L1+ result per the KEYNOTE-826 regimen.  -9/22/22: PET/CT: Partial response. I have personally reviewed the PET/CT images.   -Discussed with radiation oncologist Dr. Stockton and at the gyn onc/radiation oncology tumor conference. Recommendation for additional chemotherapy prior to radiation therapy.   -Cycles 5+: Platinum changed to carboplatin due to tinnitus, and paclitaxel dose-reduced to 135 mg/m2 due to peripheral neuropathy.   -12/5/22: PET/CT: Partial response. I have personally reviewed the PET/CT images.   Diffuse uptake throughout the thyroid gland with SUV max 5.5, likely  Thyroiditis.  A few small pulmonary  "nodules  are stable, including a 2 mm solid pulmonary nodule in the left upper  lobe. No new or enlarging pulmonary nodules.  The heart  remains enlarged. No significant pericardial effusion.   Prominent  precarinal lymph node without significant FDG uptake. No suspicious  lymphadenopathy in the mediastinum.  Decreased size of centrally hypodense, necrotic cervical mass, now  measuring 5.3 x 4.1 cm (previously 6.1 x 4.3 cm)  when measured similarly. There is resolution of previously seen  enhancing component along the right lateral wall and resolution of  previous hypermetabolic activity. There is also resolution of  previously seen hypermetabolic pelvic lymph nodes.  12/28/22-2/15/23: First-line pelvic external beam radiation therapy with total dose 4500 cGy in 25 fractions then vaginal cuff brachytherapy with total dose 2500 cGy in 5 fractions. No concurrent chemosensitization due to myelosuppression and decreased patient tolerance of therapy.   -Patient declined maintenance therapy due to significantly decreased quality of life while on systemic therapy.       Genetic/Genomic/Molecular Testing History:  2006: Negative for germline BRCA1, BRCA2 pathogenic variants.     8/14/22 (specimen from 6/9/22): [facility] Testing.   -PD-L1+ (CPS 10)  -Mismatch repair deficient/microsatellite instability-high  -TMB high (53 mut/Mb)  -NTRK 1/2/3 fusion not detected.       Subjective:  Cordell returns to the gyn onc clinic today for her scheduled surveillance visit, accompanied by her .     Cordell is here with her . She says that she has been doing a lot better lately. Her appetite and food intake increased, she can eat whatever she wants now. Biking a little bit. Gardening. Feeling very good, energy is up. Diarrhea stopped. She now has BM about 3-4x a day, does a \"morning evacuation.\"    Since her paracentesis, she doesn't feel like she has reaccumulated fluid. She has been weighing herself daily and her weight has " "been stable within 2 lbs. Able to fit in her jeans now, no longer having to just wear sweatpants. She and her doctor suspect the ascites was due to malnutrition.    No vaginal bleeding. She has been using vaginal dilators about once every 10 days now. No longer having vaginal drainage. Now dealing with \"bladder weakness,\" particularly when she sneezes or if she runs water while her bladder is full. She wears a pad in case of incontinence.    Still having neuropathy in her feet, some days worse than others. She got acupuncture last week, which helps with the neuropathy. She and her  are considering going on vacation this summer, but wanted to wait until this visit to get the go-ahead.    Review of Systems:    ROS: 10 point ROS neg other than the symptoms noted above in the HPI.       Past Medical History:  Past Medical History:   Diagnosis Date     Abnormal Pap smear     maybe 20 years ago     Cervical cancer (H)      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      History of breast cancer 2003    lumpectomy and radiation offerred chemo and patient declined at time but had oncogene test and low risk     Hyperlipidemia LDL goal < 160      Osteopenia      Paroxysmal A-fib (H)      Severe vaginal dysplasia        Past Surgical History:  Past Surgical History:   Procedure Laterality Date     BIOPSY       BREAST SURGERY Left 2003    lumpectomy left, did radiation, 5 yr of tamoxifen     COLONOSCOPY  2018    repeat in 2028     Colposcopy Cervix with Loop Electrode Conization and Lser to Vagina  2008     COLPOSCOPY, BIOPSY, COMBINED  10/24/2012    Procedure: COMBINED COLPOSCOPY, BIOPSY;  Colposcopy, Biopsy of Cervix and Vagina, Ultrasound Guidance;  Surgeon: Colette Ng MD;  Location: [facility] OR     ENT SURGERY  01/23/2018    otoscelerosis, right side     HYSTERECTOMY, FRANCIA  12/2012    high grade cervical dysplasia, [facility], Dr. Arciniega     INSERT INTERSTITIAL NEEDLE, ULTRASOUND GUIDED N/A " 2/13/2023    Procedure: INSERTION, NEEDLE, WITH ULTRASOUND GUIDANCE, FOR INTERSTITIAL BRACHYTHERAPY;  Surgeon: Raymond Stockton MD;  Location: [facility] OR     INSERT PORT VASCULAR ACCESS Right 7/18/2022    Procedure: INSERTION, VASCULAR ACCESS PORT;  Surgeon: Donnie Elias MD;  Location: [facility] OR     IR CHEST PORT PLACEMENT > 5 YRS OF AGE  7/18/2022     IR PORT REMOVAL RIGHT  3/30/2023     WY LAP VAGINECTOMY, PARTIAL REMOVAL OF VAGINAL WALL  10/2013    upper vaginectomy for dysplasia, Dr. Megan Arciniega     REMOVE INTERSTITIAL NEEDLE N/A 2/15/2023    Procedure: REMOVAL, INTERSTITIAL NEEDLE, AFTER TEMPORARY BRACHYTHERAPY;  Surgeon: Raymond Stockton MD;  Location: [facility] OR     REMOVE PORT VASCULAR ACCESS Right 3/30/2023    Procedure: Remove port vascular access right;  Surgeon: Donnie Elias MD;  Location: [facility] OR       Current Medications:   Current Outpatient Medications   Medication     acetaminophen (TYLENOL) 325 MG tablet     Acetylcarnitine HCl 250 MG CAPS     aspirin (ASA) 81 MG chewable tablet     Bacillus Coagulans-Inulin (PROBIOTIC FORMULA) 1-250 BILLION-MG CAPS     BENFOTIAMINE PO     Cholecalciferol (VITAMIN D-3) 25 MCG (1000 UT) CAPS     coenzyme Q-10 capsule     levothyroxine (SYNTHROID/LEVOTHROID) 100 MCG tablet     MAGNESIUM OXIDE PO     melatonin 5 MG tablet     metoprolol succinate ER (TOPROL XL) 50 MG 24 hr tablet     multivitamin w/minerals (THERA-VIT-M) tablet     Omega-3 Fatty Acids (OMEGA-3 FISH OIL PO)     OVER-THE-COUNTER     Pectin Cit-Inos-C-Bioflav-Soy (MODIFIED CITRUS PECTIN PO)     Taurine 1000 MG CAPS     UNABLE TO FIND     UNABLE TO FIND     UNABLE TO FIND     UNABLE TO FIND     UNABLE TO FIND     No current facility-administered medications for this visit.        Allergies:   No Known Allergies       Social History:  Patient lives with her . 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 She walks daily, gardens, does yoga twice/week.  She does have Advanced Directives, but has identified her  "daughter and  as her desired [employer] of .   Social History     Tobacco Use     Smoking status: Former     Packs/day: 0.50     Years: 15.00     Pack years: 7.50     Types: Cigarettes     Quit date:      Years since quittin.4     Smokeless tobacco: Never   Vaping Use     Vaping status: Never Used   Substance Use Topics     Alcohol use: Not Currently     Alcohol/week: 2.0 standard drinks of alcohol     Types: 2 Standard drinks or equivalent per week     Comment: Socially        History   Drug Use No       Family History:   The patient's family history is notable for a first-degree and second-degree paternal relative with breast cancer.  No known family history of ovarian, uterine, colon, urothelial/renal, prostate or pancreatic cancers.  No known family history of melanoma.  Family History   Problem Relation Age of Onset     Myocardial Infarction Mother 73        Tob     Myocardial Infarction Father 68        Tob     Breast Cancer Sister 47         of breast cancer age 63; BRCA mutation testing negative     Cancer Maternal Grandmother         Unsure of type.     Breast Cancer Paternal Grandmother         Unsure of diagnosis--some type of \"women's issue\"     Anesthesia Reaction No family hx of      Deep Vein Thrombosis (DVT) No family hx of        Physical Exam:    /74   Pulse 86   Temp 98.2  F (36.8  C) (Oral)   Resp 14   Wt 52.1 kg (114 lb 12.8 oz)   LMP  (LMP Unknown)   SpO2 100%   BMI 20.34 kg/m     Body mass index is 20.34 kg/m .    General Appearance: healthy and alert, no distress     HEENT:  no thyromegaly, no palpable nodules or masses        Cardiovascular: regular rate and rhythm, no gallops, rubs or murmurs     Respiratory: lungs clear, no rales, rhonchi or wheezes, normal diaphragmatic excursion    Musculoskeletal: extremities non tender and without edema    Skin: no lesions or rashes     Neurological: normal gait, no gross " defects     Psychiatric: appropriate mood and affect                               Hematological: normal cervical, supraclavicular and inguinal lymph nodes     Gastrointestinal:       abdomen soft, non-tender, non-distended, no organomegaly or masses    Genitourinary: Post-radiation changes, with vagina about 4-5 cm in length. Vagina is smooth without nodularity or masses. On rectovaginal exam the rectovaginal septum is thickened but smooth, right > left.       Radiographic Examination:  6/8/23: PET/CT: No definitive evidence of disease. Ascites with imaging concerning for cirrhotic changes. I have personally reviewed the PET/CT images.   HEAD/NECK:  There is mild interval enlargement of multiple left supraclavicular  nodes, with uptake similar to blood pool, dominant node measures 1.2 x  1.1 cm (previously 1 x 0.7 cm).    CHEST:  Unchanged mild focal uptake in the left interpectoral region adjacent  to the biopsy clip measuring approximately 0.8 cm with SUV max  of 1.9. Unchanged additional, approximately 1 cm mildly avid soft  tissue anterior to the left subclavian vein.  A few small pulmonary nodules  are stable, including a 0.2 cm solid pulmonary nodule in the left  upper lobe. No new or enlarging pulmonary nodules.  Stable cardiomegaly. Resolution of diffuse uptake in the left atrium,  right atrium and right ventricles. Trace pericardial effusion. Unchanged 2.9 cm subcutaneous  lipoma in the right lower chest wall.  ABDOMEN AND PELVIS:  Moderate volume ascites. There is a new peritoneal nodule in the right  lower quadrant measuring approximately 0.9 x 0.7 cm with SUV max of 4  (3/711). New mild ill-defined uptake is noted in the right perihepatic  region without discrete measurable implant.  There is interval reduction in FDG uptake and size of soft tissue  lesion at the site of recurrence along the vaginal cuff, measuring  approximately 3.5 x 2.9 cm (previously measuring 4.9 x 4.1 cm); with mild diffuse uptake  with SUV max of 3.8 (previously 5). There is new curvilinear FDG uptake along the vagina,  right mesorectal fascia and presacral space.  Prominent mildly avid bilateral inguinal nodes are likely reactive.   Unchanged subcentimeter cysts in hepatic segment 8/5. Diffusely  heterogenous hepatic parenchymal density with fissural widening and  left lobe hypertrophy indicates cirrhotic morphology. Mild  pericholecystic fluid is likely reactive. Stable 0.9 cm cystic focus in  the pancreatic head, likely a side branch IPMN. Nodular thickening of  the left adrenal gland.   Extensive generalized  body wall edema.       Performance Status:  ECOG Grade 0.      Assessment:  Cordell Donaldson (she/her/hers) is a 73 year old woman with a diagnosis of stage III squamous cell carcinoma of the vagina (vs recurrent metastatic cervical cancer) s/p primary therapy with PET/CT showing no definitive evidence of disease.    Development of non-malignant ascites during radiation therapy, with new PET/CT findings concerning for cirrhosis.  Previous cardiac PET/CT findings resolved, with additional cardiac work-up negative for pathology.     Medical history significant for FIGO Stage IA1 squamous cell carcinoma of the cervix s/p hysterectomy with no residual disease, and a long history of vaginal HSIL.    A total of 30 minutes was spent with the patient, 25 minutes of which were spent in counseling the patient and/or treatment planning; an additional 5 minutes was spent in chart review/documentation.      Plan:   1.)    Squamous cell carcinoma: I reviewed the PET/CT results with Cordell and provided her with a copy of the PET/CT report; also previously provided her with a copy of the report via [employer]. Given the non-specific findings with no definitive evidence of disease, and difficulty tolerating systemic therapy, recommend continuing surveillance.    After discussion of risks/benefits of each option, Cordell agrees with continuing  surveillance without scheduled imaging. Plan as follows:  -RTC in 3 months for a surveillance visit. Will get imaging only if Cordell's symptoms worsen and there is concern for progressive disease or other pathology.   -Will cancel radiation oncology follow-up appointment tomorrow.  -Surveillance as follows (per the NCCN guidelines):  Every 3 months x2 years, then every 6 months x3 years.   She will call in the interim if she has any vaginal bleeding or other concerning symptoms.     Side-effects:   -Grade 1 peripheral neuropathy (attributable to paclitaxel and cisplatin): Currently stable, not interfering with activity. Patient discharged from PT since she has improved her strength/function.  -Grade 1 bloating: Resolved. Ascites of unclear etiology, currently attributed to poor nutrition due to recent cancer therapy, and improving with high-protein diet. Continue follow-up/evaluation with her PCP.     2.) Breast cancer: Continue management per medical oncologist Dr. Montana. Radiographic response of breast cancer to current chemotherapy,continue monitoring for now with consideration of endocrine therapy after completion of chemotherapy.     3.) Genetic risk factors were assessed and the patient does not meet the qualifications for a referral.      4.) Labs and/or tests ordered include: None.     5.) Health maintenance issues addressed today include:   -Ascites: Currently improved. Will notify PCP of cirrhotic changes on imaging to determine if additional evaluation is indicated.     6.) Code status:  Full-code.    7.) Prescriptions: None.        Hina Raygoza MD, MS, FACOG, FACS  6/13/2023  3:32 PM                CC  Patient Care Team:  Merry Lino MD as PCP - General (Internal Medicine)  Bess Paredes MD Corfield, Aaron Daniel, DPM as MD (Podiatry)  Elise Huitron MD as Assigned Heart and Vascular Provider  Merry Lino MD as Assigned PCP  Tess Iniguez RN as Specialty Care  Coordinator  Heather Ayon MD as MD (Urology)  Angela Montana MD as MD (Hematology & Oncology)  Yvette Pike MD as MD (Surgery)  Valeri Kirby, GEOFF as Specialty Care Coordinator (Hematology & Oncology)  Nurys Horton RN as Specialty Care Coordinator (Hematology & Oncology)  Felicia Thompson PA-C as Physician Assistant (Anesthesiology)  Aleta Shea MD as Assigned Neuroscience Provider  Yvette Pike MD as Assigned Surgical Provider  Raymond Stockton MD as Assigned Cancer Care Provider  SELF, REFERRED

## 2023-06-13 ENCOUNTER — ONCOLOGY VISIT (OUTPATIENT)
Dept: ONCOLOGY | Facility: CLINIC | Age: 73
End: 2023-06-13
Attending: OBSTETRICS & GYNECOLOGY
Payer: COMMERCIAL

## 2023-06-13 VITALS
OXYGEN SATURATION: 100 % | TEMPERATURE: 98.2 F | DIASTOLIC BLOOD PRESSURE: 74 MMHG | SYSTOLIC BLOOD PRESSURE: 113 MMHG | WEIGHT: 114.8 LBS | RESPIRATION RATE: 14 BRPM | HEART RATE: 86 BPM | BODY MASS INDEX: 20.34 KG/M2

## 2023-06-13 DIAGNOSIS — G62.0 DRUG-INDUCED POLYNEUROPATHY (H): ICD-10-CM

## 2023-06-13 DIAGNOSIS — R18.8 OTHER ASCITES: ICD-10-CM

## 2023-06-13 DIAGNOSIS — C52 VAGINAL CANCER (H): Primary | ICD-10-CM

## 2023-06-13 PROBLEM — R10.2 PELVIC PAIN IN FEMALE: Status: RESOLVED | Noted: 2022-04-14 | Resolved: 2023-06-13

## 2023-06-13 PROBLEM — Z87.411: Status: RESOLVED | Noted: 2022-06-20 | Resolved: 2023-06-13

## 2023-06-13 PROCEDURE — 99214 OFFICE O/P EST MOD 30 MIN: CPT | Performed by: OBSTETRICS & GYNECOLOGY

## 2023-06-13 PROCEDURE — G0463 HOSPITAL OUTPT CLINIC VISIT: HCPCS | Performed by: OBSTETRICS & GYNECOLOGY

## 2023-06-13 ASSESSMENT — PAIN SCALES - GENERAL: PAINLEVEL: NO PAIN (0)

## 2023-06-13 NOTE — PATIENT INSTRUCTIONS
SCHEDULING:  -RTC in 3 months for vaginal cancer surveillance (MD, in-person).  -CANCEL radiation oncology appointment for tomorrow.       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   History of vaginal HSIL (pre-cancer).  Squamous cell carcinoma involving a pelvic mass with associated lymphadenopathy, likely stage III vaginal cancer (vs recurrent, metastatic cervical cancer), currently without definitive evidence of disease.   Treatment History:  -12/2012: Robotic-assisted laparoscopic lysis of adhesions (take-down of scar tissue), left ureterolysis (freeing of the left ureter) with  conversion to laparotomy, total abdominal hysterectomy (removal of uterus/cervix), and an upper  Vaginectomy (removal of the upper vagina).  -7/20/22-present: First-line therapy with IV cisplatin + paclitaxel + bevacizumab + pembrolizumab (added cycles 3+).   -12/28/22-2/15/23: First-line pelvic external beam radiation therapy then vaginal brachytherapy.       PLAN:  1) Squamous cell carcinoma:   Surveillance as follows (per the NCCN guidelines):  Every 3 months x2 years, then every 6 months x3 years.   Please call in the interim if you have any vaginal bleeding or other concerning symptoms.     2) Cancer Rehabilitation Clinic:  [email or web address]    3) Ascites, cirrhotic liver appearance on PET/CT: I will send a message to your PCP to determine whether you need any additional evaluation since you ascites has improved.     Please call with any questions or concerns. [phone]       Hina Raygoza MD, MS, FACOG, FACS  6/13/2023  2:30 PM

## 2023-06-13 NOTE — LETTER
2023         RE: Cordell Donaldson  [address]  [facility] [address]        Dear Colleague,    Thank you for referring your patient, Cordell Donaldson, to the [facility]. Please see a copy of my visit note below.    Follow-up Note on Referred Patient  Gynecologic Oncology Clinic      Date: 2023        Referring Provider/Gynecologic Oncologist:  Megan Arciniega MD  [facility]   Phone [phone]  [phone]      Radiation Oncologist:   Raymond Stockton MD, PhD  [facility]      Medical Oncologist:  Angela Montana MD  [facility].      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 Surveillance visit.      History of Present Illness:   Cordell Donaldson (she/her/hers) is a 73 year old initially referred to the Gynecologic Cancer Clinic at the [facility] on 2022 by gynecologic oncologist Dr. Arciniega and radiation oncologist Dr. Stockton for management of a pelvic mass due to recurrent cervical cancer vs new vaginal cancer. History as follows:     Breast Cancer History:  : Left breast cancer.  -Lumpectomy.  -Radiation therapy.    [id number]: BRENNAN.     22: PET/CT to stage vaginal cancer (see below).   Mild soft tissue  thickening in the left retropectoral region with an SUV max of 2.7.  7/15/22: Invasive ductal carcinoma.   -Consultation with Dr. Montana. Recommendation to prioritize vaginal/recurrent cervix cancer. Plan to follow breast lesion with repeat MRI. Plan for endocrine therapy after completion of chemotherapy for vaginal/cervical cancer.       Cervical/Vaginal Dysplasia/Cancer History:  10/24/12:   -Endocervical curettings: Squamous carcinoma, invasion cannot be assessed.   -Ectocervical  biopsies: HSIL (CIN3).   -Vaginal biopsy, posterior: HSIL (VaIN3).  12/2012: Robotic-assisted laparoscopic lysis of adhesions, left ureterolysis with  conversion to laparotomy, total abdominal hysterectomy, and an upper  Vaginectomy by gynecologic oncologist Dr. Arciniega.   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     7/22/13: Vaginal biopsy: HSIL, cannot exclude invasion.   -Consultation with gynecologic oncologist Dr. Johnson. Upper vaginectomy and CO2 laser ablation recommended, patient declined.   [id number]: No vaginal dysplasia treatment.   5/20/22: Pelvic MRI: I have personally reviewed the MRI images.   Centrally hypoenhancing heterogeneous midline pelvic mass along  the superior margin of the vaginal vault measures 8.5 x 6.7 x 8.1 cm.  The mass abut the bladder and there is mucosal irregularity at the  site of abutment. The mass indents the  decompressed rectum but there is no definite invasion or mucosal  Irregularity.  6/9/22: CT-guided biopsy of pelvic mass: Poorly-differentiated squamous cell carcinoma, HPV-associated.   7/1/22: Staging PET/CT: Stage III vaginal cancer (vs recurrent, metastatic cervical cancer). I have personally reviewed the PET/CT images.     7/20/22, 8/10/22, 8/31/22,9/21/22, 10/26/22, 11/21/22: Cycles #1-6 of first-line chemotherapy with IV platinum + paclitaxel + bevacizumab 15 mg/kg + pembrolizumab 200 mg (added cycle 3) every 21 days.   -Cycles 1-4: Cisplatin 50 mg/m2, paclitaxel 175 mg/m2.   -Cycle 3:  Pembrolizumab added to all subsequent cycles due to PD-L1+ result per the KEYNOTE-826 regimen.  -9/22/22: PET/CT: Partial response. I have personally reviewed the PET/CT images.   -Discussed with radiation oncologist Dr. Stockton and at the gyn onc/radiation oncology tumor conference. Recommendation for additional chemotherapy prior to radiation therapy.   -Cycles 5+: Platinum changed to carboplatin due to tinnitus, and  paclitaxel dose-reduced to 135 mg/m2 due to peripheral neuropathy.   -12/5/22: PET/CT: Partial response. I have personally reviewed the PET/CT images.   Diffuse uptake throughout the thyroid gland with SUV max 5.5, likely  Thyroiditis.  A few small pulmonary nodules  are stable, including a 2 mm solid pulmonary nodule in the left upper  lobe. No new or enlarging pulmonary nodules.  The heart  remains enlarged. No significant pericardial effusion.   Prominent  precarinal lymph node without significant FDG uptake. No suspicious  lymphadenopathy in the mediastinum.  Decreased size of centrally hypodense, necrotic cervical mass, now  measuring 5.3 x 4.1 cm (previously 6.1 x 4.3 cm)  when measured similarly. There is resolution of previously seen  enhancing component along the right lateral wall and resolution of  previous hypermetabolic activity. There is also resolution of  previously seen hypermetabolic pelvic lymph nodes.  12/28/22-2/15/23: First-line pelvic external beam radiation therapy with total dose 4500 cGy in 25 fractions then vaginal cuff brachytherapy with total dose 2500 cGy in 5 fractions. No concurrent chemosensitization due to myelosuppression and decreased patient tolerance of therapy.   -Patient declined maintenance therapy due to significantly decreased quality of life while on systemic therapy.       Genetic/Genomic/Molecular Testing History:  2006: Negative for germline BRCA1, BRCA2 pathogenic variants.     8/14/22 (specimen from 6/9/22): [facility] Testing.   -PD-L1+ (CPS 10)  -Mismatch repair deficient/microsatellite instability-high  -TMB high (53 mut/Mb)  -NTRK 1/2/3 fusion not detected.       Subjective:  Cordell returns to the gyn onc clinic today for her scheduled surveillance visit, accompanied by her .     Cordell is here with her . She says that she has been doing a lot better lately. Her appetite and food intake increased, she can eat whatever she wants now. Biking a little bit.  "Gardening. Feeling very good, energy is up. Diarrhea stopped. She now has BM about 3-4x a day, does a \"morning evacuation.\"    Since her paracentesis, she doesn't feel like she has reaccumulated fluid. She has been weighing herself daily and her weight has been stable within 2 lbs. Able to fit in her jeans now, no longer having to just wear sweatpants. She and her doctor suspect the ascites was due to malnutrition.    No vaginal bleeding. She has been using vaginal dilators about once every 10 days now. No longer having vaginal drainage. Now dealing with \"bladder weakness,\" particularly when she sneezes or if she runs water while her bladder is full. She wears a pad in case of incontinence.    Still having neuropathy in her feet, some days worse than others. She got acupuncture last week, which helps with the neuropathy. She and her  are considering going on vacation this summer, but wanted to wait until this visit to get the go-ahead.    Review of Systems:    ROS: 10 point ROS neg other than the symptoms noted above in the HPI.       Past Medical History:  Past Medical History:   Diagnosis Date    Abnormal Pap smear     maybe 20 years ago    Cervical cancer (H)     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     History of breast cancer 2003    lumpectomy and radiation offerred chemo and patient declined at time but had oncogene test and low risk    Hyperlipidemia LDL goal < 160     Osteopenia     Paroxysmal A-fib (H)     Severe vaginal dysplasia        Past Surgical History:  Past Surgical History:   Procedure Laterality Date    BIOPSY      BREAST SURGERY Left 2003    lumpectomy left, did radiation, 5 yr of tamoxifen    COLONOSCOPY  2018    repeat in 2028    Colposcopy Cervix with Loop Electrode Conization and Lser to Vagina  2008    COLPOSCOPY, BIOPSY, COMBINED  10/24/2012    Procedure: COMBINED COLPOSCOPY, BIOPSY;  Colposcopy, Biopsy of Cervix and Vagina, Ultrasound " Guidance;  Surgeon: Colette Ng MD;  Location: [facility] OR    ENT SURGERY  01/23/2018    otoscelerosis, right side    HYSTERECTOMY, FRANCIA  12/2012    high grade cervical dysplasia, [facility], Dr. Arciniega    INSERT INTERSTITIAL NEEDLE, ULTRASOUND GUIDED N/A 2/13/2023    Procedure: INSERTION, NEEDLE, WITH ULTRASOUND GUIDANCE, FOR INTERSTITIAL BRACHYTHERAPY;  Surgeon: Raymond Stockton MD;  Location: [facility] OR    INSERT PORT VASCULAR ACCESS Right 7/18/2022    Procedure: INSERTION, VASCULAR ACCESS PORT;  Surgeon: Donnie Elias MD;  Location: [facility] OR    IR CHEST PORT PLACEMENT > 5 YRS OF AGE  7/18/2022    IR PORT REMOVAL RIGHT  3/30/2023    NH LAP VAGINECTOMY, PARTIAL REMOVAL OF VAGINAL WALL  10/2013    upper vaginectomy for dysplasia, Dr. Megan Arciniega    REMOVE INTERSTITIAL NEEDLE N/A 2/15/2023    Procedure: REMOVAL, INTERSTITIAL NEEDLE, AFTER TEMPORARY BRACHYTHERAPY;  Surgeon: Raymond Stockton MD;  Location: [facility] OR    REMOVE PORT VASCULAR ACCESS Right 3/30/2023    Procedure: Remove port vascular access right;  Surgeon: Donnie Elias MD;  [place] [facility]       Current Medications:   Current Outpatient Medications   Medication    acetaminophen (TYLENOL) 325 MG tablet    Acetylcarnitine HCl 250 MG CAPS    aspirin (ASA) 81 MG chewable tablet    Bacillus Coagulans-Inulin (PROBIOTIC FORMULA) 1-250 BILLION-MG CAPS    BENFOTIAMINE PO    Cholecalciferol (VITAMIN D-3) 25 MCG (1000 UT) CAPS    coenzyme Q-10 capsule    levothyroxine (SYNTHROID/LEVOTHROID) 100 MCG tablet    MAGNESIUM OXIDE PO    melatonin 5 MG tablet    metoprolol succinate ER (TOPROL XL) 50 MG 24 hr tablet    multivitamin w/minerals (THERA-VIT-M) tablet    Omega-3 Fatty Acids (OMEGA-3 FISH OIL PO)    OVER-THE-COUNTER    Pectin Cit-Inos-C-Bioflav-Soy (MODIFIED CITRUS PECTIN PO)    Taurine 1000 MG CAPS    UNABLE TO FIND    UNABLE TO FIND    UNABLE TO FIND    UNABLE TO FIND    UNABLE TO FIND     No current facility-administered medications for this visit.        Allergies:  "  No Known Allergies       Social History:  Patient lives with her . 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 She walks daily, gardens, does yoga twice/week.  She does have Advanced Directives, but has identified her daughter and  as her desired [employer] of .   Social History     Tobacco Use    Smoking status: Former     Packs/day: 0.50     Years: 15.00     Pack years: 7.50     Types: Cigarettes     Quit date:      Years since quittin.4    Smokeless tobacco: Never   Vaping Use    Vaping status: Never Used   Substance Use Topics    Alcohol use: Not Currently     Alcohol/week: 2.0 standard drinks of alcohol     Types: 2 Standard drinks or equivalent per week     Comment: Socially        History   Drug Use No       Family History:   The patient's family history is notable for a first-degree and second-degree paternal relative with breast cancer.  No known family history of ovarian, uterine, colon, urothelial/renal, prostate or pancreatic cancers.  No known family history of melanoma.  Family History   Problem Relation Age of Onset    Myocardial Infarction Mother 73        Tob    Myocardial Infarction Father 68        Tob    Breast Cancer Sister 47         of breast cancer age 63; BRCA mutation testing negative    Cancer Maternal Grandmother         Unsure of type.    Breast Cancer Paternal Grandmother         Unsure of diagnosis--some type of \"women's issue\"    Anesthesia Reaction No family hx of     Deep Vein Thrombosis (DVT) No family hx of        Physical Exam:    /74   Pulse 86   Temp 98.2  F (36.8  C) (Oral)   Resp 14   Wt 52.1 kg (114 lb 12.8 oz)   LMP  (LMP Unknown)   SpO2 100%   BMI 20.34 kg/m     Body mass index is 20.34 kg/m .    General Appearance: healthy and alert, no distress     HEENT:  no thyromegaly, no palpable nodules or masses        Cardiovascular: regular rate and rhythm, no gallops, rubs or murmurs     Respiratory: lungs clear, no rales, rhonchi " or wheezes, normal diaphragmatic excursion    Musculoskeletal: extremities non tender and without edema    Skin: no lesions or rashes     Neurological: normal gait, no gross defects     Psychiatric: appropriate mood and affect                               Hematological: normal cervical, supraclavicular and inguinal lymph nodes     Gastrointestinal:       abdomen soft, non-tender, non-distended, no organomegaly or masses    Genitourinary: Post-radiation changes, with vagina about 4-5 cm in length. Vagina is smooth without nodularity or masses. On rectovaginal exam the rectovaginal septum is thickened but smooth, right > left.       Radiographic Examination:  6/8/23: PET/CT: No definitive evidence of disease. Ascites with imaging concerning for cirrhotic changes. I have personally reviewed the PET/CT images.   HEAD/NECK:  There is mild interval enlargement of multiple left supraclavicular  nodes, with uptake similar to blood pool, dominant node measures 1.2 x  1.1 cm (previously 1 x 0.7 cm).    CHEST:  Unchanged mild focal uptake in the left interpectoral region adjacent  to the biopsy clip measuring approximately 0.8 cm with SUV max  of 1.9. Unchanged additional, approximately 1 cm mildly avid soft  tissue anterior to the left subclavian vein.  A few small pulmonary nodules  are stable, including a 0.2 cm solid pulmonary nodule in the left  upper lobe. No new or enlarging pulmonary nodules.  Stable cardiomegaly. Resolution of diffuse uptake in the left atrium,  right atrium and right ventricles. Trace pericardial effusion. Unchanged 2.9 cm subcutaneous  lipoma in the right lower chest wall.  ABDOMEN AND PELVIS:  Moderate volume ascites. There is a new peritoneal nodule in the right  lower quadrant measuring approximately 0.9 x 0.7 cm with SUV max of 4  (3/711). New mild ill-defined uptake is noted in the right perihepatic  region without discrete measurable implant.  There is interval reduction in FDG uptake and  size of soft tissue  lesion at the site of recurrence along the vaginal cuff, measuring  approximately 3.5 x 2.9 cm (previously measuring 4.9 x 4.1 cm); with mild diffuse uptake with SUV max of 3.8 (previously 5). There is new curvilinear FDG uptake along the vagina,  right mesorectal fascia and presacral space.  Prominent mildly avid bilateral inguinal nodes are likely reactive.   Unchanged subcentimeter cysts in hepatic segment 8/5. Diffusely  heterogenous hepatic parenchymal density with fissural widening and  left lobe hypertrophy indicates cirrhotic morphology. Mild  pericholecystic fluid is likely reactive. Stable 0.9 cm cystic focus in  the pancreatic head, likely a side branch IPMN. Nodular thickening of  the left adrenal gland.   Extensive generalized  body wall edema.       Performance Status:  ECOG Grade 0.      Assessment:  Cordell Donaldson (she/her/hers) is a 73 year old woman with a diagnosis of stage III squamous cell carcinoma of the vagina (vs recurrent metastatic cervical cancer) s/p primary therapy with PET/CT showing no definitive evidence of disease.    Development of non-malignant ascites during radiation therapy, with new PET/CT findings concerning for cirrhosis.  Previous cardiac PET/CT findings resolved, with additional cardiac work-up negative for pathology.     Medical history significant for FIGO Stage IA1 squamous cell carcinoma of the cervix s/p hysterectomy with no residual disease, and a long history of vaginal HSIL.    A total of 30 minutes was spent with the patient, 25 minutes of which were spent in counseling the patient and/or treatment planning; an additional 5 minutes was spent in chart review/documentation.      Plan:   1.)    Squamous cell carcinoma: I reviewed the PET/CT results with Cordell and provided her with a copy of the PET/CT report; also previously provided her with a copy of the report via [employer]. Given the non-specific findings with no definitive evidence of  disease, and difficulty tolerating systemic therapy, recommend continuing surveillance.    After discussion of risks/benefits of each option, Cordell agrees with continuing surveillance without scheduled imaging. Plan as follows:  -RTC in 3 months for a surveillance visit. Will get imaging only if Cordell's symptoms worsen and there is concern for progressive disease or other pathology.   -Will cancel radiation oncology follow-up appointment tomorrow.  -Surveillance as follows (per the NCCN guidelines):  Every 3 months x2 years, then every 6 months x3 years.   She will call in the interim if she has any vaginal bleeding or other concerning symptoms.     Side-effects:   -Grade 1 peripheral neuropathy (attributable to paclitaxel and cisplatin): Currently stable, not interfering with activity. Patient discharged from PT since she has improved her strength/function.  -Grade 1 bloating: Resolved. Ascites of unclear etiology, currently attributed to poor nutrition due to recent cancer therapy, and improving with high-protein diet. Continue follow-up/evaluation with her PCP.     2.) Breast cancer: Continue management per medical oncologist Dr. Montana. Radiographic response of breast cancer to current chemotherapy,continue monitoring for now with consideration of endocrine therapy after completion of chemotherapy.     3.) Genetic risk factors were assessed and the patient does not meet the qualifications for a referral.      4.) Labs and/or tests ordered include: None.     5.) Health maintenance issues addressed today include:   -Ascites: Currently improved. Will notify PCP of cirrhotic changes on imaging to determine if additional evaluation is indicated.     6.) Code status:  Full-code.    7.) Prescriptions: None.        Hina Raygoza MD, MS, FACOG, FACS  6/13/2023  3:32 PM                CC  Patient Care Team:  Merry Lino MD as PCP - General (Internal Medicine)  Bess Paredes MD Corfield, Aaron  "PINKY Blanc as MD (Podiatry)  Elise Huitron MD as Assigned Heart and Vascular Provider  Merry Lino MD as Assigned PCP  Tess Iniguez, RN as Specialty Care Coordinator  Heather Ayon MD as MD (Urology)  Angela Montana MD as MD (Hematology & Oncology)  Yvette Pike MD as MD (Surgery)  Valeri Kibry, GEOFF as Specialty Care Coordinator (Hematology & Oncology)  Nurys Horton RN as Specialty Care Coordinator (Hematology & Oncology)  Felicia Thompson PA-C as Physician Assistant (Anesthesiology)  Aleta Shea MD as Assigned Neuroscience Provider  Yvette Pike MD as Assigned Surgical Provider  Raymond Stockton MD as Assigned Cancer Care Provider  SELF, REFERRED      Cordell is here with her . She says that she has been doing a lot better lately.    Diarrhea stopped. Food intake increased, can eat whatever she wants now. Biking a little bit. Gardening. Feeling very good, energy is up. Has BM about 3-4x a day, does a \"morning evacuation.\"    She has been weighing herself daily and her weight has been stable within 2 lbs. Able to fit in her jeans now, no longer having to just wear sweatpants. Doesn't feel like her ascites is growing since her last paracentesis.    No vaginal bleeding. She has been using vaginal dilators about once every 10 days now. No longer having vaginal drainage. Now dealing with \"bladder weakness,\" particularly when she sneezes or if she runs water while her bladder is full. She wears a pad in case of incontinence.    Still having neuropathy in her feet, some days worse than others. She got acupuncture last week, which helps with the neuropathy.      Again, thank you for allowing me to participate in the care of your patient.        Sincerely,        Hina Raygoza MD    "

## 2023-06-13 NOTE — Clinical Note
Fausto Andrade I saw Cordell Donaldson for follow-up today. Exam is c/w radiation changes, PET/CT shows continued improvement in the vaginal tumor. I told her that she could cancel her rad onc visit tomorrow.  --jade Raygoza MD, MS, FACOG, FACS 6/13/2023 2:30 PM    You can access the FollowMyHealth Patient Portal offered by [facility] by registering at the following website: [email or web address]. By joining [facility]’s FollowMyHealth portal, you will also be able to view your health information using other applications (apps) compatible with our system.

## 2023-06-13 NOTE — Clinical Note
Fausto Lino I saw Cordell Donaldson for oncology follow-up today. She is feeling much better with her diet changes. Her most recent PET/CT shows liver changes suggestive of cirrhotic changes--was not sure if this needs further evaluation since she is otherwise overall feeling better and has not needed a paracentesis x1 month.   --jade Raygoza MD, MS, FACOG, FACS 6/13/2023 3:34 PM

## 2023-06-13 NOTE — PROGRESS NOTES
"Cordell is here with her . She says that she has been doing a lot better lately.    Diarrhea stopped. Food intake increased, can eat whatever she wants now. Biking a little bit. Gardening. Feeling very good, energy is up. Has BM about 3-4x a day, does a \"morning evacuation.\"    She has been weighing herself daily and her weight has been stable within 2 lbs. Able to fit in her jeans now, no longer having to just wear sweatpants. Doesn't feel like her ascites is growing since her last paracentesis.    No vaginal bleeding. She has been using vaginal dilators about once every 10 days now. No longer having vaginal drainage. Now dealing with \"bladder weakness,\" particularly when she sneezes or if she runs water while her bladder is full. She wears a pad in case of incontinence.    Still having neuropathy in her feet, some days worse than others. She got acupuncture last week, which helps with the neuropathy.  "

## 2023-06-19 DIAGNOSIS — C53.0 MALIGNANT NEOPLASM OF ENDOCERVIX (H): ICD-10-CM

## 2023-06-19 DIAGNOSIS — R18.8 OTHER ASCITES: Primary | ICD-10-CM

## 2023-06-19 NOTE — PROGRESS NOTES
Phone visit  Discussed recent PET scan findings with possible cirrhotic liver changes.     Referring to liver clinic for opinion and any further testing/ imaging.    Maritza is in agreement with plan.    Merry Lino MD  Internal Medicine/Pediatrics

## 2023-06-21 ENCOUNTER — THERAPY VISIT (OUTPATIENT)
Dept: OCCUPATIONAL THERAPY | Facility: CLINIC | Age: 73
End: 2023-06-21
Payer: COMMERCIAL

## 2023-06-21 DIAGNOSIS — I89.0 LYMPHEDEMA: Primary | ICD-10-CM

## 2023-06-21 PROCEDURE — 97140 MANUAL THERAPY 1/> REGIONS: CPT | Mod: GO | Performed by: OCCUPATIONAL THERAPIST

## 2023-06-21 NOTE — CONFIDENTIAL NOTE
DIAGNOSIS Other ascites   Appt Date: 08.18.2023   NOTES STATUS DETAILS   OFFICE NOTE from referring provider Internal 06.19.2023 Merry Lino MD   OFFICE NOTES from other specialists Internal 06.13.2023 Hina Raygoza MD    04.24.2023 Angela Montana MD   DISCHARGE SUMMARY from hospital     MEDICATION LIST Internal    LIVER BIOSPY (IF APPLICABLE)      PATHOLOGY REPORTS      IMAGING     ENDOSCOPY (IF AVAILABLE)     COLONOSCOPY (IF AVAILABLE)     ULTRASOUND LIVER Internal 05.11.2023, 04.21.2023,  US PARACENTESIS     04.19.2023 US ABDOMEN LIMITED   CT OF ABDOMEN Internal 02.09.2023 CT ABDOMEN PELVIS W CONTRAST   MRI OF LIVER     FIBROSCAN, US ELASTOGRAPHY, FIBROSIS SCAN, MR ELASTOGRAPHY     LABS     HEPATIC PANEL (LIVER PANEL) Internal 02.13.2023   BASIC METABOLIC PANEL Internal 02.09.2023   COMPLETE METABOLIC PANEL Internal 05.31.2023   COMPLETE BLOOD COUNT (CBC) Internal 05.31.2023   INTERNATIONAL NORMALIZED RATIO (INR)     HEPATITIS C ANTIBODY     HEPATITIS C VIRAL LOAD/PCR     HEPATITIS C GENOTYPE     HEPATITIS B SURFACE ANTIGEN     HEPATITIS B SURFACE ANTIBODY     HEPATITIS B DNA QUANT LEVEL     HEPATITIS B CORE ANTIBODY

## 2023-06-26 ENCOUNTER — ONCOLOGY VISIT (OUTPATIENT)
Dept: ONCOLOGY | Facility: CLINIC | Age: 73
End: 2023-06-26
Attending: INTERNAL MEDICINE
Payer: COMMERCIAL

## 2023-06-26 VITALS
TEMPERATURE: 98 F | WEIGHT: 114 LBS | HEART RATE: 90 BPM | OXYGEN SATURATION: 100 % | DIASTOLIC BLOOD PRESSURE: 70 MMHG | SYSTOLIC BLOOD PRESSURE: 116 MMHG | BODY MASS INDEX: 20.19 KG/M2

## 2023-06-26 DIAGNOSIS — C52 VAGINAL CANCER (H): ICD-10-CM

## 2023-06-26 DIAGNOSIS — R18.8 OTHER ASCITES: ICD-10-CM

## 2023-06-26 DIAGNOSIS — C50.412 MALIGNANT NEOPLASM OF UPPER-OUTER QUADRANT OF LEFT BREAST IN FEMALE, ESTROGEN RECEPTOR POSITIVE (H): Primary | ICD-10-CM

## 2023-06-26 DIAGNOSIS — Z17.0 MALIGNANT NEOPLASM OF UPPER-OUTER QUADRANT OF LEFT BREAST IN FEMALE, ESTROGEN RECEPTOR POSITIVE (H): Primary | ICD-10-CM

## 2023-06-26 PROCEDURE — G0463 HOSPITAL OUTPT CLINIC VISIT: HCPCS | Performed by: INTERNAL MEDICINE

## 2023-06-26 PROCEDURE — 99214 OFFICE O/P EST MOD 30 MIN: CPT | Performed by: INTERNAL MEDICINE

## 2023-06-26 RX ORDER — LEVOTHYROXINE SODIUM 25 UG/1
TABLET ORAL
COMMUNITY
Start: 2023-04-28 | End: 2023-07-03 | Stop reason: DRUGHIGH

## 2023-06-26 ASSESSMENT — PAIN SCALES - GENERAL: PAINLEVEL: NO PAIN (0)

## 2023-06-26 NOTE — NURSING NOTE
"Oncology Rooming Note    June 26, 2023 5:26 PM   Cordell Donaldson is a 73 year old female who presents for:    Chief Complaint   Patient presents with     Oncology Clinic Visit     Malignant neoplasm of breast      Initial Vitals: LMP  (LMP Unknown)  Estimated body mass index is 20.34 kg/m  as calculated from the following:    Height as of 5/31/23: 1.6 m (5' 3\").    Weight as of 6/13/23: 52.1 kg (114 lb 12.8 oz). There is no height or weight on file to calculate BSA.  Data Unavailable Comment: Data Unavailable   No LMP recorded (lmp unknown). Patient has had a hysterectomy.  Allergies reviewed: Yes  Medications reviewed: Yes    Medications: Medication refills not needed today.  Pharmacy name entered into [facility]:    [id number] - [place], [place] - [address] [place] [address]  [id number] - [place], [place] - [facility] [place], [place] - [address] [place] [address] [phone]    Clinical concerns: none.       Lebron Addison"

## 2023-06-26 NOTE — PROGRESS NOTES
"Oncology Follow Up:  Date on this visit: 6/26/2023    Diagnosis: Left breast invasive ductal carcinoma.    Primary Physician: Merry Lino     History Of Present Illness:  Ms. Donaldson is a 73 year old female with cervical and breast cancer.    She has a history of left breast cancer treated with lumpectomy and radiation therapy in 2003 (followed w/ Dr. Mtz at [facility] [place]). Oncotype DX recurrence score was 10.  She took 5 years of tamoxifen. She had an incidental left breast lesion found on imaging for a pelvic mass in 06/2022. Ultrasound showed a 1.3 cm mass at 12:30, 8 cm from the nipple of the left breast.  Biopsy was c/w a grade 2 invasive ductal carcinoma, ER strong in 100%, HI negative, and HER2 negative.  Given concurrent recurrent pelvic mass with plan for cytotoxic chemotherapy, plan was made to monitor and initiate endocrine therapy when appropriate.  She completed 6 cycles of chemotherapy followed by radiation to her pelvic mass.  She has subsequently declined to start endocrine therapy.    In regards to her cervical cancer history, she was initially determined to have squamous carcinoma in 10/2012.  She is s/p FRANCIA and upper vaginectomy in 12/2012 with no residual invasive cancer, but residual HSIL.  Biopsy on 7/22/2013 was c/w HSIL, cannot exclude invasion.  In 05/2022 she developed symptoms of pelvic heaviness (felt like she \"bruised her tailbone\") and progressive pain. She felt like her urination and bowel movements were constricted, needing to put in a lot of effort to urinate or have BM. Pelvic MRI showed a large midline pelvic mass measuring up to 8.5 cm and abutting the bladder and the rectum without definite invasion or left pelvic LAD.  These findings were confirmed on PET/CT. She was started on chemo with cisplatin, paclitaxel, and bevacizumab (7/11/22).  Caris testing showed PD-L1 CPS score of 10, MMR deficient, MSI high disease with a TMB of 53 mut/Mb. Pembrolizumab was added with C3.  " "Cisplatin was changed to carboplatin starting with C5 due to tinnitus.  After a total of 6 cycles imaging demonstrated good response.    She is s/p radiation to the pelvis and para-aortic lymph nodes and interstitial brachytherapy to the vaginal cuff (12/28/2022 - 2/15/2023).    She has declined to start anastrozole due to concern regarding side effects.    Interval History:  Ms. Donaldson comes into clinic for routine breast cancer follow up.  Since our last visit, her health has been good.  In fact, she states once she discovered her ascites was from severe malnutrition and hypoalbuminemia from the combination of radiation induced colitis and poor appetite, she improved her protein intake.  With this change, the ascites improved and so did her energy level.  She is now routinely going for bike rides and walks.  Appetite is improving and she feels she is able to eat more of her normal foods.  She denies concerning breast lumps, pain, or skin changes.  She reports her port site on the right side of the chest feels, \"like there is lump underneath\".  She has no new bone or joint pains.  She has no cough, shortness of breath or chest pain.  She states she is taking some supplements with the goal of regulating estrogen.  She also takes a drop of digestive enzymes daily to help with bowel symptoms.    Past Medical/Surgical History:  Past Medical History:   Diagnosis Date     Abnormal Pap smear     maybe 20 years ago     Cervical cancer (H)      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      History of breast cancer 2003    lumpectomy and radiation offerred chemo and patient declined at time but had oncogene test and low risk     Hyperlipidemia LDL goal < 160      Osteopenia      Paroxysmal A-fib (H)      Severe vaginal dysplasia      Past Surgical History:   Procedure Laterality Date     BIOPSY       BREAST SURGERY Left 2003    lumpectomy left, did radiation, 5 yr of tamoxifen     " COLONOSCOPY  2018    repeat in 2028     Colposcopy Cervix with Loop Electrode Conization and Lser to Vagina  2008     COLPOSCOPY, BIOPSY, COMBINED  10/24/2012    Procedure: COMBINED COLPOSCOPY, BIOPSY;  Colposcopy, Biopsy of Cervix and Vagina, Ultrasound Guidance;  Surgeon: Colette Ng MD;  Location: [facility] OR     ENT SURGERY  01/23/2018    otoscelerosis, right side     HYSTERECTOMY, FRANCIA  12/2012    high grade cervical dysplasia, [facility], Dr. Arciniega     INSERT INTERSTITIAL NEEDLE, ULTRASOUND GUIDED N/A 2/13/2023    Procedure: INSERTION, NEEDLE, WITH ULTRASOUND GUIDANCE, FOR INTERSTITIAL BRACHYTHERAPY;  Surgeon: Raymond Stockton MD;  Location: [facility] OR     INSERT PORT VASCULAR ACCESS Right 7/18/2022    Procedure: INSERTION, VASCULAR ACCESS PORT;  Surgeon: Donnie Elias MD;  [place] [facility] [place]     IR CHEST PORT PLACEMENT > 5 YRS OF AGE  7/18/2022     IR PORT REMOVAL RIGHT  3/30/2023     NM LAP VAGINECTOMY, PARTIAL REMOVAL OF VAGINAL WALL  10/2013    upper vaginectomy for dysplasia, Dr. Megan Arciniega     REMOVE INTERSTITIAL NEEDLE N/A 2/15/2023    Procedure: REMOVAL, INTERSTITIAL NEEDLE, AFTER TEMPORARY BRACHYTHERAPY;  Surgeon: Raymond Stockton MD;  Location: [facility] OR     REMOVE PORT VASCULAR ACCESS Right 3/30/2023    Procedure: Remove port vascular access right;  Surgeon: Donnie Elias MD;  [place] [facility] [place]     Allergies:  Allergies as of 06/26/2023     (No Known Allergies)     Current Medications:  Current Outpatient Medications   Medication Sig Dispense Refill     acetaminophen (TYLENOL) 325 MG tablet Take 2 tablets (650 mg) by mouth every 6 hours as needed for mild pain (Patient not taking: Reported on 5/2/2023)       Acetylcarnitine HCl 250 MG CAPS Take 500 mg by mouth 2 times daily        aspirin (ASA) 81 MG chewable tablet Take 81 mg by mouth every evening       Bacillus Coagulans-Inulin (PROBIOTIC FORMULA) 1-250 BILLION-MG CAPS Take by mouth every morning 25+billion CFUS       BENFOTIAMINE PO Take 150 mg by mouth  every morning       Cholecalciferol (VITAMIN D-3) 25 MCG (1000 UT) CAPS Take 1,000 Units by mouth every morning 90 capsule 3     coenzyme Q-10 capsule Take 1 capsule by mouth daily       levothyroxine (SYNTHROID/LEVOTHROID) 100 MCG tablet Take 1 tablet (100 mcg) by mouth daily before breakfast 90 tablet 0     MAGNESIUM OXIDE PO Take 400 mg by mouth 2 times daily       melatonin 5 MG tablet Take 10 mg by mouth nightly as needed for sleep       metoprolol succinate ER (TOPROL XL) 50 MG 24 hr tablet TAKE 1 TABLET BY MOUTH EVERY DAY (Patient taking differently: Take 50 mg by mouth every evening) 90 tablet 2     multivitamin w/minerals (THERA-VIT-M) tablet Take 1 tablet by mouth every morning (Patient not taking: Reported on 5/2/2023)       Omega-3 Fatty Acids (OMEGA-3 FISH OIL PO) Take 630 mg by mouth 2 times daily       OVER-THE-COUNTER Turkey tail mushroom powder, use 3 times a day 1 Can 11     Pectin Cit-Inos-C-Bioflav-Soy (MODIFIED CITRUS PECTIN PO) Take by mouth 2 times daily       Taurine 1000 MG CAPS every morning 60 capsule 11     UNABLE TO FIND 1,200 mg 2 times daily MEDICATION NAME: [place]       UNABLE TO FIND 150 mg daily MEDICATION NAME: Silymarin       UNABLE TO FIND 1 capsule 2 times daily MEDICATION NAME: Alkylgycerols (Patient not taking: Reported on 4/6/2023)       UNABLE TO FIND 2 times daily [facility]ICATION [facility] 1000[facility] calcium  4 pills per day       UNABLE TO FIND Take 1 tablet by mouth every morning MEDICATION NAME: Dinndolylmethane Complex (DIM) 100mg tablet 1 table daily        Physical Exam:  /70 (BP Location: Right arm, Patient Position: Sitting, Cuff Size: Adult Regular)   Pulse 90   Temp 98  F (36.7  C) (Oral)   Wt 51.7 kg (114 lb)   LMP  (LMP Unknown)   SpO2 100%   BMI 20.19 kg/m     GENERAL APPEARANCE: Well appearing adult female in NAD.  Alert and oriented x 3.    HEENT: NC/AT, sclera anicteric.  MMM.    LYMPHATICS: No palpable cervical, supraclavicular, or axillary  lymphadenopathy  RESP: Lungs are CTA bilaterally and without wheezes or crackles.  CARDIOVASCULAR:  RRR.  No audible m/r/g.  BREAST:  Bilateral breasts are of normal fibroglandular density.  At 12:30, 8 cm from the nipple of the left breast is a 1.5 cm flat density with indiscrete borders, palpates relatively unchanged from prior.  Bilateral nipples are everted and without discharge.  ABDOMEN:  soft, nondistended  MUSCULOSKELETAL: Full ROM of the bilateral upper extremities.  SKIN: no suspicious lesions or rashes  PSYCHIATRIC: Normal mood and affect.    Laboratory/Imaging Studies  I personally reviewed the below labs and imaging studies:    5/31/2023 Labs:  Sodium is low at 135.  Chloride is low at 97.  Kidney function is wnl.  LFTs are wnl.    TSH is elevated at 32.6  Free T4 is wnl at 1.07    WBC is low at 2.8  Hemoglobin is low at 10.1 g/dL  Platelets are wnl.    6/8/2023 PET/CT:                                                           IMPRESSION:      1. Findings are suspicious for peritoneal metastasis, with new  hypermetabolic peritoneal nodule in the right lower quadrant and  ill-defined FDG uptake in the right perihepatic space/along the  hepatic capsule. Unchanged moderate volume ascites.     2. Interval reduction in FDG uptake and size of soft tissue lesion  along the vaginal cuff, mild residual uptake at this site could  represent inflammation versus small volume viable tumor.     3. New curvilinear FDG uptake spanning the vagina, right mesorectal  fascia and presacral space is not characterized on the fusion CT due  to limited resolution; this may represent a fistulous tract. Consider  further evaluation with MRI pelvis.      4. Two foci of mild FDG uptake in the left interpectoral region  adjacent to the biopsy clip and anterior to the left subclavian vein  are indeterminate for residual primary breast malignancy versus  inflammation, unchanged compared to 12/5/2022 PET.     5. Mildly enlarged left  supraclavicular nodes, with uptake similar to  blood pool, are probably reactive. Attention on follow-up.       6. Interval resolution of diffuse hypermetabolic FDG uptake in the  thyroid gland, stomach and cardiac muscles.    ASSESSMENT/PLAN:  Ms. Donaldson is a 74 y/o woman with a history of left breast cancer (s/p lumpectomy, radiation in 2003 followed by 5 years of tamoxifen w/ Dr. Mtz) and cervical SCC (10/2012, s/p FRANCIA, upper vaginectomy) with pelvic mass and a second ER positive, TX negative, HER2 negative left breast cancer.     1. Left breast cancer.  - PET/CT shows persistent mild FDG uptake in left interpectoral area.  Unclear whether this represents residual malignancy vs inflammation.  - On exam, she has persistent 1.5 cm palpable density in the area of known malignancy.  This is unchanged from prior exam.  - Treatment with anastrozole is recommended.  She declines to start the medication, wanting to focus on quality of life at this time.  - We again discussed the nature of malignancy. It responds to treatment for a period of time, but without definitive surgery and/or radaition, at some point it will again grow.  She expressed her understanding.  We discussed signs and symptoms of tumor growth.  We will therefore continue to monitor with surveillance for now with serial clinical exams.  If recurrence of mass, she will consider starting endocrine therapy at that time.  - She would like to reduce clinic visits and imaging as much as possible, stating she will monitor with self examination.  - Return to clinic in 4 months.      2. Recurrent cervical versus vaginal cancer   - She follows with Dr. Raygoza [facility]   - s/p 6 cycles neodjuvant Cisplatin 50 mg/m2 + paclitaxel 175 mg/m2 + bevacizumab 15 mg/kg + pembrolizumab every 21 days 7/11 - 11/21/2022.  Pembrolizumab added C3 and beyond; Cisplatin changed to carboplatin for cycles 5 and 6 due to tinnitus.    - s/p radiation to the pelvis and para-aortic lymph  nodes as well as interstitial brachytherapy to the vaginal cuff.  - PET/CT 6/8 w/ hypermetabolic nodule peritoneum RLQ and in the right perihepatic space.  I reviewed Dr. Raygoza's 6/13 note.  Discussion was had with the patient and given the indeterminate findings, patient's poor tolerance of treatment, and her preference, the plan is to monitor with surveillance.  She will have regular clinic visits and plan is to image only if symptomatic.    3.  Ascites:  Cytology negative.  Cirrhotic morphology seen on PET/CT and poor nutrition may also be contributing.  - she has increased protein intake in her diet.    4.  Neuropathy:  Chemotherapy induced.  Grade 1.  No change since last visit.    5.  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  She is on levothyroxine 100 mcg PO daily.  - Last TSH on 5/31/2023 was elevated at 32.6.  Levothyroxine dose was subsequently increased from 75 to 100 mcg daily.  - Will need repeat TSH approximately 6 weeks after the last dose increase.    6.  Follow Up:  Return to clinic in 4 months.    35 minutes was spent on the date of the encounter doing chart review, review of test results, interpretation of tests, patient visit and documentation.

## 2023-06-26 NOTE — LETTER
"    6/26/2023         RE: Cordell Donaldson  [address]  [facility] [address]        Dear Colleague,    Thank you for referring your patient, Cordell Donaldson, to the [facility]. Please see a copy of my visit note below.    Oncology Follow Up:  Date on this visit: 6/26/2023    Diagnosis: Left breast invasive ductal carcinoma.    Primary Physician: Merry Lino     History Of Present Illness:  Ms. Donaldson is a 73 year old female with cervical and breast cancer.    She has a history of left breast cancer treated with lumpectomy and radiation therapy in [place] [facility] [place] DX recurrence score was 10.  She took 5 years of tamoxifen. She had an incidental left breast lesion found on imaging for a pelvic mass in 06/2022. Ultrasound showed a 1.3 cm mass at 12:30, 8 cm from the nipple of the left breast.  Biopsy was c/w a grade 2 invasive ductal carcinoma, ER strong in 100%, DC negative, and HER2 negative.  Given concurrent recurrent pelvic mass with plan for cytotoxic chemotherapy, plan was made to monitor and initiate endocrine therapy when appropriate.  She completed 6 cycles of chemotherapy followed by radiation to her pelvic mass.  She has subsequently declined to start endocrine therapy.    In regards to her cervical cancer history, she was initially determined to have squamous carcinoma in 10/2012.  She is s/p FRANCIA and upper vaginectomy in 12/2012 with no residual invasive cancer, but residual HSIL.  Biopsy on 7/22/2013 was c/w HSIL, cannot exclude invasion.  In 05/2022 she developed symptoms of pelvic heaviness (felt like she \"bruised her tailbone\") and progressive pain. She felt like her urination and bowel movements were constricted, needing to put in a lot of effort to urinate or have BM. Pelvic MRI showed a large midline pelvic mass measuring up to 8.5 cm and abutting the bladder and the rectum without definite invasion or left pelvic LAD. " " These findings were confirmed on PET/CT. She was started on chemo with cisplatin, paclitaxel, and bevacizumab (7/11/22).  Caris testing showed PD-L1 CPS score of 10, MMR deficient, MSI high disease with a TMB of 53 mut/Mb. Pembrolizumab was added with C3.  Cisplatin was changed to carboplatin starting with C5 due to tinnitus.  After a total of 6 cycles imaging demonstrated good response.    She is s/p radiation to the pelvis and para-aortic lymph nodes and interstitial brachytherapy to the vaginal cuff (12/28/2022 - 2/15/2023).    She has declined to start anastrozole due to concern regarding side effects.    Interval History:  Ms. Donaldson comes into clinic for routine breast cancer follow up.  Since our last visit, her health has been good.  In fact, she states once she discovered her ascites was from severe malnutrition and hypoalbuminemia from the combination of radiation induced colitis and poor appetite, she improved her protein intake.  With this change, the ascites improved and so did her energy level.  She is now routinely going for bike rides and walks.  Appetite is improving and she feels she is able to eat more of her normal foods.  She denies concerning breast lumps, pain, or skin changes.  She reports her port site on the right side of the chest feels, \"like there is lump underneath\".  She has no new bone or joint pains.  She has no cough, shortness of breath or chest pain.  She states she is taking some supplements with the goal of regulating estrogen.  She also takes a drop of digestive enzymes daily to help with bowel symptoms.    Past Medical/Surgical History:  Past Medical History:   Diagnosis Date    Abnormal Pap smear     maybe 20 years ago    Cervical cancer (H)     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     History of breast cancer 2003    lumpectomy and radiation offerred chemo and patient declined at time but had oncogene test and low risk    " Hyperlipidemia LDL goal < 160     Osteopenia     Paroxysmal A-fib (H)     Severe vaginal dysplasia      Past Surgical History:   Procedure Laterality Date    BIOPSY      BREAST SURGERY Left 2003    lumpectomy left, did radiation, 5 yr of tamoxifen    COLONOSCOPY  2018    repeat in 2028    Colposcopy Cervix with Loop Electrode Conization and Lser to Vagina  2008    COLPOSCOPY, BIOPSY, COMBINED  10/24/2012    Procedure: COMBINED COLPOSCOPY, BIOPSY;  Colposcopy, Biopsy of Cervix and Vagina, Ultrasound Guidance;  Surgeon: Colette Ng MD;  Location: [facility] OR    ENT SURGERY  01/23/2018    otoscelerosis, right side    HYSTERECTOMY, FRANCIA  12/2012    high grade cervical dysplasia, [facility], Dr. Arciniega    INSERT INTERSTITIAL NEEDLE, ULTRASOUND GUIDED N/A 2/13/2023    Procedure: INSERTION, NEEDLE, WITH ULTRASOUND GUIDANCE, FOR INTERSTITIAL BRACHYTHERAPY;  Surgeon: Raymond Stockton MD;  Location: [facility] OR    INSERT PORT VASCULAR ACCESS Right 7/18/2022    Procedure: INSERTION, VASCULAR ACCESS PORT;  Surgeon: Donnie Elias MD;  Location: [facility] OR    IR CHEST PORT PLACEMENT > 5 YRS OF AGE  7/18/2022    IR PORT REMOVAL RIGHT  3/30/2023    SD LAP VAGINECTOMY, PARTIAL REMOVAL OF VAGINAL WALL  10/2013    upper vaginectomy for dysplasia, Dr. Megan Arciniega    REMOVE INTERSTITIAL NEEDLE N/A 2/15/2023    Procedure: REMOVAL, INTERSTITIAL NEEDLE, AFTER TEMPORARY BRACHYTHERAPY;  Surgeon: Raymond Stockton MD;  Location: [facility] OR    REMOVE PORT VASCULAR ACCESS Right 3/30/2023    Procedure: Remove port vascular access right;  Surgeon: Donnie Elias MD;  [place] [facility] [place]     Allergies:  Allergies as of 06/26/2023    (No Known Allergies)     Current Medications:  Current Outpatient Medications   Medication Sig Dispense Refill    acetaminophen (TYLENOL) 325 MG tablet Take 2 tablets (650 mg) by mouth every 6 hours as needed for mild pain (Patient not taking: Reported on 5/2/2023)      Acetylcarnitine HCl 250 MG CAPS Take 500 mg by mouth 2 times daily        aspirin (ASA) 81 MG chewable tablet Take 81 mg by mouth every evening      Bacillus Coagulans-Inulin (PROBIOTIC FORMULA) 1-250 BILLION-MG CAPS Take by mouth every morning 25+billion CFUS      BENFOTIAMINE PO Take 150 mg by mouth every morning      Cholecalciferol (VITAMIN D-3) 25 MCG (1000 UT) CAPS Take 1,000 Units by mouth every morning 90 capsule 3    coenzyme Q-10 capsule Take 1 capsule by mouth daily      levothyroxine (SYNTHROID/LEVOTHROID) 100 MCG tablet Take 1 tablet (100 mcg) by mouth daily before breakfast 90 tablet 0    MAGNESIUM OXIDE PO Take 400 mg by mouth 2 times daily      melatonin 5 MG tablet Take 10 mg by mouth nightly as needed for sleep      metoprolol succinate ER (TOPROL XL) 50 MG 24 hr tablet TAKE 1 TABLET BY MOUTH EVERY DAY (Patient taking differently: Take 50 mg by mouth every evening) 90 tablet 2    multivitamin w/minerals (THERA-VIT-M) tablet Take 1 tablet by mouth every morning (Patient not taking: Reported on 5/2/2023)      Omega-3 Fatty Acids (OMEGA-3 FISH OIL PO) Take 630 mg by mouth 2 times daily      OVER-THE-COUNTER Turkey tail mushroom powder, use 3 times a day 1 Can 11    Pectin Cit-Inos-C-Bioflav-Soy (MODIFIED CITRUS PECTIN PO) Take by mouth 2 times daily      Taurine 1000 MG CAPS every morning 60 capsule 11    UNABLE TO FIND 1,200 mg 2 times daily MEDICATION NAME: Efraín      UNABLE TO FIND 150 mg daily MEDICATION NAME: Silymarin      UNABLE TO FIND 1 capsule 2 times daily MEDICATION NAME: Alkylgycerols (Patient not taking: Reported on 4/6/2023)      UNABLE TO FIND 2 times daily MEDICATION NAME: [facility] for 1000mg calcium  4 pills per day      UNABLE TO FIND Take 1 tablet by mouth every morning MEDICATION NAME: Dinndolylmethane Complex (DIM) 100mg tablet 1 table daily        Physical Exam:  /70 (BP Location: Right arm, Patient Position: Sitting, Cuff Size: Adult Regular)   Pulse 90   Temp 98  F (36.7  C) (Oral)   Wt 51.7 kg (114 lb)   LMP  (LMP Unknown)   SpO2  100%   BMI 20.19 kg/m     GENERAL APPEARANCE: Well appearing adult female in NAD.  Alert and oriented x 3.    HEENT: NC/AT, sclera anicteric.  MMM.    LYMPHATICS: No palpable cervical, supraclavicular, or axillary lymphadenopathy  RESP: Lungs are CTA bilaterally and without wheezes or crackles.  CARDIOVASCULAR:  RRR.  No audible m/r/g.  BREAST:  Bilateral breasts are of normal fibroglandular density.  At 12:30, 8 cm from the nipple of the left breast is a 1.5 cm flat density with indiscrete borders, palpates relatively unchanged from prior.  Bilateral nipples are everted and without discharge.  ABDOMEN:  soft, nondistended  MUSCULOSKELETAL: Full ROM of the bilateral upper extremities.  SKIN: no suspicious lesions or rashes  PSYCHIATRIC: Normal mood and affect.    Laboratory/Imaging Studies  I personally reviewed the below labs and imaging studies:    5/31/2023 Labs:  Sodium is low at 135.  Chloride is low at 97.  Kidney function is wnl.  LFTs are wnl.    TSH is elevated at 32.6  Free T4 is wnl at 1.07    WBC is low at 2.8  Hemoglobin is low at 10.1 g/dL  Platelets are wnl.    6/8/2023 PET/CT:                                                           IMPRESSION:      1. Findings are suspicious for peritoneal metastasis, with new  hypermetabolic peritoneal nodule in the right lower quadrant and  ill-defined FDG uptake in the right perihepatic space/along the  hepatic capsule. Unchanged moderate volume ascites.     2. Interval reduction in FDG uptake and size of soft tissue lesion  along the vaginal cuff, mild residual uptake at this site could  represent inflammation versus small volume viable tumor.     3. New curvilinear FDG uptake spanning the vagina, right mesorectal  fascia and presacral space is not characterized on the fusion CT due  to limited resolution; this may represent a fistulous tract. Consider  further evaluation with MRI pelvis.      4. Two foci of mild FDG uptake in the left interpectoral  region  adjacent to the biopsy clip and anterior to the left subclavian vein  are indeterminate for residual primary breast malignancy versus  inflammation, unchanged compared to 12/5/2022 PET.     5. Mildly enlarged left supraclavicular nodes, with uptake similar to  blood pool, are probably reactive. Attention on follow-up.       6. Interval resolution of diffuse hypermetabolic FDG uptake in the  thyroid gland, stomach and cardiac muscles.    ASSESSMENT/PLAN:  Ms. Donaldson is a 74 y/o woman with a history of left breast cancer (s/p lumpectomy, radiation in 2003 followed by 5 years of tamoxifen w/ Dr. Mtz) and cervical SCC (10/2012, s/p FRANCIA, upper vaginectomy) with pelvic mass and a second ER positive, ME negative, HER2 negative left breast cancer.     1. Left breast cancer.  - PET/CT shows persistent mild FDG uptake in left interpectoral area.  Unclear whether this represents residual malignancy vs inflammation.  - On exam, she has persistent 1.5 cm palpable density in the area of known malignancy.  This is unchanged from prior exam.  - Treatment with anastrozole is recommended.  She declines to start the medication, wanting to focus on quality of life at this time.  - We again discussed the nature of malignancy. It responds to treatment for a period of time, but without definitive surgery and/or radaition, at some point it will again grow.  She expressed her understanding.  We discussed signs and symptoms of tumor growth.  We will therefore continue to monitor with surveillance for now with serial clinical exams.  If recurrence of mass, she will consider starting endocrine therapy at that time.  - She would like to reduce clinic visits and imaging as much as possible, stating she will monitor with self examination.  - Return to clinic in 4 months.      2. Recurrent cervical versus vaginal cancer   - She follows with Dr. Raygoza of [facility]   - s/p 6 cycles neodjuvant Cisplatin 50 mg/m2 + paclitaxel 175 mg/m2 +  bevacizumab 15 mg/kg + pembrolizumab every 21 days 7/11 - 11/21/2022.  Pembrolizumab added C3 and beyond; Cisplatin changed to carboplatin for cycles 5 and 6 due to tinnitus.    - s/p radiation to the pelvis and para-aortic lymph nodes as well as interstitial brachytherapy to the vaginal cuff.  - PET/CT 6/8 w/ hypermetabolic nodule peritoneum RLQ and in the right perihepatic space.  I reviewed Dr. Raygoza's 6/13 note.  Discussion was had with the patient and given the indeterminate findings, patient's poor tolerance of treatment, and her preference, the plan is to monitor with surveillance.  She will have regular clinic visits and plan is to image only if symptomatic.    3.  Ascites:  Cytology negative.  Cirrhotic morphology seen on PET/CT and poor nutrition may also be contributing.  - she has increased protein intake in her diet.    4.  Neuropathy:  Chemotherapy induced.  Grade 1.  No change since last visit.    5.  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  She is on levothyroxine 100 mcg PO daily.  - Last TSH on 5/31/2023 was elevated at 32.6.  Levothyroxine dose was subsequently increased from 75 to 100 mcg daily.  - Will need repeat TSH approximately 6 weeks after the last dose increase.    6.  Follow Up:  Return to clinic in 4 months.    35 minutes was spent on the date of the encounter doing chart review, review of test results, interpretation of tests, patient visit and documentation.       Sincerely,        Angela Montana MD

## 2023-06-28 ENCOUNTER — THERAPY VISIT (OUTPATIENT)
Dept: OCCUPATIONAL THERAPY | Facility: CLINIC | Age: 73
End: 2023-06-28
Payer: COMMERCIAL

## 2023-06-28 DIAGNOSIS — I89.0 LYMPHEDEMA: Primary | ICD-10-CM

## 2023-06-28 PROCEDURE — 97140 MANUAL THERAPY 1/> REGIONS: CPT | Mod: GO | Performed by: OCCUPATIONAL THERAPIST

## 2023-06-29 NOTE — PROGRESS NOTES
"   06/28/23 0500   Appointment Info   Treating Provider ALEXX Cha, OTR/L, CLT-   Visits Used 10/10 Medicare   Medical Diagnosis Lymphedema in trunk and LLE with mild involvement of right thigh   OT Tx Diagnosis lymphedema s/p ca tx   Quick Add  Certification   Progress Note/Certification   Start Of Care Date 04/11/23   Onset of Illness/Injury or Date of Surgery 04/10/23   Therapy Frequency 1x/week   Predicted Duration 8 weeks   Certification date from 04/11/23   Certification date to 07/10/23   Recertification Due 07/10/23   OT Goal 1   Goal Identifier edu   Goal Description pt will understand principles of skin care and infection prevention to help manage lymphedema and prevent hospitalization   Goal Progress pt is indep with infection prevention and skin care   Target Date 07/10/23   Date Met 06/29/23   OT Goal 2   Goal Identifier Volume/symptoms   Goal Description pt will have reductio in left thigh by 100 ml and will have visibly decreased swelling in abdomen   Target Date 07/10/23   Goal Progress GOAL MET, pt's left thigh has reduced to baseline and is symmetrical with right   Date Met 06/29/23   OT Goal 3   Goal Identifier home program   Goal Description pt will demo indep carry over of home program included self MLD, MFR of skin in lower abdomen, exercises and compression garments to  manage lymphedema long term   Target Date 07/10/23   Goal Progress pt demo goo understanding of home program for self MLD and ex to help maximize lymphatic function, pt demo understanding of self scar tissue massage and self MFR to abdomen   Subjective Report   Subjective Report \" I have purchase a fascia ball like yours to help with my abdominal scar and fibrosis and it has really been helping\"   Manual Therapy   Manual Therapy Minutes (25035) 55   Skilled Intervention MLD, MFR, ed   Patient Response/Progress pt feels good progress with softer abdominal area, left leg continues to have not even trace lymphedema, " abdominal area cont to have some chronic edema but it has improved   Treatment Detail completed scar mobs at lower abdomen and mons pubis area due to radiation fibrosis and scarring in this area .    Compelted Supine MLD to BL terminus, ax, parasternal and ing LN, deep abd with DB, skin drainage abd to BL ax with re-work of ax and ing LN sites multiple times throughout session;  sidelying clearing of abdominal area around towards paraspinals with light skin drainage as well a sfluid mob to right groin LN and L axilla LN.   Progress Goal 1,2 met , one more session to finalize home program   Plan   Home program self-MLD, Spanx/bike-style short; compressive tank/allison, LLE thigh-high compression sock, 20-30 mmHg, pelvic rocking; self-MFR to supra-pubic area, Dycem to scar tissue, frequent clearing of ing and deep pelvic LN   Plan for next session MLD, MFR, scar mobs, ed PRN   Total Session Time   Timed Code Treatment Minutes 55   Total Treatment Time (sum of timed and untimed services) 55   Goal 1   Goal identifier education   Goal description pt will understand principles of skin care and infection prevention to help manage lymphedema and prevent hospitalization   Target date 07/10/23   Goal 3   Goal identifier volume/symptoms   Goal description pt will have reductio in left thigh by 100 ml and will have visibly decreased swelling in abdomen.   Target date 07/10/23   Goal 4   Goal identifier home program   Goal description pt will demo indep carry over of home program included self MLD, MFR of skin in lower abdomen, exercises and compression garments to  manage lymphedema long term   Target date 07/10/23   Goal 2   Goal identifier compression garments   Goal description pt will understand what compression garments are needed for long term maintenance of Lymphedema in B LE and trunk ( possibly thigh highs and high waisted bike short   Target date 07/10/23   Date met 06/06/23   Goal Progress GOAL MET   System Outcome  Measures   Lymphedema Life Impact Scale (score range 0-72). A higher score indicates greater impairment. 29   General Information   Medical diagnosis lymphedema   Start of care 04/11/23   Onset of illness / date of surgery 04/10/23   Clinical Impression   Therapy diagnosis Lymphedema in trunk and LLE with mild involvement of right thigh   Certification   Certification date from 04/11/23         PLAN  Continue therapy per current plan of care. For one more session     Beginning/End Dates of Progress Note Reporting Period:    4/11/23 to 06/28/2023    Referring Provider:  Merry Lino

## 2023-07-03 DIAGNOSIS — D64.9 ANEMIA, UNSPECIFIED TYPE: Primary | ICD-10-CM

## 2023-07-05 ENCOUNTER — THERAPY VISIT (OUTPATIENT)
Dept: OCCUPATIONAL THERAPY | Facility: CLINIC | Age: 73
End: 2023-07-05
Payer: COMMERCIAL

## 2023-07-05 DIAGNOSIS — I89.0 LYMPHEDEMA: Primary | ICD-10-CM

## 2023-07-05 DIAGNOSIS — C53.0 MALIGNANT NEOPLASM OF ENDOCERVIX (H): ICD-10-CM

## 2023-07-05 PROCEDURE — 97140 MANUAL THERAPY 1/> REGIONS: CPT | Mod: GO

## 2023-07-05 NOTE — PROGRESS NOTES
"    DISCHARGE  Reason for Discharge: Patient has met all goals.    Equipment Issued: NA    Discharge Plan: Patient to continue home program.    Referring Provider:  Merry Lino    07/05/23 0500   Appointment Info   Treating Provider Javed Mckeon, OTR/L, [facility]   Visits Used 11 DISCHARGE NOTE   Medical Diagnosis Lymphedema in trunk and LLE with mild involvement of right thigh   OT Tx Diagnosis lymphedema s/p ca tx   Quick Add  Certification   Progress Note/Certification   Start Of Care Date 04/11/23   Onset of Illness/Injury or Date of Surgery 04/10/23   Therapy Frequency 1x/week   Predicted Duration 8 weeks   Certification date from 04/11/23   Certification date to 07/10/23   Recertification Due 07/10/23   OT Goal 1   Goal Identifier edu   Goal Description pt will understand principles of skin care and infection prevention to help manage lymphedema and prevent hospitalization   Goal Progress pt is indep with infection prevention and skin care   Target Date 07/10/23   Date Met 06/29/23   OT Goal 2   Goal Identifier Volume/symptoms   Goal Description pt will have reductio in left thigh by 100 ml and will have visibly decreased swelling in abdomen   Goal Progress GOAL MET, pt's left thigh has reduced to baseline and is symmetrical with right   Target Date 07/10/23   Date Met 06/29/23   OT Goal 3   Goal Identifier home program   Goal Description pt will demo indep carry over of home program included self MLD, MFR of skin in lower abdomen, exercises and compression garments to  manage lymphedema long term   Goal Progress pt demo goo understanding of home program for self MLD and ex to help maximize lymphatic function, pt demo understanding of self scar tissue massage and self MFR to abdomen   Target Date 07/10/23   Date Met 07/05/23   Subjective Report   Subjective Report \"I'm feeling really good, and like I've made so much progress, this has been really helpful\"   Manual Therapy   Manual Therapy Minutes " (40000) 55   Skilled Intervention MLD, MFR, ed   Patient Response/Progress patient re-assessed this visit; abd markedly flatter, softer with decreased scar adhesions inf abd; no swelling LLE; patient verb understanding of ed provided, feels confident re: HP   Treatment Detail Supine MLD to BL terminus, ax, parasternal and ing LN, deep abd with DB, skin drainage abd to BL ax with re-work of ax and ing LN sites multiple times throughout session; extensive MFR and skin drainage to supra-pubic area and low abd, skin drained to ing LN, scar mobs low abd, diaphragmatic releases; reinforced all aspects of HP, s/s infection and lymphedema exacerbation, how to obtain new order if needed.   Progress goals met   Plan   Home program self-MLD, Spanx/bike-style short; compressive tank/allison, LLE thigh-high compression sock, 20-30 mmHg, pelvic rocking; self-MFR to supra-pubic area, Dycem to scar tissue, frequent clearing of ing and deep pelvic LN   Updates to plan of care discharge from edema treatment   Goal 2   Goal identifier compression garments   Goal description pt will understand what compression garments are needed for long term maintenance of Lymphedema in B LE and trunk ( possibly thigh highs and high waisted bike short   Target date 07/10/23   Date met 06/06/23   Goal Progress GOAL MET   General Information   Medical diagnosis lymphedema   Start of care 04/11/23   Onset of illness / date of surgery 04/10/23   Clinical Impression   Therapy diagnosis Lymphedema in trunk and LLE with mild involvement of right thigh   Certification   Certification date from 04/11/23

## 2023-07-14 ENCOUNTER — LAB (OUTPATIENT)
Dept: LAB | Facility: CLINIC | Age: 73
End: 2023-07-14
Payer: COMMERCIAL

## 2023-07-14 DIAGNOSIS — E03.8 OTHER SPECIFIED HYPOTHYROIDISM: ICD-10-CM

## 2023-07-14 DIAGNOSIS — D64.9 ANEMIA, UNSPECIFIED TYPE: ICD-10-CM

## 2023-07-14 LAB
ERYTHROCYTE [DISTWIDTH] IN BLOOD BY AUTOMATED COUNT: 15 % (ref 10–15)
HCT VFR BLD AUTO: 36.4 % (ref 35–47)
HGB BLD-MCNC: 11.4 G/DL (ref 11.7–15.7)
MCH RBC QN AUTO: 28.5 PG (ref 26.5–33)
MCHC RBC AUTO-ENTMCNC: 31.3 G/DL (ref 31.5–36.5)
MCV RBC AUTO: 91 FL (ref 78–100)
PLATELET # BLD AUTO: 190 10E3/UL (ref 150–450)
RBC # BLD AUTO: 4 10E6/UL (ref 3.8–5.2)
T4 FREE SERPL-MCNC: 1.61 NG/DL (ref 0.9–1.7)
TSH SERPL DL<=0.005 MIU/L-ACNC: 11.6 UIU/ML (ref 0.3–4.2)
WBC # BLD AUTO: 2.8 10E3/UL (ref 4–11)

## 2023-07-14 PROCEDURE — 84443 ASSAY THYROID STIM HORMONE: CPT

## 2023-07-14 PROCEDURE — 84439 ASSAY OF FREE THYROXINE: CPT

## 2023-07-14 RX ORDER — LEVOTHYROXINE SODIUM 112 UG/1
112 TABLET ORAL
Qty: 90 TABLET | Refills: 0 | Status: SHIPPED | OUTPATIENT
Start: 2023-07-14 | End: 2023-08-30

## 2023-08-01 DIAGNOSIS — R18.8 OTHER ASCITES: Primary | ICD-10-CM

## 2023-08-17 NOTE — PROGRESS NOTES
REASON FOR CONSULTATION: ascites  REFERRING PROVIDER: Merry Lino MD    A/P  Cordell Donaldson is a 73 year old female with ascites in the midst of cancer treatment from which she experienced significant side effects. Ascites has since resolved.  I note that her thyroid tests were abnormal during this time. This was new in 2022.   Mildly elevated AST/ALT during treatment. Platelet count normal now.  She sees a homeopathic practitoner and takes several homeopathic remedies, These are always seen with caution from a hepatologist's perspective.  No specific risk factors for liver disease identified, other than an episode of jaundice many years ago after travelling--this may well have been HAV but could have been any infectious hepatitis. There is no HAV, HBV, or HCV testing on record. I recommend she complete testing for these (ordered) as well as a  Liver US with doppler.  We discussed the possibility of nodular regenerative hyperplasia as a result of her cancer treatments--this can cause noncirrhotic portal HTN and thus ascites. Given that this is resolved, would not pursue more invasive studies unless there is an abnormality on the recommended testing, or if she re-develops ascites or some other liver-related issue.            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    Prior to all of this she was never told there were any issues with her liver aside from jaundice after a trip in [place] many years ago. She drank dandelion tea and slept a lot. The jaundice and fatigue resolved.    Ascites started around February 2023 and has now resolved. At this time. She is under the impression that the ascites was due to malnutrion.    Fluid analysis  Protein 3.9, 4.4, albumin 2.4,  "2.7  Serum albumin 3.4 and 4.5, SAAG 1 and 0.8  in April and May  Cytology neg x 2    Echo 11/2022 unremarkable  Imaging of her liver \"suspicious for cirrhosis\" per onc note, but I do not see any imaging results that state this. CT abdomen 2/9/23 does not state this.    Cancer history, please see note by Dr. [place] 6/26/23. Summary  Breast cancer dx 2003 (lumpectomy and radiation)  Cervical cancer  -squamous carcinoma 10/2012.  -FRANCIA and upper vaginectomy 12/2012 with no residual invasive cancer, but residual HSIL.   -7/22/2013 was c/w HSIL, cannot exclude invasion.  - 05/2022 symptoms of pelvic heaviness (felt like she \"bruised her tailbone\") and progressive pain, urination and bowel movements were constricted, needing to put in a lot of effort to urinate or have BM. Pelvic MRI showed a large midline pelvic mass measuring up to 8.5 cm and abutting the bladder and the rectum without definite invasion or left pelvic LAD.  These findings were confirmed on PET/CT. She was started on chemo with cisplatin, paclitaxel, and bevacizumab (7/11/22).  Caris testing showed PD-L1 CPS score of 10, MMR deficient, MSI high disease with a TMB of 53 mut/Mb. Pembrolizumab was added with C3.  Cisplatin was changed to carboplatin starting with C5 due to tinnitus.  After a total of 6 cycles imaging demonstrated good response     (12/28/2022 - 2/15/2023)  radiation to the pelvis and para-aortic lymph nodes and interstitial brachytherapy to the vaginal cuff       During this time she lost weight and was having diarrhea. She had lymphedema for a time and this has resolved    Recent Labs   Lab Test 05/31/23  1121   PROTTOTAL 6.9   ALBUMIN 4.5   BILITOTAL 0.2   ALKPHOS 48   AST 23   ALT 12     Recent Labs   Lab Test 07/14/23  1006   WBC 2.8*   RBC 4.00   HGB 11.4*   HCT 36.4   MCV 91   MCH 28.5   MCHC 31.3*   RDW 15.0          Risk factors for fatty liver disease: none  ETOH use: no alcohol during cancer treatment, now has an " "occasional glass of wine, maybe once a month or less. 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     Breast Cancer Paternal Grandmother         Unsure of diagnosis--some type of \"women's issue\"     Anesthesia Reaction No family hx of      Deep Vein Thrombosis (DVT) No family hx of    Father heavy alcohol, smoking    ROS 10 point ROS neg other than the symptoms noted above in the HPI.  Exam  Gen Alert pleasant NAD  Resp No difficulty breathing. No cough  Skin No Jaundice  Eyes No icterus  Neuro COHEN  MSK no muscle wasting  Psyche Pleasant, appropriate. Well groomed.  Cordell Donaldson is a 73 year old female who is being evaluated via a billable video visit.    Video-Visit Details  Type of service:  Video Visit  Video Start Time: 1035  Video End Time: 1102  Originating Location (pt. Location):home  Distant Location (provider location):  [facility] [place]      Platform used for Video Visit: [employer] or [employer]    Time today in minutes 72  Record review 30  Communication with care team 0  Face to face with patient on video 27  Documentation 15          "

## 2023-08-18 ENCOUNTER — VIRTUAL VISIT (OUTPATIENT)
Dept: GASTROENTEROLOGY | Facility: CLINIC | Age: 73
End: 2023-08-18
Attending: INTERNAL MEDICINE
Payer: COMMERCIAL

## 2023-08-18 ENCOUNTER — PRE VISIT (OUTPATIENT)
Dept: GASTROENTEROLOGY | Facility: CLINIC | Age: 73
End: 2023-08-18
Payer: COMMERCIAL

## 2023-08-18 DIAGNOSIS — Z11.59 ENCOUNTER FOR SCREENING FOR OTHER VIRAL DISEASES: ICD-10-CM

## 2023-08-18 DIAGNOSIS — R18.8 OTHER ASCITES: ICD-10-CM

## 2023-08-18 DIAGNOSIS — C53.0 MALIGNANT NEOPLASM OF ENDOCERVIX (H): ICD-10-CM

## 2023-08-18 PROCEDURE — 99205 OFFICE O/P NEW HI 60 MIN: CPT | Mod: VID | Performed by: INTERNAL MEDICINE

## 2023-08-18 NOTE — NURSING NOTE
Is the patient currently in the state of [place]? YES    Visit mode:VIDEO    If the visit is dropped, the patient can be reconnected by: VIDEO VISIT: Send to e-mail at: [email or web address]    Will anyone else be joining the visit? NO  (If patient encounters technical issues they should call [phone])    How would you like to obtain your AVS? MyChart    Are changes needed to the allergy or medication list? No    Reason for visit: Consult    Cuong GUEVARA

## 2023-08-30 ENCOUNTER — OFFICE VISIT (OUTPATIENT)
Dept: FAMILY MEDICINE | Facility: CLINIC | Age: 73
End: 2023-08-30
Payer: COMMERCIAL

## 2023-08-30 VITALS
DIASTOLIC BLOOD PRESSURE: 70 MMHG | WEIGHT: 114 LBS | RESPIRATION RATE: 15 BRPM | SYSTOLIC BLOOD PRESSURE: 108 MMHG | HEIGHT: 63 IN | OXYGEN SATURATION: 99 % | HEART RATE: 83 BPM | TEMPERATURE: 97.8 F | BODY MASS INDEX: 20.2 KG/M2

## 2023-08-30 DIAGNOSIS — R18.8 OTHER ASCITES: ICD-10-CM

## 2023-08-30 DIAGNOSIS — E03.8 OTHER SPECIFIED HYPOTHYROIDISM: Primary | ICD-10-CM

## 2023-08-30 DIAGNOSIS — Z11.59 ENCOUNTER FOR SCREENING FOR OTHER VIRAL DISEASES: ICD-10-CM

## 2023-08-30 DIAGNOSIS — C53.9 RECURRENT CERVICAL CANCER (H): ICD-10-CM

## 2023-08-30 DIAGNOSIS — C53.0 MALIGNANT NEOPLASM OF ENDOCERVIX (H): ICD-10-CM

## 2023-08-30 LAB
ALBUMIN SERPL BCG-MCNC: 4.4 G/DL (ref 3.5–5.2)
ALP SERPL-CCNC: 47 U/L (ref 35–104)
ALT SERPL W P-5'-P-CCNC: 12 U/L (ref 0–50)
ANION GAP SERPL CALCULATED.3IONS-SCNC: 11 MMOL/L (ref 7–15)
AST SERPL W P-5'-P-CCNC: 21 U/L (ref 0–45)
BILIRUB DIRECT SERPL-MCNC: <0.2 MG/DL (ref 0–0.3)
BILIRUB SERPL-MCNC: 0.4 MG/DL
BUN SERPL-MCNC: 21.8 MG/DL (ref 8–23)
CALCIUM SERPL-MCNC: 9.7 MG/DL (ref 8.8–10.2)
CHLORIDE SERPL-SCNC: 102 MMOL/L (ref 98–107)
CREAT SERPL-MCNC: 0.82 MG/DL (ref 0.51–0.95)
DEPRECATED HCO3 PLAS-SCNC: 26 MMOL/L (ref 22–29)
ERYTHROCYTE [DISTWIDTH] IN BLOOD BY AUTOMATED COUNT: 16.1 % (ref 10–15)
GFR SERPL CREATININE-BSD FRML MDRD: 76 ML/MIN/1.73M2
GLUCOSE SERPL-MCNC: 93 MG/DL (ref 70–99)
HAV IGG SER QL IA: REACTIVE
HBV CORE AB SERPL QL IA: NONREACTIVE
HBV SURFACE AB SERPL IA-ACNC: 0.45 M[IU]/ML
HBV SURFACE AB SERPL IA-ACNC: NONREACTIVE M[IU]/ML
HBV SURFACE AG SERPL QL IA: NONREACTIVE
HCT VFR BLD AUTO: 39.9 % (ref 35–47)
HCV AB SERPL QL IA: NONREACTIVE
HGB BLD-MCNC: 12.2 G/DL (ref 11.7–15.7)
INR PPP: 1.02 (ref 0.85–1.15)
MCH RBC QN AUTO: 27.4 PG (ref 26.5–33)
MCHC RBC AUTO-ENTMCNC: 30.6 G/DL (ref 31.5–36.5)
MCV RBC AUTO: 90 FL (ref 78–100)
PLATELET # BLD AUTO: 211 10E3/UL (ref 150–450)
POTASSIUM SERPL-SCNC: 4.8 MMOL/L (ref 3.4–5.3)
PROT SERPL-MCNC: 6.7 G/DL (ref 6.4–8.3)
RBC # BLD AUTO: 4.45 10E6/UL (ref 3.8–5.2)
SODIUM SERPL-SCNC: 139 MMOL/L (ref 136–145)
TSH SERPL DL<=0.005 MIU/L-ACNC: 2.64 UIU/ML (ref 0.3–4.2)
WBC # BLD AUTO: 3.5 10E3/UL (ref 4–11)

## 2023-08-30 PROCEDURE — 86803 HEPATITIS C AB TEST: CPT | Performed by: INTERNAL MEDICINE

## 2023-08-30 PROCEDURE — 85610 PROTHROMBIN TIME: CPT | Performed by: INTERNAL MEDICINE

## 2023-08-30 PROCEDURE — 86706 HEP B SURFACE ANTIBODY: CPT | Performed by: INTERNAL MEDICINE

## 2023-08-30 PROCEDURE — 85027 COMPLETE CBC AUTOMATED: CPT | Performed by: INTERNAL MEDICINE

## 2023-08-30 PROCEDURE — 87340 HEPATITIS B SURFACE AG IA: CPT | Performed by: INTERNAL MEDICINE

## 2023-08-30 PROCEDURE — 82248 BILIRUBIN DIRECT: CPT | Performed by: INTERNAL MEDICINE

## 2023-08-30 PROCEDURE — 84443 ASSAY THYROID STIM HORMONE: CPT | Performed by: INTERNAL MEDICINE

## 2023-08-30 PROCEDURE — 86708 HEPATITIS A ANTIBODY: CPT | Performed by: INTERNAL MEDICINE

## 2023-08-30 PROCEDURE — 86704 HEP B CORE ANTIBODY TOTAL: CPT | Performed by: INTERNAL MEDICINE

## 2023-08-30 RX ORDER — LEVOTHYROXINE SODIUM 112 UG/1
112 TABLET ORAL
Qty: 90 TABLET | Refills: 3 | Status: SHIPPED | OUTPATIENT
Start: 2023-08-30 | End: 2024-09-10

## 2023-08-30 NOTE — PROGRESS NOTES
"Cordell Donaldson is a 73 year old female with hx of recurrent cervical and vaginal cancer, breast cancer, atrial fibrillation, hyperlipidemia, hypothyroidism, transient ascites. She is here for the following issues:     Hypothyroidism  Cordell has hypothyroidism. Her TSH has been elevated, 46.10 on 4/10/2023. She reported she had been consistent with dosing since March, taking levothyroxine 50mcg dose. I suspected that she may not have consistent absorption of her medication due to loose stools. Dose was increased to 75mcg daily, however her TSH was still elevated at 32.60 on 5/31/2023. Dose of levothyroxine was again increased to 100 mcg daily. Recheck on 7/14/2023 showed improved but still elevated TSH at 11.60. Dose of levothyroxine increased to 112 mcg daily. She takes this around 6am with water on an empty stomach. She is due for TSH check today.      Cordell reports she has been having regular bowel movements. She eats a fairly plant-based diets and tends towards looser \"vegetarian\" stools, but is no longer having diarrhea.     Wt Readings from Last 4 Encounters:   08/30/23 51.7 kg (114 lb)   06/26/23 51.7 kg (114 lb)   06/13/23 52.1 kg (114 lb 12.8 oz)   05/31/23 51.3 kg (113 lb)     TSH   Date Value Ref Range Status   07/14/2023 11.60 (H) 0.30 - 4.20 uIU/mL Final   08/22/2017 2.16 0.40 - 4.00 mU/L Final      Transient ascites  I saw Cordell in clinic 4/10/23 to discuss care she was receiving for recurrent cervical cancer.  Marked increase of ascites was noted on pelvic MRI scan in February.  Cordell reported to me she had increasing abdominal discomfort and underwent paracentesis 4/21/23 with removal of 3.2L. Tap was clear, straw colored. No malignant cells were seen. She was referred to the lymphedema clinic and is now wearing support hose and Spanx.      She was referred to cardiology to rule out cardiac cause for ascites. She had a normal ECHO, no evidence for heart failure on ECHO or clinically.     " "  She was referred back to see me by her oncologist for evaluation of the ascites. I saw her on 5/2 for this. Review of labs indicated that Cordell had abrupt change in serum protein and albumin in February, 2023. Cordell experienced a lot of loose stools during treatment with brachytherapy/radiation therapy. At her previous visit in April she mentioned that stools were still loose and just beginning to become formed.   Ascites resolved as nutrition improved.     She was referred to see the gastroenterologist, as possible cirrhosis was mentioned. Their impression is that ascites may have been secondary to chemotherapy. She was told she does not have any liver damage, which was reassuring. She has a abdominal ultrasound scheduled for 9/13/2023. Cordell is requesting to be checked for hepatitis A, B, and C today, as well as get a \"regular liver check\" done. GI has put in orders for a BMP, CBC, hepatic function panel, and hepatitis A, B, and C checks, and we will run them today.     Cordell reports her appetite has returned and her leg and abdominal lymphedema has resolved. No pain with sitting. Denies vaginal drainage beyond a little blood if she uses a vaginal dilator.       Patient Active Problem List   Diagnosis    Dupuytren contracture    Abnormal electrocardiogram    Atrial fibrillation (H)    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    Hyperlipidemia LDL goal <130    Malignant neoplasm of breast (H)    Rectocele    Retroperitoneal lymphadenopathy    Malignant neoplasm of endocervix (H)    Recurrent cervical cancer (H)    Cervical cancer (H)       Current Outpatient Medications   Medication Sig Dispense Refill    Acetylcarnitine HCl 250 MG CAPS Take 500 mg by mouth 2 times daily       aspirin (ASA) 81 MG chewable tablet Take 81 mg by mouth every evening      Bacillus Coagulans-Inulin (PROBIOTIC FORMULA) 1-250 BILLION-MG CAPS Take by mouth every morning 25+billion CFUS      " "BENFOTIAMINE PO Take 150 mg by mouth every morning      Cholecalciferol (VITAMIN D-3) 25 MCG (1000 UT) CAPS Take 1,000 Units by mouth every morning 90 capsule 3    coenzyme Q-10 capsule Take 1 capsule by mouth daily      levothyroxine (SYNTHROID/LEVOTHROID) 112 MCG tablet Take 1 tablet (112 mcg) by mouth daily before breakfast Repeat labs in 6 wk 90 tablet 0    MAGNESIUM OXIDE PO Take 400 mg by mouth 2 times daily      metoprolol succinate ER (TOPROL XL) 50 MG 24 hr tablet TAKE 1 TABLET BY MOUTH EVERY DAY (Patient taking differently: Take 50 mg by mouth every evening) 90 tablet 2    Omega-3 Fatty Acids (OMEGA-3 FISH OIL PO) Take 630 mg by mouth 2 times daily      OVER-THE-COUNTER Turkey tail mushroom powder, use 3 times a day 1 Can 11    Pectin Cit-Inos-C-Bioflav-Soy (MODIFIED CITRUS PECTIN PO) Take by mouth 2 times daily      Taurine 1000 MG CAPS every morning 60 capsule 11    UNABLE TO FIND 1,000 mg MEDICATION NAME: Shark Liver Oil      UNABLE TO FIND 500 mg MEDICATION NAME: Turmeric      UNABLE TO FIND 500 mg MEDICATION NAME: Calcium D- Glucarate      UNABLE TO FIND 1,200 mg 2 times daily MEDICATION NAME: Efraín      UNABLE TO FIND 150 mg daily MEDICATION NAME: Silymarin      UNABLE TO FIND 2 times daily MEDICATION NAME: [facility] for 1000mg calcium  4 pills per day      UNABLE TO FIND Take 1 tablet by mouth every morning MEDICATION NAME: Dinndolylmethane Complex (DIM) 100mg tablet 1 table daily         No Known Allergies     EXAM  /70 (BP Location: Right arm, Patient Position: Sitting, Cuff Size: Adult Regular)   Pulse 83   Temp 97.8  F (36.6  C) (Skin)   Resp 15   Ht 1.6 m (5' 3\")   Wt 51.7 kg (114 lb)   LMP  (LMP Unknown)   SpO2 99%   BMI 20.19 kg/m    Gen: Alert, pleasant, NAD  Neck: No lymphadenopathy, no thyromegaly.   COR: irregularly irregular rhythm, no murmur  Lungs: CTA bilaterally, no rhonchi, wheezes or rales  Abdomen: Soft, non tender, normal bowel sounds, no HSM or mass   EXT: no edema " or asymmetry      Assessment:  (E03.8) Other specified hypothyroidism  (primary encounter diagnosis)  Comment: Currently on levothyroxine 112 mcg daily. Energy improving. No longer having loose stools.  TSH in target range  TSH   Date Value Ref Range Status   08/30/2023 2.64 0.30 - 4.20 uIU/mL Final   08/22/2017 2.16 0.40 - 4.00 mU/L Final   Plan: TSH with free T4 reflex        Continue levothyroxine 112mcg daily, refills for one year given    (R18.8) Other ascites  Comment: Resolved. Recent evaluation with GI team. They have ordered labs and abdominal ultrasound.   Plan: Labs will be drawn for Dr. Spencer today. US is ordered for mid-September. Notify MD for any recurrence.     (C53.0) Malignant neoplasm of endocervix (H)  (C53.9) Recurrent cervical cancer (H)  Comment: History of metastatic disease, currently off chemotherapy, surveillance only.   Plan: Continue with recommended follow up with oncology to assess status of breast cancer and cervical/vaginal cancer.     38 minutes spend on the date of this encounter doing chart review, history and exam, documentation and further activities as noted above.     I have reviewed, edited and approved the above scribed note.    Merry Lino MD  Internal Medicine/Pediatrics      I, Swetha Lee, am serving as a scribe to document services personally performed by Dr. Merry Lino, based on data collection and the provider's statements to me.

## 2023-08-30 NOTE — NURSING NOTE
"73 year old  Chief Complaint   Patient presents with    RECHECK     Follow up on ascites concerns, labs, liver concerns.        Blood pressure 108/70, pulse 83, temperature 97.8  F (36.6  C), temperature source Skin, resp. rate 15, height 1.6 m (5' 3\"), weight 51.7 kg (114 lb), SpO2 99 %, not currently breastfeeding. Body mass index is 20.19 kg/m .  Patient Active Problem List   Diagnosis    Dupuytren contracture    Abnormal electrocardiogram    Atrial fibrillation (H)    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    Hyperlipidemia LDL goal <130    Malignant neoplasm of breast (H)    Rectocele    Retroperitoneal lymphadenopathy    Malignant neoplasm of endocervix (H)    Recurrent cervical cancer (H)    Cervical cancer (H)       Wt Readings from Last 2 Encounters:   23 51.7 kg (114 lb)   23 51.7 kg (114 lb)     BP Readings from Last 3 Encounters:   23 108/70   23 116/70   23 113/74         Current Outpatient Medications   Medication    Acetylcarnitine HCl 250 MG CAPS    aspirin (ASA) 81 MG chewable tablet    Bacillus Coagulans-Inulin (PROBIOTIC FORMULA) 1-250 BILLION-MG CAPS    BENFOTIAMINE PO    Cholecalciferol (VITAMIN D-3) 25 MCG (1000 UT) CAPS    coenzyme Q-10 capsule    levothyroxine (SYNTHROID/LEVOTHROID) 112 MCG tablet    MAGNESIUM OXIDE PO    metoprolol succinate ER (TOPROL XL) 50 MG 24 hr tablet    Omega-3 Fatty Acids (OMEGA-3 FISH OIL PO)    OVER-THE-COUNTER    Pectin Cit-Inos-C-Bioflav-Soy (MODIFIED CITRUS PECTIN PO)    Taurine 1000 MG CAPS    UNABLE TO FIND    UNABLE TO FIND    UNABLE TO FIND    UNABLE TO FIND    UNABLE TO FIND    UNABLE TO FIND    UNABLE TO FIND     No current facility-administered medications for this visit.       Social History     Tobacco Use    Smoking status: Former     Packs/day: 0.50     Years: 15.00     Pack years: 7.50     Types: Cigarettes     Quit date:      Years since quittin.6    Smokeless tobacco: " Never   Vaping Use    Vaping Use: Never used   Substance Use Topics    Alcohol use: Not Currently     Alcohol/week: 2.0 standard drinks of alcohol     Types: 2 Standard drinks or equivalent per week     Comment: Socially     Drug use: No       Health Maintenance Due   Topic Date Due    DEPRESSION ACTION PLAN  Never done    Pneumococcal Vaccine: 65+ Years (1 - PCV) Never done    ZOSTER IMMUNIZATION (2 of 2) 02/12/2020    PAP  01/02/2023    COVID-19 Vaccine (5 - Pfizer risk series) 02/14/2023    MEDICARE ANNUAL WELLNESS VISIT  06/28/2023    FALL RISK ASSESSMENT  06/28/2023       Lab Results   Component Value Date    PAP NIL 01/02/2020 August 30, 2023 10:34 AM

## 2023-09-02 ENCOUNTER — HEALTH MAINTENANCE LETTER (OUTPATIENT)
Age: 73
End: 2023-09-02

## 2023-09-07 DIAGNOSIS — I48.0 PAROXYSMAL ATRIAL FIBRILLATION (H): ICD-10-CM

## 2023-09-10 RX ORDER — METOPROLOL SUCCINATE 50 MG/1
50 TABLET, EXTENDED RELEASE ORAL DAILY
Qty: 90 TABLET | Refills: 2 | Status: SHIPPED | OUTPATIENT
Start: 2023-09-10 | End: 2024-06-12

## 2023-09-13 ENCOUNTER — ANCILLARY PROCEDURE (OUTPATIENT)
Dept: ULTRASOUND IMAGING | Facility: CLINIC | Age: 73
End: 2023-09-13
Attending: INTERNAL MEDICINE
Payer: COMMERCIAL

## 2023-09-13 DIAGNOSIS — R18.8 OTHER ASCITES: ICD-10-CM

## 2023-09-13 PROCEDURE — 76700 US EXAM ABDOM COMPLETE: CPT | Mod: GC | Performed by: RADIOLOGY

## 2023-09-13 PROCEDURE — 93975 VASCULAR STUDY: CPT | Mod: GC | Performed by: RADIOLOGY

## 2023-10-02 NOTE — PROGRESS NOTES
Follow-up Note on Referred Patient  Gynecologic Oncology Clinic      Date of visit: 10/3/2023        Referring Provider/Gynecologic Oncologist:  Megan Arciniega MD  [facility]   Phone [phone]  [phone]      Radiation Oncologist:   Raymond Stockton MD, PhD  [facility]      Medical Oncologist:  Angela Montana MD  [facility].      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 Surveillance visit.      History of Present Illness:   Cordell Donaldson (she/her/hers) is a 73 year old initially referred to the Gynecologic Cancer Clinic at the [facility] on 2022 by gynecologic oncologist Dr. Arciniega and radiation oncologist Dr. Stockton for management of a pelvic mass due to recurrent cervical cancer vs new vaginal cancer. History as follows:     Breast Cancer History:  : Left breast cancer.  -Lumpectomy.  -Radiation therapy.    [id number]: BRENNAN.     22: PET/CT to stage vaginal cancer (see below).   Mild soft tissue  thickening in the left retropectoral region with an SUV max of 2.7.  7/15/22: Invasive ductal carcinoma.   -Consultation with Dr. Montana. Recommendation to prioritize vaginal/recurrent cervix cancer. Plan to follow breast lesion with repeat MRI. Plan for endocrine therapy after completion of chemotherapy for vaginal/cervical cancer.       Cervical/Vaginal Dysplasia/Cancer History:  10/24/12:   -Endocervical curettings: Squamous carcinoma, invasion cannot be assessed.   -Ectocervical biopsies: HSIL (CIN3).   -Vaginal biopsy, posterior: HSIL (VaIN3).  2012: Robotic-assisted laparoscopic lysis of adhesions, left ureterolysis with  conversion to laparotomy, total abdominal hysterectomy, and an upper  Vaginectomy by gynecologic  oncologist Dr. Arciniega.   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     7/22/13: Vaginal biopsy: HSIL, cannot exclude invasion.   -Consultation with gynecologic oncologist Dr. Johnson. Upper vaginectomy and CO2 laser ablation recommended, patient declined.   [id number]: No vaginal dysplasia treatment.   5/20/22: Pelvic MRI: I have personally reviewed the MRI images.   Centrally hypoenhancing heterogeneous midline pelvic mass along  the superior margin of the vaginal vault measures 8.5 x 6.7 x 8.1 cm.  The mass abut the bladder and there is mucosal irregularity at the  site of abutment. The mass indents the  decompressed rectum but there is no definite invasion or mucosal  Irregularity.  6/9/22: CT-guided biopsy of pelvic mass: Poorly-differentiated squamous cell carcinoma, HPV-associated.   7/1/22: Staging PET/CT: Stage III vaginal cancer (vs recurrent, metastatic cervical cancer). I have personally reviewed the PET/CT images.   Large heterogenous and hypermetabolic mass seen within the midline of  the pelvis measuring approximately 8.5 x 7.2 x 8.1 cm with an SUV max  measuring up to 13.2. Additionally, there is central hypoattenuation  with relative lack of metabolic activity, suggestive of central  necrosis. There is abutment of the pelvic mass along the  posterior aspect of the bladder and anterior rectum, cannot  definitively exclude invasion. No inguinal lymphadenopathy.  Redemonstration of hypermetabolic left pelvic lymphadenopathy, the  largest lymph node measuring up to 3.3 x 2.4 cm with an SUV max of  12.2. Additional conglomeration of  hypermetabolic lymph nodes at the aortic bifurcation measuring 2.9 x  1.6 cm with an SUV max of 14.3.  Unchanged enlarged mildly metabolic right pelvic lymph node measuring  2.5 x 1.1 cm with an SUV max of 4.2, favored to represent a reactive  lymph node given the relative lack of hypermetabolic activity  compared  to the left pelvic lymph node.    7/20/22, 8/10/22, 8/31/22,9/21/22, 10/26/22, 11/21/22: Cycles #1-6 of first-line chemotherapy with IV platinum + paclitaxel + bevacizumab 15 mg/kg + pembrolizumab 200 mg (added cycle 3) every 21 days.   -Cycles 1-4: Cisplatin 50 mg/m2, paclitaxel 175 mg/m2.   -Cycle 3:  Pembrolizumab added to all subsequent cycles due to PD-L1+ result per the KEYNOTE-826 regimen.  -9/22/22: PET/CT: Partial response. I have personally reviewed the PET/CT images.   -Discussed with radiation oncologist Dr. Stockton and at the gyn onc/radiation oncology tumor conference. Recommendation for additional chemotherapy prior to radiation therapy.   -Cycles 5+: Platinum changed to carboplatin due to tinnitus, and paclitaxel dose-reduced to 135 mg/m2 due to peripheral neuropathy.   -12/5/22: PET/CT: Partial response. I have personally reviewed the PET/CT images.   Diffuse uptake throughout the thyroid gland with SUV max 5.5, likely  Thyroiditis.  A few small pulmonary nodules  are stable, including a 2 mm solid pulmonary nodule in the left upper  lobe. No new or enlarging pulmonary nodules.  The heart  remains enlarged. No significant pericardial effusion.   Prominent  precarinal lymph node without significant FDG uptake. No suspicious  lymphadenopathy in the mediastinum.  Decreased size of centrally hypodense, necrotic cervical mass, now  measuring 5.3 x 4.1 cm (previously 6.1 x 4.3 cm)  when measured similarly. There is resolution of previously seen  enhancing component along the right lateral wall and resolution of  previous hypermetabolic activity. There is also resolution of  previously seen hypermetabolic pelvic lymph nodes.  12/28/22-2/15/23: First-line pelvic external beam radiation therapy with total dose 4500 cGy in 25 fractions then vaginal cuff brachytherapy with total dose 2500 cGy in 5 fractions. No concurrent chemosensitization due to myelosuppression and decreased patient tolerance of  therapy.   -Patient declined maintenance therapy due to significantly decreased quality of life while on systemic therapy.   -6/8/23: PET/CT: No definitive evidence of disease. Ascites with imaging concerning for cirrhotic changes. I have personally reviewed the PET/CT images.   HEAD/NECK:  There is mild interval enlargement of multiple left supraclavicular  nodes, with uptake similar to blood pool, dominant node measures 1.2 x  1.1 cm (previously 1 x 0.7 cm).    CHEST:  Unchanged mild focal uptake in the left interpectoral region adjacent  to the biopsy clip measuring approximately 0.8 cm with SUV max  of 1.9. Unchanged additional, approximately 1 cm mildly avid soft  tissue anterior to the left subclavian vein.  A few small pulmonary nodules  are stable, including a 0.2 cm solid pulmonary nodule in the left  upper lobe. No new or enlarging pulmonary nodules.  Stable cardiomegaly. Resolution of diffuse uptake in the left atrium,  right atrium and right ventricles. Trace pericardial effusion. Unchanged 2.9 cm subcutaneous  lipoma in the right lower chest wall.  ABDOMEN AND PELVIS:  Moderate volume ascites. There is a new peritoneal nodule in the right  lower quadrant measuring approximately 0.9 x 0.7 cm with SUV max of 4  (3/711). New mild ill-defined uptake is noted in the right perihepatic  region without discrete measurable implant.  There is interval reduction in FDG uptake and size of soft tissue  lesion at the site of recurrence along the vaginal cuff, measuring  approximately 3.5 x 2.9 cm (previously measuring 4.9 x 4.1 cm); with mild diffuse uptake with SUV max of 3.8 (previously 5). There is new curvilinear FDG uptake along the vagina,  right mesorectal fascia and presacral space.  Prominent mildly avid bilateral inguinal nodes are likely reactive.   Unchanged subcentimeter cysts in hepatic segment 8/5. Diffusely  heterogenous hepatic parenchymal density with fissural widening and  left lobe hypertrophy  indicates cirrhotic morphology. Mild  pericholecystic fluid is likely reactive. Stable 0.9 cm cystic focus in  the pancreatic head, likely a side branch IPMN. Nodular thickening of  the left adrenal gland.   Extensive generalized  body wall edema.      Genetic/Genomic/Molecular Testing History:  2006: Negative for germline BRCA1, BRCA2 pathogenic variants.     8/14/22 (specimen from 6/9/22): [facility].   -PD-L1+ (CPS 10)  -Mismatch repair deficient/microsatellite instability-high  -TMB high (53 mut/Mb)  -NTRK 1/2/3 fusion not detected.       Subjective:  Cordell returns to the gyn onc clinic today for her scheduled surveillance visit, accompanied by her .   Cordell reports feeling much better. She has been active, walking and biking.   Ascites resolved. She can fit into her regular clothes again.   Normal appetite.   No vaginal discharge or bleeding. She is using the vaginal dilator ~1x/month which causes some spotting.   Cordell reports stools are smaller and looser than they were prior to therapy. She has 2 large BM in the morning and 2 in the pm. Her weight has increased from 105 to 115, and she is trying to keep her weight there (baseline weight 135).   She notes increased urge incontinence, e.g. when she hears water running.           Past Medical History:  Past Medical History:   Diagnosis Date    Abnormal Pap smear     maybe 20 years ago    Cervical cancer (H)     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     History of breast cancer 2003    lumpectomy and radiation offerred chemo and patient declined at time but had oncogene test and low risk    Hyperlipidemia LDL goal < 160     Osteopenia     Paroxysmal A-fib (H)     Severe vaginal dysplasia        Past Surgical History:  Past Surgical History:   Procedure Laterality Date    BIOPSY      BREAST SURGERY Left 2003    lumpectomy left, did radiation, 5 yr of tamoxifen    COLONOSCOPY  2018    repeat in 2028     Colposcopy Cervix with Loop Electrode Conization and Lser to Vagina  2008    COLPOSCOPY, BIOPSY, COMBINED  10/24/2012    Procedure: COMBINED COLPOSCOPY, BIOPSY;  Colposcopy, Biopsy of Cervix and Vagina, Ultrasound Guidance;  Surgeon: Colette Ng MD;  Location: [facility] OR    ENT SURGERY  01/23/2018    otoscelerosis, right side    HYSTERECTOMY, FRANCIA  12/2012    high grade cervical dysplasia, [facility], Dr. Arciniega    INSERT INTERSTITIAL NEEDLE, ULTRASOUND GUIDED N/A 2/13/2023    Procedure: INSERTION, NEEDLE, WITH ULTRASOUND GUIDANCE, FOR INTERSTITIAL BRACHYTHERAPY;  Surgeon: Raymond Stockton MD;  Location: [facility] OR    INSERT PORT VASCULAR ACCESS Right 7/18/2022    Procedure: INSERTION, VASCULAR ACCESS PORT;  Surgeon: Donnie Elias MD;  Location: [facility] OR    IR CHEST PORT PLACEMENT > 5 YRS OF AGE  7/18/2022    IR PORT REMOVAL RIGHT  3/30/2023    AR LAP VAGINECTOMY, PARTIAL REMOVAL OF VAGINAL WALL  10/2013    upper vaginectomy for dysplasia, Dr. Megan Arciniega    REMOVE INTERSTITIAL NEEDLE N/A 2/15/2023    Procedure: REMOVAL, INTERSTITIAL NEEDLE, AFTER TEMPORARY BRACHYTHERAPY;  Surgeon: Raymond Stockton MD;  Location: [facility] OR    REMOVE PORT VASCULAR ACCESS Right 3/30/2023    Procedure: Remove port vascular access right;  Surgeon: Donnie Elias MD;  [place] [facility] [place]       Current Medications:   Current Outpatient Medications   Medication    Acetylcarnitine HCl 250 MG CAPS    aspirin (ASA) 81 MG chewable tablet    Bacillus Coagulans-Inulin (PROBIOTIC FORMULA) 1-250 BILLION-MG CAPS    BENFOTIAMINE PO    Cholecalciferol (VITAMIN D-3) 25 MCG (1000 UT) CAPS    coenzyme Q-10 capsule    levothyroxine (SYNTHROID/LEVOTHROID) 112 MCG tablet    MAGNESIUM OXIDE PO    metoprolol succinate ER (TOPROL XL) 50 MG 24 hr tablet    Omega-3 Fatty Acids (OMEGA-3 FISH OIL PO)    OVER-THE-COUNTER    Pectin Cit-Inos-C-Bioflav-Soy (MODIFIED CITRUS PECTIN PO)    Taurine 1000 MG CAPS    UNABLE TO FIND    UNABLE TO FIND    UNABLE TO FIND    UNABLE TO FIND  "   UNABLE TO FIND    UNABLE TO FIND    UNABLE TO FIND     No current facility-administered medications for this visit.        Allergies:   No Known Allergies       Social History:  Patient lives with her . 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 She walks daily, gardens, does yoga twice/week.  She does have Advanced Directives, but has identified her daughter and  as her desired [employer] of .   Social History     Tobacco Use    Smoking status: Former     Packs/day: 0.50     Years: 15.00     Pack years: 7.50     Types: Cigarettes     Quit date:      Years since quittin.7    Smokeless tobacco: Never   Substance Use Topics    Alcohol use: Not Currently     Alcohol/week: 2.0 standard drinks of alcohol     Types: 2 Standard drinks or equivalent per week     Comment: Socially        History   Drug Use No       Family History:   The patient's family history is notable for a first-degree and second-degree paternal relative with breast cancer.  No known family history of ovarian, uterine, colon, urothelial/renal, prostate or pancreatic cancers.  No known family history of melanoma.  Family History   Problem Relation Age of Onset    Myocardial Infarction Mother 73        Tob    Myocardial Infarction Father 68        Tob    Breast Cancer Sister 47         of breast cancer age 63; BRCA mutation testing negative    Cancer Maternal Grandmother         Unsure of type.    Breast Cancer Paternal Grandmother         Unsure of diagnosis--some type of \"women's issue\"    Anesthesia Reaction No family hx of     Deep Vein Thrombosis (DVT) No family hx of        Physical Exam:    /79 (BP Location: Right arm, Patient Position: Sitting, Cuff Size: Adult Regular)   Pulse 80   Temp 98.1  F (36.7  C) (Oral)   Resp 16   Wt 53.9 kg (118 lb 14.4 oz)   LMP  (LMP Unknown)   SpO2 99%   BMI 21.06 kg/m     Body mass index is 21.06 kg/m .    General Appearance: healthy and alert, no distress   "   HEENT:  no thyromegaly, no palpable nodules or masses        Cardiovascular: regular rate and rhythm, no gallops, rubs or murmurs     Respiratory: lungs clear, no rales, rhonchi or wheezes, normal diaphragmatic excursion    Musculoskeletal: extremities non tender and without edema    Skin: no lesions or rashes     Neurological: normal gait, no gross defects     Psychiatric: appropriate mood and affect                               Hematological: normal cervical, supraclavicular and inguinal lymph nodes     Gastrointestinal:       abdomen soft, non-tender, non-distended, no organomegaly or masses    Genitourinary: Post-radiation changes, with vagina about 3-4 cm in length. Vagina is smooth without nodularity or masses. On rectovaginal exam the rectovaginal septum is thickened but without nodularity.       Radiographic Examination:  9/13/23: Abdominal Ultrasound: No evidence of cirrhosis.       Performance Status:  ECOG Grade 0.      Assessment:  Cordell Donaldson (she/her/hers) is a 73 year old woman with a diagnosis of stage III squamous cell carcinoma of the vagina (vs recurrent metastatic cervical cancer) s/p primary therapy with PET/CT showing no definitive evidence of disease.  Medical history significant for FIGO Stage IA1 squamous cell carcinoma of the cervix s/p hysterectomy with no residual disease, and a long history of vaginal HSIL.    Diagnosis of recurrent breast cancer during vaginal cancer treatment.     Development of non-malignant ascites during radiation therapy, with PET/CT findings concerning for cirrhosis, although diagnostic abdominal ultrasound negative for radiographic signs of cirrhsosis.   Previous cardiac PET/CT findings resolved, with additional cardiac work-up negative for pathology.       Plan:   1.)    Squamous cell carcinoma: Cordell will return to the gyn onc clinic in 3 months for her next surveillance visit.   -Surveillance as follows (per the NCCN guidelines):  Every 3 months x2  years, then every 6 months x3 years (NP 3x/year, MD annually). No cytology tests given high false-positive rate in the setting of radiation therapy.   She will call in the interim if she has any vaginal bleeding or other concerning symptoms.     Side-effects:   -Grade 1 peripheral neuropathy (attributable to paclitaxel and cisplatin): Currently stable, not interfering with activity. Patient discharged from PT since she has improved her strength/function.  -Ascites: Attributed to malnutrition, possibly pembrolizumab therapy given no other etiology (cardiac, hepatic evaluations negative). Spontaneously improving (last paracentesis 5/2023), will continue monitoring with imaging as prompted by symptoms.     2.) Breast cancer: Continue management per medical oncologist Dr. Montana. Radiographic response of breast cancer to current chemotherapy. Per last visit note 6/26/23, endocrine therapy with anastrozole recommended, but patient declined, favoring focus on quality of life.      3.) Genetic risk factors were assessed and the patient does not meet the qualifications for a referral.      4.) Labs and/or tests ordered include: None.     5.) Health maintenance issues addressed today include: continuing routine healthcare maintenance with her primary care provider.     6.) Code status:  Full-code.    7.) Prescriptions: None.        A total of 15 minutes was spent with the patient, 12 minutes of which were spent in counseling the patient and/or treatment planning; an additional 5 minutes was spent in chart review/documentation.        Hina Raygoza MD, MS, FACOG, FACS  10/3/2023  1:50 PM                  CC  Patient Care Team:  Merry Lino MD as PCP - General (Internal Medicine)  Bess Paredes MD Corfield, Aaron Daniel, DPM as MD (Podiatry)  Elise Huitron MD as Assigned Heart and Vascular Provider  Merry Lino MD as Assigned PCP  Tess Iniguez RN as Specialty Care Coordinator  Nany  Heather Munguia MD as MD (Urology)  Nan, Angela Rowley MD as MD (Hematology & Oncology)  Yvette Pike MD as MD (Surgery)  Valeri Kirby, GEOFF as Specialty Care Coordinator (Hematology & Oncology)  Nurys Horton RN as Specialty Care Coordinator (Hematology & Oncology)  Felicia Thompson PA-C as Physician Assistant (Anesthesiology)  Aleta Shea MD as Assigned Neuroscience Provider  Glendy, Yvette Rogers MD as Assigned Surgical Provider  Raymond Stockton MD as Assigned Cancer Care Provider  Valeria Spencer MD as MD (Gastroenterology)  SELF, REFERRED

## 2023-10-02 NOTE — PATIENT INSTRUCTIONS
SCHEDULING:  -RTC in 3 months for vaginal cancer surveillance (NP, in-person). 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   History of vaginal HSIL (pre-cancer).  Squamous cell carcinoma involving a pelvic mass with associated lymphadenopathy, likely stage III vaginal cancer (vs recurrent, metastatic cervical cancer), currently without definitive evidence of disease.   Treatment History:  -12/2012: Robotic-assisted laparoscopic lysis of adhesions (take-down of scar tissue), left ureterolysis (freeing of the left ureter) with  conversion to laparotomy, total abdominal hysterectomy (removal of uterus/cervix), and an upper  Vaginectomy (removal of the upper vagina).  -7/20/22-present: First-line therapy with IV cisplatin + paclitaxel + bevacizumab + pembrolizumab (added cycles 3+).   -12/28/22-2/15/23: First-line pelvic external beam radiation therapy then vaginal brachytherapy.       PLAN:  1) Squamous cell carcinoma:   Surveillance as follows (per the NCCN guidelines):  Every 3 months x2 years, then every 6 months x3 years.   You will see the gyn onc NP 3x/year, MD annually.   Please call in the interim if you have any vaginal bleeding or other concerning symptoms.     2) Cancer Rehabilitation Clinic:  [email or web address]    3) Ascites: Given the negative work-up, attributed to pembrolizumab therapy. Now resolving, will continue to monitor with imaging as prompted by symptoms.      Please call with any questions or concerns. [phone]       Hina Raygoza MD, MS, FACOG, FACS  10/3/2023  1:50 PM

## 2023-10-03 ENCOUNTER — ONCOLOGY VISIT (OUTPATIENT)
Dept: ONCOLOGY | Facility: CLINIC | Age: 73
End: 2023-10-03
Attending: OBSTETRICS & GYNECOLOGY
Payer: COMMERCIAL

## 2023-10-03 VITALS
TEMPERATURE: 98.1 F | OXYGEN SATURATION: 99 % | BODY MASS INDEX: 21.06 KG/M2 | RESPIRATION RATE: 16 BRPM | HEART RATE: 80 BPM | WEIGHT: 118.9 LBS | DIASTOLIC BLOOD PRESSURE: 79 MMHG | SYSTOLIC BLOOD PRESSURE: 114 MMHG

## 2023-10-03 DIAGNOSIS — G62.0 DRUG-INDUCED POLYNEUROPATHY (H): ICD-10-CM

## 2023-10-03 DIAGNOSIS — C52 VAGINAL CANCER (H): Primary | ICD-10-CM

## 2023-10-03 DIAGNOSIS — R18.8 OTHER ASCITES: ICD-10-CM

## 2023-10-03 PROCEDURE — 99213 OFFICE O/P EST LOW 20 MIN: CPT | Performed by: OBSTETRICS & GYNECOLOGY

## 2023-10-03 PROCEDURE — G0463 HOSPITAL OUTPT CLINIC VISIT: HCPCS | Performed by: OBSTETRICS & GYNECOLOGY

## 2023-10-03 ASSESSMENT — PAIN SCALES - GENERAL: PAINLEVEL: NO PAIN (0)

## 2023-10-03 NOTE — NURSING NOTE
"Oncology Rooming Note    October 3, 2023 1:16 PM   Cordell Donaldson is a 73 year old female who presents for:    Chief Complaint   Patient presents with    Oncology Clinic Visit     Vaginal Cancer     Initial Vitals: /79 (BP Location: Right arm, Patient Position: Sitting, Cuff Size: Adult Regular)   Pulse 80   Temp 98.1  F (36.7  C) (Oral)   Resp 16   Wt 53.9 kg (118 lb 14.4 oz)   LMP  (LMP Unknown)   SpO2 99%   BMI 21.06 kg/m   Estimated body mass index is 21.06 kg/m  as calculated from the following:    Height as of 8/30/23: 1.6 m (5' 3\").    Weight as of this encounter: 53.9 kg (118 lb 14.4 oz). Body surface area is 1.55 meters squared.  No Pain (0) Comment: Data Unavailable   No LMP recorded (lmp unknown). Patient has had a hysterectomy.  Allergies reviewed: Yes  Medications reviewed: Yes    Medications: Medication refills not needed today.  Pharmacy name entered into [employer]:    [id number] - [place], [place] - [facility]  [id number] - [place], [place] - [facility] [place], [place] - [address] [place] [address] [phone]    Clinical concerns: None       Autumn Ratliff LPN  10/3/2023              "

## 2023-10-03 NOTE — LETTER
10/3/2023         RE: Cordell Donaldson  [address]  [facility] [address]        Dear Colleague,    Thank you for referring your patient, Cordell Donaldson, to the [facility]. Please see a copy of my visit note below.    Follow-up Note on Referred Patient  Gynecologic Oncology Clinic      Date of visit: 10/3/2023        Referring Provider/Gynecologic Oncologist:  Megan Arciniega MD  [facility]   Phone [phone]  [phone]      Radiation Oncologist:   Raymond Stockton MD, PhD  [facility]      Medical Oncologist:  Angela Montana MD  [facility].      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 Surveillance visit.      History of Present Illness:   Cordell Donaldson (she/her/hers) is a 73 year old initially referred to the Gynecologic Cancer Clinic at the [facility] on 2022 by gynecologic oncologist Dr. Arciniega and radiation oncologist Dr. Stockton for management of a pelvic mass due to recurrent cervical cancer vs new vaginal cancer. History as follows:     Breast Cancer History:  : Left breast cancer.  -Lumpectomy.  -Radiation therapy.    [id number]: BRENNAN.     22: PET/CT to stage vaginal cancer (see below).   Mild soft tissue  thickening in the left retropectoral region with an SUV max of 2.7.  7/15/22: Invasive ductal carcinoma.   -Consultation with Dr. Montana. Recommendation to prioritize vaginal/recurrent cervix cancer. Plan to follow breast lesion with repeat MRI. Plan for endocrine therapy after completion of chemotherapy for vaginal/cervical cancer.       Cervical/Vaginal Dysplasia/Cancer History:  10/24/12:   -Endocervical curettings: Squamous carcinoma, invasion cannot be assessed.    -Ectocervical biopsies: HSIL (CIN3).   -Vaginal biopsy, posterior: HSIL (VaIN3).  12/2012: Robotic-assisted laparoscopic lysis of adhesions, left ureterolysis with  conversion to laparotomy, total abdominal hysterectomy, and an upper  Vaginectomy by gynecologic oncologist Dr. Arciniega.   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     7/22/13: Vaginal biopsy: HSIL, cannot exclude invasion.   -Consultation with gynecologic oncologist Dr. Johnson. Upper vaginectomy and CO2 laser ablation recommended, patient declined.   [id number]: No vaginal dysplasia treatment.   5/20/22: Pelvic MRI: I have personally reviewed the MRI images.   Centrally hypoenhancing heterogeneous midline pelvic mass along  the superior margin of the vaginal vault measures 8.5 x 6.7 x 8.1 cm.  The mass abut the bladder and there is mucosal irregularity at the  site of abutment. The mass indents the  decompressed rectum but there is no definite invasion or mucosal  Irregularity.  6/9/22: CT-guided biopsy of pelvic mass: Poorly-differentiated squamous cell carcinoma, HPV-associated.   7/1/22: Staging PET/CT: Stage III vaginal cancer (vs recurrent, metastatic cervical cancer). I have personally reviewed the PET/CT images.   Large heterogenous and hypermetabolic mass seen within the midline of  the pelvis measuring approximately 8.5 x 7.2 x 8.1 cm with an SUV max  measuring up to 13.2. Additionally, there is central hypoattenuation  with relative lack of metabolic activity, suggestive of central  necrosis. There is abutment of the pelvic mass along the  posterior aspect of the bladder and anterior rectum, cannot  definitively exclude invasion. No inguinal lymphadenopathy.  Redemonstration of hypermetabolic left pelvic lymphadenopathy, the  largest lymph node measuring up to 3.3 x 2.4 cm with an SUV max of  12.2. Additional conglomeration of  hypermetabolic lymph nodes at the aortic bifurcation  measuring 2.9 x  1.6 cm with an SUV max of 14.3.  Unchanged enlarged mildly metabolic right pelvic lymph node measuring  2.5 x 1.1 cm with an SUV max of 4.2, favored to represent a reactive  lymph node given the relative lack of hypermetabolic activity compared  to the left pelvic lymph node.    7/20/22, 8/10/22, 8/31/22,9/21/22, 10/26/22, 11/21/22: Cycles #1-6 of first-line chemotherapy with IV platinum + paclitaxel + bevacizumab 15 mg/kg + pembrolizumab 200 mg (added cycle 3) every 21 days.   -Cycles 1-4: Cisplatin 50 mg/m2, paclitaxel 175 mg/m2.   -Cycle 3:  Pembrolizumab added to all subsequent cycles due to PD-L1+ result per the KEYNOTE-826 regimen.  -9/22/22: PET/CT: Partial response. I have personally reviewed the PET/CT images.   -Discussed with radiation oncologist Dr. Stockton and at the gyn onc/radiation oncology tumor conference. Recommendation for additional chemotherapy prior to radiation therapy.   -Cycles 5+: Platinum changed to carboplatin due to tinnitus, and paclitaxel dose-reduced to 135 mg/m2 due to peripheral neuropathy.   -12/5/22: PET/CT: Partial response. I have personally reviewed the PET/CT images.   Diffuse uptake throughout the thyroid gland with SUV max 5.5, likely  Thyroiditis.  A few small pulmonary nodules  are stable, including a 2 mm solid pulmonary nodule in the left upper  lobe. No new or enlarging pulmonary nodules.  The heart  remains enlarged. No significant pericardial effusion.   Prominent  precarinal lymph node without significant FDG uptake. No suspicious  lymphadenopathy in the mediastinum.  Decreased size of centrally hypodense, necrotic cervical mass, now  measuring 5.3 x 4.1 cm (previously 6.1 x 4.3 cm)  when measured similarly. There is resolution of previously seen  enhancing component along the right lateral wall and resolution of  previous hypermetabolic activity. There is also resolution of  previously seen hypermetabolic pelvic lymph nodes.  12/28/22-2/15/23:  First-line pelvic external beam radiation therapy with total dose 4500 cGy in 25 fractions then vaginal cuff brachytherapy with total dose 2500 cGy in 5 fractions. No concurrent chemosensitization due to myelosuppression and decreased patient tolerance of therapy.   -Patient declined maintenance therapy due to significantly decreased quality of life while on systemic therapy.   -6/8/23: PET/CT: No definitive evidence of disease. Ascites with imaging concerning for cirrhotic changes. I have personally reviewed the PET/CT images.   HEAD/NECK:  There is mild interval enlargement of multiple left supraclavicular  nodes, with uptake similar to blood pool, dominant node measures 1.2 x  1.1 cm (previously 1 x 0.7 cm).    CHEST:  Unchanged mild focal uptake in the left interpectoral region adjacent  to the biopsy clip measuring approximately 0.8 cm with SUV max  of 1.9. Unchanged additional, approximately 1 cm mildly avid soft  tissue anterior to the left subclavian vein.  A few small pulmonary nodules  are stable, including a 0.2 cm solid pulmonary nodule in the left  upper lobe. No new or enlarging pulmonary nodules.  Stable cardiomegaly. Resolution of diffuse uptake in the left atrium,  right atrium and right ventricles. Trace pericardial effusion. Unchanged 2.9 cm subcutaneous  lipoma in the right lower chest wall.  ABDOMEN AND PELVIS:  Moderate volume ascites. There is a new peritoneal nodule in the right  lower quadrant measuring approximately 0.9 x 0.7 cm with SUV max of 4  (3/711). New mild ill-defined uptake is noted in the right perihepatic  region without discrete measurable implant.  There is interval reduction in FDG uptake and size of soft tissue  lesion at the site of recurrence along the vaginal cuff, measuring  approximately 3.5 x 2.9 cm (previously measuring 4.9 x 4.1 cm); with mild diffuse uptake with SUV max of 3.8 (previously 5). There is new curvilinear FDG uptake along the vagina,  right mesorectal  fascia and presacral space.  Prominent mildly avid bilateral inguinal nodes are likely reactive.   Unchanged subcentimeter cysts in hepatic segment 8/5. Diffusely  heterogenous hepatic parenchymal density with fissural widening and  left lobe hypertrophy indicates cirrhotic morphology. Mild  pericholecystic fluid is likely reactive. Stable 0.9 cm cystic focus in  the pancreatic head, likely a side branch IPMN. Nodular thickening of  the left adrenal gland.   Extensive generalized  body wall edema.      Genetic/Genomic/Molecular Testing History:  2006: Negative for germline BRCA1, BRCA2 pathogenic variants.     8/14/22 (specimen from 6/9/22): [facility].   -PD-L1+ (CPS 10)  -Mismatch repair deficient/microsatellite instability-high  -TMB high (53 mut/Mb)  -NTRK 1/2/3 fusion not detected.       Subjective:  Cordell returns to the gyn onc clinic today for her scheduled surveillance visit, accompanied by her .   Cordell reports feeling much better. She has been active, walking and biking.   Ascites resolved. She can fit into her regular clothes again.   Normal appetite.   No vaginal discharge or bleeding. She is using the vaginal dilator ~1x/month which causes some spotting.   Cordell reports stools are smaller and looser than they were prior to therapy. She has 2 large BM in the morning and 2 in the pm. Her weight has increased from 105 to 115, and she is trying to keep her weight there (baseline weight 135).   She notes increased urge incontinence, e.g. when she hears water running.           Past Medical History:  Past Medical History:   Diagnosis Date    Abnormal Pap smear     maybe 20 years ago    Cervical cancer (H)     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     History of breast cancer 2003    lumpectomy and radiation offerred chemo and patient declined at time but had oncogene test and low risk    Hyperlipidemia LDL goal < 160     Osteopenia     Paroxysmal A-fib  (H)     Severe vaginal dysplasia        Past Surgical History:  Past Surgical History:   Procedure Laterality Date    BIOPSY      BREAST SURGERY Left 2003    lumpectomy left, did radiation, 5 yr of tamoxifen    COLONOSCOPY  2018    repeat in 2028    Colposcopy Cervix with Loop Electrode Conization and Lser to Vagina  2008    COLPOSCOPY, BIOPSY, COMBINED  10/24/2012    Procedure: COMBINED COLPOSCOPY, BIOPSY;  Colposcopy, Biopsy of Cervix and Vagina, Ultrasound Guidance;  Surgeon: Colette Ng MD;  Location: [facility] OR    ENT SURGERY  01/23/2018    otoscelerosis, right side    HYSTERECTOMY, FRANCIA  12/2012    high grade cervical dysplasia, [facility], Dr. Arciniega    INSERT INTERSTITIAL NEEDLE, ULTRASOUND GUIDED N/A 2/13/2023    Procedure: INSERTION, NEEDLE, WITH ULTRASOUND GUIDANCE, FOR INTERSTITIAL BRACHYTHERAPY;  Surgeon: Raymond Stockton MD;  Location: [facility] OR    INSERT PORT VASCULAR ACCESS Right 7/18/2022    Procedure: INSERTION, VASCULAR ACCESS PORT;  Surgeon: Donnie Elias MD;  Location: [facility] OR    IR CHEST PORT PLACEMENT > 5 YRS OF AGE  7/18/2022    IR PORT REMOVAL RIGHT  3/30/2023    HI LAP VAGINECTOMY, PARTIAL REMOVAL OF VAGINAL WALL  10/2013    upper vaginectomy for dysplasia, Dr. Megan Arciniega    REMOVE INTERSTITIAL NEEDLE N/A 2/15/2023    Procedure: REMOVAL, INTERSTITIAL NEEDLE, AFTER TEMPORARY BRACHYTHERAPY;  Surgeon: Raymond Stockton MD;  Location: [facility] OR    REMOVE PORT VASCULAR ACCESS Right 3/30/2023    Procedure: Remove port vascular access right;  Surgeon: Donnie Elias MD;  Location: [facility] OR       Current Medications:   Current Outpatient Medications   Medication    Acetylcarnitine HCl 250 MG CAPS    aspirin (ASA) 81 MG chewable tablet    Bacillus Coagulans-Inulin (PROBIOTIC FORMULA) 1-250 BILLION-MG CAPS    BENFOTIAMINE PO    Cholecalciferol (VITAMIN D-3) 25 MCG (1000 UT) CAPS    coenzyme Q-10 capsule    levothyroxine (SYNTHROID/LEVOTHROID) 112 MCG tablet    MAGNESIUM OXIDE PO    metoprolol succinate ER  "(TOPROL XL) 50 MG 24 hr tablet    Omega-3 Fatty Acids (OMEGA-3 FISH OIL PO)    OVER-THE-COUNTER    Pectin Cit-Inos-C-Bioflav-Soy (MODIFIED CITRUS PECTIN PO)    Taurine 1000 MG CAPS    UNABLE TO FIND    UNABLE TO FIND    UNABLE TO FIND    UNABLE TO FIND    UNABLE TO FIND    UNABLE TO FIND    UNABLE TO FIND     No current facility-administered medications for this visit.        Allergies:   No Known Allergies       Social History:  Patient lives with her . 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 She walks daily, gardens, does yoga twice/week.  She does have Advanced Directives, but has identified her daughter and  as her desired [employer] of .   Social History     Tobacco Use    Smoking status: Former     Packs/day: 0.50     Years: 15.00     Pack years: 7.50     Types: Cigarettes     Quit date:      Years since quittin.7    Smokeless tobacco: Never   Substance Use Topics    Alcohol use: Not Currently     Alcohol/week: 2.0 standard drinks of alcohol     Types: 2 Standard drinks or equivalent per week     Comment: Socially        History   Drug Use No       Family History:   The patient's family history is notable for a first-degree and second-degree paternal relative with breast cancer.  No known family history of ovarian, uterine, colon, urothelial/renal, prostate or pancreatic cancers.  No known family history of melanoma.  Family History   Problem Relation Age of Onset    Myocardial Infarction Mother 73        Tob    Myocardial Infarction Father 68        Tob    Breast Cancer Sister 47         of breast cancer age 63; BRCA mutation testing negative    Cancer Maternal Grandmother         Unsure of type.    Breast Cancer Paternal Grandmother         Unsure of diagnosis--some type of \"women's issue\"    Anesthesia Reaction No family hx of     Deep Vein Thrombosis (DVT) No family hx of        Physical Exam:    /79 (BP Location: Right arm, Patient Position: Sitting, Cuff Size: " Adult Regular)   Pulse 80   Temp 98.1  F (36.7  C) (Oral)   Resp 16   Wt 53.9 kg (118 lb 14.4 oz)   LMP  (LMP Unknown)   SpO2 99%   BMI 21.06 kg/m     Body mass index is 21.06 kg/m .    General Appearance: healthy and alert, no distress     HEENT:  no thyromegaly, no palpable nodules or masses        Cardiovascular: regular rate and rhythm, no gallops, rubs or murmurs     Respiratory: lungs clear, no rales, rhonchi or wheezes, normal diaphragmatic excursion    Musculoskeletal: extremities non tender and without edema    Skin: no lesions or rashes     Neurological: normal gait, no gross defects     Psychiatric: appropriate mood and affect                               Hematological: normal cervical, supraclavicular and inguinal lymph nodes     Gastrointestinal:       abdomen soft, non-tender, non-distended, no organomegaly or masses    Genitourinary: Post-radiation changes, with vagina about 3-4 cm in length. Vagina is smooth without nodularity or masses. On rectovaginal exam the rectovaginal septum is thickened but without nodularity.       Radiographic Examination:  9/13/23: Abdominal Ultrasound: No evidence of cirrhosis.       Performance Status:  ECOG Grade 0.      Assessment:  Cordell Donaldson (she/her/hers) is a 73 year old woman with a diagnosis of stage III squamous cell carcinoma of the vagina (vs recurrent metastatic cervical cancer) s/p primary therapy with PET/CT showing no definitive evidence of disease.  Medical history significant for FIGO Stage IA1 squamous cell carcinoma of the cervix s/p hysterectomy with no residual disease, and a long history of vaginal HSIL.    Diagnosis of recurrent breast cancer during vaginal cancer treatment.     Development of non-malignant ascites during radiation therapy, with PET/CT findings concerning for cirrhosis, although diagnostic abdominal ultrasound negative for radiographic signs of cirrhsosis.   Previous cardiac PET/CT findings resolved, with additional  cardiac work-up negative for pathology.       Plan:   1.)    Squamous cell carcinoma: Cordell will return to the gyn onc clinic in 3 months for her next surveillance visit.   -Surveillance as follows (per the NCCN guidelines):  Every 3 months x2 years, then every 6 months x3 years (NP 3x/year, MD annually). No cytology tests given high false-positive rate in the setting of radiation therapy.   She will call in the interim if she has any vaginal bleeding or other concerning symptoms.     Side-effects:   -Grade 1 peripheral neuropathy (attributable to paclitaxel and cisplatin): Currently stable, not interfering with activity. Patient discharged from PT since she has improved her strength/function.  -Ascites: Attributed to malnutrition, possibly pembrolizumab therapy given no other etiology (cardiac, hepatic evaluations negative). Spontaneously improving (last paracentesis 5/2023), will continue monitoring with imaging as prompted by symptoms.     2.) Breast cancer: Continue management per medical oncologist Dr. Montana. Radiographic response of breast cancer to current chemotherapy. Per last visit note 6/26/23, endocrine therapy with anastrozole recommended, but patient declined, favoring focus on quality of life.      3.) Genetic risk factors were assessed and the patient does not meet the qualifications for a referral.      4.) Labs and/or tests ordered include: None.     5.) Health maintenance issues addressed today include: continuing routine healthcare maintenance with her primary care provider.     6.) Code status:  Full-code.    7.) Prescriptions: None.        A total of 15 minutes was spent with the patient, 12 minutes of which were spent in counseling the patient and/or treatment planning; an additional 5 minutes was spent in chart review/documentation.        Hina Raygoza MD, MS, FACOG, FACS  10/3/2023  1:50 PM                  CC  Patient Care Team:  Merry Lino MD as PCP - General (Internal  Medicine)  Bess Paredes MD Corfield, Aaron Daniel, MYAM as MD (Podiatry)  Elise Huitron MD as Assigned Heart and Vascular Provider  Pinkarri, Merry Coronel MD as Assigned PCP  Tess Iniguez, RN as Specialty Care Coordinator  Nany, Heather Munguia MD as MD (Urology)  Nan, Angela Rowley MD as MD (Hematology & Oncology)  Glendy, Yvette Rogers MD as MD (Surgery)  Valeri Kirby, GEOFF as Specialty Care Coordinator (Hematology & Oncology)  Nurys Horton RN as Specialty Care Coordinator (Hematology & Oncology)  Felicia Thompson PA-C as Physician Assistant (Anesthesiology)  Aleta Shea MD as Assigned Neuroscience Provider  Glendy, Yvette Rogers MD as Assigned Surgical Provider  Raymond Stockton MD as Assigned Cancer Care Provider  Valeria Spencer MD as MD (Gastroenterology)  SELF, REFERRED

## 2023-10-04 PROBLEM — C53.9: Status: RESOLVED | Noted: 2022-07-09 | Resolved: 2023-10-04

## 2023-10-04 PROBLEM — R59.0 RETROPERITONEAL LYMPHADENOPATHY: Status: RESOLVED | Noted: 2022-06-20 | Resolved: 2023-10-04

## 2023-10-04 PROBLEM — C52 VAGINAL CANCER (H): Status: ACTIVE | Noted: 2023-10-04

## 2023-10-04 PROBLEM — C53.9 CERVICAL CANCER (H): Status: RESOLVED | Noted: 2023-02-13 | Resolved: 2023-10-04

## 2023-10-29 NOTE — PROGRESS NOTES
"Oncology Follow Up:  Date on this visit: 10/30/2023    Diagnosis: Left breast invasive ductal carcinoma.    Primary Physician: Merry Lino     History Of Present Illness:  Ms. Donaldson is a 73 year old female with cervical and breast cancer.    She has a history of left breast cancer treated with lumpectomy and radiation therapy in 2003 (followed w/ Dr. Mtz at [facility]). Oncotype DX recurrence score was 10.  She took 5 years of tamoxifen. She had an incidental left breast lesion found on imaging for a pelvic mass in 06/2022. Ultrasound showed a 1.3 cm mass at 12:30, 8 cm from the nipple of the left breast.  Biopsy was c/w a grade 2 invasive ductal carcinoma, ER strong in 100%, AK negative, and HER2 negative.  Given concurrent recurrent pelvic mass with plan for cytotoxic chemotherapy, plan was made to monitor and initiate endocrine therapy when appropriate.  She completed 6 cycles of chemotherapy followed by radiation to her pelvic mass.  She has subsequently declined to start endocrine therapy.    In regards to her cervical cancer history, she was initially determined to have squamous carcinoma in 10/2012.  She is s/p FRANCIA and upper vaginectomy in 12/2012 with no residual invasive cancer, but residual HSIL.  Biopsy on 7/22/2013 was c/w HSIL, cannot exclude invasion.  In 05/2022 she developed symptoms of pelvic heaviness (felt like she \"bruised her tailbone\") and progressive pain. She felt like her urination and bowel movements were constricted, needing to put in a lot of effort to urinate or have BM. Pelvic MRI showed a large midline pelvic mass measuring up to 8.5 cm and abutting the bladder and the rectum without definite invasion or left pelvic LAD.  These findings were confirmed on PET/CT. She was started on chemo with cisplatin, paclitaxel, and bevacizumab (7/11/22).  Caris testing showed PD-L1 CPS score of 10, MMR deficient, MSI high disease with a TMB of 53 mut/Mb. Pembrolizumab was added with C3.  " Cisplatin was changed to carboplatin starting with C5 due to tinnitus.  After a total of 6 cycles imaging demonstrated good response.    She is s/p radiation to the pelvis and para-aortic lymph nodes and interstitial brachytherapy to the vaginal cuff (12/28/2022 - 2/15/2023).  She has declined to start anastrozole due to concern regarding side effects.    Interval History:  Ms. Donaldson comes into clinic today for routine breast cancer follow-up.  Her health has been remarkably good over the last couple of months.  She states energy level is improving since completion of treatment of her squamous cell carcinoma.  In fact, she is obtaining routine daily cardiovascular exercise including walking, biking, and swimming.  She has no difficulty with exercise.  She has remained very active at home, recently raking leaves.  She has been able to gain some of the weight that she previously lost from her cancer treatment.  She states mood has been good.  She denies concerning breast lumps, pain, or swelling.  She has had no skin changes or nipple discharge from the breast.  She denies cough, shortness of breath, or chest pain.  She has no abdominal complaints.  She has no new bone or joint aches or pains.    Past Medical/Surgical History:  Past Medical History:   Diagnosis Date    Abnormal Pap smear     maybe 20 years ago    Cervical cancer (H)     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     History of breast cancer 2003    lumpectomy and radiation offerred chemo and patient declined at time but had oncogene test and low risk    Hyperlipidemia LDL goal < 160     Osteopenia     Paroxysmal A-fib (H)     Severe vaginal dysplasia      Past Surgical History:   Procedure Laterality Date    BIOPSY      BREAST SURGERY Left 2003    lumpectomy left, did radiation, 5 yr of tamoxifen    COLONOSCOPY  2018    repeat in 2028    Colposcopy Cervix with Loop Electrode Conization and Lser to Vagina  2008     COLPOSCOPY, BIOPSY, COMBINED  10/24/2012    Procedure: COMBINED COLPOSCOPY, BIOPSY;  Colposcopy, Biopsy of Cervix and Vagina, Ultrasound Guidance;  Surgeon: Colette Ng MD;  Location: [facility] OR    ENT SURGERY  01/23/2018    otoscelerosis, right side    HYSTERECTOMY, FRANCIA  12/2012    high grade cervical dysplasia, [place] [facility], Dr. Arciniega    INSERT INTERSTITIAL NEEDLE, ULTRASOUND GUIDED N/A 2/13/2023    Procedure: INSERTION, NEEDLE, WITH ULTRASOUND GUIDANCE, FOR INTERSTITIAL BRACHYTHERAPY;  Surgeon: Raymond Stockton MD;  Location: [facility] OR    INSERT PORT VASCULAR ACCESS Right 7/18/2022    Procedure: INSERTION, VASCULAR ACCESS PORT;  Surgeon: Donnie Elias MD;  Location: [facility] OR    IR CHEST PORT PLACEMENT > 5 YRS OF AGE  7/18/2022    IR PORT REMOVAL RIGHT  3/30/2023    OK LAP VAGINECTOMY, PARTIAL REMOVAL OF VAGINAL WALL  10/2013    upper vaginectomy for dysplasia, Dr. Megan Arciniega    REMOVE INTERSTITIAL NEEDLE N/A 2/15/2023    Procedure: REMOVAL, INTERSTITIAL NEEDLE, AFTER TEMPORARY BRACHYTHERAPY;  Surgeon: Raymond Stockton MD;  Location: [facility] OR    REMOVE PORT VASCULAR ACCESS Right 3/30/2023    Procedure: Remove port vascular access right;  Surgeon: Donnie Elias MD;  Location: [facility] OR     Allergies:  Allergies as of 10/30/2023    (No Known Allergies)     Current Medications:  Current Outpatient Medications   Medication Sig Dispense Refill    Acetylcarnitine HCl 250 MG CAPS Take 500 mg by mouth daily      aspirin (ASA) 81 MG chewable tablet Take 81 mg by mouth every evening      Bacillus Coagulans-Inulin (PROBIOTIC FORMULA) 1-250 BILLION-MG CAPS Take by mouth every morning 25+billion CFUS      BENFOTIAMINE PO Take 150 mg by mouth every morning      Cholecalciferol (VITAMIN D-3) 25 MCG (1000 UT) CAPS Take 1,000 Units by mouth every morning 90 capsule 3    coenzyme Q-10 capsule Take 1 capsule by mouth daily      levothyroxine (SYNTHROID/LEVOTHROID) 112 MCG tablet Take 1 tablet (112 mcg) by mouth daily before breakfast 90  tablet 3    MAGNESIUM OXIDE PO Take 400 mg by mouth daily      metoprolol succinate ER (TOPROL XL) 50 MG 24 hr tablet Take 1 tablet (50 mg) by mouth daily 90 tablet 2    Omega-3 Fatty Acids (OMEGA-3 FISH OIL PO) Take 630 mg by mouth 2 times daily      OVER-THE-COUNTER Turkey tail mushroom powder, use 3 times a day 1 Can 11    Pectin Cit-Inos-C-Bioflav-Soy (MODIFIED CITRUS PECTIN PO) Take by mouth 2 times daily      Taurine 1000 MG CAPS Take 1,000 mg by mouth every morning 60 capsule 11    UNABLE TO FIND 1,000 mg MEDICATION NAME: Shark Liver Oil      UNABLE TO FIND 500 mg MEDICATION NAME: Turmeric      UNABLE TO FIND 500 mg MEDICATION NAME: Calcium D- Glucarate      UNABLE TO FIND 1,200 mg 2 times daily MEDICATION NAME: [place]      UNABLE TO FIND 150 mg daily MEDICATION NAME: Silymarin      UNABLE TO FIND 2 times daily MEDICATION NAME: MCHC for 1000mg calcium  4 pills per day      UNABLE TO FIND Take 1 tablet by mouth every morning MEDICATION NAME: Dinndolylmethane Complex (DIM) 100mg tablet 1 table daily        Physical Exam:  /70 (BP Location: Right arm, Patient Position: Sitting, Cuff Size: Adult Small)   Pulse 74   Temp 97.5  F (36.4  C) (Oral)   Resp 16   Wt 56.3 kg (124 lb 1.6 oz)   LMP  (LMP Unknown)   SpO2 96%   BMI 21.98 kg/m    LMP  (LMP Unknown)    GENERAL APPEARANCE: Well appearing adult female in NAD.  Alert and oriented x 3.    HEENT: NC/AT, sclera anicteric.  MMM.    LYMPHATICS: No palpable cervical, supraclavicular, or axillary lymphadenopathy  RESP: Lungs are CTA bilaterally and without wheezes or crackles.  CARDIOVASCULAR:  RRR.  No audible m/r/g.  BREAST:  Bilateral breasts are of normal fibroglandular density.  At 12:30, 8 cm from the nipple of the left breast in the area of previously palpable 1.5 cm flat density, there is no palpable mass on today's exam.  Bilateral nipples are everted and without discharge.  ABDOMEN:  soft, nondistended  MUSCULOSKELETAL: Full ROM of the  bilateral upper extremities.  SKIN: no suspicious lesions or rashes  PSYCHIATRIC: Normal mood and affect.    Laboratory/Imaging Studies  I personally reviewed the below labs and imaging studies:    6/8/2023 PET/CT:                                                           IMPRESSION:      1. Findings are suspicious for peritoneal metastasis, with new  hypermetabolic peritoneal nodule in the right lower quadrant and  ill-defined FDG uptake in the right perihepatic space/along the  hepatic capsule. Unchanged moderate volume ascites.     2. Interval reduction in FDG uptake and size of soft tissue lesion  along the vaginal cuff, mild residual uptake at this site could  represent inflammation versus small volume viable tumor.     3. New curvilinear FDG uptake spanning the vagina, right mesorectal  fascia and presacral space is not characterized on the fusion CT due  to limited resolution; this may represent a fistulous tract. Consider  further evaluation with MRI pelvis.      4. Two foci of mild FDG uptake in the left interpectoral region  adjacent to the biopsy clip and anterior to the left subclavian vein  are indeterminate for residual primary breast malignancy versus  inflammation, unchanged compared to 12/5/2022 PET.     5. Mildly enlarged left supraclavicular nodes, with uptake similar to  blood pool, are probably reactive. Attention on follow-up.       6. Interval resolution of diffuse hypermetabolic FDG uptake in the  thyroid gland, stomach and cardiac muscles.    8/30/2023 Labs:  Electrolytes, kidney, and liver function tests are wnl.  WBC is low at 3.5  Hemoglobin and platelets are wnl.    9/13/2023 US abdomen:  Impression:   1. Normal sonographic Doppler evaluation of the hepatic vasculature.  2. Trace intraperitoneal ascites.  3. Likely benign 3 mm gallbladder polyp. No cholelithiasis or evidence  for acute cholecystitis.  4. Mild cortical renal atrophy.    ASSESSMENT/PLAN:  Ms. Donaldson is a 74 y/o woman with  a history of left breast cancer (s/p lumpectomy, radiation in 2003 followed by 5 years of tamoxifen w/ Dr. Mtz) and cervical SCC (10/2012, s/p FRANCIA, upper vaginectomy) with pelvic mass and a second ER positive, NM negative, HER2 negative left breast cancer.     1. Left breast cancer.  - PET/CT in 06/2023 with persistent mild FDG uptake in left interpectoral area.  Unclear whether this represents residual malignancy vs inflammation.  She declines further imaging follow up.  - We have discussed that without surgery/radiation treatment of breast cancer, the breast cancer is not cured.  I have recommended treatment with anastrozole.  She has declined this treatment wanting to focus on quality of life, which has been much improved in recent months.  -   At time of progression of breast cancer, she will consider starting endocrine therapy at that time.  - Return to clinic in 4 months.      2. Recurrent cervical versus vaginal cancer  - She follows with Dr. Raygoza of [facility]   - s/p 6 cycles neodjuvant Cisplatin 50 mg/m2 + paclitaxel 175 mg/m2 + bevacizumab 15 mg/kg + pembrolizumab every 21 days 7/11 - 11/21/2022.  Pembrolizumab added C3 and beyond; Cisplatin changed to carboplatin for cycles 5 and 6 due to tinnitus.    - s/p radiation to the pelvis and para-aortic lymph nodes as well as interstitial brachytherapy to the vaginal cuff.  - PET/CT 6/8 w/ hypermetabolic nodule peritoneum RLQ and in the right perihepatic space.  Discussion was had with the patient and given the indeterminate findings, patient's poor tolerance of treatment, and her preference, the plan is to monitor with surveillance.  She will have regular clinic visits and plan is to image only if symptomatic.  - I reviewed Dr. Raygoza's 10/3 gyn/onc clinic note, there is no change to the above plan.    3.  Neuropathy:  Not discussed at today's visit.  Chemotherapy induced.  Grade 1.  No change since last visit.    4.  Autoimmune hypothyroidism:  Secondary to  pembrolizumab.  She is on levothyroxine 100 mcg PO daily.  - Last TSH on 8/30/2023 was wnl.     5.  Follow Up:  Return to clinic in 4 months.

## 2023-10-29 NOTE — PROGRESS NOTES
"October 29, 2023    [facility]     Cordell Donaldson is a 73 year old delightful woman with persistent atrial fibrillation. She has a history of breast cancer treated with lumpectomy and radiation (2003).  For the persistent atrial fibrillation she takes metoprolol for rate control and has declined cardioversion and anticoagulation.     She was diagnosed with cervical cancer in June 2022 and invasive ductal carcinoma of the left breast in July 2022. The cervical cancer treatment was prioritized and she received immunotherapy (Keytruda), carboplatin (was cisplatin), Avastin and paclitaxel with good response. She developed tachycardia and hypertension as a result of Avastin. On recent surveillance PET scan there was concern for possible immunotherapy associated myocarditis. Cardiac biomarkers were normal and a follow up cardiac MRI with stress was without ischemia or evidence of myocarditis.       Chemotherapy was stopped and she received radiation therapy and brachytherapy. Her last visit was April 2023.     Since that time she has been doing well. Her ascites has resolved. She is no longer on chemotherapy or immunotherapy. Feeling much better. Biked this summer and hoping to take up swimming. No cardiopulmonary symptoms.     PAST MEDICAL HISTORY:    History of breast cancer 2003   lumpectomy and radiation offerred chemo and patient declined at time but had oncogene test and low risk     FAMILY HISTORY:  No premature CAD    SOCIAL HISTORY:  Former smoker       CURRENT MEDICATIONS:    Aspirin 81 mg daily  Levothyroxine 112 mcg daily  Metoprolol succinate 50 mg daily      EXAM:  /75 (BP Location: Right arm, Patient Position: Sitting, Cuff Size: Adult Small)   Pulse 77   Ht 1.6 m (5' 2.99\")   Wt 56.3 kg (124 lb 1.6 oz)   LMP  (LMP Unknown)   SpO2 99%   BMI 21.99 kg/m    In general, the patient is in no apparent distress.        Neck: No JVD. No thyromegaly  Heart: " regular with normal S1, S2. No murmur. No audible rub or gallop.    Lungs: Clear bilaterally.  No rhonchi, wheezes, rales.   Extremities: No edema.  The pulses are 2+at the radial bilaterally.      I have independently reviewed and interpreted the following data:  Lipids: Total cholesterol 202, HDL 41, , triglycerides 86 (2022)    Electrolytes: normal   Hemogram: 12.2 g per DL  TSH: normal    Cardiac Studies:    Stress cardiac MRI 12/8/22  The patient's rhythm is atrial fibrillation.  Normal biventricular function, LVEF 68% and RVEF 52%.   No evidence of ischemia.   No evidence of myocardial edema or fibrosis to indicate immune checkpoint inhibitor myocarditis.     Assessment and Plan:   Cordell Donaldson is a 73 year old with the following past medical history:    Persistent atrial fibrillation  Normal biventricular function   Dyslipidemia  Cervical cancer July 2022, post chemoradiation and radiation  Invasive ductal carcinoma L breast July 2022  History of breast cancer 2003, lumpectomy, radiation followed by 5 years of tamoxife   Autoimmune hypothyroidism     Overall doing well with no cardiovascular symptoms. Remains active, biking this summer without limitations. She has persistent a-fib, HR is well controlled on metoprolol. Not interested in anticoagulation at this time. No change in medications today.    Majority of today's visit was spent addressing prescription drug management.    Follow up in 1yr.    The patient was seen and discussed with Dr. Tomy Marina MD  Cardiology Fellow  [phone]  ATTENDING ATTESTATION:  This patient has been seen and examined by me October 30, 2023 with Dr. Marina, cardiology fellow. I have reviewed the vitals, laboratory and imaging data relevant to this patient's care. I have edited this note to reflect our joint assessment and plan, and discussed the plan with the patient.    Elise Huitron MD, MS  Professor of Medicine  Cardiovascular Medicine

## 2023-10-30 ENCOUNTER — ONCOLOGY VISIT (OUTPATIENT)
Dept: ONCOLOGY | Facility: CLINIC | Age: 73
End: 2023-10-30
Attending: INTERNAL MEDICINE
Payer: COMMERCIAL

## 2023-10-30 ENCOUNTER — OFFICE VISIT (OUTPATIENT)
Dept: CARDIOLOGY | Facility: CLINIC | Age: 73
End: 2023-10-30
Attending: INTERNAL MEDICINE
Payer: COMMERCIAL

## 2023-10-30 VITALS
BODY MASS INDEX: 21.98 KG/M2 | HEART RATE: 74 BPM | DIASTOLIC BLOOD PRESSURE: 70 MMHG | TEMPERATURE: 97.5 F | RESPIRATION RATE: 16 BRPM | OXYGEN SATURATION: 96 % | SYSTOLIC BLOOD PRESSURE: 110 MMHG | WEIGHT: 124.1 LBS

## 2023-10-30 VITALS
HEART RATE: 77 BPM | OXYGEN SATURATION: 99 % | BODY MASS INDEX: 21.99 KG/M2 | SYSTOLIC BLOOD PRESSURE: 113 MMHG | HEIGHT: 63 IN | WEIGHT: 124.1 LBS | DIASTOLIC BLOOD PRESSURE: 75 MMHG

## 2023-10-30 DIAGNOSIS — C50.412 MALIGNANT NEOPLASM OF UPPER-OUTER QUADRANT OF LEFT BREAST IN FEMALE, ESTROGEN RECEPTOR POSITIVE (H): Primary | ICD-10-CM

## 2023-10-30 DIAGNOSIS — C53.0 MALIGNANT NEOPLASM OF ENDOCERVIX (H): ICD-10-CM

## 2023-10-30 DIAGNOSIS — Z17.0 MALIGNANT NEOPLASM OF UPPER-OUTER QUADRANT OF LEFT BREAST IN FEMALE, ESTROGEN RECEPTOR POSITIVE (H): Primary | ICD-10-CM

## 2023-10-30 DIAGNOSIS — Z85.3 PERSONAL HISTORY OF MALIGNANT NEOPLASM OF BREAST: ICD-10-CM

## 2023-10-30 DIAGNOSIS — I48.20 CHRONIC ATRIAL FIBRILLATION (H): Primary | ICD-10-CM

## 2023-10-30 DIAGNOSIS — E03.2 HYPOTHYROIDISM DUE TO MEDICATION: ICD-10-CM

## 2023-10-30 DIAGNOSIS — C52 VAGINAL CANCER (H): ICD-10-CM

## 2023-10-30 DIAGNOSIS — D72.810 LYMPHOPENIA: ICD-10-CM

## 2023-10-30 PROCEDURE — G0463 HOSPITAL OUTPT CLINIC VISIT: HCPCS | Performed by: INTERNAL MEDICINE

## 2023-10-30 PROCEDURE — 99214 OFFICE O/P EST MOD 30 MIN: CPT | Performed by: INTERNAL MEDICINE

## 2023-10-30 PROCEDURE — G0463 HOSPITAL OUTPT CLINIC VISIT: HCPCS | Mod: 27 | Performed by: INTERNAL MEDICINE

## 2023-10-30 PROCEDURE — 99214 OFFICE O/P EST MOD 30 MIN: CPT | Mod: GC | Performed by: INTERNAL MEDICINE

## 2023-10-30 ASSESSMENT — PAIN SCALES - GENERAL
PAINLEVEL: NO PAIN (0)
PAINLEVEL: NO PAIN (0)

## 2023-10-30 NOTE — NURSING NOTE
"Oncology Rooming Note    October 30, 2023 12:43 PM   Cordell Donaldson is a 73 year old female who presents for:    Chief Complaint   Patient presents with    Oncology Clinic Visit     Breast CA     Initial Vitals: /70 (BP Location: Right arm, Patient Position: Sitting, Cuff Size: Adult Small)   Pulse 74   Temp 97.5  F (36.4  C) (Oral)   Resp 16   Wt 56.3 kg (124 lb 1.6 oz)   LMP  (LMP Unknown)   SpO2 96%   BMI 21.98 kg/m   Estimated body mass index is 21.98 kg/m  as calculated from the following:    Height as of 8/30/23: 1.6 m (5' 3\").    Weight as of this encounter: 56.3 kg (124 lb 1.6 oz). Body surface area is 1.58 meters squared.  No Pain (0) Comment: Data Unavailable   No LMP recorded (lmp unknown). Patient has had a hysterectomy.  Allergies reviewed: Yes  Medications reviewed: Yes    Medications: Medication refills not needed today.  Pharmacy name entered into ugichem:    [id number] - [place], [place] - [facility]  [id number] - [place], [place] - [facility] [place], [place] - [address] [place] [address] [phone]    Clinical concerns: Patient states there are no new concerns to discuss with provider.       Cj Holden              "

## 2023-10-30 NOTE — LETTER
10/30/2023      RE: Cordell Donaldson  [address]  [facility] [address]       Dear Colleague,    Thank you for the opportunity to participate in the care of your patient, Cordell Donaldson, at the [facility] [place] [facility]. Please see a copy of my visit note below.    October 29, 2023    [facility]     Cordell Donaldson is a 73 year old delightful woman with persistent atrial fibrillation. She has a history of breast cancer treated with lumpectomy and radiation (2003).  For the persistent atrial fibrillation she takes metoprolol for rate control and has declined cardioversion and anticoagulation.     She was diagnosed with cervical cancer in June 2022 and invasive ductal carcinoma of the left breast in July 2022. The cervical cancer treatment was prioritized and she received immunotherapy (Keytruda), carboplatin (was cisplatin), Avastin and paclitaxel with good response. She developed tachycardia and hypertension as a result of Avastin. On recent surveillance PET scan there was concern for possible immunotherapy associated myocarditis. Cardiac biomarkers were normal and a follow up cardiac MRI with stress was without ischemia or evidence of myocarditis.       Chemotherapy was stopped and she received radiation therapy and brachytherapy. Her last visit was April 2023.     Since that time she has been doing well. Her ascites has resolved. She is no longer on chemotherapy or immunotherapy. Feeling much better. Biked this summer and hoping to take up swimming. No cardiopulmonary symptoms.     PAST MEDICAL HISTORY:    History of breast cancer 2003   lumpectomy and radiation offerred chemo and patient declined at time but had oncogene test and low risk     FAMILY HISTORY:  No premature CAD    SOCIAL HISTORY:  Former smoker       CURRENT MEDICATIONS:    Aspirin 81 mg daily  Levothyroxine 112 mcg  "daily  Metoprolol succinate 50 mg daily      EXAM:  /75 (BP Location: Right arm, Patient Position: Sitting, Cuff Size: Adult Small)   Pulse 77   Ht 1.6 m (5' 2.99\")   Wt 56.3 kg (124 lb 1.6 oz)   LMP  (LMP Unknown)   SpO2 99%   BMI 21.99 kg/m    In general, the patient is in no apparent distress.        Neck: No JVD. No thyromegaly  Heart: regular with normal S1, S2. No murmur. No audible rub or gallop.    Lungs: Clear bilaterally.  No rhonchi, wheezes, rales.   Extremities: No edema.  The pulses are 2+at the radial bilaterally.      I have independently reviewed and interpreted the following data:  Lipids: Total cholesterol 202, HDL 41, , triglycerides 86 (2022)    Electrolytes: normal   Hemogram: 12.2 g per DL  TSH: normal    Cardiac Studies:    Stress cardiac MRI 12/8/22  The patient's rhythm is atrial fibrillation.  Normal biventricular function, LVEF 68% and RVEF 52%.   No evidence of ischemia.   No evidence of myocardial edema or fibrosis to indicate immune checkpoint inhibitor myocarditis.     Assessment and Plan:   Cordell Donaldson is a 73 year old with the following past medical history:    Persistent atrial fibrillation  Normal biventricular function   Dyslipidemia  Cervical cancer July 2022, post chemoradiation and radiation  Invasive ductal carcinoma L breast July 2022  History of breast cancer 2003, lumpectomy, radiation followed by 5 years of tamoxife   Autoimmune hypothyroidism     Overall doing well with no cardiovascular symptoms. Remains active, biking this summer without limitations. She has persistent a-fib, HR is well controlled on metoprolol. Not interested in anticoagulation at this time. No change in medications today.    Majority of today's visit was spent addressing prescription drug management.    Follow up in 1yr.    The patient was seen and discussed with Dr. Tomy Marina MD  Cardiology Fellow  [phone]  ATTENDING ATTESTATION:  This patient has been " seen and examined by me October 30, 2023 with Dr. Marina, cardiology fellow. I have reviewed the vitals, laboratory and imaging data relevant to this patient's care. I have edited this note to reflect our joint assessment and plan, and discussed the plan with the patient.    Elise Huitron MD, MS  Professor of Medicine  Cardiovascular Medicine

## 2023-10-30 NOTE — LETTER
"    10/30/2023         RE: Cordell Donaldson  [address]  [facility] [address]        Dear Colleague,    Thank you for referring your patient, Cordell Donaldson, to the [facility]. Please see a copy of my visit note below.    Oncology Follow Up:  Date on this visit: 10/30/2023    Diagnosis: Left breast invasive ductal carcinoma.    Primary Physician: Merry Lino     History Of Present Illness:  Ms. Donaldson is a 73 year old female with cervical and breast cancer.    She has a history of left breast cancer treated with lumpectomy and radiation therapy in 2003 (followed w/ Dr. Mtz at [facility]). Oncotype DX recurrence score was 10.  She took 5 years of tamoxifen. She had an incidental left breast lesion found on imaging for a pelvic mass in 06/2022. Ultrasound showed a 1.3 cm mass at 12:30, 8 cm from the nipple of the left breast.  Biopsy was c/w a grade 2 invasive ductal carcinoma, ER strong in 100%, DE negative, and HER2 negative.  Given concurrent recurrent pelvic mass with plan for cytotoxic chemotherapy, plan was made to monitor and initiate endocrine therapy when appropriate.  She completed 6 cycles of chemotherapy followed by radiation to her pelvic mass.  She has subsequently declined to start endocrine therapy.    In regards to her cervical cancer history, she was initially determined to have squamous carcinoma in 10/2012.  She is s/p FRANCIA and upper vaginectomy in 12/2012 with no residual invasive cancer, but residual HSIL.  Biopsy on 7/22/2013 was c/w HSIL, cannot exclude invasion.  In 05/2022 she developed symptoms of pelvic heaviness (felt like she \"bruised her tailbone\") and progressive pain. She felt like her urination and bowel movements were constricted, needing to put in a lot of effort to urinate or have BM. Pelvic MRI showed a large midline pelvic mass measuring up to 8.5 cm and abutting the bladder and the rectum without definite invasion or left pelvic " LAD.  These findings were confirmed on PET/CT. She was started on chemo with cisplatin, paclitaxel, and bevacizumab (7/11/22).  Caris testing showed PD-L1 CPS score of 10, MMR deficient, MSI high disease with a TMB of 53 mut/Mb. Pembrolizumab was added with C3.  Cisplatin was changed to carboplatin starting with C5 due to tinnitus.  After a total of 6 cycles imaging demonstrated good response.    She is s/p radiation to the pelvis and para-aortic lymph nodes and interstitial brachytherapy to the vaginal cuff (12/28/2022 - 2/15/2023).  She has declined to start anastrozole due to concern regarding side effects.    Interval History:  Ms. Donaldson comes into clinic today for routine breast cancer follow-up.  Her health has been remarkably good over the last couple of months.  She states energy level is improving since completion of treatment of her squamous cell carcinoma.  In fact, she is obtaining routine daily cardiovascular exercise including walking, biking, and swimming.  She has no difficulty with exercise.  She has remained very active at home, recently raking leaves.  She has been able to gain some of the weight that she previously lost from her cancer treatment.  She states mood has been good.  She denies concerning breast lumps, pain, or swelling.  She has had no skin changes or nipple discharge from the breast.  She denies cough, shortness of breath, or chest pain.  She has no abdominal complaints.  She has no new bone or joint aches or pains.    Past Medical/Surgical History:  Past Medical History:   Diagnosis Date    Abnormal Pap smear     maybe 20 years ago    Cervical cancer (H)     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     History of breast cancer 2003    lumpectomy and radiation offerred chemo and patient declined at time but had oncogene test and low risk    Hyperlipidemia LDL goal < 160     Osteopenia     Paroxysmal A-fib (H)     Severe vaginal dysplasia       Past Surgical History:   Procedure Laterality Date    BIOPSY      BREAST SURGERY Left 2003    lumpectomy left, did radiation, 5 yr of tamoxifen    COLONOSCOPY  2018    repeat in 2028    Colposcopy Cervix with Loop Electrode Conization and Lser to Vagina  2008    COLPOSCOPY, BIOPSY, COMBINED  10/24/2012    Procedure: COMBINED COLPOSCOPY, BIOPSY;  Colposcopy, Biopsy of Cervix and Vagina, Ultrasound Guidance;  Surgeon: Colette Ng MD;  Location: [facility] OR    ENT SURGERY  01/23/2018    otoscelerosis, right side    HYSTERECTOMY, FRANCIA  12/2012    high grade cervical dysplasia, [place] [facility], Dr. Arciniega    INSERT INTERSTITIAL NEEDLE, ULTRASOUND GUIDED N/A 2/13/2023    Procedure: INSERTION, NEEDLE, WITH ULTRASOUND GUIDANCE, FOR INTERSTITIAL BRACHYTHERAPY;  Surgeon: Raymond Stockton MD;  Location: [facility] OR    INSERT PORT VASCULAR ACCESS Right 7/18/2022    Procedure: INSERTION, VASCULAR ACCESS PORT;  Surgeon: Donnie Elias MD;  Location: [facility] [place] OR    IR CHEST PORT PLACEMENT > 5 YRS OF AGE  7/18/2022    IR PORT REMOVAL RIGHT  3/30/2023    [place] LAP VAGINECTOMY, PARTIAL REMOVAL OF VAGINAL WALL  10/2013    upper vaginectomy for dysplasia, Dr. Megan Arciniega    REMOVE INTERSTITIAL NEEDLE N/A 2/15/2023    Procedure: REMOVAL, INTERSTITIAL NEEDLE, AFTER TEMPORARY BRACHYTHERAPY;  Surgeon: Raymond Stockton MD;  Location: [facility] OR    REMOVE PORT VASCULAR ACCESS Right 3/30/2023    Procedure: Remove port vascular access right;  Surgeon: Donnie Elias MD;  Location: [facility] [place] OR     Allergies:  Allergies as of 10/30/2023    (No Known Allergies)     Current Medications:  Current Outpatient Medications   Medication Sig Dispense Refill    Acetylcarnitine HCl 250 MG CAPS Take 500 mg by mouth daily      aspirin (ASA) 81 MG chewable tablet Take 81 mg by mouth every evening      Bacillus Coagulans-Inulin (PROBIOTIC FORMULA) 1-250 BILLION-MG CAPS Take by mouth every morning 25+billion CFUS      BENFOTIAMINE PO Take 150 mg by mouth every morning       Cholecalciferol (VITAMIN D-3) 25 MCG (1000 UT) CAPS Take 1,000 Units by mouth every morning 90 capsule 3    coenzyme Q-10 capsule Take 1 capsule by mouth daily      levothyroxine (SYNTHROID/LEVOTHROID) 112 MCG tablet Take 1 tablet (112 mcg) by mouth daily before breakfast 90 tablet 3    MAGNESIUM OXIDE PO Take 400 mg by mouth daily      metoprolol succinate ER (TOPROL XL) 50 MG 24 hr tablet Take 1 tablet (50 mg) by mouth daily 90 tablet 2    Omega-3 Fatty Acids (OMEGA-3 FISH OIL PO) Take 630 mg by mouth 2 times daily      OVER-THE-COUNTER Turkey tail mushroom powder, use 3 times a day 1 Can 11    Pectin Cit-Inos-C-Bioflav-Soy (MODIFIED CITRUS PECTIN PO) Take by mouth 2 times daily      Taurine 1000 MG CAPS Take 1,000 mg by mouth every morning 60 capsule 11    UNABLE TO FIND 1,000 mg MEDICATION NAME: Shark Liver Oil      UNABLE TO FIND 500 mg MEDICATION NAME: Turmeric      UNABLE TO FIND 500 mg MEDICATION NAME: Calcium D- Glucarate      UNABLE TO FIND 1,200 mg 2 times daily MEDICATION NAME: [place]      UNABLE TO FIND 150 mg daily MEDICATION NAME: Silymarin      UNABLE TO FIND 2 times daily MEDICATION NAME: [facility] for 1000mg calcium  4 pills per day      UNABLE TO FIND Take 1 tablet by mouth every morning MEDICATION NAME: Dinndolylmethane Complex (DIM) 100mg tablet 1 table daily        Physical Exam:  /70 (BP Location: Right arm, Patient Position: Sitting, Cuff Size: Adult Small)   Pulse 74   Temp 97.5  F (36.4  C) (Oral)   Resp 16   Wt 56.3 kg (124 lb 1.6 oz)   LMP  (LMP Unknown)   SpO2 96%   BMI 21.98 kg/m    LMP  (LMP Unknown)    GENERAL APPEARANCE: Well appearing adult female in NAD.  Alert and oriented x 3.    HEENT: NC/AT, sclera anicteric.  MMM.    LYMPHATICS: No palpable cervical, supraclavicular, or axillary lymphadenopathy  RESP: Lungs are CTA bilaterally and without wheezes or crackles.  CARDIOVASCULAR:  RRR.  No audible m/r/g.  BREAST:  Bilateral breasts are of normal fibroglandular density.   At 12:30, 8 cm from the nipple of the left breast in the area of previously palpable 1.5 cm flat density, there is no palpable mass on today's exam.  Bilateral nipples are everted and without discharge.  ABDOMEN:  soft, nondistended  MUSCULOSKELETAL: Full ROM of the bilateral upper extremities.  SKIN: no suspicious lesions or rashes  PSYCHIATRIC: Normal mood and affect.    Laboratory/Imaging Studies  I personally reviewed the below labs and imaging studies:    6/8/2023 PET/CT:                                                           IMPRESSION:      1. Findings are suspicious for peritoneal metastasis, with new  hypermetabolic peritoneal nodule in the right lower quadrant and  ill-defined FDG uptake in the right perihepatic space/along the  hepatic capsule. Unchanged moderate volume ascites.     2. Interval reduction in FDG uptake and size of soft tissue lesion  along the vaginal cuff, mild residual uptake at this site could  represent inflammation versus small volume viable tumor.     3. New curvilinear FDG uptake spanning the vagina, right mesorectal  fascia and presacral space is not characterized on the fusion CT due  to limited resolution; this may represent a fistulous tract. Consider  further evaluation with MRI pelvis.      4. Two foci of mild FDG uptake in the left interpectoral region  adjacent to the biopsy clip and anterior to the left subclavian vein  are indeterminate for residual primary breast malignancy versus  inflammation, unchanged compared to 12/5/2022 PET.     5. Mildly enlarged left supraclavicular nodes, with uptake similar to  blood pool, are probably reactive. Attention on follow-up.       6. Interval resolution of diffuse hypermetabolic FDG uptake in the  thyroid gland, stomach and cardiac muscles.    8/30/2023 Labs:  Electrolytes, kidney, and liver function tests are wnl.  WBC is low at 3.5  Hemoglobin and platelets are wnl.    9/13/2023 US abdomen:  Impression:   1. Normal sonographic  Doppler evaluation of the hepatic vasculature.  2. Trace intraperitoneal ascites.  3. Likely benign 3 mm gallbladder polyp. No cholelithiasis or evidence  for acute cholecystitis.  4. Mild cortical renal atrophy.    ASSESSMENT/PLAN:  Ms. Donaldson is a 74 y/o woman with a history of left breast cancer (s/p lumpectomy, radiation in 2003 followed by 5 years of tamoxifen w/ Dr. Mtz) and cervical SCC (10/2012, s/p FRANCIA, upper vaginectomy) with pelvic mass and a second ER positive, TX negative, HER2 negative left breast cancer.     1. Left breast cancer.  - PET/CT in 06/2023 with persistent mild FDG uptake in left interpectoral area.  Unclear whether this represents residual malignancy vs inflammation.  She declines further imaging follow up.  - We have discussed that without surgery/radiation treatment of breast cancer, the breast cancer is not cured.  I have recommended treatment with anastrozole.  She has declined this treatment wanting to focus on quality of life, which has been much improved in recent months.  -   At time of progression of breast cancer, she will consider starting endocrine therapy at that time.  - Return to clinic in 4 months.      2. Recurrent cervical versus vaginal cancer  - She follows with Dr. Raygoza of GynOnc   - s/p 6 cycles neodjuvant Cisplatin 50 mg/m2 + paclitaxel 175 mg/m2 + bevacizumab 15 mg/kg + pembrolizumab every 21 days 7/11 - 11/21/2022.  Pembrolizumab added C3 and beyond; Cisplatin changed to carboplatin for cycles 5 and 6 due to tinnitus.    - s/p radiation to the pelvis and para-aortic lymph nodes as well as interstitial brachytherapy to the vaginal cuff.  - PET/CT 6/8 w/ hypermetabolic nodule peritoneum RLQ and in the right perihepatic space.  Discussion was had with the patient and given the indeterminate findings, patient's poor tolerance of treatment, and her preference, the plan is to monitor with surveillance.  She will have regular clinic visits and plan is to image only if  symptomatic.  - I reviewed Dr. Raygoza's 10/3 gyn/onc clinic note, there is no change to the above plan.    3.  Neuropathy:  Not discussed at today's visit.  Chemotherapy induced.  Grade 1.  No change since last visit.    4.  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  She is on levothyroxine 100 mcg PO daily.  - Last TSH on 8/30/2023 was wnl.     5.  Follow Up:  Return to clinic in 4 months.    Sincerely,        Angela Montana MD

## 2023-10-30 NOTE — NURSING NOTE
Chief Complaint   Patient presents with    Follow Up     Dr. Huitron follow-up       Vitals were taken, medications reviewed.    Joan Packer, EMT   3:57 PM

## 2023-10-31 NOTE — PATIENT INSTRUCTIONS
You were seen in the Cardiology Clinic today by: Dr. Huitron    Plan:   Medication Changes:   None.     Follow up Visit:  With Dr. Huitron in 1 year or sooner if needed.     Further Instructions:  Follow the American Heart Association Diet and Lifestyle recommendations: Limit saturated fat, trans fat, sodium, red meat, sweets and sugar-sweetened beverages. If you choose to eat red meat, compare labels and select the leanest cuts available. Aim for at least 150 minutes of moderate physical activity or 75 minutes of vigorous physical activity - or an equal combination of both - each week.    If you have further questions, please utilize [facility] to contact us.     Your Care Team:    Cardiology   Telephone Number     Nurse Line  Pratik Iniguez RN    [phone]     For scheduling appointments:  [phone]           On-call cardiologist for after hours or on weekends:   [phone], option #4, and ask to speak to the on-call cardiologist.     Cardiovascular Clinic:   [address] [place] [address]. [place] [address]      As always, Thank you for trusting us with your health care needs!

## 2024-01-08 ENCOUNTER — ONCOLOGY VISIT (OUTPATIENT)
Dept: ONCOLOGY | Facility: CLINIC | Age: 74
End: 2024-01-08
Attending: OBSTETRICS & GYNECOLOGY
Payer: COMMERCIAL

## 2024-01-08 VITALS
SYSTOLIC BLOOD PRESSURE: 116 MMHG | RESPIRATION RATE: 16 BRPM | WEIGHT: 129.1 LBS | DIASTOLIC BLOOD PRESSURE: 80 MMHG | BODY MASS INDEX: 22.87 KG/M2 | TEMPERATURE: 97.6 F | HEART RATE: 80 BPM | OXYGEN SATURATION: 100 %

## 2024-01-08 DIAGNOSIS — C53.0 MALIGNANT NEOPLASM OF ENDOCERVIX (H): Primary | ICD-10-CM

## 2024-01-08 PROCEDURE — 99213 OFFICE O/P EST LOW 20 MIN: CPT | Performed by: NURSE PRACTITIONER

## 2024-01-08 PROCEDURE — G0463 HOSPITAL OUTPT CLINIC VISIT: HCPCS | Performed by: NURSE PRACTITIONER

## 2024-01-08 ASSESSMENT — PAIN SCALES - GENERAL: PAINLEVEL: NO PAIN (0)

## 2024-01-08 NOTE — PROGRESS NOTES
"            Follow Up Notes on Referred Patient    Date: 2024       Hina Raygoza MD  [address] [place] [address]       RE: Cordell Donaldson  : 1950  MCKINLEY: 2024    Dear Hina Raygoza:    Cordell Donaldson is a 73 year old woman with a History of Stage IA1 squamous cell carcinoma of the cervix. 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 She completed EBRT and brachytherapy 2023. She is here for a surveillance visit.     Oncology history:     See MD note from 10/3/23 for full history    10/3/23: per MD note, \"No cytology tests given high false-positive rate in the setting of radiation therapy\"      Today she comes to clinic feeling generally well. She reports the following:           Bowels: had diarrhea during radiation; now good  Hydration: doing well  Chest pain, SOB, Leg swelling, Fevers/chills, Abdominal pain/bloating: denies  HA/vision changes: denies  Vaginal spotting/bleeding: denies; after dilator will sometimes see some spotting afterwards. Has been using dilator every 2 months for about 20 minutes. She denies any spotting when she has not used her dilator. She used her dilator about once a week right after treatment  Colonoscopy ; due 28  Annual exam       Review of Systems  See HPI      Past Medical History:    Past Medical History:   Diagnosis Date    Abnormal Pap smear     maybe 20 years ago    Cervical cancer (H)     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     History of breast cancer     lumpectomy and radiation offerred chemo and patient declined at time but had oncogene test and low risk    Hyperlipidemia LDL goal < 160     Osteopenia     Paroxysmal A-fib (H)     Severe vaginal dysplasia          Past Surgical History:    Past Surgical History:   Procedure Laterality Date    BIOPSY      BREAST SURGERY Left 2003    lumpectomy left, did radiation, 5 yr of " tamoxifen    COLONOSCOPY  2018    repeat in 2028    Colposcopy Cervix with Loop Electrode Conization and Lser to Vagina  2008    COLPOSCOPY, BIOPSY, COMBINED  10/24/2012    Procedure: COMBINED COLPOSCOPY, BIOPSY;  Colposcopy, Biopsy of Cervix and Vagina, Ultrasound Guidance;  Surgeon: Colette Ng MD;  Location: [facility] OR    ENT SURGERY  01/23/2018    otoscelerosis, right side    HYSTERECTOMY, FRANCIA  12/2012    high grade cervical dysplasia, [place] [facility], Dr. Arciniega    INSERT INTERSTITIAL NEEDLE, ULTRASOUND GUIDED N/A 2/13/2023    Procedure: INSERTION, NEEDLE, WITH ULTRASOUND GUIDANCE, FOR INTERSTITIAL BRACHYTHERAPY;  Surgeon: Raymond Stockton MD;  Location: [facility] OR    INSERT PORT VASCULAR ACCESS Right 7/18/2022    Procedure: INSERTION, VASCULAR ACCESS PORT;  Surgeon: Donnie Elias MD;  Location: [facility] OR    IR CHEST PORT PLACEMENT > 5 YRS OF AGE  7/18/2022    IR PORT REMOVAL RIGHT  3/30/2023    [place] LAP VAGINECTOMY, PARTIAL REMOVAL OF VAGINAL WALL  10/2013    upper vaginectomy for dysplasia, Dr. Megan Arciniega    REMOVE INTERSTITIAL NEEDLE N/A 2/15/2023    Procedure: REMOVAL, INTERSTITIAL NEEDLE, AFTER TEMPORARY BRACHYTHERAPY;  Surgeon: Raymond Stockton MD;  Location: [facility] OR    REMOVE PORT VASCULAR ACCESS Right 3/30/2023    Procedure: Remove port vascular access right;  Surgeon: Donnie Elias MD;  Location: [facility] OR       Current Medications:     Current Outpatient Medications   Medication Sig Dispense Refill    Acetylcarnitine HCl 250 MG CAPS Take 500 mg by mouth daily      aspirin (ASA) 81 MG chewable tablet Take 81 mg by mouth every evening      Bacillus Coagulans-Inulin (PROBIOTIC FORMULA) 1-250 BILLION-MG CAPS Take by mouth every morning 25+billion CFUS      BENFOTIAMINE PO Take 150 mg by mouth every morning      Cholecalciferol (VITAMIN D-3) 25 MCG (1000 UT) CAPS Take 1,000 Units by mouth every morning 90 capsule 3    coenzyme Q-10 capsule Take 1 capsule by mouth daily      levothyroxine (SYNTHROID/LEVOTHROID) 112 MCG  tablet Take 1 tablet (112 mcg) by mouth daily before breakfast 90 tablet 3    MAGNESIUM OXIDE PO Take 400 mg by mouth daily      metoprolol succinate ER (TOPROL XL) 50 MG 24 hr tablet Take 1 tablet (50 mg) by mouth daily 90 tablet 2    Omega-3 Fatty Acids (OMEGA-3 FISH OIL PO) Take 630 mg by mouth 2 times daily      OVER-THE-COUNTER Turkey tail mushroom powder, use 3 times a day 1 Can 11    Pectin Cit-Inos-C-Bioflav-Soy (MODIFIED CITRUS PECTIN PO) Take by mouth 2 times daily      Taurine 1000 MG CAPS Take 1,000 mg by mouth every morning 60 capsule 11    UNABLE TO FIND 500 mg MEDICATION NAME: Turmeric      UNABLE TO FIND 500 mg MEDICATION NAME: Calcium D- Glucarate      UNABLE TO FIND 1,200 mg 2 times daily MEDICATION NAME: [place]      UNABLE TO FIND 150 mg daily MEDICATION NAME: Silymarin      UNABLE TO FIND 2 times daily MEDICATION NAME: [facility] for 1000mg calcium  4 pills per day      UNABLE TO FIND Take 1 tablet by mouth every morning MEDICATION NAME: Dinndolylmethane Complex (DIM) 100mg tablet 1 table daily           Allergies:      No Known Allergies     Social History:     Social History     Tobacco Use    Smoking status: Former     Packs/day: 0.50     Years: 15.00     Additional pack years: 0.00     Total pack years: 7.50     Types: Cigarettes     Quit date:      Years since quittin.0     Passive exposure: Past    Smokeless tobacco: Never   Substance Use Topics    Alcohol use: Not Currently     Alcohol/week: 2.0 standard drinks of alcohol     Types: 2 Standard drinks or equivalent per week     Comment: Socially        History   Drug Use No         Family History:       Family History   Problem Relation Age of Onset    Myocardial Infarction Mother 73        Tob    Myocardial Infarction Father 68        Tob    Breast Cancer Sister 47         of breast cancer age 63; BRCA mutation testing negative    Cancer Maternal Grandmother         Unsure of type.    Breast Cancer Paternal Grandmother          "Unsure of diagnosis--some type of \"women's issue\"    Anesthesia Reaction No family hx of     Deep Vein Thrombosis (DVT) No family hx of          Physical Exam:     /80 (BP Location: Right arm, Patient Position: Sitting, Cuff Size: Adult Regular)   Pulse 80   Temp 97.6  F (36.4  C) (Oral)   Resp 16   Wt 58.6 kg (129 lb 1.6 oz)   LMP  (LMP Unknown)   SpO2 100%   BMI 22.87 kg/m    Body mass index is 22.87 kg/m .    General Appearance: healthy and alert, no distress     HEENT: no thyromegaly, no palpable nodules or masses        Cardiovascular: regular rate and rhythm, no gallops, rubs or murmurs     Respiratory: lungs clear, no rales, rhonchi or wheezes, normal diaphragmatic excursion    Musculoskeletal: extremities non tender and without edema    Skin: no lesions or rashes     Neurological: normal gait, no gross defects     Psychiatric: appropriate mood and affect                               Hematological: normal cervical, supraclavicular and inguinal lymph nodes     Gastrointestinal:       abdomen soft, non-tender, non-distended, no organomegaly or masses    Genitourinary: External genitalia and urethral meatus appears slightly atrophic  Vagina is smooth without nodularity or masses; foreshortened, right lateral apex with slight webbing; scant bleeding at lateral edges with speculum opening; some resistance.  Cervix surgically absent.  Bimanual exam reveal no masses, nodularity or fullness.  Recto-vaginal exam confirms these findings.      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 She completed EBRT and brachytherapy 2/2023. She is here for a surveillance visit.     22 minutes spent on the date of the encounter doing chart review, history and exam, documentation and further activities as noted above      Plan:     1.)       Patient " to RTC in 3 months for her next surveillance visit. Reviewed the plan for her to see MD once a year and the remainder of her visits will be with NP. Reviewed MD recommendation against cytology. Discussed using her dilator once a month. Discussed using organic coconut oil to her vulva at bedtime to help provide a moisturizer. Reviewed signs and symptoms for when she should contact the clinic or seek additional care. Patient to contact the clinic with any questions or concerns in the interim.  Answered all of her questions to the best of my ability.     2.) Continue to follow with medical oncology for her breast cancer.  Genetic/Genomic/Molecular Testing History:  2006: Negative for germline BRCA1, BRCA2 pathogenic variants.      8/14/22 (specimen from 6/9/22): [facility].   -PD-L1+ (CPS 10)  -Mismatch repair deficient/microsatellite instability-high  -TMB high (53 mut/Mb)  -NTRK 1/2/3 fusion not detected.    3.) Labs and/or tests ordered include:  none.     4.) Health maintenance issues addressed today include annual health maintenance and non-gynecologic issues with PCP.    Hayley Macdonald, APRN, WHNP-BC, ANP-BC, AOCNP  Women's Health Nurse Practitioner  Adult Nurse Practitioner  Division of Gynecologic Oncology  .      CC  Patient Care Team:  Merry Lino MD as PCP - General (Internal Medicine)  Bess Paredes MD [place], Aaron Daniel, DPM as MD (Podiatry)  Elise Huitron MD as Assigned Heart and Vascular Provider  Merry Lino MD as Assigned PCP  Tess Iniguez RN as Specialty Care Coordinator  Heather Ayon MD as MD (Urology)  Angela Montana MD as MD (Hematology & Oncology)  Yvette Pike MD as MD (Surgery)  Valeri Kirby, GEOFF as Specialty Care Coordinator (Hematology & Oncology)  Nurys Horton RN as Specialty Care Coordinator (Hematology & Oncology)  Felicia Thompson PA-C as Physician Assistant (Anesthesiology)  Aleta Shea MD as  Assigned Neuroscience Provider  Yvette Pike MD as Assigned Surgical Provider  Raymond Stockton MD as Assigned Cancer Care Provider  Valeria Spencer MD as MD (Gastroenterology)  MACO TRAORE

## 2024-01-08 NOTE — NURSING NOTE
"Oncology Rooming Note    January 8, 2024 12:21 PM   Cordell Donaldson is a 73 year old female who presents for:    Chief Complaint   Patient presents with    Oncology Clinic Visit     Vaginal cancer     Initial Vitals: /80 (BP Location: Right arm, Patient Position: Sitting, Cuff Size: Adult Regular)   Pulse 80   Temp 97.6  F (36.4  C) (Oral)   Resp 16   Wt 58.6 kg (129 lb 1.6 oz)   LMP  (LMP Unknown)   SpO2 100%   BMI 22.87 kg/m   Estimated body mass index is 22.87 kg/m  as calculated from the following:    Height as of 10/30/23: 1.6 m (5' 2.99\").    Weight as of this encounter: 58.6 kg (129 lb 1.6 oz). Body surface area is 1.61 meters squared.  No Pain (0) Comment: Data Unavailable   No LMP recorded (lmp unknown). Patient has had a hysterectomy.  Allergies reviewed: Yes  Medications reviewed: Yes    Medications: Medication refills not needed today.  Pharmacy name entered into [employer]:    [id number] - [place], [place] - [facility]  [id number] - [place], [place] - [address] [place] [address] PHARMACY [place], [place] - [address] [facility] 5[address]999    Frailty Screening:   Is the patient here for a new oncology consult visit in cancer care? 2. No      Clinical concerns: none      Yelena Maddox                    "

## 2024-01-08 NOTE — LETTER
"    2024         RE: Cordell Donaldson  [address]  [facility] [address]        Dear Colleague,    Thank you for referring your patient, Cordell Donaldson, to the [facility]. Please see a copy of my visit note below.                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 She completed EBRT and brachytherapy 2023. She is here for a surveillance visit.     Oncology history:     See MD note from 10/3/23 for full history    10/3/23: per MD note, \"No cytology tests given high false-positive rate in the setting of radiation therapy\"      Today she comes to clinic feeling generally well. She reports the following:           Bowels: had diarrhea during radiation; now good  Hydration: doing well  Chest pain, SOB, Leg swelling, Fevers/chills, Abdominal pain/bloating: denies  HA/vision changes: denies  Vaginal spotting/bleeding: denies; after dilator will sometimes see some spotting afterwards. Has been using dilator every 2 months for about 20 minutes. She denies any spotting when she has not used her dilator. She used her dilator about once a week right after treatment  Colonoscopy ; due 28  Annual exam       Review of Systems  See HPI      Past Medical History:    Past Medical History:   Diagnosis Date    Abnormal Pap smear     maybe 20 years ago    Cervical cancer (H)     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     History of breast cancer     lumpectomy and radiation offerred chemo and patient declined at time but had oncogene test and low risk    " Hyperlipidemia LDL goal < 160     Osteopenia     Paroxysmal A-fib (H)     Severe vaginal dysplasia          Past Surgical History:    Past Surgical History:   Procedure Laterality Date    BIOPSY      BREAST SURGERY Left 2003    lumpectomy left, did radiation, 5 yr of tamoxifen    COLONOSCOPY  2018    repeat in 2028    Colposcopy Cervix with Loop Electrode Conization and Lser to Vagina  2008    COLPOSCOPY, BIOPSY, COMBINED  10/24/2012    Procedure: COMBINED COLPOSCOPY, BIOPSY;  Colposcopy, Biopsy of Cervix and Vagina, Ultrasound Guidance;  Surgeon: Colette Ng MD;  Location: [facility] OR    ENT SURGERY  01/23/2018    otoscelerosis, right side    HYSTERECTOMY, FRANCIA  12/2012    high grade cervical dysplasia, [place] [facility], Dr. Arciniega    INSERT INTERSTITIAL NEEDLE, ULTRASOUND GUIDED N/A 2/13/2023    Procedure: INSERTION, NEEDLE, WITH ULTRASOUND GUIDANCE, FOR INTERSTITIAL BRACHYTHERAPY;  Surgeon: Raymond Stockton MD;  Location: [facility] OR    INSERT PORT VASCULAR ACCESS Right 7/18/2022    Procedure: INSERTION, VASCULAR ACCESS PORT;  Surgeon: Donnie Elias MD;  Location: [facility] OR    IR CHEST PORT PLACEMENT > 5 YRS OF AGE  7/18/2022    IR PORT REMOVAL RIGHT  3/30/2023    MS LAP VAGINECTOMY, PARTIAL REMOVAL OF VAGINAL WALL  10/2013    upper vaginectomy for dysplasia, Dr. Megan Arciniega    REMOVE INTERSTITIAL NEEDLE N/A 2/15/2023    Procedure: REMOVAL, INTERSTITIAL NEEDLE, AFTER TEMPORARY BRACHYTHERAPY;  Surgeon: Raymond Stockton MD;  Location: [facility] OR    REMOVE PORT VASCULAR ACCESS Right 3/30/2023    Procedure: Remove port vascular access right;  Surgeon: Donnie Elias MD;  Location: [facility] OR       Current Medications:     Current Outpatient Medications   Medication Sig Dispense Refill    Acetylcarnitine HCl 250 MG CAPS Take 500 mg by mouth daily      aspirin (ASA) 81 MG chewable tablet Take 81 mg by mouth every evening      Bacillus Coagulans-Inulin (PROBIOTIC FORMULA) 1-250 BILLION-MG CAPS Take by mouth every morning 25+billion CFUS       BENFOTIAMINE PO Take 150 mg by mouth every morning      Cholecalciferol (VITAMIN D-3) 25 MCG (1000 UT) CAPS Take 1,000 Units by mouth every morning 90 capsule 3    coenzyme Q-10 capsule Take 1 capsule by mouth daily      levothyroxine (SYNTHROID/LEVOTHROID) 112 MCG tablet Take 1 tablet (112 mcg) by mouth daily before breakfast 90 tablet 3    MAGNESIUM OXIDE PO Take 400 mg by mouth daily      metoprolol succinate ER (TOPROL XL) 50 MG 24 hr tablet Take 1 tablet (50 mg) by mouth daily 90 tablet 2    Omega-3 Fatty Acids (OMEGA-3 FISH OIL PO) Take 630 mg by mouth 2 times daily      OVER-THE-COUNTER Turkey tail mushroom powder, use 3 times a day 1 Can 11    Pectin Cit-Inos-C-Bioflav-Soy (MODIFIED CITRUS PECTIN PO) Take by mouth 2 times daily      Taurine 1000 MG CAPS Take 1,000 mg by mouth every morning 60 capsule 11    UNABLE TO FIND 500 mg MEDICATION NAME: Turmeric      UNABLE TO FIND 500 mg MEDICATION NAME: Calcium D- Glucarate      UNABLE TO FIND 1,200 mg 2 times daily MEDICATION NAME: Efraín      UNABLE TO FIND 150 mg daily MEDICATION NAME: Silymarin      UNABLE TO FIND 2 times daily MEDICATION NAME: [facility] for 1000mg calcium  4 pills per day      UNABLE TO FIND Take 1 tablet by mouth every morning MEDICATION NAME: Dinndolylmethane Complex (DIM) 100mg tablet 1 table daily           Allergies:      No Known Allergies     Social History:     Social History     Tobacco Use    Smoking status: Former     Packs/day: 0.50     Years: 15.00     Additional pack years: 0.00     Total pack years: 7.50     Types: Cigarettes     Quit date:      Years since quittin.0     Passive exposure: Past    Smokeless tobacco: Never   Substance Use Topics    Alcohol use: Not Currently     Alcohol/week: 2.0 standard drinks of alcohol     Types: 2 Standard drinks or equivalent per week     Comment: Socially        History   Drug Use No         Family History:       Family History   Problem Relation Age of Onset    Myocardial Infarction  "Mother 73        Tob    Myocardial Infarction Father 68        Tob    Breast Cancer Sister 47         of breast cancer age 63; BRCA mutation testing negative    Cancer Maternal Grandmother         Unsure of type.    Breast Cancer Paternal Grandmother         Unsure of diagnosis--some type of \"women's issue\"    Anesthesia Reaction No family hx of     Deep Vein Thrombosis (DVT) No family hx of          Physical Exam:     /80 (BP Location: Right arm, Patient Position: Sitting, Cuff Size: Adult Regular)   Pulse 80   Temp 97.6  F (36.4  C) (Oral)   Resp 16   Wt 58.6 kg (129 lb 1.6 oz)   LMP  (LMP Unknown)   SpO2 100%   BMI 22.87 kg/m    Body mass index is 22.87 kg/m .    General Appearance: healthy and alert, no distress     HEENT: no thyromegaly, no palpable nodules or masses        Cardiovascular: regular rate and rhythm, no gallops, rubs or murmurs     Respiratory: lungs clear, no rales, rhonchi or wheezes, normal diaphragmatic excursion    Musculoskeletal: extremities non tender and without edema    Skin: no lesions or rashes     Neurological: normal gait, no gross defects     Psychiatric: appropriate mood and affect                               Hematological: normal cervical, supraclavicular and inguinal lymph nodes     Gastrointestinal:       abdomen soft, non-tender, non-distended, no organomegaly or masses    Genitourinary: External genitalia and urethral meatus appears slightly atrophic  Vagina is smooth without nodularity or masses; foreshortened, right lateral apex with slight webbing; scant bleeding at lateral edges with speculum opening; some resistance.  Cervix surgically absent.  Bimanual exam reveal no masses, nodularity or fullness.  Recto-vaginal exam confirms these findings.      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 She completed EBRT and brachytherapy 2/2023. She is here for a surveillance visit.     22 minutes spent on the date of the encounter doing chart review, history and exam, documentation and further activities as noted above      Plan:     1.)       Patient to RTC in 3 months for her next surveillance visit. Reviewed the plan for her to see MD once a year and the remainder of her visits will be with NP. Reviewed MD recommendation against cytology. Discussed using her dilator once a month. Discussed using organic coconut oil to her vulva at bedtime to help provide a moisturizer. Reviewed signs and symptoms for when she should contact the clinic or seek additional care. Patient to contact the clinic with any questions or concerns in the interim.  Answered all of her questions to the best of my ability.     2.) Continue to follow with medical oncology for her breast cancer.  Genetic/Genomic/Molecular Testing History:  2006: Negative for germline BRCA1, BRCA2 pathogenic variants.      8/14/22 (specimen from 6/9/22): [facility] Testing.   -PD-L1+ (CPS 10)  -Mismatch repair deficient/microsatellite instability-high  -TMB high (53 mut/Mb)  -NTRK 1/2/3 fusion not detected.    3.) Labs and/or tests ordered include:  none.     4.) Health maintenance issues addressed today include annual health maintenance and non-gynecologic issues with PCP.    TE Velazquez, WHNP-BC, ANP-BC, AOCNP  Women's Health Nurse Practitioner  Adult Nurse Practitioner  Division of Gynecologic Oncology  .      CC  Patient Care Team:  Merry Lino MD as PCP - General (Internal Medicine)

## 2024-03-17 NOTE — PROGRESS NOTES
"Oncology Follow Up:  Date on this visit: 3/18/2024    Diagnosis: Left breast invasive ductal carcinoma.    Primary Physician: Merry Lino     History Of Present Illness:  Ms. Donaldson is a 73 year old female with cervical and breast cancer.    She has a history of left breast cancer treated with lumpectomy and radiation therapy in 2003 ([place] [facility] Oncotype DX recurrence score was 10.  She took 5 years of tamoxifen. She had an incidental left breast lesion found on imaging for a pelvic mass in 06/2022. Ultrasound showed a 1.3 cm mass at 12:30, 8 cm from the nipple of the left breast.  Biopsy was c/w a grade 2 invasive ductal carcinoma, ER strong in 100%, SC negative, and HER2 negative.  Given concurrent recurrent pelvic mass with plan for cytotoxic chemotherapy, plan was made to monitor and initiate endocrine therapy when appropriate.  She completed 6 cycles of chemotherapy followed by radiation to her pelvic mass.  She has subsequently declined to start endocrine therapy.    In regards to her cervical cancer history, she was initially determined to have squamous carcinoma in 10/2012.  She is s/p FRANCIA and upper vaginectomy in 12/2012 with no residual invasive cancer, but residual HSIL.  Biopsy on 7/22/2013 was c/w HSIL, cannot exclude invasion.  In 05/2022 she developed symptoms of pelvic heaviness (felt like she \"bruised her tailbone\") and progressive pain. She felt like her urination and bowel movements were constricted, needing to put in a lot of effort to urinate or have BM. Pelvic MRI showed a large midline pelvic mass measuring up to 8.5 cm and abutting the bladder and the rectum without definite invasion or left pelvic LAD.  These findings were confirmed on PET/CT. She was started on chemo with cisplatin, paclitaxel, and bevacizumab (7/11/22).  [facility] testing showed PD-L1 CPS score of 10, MMR deficient, MSI high disease with a TMB of 53 mut/Mb. Pembrolizumab was added with C3.  " Cisplatin was changed to carboplatin starting with C5 due to tinnitus.  After a total of 6 cycles imaging demonstrated good response.    She is s/p radiation to the pelvis and para-aortic lymph nodes and interstitial brachytherapy to the vaginal cuff (12/28/2022 - 2/15/2023).  She has declined to start anastrozole due to concern regarding side effects.    Interval History:  Ms. Donaldson comes into clinic today for routine breast cancer follow-up.  She has been in good health.  In fact, she recently returned from a 3-week trip to [place].  She visited multiple places within [place].  She performed a significant amount of cardiovascular activity while she was there and tolerated it without any difficulty.  She does note some mild bilateral knee discomfort secondary to biking a lot of hills while she was there.  The knee pain is not bothersome enough for her to take pain medicine for it.  She has no new bone or joint aches or pains.  She denies abdominal pain or bloating.  She continues to have loose stools, 2 in the morning and 1 in the evening.  She has had no blood in her stool.  She states she had less loose stools while she was in [place], but notes that she was eating much less fruits and vegetables there.  She has had no swelling of her lower extremities.  She denies any concerning breast lumps, pain, or swelling.  She has full range of motion of her bilateral upper extremities.  Since she completed her vaginal cancer treatment a year ago her energy level has consistently improved.    Past Medical/Surgical History:  Past Medical History:   Diagnosis Date    Abnormal Pap smear     maybe 20 years ago    Cervical cancer (H)     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     History of breast cancer 2003    lumpectomy and radiation offerred chemo and patient declined at time but had oncogene test and low risk    Hyperlipidemia LDL goal < 160     Osteopenia     Paroxysmal  A-fib (H)     Severe vaginal dysplasia      Past Surgical History:   Procedure Laterality Date    BIOPSY      BREAST SURGERY Left 2003    lumpectomy left, did radiation, 5 yr of tamoxifen    COLONOSCOPY  2018    repeat in 2028    Colposcopy Cervix with Loop Electrode Conization and Lser to Vagina  2008    COLPOSCOPY, BIOPSY, COMBINED  10/24/2012    Procedure: COMBINED COLPOSCOPY, BIOPSY;  Colposcopy, Biopsy of Cervix and Vagina, Ultrasound Guidance;  Surgeon: Colette Ng MD;  Location: [facility] OR    ENT SURGERY  01/23/2018    otoscelerosis, right side    HYSTERECTOMY, FRANCIA  12/2012    high grade cervical dysplasia, [place] [facility], Dr. Arciniega    INSERT INTERSTITIAL NEEDLE, ULTRASOUND GUIDED N/A 2/13/2023    Procedure: INSERTION, NEEDLE, WITH ULTRASOUND GUIDANCE, FOR INTERSTITIAL BRACHYTHERAPY;  Surgeon: Raymond Stockton MD;  Location: [facility] OR    INSERT PORT VASCULAR ACCESS Right 7/18/2022    Procedure: INSERTION, VASCULAR ACCESS PORT;  Surgeon: Donnie Elias MD;  Location: [facility] OR    IR CHEST PORT PLACEMENT > 5 YRS OF AGE  7/18/2022    IR PORT REMOVAL RIGHT  3/30/2023    NH LAP VAGINECTOMY, PARTIAL REMOVAL OF VAGINAL WALL  10/2013    upper vaginectomy for dysplasia, Dr. Megan Arciniega    REMOVE INTERSTITIAL NEEDLE N/A 2/15/2023    Procedure: REMOVAL, INTERSTITIAL NEEDLE, AFTER TEMPORARY BRACHYTHERAPY;  Surgeon: Raymond Stockton MD;  Location: [facility] OR    REMOVE PORT VASCULAR ACCESS Right 3/30/2023    Procedure: Remove port vascular access right;  Surgeon: Donnie Elias MD;  Location: [facility] OR     Allergies:  Allergies as of 03/18/2024    (No Known Allergies)     Current Medications:  Current Outpatient Medications   Medication Sig Dispense Refill    Acetylcarnitine HCl 250 MG CAPS Take 500 mg by mouth daily      aspirin (ASA) 81 MG chewable tablet Take 81 mg by mouth every evening      Bacillus Coagulans-Inulin (PROBIOTIC FORMULA) 1-250 BILLION-MG CAPS Take by mouth every morning 25+billion CFUS      BENFOTIAMINE PO Take 150 mg  "by mouth every morning      Cholecalciferol (VITAMIN D-3) 25 MCG (1000 UT) CAPS Take 1,000 Units by mouth every morning 90 capsule 3    coenzyme Q-10 capsule Take 1 capsule by mouth daily      levothyroxine (SYNTHROID/LEVOTHROID) 112 MCG tablet Take 1 tablet (112 mcg) by mouth daily before breakfast 90 tablet 3    MAGNESIUM OXIDE PO Take 400 mg by mouth daily      metoprolol succinate ER (TOPROL XL) 50 MG 24 hr tablet Take 1 tablet (50 mg) by mouth daily 90 tablet 2    Omega-3 Fatty Acids (OMEGA-3 FISH OIL PO) Take 630 mg by mouth 2 times daily      OVER-THE-COUNTER Turkey tail mushroom powder, use 3 times a day 1 Can 11    Pectin Cit-Inos-C-Bioflav-Soy (MODIFIED CITRUS PECTIN PO) Take by mouth 2 times daily      Taurine 1000 MG CAPS Take 1,000 mg by mouth every morning 60 capsule 11    UNABLE TO FIND 500 mg MEDICATION NAME: Turmeric      UNABLE TO FIND 500 mg MEDICATION NAME: Calcium D- Glucarate      UNABLE TO FIND 1,200 mg 2 times daily MEDICATION NAME: Efraín      UNABLE TO FIND 150 mg daily MEDICATION NAME: Silymarin      UNABLE TO FIND 2 times daily MEDICATION NAME: [facility] for 1000mg calcium  4 pills per day      UNABLE TO FIND Take 1 tablet by mouth every morning MEDICATION NAME: Dinndolylmethane Complex (DIM) 100mg tablet 1 table daily        Physical Exam:  /68 (BP Location: Right arm, Patient Position: Chair, Cuff Size: Adult Regular)   Pulse 90   Temp 97.8  F (36.6  C)   Resp 16   Ht 1.6 m (5' 2.99\")   Wt 60.1 kg (132 lb 9.6 oz)   LMP  (LMP Unknown)   SpO2 98%   BMI 23.50 kg/m    GENERAL APPEARANCE: Well appearing adult female in NAD.  Alert and oriented x 3.    HEENT: NC/AT, sclera anicteric.  MMM.    LYMPHATICS: No palpable cervical, supraclavicular, or axillary lymphadenopathy  RESP: Lungs are CTA bilaterally and without wheezes or crackles.  CARDIOVASCULAR:  RRR.  No audible m/r/g.  BREAST:  Bilateral breasts are of normal fibroglandular density.  There are no dominant or discretely " palpable masses in either breast.  Bilateral nipples are everted and without discharge.  ABDOMEN:  soft, nondistended  MUSCULOSKELETAL: Full ROM of the bilateral upper extremities.  SKIN: no visible concerning skin lesions or rashes  PSYCHIATRIC: Normal mood and affect.    Laboratory/Imaging Studies  No interim imaging or laboratories pertinent to today's visit.    ASSESSMENT/PLAN:  Ms. Donaldson is a 74 y/o woman with a history of left breast cancer (s/p lumpectomy, radiation in 2003 followed by 5 years of tamoxifen w/ Dr. Mtz) and cervical SCC (10/2012, s/p FRANCIA, upper vaginectomy) with pelvic mass and a second ER positive, CA negative, HER2 negative left breast cancer.     1. Left breast cancer.  - PET/CT in 06/2023 with persistent mild FDG uptake in left interpectoral area.  Unclear whether this represents residual malignancy vs inflammation.  She again states today she declines further imaging follow up of this finding.  - We have previously discussed that without surgery/radiation treatment of breast cancer, the breast cancer is not cured.  I have recommended treatment with anastrozole.  She has declined this treatment wanting to focus on quality of life.  - She is asymptomatic of disease recurrence on history taken today and clinical exam is without concerning findings.  - Return to clinic in 6 months.  She agrees to contact the clinic with any concerns in the meantime.    2. Recurrent cervical versus vaginal cancer  - She follows with Dr. Raygoza of [facility]yn[facility]c   - s/p 6 cycles neodjuvant Cisplatin 50 mg/m2 + paclitaxel 175 mg/m2 + bevacizumab 15 mg/kg + pembrolizumab every 21 days 7/11 - 11/21/2022.  Pembrolizumab added C3 and beyond; Cisplatin changed to carboplatin for cycles 5 and 6 due to tinnitus.    - s/p radiation to the pelvis and para-aortic lymph nodes as well as interstitial brachytherapy to the vaginal cuff, completed 02/2023.  - Plan is for regular clinic visits and plan is to image only if  symptomatic.  - I reviewed Dr. Galan's 1/8/2024 gyn/onc clinic note, there is no change to the above plan. She is following with gyn/onc once every 3 months.    3.  Neuropathy:  Chemotherapy induced.  Grade 1.  No change since last visit.    4.  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  She is on levothyroxine 112 mcg PO daily.  - Last TSH on 8/30/2023 was wnl.     5.  Follow Up:  Return to clinic in 6 months.    I spent 30 minutes on the date of the encounter doing chart review, patient visit, and documentation.

## 2024-03-18 ENCOUNTER — ONCOLOGY VISIT (OUTPATIENT)
Dept: ONCOLOGY | Facility: CLINIC | Age: 74
End: 2024-03-18
Attending: INTERNAL MEDICINE
Payer: COMMERCIAL

## 2024-03-18 VITALS
WEIGHT: 132.6 LBS | SYSTOLIC BLOOD PRESSURE: 116 MMHG | HEIGHT: 63 IN | DIASTOLIC BLOOD PRESSURE: 68 MMHG | TEMPERATURE: 97.8 F | RESPIRATION RATE: 16 BRPM | OXYGEN SATURATION: 98 % | BODY MASS INDEX: 23.5 KG/M2 | HEART RATE: 90 BPM

## 2024-03-18 DIAGNOSIS — C50.412 MALIGNANT NEOPLASM OF UPPER-OUTER QUADRANT OF LEFT BREAST IN FEMALE, ESTROGEN RECEPTOR POSITIVE (H): Primary | ICD-10-CM

## 2024-03-18 DIAGNOSIS — C52 VAGINAL CANCER (H): ICD-10-CM

## 2024-03-18 DIAGNOSIS — Z17.0 MALIGNANT NEOPLASM OF UPPER-OUTER QUADRANT OF LEFT BREAST IN FEMALE, ESTROGEN RECEPTOR POSITIVE (H): Primary | ICD-10-CM

## 2024-03-18 PROCEDURE — 99214 OFFICE O/P EST MOD 30 MIN: CPT | Performed by: INTERNAL MEDICINE

## 2024-03-18 PROCEDURE — G0463 HOSPITAL OUTPT CLINIC VISIT: HCPCS | Performed by: INTERNAL MEDICINE

## 2024-03-18 ASSESSMENT — PAIN SCALES - GENERAL: PAINLEVEL: NO PAIN (0)

## 2024-03-18 NOTE — NURSING NOTE
"Oncology Rooming Note    March 18, 2024 2:47 PM   Cordell Donaldson is a 73 year old female who presents for:    Chief Complaint   Patient presents with    Oncology Clinic Visit     [email or web address] RETURN - BREAST CANCER     Initial Vitals: /68 (BP Location: Right arm, Patient Position: Chair, Cuff Size: Adult Regular)   Pulse 90   Temp 97.8  F (36.6  C)   Resp 16   Ht 1.6 m (5' 2.99\")   Wt 60.1 kg (132 lb 9.6 oz)   LMP  (LMP Unknown)   SpO2 98%   BMI 23.50 kg/m   Estimated body mass index is 23.5 kg/m  as calculated from the following:    Height as of this encounter: 1.6 m (5' 2.99\").    Weight as of this encounter: 60.1 kg (132 lb 9.6 oz). Body surface area is 1.63 meters squared.  No Pain (0) Comment: Data Unavailable   No LMP recorded (lmp unknown). Patient has had a hysterectomy.  Allergies reviewed: Yes  Medications reviewed: Yes    Medications: Medication refills not needed today.  Pharmacy name entered into [employer]:    [id number] [place], [place] - [facility]  [id number] [place], [place] - [address] [facility] [place] [place] -  [facility] [address]    Frailty Screening:   Is the patient here for a new oncology consult visit in cancer care? 2. No    Tom Johnson LPN              "

## 2024-03-18 NOTE — LETTER
"    3/18/2024         RE: Cordell Donaldson  [address]  [facility] [address]        Dear Colleague,    Thank you for referring your patient, Cordell Donaldson, to the [facility]. Please see a copy of my visit note below.    Oncology Follow Up:  Date on this visit: 3/18/2024    Diagnosis: Left breast invasive ductal carcinoma.    Primary Physician: Merry Lino     History Of Present Illness:  Ms. Donaldson is a 73 year old female with cervical and breast cancer.    She has a history of left breast cancer treated with lumpectomy and radiation therapy in 2003 [place] [facility] [place] DX recurrence score was 10.  She took 5 years of tamoxifen. She had an incidental left breast lesion found on imaging for a pelvic mass in 06/2022. Ultrasound showed a 1.3 cm mass at 12:30, 8 cm from the nipple of the left breast.  Biopsy was c/w a grade 2 invasive ductal carcinoma, ER strong in 100%, WV negative, and HER2 negative.  Given concurrent recurrent pelvic mass with plan for cytotoxic chemotherapy, plan was made to monitor and initiate endocrine therapy when appropriate.  She completed 6 cycles of chemotherapy followed by radiation to her pelvic mass.  She has subsequently declined to start endocrine therapy.    In regards to her cervical cancer history, she was initially determined to have squamous carcinoma in 10/2012.  She is s/p FRANCIA and upper vaginectomy in 12/2012 with no residual invasive cancer, but residual HSIL.  Biopsy on 7/22/2013 was c/w HSIL, cannot exclude invasion.  In 05/2022 she developed symptoms of pelvic heaviness (felt like she \"bruised her tailbone\") and progressive pain. She felt like her urination and bowel movements were constricted, needing to put in a lot of effort to urinate or have BM. Pelvic MRI showed a large midline pelvic mass measuring up to 8.5 cm and abutting the bladder and the rectum without definite invasion or left pelvic LAD. "  These findings were confirmed on PET/CT. She was started on chemo with cisplatin, paclitaxel, and bevacizumab (7/11/22).  Caris testing showed PD-L1 CPS score of 10, MMR deficient, MSI high disease with a TMB of 53 mut/Mb. Pembrolizumab was added with C3.  Cisplatin was changed to carboplatin starting with C5 due to tinnitus.  After a total of 6 cycles imaging demonstrated good response.    She is s/p radiation to the pelvis and para-aortic lymph nodes and interstitial brachytherapy to the vaginal cuff (12/28/2022 - 2/15/2023).  She has declined to start anastrozole due to concern regarding side effects.    Interval History:  Ms. Donaldson comes into clinic today for routine breast cancer follow-up.  She has been in good health.  In fact, she recently returned from a 3-week trip to [place].  She visited multiple places within [place].  She performed a significant amount of cardiovascular activity while she was there and tolerated it without any difficulty.  She does note some mild bilateral knee discomfort secondary to biking a lot of hills while she was there.  The knee pain is not bothersome enough for her to take pain medicine for it.  She has no new bone or joint aches or pains.  She denies abdominal pain or bloating.  She continues to have loose stools, 2 in the morning and 1 in the evening.  She has had no blood in her stool.  She states she had less loose stools while she was in [place], but notes that she was eating much less fruits and vegetables there.  She has had no swelling of her lower extremities.  She denies any concerning breast lumps, pain, or swelling.  She has full range of motion of her bilateral upper extremities.  Since she completed her vaginal cancer treatment a year ago her energy level has consistently improved.    Past Medical/Surgical History:  Past Medical History:   Diagnosis Date    Abnormal Pap smear     maybe 20 years ago    Cervical cancer (H)     NGUYỄN III (cervical  intraepithelial neoplasia grade III) with severe dysplasia     History of breast cancer 2003    lumpectomy and radiation offerred chemo and patient declined at time but had oncogene test and low risk    Hyperlipidemia LDL goal < 160     Osteopenia     Paroxysmal A-fib (H)     Severe vaginal dysplasia      Past Surgical History:   Procedure Laterality Date    BIOPSY      BREAST SURGERY Left 2003    lumpectomy left, did radiation, 5 yr of tamoxifen    COLONOSCOPY  2018    repeat in 2028    Colposcopy Cervix with Loop Electrode Conization and Lser to Vagina  2008    COLPOSCOPY, BIOPSY, COMBINED  10/24/2012    Procedure: COMBINED COLPOSCOPY, BIOPSY;  Colposcopy, Biopsy of Cervix and Vagina, Ultrasound Guidance;  Surgeon: Colette Ng MD;  Location: [facility] OR    ENT SURGERY  01/23/2018    otoscelerosis, right side    HYSTERECTOMY, FRANCIA  12/2012    high grade cervical dysplasia, [facility], Dr. Arciniega    INSERT INTERSTITIAL NEEDLE, ULTRASOUND GUIDED N/A 2/13/2023    Procedure: INSERTION, NEEDLE, WITH ULTRASOUND GUIDANCE, FOR INTERSTITIAL BRACHYTHERAPY;  Surgeon: Raymond Stockton MD;  Location: [facility] OR    INSERT PORT VASCULAR ACCESS Right 7/18/2022    Procedure: INSERTION, VASCULAR ACCESS PORT;  Surgeon: Donnie Elias MD;  Location: [facility] OR    IR CHEST PORT PLACEMENT > 5 YRS OF AGE  7/18/2022    IR PORT REMOVAL RIGHT  3/30/2023    TX LAP VAGINECTOMY, PARTIAL REMOVAL OF VAGINAL WALL  10/2013    upper vaginectomy for dysplasia, Dr. Megan Arciniega    REMOVE INTERSTITIAL NEEDLE N/A 2/15/2023    Procedure: REMOVAL, INTERSTITIAL NEEDLE, AFTER TEMPORARY BRACHYTHERAPY;  Surgeon: Raymond Stockton MD;  Location: [facility] OR    REMOVE PORT VASCULAR ACCESS Right 3/30/2023    Procedure: Remove port vascular access right;  Surgeon: Donnie Elias MD;  Location: [facility] [place]     Allergies:  Allergies as of 03/18/2024    (No Known Allergies)     Current Medications:  Current Outpatient Medications   Medication Sig Dispense Refill    Acetylcarnitine HCl  "250 MG CAPS Take 500 mg by mouth daily      aspirin (ASA) 81 MG chewable tablet Take 81 mg by mouth every evening      Bacillus Coagulans-Inulin (PROBIOTIC FORMULA) 1-250 BILLION-MG CAPS Take by mouth every morning 25+billion CFUS      BENFOTIAMINE PO Take 150 mg by mouth every morning      Cholecalciferol (VITAMIN D-3) 25 MCG (1000 UT) CAPS Take 1,000 Units by mouth every morning 90 capsule 3    coenzyme Q-10 capsule Take 1 capsule by mouth daily      levothyroxine (SYNTHROID/LEVOTHROID) 112 MCG tablet Take 1 tablet (112 mcg) by mouth daily before breakfast 90 tablet 3    MAGNESIUM OXIDE PO Take 400 mg by mouth daily      metoprolol succinate ER (TOPROL XL) 50 MG 24 hr tablet Take 1 tablet (50 mg) by mouth daily 90 tablet 2    Omega-3 Fatty Acids (OMEGA-3 FISH OIL PO) Take 630 mg by mouth 2 times daily      OVER-THE-COUNTER Turkey tail mushroom powder, use 3 times a day 1 Can 11    Pectin Cit-Inos-C-Bioflav-Soy (MODIFIED CITRUS PECTIN PO) Take by mouth 2 times daily      Taurine 1000 MG CAPS Take 1,000 mg by mouth every morning 60 capsule 11    UNABLE TO FIND 500 mg MEDICATION NAME: Turmeric      UNABLE TO FIND 500 mg MEDICATION NAME: Calcium D- Glucarate      UNABLE TO FIND 1,200 mg 2 times daily MEDICATION NAME: [place]      UNABLE TO FIND 150 mg daily MEDICATION NAME: Silymarin      UNABLE TO FIND 2 times daily MEDICATION NAME: [facility] for 1000mg calcium  4 pills per day      UNABLE TO FIND Take 1 tablet by mouth every morning MEDICATION NAME: Dinndolylmethane Complex (DIM) 100mg tablet 1 table daily        Physical Exam:  /68 (BP Location: Right arm, Patient Position: Chair, Cuff Size: Adult Regular)   Pulse 90   Temp 97.8  F (36.6  C)   Resp 16   Ht 1.6 m (5' 2.99\")   Wt 60.1 kg (132 lb 9.6 oz)   LMP  (LMP Unknown)   SpO2 98%   BMI 23.50 kg/m    GENERAL APPEARANCE: Well appearing adult female in NAD.  Alert and oriented x 3.    HEENT: NC/AT, sclera anicteric.  MMM.    LYMPHATICS: No palpable " cervical, supraclavicular, or axillary lymphadenopathy  RESP: Lungs are CTA bilaterally and without wheezes or crackles.  CARDIOVASCULAR:  RRR.  No audible m/r/g.  BREAST:  Bilateral breasts are of normal fibroglandular density.  There are no dominant or discretely palpable masses in either breast.  Bilateral nipples are everted and without discharge.  ABDOMEN:  soft, nondistended  MUSCULOSKELETAL: Full ROM of the bilateral upper extremities.  SKIN: no visible concerning skin lesions or rashes  PSYCHIATRIC: Normal mood and affect.    Laboratory/Imaging Studies  No interim imaging or laboratories pertinent to today's visit.    ASSESSMENT/PLAN:  Ms. Donaldson is a 74 y/o woman with a history of left breast cancer (s/p lumpectomy, radiation in 2003 followed by 5 years of tamoxifen w/ Dr. Mtz) and cervical SCC (10/2012, s/p FRANCIA, upper vaginectomy) with pelvic mass and a second ER positive, OR negative, HER2 negative left breast cancer.     1. Left breast cancer.  - PET/CT in 06/2023 with persistent mild FDG uptake in left interpectoral area.  Unclear whether this represents residual malignancy vs inflammation.  She again states today she declines further imaging follow up of this finding.  - We have previously discussed that without surgery/radiation treatment of breast cancer, the breast cancer is not cured.  I have recommended treatment with anastrozole.  She has declined this treatment wanting to focus on quality of life.  - She is asymptomatic of disease recurrence on history taken today and clinical exam is without concerning findings.  - Return to clinic in 6 months.  She agrees to contact the clinic with any concerns in the meantime.    2. Recurrent cervical versus vaginal cancer  - She follows with Dr. Raygoza of [facility]yn[facility]   - s/p 6 cycles neodjuvant Cisplatin 50 mg/m2 + paclitaxel 175 mg/m2 + bevacizumab 15 mg/kg + pembrolizumab every 21 days 7/11 - 11/21/2022.  Pembrolizumab added C3 and beyond; Cisplatin changed to  carboplatin for cycles 5 and 6 due to tinnitus.    - s/p radiation to the pelvis and para-aortic lymph nodes as well as interstitial brachytherapy to the vaginal cuff, completed 02/2023.  - Plan is for regular clinic visits and plan is to image only if symptomatic.  - I reviewed Dr. Galan's 1/8/2024 gyn/onc clinic note, there is no change to the above plan. She is following with gyn/onc once every 3 months.    3.  Neuropathy:  Chemotherapy induced.  Grade 1.  No change since last visit.    4.  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  She is on levothyroxine 112 mcg PO daily.  - Last TSH on 8/30/2023 was wnl.     5.  Follow Up:  Return to clinic in 6 months.    I spent 30 minutes on the date of the encounter doing chart review, patient visit, and documentation.       Angela Montana MD

## 2024-03-26 NOTE — PROGRESS NOTES
Cordell Donaldson is a 73 year old female with hx of recurrent cervical and vaginal cancer, breast cancer, atrial fibrillation, hyperlipidemia, hypothyroidism, transient ascites. She is here for the following issues:        Hypothyroidism  Levothyroxine 112 mcg daily  dose  Consistent with dosing  Some loose stools, chronic    Longstanding persistent atrial fibrillation (H)  Currently on metoprolol succinate, 50mg daily  Follows with cardiology  No dizziness or palpiations  Takes baby aspirin, no anticoagulation  Also taking Nattokinase supplement to prevent fibrin action, recommended by naturopath      B12  Eats eggs, morataya yeast  Fish 3x per week  No supplements      Some fish, eggs, nuts, fruits, veggies  Calcium : yoghurt, cheese, supplement Ca bid with D  Wt Readings from Last 4 Encounters:   03/27/24 59.9 kg (132 lb)   03/18/24 60.1 kg (132 lb 9.6 oz)   01/08/24 58.6 kg (129 lb 1.6 oz)   10/30/23 56.3 kg (124 lb 1.6 oz)   Body mass index is 23.39 kg/m .    Exercise: active , [employer], [place] x 3 weeks, walking up to 8 miles  Yoga 3x per week  Gym, 5 mi biking      Patient Active Problem List   Diagnosis    Dupuytren contracture    Abnormal electrocardiogram    Atrial fibrillation (H)    Hyperlipidemia LDL goal <130    Malignant neoplasm of breast (H)    Rectocele    Malignant neoplasm of endocervix (H)    Vaginal cancer (H)       Current Outpatient Medications   Medication Sig Dispense Refill    Acetylcarnitine HCl 250 MG CAPS Take 500 mg by mouth daily      aspirin (ASA) 81 MG chewable tablet Take 81 mg by mouth every evening      Bacillus Coagulans-Inulin (PROBIOTIC FORMULA) 1-250 BILLION-MG CAPS Take by mouth every morning 25+billion CFUS      BENFOTIAMINE PO Take 150 mg by mouth every morning      Cholecalciferol (VITAMIN D-3) 25 MCG (1000 UT) CAPS Take 1,000 Units by mouth every morning 90 capsule 3    coenzyme Q-10 capsule Take 1 capsule by mouth daily      levothyroxine (SYNTHROID/LEVOTHROID)  "112 MCG tablet Take 1 tablet (112 mcg) by mouth daily before breakfast 90 tablet 3    MAGNESIUM OXIDE PO Take 400 mg by mouth daily      metoprolol succinate ER (TOPROL XL) 50 MG 24 hr tablet Take 1 tablet (50 mg) by mouth daily 90 tablet 2    Omega-3 Fatty Acids (OMEGA-3 FISH OIL PO) Take 630 mg by mouth 2 times daily      OVER-THE-COUNTER Turkey tail mushroom powder, use 3 times a day 1 Can 11    Pectin Cit-Inos-C-Bioflav-Soy (MODIFIED CITRUS PECTIN PO) Take by mouth 2 times daily      Taurine 1000 MG CAPS Take 1,000 mg by mouth every morning 60 capsule 11    UNABLE TO FIND 500 mg MEDICATION NAME: Turmeric      UNABLE TO FIND 500 mg MEDICATION NAME: Calcium D- Glucarate      UNABLE TO FIND 1,200 mg 2 times daily MEDICATION NAME: [place]      UNABLE TO FIND 2 times daily MEDICATION NAME: [facility] for 1000mg calcium  4 pills per day      UNABLE TO FIND Take 1 tablet by mouth every morning MEDICATION NAME: Dinndolylmethane Complex (DIM) 100mg tablet 1 table daily         No Known Allergies     EXAM  /67 (BP Location: Left arm, Patient Position: Sitting, Cuff Size: Adult Regular)   Pulse 89   Temp 97.3  F (36.3  C) (Skin)   Resp 15   Ht 1.6 m (5' 2.99\")   Wt 59.9 kg (132 lb)   LMP  (LMP Unknown)   SpO2 99%   BMI 23.39 kg/m    Gen: Alert, pleasant, NAD  NECK. No LAD/TM  COR: irregularly irregular rhythm, no murmur  Lungs: CTA bilaterally, no rhonchi, wheezes or rales  Ext: no peripheral edema, pulses full  Neuro: DTR +2/4 in all extremities      Assessment:  (E03.8) Other specified hypothyroidism  (primary encounter diagnosis)  Comment: levothyroxine 112mcg daily dose  Plan: TSH with free T4 reflex        Await TSH, will adjust dose if needed    (R19.5) Loose stools  Comment: chronic issue  Plan: Vitamin B12        Check B12 level    (I48.11) Longstanding persistent atrial fibrillation (H)  Comment: currently rate controlled with metoprolol. Using only ASA ans dietary supplement. No other " anticoagulation  Plan: follow up if any acute change in symptoms    Follow up for preventive exam in one month      Merry Lino MD  Internal Medicine/Pediatrics

## 2024-03-27 ENCOUNTER — OFFICE VISIT (OUTPATIENT)
Dept: FAMILY MEDICINE | Facility: CLINIC | Age: 74
End: 2024-03-27
Payer: COMMERCIAL

## 2024-03-27 VITALS
HEART RATE: 89 BPM | TEMPERATURE: 97.3 F | DIASTOLIC BLOOD PRESSURE: 67 MMHG | OXYGEN SATURATION: 99 % | BODY MASS INDEX: 23.39 KG/M2 | RESPIRATION RATE: 15 BRPM | SYSTOLIC BLOOD PRESSURE: 104 MMHG | HEIGHT: 63 IN | WEIGHT: 132 LBS

## 2024-03-27 DIAGNOSIS — I48.11 LONGSTANDING PERSISTENT ATRIAL FIBRILLATION (H): ICD-10-CM

## 2024-03-27 DIAGNOSIS — E03.8 OTHER SPECIFIED HYPOTHYROIDISM: Primary | ICD-10-CM

## 2024-03-27 DIAGNOSIS — R19.5 LOOSE STOOLS: ICD-10-CM

## 2024-03-27 PROCEDURE — 82607 VITAMIN B-12: CPT | Performed by: INTERNAL MEDICINE

## 2024-03-27 PROCEDURE — 84443 ASSAY THYROID STIM HORMONE: CPT | Performed by: INTERNAL MEDICINE

## 2024-03-27 ASSESSMENT — ANXIETY QUESTIONNAIRES
3. WORRYING TOO MUCH ABOUT DIFFERENT THINGS: NOT AT ALL
6. BECOMING EASILY ANNOYED OR IRRITABLE: NOT AT ALL
GAD7 TOTAL SCORE: 0
5. BEING SO RESTLESS THAT IT IS HARD TO SIT STILL: NOT AT ALL
IF YOU CHECKED OFF ANY PROBLEMS ON THIS QUESTIONNAIRE, HOW DIFFICULT HAVE THESE PROBLEMS MADE IT FOR YOU TO DO YOUR WORK, TAKE CARE OF THINGS AT HOME, OR GET ALONG WITH OTHER PEOPLE: NOT DIFFICULT AT ALL
1. FEELING NERVOUS, ANXIOUS, OR ON EDGE: NOT AT ALL
GAD7 TOTAL SCORE: 0
7. FEELING AFRAID AS IF SOMETHING AWFUL MIGHT HAPPEN: NOT AT ALL
2. NOT BEING ABLE TO STOP OR CONTROL WORRYING: NOT AT ALL

## 2024-03-27 ASSESSMENT — PATIENT HEALTH QUESTIONNAIRE - PHQ9
SUM OF ALL RESPONSES TO PHQ QUESTIONS 1-9: 0
5. POOR APPETITE OR OVEREATING: NOT AT ALL

## 2024-03-27 NOTE — NURSING NOTE
"73 year old  Chief Complaint   Patient presents with    RECHECK     Follow up & thyroid test, b12 testing.        Blood pressure 104/67, pulse 89, temperature 97.3  F (36.3  C), temperature source Skin, resp. rate 15, height 1.6 m (5' 2.99\"), weight 59.9 kg (132 lb), SpO2 99%, not currently breastfeeding. Body mass index is 23.39 kg/m .  Patient Active Problem List   Diagnosis    Dupuytren contracture    Abnormal electrocardiogram    Atrial fibrillation (H)    Hyperlipidemia LDL goal <130    Malignant neoplasm of breast (H)    Rectocele    Malignant neoplasm of endocervix (H)    Vaginal cancer (H)       Wt Readings from Last 2 Encounters:   24 59.9 kg (132 lb)   24 60.1 kg (132 lb 9.6 oz)     BP Readings from Last 3 Encounters:   24 104/67   24 116/68   24 116/80         Current Outpatient Medications   Medication    Acetylcarnitine HCl 250 MG CAPS    aspirin (ASA) 81 MG chewable tablet    Bacillus Coagulans-Inulin (PROBIOTIC FORMULA) 1-250 BILLION-MG CAPS    BENFOTIAMINE PO    Cholecalciferol (VITAMIN D-3) 25 MCG (1000 UT) CAPS    coenzyme Q-10 capsule    levothyroxine (SYNTHROID/LEVOTHROID) 112 MCG tablet    MAGNESIUM OXIDE PO    metoprolol succinate ER (TOPROL XL) 50 MG 24 hr tablet    Omega-3 Fatty Acids (OMEGA-3 FISH OIL PO)    OVER-THE-COUNTER    Pectin Cit-Inos-C-Bioflav-Soy (MODIFIED CITRUS PECTIN PO)    Taurine 1000 MG CAPS    UNABLE TO FIND    UNABLE TO FIND    UNABLE TO FIND    UNABLE TO FIND    UNABLE TO FIND     No current facility-administered medications for this visit.       Social History     Tobacco Use    Smoking status: Former     Packs/day: 0.50     Years: 15.00     Additional pack years: 0.00     Total pack years: 7.50     Types: Cigarettes     Quit date:      Years since quittin.2     Passive exposure: Past    Smokeless tobacco: Never   Vaping Use    Vaping Use: Never used   Substance Use Topics    Alcohol use: Not Currently     Alcohol/week: 2.0 standard " drinks of alcohol     Types: 2 Standard drinks or equivalent per week     Comment: Socially     Drug use: No       Health Maintenance Due   Topic Date Due    DEPRESSION ACTION PLAN  Never done    Pneumococcal Vaccine: 65+ Years (1 of 2 - PCV) Never done    RSV VACCINE (Pregnancy & 60+) (1 - 1-dose 60+ series) Never done    ZOSTER IMMUNIZATION (2 of 2) 02/12/2020    PAP  01/02/2023    LIPID  04/06/2023    MEDICARE ANNUAL WELLNESS VISIT  06/28/2023    MAMMO SCREENING  07/13/2023    INFLUENZA VACCINE (1) 09/01/2023    PHQ-9  10/10/2023       Lab Results   Component Value Date    PAP NIL 01/02/2020 March 27, 2024 2:44 PM

## 2024-03-28 LAB
TSH SERPL DL<=0.005 MIU/L-ACNC: 1.48 UIU/ML (ref 0.3–4.2)
VIT B12 SERPL-MCNC: 399 PG/ML (ref 232–1245)

## 2024-03-30 ENCOUNTER — HEALTH MAINTENANCE LETTER (OUTPATIENT)
Age: 74
End: 2024-03-30

## 2024-04-26 SDOH — HEALTH STABILITY: PHYSICAL HEALTH: ON AVERAGE, HOW MANY DAYS PER WEEK DO YOU ENGAGE IN MODERATE TO STRENUOUS EXERCISE (LIKE A BRISK WALK)?: 6 DAYS

## 2024-04-26 SDOH — HEALTH STABILITY: PHYSICAL HEALTH: ON AVERAGE, HOW MANY MINUTES DO YOU ENGAGE IN EXERCISE AT THIS LEVEL?: 40 MIN

## 2024-04-26 ASSESSMENT — SOCIAL DETERMINANTS OF HEALTH (SDOH): HOW OFTEN DO YOU GET TOGETHER WITH FRIENDS OR RELATIVES?: TWICE A WEEK

## 2024-04-30 ENCOUNTER — OFFICE VISIT (OUTPATIENT)
Dept: FAMILY MEDICINE | Facility: CLINIC | Age: 74
End: 2024-04-30
Payer: COMMERCIAL

## 2024-04-30 VITALS
TEMPERATURE: 97.3 F | SYSTOLIC BLOOD PRESSURE: 116 MMHG | DIASTOLIC BLOOD PRESSURE: 72 MMHG | WEIGHT: 133 LBS | OXYGEN SATURATION: 96 % | HEIGHT: 62 IN | BODY MASS INDEX: 24.48 KG/M2 | HEART RATE: 76 BPM

## 2024-04-30 DIAGNOSIS — Z00.00 ENCOUNTER FOR MEDICARE ANNUAL WELLNESS EXAM: Primary | ICD-10-CM

## 2024-04-30 DIAGNOSIS — S10.96XA TICK BITE OF NECK, INITIAL ENCOUNTER: ICD-10-CM

## 2024-04-30 DIAGNOSIS — C53.9 RECURRENT CERVICAL CANCER (H): ICD-10-CM
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
## 2024-04-30 DIAGNOSIS — E28.39 ESTROGEN DEFICIENCY: ICD-10-CM

## 2024-04-30 DIAGNOSIS — C50.912 MALIGNANT NEOPLASM OF LEFT BREAST IN FEMALE, ESTROGEN RECEPTOR POSITIVE, UNSPECIFIED SITE OF BREAST (H): ICD-10-CM

## 2024-04-30 DIAGNOSIS — Z17.0 MALIGNANT NEOPLASM OF LEFT BREAST IN FEMALE, ESTROGEN RECEPTOR POSITIVE, UNSPECIFIED SITE OF BREAST (H): ICD-10-CM

## 2024-04-30 DIAGNOSIS — I48.11 LONGSTANDING PERSISTENT ATRIAL FIBRILLATION (H): ICD-10-CM

## 2024-04-30 DIAGNOSIS — Z13.220 LIPID SCREENING: ICD-10-CM

## 2024-04-30 PROCEDURE — 82465 ASSAY BLD/SERUM CHOLESTEROL: CPT | Performed by: INTERNAL MEDICINE

## 2024-04-30 NOTE — PATIENT INSTRUCTIONS
Calcium 1200mg daily  Vitamin D 1000 international unit(s)     Preventive Care Advice   This is general advice given by our system to help you stay healthy. However, your care team may have specific advice just for you. Please talk to your care team about your preventive care needs.  Nutrition  Eat 5 or more servings of fruits and vegetables each day.  Try wheat bread, brown rice and whole grain pasta (instead of white bread, rice, and pasta).  Get enough calcium and vitamin D. Check the label on foods and aim for 100% of the RDA (recommended daily allowance).  Lifestyle  Exercise at least 150 minutes each week   (30 minutes a day, 5 days a week).  Do muscle strengthening activities 2 days a week. These help control your weight and prevent disease.  No smoking.  Wear sunscreen to prevent skin cancer.  Have a dental exam and cleaning every 6 months.  Yearly exams  See your health care team every year to talk about:  Any changes in your health.  Any medicines your care team has prescribed.  Preventive care, family planning, and ways to prevent chronic diseases.  Shots (vaccines)   HPV shots (up to age 26), if you've never had them before.  Hepatitis B shots (up to age 59), if you've never had them before.  COVID-19 shot: Get this shot when it's due.  Flu shot: Get a flu shot every year.  Tetanus shot: Get a tetanus shot every 10 years.  Pneumococcal, hepatitis A, and RSV shots: Ask your care team if you need these based on your risk.  Shingles shot (for age 50 and up).  General health tests  Diabetes screening:  Starting at age 35, Get screened for diabetes at least every 3 years.  If you are younger than age 35, ask your care team if you should be screened for diabetes.  Cholesterol test: At age 39, start having a cholesterol test every 5 years, or more often if advised.  Bone density scan (DEXA): At age 50, ask your care team if you should have this scan for osteoporosis (brittle bones).  Hepatitis C: Get tested at  least once in your life.  STIs (sexually transmitted infections)  Before age 24: Ask your care team if you should be screened for STIs.  After age 24: Get screened for STIs if you're at risk. You are at risk for STIs (including HIV) if:  You are sexually active with more than one person.  You don't use condoms every time.  You or a partner was diagnosed with a sexually transmitted infection.  If you are at risk for HIV, ask about PrEP medicine to prevent HIV.  Get tested for HIV at least once in your life, whether you are at risk for HIV or not.  Cancer screening tests  Cervical cancer screening: If you have a cervix, begin getting regular cervical cancer screening tests at age 21. Most people who have regular screenings with normal results can stop after age 65. Talk about this with your provider.  Breast cancer scan (mammogram): If you've ever had breasts, begin having regular mammograms starting at age 40. This is a scan to check for breast cancer.  Colon cancer screening: It is important to start screening for colon cancer at age 45.  Have a colonoscopy test every 10 years (or more often if you're at risk) Or, ask your provider about stool tests like a FIT test every year or Cologuard test every 3 years.  To learn more about your testing options, visit: https://www.[email or web address]/485540.pdf.  For help making a decision, visit: https://bit.ly/xn59339.  Prostate cancer screening test: If you have a prostate and are age 55 to 69, ask your provider if you would benefit from a yearly prostate cancer screening test.  Lung cancer screening: If you are a current or former smoker age 50 to 80, ask your care team if ongoing lung cancer screenings are right for you.  For informational purposes only. Not to replace the advice of your health care provider. Copyright   2023 [employer]. All rights reserved. Clinically reviewed by the [facility]. WAYN 137974 - REV 01/24.    Hearing  Loss: Care Instructions  Overview     Hearing loss is a sudden or slow decrease in how well you hear. It can range from slight to profound. Permanent hearing loss can occur with aging. It also can happen when you are exposed long-term to loud noise. Examples include listening to loud music, riding motorcycles, or being around other loud machines.  Hearing loss can affect your work and home life. It can make you feel lonely or depressed. You may feel that you have lost your independence. But hearing aids and other devices can help you hear better and feel connected to others.  Follow-up care is a key part of your treatment and safety. Be sure to make and go to all appointments, and call your doctor if you are having problems. It's also a good idea to know your test results and keep a list of the medicines you take.  How can you care for yourself at home?  Avoid loud noises whenever possible. This helps keep your hearing from getting worse.  Always wear hearing protection around loud noises.  Wear a hearing aid as directed.  A professional can help you pick a hearing aid that will work best for you.  You can also get hearing aids over the counter for mild to moderate hearing loss.  Have hearing tests as your doctor suggests. They can show whether your hearing has changed. Your hearing aid may need to be adjusted.  Use other devices as needed. These may include:  Telephone amplifiers and hearing aids that can connect to a television, stereo, radio, or microphone.  Devices that use lights or vibrations. These alert you to the doorbell, a ringing telephone, or a baby monitor.  Television closed-captioning. This shows the words at the bottom of the screen. Most new TVs can do this.  TTY (text telephone). This lets you type messages back and forth on the telephone instead of talking or listening. These devices are also called TDD. When messages are typed on the keyboard, they are sent over the phone line to a receiving  "TTY. The message is shown on a monitor.  Use text messaging, social media, and email if it is hard for you to communicate by telephone.  Try to learn a listening technique called speechreading. It is not lipreading. You pay attention to people's gestures, expressions, posture, and tone of voice. These clues can help you understand what a person is saying. Face the person you are talking to, and have them face you. Make sure the lighting is good. You need to see the other person's face clearly.  Think about counseling if you need help to adjust to your hearing loss.  When should you call for help?  Watch closely for changes in your health, and be sure to contact your doctor if:    You think your hearing is getting worse.     You have new symptoms, such as dizziness or nausea.   Where can you learn more?  Go to https://www.[employer].net/patiented  Enter R798 in the search box to learn more about \"Hearing Loss: Care Instructions.\"  Current as of: September 27, 2023               Content Version: 14.0    3368-9843 [employer].   Care instructions adapted under license by your healthcare professional. If you have questions about a medical condition or this instruction, always ask your healthcare professional. [employer] disclaims any warranty or liability for your use of this information.      Substance Use Disorder: Care Instructions  Overview     You can improve your life and health by stopping your use of alcohol or drugs. When you don't drink or use drugs, you may feel and sleep better. You may get along better with your family, friends, and coworkers. There are medicines and programs that can help with substance use disorder.  How can you care for yourself at home?  Here are some ways to help you stay sober and prevent relapse.  If you have been given medicine to help keep you sober or reduce your cravings, be sure to take it exactly as prescribed.  Talk to your doctor about programs that " can help you stop using drugs or drinking alcohol.  Do not keep alcohol or drugs in your home.  Plan ahead. Think about what you'll say if other people ask you to drink or use drugs. Try not to spend time with people who drink or use drugs.  Use the time and money spent on drinking or drugs to do something that's important to you.  Preventing a relapse  Have a plan to deal with relapse. Learn to recognize changes in your thinking that lead you to drink or use drugs. Get help before you start to drink or use drugs again.  Try to stay away from situations, friends, or places that may lead you to drink or use drugs.  If you feel the need to drink alcohol or use drugs again, seek help right away. Call a trusted friend or family member. Some people get support from organizations such as Narcotics Anonymous or [employer] or from treatment facilities.  If you relapse, get help as soon as you can. Some people make a plan with another person that outlines what they want that person to do for them if they relapse. The plan usually includes how to handle the relapse and who to notify in case of relapse.  Don't give up. Remember that a relapse doesn't mean that you have failed. Use the experience to learn the triggers that lead you to drink or use drugs. Then quit again. Recovery is a lifelong process. Many people have several relapses before they are able to quit for good.  Follow-up care is a key part of your treatment and safety. Be sure to make and go to all appointments, and call your doctor if you are having problems. It's also a good idea to know your test results and keep a list of the medicines you take.  When should you call for help?   Call 911  anytime you think you may need emergency care. For example, call if you or someone else:    Has overdosed or has withdrawal signs. Be sure to tell the emergency workers that you are or someone else is using or trying to quit using drugs. Overdose or withdrawal signs may  "include:  Losing consciousness.  Seizure.  Seeing or hearing things that aren't there (hallucinations).     Is thinking or talking about suicide or harming others.   Where to get help 24 hours a day, 7 days a week   If you or someone you know talks about suicide, self-harm, a mental health crisis, a substance use crisis, or any other kind of emotional distress, get help right away. You can:    Call the Suicide and Crisis Lifeline at 988.     Call 9-491-058-TALK (1-283.660.9413).     Text HOME to 766775 to access the Crisis Text Line.   Consider saving these numbers in your phone.  Go to [employer] for more information or to chat online.  Call your doctor now or seek immediate medical care if:    You are having withdrawal symptoms. These may include nausea or vomiting, sweating, shakiness, and anxiety.   Watch closely for changes in your health, and be sure to contact your doctor if:    You have a relapse.     You need more help or support to stop.   Where can you learn more?  Go to https://www.[employer].net/patiented  Enter H573 in the search box to learn more about \"Substance Use Disorder: Care Instructions.\"  Current as of: November 15, 2023               Content Version: 14.0    1222-5359 [employer].   Care instructions adapted under license by your healthcare professional. If you have questions about a medical condition or this instruction, always ask your healthcare professional. [employer] disclaims any warranty or liability for your use of this information.      "

## 2024-04-30 NOTE — NURSING NOTE
"Cordell  74 year old    Chief Complaint   Patient presents with    Wellness Visit    Tick Bite     Found a deer tick on her neck yesterday and removed it            Blood pressure 116/72, pulse 76, temperature 97.3  F (36.3  C), temperature source Skin, height 1.581 m (5' 2.25\"), weight 60.3 kg (133 lb), SpO2 96%, not currently breastfeeding. Body mass index is 24.13 kg/m .    Patient Active Problem List   Diagnosis    Dupuytren contracture    Abnormal electrocardiogram    Atrial fibrillation (H)    Hyperlipidemia LDL goal <130    Malignant neoplasm of breast (H)    Rectocele    Malignant neoplasm of endocervix (H)    Vaginal cancer (H)              Wt Readings from Last 2 Encounters:   04/30/24 60.3 kg (133 lb)   03/27/24 59.9 kg (132 lb)       BP Readings from Last 3 Encounters:   04/30/24 116/72   03/27/24 104/67   03/18/24 116/68                Current Outpatient Medications   Medication Sig Dispense Refill    Acetylcarnitine HCl 250 MG CAPS Take 500 mg by mouth daily      aspirin (ASA) 81 MG chewable tablet Take 81 mg by mouth every evening      Bacillus Coagulans-Inulin (PROBIOTIC FORMULA) 1-250 BILLION-MG CAPS Take by mouth every morning 25+billion CFUS      BENFOTIAMINE PO Take 150 mg by mouth every morning      Cholecalciferol (VITAMIN D-3) 25 MCG (1000 UT) CAPS Take 1,000 Units by mouth every morning 90 capsule 3    coenzyme Q-10 capsule Take 1 capsule by mouth daily      levothyroxine (SYNTHROID/LEVOTHROID) 112 MCG tablet Take 1 tablet (112 mcg) by mouth daily before breakfast 90 tablet 3    MAGNESIUM OXIDE PO Take 400 mg by mouth daily      metoprolol succinate ER (TOPROL XL) 50 MG 24 hr tablet Take 1 tablet (50 mg) by mouth daily 90 tablet 2    Omega-3 Fatty Acids (OMEGA-3 FISH OIL PO) Take 630 mg by mouth 2 times daily      OVER-THE-COUNTER Turkey tail mushroom powder, use 3 times a day 1 Can 11    Pectin Cit-Inos-C-Bioflav-Soy (MODIFIED CITRUS PECTIN PO) Take by mouth 2 times daily      Taurine 1000 " MG CAPS Take 1,000 mg by mouth every morning 60 capsule 11    UNABLE TO FIND 500 mg MEDICATION NAME: Turmeric      UNABLE TO FIND 500 mg MEDICATION NAME: Calcium D- Glucarate      UNABLE TO FIND 1,200 mg 2 times daily MEDICATION NAME: Efraín      UNABLE TO FIND 2 times daily MEDICATION NAME: [facility] for 1000mg calcium  4 pills per day      UNABLE TO FIND Take 1 tablet by mouth every morning MEDICATION NAME: Dinndolylmethane Complex (DIM) 100mg tablet 1 table daily       No current facility-administered medications for this visit.              Social History     Tobacco Use    Smoking status: Former     Current packs/day: 0.00     Average packs/day: 0.5 packs/day for 15.0 years (7.5 ttl pk-yrs)     Types: Cigarettes     Start date:      Quit date:      Years since quittin.3     Passive exposure: Past    Smokeless tobacco: Never   Vaping Use    Vaping status: Never Used   Substance Use Topics    Alcohol use: Not Currently     Alcohol/week: 0.0 - 2.0 standard drinks of alcohol     Comment: Socially     Drug use: No              Health Maintenance Due   Topic Date Due    DEPRESSION ACTION PLAN  Never done    Pneumococcal Vaccine: 65+ Years (1 of 2 - PCV) Never done    RSV VACCINE (Pregnancy & 60+) (1 - 1-dose 60+ series) Never done    ZOSTER IMMUNIZATION (2 of 2) 2020    PAP  2023    LIPID  [id number]    MAMMO SCREENING  2023    INFLUENZA VACCINE (1) 2023    COVID-19 Vaccine ( season) 2023              Lab Results   Component Value Date    PAP NIL 2020 12:51 PM

## 2024-04-30 NOTE — PROGRESS NOTES
Cordell Donaldson is a 74 year old woman with hx of breast cancer, treated with left sided lumpectomy (2003), radiation and tamoxifen x 5 yr. She also has hx of persistent atrial fibrillation, not on anticoagulation. Hx of cervical cancer 2012 and vaginal cancer 2013, with recent recurrence of cervical cancer (see below). She is here for a preventive exam.  She is not fasting. She is up to date on eye exams and dental visits.    HCM  Advanced directive: on file since 2012  COVID vaccine eligible for booster declines   Other vaccines  pneumovax 20, Shingrix, RSV due declines all  Colon cancer screening due 2028  Mammogram follows with Dr. Montana, oncology for hx of breast cancer, appt 9/2024  AURELIANO has never had baseline wishes to do this his year    Medicare questions:  Hearing concerns: wears hearing aid Right side only, needs recheck  Fall Risk: no  Independent at home: yes  Safe : yes  Memory concerns: declines      Diet: fish and veggies  Wt Readings from Last 4 Encounters:   04/30/24 60.3 kg (133 lb)   03/27/24 59.9 kg (132 lb)   03/18/24 60.1 kg (132 lb 9.6 oz)   01/08/24 58.6 kg (129 lb 1.6 oz)     Body mass index is 24.13 kg/m .    Deer tick  Pulled off the back of her neck yesterday, not engorged   Had been outside at [facility] garden  Brought tick in today    Breast cancer  Followed by Dr. Montana [facility]  Left breast invasive ductal carcinoma  S/p lumpectomy and radiation therapy 2003, tamoxifen  6/2022 left breast mass , found on imaging while being worked up for treatment of malignant pelvic mass.   Breast mass + Biopsy +grade 2 invasive ductal carcinoma  ER+OH neg, HER2 neg  Declined endocrine therapy  Recent visit 3/2024 with Dr. Montana  ---note from that visit  1. Left breast cancer.  - PET/CT in 06/2023 with persistent mild FDG uptake in left interpectoral area.  Unclear whether this represents residual malignancy vs inflammation.  She again states today she declines further  imaging follow up of this finding.  - We have previously discussed that without surgery/radiation treatment of breast cancer, the breast cancer is not cured.  I have recommended treatment with anastrozole.  She has declined this treatment wanting to focus on quality of life.  - She is asymptomatic of disease recurrence on history taken today and clinical exam is without concerning findings.  - Return to clinic in 6 months.  She agrees to contact the clinic with any concerns in the meantime.      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 Raygoza (gyn/onc) q 3 months  Goes for clinical exam. But declines any further imaging as she would not opt for chemotherapy    Atrial fibrillation  Persistent afib, asymptomatic  Declined anticoagulation  Uses metoprolol 50mg daily for rate control  No syncope, chest pain, palpitation    Hyperlipidemia  Hx of afib, no hx of ASCVD or stroke  + family hx, both parents with ASCVD 60-70s  No current lipid lowering med  The 10-year ASCVD risk score (Isra DK, et al., 2019) is: 11.9%  Recent Labs   Lab Test 04/06/22  1544 05/12/21  1146   CHOL 202* 246*   HDL 41* 55   * 167*   TRIG 86 120       PMH, PSH, FH, medications, allergies and immunizations are updated this visit.      Social    Real estate retired     HABITS:  Tob: former smoker, quit 30 yr, total 15 yr 0.5 ppd, 7.5 pk yr   ETOH: 2/week  Calcium: 1-2 per day, Calcium D supplements daily  Caffeine: 2 per day  Exercise:  6-7 days a week     OB/GYN HISTORY:  Patient is postmenopausal.  Hx abnormal pap? Yes, history of cervical dysplasia, s/p FRANCIA 2012  Vasomotor sx: None, night  sweats stopped last week, 3-4 days worth  G 2   P 1   A 1       Current Outpatient Medications   Medication Sig Dispense Refill    Acetylcarnitine HCl 250 MG CAPS Take 500 mg by mouth daily      aspirin (ASA) 81 MG chewable tablet Take 81 mg by mouth every evening      Bacillus Coagulans-Inulin (PROBIOTIC FORMULA) 1-250 BILLION-MG CAPS Take by mouth every morning 25+billion CFUS      BENFOTIAMINE PO Take 150 mg by mouth every morning      Cholecalciferol (VITAMIN D-3) 25 MCG (1000 UT) CAPS Take 1,000 Units by mouth every morning 90 capsule 3    coenzyme Q-10 capsule Take 1 capsule by mouth daily      levothyroxine (SYNTHROID/LEVOTHROID) 112 MCG tablet Take 1 tablet (112 mcg) by mouth daily before breakfast 90 tablet 3    MAGNESIUM OXIDE PO Take 400 mg by mouth daily      metoprolol succinate ER (TOPROL XL) 50 MG 24 hr tablet Take 1 tablet (50 mg) by mouth daily 90 tablet 2    Omega-3 Fatty Acids (OMEGA-3 FISH OIL PO) Take 630 mg by mouth 2 times daily      OVER-THE-COUNTER Turkey tail mushroom powder, use 3 times a day 1 Can 11    Pectin Cit-Inos-C-Bioflav-Soy (MODIFIED CITRUS PECTIN PO) Take by mouth 2 times daily      Taurine 1000 MG CAPS Take 1,000 mg by mouth every morning 60 capsule 11    UNABLE TO FIND 500 mg MEDICATION NAME: Turmeric      UNABLE TO FIND 500 mg MEDICATION NAME: Calcium D- Glucarate      UNABLE TO FIND 1,200 mg 2 times daily MEDICATION NAME: [place]      UNABLE TO FIND 2 times daily MEDICATION NAME: [facility] for 1000mg calcium  4 pills per day      UNABLE TO FIND Take 1 tablet by mouth every morning MEDICATION NAME: Dinndolylmethane Complex (DIM) 100mg tablet 1 table daily       No Known Allergies      ROS  CONSTITUTIONAL:NEGATIVE for fever, chills, change in weight  INTEGUMENTARY/SKIN: NEGATIVE for worrisome rashes, moles or lesions  EYES: NEGATIVE for vision changes or irritation  ENT/MOUTH: NEGATIVE for ear, mouth and throat problems  RESP:NEGATIVE for significant cough or SOB  CV: NEGATIVE  "for chest pain, palpitations, SUN, orthopnea, PND  or peripheral edema  GI: NEGATIVE for nausea, abdominal pain, heartburn, or change in bowel habits, tends toward loose stools  :NEGATIVE for frequency, dysuria, or hematuria  GYN: hx of cervical cancer, see above, no current vaginal discharge  MUSCULOSKELETAL:NEGATIVE for significant arthralgias or myalgia  NEURO: NEGATIVE for weakness, dizziness or paresthesias  ENDOCRINE: NEGATIVE for polyuria/dipsia,  temperature intolerance, skin/hair changes  HEME/ALLERGY/IMMUNE: NEGATIVE for bleeding problems  PSYCHIATRIC: NEGATIVE for changes in mood or affect    EXAM  /72 (BP Location: Left arm, Patient Position: Sitting, Cuff Size: Adult Regular)   Pulse 76   Temp 97.3  F (36.3  C) (Skin)   Ht 1.581 m (5' 2.25\")   Wt 60.3 kg (133 lb)   LMP  (LMP Unknown)   SpO2 96%   BMI 24.13 kg/m        GENERAL APPEARANCE: Alert, pleasant, NAD  EYES: PERRL, EOMI, conjunctiva clear  HENT: TM normal bilaterally. Nose and mouth without lesions  NECK: no adenopathy, thyroid normal to palpation, at posterior neck is small red macule with central darkened central scab (location of removed tick), no bullseye lesion  RESP: lungs clear to auscultation bilaterally,   CV: irregularly irregular rhythm, no murmur, no carotid bruits  ABDOMEN: soft, nontender, without HSM or masses. Bowel sounds normal  MS: extremities normal- no gross deformities noted, no tender, hot or swollen joints.    SKIN: no suspicious lesions or rashes  NEURO: Normal strength and tone, sensory exam grossly normal, DTR normoreflexive in upper and lower extremities  PSYCH: mentation appears normal. and affect normal/bright.  EXT: no peripheral edema, pedal pulses palpable    Assessment:  (Z00.00) Encounter for Medicare annual wellness exam  (primary encounter diagnosis)  Comment: 74 year old woman with complex medical history, stable health  Plan: Today we discussed ways to maintain a healthy lifestyle with " sensible eating, regular exercise and self cares. We dicussed calcium and Vitamin D intake, vaccinations and preventive health screens.   No longer pursuing mammogram      (E28.39) Estrogen deficiency  Comment: due for baseline bone density  Plan: DX Bone Density        Discussed adequate calcium/D by supplements    (Z13.220) Lipid screening  Comment: not fasting  Plan: Lipid Profile            (I48.11) Longstanding persistent atrial fibrillation (H)  Comment: rate controlled, takes ASA, no other anticoagulation  Plan: notify cardiology if she has frequent break through symptoms, may be able to add  additional diltiazem if needed    (C53.9) Recurrent cervical cancer (H)  Comment: s/p chemotherapy but has decided to decline any further imaging or chemotherapy should disease return. She is realistic and prefers to focus on quality of life  Plan: she has a health care directive. Spouse and daughter understand her wishes    (C50.912,  Z17.0) Malignant neoplasm of left breast in female, estrogen receptor positive, unspecified site of breast (H)  Comment: recurrent breast cancer, she has declined further treatment  Plan: following with Dr. Montana for surveillance, clinical exam. She is focusing on quality of life.     Discussed reviewing health care directive and updating any pertinent information.     (S10.96XA,  W57.XXXA) Tick bite of neck, initial encounter  Comment: back of neck, non engorged, <36 hr  Plan: notify me if she develops fever, chills, headache etc    Merry Lino MD  Internal Medicine/Pediatrics

## 2024-05-01 LAB
CHOLEST SERPL-MCNC: 240 MG/DL
FASTING STATUS PATIENT QL REPORTED: NO
HDLC SERPL-MCNC: 53 MG/DL
LDLC SERPL CALC-MCNC: 159 MG/DL
NONHDLC SERPL-MCNC: 187 MG/DL
TRIGL SERPL-MCNC: 141 MG/DL

## 2024-05-01 NOTE — PROGRESS NOTES
Follow-up Note on Referred Patient  Gynecologic Oncology Clinic      Date of visit: 5/3/2024         Referring Provider/Gynecologic Oncologist:  Megan Arciniega MD  [facility]   Phone [phone]  [phone]      Radiation Oncologist:   Raymond Stockton MD, PhD  [facility]      Medical Oncologist:  Angela Montana MD  [facility].      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 Surveillance visit.      History of Present Illness:   Cordell Donaldson is a 74 year old initially referred to the Gynecologic Cancer Clinic at the [facility] on 2022 by gynecologic oncologist Dr. Arciniega and radiation oncologist Dr. Stockton for management of a pelvic mass due to recurrent cervical cancer vs new vaginal cancer. History as follows:     Breast Cancer History:  : Left breast cancer.  -Lumpectomy.  -Radiation therapy.    [id number]: BRENNAN.     22: PET/CT to stage vaginal cancer (see below).   Mild soft tissue  thickening in the left retropectoral region with an SUV max of 2.7.  7/15/22: Invasive ductal carcinoma.   -Managed by Dr. Montana. Endocrine therapy with anastozole recommended after completion of therapy for the vaginal cancer, but Cordell declined, prefers to focus on quality of life.       Cervical/Vaginal Dysplasia/Cancer History:  10/24/12:   -Endocervical curettings: Squamous carcinoma, invasion cannot be assessed.   -Ectocervical biopsies: HSIL (CIN3).   -Vaginal biopsy, posterior: HSIL (VaIN3).  2012: Robotic-assisted laparoscopic lysis of adhesions, left ureterolysis with  conversion to laparotomy, total abdominal hysterectomy, and an upper  Vaginectomy by gynecologic oncologist Dr. Arciniega.   -Pathology: Stage  IA1 squamous cell carcinoma of the cervix with no residual invasive cancer, residual HSIL.     7/22/13: Vaginal biopsy: HSIL, cannot exclude invasion.   -Consultation with gynecologic oncologist Dr. Johnson. Upper vaginectomy and CO2 laser ablation recommended, patient declined.   [id number]: No vaginal dysplasia treatment.   5/20/22: Pelvic MRI: I have personally reviewed the MRI images.   Centrally hypoenhancing heterogeneous midline pelvic mass along  the superior margin of the vaginal vault measures 8.5 x 6.7 x 8.1 cm.  The mass abut the bladder and there is mucosal irregularity at the  site of abutment. The mass indents the  decompressed rectum but there is no definite invasion or mucosal  Irregularity.  6/9/22: CT-guided biopsy of pelvic mass: Poorly-differentiated squamous cell carcinoma, HPV-associated.   7/1/22: Staging PET/CT: Stage III vaginal cancer (vs recurrent, metastatic cervical cancer). I have personally reviewed the PET/CT images.   Large heterogenous and hypermetabolic mass seen within the midline of  the pelvis measuring approximately 8.5 x 7.2 x 8.1 cm with an SUV max  measuring up to 13.2. Additionally, there is central hypoattenuation  with relative lack of metabolic activity, suggestive of central  necrosis. There is abutment of the pelvic mass along the  posterior aspect of the bladder and anterior rectum, cannot  definitively exclude invasion. No inguinal lymphadenopathy.  Redemonstration of hypermetabolic left pelvic lymphadenopathy, the  largest lymph node measuring up to 3.3 x 2.4 cm with an SUV max of  12.2. Additional conglomeration of  hypermetabolic lymph nodes at the aortic bifurcation measuring 2.9 x  1.6 cm with an SUV max of 14.3.  Unchanged enlarged mildly metabolic right pelvic lymph node measuring  2.5 x 1.1 cm with an SUV max of 4.2, favored to represent a reactive  lymph node given the relative lack of hypermetabolic activity compared  to the left pelvic lymph  node.    7/20/22, 8/10/22, 8/31/22,9/21/22, 10/26/22, 11/21/22: Cycles #1-6 of first-line chemotherapy with IV platinum + paclitaxel + bevacizumab 15 mg/kg + pembrolizumab 200 mg (added cycle 3) every 21 days.   -Cycles 1-4: Cisplatin 50 mg/m2, paclitaxel 175 mg/m2.   -Cycle 3:  Pembrolizumab added to all subsequent cycles due to PD-L1+ result per the [id number]-826 regimen.  -9/22/22: PET/CT: Partial response. I have personally reviewed the PET/CT images.   -Discussed with radiation oncologist Dr. Stockton and at the gyn onc/radiation oncology tumor conference. Recommendation for additional chemotherapy prior to radiation therapy.   -Cycles 5+: Platinum changed to carboplatin due to tinnitus, and paclitaxel dose-reduced to 135 mg/m2 due to peripheral neuropathy.   -12/5/22: PET/CT: Partial response. I have personally reviewed the PET/CT images.   12/28/22-2/15/23: First-line pelvic external beam radiation therapy with total dose 4500 cGy in 25 fractions then vaginal cuff brachytherapy with total dose 2500 cGy in 5 fractions. No concurrent chemosensitization due to myelosuppression and decreased patient tolerance of therapy.   -Patient declined maintenance therapy due to significantly decreased quality of life while on systemic therapy.   -6/8/23: PET/CT: No definitive evidence of disease. Ascites with imaging concerning for cirrhotic changes. I have personally reviewed the PET/CT images.   HEAD/NECK:  There is mild interval enlargement of multiple left supraclavicular  nodes, with uptake similar to blood pool, dominant node measures 1.2 x  1.1 cm (previously 1 x 0.7 cm).    CHEST:  Unchanged mild focal uptake in the left interpectoral region adjacent  to the biopsy clip measuring approximately 0.8 cm with SUV max  of 1.9. Unchanged additional, approximately 1 cm mildly avid soft  tissue anterior to the left subclavian vein.  A few small pulmonary nodules  are stable, including a 0.2 cm solid pulmonary nodule in the  left  upper lobe. No new or enlarging pulmonary nodules.  Stable cardiomegaly. Resolution of diffuse uptake in the left atrium,  right atrium and right ventricles. Trace pericardial effusion. Unchanged 2.9 cm subcutaneous  lipoma in the right lower chest wall.  ABDOMEN AND PELVIS:  Moderate volume ascites. There is a new peritoneal nodule in the right  lower quadrant measuring approximately 0.9 x 0.7 cm with SUV max of 4  (3/711). New mild ill-defined uptake is noted in the right perihepatic  region without discrete measurable implant.  There is interval reduction in FDG uptake and size of soft tissue  lesion at the site of recurrence along the vaginal cuff, measuring  approximately 3.5 x 2.9 cm (previously measuring 4.9 x 4.1 cm); with mild diffuse uptake with SUV max of 3.8 (previously 5). There is new curvilinear FDG uptake along the vagina,  right mesorectal fascia and presacral space.  Prominent mildly avid bilateral inguinal nodes are likely reactive.   Unchanged subcentimeter cysts in hepatic segment 8/5. Diffusely  heterogenous hepatic parenchymal density with fissural widening and  left lobe hypertrophy indicates cirrhotic morphology. Mild  pericholecystic fluid is likely reactive. Stable 0.9 cm cystic focus in  the pancreatic head, likely a side branch IPMN. Nodular thickening of  the left adrenal gland.   Extensive generalized  body wall edema.      Genetic/Genomic/Molecular Testing History:  2006: Negative for germline BRCA1, BRCA2 pathogenic variants.     8/14/22 (specimen from 6/9/22): [facility].   -PD-L1+ (CPS 10)  -Mismatch repair deficient/microsatellite instability-high  -TMB high (53 mut/Mb)  -NTRK 1/2/3 fusion not detected.       Subjective:  Cordell returns to the gyn onc clinic today for her scheduled surveillance visit, unaccompanied.   Cordell reports feeling well overall.  She has chronic bowel changes since radiation therapy. She has to sit on the toilet in the morning, ad then often  has a second bowel movement ~4p.    She continues to have mild neuropathy in her feet. She just tried acupuncture for this next week.   No abdominal bloating. Normal appetite.   She recently had a healthcare maintenance visit with her PCP, and is getting caught up on preventative care. No new medical/surgical/family history.       Past Medical History:  Past Medical History:   Diagnosis Date    Abnormal Pap smear     maybe 20 years ago    Cervical cancer (H)     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     History of breast cancer 2003    lumpectomy and radiation offerred chemo and patient declined at time but had oncogene test and low risk    Hyperlipidemia LDL goal < 160     Osteopenia     Paroxysmal A-fib (H)     Severe vaginal dysplasia        Past Surgical History:  Past Surgical History:   Procedure Laterality Date    BIOPSY      BREAST SURGERY Left 2003    lumpectomy left, did radiation, 5 yr of tamoxifen    COLONOSCOPY  2018    repeat in 2028    Colposcopy Cervix with Loop Electrode Conization and Lser to Vagina  2008    COLPOSCOPY, BIOPSY, COMBINED  10/24/2012    Procedure: COMBINED COLPOSCOPY, BIOPSY;  Colposcopy, Biopsy of Cervix and Vagina, Ultrasound Guidance;  Surgeon: Colette Ng MD;  Location: [facility] OR    ENT SURGERY  01/23/2018    otoscelerosis, right side    HYSTERECTOMY, FRANCIA  12/2012    high grade cervical dysplasia, [facility], Dr. Arciniega    INSERT INTERSTITIAL NEEDLE, ULTRASOUND GUIDED N/A 2/13/2023    Procedure: INSERTION, NEEDLE, WITH ULTRASOUND GUIDANCE, FOR INTERSTITIAL BRACHYTHERAPY;  Surgeon: Raymond Stockton MD;  Location: [facility] OR    INSERT PORT VASCULAR ACCESS Right 7/18/2022    Procedure: INSERTION, VASCULAR ACCESS PORT;  Surgeon: Donnie Elias MD;  Location: [facility]    IR CHEST PORT PLACEMENT > 5 YRS OF AGE  7/18/2022    IR PORT REMOVAL RIGHT  3/30/2023    AR LAP VAGINECTOMY, PARTIAL REMOVAL OF VAGINAL WALL  10/2013    upper vaginectomy for dysplasia,  Dr. Megan Arciniega    REMOVE INTERSTITIAL NEEDLE N/A 2/15/2023    Procedure: REMOVAL, INTERSTITIAL NEEDLE, AFTER TEMPORARY BRACHYTHERAPY;  Surgeon: Raymond Stockton MD;  Location: [facility] OR    REMOVE PORT VASCULAR ACCESS Right 3/30/2023    Procedure: Remove port vascular access right;  Surgeon: Donnie Elias MD;  Location: [facility] OR       Current Medications:   Current Outpatient Medications   Medication Sig Dispense Refill    Acetylcarnitine HCl 250 MG CAPS Take 500 mg by mouth daily      aspirin (ASA) 81 MG chewable tablet Take 81 mg by mouth every evening      Bacillus Coagulans-Inulin (PROBIOTIC FORMULA) 1-250 BILLION-MG CAPS Take by mouth every morning 25+billion CFUS      BENFOTIAMINE PO Take 150 mg by mouth every morning      Cholecalciferol (VITAMIN D-3) 25 MCG (1000 UT) CAPS Take 1,000 Units by mouth every morning 90 capsule 3    coenzyme Q-10 capsule Take 1 capsule by mouth daily      levothyroxine (SYNTHROID/LEVOTHROID) 112 MCG tablet Take 1 tablet (112 mcg) by mouth daily before breakfast 90 tablet 3    MAGNESIUM OXIDE PO Take 400 mg by mouth daily      metoprolol succinate ER (TOPROL XL) 50 MG 24 hr tablet Take 1 tablet (50 mg) by mouth daily 90 tablet 2    Omega-3 Fatty Acids (OMEGA-3 FISH OIL PO) Take 630 mg by mouth 2 times daily      OVER-THE-COUNTER Turkey tail mushroom powder, use 1 times a day      Pectin Cit-Inos-C-Bioflav-Soy (MODIFIED CITRUS PECTIN PO) Take by mouth 2 times daily      Taurine 1000 MG CAPS Take 1,000 mg by mouth every morning 60 capsule 11    UNABLE TO FIND 500 mg MEDICATION NAME: Turmeric      UNABLE TO FIND 500 mg MEDICATION NAME: Calcium D- Glucarate      UNABLE TO FIND 1,200 mg 2 times daily MEDICATION NAME: [place]      UNABLE TO FIND 2 times daily MEDICATION NAME: [facility] for 1000mg calcium  4 pills per day      UNABLE TO FIND Take 1 tablet by mouth every morning MEDICATION NAME: Dinndolylmethane Complex (DIM) 100mg tablet 1 table daily       No current facility-administered  "medications for this visit.        Allergies:   No Known Allergies       Social History:  Patient lives with her . 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 She walks daily, gardens, does yoga twice/week.  She does have Advanced Directives, but has identified her daughter and  as her desired Healthcare [employer] of .   Social History     Tobacco Use    Smoking status: Former     Current packs/day: 0.00     Average packs/day: 0.5 packs/day for 15.0 years (7.5 ttl pk-yrs)     Types: Cigarettes     Start date:      Quit date:      Years since quittin.3     Passive exposure: Past    Smokeless tobacco: Never   Substance Use Topics    Alcohol use: Not Currently     Alcohol/week: 0.0 - 2.0 standard drinks of alcohol     Comment: Socially        History   Drug Use No       Family History:   The patient's family history is notable for a first-degree and second-degree paternal relative with breast cancer.  No known family history of ovarian, uterine, colon, urothelial/renal, prostate or pancreatic cancers.  No known family history of melanoma.  Family History   Problem Relation Age of Onset    Myocardial Infarction Mother 73        Tob    Myocardial Infarction Father 68        Tob    Breast Cancer Sister 47         of breast cancer age 63; BRCA mutation testing negative    Cancer Maternal Grandmother         Unsure of type.    Breast Cancer Paternal Grandmother         Unsure of diagnosis--some type of \"women's issue\"    Skin Cancer Daughter 47    Anesthesia Reaction No family hx of     Deep Vein Thrombosis (DVT) No family hx of        Physical Exam:    /81   Pulse 76   Temp 97.6  F (36.4  C) (Oral)   Resp 16   Wt 61.2 kg (135 lb)   LMP  (LMP Unknown)   SpO2 99%   BMI 24.49 kg/m     Body mass index is 24.49 kg/m .    General Appearance: healthy and alert, no distress     HEENT:  no thyromegaly, no palpable nodules or masses        Cardiovascular: regular rate and rhythm, no gallops, " rubs or murmurs     Respiratory: lungs clear, no rales, rhonchi or wheezes, normal diaphragmatic excursion    Musculoskeletal: extremities non tender and without edema    Skin: no lesions or rashes     Neurological: normal gait, no gross defects     Psychiatric: appropriate mood and affect                               Hematological: normal cervical, supraclavicular and inguinal lymph nodes     Gastrointestinal:       abdomen soft, non-tender, non-distended, no organomegaly or masses    Genitourinary: External genitalia and urethral meatus appears normal. Vagina is shortened to ~4-5 cm, but grossly normal. On bimanual exam the vagina is smooth. Rectovaginal exam is limited to the most distal portion due to patient discomfort, but no nodularity or thickening of the palpable rectovaginal septum.   Verbal consent obtained from the patient for the pelvic exam.   Each step of the exam was verbalized throughout.  Present for the exam: GEOFF Horton.        Radiographic Examination:  NA      Performance Status:  ECOG Grade 0.      Assessment:  Cordell Donaldson is a 74 year old patient with a diagnosis of stage III squamous cell carcinoma of the vagina (vs recurrent metastatic cervical cancer) s/p primary therapy with PET/CT showing no definitive evidence of disease.  Medical history significant for FIGO Stage IA1 squamous cell carcinoma of the cervix s/p hysterectomy with no residual disease, and a long history of vaginal HSIL.    Diagnosis of recurrent breast cancer during vaginal cancer treatment.     Development of non-malignant ascites during radiation therapy, with PET/CT findings concerning for cirrhosis, although diagnostic abdominal ultrasound negative for radiographic signs of cirrhsosis.   Previous cardiac PET/CT findings resolved, with additional cardiac work-up negative for pathology.       Plan:   1.)    Squamous cell carcinoma: Cordell will return to the gyn onc clinic in 3 months for her next surveillance  visit.   -Surveillance as follows (per the NCCN guidelines):  Every 3 months x2 years, then every 6 months x3 years (MD per patient request). No cytology tests given high false-positive rate in the setting of radiation therapy.   She will call in the interim if she has any vaginal bleeding or other concerning symptoms.     Side-effects:   -Grade 1 peripheral neuropathy (attributable to paclitaxel and cisplatin): Currently stable, not interfering with activity. Patient discharged from PT since she has improved her strength/function.    2.) Breast cancer: Continue management per medical oncologist Dr. Montana. Radiographic response of breast cancer to current chemotherapy. endocrine therapy with anastrozole recommended, but patient declined, favoring focus on quality of life.      3.) Genetic risk factors were assessed and the patient does not meet the qualifications for a referral.      4.) Labs and/or tests ordered include: None.     5.) Health maintenance issues addressed today include: continuing routine healthcare maintenance with her primary care provider.     6.) Code status:  Full-code.    7.) Prescriptions: None.        A total of 15 minutes was spent with the patient, 12 minutes of which were spent in counseling the patient and/or treatment planning; an additional 5  minutes was spent in chart review (including review of medical oncologist notes) and documentation.        Hina Raygoza MD, MS, FACOG, FACS  5/3/2024  1:40 PM                    CC  Patient Care Team:  Merry Lino MD as PCP - General (Internal Medicine)  Bess Paredes MD [place], Aaron Daniel, DPM as MD (Podiatry)  Elise Huitron MD as Assigned Heart and Vascular Provider  Merry Lino MD as Assigned PCP  Tess Iniguez RN as Specialty Care Coordinator  Heather Ayon MD as MD (Urology)  Angela Montana MD as MD (Hematology & Oncology)  Yvette Pike MD as MD  (Surgery)  Valeri Kirby, RN as Specialty Care Coordinator (Hematology & Oncology)  Nurys Horton RN as Specialty Care Coordinator (Hematology & Oncology)  Felicia Thompson PA-C as Physician Assistant (Anesthesiology)  Aleta Shea MD as Assigned Neuroscience Provider  Raymond Stockton MD as Assigned Cancer Care Provider  Valeria Spencer MD as MD (Gastroenterology)  Yulisa Aparicio MD as MD (Dermatology)  SELF, REFERRED

## 2024-05-01 NOTE — PATIENT INSTRUCTIONS
SCHEDULING:  -RTC in 3 months for vaginal cancer surveillance (MD, in-person). 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   History of vaginal HSIL (pre-cancer).  Squamous cell carcinoma involving a pelvic mass with associated lymphadenopathy, likely stage III vaginal cancer (vs recurrent, metastatic cervical cancer), currently without definitive evidence of disease.   Treatment History:  -12/2012: Robotic-assisted laparoscopic lysis of adhesions (take-down of scar tissue), left ureterolysis (freeing of the left ureter) with  conversion to laparotomy, total abdominal hysterectomy (removal of uterus/cervix), and an upper  Vaginectomy (removal of the upper vagina).  -7/20/22-present: First-line therapy with IV cisplatin + paclitaxel + bevacizumab + pembrolizumab (added cycles 3+).   -12/28/22-2/15/23: First-line pelvic external beam radiation therapy then vaginal brachytherapy.       PLAN:  1) Squamous cell carcinoma:   Surveillance as follows (per the NCCN guidelines):  Every 3 months x2 years, then every 6 months x3 years.   Please call in the interim if you have any vaginal bleeding or other concerning symptoms.     2) Cancer Rehabilitation Clinic:  [email or web address]        Please call with any questions or concerns. [phone]       Hina Raygoza MD, MS, FACOG, FACS  5/3/2024  1:41 PM

## 2024-05-03 ENCOUNTER — ONCOLOGY VISIT (OUTPATIENT)
Dept: ONCOLOGY | Facility: CLINIC | Age: 74
End: 2024-05-03
Attending: OBSTETRICS & GYNECOLOGY
Payer: COMMERCIAL

## 2024-05-03 VITALS
RESPIRATION RATE: 16 BRPM | OXYGEN SATURATION: 99 % | SYSTOLIC BLOOD PRESSURE: 115 MMHG | BODY MASS INDEX: 24.49 KG/M2 | HEART RATE: 76 BPM | WEIGHT: 135 LBS | DIASTOLIC BLOOD PRESSURE: 81 MMHG | TEMPERATURE: 97.6 F

## 2024-05-03 DIAGNOSIS — C52 VAGINAL CANCER (H): Primary | ICD-10-CM

## 2024-05-03 DIAGNOSIS — G62.0 DRUG-INDUCED POLYNEUROPATHY (H): ICD-10-CM

## 2024-05-03 PROCEDURE — G0463 HOSPITAL OUTPT CLINIC VISIT: HCPCS | Performed by: OBSTETRICS & GYNECOLOGY

## 2024-05-03 PROCEDURE — 99214 OFFICE O/P EST MOD 30 MIN: CPT | Performed by: OBSTETRICS & GYNECOLOGY

## 2024-05-03 ASSESSMENT — PAIN SCALES - GENERAL: PAINLEVEL: NO PAIN (0)

## 2024-05-03 NOTE — LETTER
5/3/2024         RE: Cordell Donaldson  [address]  [facility] [address]        Dear Colleague,    Thank you for referring your patient, Cordell Donaldson, to the [facility]. Please see a copy of my visit note below.    Follow-up Note on Referred Patient  Gynecologic Oncology Clinic      Date of visit: 5/3/2024         Referring Provider/Gynecologic Oncologist:  Megan Arciniega MD  [facility]   Phone [phone]  [phone]      Radiation Oncologist:   Raymond Stockton MD, PhD  [facility]      Medical Oncologist:  Angela Montana MD  [facility].      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 Surveillance visit.      History of Present Illness:   Cordell Donaldson is a 74 year old initially referred to the Gynecologic Cancer Clinic at the [facility] on 2022 by gynecologic oncologist Dr. Arciniega and radiation oncologist Dr. Stockton for management of a pelvic mass due to recurrent cervical cancer vs new vaginal cancer. History as follows:     Breast Cancer History:  : Left breast cancer.  -Lumpectomy.  -Radiation therapy.    [id number]: BRENNAN.     22: PET/CT to stage vaginal cancer (see below).   Mild soft tissue  thickening in the left retropectoral region with an SUV max of 2.7.  7/15/22: Invasive ductal carcinoma.   -Managed by Dr. Montana. Endocrine therapy with anastozole recommended after completion of therapy for the vaginal cancer, but Cordell declined, prefers to focus on quality of life.       Cervical/Vaginal Dysplasia/Cancer History:  10/24/12:   -Endocervical curettings: Squamous carcinoma, invasion cannot be assessed.   -Ectocervical biopsies: HSIL (CIN3).    -Vaginal biopsy, posterior: HSIL (VaIN3).  12/2012: Robotic-assisted laparoscopic lysis of adhesions, left ureterolysis with  conversion to laparotomy, total abdominal hysterectomy, and an upper  Vaginectomy by gynecologic oncologist Dr. Arciniega.   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     7/22/13: Vaginal biopsy: HSIL, cannot exclude invasion.   -Consultation with gynecologic oncologist Dr. Johnson. Upper vaginectomy and CO2 laser ablation recommended, patient declined.   7505-1219: No vaginal dysplasia treatment.   5/20/22: Pelvic MRI: I have personally reviewed the MRI images.   Centrally hypoenhancing heterogeneous midline pelvic mass along  the superior margin of the vaginal vault measures 8.5 x 6.7 x 8.1 cm.  The mass abut the bladder and there is mucosal irregularity at the  site of abutment. The mass indents the  decompressed rectum but there is no definite invasion or mucosal  Irregularity.  6/9/22: CT-guided biopsy of pelvic mass: Poorly-differentiated squamous cell carcinoma, HPV-associated.   7/1/22: Staging PET/CT: Stage III vaginal cancer (vs recurrent, metastatic cervical cancer). I have personally reviewed the PET/CT images.   Large heterogenous and hypermetabolic mass seen within the midline of  the pelvis measuring approximately 8.5 x 7.2 x 8.1 cm with an SUV max  measuring up to 13.2. Additionally, there is central hypoattenuation  with relative lack of metabolic activity, suggestive of central  necrosis. There is abutment of the pelvic mass along the  posterior aspect of the bladder and anterior rectum, cannot  definitively exclude invasion. No inguinal lymphadenopathy.  Redemonstration of hypermetabolic left pelvic lymphadenopathy, the  largest lymph node measuring up to 3.3 x 2.4 cm with an SUV max of  12.2. Additional conglomeration of  hypermetabolic lymph nodes at the aortic bifurcation measuring 2.9 x  1.6 cm with an SUV  max of 14.3.  Unchanged enlarged mildly metabolic right pelvic lymph node measuring  2.5 x 1.1 cm with an SUV max of 4.2, favored to represent a reactive  lymph node given the relative lack of hypermetabolic activity compared  to the left pelvic lymph node.    7/20/22, 8/10/22, 8/31/22,9/21/22, 10/26/22, 11/21/22: Cycles #1-6 of first-line chemotherapy with IV platinum + paclitaxel + bevacizumab 15 mg/kg + pembrolizumab 200 mg (added cycle 3) every 21 days.   -Cycles 1-4: Cisplatin 50 mg/m2, paclitaxel 175 mg/m2.   -Cycle 3:  Pembrolizumab added to all subsequent cycles due to PD-L1+ result per the KEYNOTE-826 regimen.  -9/22/22: PET/CT: Partial response. I have personally reviewed the PET/CT images.   -Discussed with radiation oncologist Dr. Stockton and at the gyn onc/radiation oncology tumor conference. Recommendation for additional chemotherapy prior to radiation therapy.   -Cycles 5+: Platinum changed to carboplatin due to tinnitus, and paclitaxel dose-reduced to 135 mg/m2 due to peripheral neuropathy.   -12/5/22: PET/CT: Partial response. I have personally reviewed the PET/CT images.   12/28/22-2/15/23: First-line pelvic external beam radiation therapy with total dose 4500 cGy in 25 fractions then vaginal cuff brachytherapy with total dose 2500 cGy in 5 fractions. No concurrent chemosensitization due to myelosuppression and decreased patient tolerance of therapy.   -Patient declined maintenance therapy due to significantly decreased quality of life while on systemic therapy.   -6/8/23: PET/CT: No definitive evidence of disease. Ascites with imaging concerning for cirrhotic changes. I have personally reviewed the PET/CT images.   HEAD/NECK:  There is mild interval enlargement of multiple left supraclavicular  nodes, with uptake similar to blood pool, dominant node measures 1.2 x  1.1 cm (previously 1 x 0.7 cm).    CHEST:  Unchanged mild focal uptake in the left interpectoral region adjacent  to the biopsy clip  measuring approximately 0.8 cm with SUV max  of 1.9. Unchanged additional, approximately 1 cm mildly avid soft  tissue anterior to the left subclavian vein.  A few small pulmonary nodules  are stable, including a 0.2 cm solid pulmonary nodule in the left  upper lobe. No new or enlarging pulmonary nodules.  Stable cardiomegaly. Resolution of diffuse uptake in the left atrium,  right atrium and right ventricles. Trace pericardial effusion. Unchanged 2.9 cm subcutaneous  lipoma in the right lower chest wall.  ABDOMEN AND PELVIS:  Moderate volume ascites. There is a new peritoneal nodule in the right  lower quadrant measuring approximately 0.9 x 0.7 cm with SUV max of 4  (3/711). New mild ill-defined uptake is noted in the right perihepatic  region without discrete measurable implant.  There is interval reduction in FDG uptake and size of soft tissue  lesion at the site of recurrence along the vaginal cuff, measuring  approximately 3.5 x 2.9 cm (previously measuring 4.9 x 4.1 cm); with mild diffuse uptake with SUV max of 3.8 (previously 5). There is new curvilinear FDG uptake along the vagina,  right mesorectal fascia and presacral space.  Prominent mildly avid bilateral inguinal nodes are likely reactive.   Unchanged subcentimeter cysts in hepatic segment 8/5. Diffusely  heterogenous hepatic parenchymal density with fissural widening and  left lobe hypertrophy indicates cirrhotic morphology. Mild  pericholecystic fluid is likely reactive. Stable 0.9 cm cystic focus in  the pancreatic head, likely a side branch IPMN. Nodular thickening of  the left adrenal gland.   Extensive generalized  body wall edema.      Genetic/Genomic/Molecular Testing History:  2006: Negative for germline BRCA1, BRCA2 pathogenic variants.     8/14/22 (specimen from 6/9/22): [facility].   -PD-L1+ (CPS 10)  -Mismatch repair deficient/microsatellite instability-high  -TMB high (53 mut/Mb)  -NTRK 1/2/3 fusion not detected.        Subjective:  Cordell returns to the gyn onc clinic today for her scheduled surveillance visit, unaccompanied.   Cordell reports feeling well overall.  She has chronic bowel changes since radiation therapy. She has to sit on the toilet in the morning, ad then often has a second bowel movement ~4p.    She continues to have mild neuropathy in her feet. She just tried acupuncture for this next week.   No abdominal bloating. Normal appetite.   She recently had a healthcare maintenance visit with her PCP, and is getting caught up on preventative care. No new medical/surgical/family history.       Past Medical History:  Past Medical History:   Diagnosis Date    Abnormal Pap smear     maybe 20 years ago    Cervical cancer (H)     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     History of breast cancer 2003    lumpectomy and radiation offerred chemo and patient declined at time but had oncogene test and low risk    Hyperlipidemia LDL goal < 160     Osteopenia     Paroxysmal A-fib (H)     Severe vaginal dysplasia        Past Surgical History:  Past Surgical History:   Procedure Laterality Date    BIOPSY      BREAST SURGERY Left 2003    lumpectomy left, did radiation, 5 yr of tamoxifen    COLONOSCOPY  2018    repeat in 2028    Colposcopy Cervix with Loop Electrode Conization and Lser to Vagina  2008    COLPOSCOPY, BIOPSY, COMBINED  10/24/2012    Procedure: COMBINED COLPOSCOPY, BIOPSY;  Colposcopy, Biopsy of Cervix and Vagina, Ultrasound Guidance;  Surgeon: Colette Ng MD;  Location: [facility] OR    ENT SURGERY  01/23/2018    otoscelerosis, right side    HYSTERECTOMY, FRANCIA  12/2012    high grade cervical dysplasia, [facility], Dr. Arciniega    INSERT INTERSTITIAL NEEDLE, ULTRASOUND GUIDED N/A 2/13/2023    Procedure: INSERTION, NEEDLE, WITH ULTRASOUND GUIDANCE, FOR INTERSTITIAL BRACHYTHERAPY;  Surgeon: Raymond Stockton MD;  Location: [facility] OR    INSERT PORT VASCULAR ACCESS Right 7/18/2022    Procedure:  INSERTION, VASCULAR ACCESS PORT;  Surgeon: Donnie Elias MD;  Location: [facility] OR    IR CHEST PORT PLACEMENT > 5 YRS OF AGE  7/18/2022    IR PORT REMOVAL RIGHT  3/30/2023    [place] LAP VAGINECTOMY, PARTIAL REMOVAL OF VAGINAL WALL  10/2013    upper vaginectomy for dysplasia, Dr. Megan Arciniega    REMOVE INTERSTITIAL NEEDLE N/A 2/15/2023    Procedure: REMOVAL, INTERSTITIAL NEEDLE, AFTER TEMPORARY BRACHYTHERAPY;  Surgeon: Raymond Stockton MD;  Location: [facility] OR    REMOVE PORT VASCULAR ACCESS Right 3/30/2023    Procedure: Remove port vascular access right;  Surgeon: Donnie Elias MD;  Location: [facility] OR       Current Medications:   Current Outpatient Medications   Medication Sig Dispense Refill    Acetylcarnitine HCl 250 MG CAPS Take 500 mg by mouth daily      aspirin (ASA) 81 MG chewable tablet Take 81 mg by mouth every evening      Bacillus Coagulans-Inulin (PROBIOTIC FORMULA) 1-250 BILLION-MG CAPS Take by mouth every morning 25+billion CFUS      BENFOTIAMINE PO Take 150 mg by mouth every morning      Cholecalciferol (VITAMIN D-3) 25 MCG (1000 UT) CAPS Take 1,000 Units by mouth every morning 90 capsule 3    coenzyme Q-10 capsule Take 1 capsule by mouth daily      levothyroxine (SYNTHROID/LEVOTHROID) 112 MCG tablet Take 1 tablet (112 mcg) by mouth daily before breakfast 90 tablet 3    MAGNESIUM OXIDE PO Take 400 mg by mouth daily      metoprolol succinate ER (TOPROL XL) 50 MG 24 hr tablet Take 1 tablet (50 mg) by mouth daily 90 tablet 2    Omega-3 Fatty Acids (OMEGA-3 FISH OIL PO) Take 630 mg by mouth 2 times daily      OVER-THE-COUNTER Turkey tail mushroom powder, use 1 times a day      Pectin Cit-Inos-C-Bioflav-Soy (MODIFIED CITRUS PECTIN PO) Take by mouth 2 times daily      Taurine 1000 MG CAPS Take 1,000 mg by mouth every morning 60 capsule 11    UNABLE TO FIND 500 mg MEDICATION NAME: Turmeric      UNABLE TO FIND 500 mg MEDICATION NAME: Calcium D- Glucarate      UNABLE TO FIND 1,200 mg 2 times daily MEDICATION NAME:  "Efraín      UNABLE TO FIND 2 times daily MEDICATION NAME: [facility] for 1000mg calcium  4 pills per day      UNABLE TO FIND Take 1 tablet by mouth every morning MEDICATION NAME: Dinndolylmethane Complex (DIM) 100mg tablet 1 table daily       No current facility-administered medications for this visit.        Allergies:   No Known Allergies       Social History:  Patient lives with her . 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 She walks daily, gardens, does yoga twice/week.  She does have Advanced Directives, but has identified her daughter and  as her desired Healthcare [employer] of .   Social History     Tobacco Use    Smoking status: Former     Current packs/day: 0.00     Average packs/day: 0.5 packs/day for 15.0 years (7.5 ttl pk-yrs)     Types: Cigarettes     Start date:      Quit date:      Years since quittin.3     Passive exposure: Past    Smokeless tobacco: Never   Substance Use Topics    Alcohol use: Not Currently     Alcohol/week: 0.0 - 2.0 standard drinks of alcohol     Comment: Socially        History   Drug Use No       Family History:   The patient's family history is notable for a first-degree and second-degree paternal relative with breast cancer.  No known family history of ovarian, uterine, colon, urothelial/renal, prostate or pancreatic cancers.  No known family history of melanoma.  Family History   Problem Relation Age of Onset    Myocardial Infarction Mother 73        Tob    Myocardial Infarction Father 68        Tob    Breast Cancer Sister 47         of breast cancer age 63; BRCA mutation testing negative    Cancer Maternal Grandmother         Unsure of type.    Breast Cancer Paternal Grandmother         Unsure of diagnosis--some type of \"women's issue\"    Skin Cancer Daughter 47    Anesthesia Reaction No family hx of     Deep Vein Thrombosis (DVT) No family hx of        Physical Exam:    /81   Pulse 76   Temp 97.6  F (36.4  C) (Oral)   Resp 16   Wt 61.2 " kg (135 lb)   LMP  (LMP Unknown)   SpO2 99%   BMI 24.49 kg/m     Body mass index is 24.49 kg/m .    General Appearance: healthy and alert, no distress     HEENT:  no thyromegaly, no palpable nodules or masses        Cardiovascular: regular rate and rhythm, no gallops, rubs or murmurs     Respiratory: lungs clear, no rales, rhonchi or wheezes, normal diaphragmatic excursion    Musculoskeletal: extremities non tender and without edema    Skin: no lesions or rashes     Neurological: normal gait, no gross defects     Psychiatric: appropriate mood and affect                               Hematological: normal cervical, supraclavicular and inguinal lymph nodes     Gastrointestinal:       abdomen soft, non-tender, non-distended, no organomegaly or masses    Genitourinary: External genitalia and urethral meatus appears normal. Vagina is shortened to ~4-5 cm, but grossly normal. On bimanual exam the vagina is smooth. Rectovaginal exam is limited to the most distal portion due to patient discomfort, but no nodularity or thickening of the palpable rectovaginal septum.   Verbal consent obtained from the patient for the pelvic exam.   Each step of the exam was verbalized throughout.  Present for the exam: GEOFF Horton.        Radiographic Examination:  NA      Performance Status:  ECOG Grade 0.      Assessment:  Cordell Donaldson is a 74 year old patient with a diagnosis of stage III squamous cell carcinoma of the vagina (vs recurrent metastatic cervical cancer) s/p primary therapy with PET/CT showing no definitive evidence of disease.  Medical history significant for FIGO Stage IA1 squamous cell carcinoma of the cervix s/p hysterectomy with no residual disease, and a long history of vaginal HSIL.    Diagnosis of recurrent breast cancer during vaginal cancer treatment.     Development of non-malignant ascites during radiation therapy, with PET/CT findings concerning for cirrhosis, although diagnostic abdominal ultrasound  negative for radiographic signs of cirrhsosis.   Previous cardiac PET/CT findings resolved, with additional cardiac work-up negative for pathology.       Plan:   1.)    Squamous cell carcinoma: Cordell will return to the gyn onc clinic in 3 months for her next surveillance visit.   -Surveillance as follows (per the NCCN guidelines):  Every 3 months x2 years, then every 6 months x3 years (MD per patient request). No cytology tests given high false-positive rate in the setting of radiation therapy.   She will call in the interim if she has any vaginal bleeding or other concerning symptoms.     Side-effects:   -Grade 1 peripheral neuropathy (attributable to paclitaxel and cisplatin): Currently stable, not interfering with activity. Patient discharged from PT since she has improved her strength/function.    2.) Breast cancer: Continue management per medical oncologist Dr. Montana. Radiographic response of breast cancer to current chemotherapy. endocrine therapy with anastrozole recommended, but patient declined, favoring focus on quality of life.      3.) Genetic risk factors were assessed and the patient does not meet the qualifications for a referral.      4.) Labs and/or tests ordered include: None.     5.) Health maintenance issues addressed today include: continuing routine healthcare maintenance with her primary care provider.     6.) Code status:  Full-code.    7.) Prescriptions: None.        A total of 15 minutes was spent with the patient, 12 minutes of which were spent in counseling the patient and/or treatment planning; an additional 5  minutes was spent in chart review (including review of medical oncologist notes) and documentation.        Hina Raygoza MD, MS, FACOG, FACS  5/3/2024  1:40 PM      CC  Patient Care Team:  Merry Lino MD as PCP - General (Internal Medicine)  Bess Paredes MD [place], Aaron Daniel, DPM as MD (Podiatry)

## 2024-05-03 NOTE — NURSING NOTE
"Oncology Rooming Note    May 3, 2024 1:04 PM   Cordell Donaldson is a 74 year old female who presents for:    Chief Complaint   Patient presents with    Oncology Clinic Visit     RTN for Cervical Cancer     Initial Vitals: /81   Pulse 76   Temp 97.6  F (36.4  C) (Oral)   Resp 16   Wt 61.2 kg (135 lb)   LMP  (LMP Unknown)   SpO2 99%   BMI 24.49 kg/m   Estimated body mass index is 24.49 kg/m  as calculated from the following:    Height as of 4/30/24: 1.581 m (5' 2.25\").    Weight as of this encounter: 61.2 kg (135 lb). Body surface area is 1.64 meters squared.  No Pain (0) Comment: Data Unavailable   No LMP recorded (lmp unknown). Patient has had a hysterectomy.  Allergies reviewed: Yes  Medications reviewed: Yes    Medications: Medication refills not needed today.  Pharmacy name entered into Dazo:    [id number] - [place], [place] - [address] [facility]  [id number] - [place], [place] - [address] [facility] [place], [place] - [address] [place] [address] [phone]    Frailty Screening:   Is the patient here for a new oncology consult visit in cancer care? 2. No      Clinical concerns: none       Yakelin Fontanez MA             "

## 2024-06-09 DIAGNOSIS — I48.0 PAROXYSMAL ATRIAL FIBRILLATION (H): ICD-10-CM

## 2024-06-12 RX ORDER — METOPROLOL SUCCINATE 50 MG/1
50 TABLET, EXTENDED RELEASE ORAL DAILY
Qty: 90 TABLET | Refills: 1 | Status: SHIPPED | OUTPATIENT
Start: 2024-06-12

## 2024-06-20 ENCOUNTER — ANCILLARY PROCEDURE (OUTPATIENT)
Dept: BONE DENSITY | Facility: CLINIC | Age: 74
End: 2024-06-20
Attending: INTERNAL MEDICINE
Payer: COMMERCIAL

## 2024-06-20 DIAGNOSIS — E28.39 ESTROGEN DEFICIENCY: ICD-10-CM

## 2024-06-20 PROCEDURE — 77080 DXA BONE DENSITY AXIAL: CPT | Performed by: INTERNAL MEDICINE

## 2024-08-28 NOTE — PROVIDER NOTIFICATION
Patient arrives to triage with chief complaint of right upper abdominal pain since about 1300 today.  Endorses nausea without vomiting.  Last bowel movement was today.  History of Crohn's Disease.  Alert and oriented x4.          Patient is awaiting for discharge order, voided x 2 total of 150, /79, and 124/77, Gyn oncology notified.

## 2024-09-05 NOTE — PATIENT INSTRUCTIONS
SCHEDULING:  -RTC in 3 months for vaginal cancer surveillance (MD, in-person). 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   History of vaginal HSIL (pre-cancer).  Squamous cell carcinoma involving a pelvic mass with associated lymphadenopathy, likely stage III vaginal cancer (vs recurrent, metastatic cervical cancer), currently without definitive evidence of disease.   Treatment History:  -12/2012: Robotic-assisted laparoscopic lysis of adhesions (take-down of scar tissue), left ureterolysis (freeing of the left ureter) with  conversion to laparotomy, total abdominal hysterectomy (removal of uterus/cervix), and an upper  Vaginectomy (removal of the upper vagina).  -7/20/22-present: First-line therapy with IV cisplatin + paclitaxel + bevacizumab + pembrolizumab (added cycles 3+).   -12/28/22-2/15/23: First-line pelvic external beam radiation therapy then vaginal brachytherapy.       PLAN:  1) Squamous cell carcinoma:   Surveillance as follows (per the NCCN guidelines):  Every 3 months x2 years, then every 6 months x3 years.   Please call in the interim if you have any vaginal bleeding or other concerning symptoms.         Please call with any questions or concerns. [phone]       Hina Raygoza MD, MS, FACOG, FACS  9/6/2024  11:37 AM

## 2024-09-05 NOTE — PROGRESS NOTES
Follow-up Note on Referred Patient  Gynecologic Oncology Clinic      Date of visit: 2024         Referring Provider/Gynecologic Oncologist:  Megan Arciniega MD  [facility]   Phone [phone]  [phone]      Radiation Oncologist:   Raymond Stockton MD, PhD  [facility]      Medical Oncologist:  Angela Montana MD  [facility].      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 Surveillance visit.      History of Present Illness:   Cordell Donaldson is a 74 year old initially referred to the Gynecologic Cancer Clinic at the [facility] on 2022 by gynecologic oncologist Dr. Arciniega and radiation oncologist Dr. Stockton for management of a pelvic mass due to recurrent cervical cancer vs new vaginal cancer. History as follows:     Breast Cancer History:  : Left breast cancer.  -Lumpectomy.  -Radiation therapy.    [id number]: BRENNAN.     22: PET/CT to stage vaginal cancer (see below).   Mild soft tissue  thickening in the left retropectoral region with an SUV max of 2.7.  7/15/22: Invasive ductal carcinoma.   -Managed by Dr. Montana. Endocrine therapy with anastozole recommended after completion of therapy for the vaginal cancer, but Cordell declined, prefers to focus on quality of life.       Cervical/Vaginal Dysplasia/Cancer History:  10/24/12:   -Endocervical curettings: Squamous carcinoma, invasion cannot be assessed.   -Ectocervical biopsies: HSIL (CIN3).   -Vaginal biopsy, posterior: HSIL (VaIN3).  2012: Robotic-assisted laparoscopic lysis of adhesions, left ureterolysis with  conversion to laparotomy, total abdominal hysterectomy, and an upper  Vaginectomy by gynecologic oncologist Dr. Arciniega.   -Pathology: Stage  IA1 squamous cell carcinoma of the cervix with no residual invasive cancer, residual HSIL.     7/22/13: Vaginal biopsy: HSIL, cannot exclude invasion.   -Consultation with gynecologic oncologist Dr. Johnson. Upper vaginectomy and CO2 laser ablation recommended, patient declined.   4920-1008: No vaginal dysplasia treatment.   5/20/22: Pelvic MRI: I have personally reviewed the MRI images.   Centrally hypoenhancing heterogeneous midline pelvic mass along  the superior margin of the vaginal vault measures 8.5 x 6.7 x 8.1 cm.  The mass abut the bladder and there is mucosal irregularity at the  site of abutment. The mass indents the  decompressed rectum but there is no definite invasion or mucosal  Irregularity.  6/9/22: CT-guided biopsy of pelvic mass: Poorly-differentiated squamous cell carcinoma, HPV-associated.   7/1/22: Staging PET/CT: Stage III vaginal cancer (vs recurrent, metastatic cervical cancer). I have personally reviewed the PET/CT images.   Large heterogenous and hypermetabolic mass seen within the midline of  the pelvis measuring approximately 8.5 x 7.2 x 8.1 cm with an SUV max  measuring up to 13.2. Additionally, there is central hypoattenuation  with relative lack of metabolic activity, suggestive of central  necrosis. There is abutment of the pelvic mass along the  posterior aspect of the bladder and anterior rectum, cannot  definitively exclude invasion. No inguinal lymphadenopathy.  Redemonstration of hypermetabolic left pelvic lymphadenopathy, the  largest lymph node measuring up to 3.3 x 2.4 cm with an SUV max of  12.2. Additional conglomeration of  hypermetabolic lymph nodes at the aortic bifurcation measuring 2.9 x  1.6 cm with an SUV max of 14.3.  Unchanged enlarged mildly metabolic right pelvic lymph node measuring  2.5 x 1.1 cm with an SUV max of 4.2, favored to represent a reactive  lymph node given the relative lack of hypermetabolic activity compared  to the left pelvic lymph  node.    7/20/22, 8/10/22, 8/31/22,9/21/22, 10/26/22, 11/21/22: Cycles #1-6 of first-line chemotherapy with IV platinum + paclitaxel + bevacizumab 15 mg/kg + pembrolizumab 200 mg (added cycle 3) every 21 days.   -Cycles 1-4: Cisplatin 50 mg/m2, paclitaxel 175 mg/m2.   -Cycle 3:  Pembrolizumab added to all subsequent cycles due to PD-L1+ result per the KEYNOTE-826 regimen.  -9/22/22: PET/CT: Partial response. I have personally reviewed the PET/CT images.   -Discussed with radiation oncologist Dr. Stockton and at the gyn onc/radiation oncology tumor conference. Recommendation for additional chemotherapy prior to radiation therapy.   -Cycles 5+: Platinum changed to carboplatin due to tinnitus, and paclitaxel dose-reduced to 135 mg/m2 due to peripheral neuropathy.   -12/5/22: PET/CT: Partial response. I have personally reviewed the PET/CT images.   12/28/22-2/15/23: First-line pelvic external beam radiation therapy with total dose 4500 cGy in 25 fractions then vaginal cuff brachytherapy with total dose 2500 cGy in 5 fractions. No concurrent chemosensitization due to myelosuppression and decreased patient tolerance of therapy.   -Patient declined maintenance therapy due to significantly decreased quality of life while on systemic therapy.   -6/8/23: PET/CT: No definitive evidence of disease. Ascites with imaging concerning for cirrhotic changes. I have personally reviewed the PET/CT images.   HEAD/NECK:  There is mild interval enlargement of multiple left supraclavicular  nodes, with uptake similar to blood pool, dominant node measures 1.2 x  1.1 cm (previously 1 x 0.7 cm).    CHEST:  Unchanged mild focal uptake in the left interpectoral region adjacent  to the biopsy clip measuring approximately 0.8 cm with SUV max  of 1.9. Unchanged additional, approximately 1 cm mildly avid soft  tissue anterior to the left subclavian vein.  A few small pulmonary nodules  are stable, including a 0.2 cm solid pulmonary nodule in the  left  upper lobe. No new or enlarging pulmonary nodules.  Stable cardiomegaly. Resolution of diffuse uptake in the left atrium,  right atrium and right ventricles. Trace pericardial effusion. Unchanged 2.9 cm subcutaneous  lipoma in the right lower chest wall.  ABDOMEN AND PELVIS:  Moderate volume ascites. There is a new peritoneal nodule in the right  lower quadrant measuring approximately 0.9 x 0.7 cm with SUV max of 4  (3/711). New mild ill-defined uptake is noted in the right perihepatic  region without discrete measurable implant.  There is interval reduction in FDG uptake and size of soft tissue  lesion at the site of recurrence along the vaginal cuff, measuring  approximately 3.5 x 2.9 cm (previously measuring 4.9 x 4.1 cm); with mild diffuse uptake with SUV max of 3.8 (previously 5). There is new curvilinear FDG uptake along the vagina,  right mesorectal fascia and presacral space.  Prominent mildly avid bilateral inguinal nodes are likely reactive.   Unchanged subcentimeter cysts in hepatic segment 8/5. Diffusely  heterogenous hepatic parenchymal density with fissural widening and  left lobe hypertrophy indicates cirrhotic morphology. Mild  pericholecystic fluid is likely reactive. Stable 0.9 cm cystic focus in  the pancreatic head, likely a side branch IPMN. Nodular thickening of  the left adrenal gland.   Extensive generalized  body wall edema.      Genetic/Genomic/Molecular Testing History:  2006: Negative for germline BRCA1, BRCA2 pathogenic variants.     8/14/22 (specimen from 6/9/22): [facility].   -PD-L1+ (CPS 10)  -Mismatch repair deficient/microsatellite instability-high  -TMB high (53 mut/Mb)  -NTRK 1/2/3 fusion not detected.       Subjective:  Cordell returns to the gyn onc clinic today for her scheduled surveillance visit, unaccompanied.   Cordell reports feeling well overall. No current concerns. No pain. No bleeding.   She is continuing breast cancer surveillance with Dr. Montana, and has  another appointment in a few weeks.   She has an upcoming trip to [place] planned with her  through [place], [place], [place], [place], possibly [place].       Past Medical History:  Past Medical History:   Diagnosis Date    Abnormal Pap smear     maybe 20 years ago    Cervical cancer (H)     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     History of breast cancer 2003    lumpectomy and radiation offerred chemo and patient declined at time but had oncogene test and low risk    Hyperlipidemia LDL goal < 160     Osteopenia     Paroxysmal A-fib (H)     Severe vaginal dysplasia        Past Surgical History:  Past Surgical History:   Procedure Laterality Date    BIOPSY      BREAST SURGERY Left 2003    lumpectomy left, did radiation, 5 yr of tamoxifen    COLONOSCOPY  2018    repeat in 2028    Colposcopy Cervix with Loop Electrode Conization and Lser to Vagina  2008    COLPOSCOPY, BIOPSY, COMBINED  10/24/2012    Procedure: COMBINED COLPOSCOPY, BIOPSY;  Colposcopy, Biopsy of Cervix and Vagina, Ultrasound Guidance;  Surgeon: Colette Ng MD;  Location: [facility] OR    ENT SURGERY  01/23/2018    otoscelerosis, right side    HYSTERECTOMY, FRANCIA  12/2012    high grade cervical dysplasia, [facility], Dr. Arciniega    INSERT INTERSTITIAL NEEDLE, ULTRASOUND GUIDED N/A 2/13/2023    Procedure: INSERTION, NEEDLE, WITH ULTRASOUND GUIDANCE, FOR INTERSTITIAL BRACHYTHERAPY;  Surgeon: Raymond Stockton MD;  Location: [facility] OR    INSERT PORT VASCULAR ACCESS Right 7/18/2022    Procedure: INSERTION, VASCULAR ACCESS PORT;  Surgeon: Donnie Elias MD;  Location: [facility] OR    IR CHEST PORT PLACEMENT > 5 YRS OF AGE  7/18/2022    IR PORT REMOVAL RIGHT  3/30/2023    ID LAP VAGINECTOMY, PARTIAL REMOVAL OF VAGINAL WALL  10/2013    upper vaginectomy for dysplasia, Dr. Megan Arciniega    REMOVE INTERSTITIAL NEEDLE N/A 2/15/2023    Procedure: REMOVAL, INTERSTITIAL NEEDLE, AFTER TEMPORARY BRACHYTHERAPY;  Surgeon: Mahin  MD Raymond;  Location:  OR    REMOVE PORT VASCULAR ACCESS Right 3/30/2023    Procedure: Remove port vascular access right;  Surgeon: Donnie Elias MD;  Location: [facility] OR       Current Medications:   Current Outpatient Medications   Medication Sig Dispense Refill    Acetylcarnitine HCl 250 MG CAPS Take 500 mg by mouth daily      aspirin (ASA) 81 MG chewable tablet Take 81 mg by mouth every evening      Bacillus Coagulans-Inulin (PROBIOTIC FORMULA) 1-250 BILLION-MG CAPS Take by mouth every morning 25+billion CFUS      BENFOTIAMINE PO Take 150 mg by mouth every morning      Cholecalciferol (VITAMIN D-3) 25 MCG (1000 UT) CAPS Take 1,000 Units by mouth every morning 90 capsule 3    coenzyme Q-10 capsule Take 1 capsule by mouth daily      levothyroxine (SYNTHROID/LEVOTHROID) 112 MCG tablet Take 1 tablet (112 mcg) by mouth daily before breakfast 90 tablet 3    MAGNESIUM OXIDE PO Take 400 mg by mouth daily      metoprolol succinate ER (TOPROL XL) 50 MG 24 hr tablet TAKE 1 TABLET BY MOUTH EVERY DAY 90 tablet 1    Omega-3 Fatty Acids (OMEGA-3 FISH OIL PO) Take 630 mg by mouth 2 times daily      OVER-THE-COUNTER Turkey tail mushroom powder, use 1 times a day      Pectin Cit-Inos-C-Bioflav-Soy (MODIFIED CITRUS PECTIN PO) Take by mouth 2 times daily      Taurine 1000 MG CAPS Take 1,000 mg by mouth every morning 60 capsule 11    UNABLE TO FIND 500 mg MEDICATION NAME: Turmeric      UNABLE TO FIND 500 mg MEDICATION NAME: Calcium D- Glucarate      UNABLE TO FIND 1,200 mg 2 times daily MEDICATION NAME: Efraín      UNABLE TO FIND 2 times daily MEDICATION NAME: [facility] for 1000mg calcium  4 pills per day      UNABLE TO FIND Take 1 tablet by mouth every morning MEDICATION NAME: Dinndolylmethane Complex (DIM) 100mg tablet 1 table daily       No current facility-administered medications for this visit.        Allergies:   No Known Allergies       Social History:  Patient lives with her . 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 She walks daily, gardens, does yoga twice/week.  She does have Advanced Directives, but has identified her daughter and  as her desired Healthcare [employer] of .   Social History     Tobacco Use    Smoking status: Former     Current packs/day: 0.00     Average packs/day: 0.5 packs/day for 15.0 years (7.5 ttl pk-yrs)     Types: Cigarettes     Start date:      Quit date:      Years since quittin.7     Passive exposure: Past    Smokeless tobacco: Never   Substance Use Topics    Alcohol use: Not Currently     Alcohol/week: 0.0 - 2.0 standard drinks of alcohol     Comment: Socially        History   Drug Use No       Family History:   The patient's family history is notable for a first-degree and second-degree paternal relative with breast cancer.  No known family history of ovarian, uterine, colon, urothelial/renal, prostate or pancreatic cancers.  No known family history of melanoma.  Family History   Problem Relation Age of Onset    Myocardial Infarction Mother 73        Tob    Myocardial Infarction Father 68        Tob    Breast Cancer Sister 47         of breast cancer age 63; BRCA mutation testing negative    Cancer Maternal Grandmother         Unsure of type.    Breast Cancer Paternal Grandmother         Unsure of diagnosis--some type of \"women's issue\"    Skin Cancer Daughter 47    Anesthesia Reaction No family hx of     Deep Vein Thrombosis (DVT) No family hx of        Physical Exam:    /73 (BP Location: Right arm, Patient Position: Sitting, Cuff Size: Adult Regular)   Pulse 82   Temp 98  F (36.7  C) (Oral)   Resp 12   Wt 61.2 kg (135 lb)   LMP  (LMP Unknown)   SpO2 98%   BMI 24.49 kg/m     Body mass index is 24.49 kg/m .    General Appearance: healthy and alert, no distress     HEENT:  no thyromegaly, no palpable nodules or masses        Cardiovascular: regular rate and rhythm, no gallops, rubs or murmurs     Respiratory: lungs clear, no rales, rhonchi or " wheezes, normal diaphragmatic excursion    Musculoskeletal: extremities non tender and without edema    Skin: no lesions or rashes     Neurological: normal gait, no gross defects     Psychiatric: appropriate mood and affect                               Hematological: normal cervical, supraclavicular and inguinal lymph nodes     Gastrointestinal:       abdomen soft, non-tender, non-distended, no organomegaly or masses    Genitourinary: External genitalia and urethral meatus appears normal.  Vagina foreshortened to ~3-4 cm. Vaginal mucosa normal in appearance with radiation changes. On bimanual exam the vaginal stenosis is confirmed, but without nodularity. Stenosis at the apex can be reduced somewhat with gentle pressure. On rectovaginal exam the rectovaginal septum is thin and without nodularity.   Verbal consent obtained from the patient for the pelvic exam.   Each step of the exam was verbalized throughout.  Present for the exam: GEOFF Horton.       Laboratory Examination:   NA      Radiographic Examination:  NA      Performance Status:  ECOG Grade 0.      Assessment:  Cordell Donaldson is a 74 year old patient with a diagnosis of stage III squamous cell carcinoma of the vagina (vs recurrent metastatic cervical cancer) s/p primary therapy with PET/CT showing no definitive evidence of disease.  Medical history significant for FIGO Stage IA1 squamous cell carcinoma of the cervix s/p hysterectomy with no residual disease, and a long history of vaginal HSIL.    Diagnosis of recurrent breast cancer during vaginal cancer treatment.         Plan:   1.)    Squamous cell carcinoma: Cordell will return to the gyn onc clinic in 3 months for her next surveillance visit.   -Surveillance as follows (per the NCCN guidelines):  Every 3 months x2 years, then every 6 months x3 years (MD per patient request). No cytology tests given high false-positive rate in the setting of radiation therapy.   She will call in the interim if  she has any vaginal bleeding or other concerning symptoms.     Side-effects:   -Grade 1 peripheral neuropathy (attributable to paclitaxel and cisplatin): Currently stable, not interfering with activity. Patient discharged from PT since she has improved her strength/function.    2.) Breast cancer: Continue management per medical oncologist Dr. Montana. Radiographic response of breast cancer to current chemotherapy. endocrine therapy with anastrozole recommended, but patient declined, favoring focus on quality of life.      3.) Genetic risk factors were assessed and the patient does not meet the qualifications for a referral.      4.) Labs and/or tests ordered include: None.     5.) Health maintenance issues addressed today include: continuing routine healthcare maintenance with her primary care provider.     6.) Code status:  Full-code.    7.) Prescriptions: None.        A total of 15 minutes was spent with the patient, 10 minutes of which were spent in counseling the patient and/or treatment planning; an additional 5  minutes was spent in chart review and documentation.  The longitudinal plan of care for the diagnosis(es)/condition(s) as documented were addressed during this visit. Due to the added complexity in care, I will continue to support Cordell in the subsequent management and with ongoing continuity of care.          Hina Raygoza MD, MS, FACOG, FACS  9/6/2024  11:36 AM                      CC  Patient Care Team:  Merry Lino MD as PCP - General (Internal Medicine)  Bess Paredes MD [place], Aaron Daniel, MYAM as MD (Podiatry)  Elise Huitron MD as Assigned Heart and Vascular Provider  Merry Lino MD as Assigned PCP  Tess Iniguez RN as Specialty Care Coordinator  Heather Ayon MD as MD (Urology)  Angela Montana MD as MD (Hematology & Oncology)  Yvette Pike MD as MD (Surgery)  Nurys Horton RN as Specialty Care Coordinator  (Hematology & Oncology)  Felicia Thompson PA-C as Physician Assistant (Anesthesiology)  Aleta Shea MD as Assigned Neuroscience Provider  Valeria Spencer MD as MD (Gastroenterology)  Yulisa Aparicio MD as MD (Dermatology)  Hina Raygoza MD as Assigned Cancer Care Provider  SELF, REFERRED

## 2024-09-06 ENCOUNTER — ONCOLOGY VISIT (OUTPATIENT)
Dept: ONCOLOGY | Facility: CLINIC | Age: 74
End: 2024-09-06
Attending: OBSTETRICS & GYNECOLOGY
Payer: COMMERCIAL

## 2024-09-06 VITALS
TEMPERATURE: 98 F | SYSTOLIC BLOOD PRESSURE: 125 MMHG | HEART RATE: 82 BPM | OXYGEN SATURATION: 98 % | RESPIRATION RATE: 12 BRPM | WEIGHT: 135 LBS | DIASTOLIC BLOOD PRESSURE: 73 MMHG | BODY MASS INDEX: 24.49 KG/M2

## 2024-09-06 DIAGNOSIS — C52 VAGINAL CANCER (H): ICD-10-CM

## 2024-09-06 PROCEDURE — G2211 COMPLEX E/M VISIT ADD ON: HCPCS | Performed by: OBSTETRICS & GYNECOLOGY

## 2024-09-06 PROCEDURE — G0463 HOSPITAL OUTPT CLINIC VISIT: HCPCS | Performed by: OBSTETRICS & GYNECOLOGY

## 2024-09-06 PROCEDURE — 99213 OFFICE O/P EST LOW 20 MIN: CPT | Performed by: OBSTETRICS & GYNECOLOGY

## 2024-09-06 ASSESSMENT — PAIN SCALES - GENERAL: PAINLEVEL: NO PAIN (0)

## 2024-09-06 NOTE — LETTER
2024      Cordell Donaldson  [address]  [facility] [address]      Dear Colleague,    Thank you for referring your patient, Cordell Donaldson, to the [facility]. Please see a copy of my visit note below.    Follow-up Note on Referred Patient  Gynecologic Oncology Clinic      Date of visit: 2024         Referring Provider/Gynecologic Oncologist:  Megan Arciniega MD  [facility]   Phone [phone]  [phone]      Radiation Oncologist:   Raymond Stockton MD, PhD  [facility]      Medical Oncologist:  Angela Montana MD  [facility].      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 Surveillance visit.      History of Present Illness:   Cordell Donaldson is a 74 year old initially referred to the Gynecologic Cancer Clinic at the [facility] on 2022 by gynecologic oncologist Dr. Arciniega and radiation oncologist Dr. Stockton for management of a pelvic mass due to recurrent cervical cancer vs new vaginal cancer. History as follows:     Breast Cancer History:  : Left breast cancer.  -Lumpectomy.  -Radiation therapy.    [id number]: BRENNAN.     22: PET/CT to stage vaginal cancer (see below).   Mild soft tissue  thickening in the left retropectoral region with an SUV max of 2.7.  7/15/22: Invasive ductal carcinoma.   -Managed by Dr. Montana. Endocrine therapy with anastozole recommended after completion of therapy for the vaginal cancer, but Cordell declined, prefers to focus on quality of life.       Cervical/Vaginal Dysplasia/Cancer History:  10/24/12:   -Endocervical curettings: Squamous carcinoma, invasion cannot be assessed.   -Ectocervical biopsies: HSIL (CIN3).   -Vaginal biopsy,  posterior: HSIL (VaIN3).  12/2012: Robotic-assisted laparoscopic lysis of adhesions, left ureterolysis with  conversion to laparotomy, total abdominal hysterectomy, and an upper  Vaginectomy by gynecologic oncologist Dr. Arciniega.   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     7/22/13: Vaginal biopsy: HSIL, cannot exclude invasion.   -Consultation with gynecologic oncologist Dr. Johnson. Upper vaginectomy and CO2 laser ablation recommended, patient declined.   [id number]: No vaginal dysplasia treatment.   5/20/22: Pelvic MRI: I have personally reviewed the MRI images.   Centrally hypoenhancing heterogeneous midline pelvic mass along  the superior margin of the vaginal vault measures 8.5 x 6.7 x 8.1 cm.  The mass abut the bladder and there is mucosal irregularity at the  site of abutment. The mass indents the  decompressed rectum but there is no definite invasion or mucosal  Irregularity.  6/9/22: CT-guided biopsy of pelvic mass: Poorly-differentiated squamous cell carcinoma, HPV-associated.   7/1/22: Staging PET/CT: Stage III vaginal cancer (vs recurrent, metastatic cervical cancer). I have personally reviewed the PET/CT images.   Large heterogenous and hypermetabolic mass seen within the midline of  the pelvis measuring approximately 8.5 x 7.2 x 8.1 cm with an SUV max  measuring up to 13.2. Additionally, there is central hypoattenuation  with relative lack of metabolic activity, suggestive of central  necrosis. There is abutment of the pelvic mass along the  posterior aspect of the bladder and anterior rectum, cannot  definitively exclude invasion. No inguinal lymphadenopathy.  Redemonstration of hypermetabolic left pelvic lymphadenopathy, the  largest lymph node measuring up to 3.3 x 2.4 cm with an SUV max of  12.2. Additional conglomeration of  hypermetabolic lymph nodes at the aortic bifurcation measuring 2.9 x  1.6 cm with an SUV max of  14.3.  Unchanged enlarged mildly metabolic right pelvic lymph node measuring  2.5 x 1.1 cm with an SUV max of 4.2, favored to represent a reactive  lymph node given the relative lack of hypermetabolic activity compared  to the left pelvic lymph node.    7/20/22, 8/10/22, 8/31/22,9/21/22, 10/26/22, 11/21/22: Cycles #1-6 of first-line chemotherapy with IV platinum + paclitaxel + bevacizumab 15 mg/kg + pembrolizumab 200 mg (added cycle 3) every 21 days.   -Cycles 1-4: Cisplatin 50 mg/m2, paclitaxel 175 mg/m2.   -Cycle 3:  Pembrolizumab added to all subsequent cycles due to PD-L1+ result per the KEYNOTE-826 regimen.  -9/22/22: PET/CT: Partial response. I have personally reviewed the PET/CT images.   -Discussed with radiation oncologist Dr. Stockton and at the gyn onc/radiation oncology tumor conference. Recommendation for additional chemotherapy prior to radiation therapy.   -Cycles 5+: Platinum changed to carboplatin due to tinnitus, and paclitaxel dose-reduced to 135 mg/m2 due to peripheral neuropathy.   -12/5/22: PET/CT: Partial response. I have personally reviewed the PET/CT images.   12/28/22-2/15/23: First-line pelvic external beam radiation therapy with total dose 4500 cGy in 25 fractions then vaginal cuff brachytherapy with total dose 2500 cGy in 5 fractions. No concurrent chemosensitization due to myelosuppression and decreased patient tolerance of therapy.   -Patient declined maintenance therapy due to significantly decreased quality of life while on systemic therapy.   -6/8/23: PET/CT: No definitive evidence of disease. Ascites with imaging concerning for cirrhotic changes. I have personally reviewed the PET/CT images.   HEAD/NECK:  There is mild interval enlargement of multiple left supraclavicular  nodes, with uptake similar to blood pool, dominant node measures 1.2 x  1.1 cm (previously 1 x 0.7 cm).    CHEST:  Unchanged mild focal uptake in the left interpectoral region adjacent  to the biopsy clip measuring  approximately 0.8 cm with SUV max  of 1.9. Unchanged additional, approximately 1 cm mildly avid soft  tissue anterior to the left subclavian vein.  A few small pulmonary nodules  are stable, including a 0.2 cm solid pulmonary nodule in the left  upper lobe. No new or enlarging pulmonary nodules.  Stable cardiomegaly. Resolution of diffuse uptake in the left atrium,  right atrium and right ventricles. Trace pericardial effusion. Unchanged 2.9 cm subcutaneous  lipoma in the right lower chest wall.  ABDOMEN AND PELVIS:  Moderate volume ascites. There is a new peritoneal nodule in the right  lower quadrant measuring approximately 0.9 x 0.7 cm with SUV max of 4  (3/711). New mild ill-defined uptake is noted in the right perihepatic  region without discrete measurable implant.  There is interval reduction in FDG uptake and size of soft tissue  lesion at the site of recurrence along the vaginal cuff, measuring  approximately 3.5 x 2.9 cm (previously measuring 4.9 x 4.1 cm); with mild diffuse uptake with SUV max of 3.8 (previously 5). There is new curvilinear FDG uptake along the vagina,  right mesorectal fascia and presacral space.  Prominent mildly avid bilateral inguinal nodes are likely reactive.   Unchanged subcentimeter cysts in hepatic segment 8/5. Diffusely  heterogenous hepatic parenchymal density with fissural widening and  left lobe hypertrophy indicates cirrhotic morphology. Mild  pericholecystic fluid is likely reactive. Stable 0.9 cm cystic focus in  the pancreatic head, likely a side branch IPMN. Nodular thickening of  the left adrenal gland.   Extensive generalized  body wall edema.      Genetic/Genomic/Molecular Testing History:  2006: Negative for germline BRCA1, BRCA2 pathogenic variants.     8/14/22 (specimen from 6/9/22): [facility].   -PD-L1+ (CPS 10)  -Mismatch repair deficient/microsatellite instability-high  -TMB high (53 mut/Mb)  -NTRK 1/2/3 fusion not detected.       Subjective:  Cordell  returns to the gyn onc clinic today for her scheduled surveillance visit, unaccompanied.   Cordell reports feeling well overall. No current concerns. No pain. No bleeding.   She is continuing breast cancer surveillance with Dr. Montana, and has another appointment in a few weeks.   She has an upcoming trip to [place] planned with her  through [place], [place], [place], [place], possibly [place].       Past Medical History:  Past Medical History:   Diagnosis Date     Abnormal Pap smear     maybe 20 years ago     Cervical cancer (H)      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      History of breast cancer 2003    lumpectomy and radiation offerred chemo and patient declined at time but had oncogene test and low risk     Hyperlipidemia LDL goal < 160      Osteopenia      Paroxysmal A-fib (H)      Severe vaginal dysplasia        Past Surgical History:  Past Surgical History:   Procedure Laterality Date     BIOPSY       BREAST SURGERY Left 2003    lumpectomy left, did radiation, 5 yr of tamoxifen     COLONOSCOPY  2018    repeat in 2028     Colposcopy Cervix with Loop Electrode Conization and Lser to Vagina  2008     COLPOSCOPY, BIOPSY, COMBINED  10/24/2012    Procedure: COMBINED COLPOSCOPY, BIOPSY;  Colposcopy, Biopsy of Cervix and Vagina, Ultrasound Guidance;  Surgeon: Colette Ng MD;  Location: [facility] OR     ENT SURGERY  01/23/2018    otoscelerosis, right side     HYSTERECTOMY, FRANCIA  12/2012    high grade cervical dysplasia, [place] [facility], Dr. Arciniega     INSERT INTERSTITIAL NEEDLE, ULTRASOUND GUIDED N/A 2/13/2023    Procedure: INSERTION, NEEDLE, WITH ULTRASOUND GUIDANCE, FOR INTERSTITIAL BRACHYTHERAPY;  Surgeon: Raymond Stockton MD;  Location: [facility] OR     INSERT PORT VASCULAR ACCESS Right 7/18/2022    Procedure: INSERTION, VASCULAR ACCESS PORT;  Surgeon: Donnie Elias MD;  Location: [facility] OR     IR CHEST PORT PLACEMENT > 5 YRS OF AGE  7/18/2022     IR PORT REMOVAL RIGHT   3/30/2023     WY LAP VAGINECTOMY, PARTIAL REMOVAL OF VAGINAL WALL  10/2013    upper vaginectomy for dysplasia, Dr. Megan Arciniega     REMOVE INTERSTITIAL NEEDLE N/A 2/15/2023    Procedure: REMOVAL, INTERSTITIAL NEEDLE, AFTER TEMPORARY BRACHYTHERAPY;  Surgeon: Raymond Stockton MD;  Location: [facility] OR     REMOVE PORT VASCULAR ACCESS Right 3/30/2023    Procedure: Remove port vascular access right;  Surgeon: Donnie Elias MD;  Location: [facility] OR       Current Medications:   Current Outpatient Medications   Medication Sig Dispense Refill     Acetylcarnitine HCl 250 MG CAPS Take 500 mg by mouth daily       aspirin (ASA) 81 MG chewable tablet Take 81 mg by mouth every evening       Bacillus Coagulans-Inulin (PROBIOTIC FORMULA) 1-250 BILLION-MG CAPS Take by mouth every morning 25+billion CFUS       BENFOTIAMINE PO Take 150 mg by mouth every morning       Cholecalciferol (VITAMIN D-3) 25 MCG (1000 UT) CAPS Take 1,000 Units by mouth every morning 90 capsule 3     coenzyme Q-10 capsule Take 1 capsule by mouth daily       levothyroxine (SYNTHROID/LEVOTHROID) 112 MCG tablet Take 1 tablet (112 mcg) by mouth daily before breakfast 90 tablet 3     MAGNESIUM OXIDE PO Take 400 mg by mouth daily       metoprolol succinate ER (TOPROL XL) 50 MG 24 hr tablet TAKE 1 TABLET BY MOUTH EVERY DAY 90 tablet 1     Omega-3 Fatty Acids (OMEGA-3 FISH OIL PO) Take 630 mg by mouth 2 times daily       OVER-THE-COUNTER Turkey tail mushroom powder, use 1 times a day       Pectin Cit-Inos-C-Bioflav-Soy (MODIFIED CITRUS PECTIN PO) Take by mouth 2 times daily       Taurine 1000 MG CAPS Take 1,000 mg by mouth every morning 60 capsule 11     UNABLE TO FIND 500 mg MEDICATION NAME: Turmeric       UNABLE TO FIND 500 mg MEDICATION NAME: Calcium D- Glucarate       UNABLE TO FIND 1,200 mg 2 times daily MEDICATION NAME: Efraín       UNABLE TO FIND 2 times daily MEDICATION NAME: [facility] for 1000mg calcium  4 pills per day       UNABLE TO FIND Take 1 tablet by mouth  "every morning MEDICATION NAME: Dinndolylmethane Complex (DIM) 100mg tablet 1 table daily       No current facility-administered medications for this visit.        Allergies:   No Known Allergies       Social History:  Patient lives with her . 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 She walks daily, gardens, does yoga twice/week.  She does have Advanced Directives, but has identified her daughter and  as her desired [employer] of .   Social History     Tobacco Use     Smoking status: Former     Current packs/day: 0.00     Average packs/day: 0.5 packs/day for 15.0 years (7.5 ttl pk-yrs)     Types: Cigarettes     Start date:      Quit date:      Years since quittin.7     Passive exposure: Past     Smokeless tobacco: Never   Substance Use Topics     Alcohol use: Not Currently     Alcohol/week: 0.0 - 2.0 standard drinks of alcohol     Comment: Socially        History   Drug Use No       Family History:   The patient's family history is notable for a first-degree and second-degree paternal relative with breast cancer.  No known family history of ovarian, uterine, colon, urothelial/renal, prostate or pancreatic cancers.  No known family history of melanoma.  Family History   Problem Relation Age of Onset     Myocardial Infarction Mother 73        Tob     Myocardial Infarction Father 68        Tob     Breast Cancer Sister 47         of breast cancer age 63; BRCA mutation testing negative     Cancer Maternal Grandmother         Unsure of type.     Breast Cancer Paternal Grandmother         Unsure of diagnosis--some type of \"women's issue\"     Skin Cancer Daughter 47     Anesthesia Reaction No family hx of      Deep Vein Thrombosis (DVT) No family hx of        Physical Exam:    /73 (BP Location: Right arm, Patient Position: Sitting, Cuff Size: Adult Regular)   Pulse 82   Temp 98  F (36.7  C) (Oral)   Resp 12   Wt 61.2 kg (135 lb)   LMP  (LMP Unknown)   SpO2 98%   BMI " 24.49 kg/m     Body mass index is 24.49 kg/m .    General Appearance: healthy and alert, no distress     HEENT:  no thyromegaly, no palpable nodules or masses        Cardiovascular: regular rate and rhythm, no gallops, rubs or murmurs     Respiratory: lungs clear, no rales, rhonchi or wheezes, normal diaphragmatic excursion    Musculoskeletal: extremities non tender and without edema    Skin: no lesions or rashes     Neurological: normal gait, no gross defects     Psychiatric: appropriate mood and affect                               Hematological: normal cervical, supraclavicular and inguinal lymph nodes     Gastrointestinal:       abdomen soft, non-tender, non-distended, no organomegaly or masses    Genitourinary: External genitalia and urethral meatus appears normal.  Vagina foreshortened to ~3-4 cm. Vaginal mucosa normal in appearance with radiation changes. On bimanual exam the vaginal stenosis is confirmed, but without nodularity. Stenosis at the apex can be reduced somewhat with gentle pressure. On rectovaginal exam the rectovaginal septum is thin and without nodularity.   Verbal consent obtained from the patient for the pelvic exam.   Each step of the exam was verbalized throughout.  Present for the exam: GEOFF Horton.       Laboratory Examination:   NA      Radiographic Examination:  NA      Performance Status:  ECOG Grade 0.      Assessment:  Cordell Donaldson is a 74 year old patient with a diagnosis of stage III squamous cell carcinoma of the vagina (vs recurrent metastatic cervical cancer) s/p primary therapy with PET/CT showing no definitive evidence of disease.  Medical history significant for FIGO Stage IA1 squamous cell carcinoma of the cervix s/p hysterectomy with no residual disease, and a long history of vaginal HSIL.    Diagnosis of recurrent breast cancer during vaginal cancer treatment.         Plan:   1.)    Squamous cell carcinoma: Cordell will return to the gyn onc clinic in 3 months for  her next surveillance visit.   -Surveillance as follows (per the NCCN guidelines):  Every 3 months x2 years, then every 6 months x3 years (MD per patient request). No cytology tests given high false-positive rate in the setting of radiation therapy.   She will call in the interim if she has any vaginal bleeding or other concerning symptoms.     Side-effects:   -Grade 1 peripheral neuropathy (attributable to paclitaxel and cisplatin): Currently stable, not interfering with activity. Patient discharged from PT since she has improved her strength/function.    2.) Breast cancer: Continue management per medical oncologist Dr. Montana. Radiographic response of breast cancer to current chemotherapy. endocrine therapy with anastrozole recommended, but patient declined, favoring focus on quality of life.      3.) Genetic risk factors were assessed and the patient does not meet the qualifications for a referral.      4.) Labs and/or tests ordered include: None.     5.) Health maintenance issues addressed today include: continuing routine healthcare maintenance with her primary care provider.     6.) Code status:  Full-code.    7.) Prescriptions: None.        A total of 15 minutes was spent with the patient, 10 minutes of which were spent in counseling the patient and/or treatment planning; an additional 5  minutes was spent in chart review and documentation.  The longitudinal plan of care for the diagnosis(es)/condition(s) as documented were addressed during this visit. Due to the added complexity in care, I will continue to support Cordell in the subsequent management and with ongoing continuity of care.          Hina Raygoza MD, MS, FACOG, FACS  9/6/2024  11:36 AM                      CC  Patient Care Team:  Merry Lino MD as PCP - General (Internal Medicine)  Bess Paredes MD Corfield, Aaron Daniel, DPM as MD (Podiatry)  Elise Huitron MD as Assigned Heart and Vascular Provider  Merry Lino  MD Berna as Assigned PCP  Tess Iniguez RN as Specialty Care Coordinator  Heather Ayon MD as MD (Urology)  Angela Montana MD as MD (Hematology & Oncology)  Yvette Pike MD as MD (Surgery)  Nurys Horton RN as Specialty Care Coordinator (Hematology & Oncology)  Felicia Thompson PA-C as Physician Assistant (Anesthesiology)  Aleta Shea MD as Assigned Neuroscience Provider  Valeria Spencer MD as MD (Gastroenterology)  Yulisa Aparicio MD as MD (Dermatology)  Idalmis, Hina Schuster MD as Assigned Cancer Care Provider  SELF, REFERRED    Again, thank you for allowing me to participate in the care of your patient.        Sincerely,        Hina Raygoza MD

## 2024-09-06 NOTE — NURSING NOTE
"Oncology Rooming Note    September 6, 2024 10:41 AM   Cordell Donaldson is a 74 year old female who presents for:    Chief Complaint   Patient presents with    Oncology Clinic Visit     Vaginal cancer     Initial Vitals: /73 (BP Location: Right arm, Patient Position: Sitting, Cuff Size: Adult Regular)   Pulse 82   Temp 98  F (36.7  C) (Oral)   Resp 12   Wt 61.2 kg (135 lb)   LMP  (LMP Unknown)   SpO2 98%   BMI 24.49 kg/m   Estimated body mass index is 24.49 kg/m  as calculated from the following:    Height as of 4/30/24: 1.581 m (5' 2.25\").    Weight as of this encounter: 61.2 kg (135 lb). Body surface area is 1.64 meters squared.  No Pain (0) Comment: Data Unavailable   No LMP recorded (lmp unknown). Patient has had a hysterectomy.  Allergies reviewed: Yes  Medications reviewed: Yes    Medications: Medication refills not needed today.  Pharmacy name entered into [facility]:    [id number] - [place], [place] - [address] [place] [address]  [id number] - [place], [place] - [address] [facility] [place], [place] - [address] [facility] [phone]    Frailty Screening:   Is the patient here for a new oncology consult visit in cancer care? 2. No      Clinical concerns: Patient will be traveling in a couple of weeks, and she would like to know if the new COVID-19 booster is available.      Cuauhtemoc Coronel EMT            "

## 2024-09-09 DIAGNOSIS — E03.8 OTHER SPECIFIED HYPOTHYROIDISM: ICD-10-CM

## 2024-09-10 RX ORDER — LEVOTHYROXINE SODIUM 112 UG/1
112 TABLET ORAL
Qty: 90 TABLET | Refills: 1 | Status: SHIPPED | OUTPATIENT
Start: 2024-09-10

## 2024-09-10 NOTE — TELEPHONE ENCOUNTER
Medication requested: levothyroxine (SYNTHROID/LEVOTHROID) 112 MCG tablet   Last office visit: 4/30/24  [facility] appointments: none  Medication last refilled: 8/30/23; 90 + 3 refills  Last qualifying labs:   Component      Latest Ref Rng 3/27/2024  3:34 PM   TSH      0.30 - 4.20 uIU/mL 1.48      Prescription approved per [facility] Refill Protocol.    Kun BORREGO, RN  09/10/24 11:32 AM

## 2024-09-14 NOTE — PROGRESS NOTES
"Oncology Follow Up:  Date on this visit: 9/16/2024    Diagnosis: Left breast invasive ductal carcinoma.    Primary Physician: Merry Lino     History Of Present Illness:  Ms. Donaldson is a 74 year old female with cervical and breast cancer.    She has a history of left breast cancer treated with lumpectomy and radiation therapy in 2003 (followed w/ Dr. Mtz [facility]). Oncotype DX recurrence score was 10.  She took 5 years of tamoxifen. She had an incidental left breast lesion found on imaging for a pelvic mass in 06/2022. Ultrasound showed a 1.3 cm mass at 12:30, 8 cm from the nipple of the left breast.  Biopsy was c/w a grade 2 invasive ductal carcinoma, ER strong in 100%, OR negative, and HER2 negative.  Given concurrent recurrent pelvic mass with plan for cytotoxic chemotherapy, plan was made to monitor and initiate endocrine therapy when appropriate.  She completed 6 cycles of chemotherapy followed by radiation to her pelvic mass.  She has subsequently declined to start endocrine therapy.    In regards to her cervical cancer history, she was initially determined to have squamous carcinoma in 10/2012.  She is s/p FRANCIA and upper vaginectomy in 12/2012 with no residual invasive cancer, but residual HSIL.  Biopsy on 7/22/2013 was c/w HSIL, cannot exclude invasion.  In 05/2022 she developed symptoms of pelvic heaviness (felt like she \"bruised her tailbone\") and progressive pain. She felt like her urination and bowel movements were constricted, needing to put in a lot of effort to urinate or have BM. Pelvic MRI showed a large midline pelvic mass measuring up to 8.5 cm and abutting the bladder and the rectum without definite invasion or left pelvic LAD.  These findings were confirmed on PET/CT. She was started on chemo with cisplatin, paclitaxel, and bevacizumab (7/11/22).  Caris testing showed PD-L1 CPS score of 10, MMR deficient, MSI high disease with a TMB of 53 mut/Mb. Pembrolizumab was added with C3.  " Cisplatin was changed to carboplatin starting with C5 due to tinnitus.  After a total of 6 cycles imaging demonstrated good response.    She is s/p radiation to the pelvis and para-aortic lymph nodes and interstitial brachytherapy to the vaginal cuff (12/28/2022 - 2/15/2023).     Interval History:  Maritza comes into clinic today for routine breast cancer follow-up.  She is not currently on treatment for a left chest wall recurrence.  She denies palpable mass at the site of known breast cancer recurrence.  She continues to live a very healthy lifestyle.  She works with a naturopath and a homeopath.  She takes a number of supplements.  She follows a pescatarian diet.  She routinely exercises, noting that she walks 3 to 4 miles per day, multiple days per week.  She has been working as a  at [employer].  In general, she feels she is healing from her prior cancer therapies.  She has done acupuncture for her neuropathy and feels that she is seeing significant improvement.  She has had no recent fevers, chills, or symptoms of infection.  She has no new bone or joint aches or pains.  She denies cough, shortness of breath, or chest pain.  She has some dietary intolerances, she otherwise denies abdominal complaints.    Past Medical/Surgical History:  Past Medical History:   Diagnosis Date    Abnormal Pap smear     maybe 20 years ago    Cervical cancer (H)     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     History of breast cancer 2003    lumpectomy and radiation offerred chemo and patient declined at time but had oncogene test and low risk    Hyperlipidemia LDL goal < 160     Osteopenia     Paroxysmal A-fib (H)     Severe vaginal dysplasia      Past Surgical History:   Procedure Laterality Date    BIOPSY      BREAST SURGERY Left 2003    lumpectomy left, did radiation, 5 yr of tamoxifen    COLONOSCOPY  2018    repeat in 2028    Colposcopy Cervix with  Loop Electrode Conization and Lser to Vagina  2008    COLPOSCOPY, BIOPSY, COMBINED  10/24/2012    Procedure: COMBINED COLPOSCOPY, BIOPSY;  Colposcopy, Biopsy of Cervix and Vagina, Ultrasound Guidance;  Surgeon: Colette Ng MD;  Location: [facility] OR    ENT SURGERY  01/23/2018    otoscelerosis, right side    HYSTERECTOMY, FRANCIA  12/2012    high grade cervical dysplasia, [place] [facility], Dr. Arciniega    INSERT INTERSTITIAL NEEDLE, ULTRASOUND GUIDED N/A 2/13/2023    Procedure: INSERTION, NEEDLE, WITH ULTRASOUND GUIDANCE, FOR INTERSTITIAL BRACHYTHERAPY;  Surgeon: Raymond Stockton MD;  Location: [facility] OR    INSERT PORT VASCULAR ACCESS Right 7/18/2022    Procedure: INSERTION, VASCULAR ACCESS PORT;  Surgeon: Donnie Elias MD;  [place] [facility] [place]    IR CHEST PORT PLACEMENT > 5 YRS OF AGE  7/18/2022    IR PORT REMOVAL RIGHT  3/30/2023    ID LAP VAGINECTOMY, PARTIAL REMOVAL OF VAGINAL WALL  10/2013    upper vaginectomy for dysplasia, Dr. Megan Arciniega    REMOVE INTERSTITIAL NEEDLE N/A 2/15/2023    Procedure: REMOVAL, INTERSTITIAL NEEDLE, AFTER TEMPORARY BRACHYTHERAPY;  Surgeon: Raymond Stockton MD;  Location: [facility]    REMOVE PORT VASCULAR ACCESS Right 3/30/2023    Procedure: Remove port vascular access right;  Surgeon: Donnie Elias MD;  [place] [facility] [place]     Allergies:  Allergies as of 09/16/2024    (No Known Allergies)     Current Medications:  Current Outpatient Medications   Medication Sig Dispense Refill    Acetylcarnitine HCl 250 MG CAPS Take 500 mg by mouth daily      aspirin (ASA) 81 MG chewable tablet Take 81 mg by mouth every evening      Bacillus Coagulans-Inulin (PROBIOTIC FORMULA) 1-250 BILLION-MG CAPS Take by mouth every morning 25+billion CFUS      BENFOTIAMINE PO Take 150 mg by mouth every morning      Cholecalciferol (VITAMIN D-3) 25 MCG (1000 UT) CAPS Take 1,000 Units by mouth every morning 90 capsule 3    coenzyme Q-10 capsule Take 1 capsule by mouth daily      levothyroxine (SYNTHROID/LEVOTHROID) 112 MCG tablet TAKE 1  TABLET (112 MCG) BY MOUTH DAILY BEFORE BREAKFAST 90 tablet 1    MAGNESIUM OXIDE PO Take 400 mg by mouth daily      metoprolol succinate ER (TOPROL XL) 50 MG 24 hr tablet TAKE 1 TABLET BY MOUTH EVERY DAY 90 tablet 1    Omega-3 Fatty Acids (OMEGA-3 FISH OIL PO) Take 630 mg by mouth 2 times daily      OVER-THE-COUNTER Turkey tail mushroom powder, use 1 times a day      Pectin Cit-Inos-C-Bioflav-Soy (MODIFIED CITRUS PECTIN PO) Take by mouth 2 times daily      Taurine 1000 MG CAPS Take 1,000 mg by mouth every morning 60 capsule 11    UNABLE TO FIND 500 mg MEDICATION NAME: Turmeric      UNABLE TO FIND 500 mg MEDICATION NAME: Calcium D- Glucarate      UNABLE TO FIND 1,200 mg 2 times daily MEDICATION NAME: [place]      UNABLE TO FIND 2 times daily MEDICATION NAME: [facility] for 1000mg calcium  4 pills per day      UNABLE TO FIND Take 1 tablet by mouth every morning MEDICATION NAME: Dinndolylmethane Complex (DIM) 100mg tablet 1 table daily        Physical Exam:  /74 (BP Location: Right arm, Patient Position: Sitting, Cuff Size: Adult Regular)   Pulse 83   Temp 98.6  F (37  C) (Oral)   Resp 12   Wt 61.5 kg (135 lb 9.6 oz)   LMP  (LMP Unknown)   SpO2 98%   BMI 24.60 kg/m    GENERAL APPEARANCE: Well appearing adult female in NAD.  Alert and oriented x 3.    HEENT: NC/AT, sclera anicteric.  MMM.    LYMPHATICS: No palpable cervical, supraclavicular, or axillary lymphadenopathy  RESP: Lungs are CTA bilaterally and without wheezes or crackles.  CARDIOVASCULAR:  RRR.  No audible m/r/g.  BREAST:  Bilateral breasts are of normal fibroglandular density.  There are no dominant or discretely palpable masses in either breast.  Specifically, I am unable to palpate a mass at 12:30, 8 cm from the nipple of the left breast in area of known chest wall recurrence.  Bilateral nipples are everted and without discharge.  ABDOMEN:  soft, nondistended  MUSCULOSKELETAL: Full ROM of the bilateral upper extremities.  SKIN: no visible  concerning skin lesions or rashes  PSYCHIATRIC: Normal mood and affect.  Pleasant and conversant.    Laboratory/Imaging Studies  I personally reviewed the below images while in clinic today:    6/20/2024 DEXA bone density scan:  Results   Lumbar spine   T-score -1.7 (L1-2), BMD is 0.965 g/cm2     Left femoral neck  T-score -2.8, BMD is 0.655 g/cm2     Right femoral neck  T-score -2.8, BMD is 0.646 g/cm2     Left total hip  T-score -2.6, BMD is 0.674 g/cm2     Right total hip  T-score -2.6, BMD is 0.678 g/cm2    ASSESSMENT/PLAN:  Ms. Donaldson is a 75 y/o woman with a history of left breast cancer (s/p lumpectomy, radiation in 2003 followed by 5 years of tamoxifen w/ Dr. Mtz) and cervical SCC (10/2012, s/p FRANCIA, upper vaginectomy) with pelvic mass and a second ER positive, MI negative, HER2 negative left breast cancer.     1. Left breast cancer.  12:30, 8 cm from the nipple.  - Without surgery/radiation treatment of breast cancer, the breast cancer is not cured.  She has declined treatment including recommended palliative treatment with anastrozole.  She is wanting to focus on quality of life at this time.  She would be open to discussion of treatment options if evidence of further tumor growth.    - She is pursuing/taking a number of naturopathic supplements including modified citrus pectin for cancer prevention.  It is notable that modified citrus pectin has been shown in preclinical studies to have anti-cancer properties, however, there is a lack of clinical data that it is effective at treating/preventing cancer.  - She is asymptomatic of disease recurrence on history taken today and clinical exam is without concerning findings.  She continues to decline further imaging of the left breast mass.  - Return to clinic in 6 months.  She agrees to contact the clinic with any concerns in the meantime.    2. Recurrent cervical versus vaginal cancer  - She follows with Dr. Raygoza of [facility]   - s/p 6 cycles neodjuvant  Cisplatin 50 mg/m2 + paclitaxel 175 mg/m2 + bevacizumab 15 mg/kg + pembrolizumab every 21 days 7/11 - 11/21/2022.  Pembrolizumab added C3 and beyond; Cisplatin changed to carboplatin for cycles 5 and 6 due to tinnitus.    - s/p radiation to the pelvis and para-aortic lymph nodes as well as interstitial brachytherapy to the vaginal cuff, completed 02/2023.  - Plan is for regular clinic visits and to image only if symptomatic.  - I reviewed Dr. Raygoza's 9/6/2024 gyn/onc clinic note, there is no change to the above plan. She is following with gyn/onc once every 3 months.  At the 2 year point, will transition to once every 6 months visits.    3.  Neuropathy:  Chemotherapy induced.  Grade 1.  She is doing acupuncture and has noticed some improvement in her neuropathy.    4.  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  She continues on levothyroxine 112 mcg PO daily.  - Last TSH on 3/27/2024 was wnl.     5.  Osteoporosis:  DEXA in June with lowest T-score of -2.8 in the bilateral femoral necks c/w osteoporosis.    - she is treating with calcium, vitamin D, weight bearing exercise.    6.  Follow Up:  Return to clinic in 6 months.

## 2024-09-16 ENCOUNTER — ONCOLOGY VISIT (OUTPATIENT)
Dept: ONCOLOGY | Facility: CLINIC | Age: 74
End: 2024-09-16
Attending: INTERNAL MEDICINE
Payer: COMMERCIAL

## 2024-09-16 VITALS
HEART RATE: 83 BPM | TEMPERATURE: 98.6 F | OXYGEN SATURATION: 98 % | WEIGHT: 135.6 LBS | BODY MASS INDEX: 24.6 KG/M2 | SYSTOLIC BLOOD PRESSURE: 115 MMHG | DIASTOLIC BLOOD PRESSURE: 74 MMHG | RESPIRATION RATE: 12 BRPM

## 2024-09-16 DIAGNOSIS — Z17.0 MALIGNANT NEOPLASM OF UPPER-OUTER QUADRANT OF LEFT BREAST IN FEMALE, ESTROGEN RECEPTOR POSITIVE (H): Primary | ICD-10-CM

## 2024-09-16 DIAGNOSIS — C50.412 MALIGNANT NEOPLASM OF UPPER-OUTER QUADRANT OF LEFT BREAST IN FEMALE, ESTROGEN RECEPTOR POSITIVE (H): Primary | ICD-10-CM

## 2024-09-16 DIAGNOSIS — C52 VAGINAL CANCER (H): ICD-10-CM

## 2024-09-16 DIAGNOSIS — G62.0 DRUG-INDUCED POLYNEUROPATHY (H): ICD-10-CM

## 2024-09-16 DIAGNOSIS — Z78.9 TAKES DIETARY SUPPLEMENTS: ICD-10-CM

## 2024-09-16 PROCEDURE — G0463 HOSPITAL OUTPT CLINIC VISIT: HCPCS | Performed by: INTERNAL MEDICINE

## 2024-09-16 PROCEDURE — 99214 OFFICE O/P EST MOD 30 MIN: CPT | Performed by: INTERNAL MEDICINE

## 2024-09-16 ASSESSMENT — PAIN SCALES - GENERAL: PAINLEVEL: NO PAIN (0)

## 2024-09-16 NOTE — NURSING NOTE
"Oncology Rooming Note    September 16, 2024 2:39 PM   Cordell Donaldson is a 74 year old female who presents for:    Chief Complaint   Patient presents with    Oncology Clinic Visit     Breast cancer     Initial Vitals: /74 (BP Location: Right arm, Patient Position: Sitting, Cuff Size: Adult Regular)   Pulse 83   Temp 98.6  F (37  C) (Oral)   Resp 12   Wt 61.5 kg (135 lb 9.6 oz)   LMP  (LMP Unknown)   SpO2 98%   BMI 24.60 kg/m   Estimated body mass index is 24.6 kg/m  as calculated from the following:    Height as of 4/30/24: 1.581 m (5' 2.25\").    Weight as of this encounter: 61.5 kg (135 lb 9.6 oz). Body surface area is 1.64 meters squared.  No Pain (0) Comment: Data Unavailable   No LMP recorded (lmp unknown). Patient has had a hysterectomy.  Allergies reviewed: Yes  Medications reviewed: Yes    Medications: Medication refills not needed today.  Pharmacy name entered into [employer]:    [id number] - [place], [place] - [address] [place] [address]  [id number] - [place], [place] - [address] [place] [address] PHARMACY [place], [place] - [address] [facility] [phone]    Frailty Screening:   Is the patient here for a new oncology consult visit in cancer care? 2. No      Clinical concerns: Patient states no new concerns to discuss with provider.       Cuauhtemoc Coronel EMT            "
No

## 2024-09-16 NOTE — LETTER
"9/16/2024      Cordell Donaldson  [address]  [facility] [address]      Dear Colleague,    Thank you for referring your patient, Cordell Donaldson, to the [facility]. Please see a copy of my visit note below.    Oncology Follow Up:  Date on this visit: 9/16/2024    Diagnosis: Left breast invasive ductal carcinoma.    Primary Physician: Merry Lino     History Of Present Illness:  Ms. Donaldson is a 74 year old female with cervical and breast cancer.    She has a history of left breast cancer treated with lumpectomy and radiation therapy in 2003 (followed w/ Dr. Mtz at [facility] [place] Oncotype DX recurrence score was 10.  She took 5 years of tamoxifen. She had an incidental left breast lesion found on imaging for a pelvic mass in 06/2022. Ultrasound showed a 1.3 cm mass at 12:30, 8 cm from the nipple of the left breast.  Biopsy was c/w a grade 2 invasive ductal carcinoma, ER strong in 100%, GA negative, and HER2 negative.  Given concurrent recurrent pelvic mass with plan for cytotoxic chemotherapy, plan was made to monitor and initiate endocrine therapy when appropriate.  She completed 6 cycles of chemotherapy followed by radiation to her pelvic mass.  She has subsequently declined to start endocrine therapy.    In regards to her cervical cancer history, she was initially determined to have squamous carcinoma in 10/2012.  She is s/p FRANCIA and upper vaginectomy in 12/2012 with no residual invasive cancer, but residual HSIL.  Biopsy on 7/22/2013 was c/w HSIL, cannot exclude invasion.  In 05/2022 she developed symptoms of pelvic heaviness (felt like she \"bruised her tailbone\") and progressive pain. She felt like her urination and bowel movements were constricted, needing to put in a lot of effort to urinate or have BM. Pelvic MRI showed a large midline pelvic mass measuring up to 8.5 cm and abutting the bladder and the rectum without definite invasion or left pelvic LAD.  These " findings were confirmed on PET/CT. She was started on chemo with cisplatin, paclitaxel, and bevacizumab (7/11/22).  Caris testing showed PD-L1 CPS score of 10, MMR deficient, MSI high disease with a TMB of 53 mut/Mb. Pembrolizumab was added with C3.  Cisplatin was changed to carboplatin starting with C5 due to tinnitus.  After a total of 6 cycles imaging demonstrated good response.    She is s/p radiation to the pelvis and para-aortic lymph nodes and interstitial brachytherapy to the vaginal cuff (12/28/2022 - 2/15/2023).     Interval History:  Maritza comes into clinic today for routine breast cancer follow-up.  She is not currently on treatment for a left chest wall recurrence.  She denies palpable mass at the site of known breast cancer recurrence.  She continues to live a very healthy lifestyle.  She works with a naturopath and a homeopath.  She takes a number of supplements.  She follows a pescatarian diet.  She routinely exercises, noting that she walks 3 to 4 miles per day, multiple days per week.  She has been working as a  at [employer].  In general, she feels she is healing from her prior cancer therapies.  She has done acupuncture for her neuropathy and feels that she is seeing significant improvement.  She has had no recent fevers, chills, or symptoms of infection.  She has no new bone or joint aches or pains.  She denies cough, shortness of breath, or chest pain.  She has some dietary intolerances, she otherwise denies abdominal complaints.    Past Medical/Surgical History:  Past Medical History:   Diagnosis Date     Abnormal Pap smear     maybe 20 years ago     Cervical cancer (H)      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      History of breast cancer 2003    lumpectomy and radiation offerred chemo and patient declined at time but had oncogene test and low risk     Hyperlipidemia LDL goal < 160      Osteopenia       Paroxysmal A-fib (H)      Severe vaginal dysplasia      Past Surgical History:   Procedure Laterality Date     BIOPSY       BREAST SURGERY Left 2003    lumpectomy left, did radiation, 5 yr of tamoxifen     COLONOSCOPY  2018    repeat in 2028     Colposcopy Cervix with Loop Electrode Conization and Lser to Vagina  2008     COLPOSCOPY, BIOPSY, COMBINED  10/24/2012    Procedure: COMBINED COLPOSCOPY, BIOPSY;  Colposcopy, Biopsy of Cervix and Vagina, Ultrasound Guidance;  Surgeon: Colette Ng MD;  Location: [facility] OR     ENT SURGERY  01/23/2018    otoscelerosis, right side     HYSTERECTOMY, FRANCIA  12/2012    high grade cervical dysplasia, [place] [facility], Dr. Arciniega     INSERT INTERSTITIAL NEEDLE, ULTRASOUND GUIDED N/A 2/13/2023    Procedure: INSERTION, NEEDLE, WITH ULTRASOUND GUIDANCE, FOR INTERSTITIAL BRACHYTHERAPY;  Surgeon: Raymond Stockton MD;  Location: [facility] OR     INSERT PORT VASCULAR ACCESS Right 7/18/2022    Procedure: INSERTION, VASCULAR ACCESS PORT;  Surgeon: Donnie Elias MD;  Location: [facility] OR     IR CHEST PORT PLACEMENT > 5 YRS OF AGE  7/18/2022     IR PORT REMOVAL RIGHT  3/30/2023     OR LAP VAGINECTOMY, PARTIAL REMOVAL OF VAGINAL WALL  10/2013    upper vaginectomy for dysplasia, Dr. Megan Arciniega     REMOVE INTERSTITIAL NEEDLE N/A 2/15/2023    Procedure: REMOVAL, INTERSTITIAL NEEDLE, AFTER TEMPORARY BRACHYTHERAPY;  Surgeon: Raymond Stockton MD;  [facility]     REMOVE PORT VASCULAR ACCESS Right 3/30/2023    Procedure: Remove port vascular access right;  Surgeon: Donnie Elias MD;  [place] [facility]     Allergies:  Allergies as of 09/16/2024     (No Known Allergies)     Current Medications:  Current Outpatient Medications   Medication Sig Dispense Refill     Acetylcarnitine HCl 250 MG CAPS Take 500 mg by mouth daily       aspirin (ASA) 81 MG chewable tablet Take 81 mg by mouth every evening       Bacillus Coagulans-Inulin (PROBIOTIC FORMULA) 1-250 BILLION-MG CAPS Take by mouth every morning 25+billion CFUS        BENFOTIAMINE PO Take 150 mg by mouth every morning       Cholecalciferol (VITAMIN D-3) 25 MCG (1000 UT) CAPS Take 1,000 Units by mouth every morning 90 capsule 3     coenzyme Q-10 capsule Take 1 capsule by mouth daily       levothyroxine (SYNTHROID/LEVOTHROID) 112 MCG tablet TAKE 1 TABLET (112 MCG) BY MOUTH DAILY BEFORE BREAKFAST 90 tablet 1     MAGNESIUM OXIDE PO Take 400 mg by mouth daily       metoprolol succinate ER (TOPROL XL) 50 MG 24 hr tablet TAKE 1 TABLET BY MOUTH EVERY DAY 90 tablet 1     Omega-3 Fatty Acids (OMEGA-3 FISH OIL PO) Take 630 mg by mouth 2 times daily       OVER-THE-COUNTER Turkey tail mushroom powder, use 1 times a day       Pectin Cit-Inos-C-Bioflav-Soy (MODIFIED CITRUS PECTIN PO) Take by mouth 2 times daily       Taurine 1000 MG CAPS Take 1,000 mg by mouth every morning 60 capsule 11     UNABLE TO FIND 500 mg MEDICATION NAME: Turmeric       UNABLE TO FIND 500 mg MEDICATION NAME: Calcium D- Glucarate       UNABLE TO FIND 1,200 mg 2 times daily MEDICATION NAME: [place]       UNABLE TO FIND 2 times daily MEDICATION NAME: [facility] for 1000mg calcium  4 pills per day       UNABLE TO FIND Take 1 tablet by mouth every morning MEDICATION NAME: Dinndolylmethane Complex (DIM) 100mg tablet 1 table daily        Physical Exam:  /74 (BP Location: Right arm, Patient Position: Sitting, Cuff Size: Adult Regular)   Pulse 83   Temp 98.6  F (37  C) (Oral)   Resp 12   Wt 61.5 kg (135 lb 9.6 oz)   LMP  (LMP Unknown)   SpO2 98%   BMI 24.60 kg/m    GENERAL APPEARANCE: Well appearing adult female in NAD.  Alert and oriented x 3.    HEENT: NC/AT, sclera anicteric.  MMM.    LYMPHATICS: No palpable cervical, supraclavicular, or axillary lymphadenopathy  RESP: Lungs are CTA bilaterally and without wheezes or crackles.  CARDIOVASCULAR:  RRR.  No audible m/r/g.  BREAST:  Bilateral breasts are of normal fibroglandular density.  There are no dominant or discretely palpable masses in either breast.   Specifically, I am unable to palpate a mass at 12:30, 8 cm from the nipple of the left breast in area of known chest wall recurrence.  Bilateral nipples are everted and without discharge.  ABDOMEN:  soft, nondistended  MUSCULOSKELETAL: Full ROM of the bilateral upper extremities.  SKIN: no visible concerning skin lesions or rashes  PSYCHIATRIC: Normal mood and affect.  Pleasant and conversant.    Laboratory/Imaging Studies  I personally reviewed the below images while in clinic today:    6/20/2024 DEXA bone density scan:  Results   Lumbar spine   T-score -1.7 (L1-2), BMD is 0.965 g/cm2     Left femoral neck  T-score -2.8, BMD is 0.655 g/cm2     Right femoral neck  T-score -2.8, BMD is 0.646 g/cm2     Left total hip  T-score -2.6, BMD is 0.674 g/cm2     Right total hip  T-score -2.6, BMD is 0.678 g/cm2    ASSESSMENT/PLAN:  Ms. Donaldson is a 75 y/o woman with a history of left breast cancer (s/p lumpectomy, radiation in 2003 followed by 5 years of tamoxifen w/ Dr. Mtz) and cervical SCC (10/2012, s/p FRANCIA, upper vaginectomy) with pelvic mass and a second ER positive, DE negative, HER2 negative left breast cancer.     1. Left breast cancer.  12:30, 8 cm from the nipple.  - Without surgery/radiation treatment of breast cancer, the breast cancer is not cured.  She has declined treatment including recommended palliative treatment with anastrozole.  She is wanting to focus on quality of life at this time.  She would be open to discussion of treatment options if evidence of further tumor growth.    - She is pursuing/taking a number of naturopathic supplements including modified citrus pectin for cancer prevention.  It is notable that modified citrus pectin has been shown in preclinical studies to have anti-cancer properties, however, there is a lack of clinical data that it is effective at treating/preventing cancer.  - She is asymptomatic of disease recurrence on history taken today and clinical exam is without concerning  findings.  She continues to decline further imaging of the left breast mass.  - Return to clinic in 6 months.  She agrees to contact the clinic with any concerns in the meantime.    2. Recurrent cervical versus vaginal cancer  - She follows with Dr. Raygoza of [facility]   - s/p 6 cycles neodjuvant Cisplatin 50 mg/m2 + paclitaxel 175 mg/m2 + bevacizumab 15 mg/kg + pembrolizumab every 21 days 7/11 - 11/21/2022.  Pembrolizumab added C3 and beyond; Cisplatin changed to carboplatin for cycles 5 and 6 due to tinnitus.    - s/p radiation to the pelvis and para-aortic lymph nodes as well as interstitial brachytherapy to the vaginal cuff, completed 02/2023.  - Plan is for regular clinic visits and to image only if symptomatic.  - I reviewed Dr. Raygoza's 9/6/2024 gyn/onc clinic note, there is no change to the above plan. She is following with gyn/onc once every 3 months.  At the 2 year point, will transition to once every 6 months visits.    3.  Neuropathy:  Chemotherapy induced.  Grade 1.  She is doing acupuncture and has noticed some improvement in her neuropathy.    4.  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  She continues on levothyroxine 112 mcg PO daily.  - Last TSH on 3/27/2024 was wnl.     5.  Osteoporosis:  DEXA in June with lowest T-score of -2.8 in the bilateral femoral necks c/w osteoporosis.    - she is treating with calcium, vitamin D, weight bearing exercise.    6.  Follow Up:  Return to clinic in 6 months.                    Again, thank you for allowing me to participate in the care of your patient.        Sincerely,        Angela Montana MD

## 2024-10-15 ENCOUNTER — TELEPHONE (OUTPATIENT)
Dept: CARDIOLOGY | Facility: CLINIC | Age: 74
End: 2024-10-15
Payer: COMMERCIAL

## 2024-10-15 NOTE — TELEPHONE ENCOUNTER
Left Voicemail (1st Attempt) and Sent Mychart (1st Attempt) for the patient to call back and schedule the following:    Appointment type: RETURN CARDIOLOGY  Provider: SANTO  Return date: NEXT AVAILABLE  Specialty phone number: [phone] OPT 1  Additional appointment(s) needed: N/A  Additonal Notes: N/A

## 2024-10-23 NOTE — PROGRESS NOTES
[facility] Dermatology Note  Encounter Date: Oct 25, 2024  Office Visit     Dermatology Problem List:  Last FBSC 10/25/2024    # BCC of left clavicular chest, s/p excision 2/4/22   # AK x1  - s/p cryo 10/25/2024  ____________________________________________    Assessment & Plan:    # AK, L upper cutaneous lip  - cryotherapy done today. See procedure note below.     # Benign lesions - SKs, cherry angiomas, lentigenes.  - No treatment required     # Multiple benign nevi.   - Monitor for ABCDEs of melanoma   - Continue sun protection - recommend SPF 30 or higher with frequent application   - Return sooner if noticing changing or symptomatic lesions     # History of NMSC. No evidence of recurrent disease.  - Continue photoprotection - recommend SPF 30 or higher with frequent reapplication  - Continue yearly skin exams  - Advised to monitor for changing, non-healing, bleeding, painful, changing, or otherwise symptomatic lesions       Procedures Performed:   Cryotherapy procedure note: After verbal consent and discussion of risks and benefits including but no limited to dyspigmentation/scar, blister, and pain, 1 AK was(were) treated with 1-2mm freeze border for 2 cycles with liquid nitrogen. Post cryotherapy instructions were provided.       Follow-up: 1 year(s) in-person, or earlier for new or changing lesions    Staff and Scribe:     Scribe Disclosure:   I, Jewels Stafford, am serving as a scribe; to document services personally performed by Yulisa Aparicio MD -based on data collection and the provider's statements to me.     Provider Disclosure:   The documentation recorded by the scribe accurately reflects the services I personally performed and the decisions made by me.    Yulisa Aparicio MD    Department of Dermatology  [facility]: Phone: [phone], Fax: [phone]  10/28/2024      ____________________________________________    CC: Skin Check (Here today for a skin check. No concerns. )    HPI:  Ms. Cordell Donaldson is a(n) 74 year old female who presents today as a new patient for above.    The patient reports that she previously underwent cancer treatments. She previously had BCC. She has an area that has a scab that keeps building up, she notes she frequently picks at this area. She notes that she has a spot of scaling located on her scalp.     Patient is otherwise feeling well, without additional skin concerns.    Labs Reviewed:  N/A    Physical Exam:  Vitals: LMP  (LMP Unknown)   SKIN: Total skin excluding the undergarment areas was performed. The exam included the head/face, neck, both arms, chest, back, abdomen, both legs, digits and/or nails.   - There are dome shaped bright red papules on the trunk and extremities .   - Multiple regular brown pigmented macules and papules are identified on the trunk and extremities. .   - Scattered brown macules on sun exposed areas.  - Waxy stuck on papules and plaques on trunk and extremities.    - There is an erythematous macule with overyling adherent scale on the L upper cutaneous lip.  - No other lesions of concern on areas examined.     Medications:  Current Outpatient Medications   Medication Sig Dispense Refill    Acetylcarnitine HCl 250 MG CAPS Take 500 mg by mouth daily      aspirin (ASA) 81 MG chewable tablet Take 81 mg by mouth every evening      Bacillus Coagulans-Inulin (PROBIOTIC FORMULA) 1-250 BILLION-MG CAPS Take by mouth every morning 25+billion CFUS      BENFOTIAMINE PO Take 150 mg by mouth every morning      Cholecalciferol (VITAMIN D-3) 25 MCG (1000 UT) CAPS Take 1,000 Units by mouth every morning 90 capsule 3    coenzyme Q-10 capsule Take 1 capsule by mouth daily      levothyroxine (SYNTHROID/LEVOTHROID) 112 MCG tablet TAKE 1 TABLET (112 MCG) BY MOUTH DAILY BEFORE BREAKFAST 90 tablet 1    MAGNESIUM OXIDE PO Take 400 mg by  mouth daily      metoprolol succinate ER (TOPROL XL) 50 MG 24 hr tablet TAKE 1 TABLET BY MOUTH EVERY DAY 90 tablet 1    Omega-3 Fatty Acids (OMEGA-3 FISH OIL PO) Take 630 mg by mouth 2 times daily      OVER-THE-COUNTER Turkey tail mushroom powder, use 1 times a day      Pectin Cit-Inos-C-Bioflav-Soy (MODIFIED CITRUS PECTIN PO) Take by mouth 2 times daily      Taurine 1000 MG CAPS Take 1,000 mg by mouth every morning 60 capsule 11    UNABLE TO FIND 500 mg MEDICATION NAME: Turmeric      UNABLE TO FIND 1,200 mg 2 times daily MEDICATION NAME: [place]      UNABLE TO FIND 2 times daily MEDICATION NAME: [facility] for 1000mg calcium  4 pills per day      UNABLE TO FIND Take 1 tablet by mouth every morning MEDICATION NAME: Dinndolylmethane Complex (DIM) 100mg tablet 1 table daily      UNABLE TO FIND 500 mg MEDICATION NAME: Calcium D- Glucarate       No current facility-administered medications for this visit.      Past Medical History:   Patient Active Problem List   Diagnosis    Dupuytren contracture    Abnormal electrocardiogram    Atrial fibrillation (H)    Hyperlipidemia LDL goal <130    Malignant neoplasm of breast (H)    Rectocele    Malignant neoplasm of endocervix (H)    Vaginal cancer (H)     Past Medical History:   Diagnosis Date    Abnormal Pap smear     maybe 20 years ago    Cervical cancer (H)     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     History of breast cancer 2003    lumpectomy and radiation offerred chemo and patient declined at time but had oncogene test and low risk    Hyperlipidemia LDL goal < 160     Osteopenia     Paroxysmal A-fib (H)     Severe vaginal dysplasia         CC Referred Self, MD  No address on file on close of this encounter.

## 2024-10-25 ENCOUNTER — OFFICE VISIT (OUTPATIENT)
Dept: DERMATOLOGY | Facility: CLINIC | Age: 74
End: 2024-10-25
Payer: COMMERCIAL

## 2024-10-25 DIAGNOSIS — L81.4 SOLAR LENTIGO: ICD-10-CM

## 2024-10-25 DIAGNOSIS — D18.01 CHERRY ANGIOMA: ICD-10-CM

## 2024-10-25 DIAGNOSIS — L82.1 SEBORRHEIC KERATOSES: ICD-10-CM

## 2024-10-25 DIAGNOSIS — Z12.83 SKIN CANCER SCREENING: Primary | ICD-10-CM

## 2024-10-25 DIAGNOSIS — Z85.828 HISTORY OF NONMELANOMA SKIN CANCER: ICD-10-CM

## 2024-10-25 DIAGNOSIS — L57.0 ACTINIC KERATOSES: ICD-10-CM

## 2024-10-25 DIAGNOSIS — D22.9 MULTIPLE BENIGN NEVI: ICD-10-CM

## 2024-10-25 PROCEDURE — 99203 OFFICE O/P NEW LOW 30 MIN: CPT | Mod: 25 | Performed by: DERMATOLOGY

## 2024-10-25 PROCEDURE — 17000 DESTRUCT PREMALG LESION: CPT | Performed by: DERMATOLOGY

## 2024-10-25 ASSESSMENT — PAIN SCALES - GENERAL: PAINLEVEL_OUTOF10: NO PAIN (0)

## 2024-10-25 NOTE — LETTER
10/25/2024       RE: Cordell Donaldson  [address]  [facility] [address]     Dear Colleague,    Thank you for referring your patient, Cordell Donaldson, to the [facility] [place] at [facility]. Please see a copy of my visit note below.    [facility] Dermatology Note  Encounter Date: Oct 25, 2024  Office Visit     Dermatology Problem List:  Last FBSC 10/25/2024    # BCC of left clavicular chest, s/p excision 2/4/22   # AK x1  - s/p cryo 10/25/2024  ____________________________________________    Assessment & Plan:    # AK, L upper cutaneous lip  - cryotherapy done today. See procedure note below.     # Benign lesions - SKs, cherry angiomas, lentigenes.  - No treatment required     # Multiple benign nevi.   - Monitor for ABCDEs of melanoma   - Continue sun protection - recommend SPF 30 or higher with frequent application   - Return sooner if noticing changing or symptomatic lesions     # History of NMSC. No evidence of recurrent disease.  - Continue photoprotection - recommend SPF 30 or higher with frequent reapplication  - Continue yearly skin exams  - Advised to monitor for changing, non-healing, bleeding, painful, changing, or otherwise symptomatic lesions       Procedures Performed:   Cryotherapy procedure note: After verbal consent and discussion of risks and benefits including but no limited to dyspigmentation/scar, blister, and pain, 1 AK was(were) treated with 1-2mm freeze border for 2 cycles with liquid nitrogen. Post cryotherapy instructions were provided.       Follow-up: 1 year(s) in-person, or earlier for new or changing lesions    Staff and Scribe:     Scribe Disclosure:   I, Jewels Stafford, am serving as a scribe; to document services personally performed by Yulisa Aparicio MD -based on data collection and the provider's statements to me.     Provider Disclosure:   The documentation recorded by  the scribe accurately reflects the services I personally performed and the decisions made by me.    Yulisa Aparicio MD    Department of Dermatology  [facility]: Phone: [phone], Fax: [phone]  10/28/2024     ____________________________________________    CC: Skin Check (Here today for a skin check. No concerns. )    HPI:  Ms. Cordell Donaldson is a(n) 74 year old female who presents today as a new patient for above.    The patient reports that she previously underwent cancer treatments. She previously had BCC. She has an area that has a scab that keeps building up, she notes she frequently picks at this area. She notes that she has a spot of scaling located on her scalp.     Patient is otherwise feeling well, without additional skin concerns.    Labs Reviewed:  N/A    Physical Exam:  Vitals: LMP  (LMP Unknown)   SKIN: Total skin excluding the undergarment areas was performed. The exam included the head/face, neck, both arms, chest, back, abdomen, both legs, digits and/or nails.   - There are dome shaped bright red papules on the trunk and extremities .   - Multiple regular brown pigmented macules and papules are identified on the trunk and extremities. .   - Scattered brown macules on sun exposed areas.  - Waxy stuck on papules and plaques on trunk and extremities.    - There is an erythematous macule with overyling adherent scale on the L upper cutaneous lip.  - No other lesions of concern on areas examined.     Medications:  Current Outpatient Medications   Medication Sig Dispense Refill     Acetylcarnitine HCl 250 MG CAPS Take 500 mg by mouth daily       aspirin (ASA) 81 MG chewable tablet Take 81 mg by mouth every evening       Bacillus Coagulans-Inulin (PROBIOTIC FORMULA) 1-250 BILLION-MG CAPS Take by mouth every morning 25+billion CFUS       BENFOTIAMINE PO Take 150 mg by mouth every morning        Cholecalciferol (VITAMIN D-3) 25 MCG (1000 UT) CAPS Take 1,000 Units by mouth every morning 90 capsule 3     coenzyme Q-10 capsule Take 1 capsule by mouth daily       levothyroxine (SYNTHROID/LEVOTHROID) 112 MCG tablet TAKE 1 TABLET (112 MCG) BY MOUTH DAILY BEFORE BREAKFAST 90 tablet 1     MAGNESIUM OXIDE PO Take 400 mg by mouth daily       metoprolol succinate ER (TOPROL XL) 50 MG 24 hr tablet TAKE 1 TABLET BY MOUTH EVERY DAY 90 tablet 1     Omega-3 Fatty Acids (OMEGA-3 FISH OIL PO) Take 630 mg by mouth 2 times daily       OVER-THE-COUNTER Turkey tail mushroom powder, use 1 times a day       Pectin Cit-Inos-C-Bioflav-Soy (MODIFIED CITRUS PECTIN PO) Take by mouth 2 times daily       Taurine 1000 MG CAPS Take 1,000 mg by mouth every morning 60 capsule 11     UNABLE TO FIND 500 mg MEDICATION NAME: Turmeric       UNABLE TO FIND 1,200 mg 2 times daily MEDICATION NAME: Efraín       UNABLE TO FIND 2 times daily MEDICATION NAME: [facility] for 1000mg calcium  4 pills per day       UNABLE TO FIND Take 1 tablet by mouth every morning MEDICATION NAME: Dinndolylmethane Complex (DIM) 100mg tablet 1 table daily       UNABLE TO FIND 500 mg MEDICATION NAME: Calcium D- Glucarate       No current facility-administered medications for this visit.      Past Medical History:   Patient Active Problem List   Diagnosis     Dupuytren contracture     Abnormal electrocardiogram     Atrial fibrillation (H)     Hyperlipidemia LDL goal <130     Malignant neoplasm of breast (H)     Rectocele     Malignant neoplasm of endocervix (H)     Vaginal cancer (H)     Past Medical History:   Diagnosis Date     Abnormal Pap smear     maybe 20 years ago     Cervical cancer (H)      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      History of breast cancer 2003    lumpectomy and radiation offerred chemo and patient declined at time but had oncogene test and low risk     Hyperlipidemia LDL goal < 160      Osteopenia       Paroxysmal A-fib (H)      Severe vaginal dysplasia         CC Referred Self, MD  No address on file on close of this encounter.      Again, thank you for allowing me to participate in the care of your patient.      Sincerely,    Yulisa Aparicio MD

## 2024-10-25 NOTE — NURSING NOTE
Dermatology Rooming Note    Cordell Donaldson's goals for this visit include:   Chief Complaint   Patient presents with    Skin Check     Here today for a skin check. No concerns.      Joan Bernal RN

## 2024-10-25 NOTE — PATIENT INSTRUCTIONS
Checking for Skin Cancer  You can help find cancer early by checking your skin each month. There are 3 main kinds of skin cancer: melanoma, basal cell carcinoma, and squamous cell carcinoma. Doing monthly skin checks is the best way to find new marks, sores, or skin changes. Follow these instructions for checking your skin.   The ABCDEs of checking moles for melanoma   Check your moles or growths for signs of melanoma using ABCDE:   Asymmetry: The sides of the mole or growth don t match.  Border: The edges are ragged, notched, or blurred.  Color: The color within the mole or growth varies. It could be black, brown, tan, white, or shades of red, gray, or blue.  Diameter: The mole or growth is larger than   inch or 6 mm (size of a pencil eraser).  Evolving: The size, shape, texture, or color of the mole or growth is changing.     ABCDE's of moles on light skin.        ABCDE's of moles on dark skin may be harder to identify.     Checking for other types of skin cancer  Basal cell carcinoma or squamous cell carcinoma cause symptoms like:     A spot or mole that looks different from all other marks on your skin  Changes in how an area feels, such as itching, tenderness, or pain  Changes in the skin's surface, such as oozing, bleeding, or scaliness  A sore that doesn't heal  New swelling, redness, or spread of color beyond the border of a mole    Who s at risk?  Anyone of any skin color can get skin cancer. But you're at greater risk if you have:   Fair skin that freckles easily and burns instead of tanning  Light-colored or red hair  Light-colored eyes  Many moles or abnormal moles on your skin  A long history of unprotected exposure to sunlight or tanning beds  A history of many blistering sunburns as a child or teen  A family history of skin cancer  Been exposed to radiation or chemicals  A weakened immune system  Been exposed to arsenic  If you've had skin cancer in the past, you're at high risk of having it again.    How to check your skin  Do your monthly skin checkups in front of a full-length mirror. Use a room with good lighting so it's easier to see. Use a hand mirror to look at hard-to-see places like your buttocks and back. You can also have a trusted friend or family member help you with these checks. Check every part of your body, including your:   Head (ears, face, neck, and scalp)  Torso (front, back, sides, and under breasts)  Arms (tops, undersides, and armpits)  Hands (palms, backs, and fingers, including under the nails)  Lower back, buttocks, and genitals  Legs (front, back, and sides)  Feet (tops, soles, toes, including under the nails, and between toes)  Watch for new spots on your skin or a spot that's changing in color, shape, size.   If you have a lot of moles, take digital photos of them each month. Make sure to take photos both up close and from a distance. These can help you see if any moles change over time.   Know your skin  Most skin changes aren't cancer. But if you see any changes in your skin, call your healthcare provider right away. Only they can tell you if a change is a problem. If you have skin cancer, seeing your provider can be the first step to getting the treatment that could save your life.   Kev last reviewed this educational content on 10/1/2021    7118-6915 [employer]. All rights reserved. This information is not intended as a substitute for professional medical care. Always follow your healthcare professional's instructions.     Cryotherapy    What is it?  Use of a very cold liquid, such as liquid nitrogen, to freeze and destroy abnormal skin cells that need to be removed    What should I expect?  Tenderness and redness  A small blister that might grow and fill with dark purple blood. There may be crusting.  More than one treatment may be needed if the lesions do not go away.    How do I care for the treated area?  Gently wash the area with your hands when  bathing.  Use a thin layer of Vaseline to help with healing. You may use a Band-Aid.   The area should heal within 7-10 days and may leave behind a pink or lighter color.   Do not use an antibiotic or Neosporin ointment.   You may take acetaminophen (Tylenol) for pain.     Call your doctor if you have:  Severe pain  Signs of infection (warmth, redness, cloudy yellow drainage, and or a bad smell)  Questions or concerns    Who should I call with questions?      [facility]: [phone]      [facility]: [phone]      For urgent needs outside of business hours call the [facility] at [phone] and ask for the dermatology resident on call

## 2024-12-08 DIAGNOSIS — I48.0 PAROXYSMAL ATRIAL FIBRILLATION (H): ICD-10-CM

## 2024-12-12 ENCOUNTER — MYC REFILL (OUTPATIENT)
Dept: CARDIOLOGY | Facility: CLINIC | Age: 74
End: 2024-12-12
Payer: COMMERCIAL

## 2024-12-12 DIAGNOSIS — I48.0 PAROXYSMAL ATRIAL FIBRILLATION (H): ICD-10-CM

## 2024-12-12 RX ORDER — METOPROLOL SUCCINATE 50 MG/1
50 TABLET, EXTENDED RELEASE ORAL DAILY
Qty: 90 TABLET | Refills: 0 | Status: SHIPPED | OUTPATIENT
Start: 2024-12-12

## 2024-12-18 RX ORDER — METOPROLOL SUCCINATE 50 MG/1
50 TABLET, EXTENDED RELEASE ORAL DAILY
Qty: 90 TABLET | Refills: 1 | OUTPATIENT
Start: 2024-12-18

## 2024-12-18 NOTE — TELEPHONE ENCOUNTER
metoprolol succinate ER (TOPROL XL) 50 MG 24 hr tablet 90 tablet 0 12/12/2024     Should have refills on file. Pateint sent message. Rx refill denied    Nicole Archer RN  P Red Flag Triage/MRT

## 2025-01-01 ENCOUNTER — TELEPHONE (OUTPATIENT)
Dept: PALLIATIVE CARE | Facility: CLINIC | Age: 75
End: 2025-01-01
Payer: COMMERCIAL

## 2025-01-01 ENCOUNTER — RESULTS FOLLOW-UP (OUTPATIENT)
Dept: SURGERY | Facility: CLINIC | Age: 75
End: 2025-01-01

## 2025-01-01 ENCOUNTER — RESULTS FOLLOW-UP (OUTPATIENT)
Dept: ONCOLOGY | Facility: CLINIC | Age: 75
End: 2025-01-01

## 2025-01-01 ENCOUNTER — ANESTHESIA EVENT (OUTPATIENT)
Dept: SURGERY | Facility: CLINIC | Age: 75
DRG: 166 | End: 2025-01-01
Payer: COMMERCIAL

## 2025-01-01 ENCOUNTER — ANESTHESIA (OUTPATIENT)
Dept: SURGERY | Facility: CLINIC | Age: 75
DRG: 166 | End: 2025-01-01
Payer: COMMERCIAL

## 2025-01-01 ENCOUNTER — RESULTS FOLLOW-UP (OUTPATIENT)
Dept: PULMONOLOGY | Facility: CLINIC | Age: 75
End: 2025-01-01

## 2025-01-01 DIAGNOSIS — J96.01 ACUTE RESPIRATORY FAILURE WITH HYPOXIA (H): Primary | ICD-10-CM

## 2025-01-01 DIAGNOSIS — C78.01 MALIGNANT NEOPLASM METASTATIC TO BOTH LUNGS (H): ICD-10-CM

## 2025-01-01 DIAGNOSIS — C78.02 MALIGNANT NEOPLASM METASTATIC TO BOTH LUNGS (H): ICD-10-CM

## 2025-01-01 PROCEDURE — 250N000011 HC RX IP 250 OP 636: Performed by: REGISTERED NURSE

## 2025-01-01 PROCEDURE — 258N000003 HC RX IP 258 OP 636: Performed by: REGISTERED NURSE

## 2025-01-01 PROCEDURE — 250N000009 HC RX 250: Performed by: REGISTERED NURSE

## 2025-01-01 PROCEDURE — 31645 BRNCHSC W/THER ASPIR 1ST: CPT | Mod: GC | Performed by: INTERNAL MEDICINE

## 2025-01-01 RX ORDER — DEXAMETHASONE SODIUM PHOSPHATE 4 MG/ML
INJECTION, SOLUTION INTRA-ARTICULAR; INTRALESIONAL; INTRAMUSCULAR; INTRAVENOUS; SOFT TISSUE PRN
Status: DISCONTINUED | OUTPATIENT
Start: 2025-01-01 | End: 2025-01-01

## 2025-01-01 RX ORDER — METOPROLOL TARTRATE 1 MG/ML
INJECTION, SOLUTION INTRAVENOUS PRN
Status: DISCONTINUED | OUTPATIENT
Start: 2025-01-01 | End: 2025-01-01

## 2025-01-01 RX ORDER — PROPOFOL 10 MG/ML
INJECTION, EMULSION INTRAVENOUS PRN
Status: DISCONTINUED | OUTPATIENT
Start: 2025-01-01 | End: 2025-01-01

## 2025-01-01 RX ORDER — SODIUM CHLORIDE, SODIUM LACTATE, POTASSIUM CHLORIDE, CALCIUM CHLORIDE 600; 310; 30; 20 MG/100ML; MG/100ML; MG/100ML; MG/100ML
INJECTION, SOLUTION INTRAVENOUS CONTINUOUS PRN
Status: DISCONTINUED | OUTPATIENT
Start: 2025-01-01 | End: 2025-01-01

## 2025-01-01 RX ORDER — PROPOFOL 10 MG/ML
INJECTION, EMULSION INTRAVENOUS CONTINUOUS PRN
Status: DISCONTINUED | OUTPATIENT
Start: 2025-01-01 | End: 2025-01-01

## 2025-01-01 RX ORDER — LIDOCAINE HYDROCHLORIDE 20 MG/ML
INJECTION, SOLUTION INFILTRATION; PERINEURAL PRN
Status: DISCONTINUED | OUTPATIENT
Start: 2025-01-01 | End: 2025-01-01

## 2025-01-01 RX ORDER — FENTANYL CITRATE 50 UG/ML
INJECTION, SOLUTION INTRAMUSCULAR; INTRAVENOUS PRN
Status: DISCONTINUED | OUTPATIENT
Start: 2025-01-01 | End: 2025-01-01

## 2025-01-01 RX ADMIN — Medication 10 MG: at 11:42

## 2025-01-01 RX ADMIN — Medication 200 MG: at 11:59

## 2025-01-01 RX ADMIN — DEXAMETHASONE SODIUM PHOSPHATE 4 MG: 4 INJECTION, SOLUTION INTRAMUSCULAR; INTRAVENOUS at 11:26

## 2025-01-01 RX ADMIN — FENTANYL CITRATE 50 MCG: 50 INJECTION INTRAMUSCULAR; INTRAVENOUS at 11:26

## 2025-01-01 RX ADMIN — PROPOFOL 100 MCG/KG/MIN: 10 INJECTION, EMULSION INTRAVENOUS at 11:30

## 2025-01-01 RX ADMIN — PROPOFOL 70 MG: 10 INJECTION, EMULSION INTRAVENOUS at 11:28

## 2025-01-01 RX ADMIN — SODIUM CHLORIDE, SODIUM LACTATE, POTASSIUM CHLORIDE, AND CALCIUM CHLORIDE: .6; .31; .03; .02 INJECTION, SOLUTION INTRAVENOUS at 11:15

## 2025-01-01 RX ADMIN — METOPROLOL TARTRATE 1 MG: 5 INJECTION INTRAVENOUS at 11:38

## 2025-01-01 RX ADMIN — Medication 30 MG: at 11:31

## 2025-01-01 RX ADMIN — PHENYLEPHRINE HYDROCHLORIDE 200 MCG: 10 INJECTION INTRAVENOUS at 11:37

## 2025-01-01 RX ADMIN — LIDOCAINE HYDROCHLORIDE 60 MG: 20 INJECTION, SOLUTION INFILTRATION; PERINEURAL at 11:26

## 2025-01-01 ASSESSMENT — ENCOUNTER SYMPTOMS: DYSRHYTHMIAS: 1

## 2025-01-21 ENCOUNTER — OFFICE VISIT (OUTPATIENT)
Dept: UROLOGY | Facility: CLINIC | Age: 75
End: 2025-01-21
Attending: OBSTETRICS & GYNECOLOGY
Payer: COMMERCIAL

## 2025-01-21 VITALS
DIASTOLIC BLOOD PRESSURE: 64 MMHG | BODY MASS INDEX: 24.49 KG/M2 | RESPIRATION RATE: 18 BRPM | SYSTOLIC BLOOD PRESSURE: 102 MMHG | WEIGHT: 135 LBS

## 2025-01-21 DIAGNOSIS — N89.5 NARROWING OR CLOSURE OF VAGINA: Primary | ICD-10-CM

## 2025-01-21 DIAGNOSIS — N95.2 VAGINAL ATROPHY: ICD-10-CM

## 2025-01-21 DIAGNOSIS — N36.2 URETHRAL CARUNCLE: ICD-10-CM

## 2025-01-21 ASSESSMENT — PAIN SCALES - GENERAL: PAINLEVEL_OUTOF10: NO PAIN (0)

## 2025-01-21 NOTE — PROGRESS NOTES
[facility] [place]  [address], [address], [address]  [facility] [address]  Phone: [phone]  Fax: [phone]     January 21, 2025    Referring Provider: Hina Raygoza MD  [address] [place] [address]    Primary Care Provider: Merry Lino    CC: weak urine stream and prolonged bladder emptying time    A/P: Cordell Donaldson is a 74 year old F with medical hx significant for constipation and rectocele, breast cancer (2003; lumpectomy left, radiation, 5 yr of tamoxifen) and cervical CINIII and surgical history significant for L mastectomy and FRANCIA.   Encounter Diagnoses   Name Primary?    Narrowing or closure of vagina Yes    Vaginal atrophy     Urethral caruncle      Cordell was seen today for consult.    Diagnoses and all orders for this visit:    Narrowing or closure of vagina    Vaginal atrophy    Urethral caruncle    Other orders  -     Adult Uro/Gyn  Referral      1) Vaginal introitus narrowed: today we discussed that s/p vulvar radiation she has remodelled vaginal tissue with narrowed vaginal opening as well as shortened vaginal canal. For this reason it is important to restart use of vaginal dilators  - start by using them 5 minutes 2 x week with vaginal moisturizer like bionourish (good clean love) or a silicone lubricant like uberlube  - slowly advance time of use until back to guideline of 20 minutes per week (or 10  minutes twice weekly)    2) Vulvovaginal atrophy: thinning skin and mucosa r/t low estrogen state. Given that she continues cancer surveillance s/p recurrent cervical CIN3, it is not safe to introduce any systemic estrogen, however there is good data to support that estradiol vaginal cream and intrarosa (DHEA) vaginal cream act locally, and do not cause detectable hormone levels. However, many oncologists use caution and do not re-introduce hormones even locally. Consider a conversation  with oncology regarding risks/benefits of using hormones at the vulva/vagina to alleviate severe vestibular and vaginal atrophy, and to assist in maintaining vaginal introitus.     - Alternatively use vaginal moisturizures and silicone lubricant as well as vibrator to continue to moisturize the vagina and encourage blood flow to vulvovaginal anatomy    3) Urethral caruncle: exam shows mild urothelial prolapse today with some shiny red tissue outside of the urethral meatus. It is not causing any symptoms and does not require treatment.     We discussed that at this time both history and physical exam provide reassurance that things are healthy. Cordell voids within 30 seconds, senses complete emptying and our PVR shows 0 mL, telling us that her bladder empties completely. Cordell has good bladder capacity as evidenced by adequate hydration and voiding every 2+ hours. Nocturia is worst with avoidable bladder triggers and well controlled with restricting free fluids 2 hrs before sleep.     No need to return for follow up unless new symptoms become bothersome.     HPI:  Cordell Donaldson is a 74 year old F with medical hx significant for breast cancer (2003; s/p left lumpectomy, radiation, L breast mastectomy, 5 yr of tamoxifen) and cervical CINIII (s/p FRANCIA) who presents for evaluation of weak urine stream and sense of slow urination with prolonged emptying time.     She urinates every 2 hrs daytime. She urinates during this visit and timed the voiding at about 25 seconds. She limits bladder triggers.     A few months ago Cordell became concerned it was taking her longer than usual to empty her bladder. She sensed slower urination and would bear down and push to empty. Her concern was that she was taking too long in the bathroom. Then she told herself to relax and just let herself urinate, and this sensation has improved. She is not sure if things are healthy in her pelvis. She followed up with her oncology team, was  offered pelvic imaging to rule out recurrence, and decided not to seek imaging  as she is focused on quality of life at this time and concludes imaging would not convince her to adopt a treatment-orientation, at least at this time.       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 Surveillance visit.     History of Present Illness:   Cordell Donaldson is a 74 year old initially referred to the Gynecologic Cancer Clinic at the [facility] on 7/5/2022 by gynecologic oncologist Dr. Arciniega and radiation oncologist Dr. Stockton for management of a pelvic mass due to recurrent cervical cancer vs new vaginal cancer. History as follows:      Breast Cancer History:  2003: Left breast cancer.  -Lumpectomy.  -Radiation therapy.     [id number]: BRENNAN.      7/1/22: PET/CT to stage vaginal cancer (see below).   Mild soft tissue  thickening in the left retropectoral region with an SUV max of 2.7.  7/15/22: Invasive ductal carcinoma.   -Managed by Dr. Montana. Endocrine therapy with anastozole recommended after completion of therapy for the vaginal cancer, but Cordell declined, prefers to focus on quality of life.      Cervical/Vaginal Dysplasia/Cancer History:  10/24/12:   -Endocervical curettings: Squamous carcinoma, invasion cannot be assessed.   -Ectocervical biopsies: HSIL (CIN3).   -Vaginal biopsy, posterior: HSIL (VaIN3).  12/2012: Robotic-assisted laparoscopic lysis of adhesions, left ureterolysis with  conversion to laparotomy, total abdominal hysterectomy, and an upper  Vaginectomy by gynecologic oncologist Dr. Arciniega.   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      7/22/13: Vaginal biopsy: HSIL,  cannot exclude invasion.   -Consultation with gynecologic oncologist Dr. Johnson. Upper vaginectomy and CO2 laser ablation recommended, patient declined.   [id number]: No vaginal dysplasia treatment.   5/20/22: Pelvic MRI: I have personally reviewed the MRI images.   Centrally hypoenhancing heterogeneous midline pelvic mass along  the superior margin of the vaginal vault measures 8.5 x 6.7 x 8.1 cm.  The mass abut the bladder and there is mucosal irregularity at the  site of abutment. The mass indents the  decompressed rectum but there is no definite invasion or mucosal  Irregularity.  6/9/22: CT-guided biopsy of pelvic mass: Poorly-differentiated squamous cell carcinoma, HPV-associated.   7/1/22: Staging PET/CT: Stage III vaginal cancer (vs recurrent, metastatic cervical cancer).      7/20/22, 8/10/22, 8/31/22,9/21/22, 10/26/22, 11/21/22: Cycles #1-6 of first-line chemotherapy with IV platinum + paclitaxel + bevacizumab 15 mg/kg + pembrolizumab 200 mg (added cycle 3) every 21 days.   -Cycles 1-4: Cisplatin 50 mg/m2, paclitaxel 175 mg/m2.   -Cycle 3:  Pembrolizumab added to all subsequent cycles due to PD-L1+ result per the KEYNOTE-826 regimen.  -9/22/22: PET/CT: Partial response. I have personally reviewed the PET/CT images.   -Discussed with radiation oncologist Dr. Stockton and at the gyn onc/radiation oncology tumor conference. Recommendation for additional chemotherapy prior to radiation therapy.   -Cycles 5+: Platinum changed to carboplatin due to tinnitus, and paclitaxel dose-reduced to 135 mg/m2 due to peripheral neuropathy.   -12/5/22: PET/CT: Partial response. I have personally reviewed the PET/CT images.   12/28/22-2/15/23: First-line pelvic external beam radiation therapy with total dose 4500 cGy in 25 fractions then vaginal cuff brachytherapy with total dose 2500 cGy in 5 fractions. No concurrent chemosensitization due to myelosuppression and decreased patient tolerance of therapy.   -Patient declined  "maintenance therapy due to significantly decreased quality of life while on systemic therapy.   -6/8/23: PET/CT: No definitive evidence of disease. Ascites with imaging concerning for cirrhotic changes.  -9/13/23: Abdominal ultrasound: Negative for cirrhosis.   -Ascites resolved, attributed to pembrolizumab.     Genetic/Genomic/Molecular Testing History:  2006: Negative for germline BRCA1, BRCA2 pathogenic variants.      8/14/22 (specimen from 6/9/22): [facility] Testing.   -PD-L1+ (CPS 10)  -Mismatch repair deficient/microsatellite instability-high  -TMB high (53 mut/Mb)  -NTRK 1/2/3 fusion not detected.      Subjective:  Cordell returns to the gyn onc clinic today for her scheduled surveillance visit, unaccompanied.   Maritza traveled through [place] x3 weeks, and was able to control her bowel movements and urination well. However, intermittently since October she has noted that it takes her a long time to void. She needs to sit in the bathroom for long periods of time due to a \"slow stream.\" No pain.   She continues to work with a homeopathic provider, who provided a remedy for her urinary stream. She does think this is helping it to improve.   No vaginal bleeding. Otherwise feels well.     Past Medical History:  Past Medical History   sical Exam:    /77 (BP Location: Right arm, Patient Position: Sitting, Cuff Size: Adult Regular)   Pulse 89   Temp 97.7  F (36.5  C) (Oral)   Resp 16   Wt 62.8 kg (138 lb 6.4 oz)   LMP  (LMP Unknown)   SpO2 99%   BMI 25.11 kg/m     Body mass index is 25.11 kg/m .    Genitourinary:External genitalia grossly normal. Medium Danuta speculum inserted ~3-4 cm, with limited view of the vagina normal with radiation changes. On bimanual exam, there is a ridge of smooth but firm tissue anteriorly just posterior to the urethra; Cordell states it has always been there and does not think additional evaluation is needed. Remainder of the palpable vagina (3-4 cm) is smooth and soft. " Rectovaginal exam reveals a smooth rectovaginal septum.   Verbal consent obtained from the patient for the pelvic exam.   Each step of the exam was verbalized throughout.  Present for the exam: GEOFF Horton.      Assessment:  Cordell Donaldson is a 74 year old patient with a diagnosis of stage III squamous cell carcinoma of the vagina (vs recurrent metastatic cervical cancer) s/p primary therapy without gross evidence of disease.      Urinary hesitancy, intermittent, most likely due to radiation changes, less likely recurrent disease.      Medical history significant for FIGO Stage IA1 squamous cell carcinoma of the cervix s/p hysterectomy with no residual disease, and a long history of vaginal HSIL.     Diagnosis of recurrent breast cancer during vaginal cancer treatment.      Plan:   1.)    Squamous cell carcinoma: Recommend pelvic MRI to further evaluate for recurrent disease as a cause of the urinary hesitancy, but Cordell would like to avoid imaging, and will think about it. Referral to urology for additional evaluation.     Plan as follows:   -Cordell will return to the gyn onc clinic in 6 months for her next surveillance visit.   2.)    Breast cancer: Continue management per medical oncologist Dr. Montana. Radiographic response of breast cancer to current chemotherapy. endocrine therapy with anastrozole recommended, but patient declined, favoring focus on quality of life.      3.)Genetic risk factors were assessed and the patient does not meet the qualifications for a referral.       4.)Labs and/or tests ordered include: None.                 5.)  Health maintenance issues addressed today include: continuing routine healthcare maintenance with her primary care provider.      6.)  Code status:  Full-code.     7.)Prescriptions: None.     Hina Raygoza MD, MS, FACOG, FACS  12/27/2024  10:50 AM             Urinary Symptoms/Voiding function  - daytime voids: every 2 hours   - nocturia: 1- rarely 4 x; on a  'bad' night she does not restrict water 2 hours before bed and then will get up a lot. Some is residual trauma from chemo/rads.   - bladder irritants: 6 oz carbonated beverages evening; 2 c coffee am   - hydration: two 20 oz water / day     Pelvic Organ Prolapse Symptoms  - none     Gastrointestinal Symptoms:  - no constipation     Sexual function/Pelvic floor pain/GYN:   - active with vibrator which is pleasurable    Relevant Medical History:    Diabetes? no  High Blood pressure? no     Recurrent UTIs? no  Sleep Apnea? no  Obesity? no  History of Blood clots? no  Other medical problems: see above    Surgical History:      Past Surgical History:   Procedure Laterality Date    BIOPSY      BREAST SURGERY Left 2003    lumpectomy left, did radiation, 5 yr of tamoxifen    COLONOSCOPY  2018    repeat in 2028    Colposcopy Cervix with Loop Electrode Conization and Lser to Vagina  2008    COLPOSCOPY, BIOPSY, COMBINED  10/24/2012    Procedure: COMBINED COLPOSCOPY, BIOPSY;  Colposcopy, Biopsy of Cervix and Vagina, Ultrasound Guidance;  Surgeon: Colette Ng MD;  Location: [facility] OR    ENT SURGERY  01/23/2018    otoscelerosis, right side    HYSTERECTOMY, FRANCIA  12/2012    high grade cervical dysplasia, [place] [facility], Dr. Arciniega    INSERT INTERSTITIAL NEEDLE, ULTRASOUND GUIDED N/A 2/13/2023    Procedure: INSERTION, NEEDLE, WITH ULTRASOUND GUIDANCE, FOR INTERSTITIAL BRACHYTHERAPY;  Surgeon: Raymond Stockton MD;  Location: [facility] OR    INSERT PORT VASCULAR ACCESS Right 7/18/2022    Procedure: INSERTION, VASCULAR ACCESS PORT;  Surgeon: Donnie Elias MD;  Location: [facility] OR    IR CHEST PORT PLACEMENT > 5 YRS OF AGE  7/18/2022    IR PORT REMOVAL RIGHT  3/30/2023    AK LAP VAGINECTOMY, PARTIAL REMOVAL OF VAGINAL WALL  10/2013    upper vaginectomy for dysplasia, Dr. Megan Arciniega    REMOVE INTERSTITIAL NEEDLE N/A 2/15/2023    Procedure: REMOVAL, INTERSTITIAL NEEDLE, AFTER TEMPORARY BRACHYTHERAPY;  Surgeon: Raymond Stockton MD;  Location:  OR     REMOVE PORT VASCULAR ACCESS Right 3/30/2023    Procedure: Remove port vascular access right;  Surgeon: Donnie Elias MD;  Location: [facility] OR     OB/Gyn History:  OB History    Para Term  AB Living   2 1 0 0 1 0   SAB IAB Ectopic Multiple Live Births   0 0 0 0 0      # Outcome Date GA Lbr Ed/2nd Weight Sex Type Anes PTL Lv   2 AB            1 Para              Medical History:      Past Medical History:   Diagnosis Date    Abnormal Pap smear     maybe 20 years ago    Cervical cancer (H)     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     History of breast cancer     lumpectomy and radiation offerred chemo and patient declined at time but had oncogene test and low risk    Hyperlipidemia LDL goal < 160     Osteopenia     Paroxysmal A-fib (H)     Severe vaginal dysplasia      Social History    Social History     Socioeconomic History    Marital status:      Spouse name: Not on file    Number of children: Not on file    Years of education: Not on file    Highest education level: Not on file   Occupational History    Not on file   Tobacco Use    Smoking status: Former     Current packs/day: 0.00     Average packs/day: 0.5 packs/day for 15.0 years (7.5 ttl pk-yrs)     Types: Cigarettes     Start date:      Quit date:      Years since quittin.0     Passive exposure: Past    Smokeless tobacco: Never   Vaping Use    Vaping status: Never Used   Substance and Sexual Activity    Alcohol use: Not Currently     Alcohol/week: 0.0 - 2.0 standard drinks of alcohol     Comment: Socially     Drug use: No    Sexual activity: Not Currently     Partners: Male     Birth control/protection: Post-menopausal   Other Topics Concern    Parent/sibling w/ CABG, MI or angioplasty before 65F 55M? 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      Social Drivers of Health     Financial  Resource Strain: Low Risk  (4/26/2024)    Financial Resource Strain     Within the past 12 months, have you or your family members you live with been unable to get utilities (heat, electricity) when it was really needed?: No   Food Insecurity: Low Risk  (4/26/2024)    Food Insecurity     Within the past 12 months, did you worry that your food would run out before you got money to buy more?: No     Within the past 12 months, did the food you bought just not last and you didn t have money to get more?: No   Transportation Needs: Low Risk  (4/26/2024)    Transportation Needs     Within the past 12 months, has lack of transportation kept you from medical appointments, getting your medicines, non-medical meetings or appointments, work, or from getting things that you need?: No   Physical Activity: Sufficiently Active (4/26/2024)    Exercise Vital Sign     Days of Exercise per Week: 6 days     Minutes of Exercise per Session: 40 min   Stress: No Stress Concern Present (4/26/2024)    [employer] [place] [employer] - Occupational Stress Questionnaire     Feeling of Stress : Not at all   Social Connections: Unknown (4/26/2024)    Social Connection and Isolation Panel [NHANES]     Frequency of Communication with Friends and Family: Not on file     Frequency of Social Gatherings with Friends and Family: Twice a week     Attends Sabianism Services: Not on file     Active Member of Clubs or Organizations: Not on file     Attends Club or Organization Meetings: Not on file     Marital Status: Not on file   Interpersonal Safety: Low Risk  (4/30/2024)    Interpersonal Safety     Do you feel physically and emotionally safe where you currently live?: Yes     Within the past 12 months, have you been hit, slapped, kicked or otherwise physically hurt by someone?: No     Within the past 12 months, have you been humiliated or emotionally abused in other ways by your partner or ex-partner?: No   Housing Stability: Low Risk   "(2024)    Housing Stability     Do you have housing? : Yes     Are you worried about losing your housing?: No     Family History  Family History   Problem Relation Age of Onset    Myocardial Infarction Mother 73        Tob    Myocardial Infarction Father 68        Tob    Breast Cancer Sister 47         of breast cancer age 63; BRCA mutation testing negative    Cancer Maternal Grandmother         Unsure of type.    Breast Cancer Paternal Grandmother         Unsure of diagnosis--some type of \"women's issue\"    Skin Cancer Daughter 47    Anesthesia Reaction No family hx of     Deep Vein Thrombosis (DVT) No family hx of      Allergy    No Known Allergies    Current Outpatient Medications   Medication Sig Dispense Refill    Acetylcarnitine HCl 250 MG CAPS Take 500 mg by mouth daily      aspirin (ASA) 81 MG chewable tablet Take 81 mg by mouth every evening      Bacillus Coagulans-Inulin (PROBIOTIC FORMULA) 1-250 BILLION-MG CAPS Take by mouth every morning 25+billion CFUS      BENFOTIAMINE PO Take 150 mg by mouth every morning      Cholecalciferol (VITAMIN D-3) 25 MCG (1000 UT) CAPS Take 1,000 Units by mouth every morning 90 capsule 3    coenzyme Q-10 capsule Take 1 capsule by mouth daily      levothyroxine (SYNTHROID/LEVOTHROID) 112 MCG tablet TAKE 1 TABLET (112 MCG) BY MOUTH DAILY BEFORE BREAKFAST 90 tablet 1    MAGNESIUM OXIDE PO Take 400 mg by mouth daily      metoprolol succinate ER (TOPROL XL) 50 MG 24 hr tablet TAKE 1 TABLET BY MOUTH EVERY DAY 90 tablet 0    Omega-3 Fatty Acids (OMEGA-3 FISH OIL PO) Take 630 mg by mouth 2 times daily      OVER-THE-COUNTER Turkey tail mushroom powder, use 1 times a day      Pectin Cit-Inos-C-Bioflav-Soy (MODIFIED CITRUS PECTIN PO) Take by mouth 2 times daily      Taurine 1000 MG CAPS Take 1,000 mg by mouth every morning 60 capsule 11    UNABLE TO FIND 500 mg MEDICATION NAME: Turmeric      UNABLE TO FIND 1,200 mg 2 times daily MEDICATION NAME: [place]      UNABLE TO FIND 2 " times daily MEDICATION NAME: [facility] for 1000mg calcium  4 pills per day      UNABLE TO FIND Take 1 tablet by mouth every morning MEDICATION NAME: Dinndolylmethane Complex (DIM) 100mg tablet 1 table daily       No current facility-administered medications for this visit.     Physical Exam  LMP  (LMP Unknown)  No LMP recorded (lmp unknown). Patient has had a hysterectomy. There is no height or weight on file to calculate BMI.  General: pleasant female in no acute distress  Psych: normal mentation, well oriented  Respiratory:  Unlabored breathing  Musculoskeletal: no gross deformities    Pelvic Exam:  Clitoris: garcia mobile with no adhesions or phimosis, no keratin pearls  Vulva: No external lesions, normal hair distribution, normal architecture, no hypopigmentation or skin changes  Vestibule: mucosa pale pink moist intact with no lesions, swelling or injury  Urethra: with caruncle - mild asymptomatic urothelial prolapse is not friable to touch.  Urethral sponge is not tender to palpation  Bladder: non tender to palpation with single digit vaginal exam   Vagina: dry pale pink flat walls with no lesions. Narrowed vaginal introitus Shortened vagina approximately 4 cm depth with no s/s trauma, no   Cervix: surgically absent  Uterus: surgically absent  Anus: skin intact with no lesions, fissures or external hemorrhoids    Pelvic floor strength: 3/5 kegels.    Pelvic floor muscles: can flex and relax fully     Voiding trial:    VOID 30 ml  PVR 0 mL by Bladder ultrasound  Leak with Cough stress test : no, no prolapse    Labs:   Color Urine (no units)   Date Value   07/20/2022 Yellow     Appearance Urine (no units)   Date Value   07/20/2022 Clear     Glucose Urine (mg/dL)   Date Value   07/20/2022 Negative     Bilirubin Urine (no units)   Date Value   07/20/2022 Negative     Ketones Urine (mg/dL)   Date Value   07/20/2022 5 (A)     Specific Gravity Urine (no units)   Date Value   07/20/2022 1.010     pH Urine (no units)   Date  Value   07/20/2022 6.5     Protein Albumin Urine (mg/dL)   Date Value   11/21/2022 Negative     Urobilinogen Urine (E.U./dL)   Date Value   04/06/2022 0.2     Nitrite Urine (no units)   Date Value   07/20/2022 Negative     Leukocyte Esterase Urine (no units)   Date Value   07/20/2022 Negative     CBC RESULTS:   Recent Labs   Lab Test 08/30/23  1108   WBC 3.5*   RBC 4.45   HGB 12.2   HCT 39.9   MCV 90   MCH 27.4   MCHC 30.6*   RDW 16.1*         I spent a total of 45 minutes with  Cordell Donaldson  on the date of the encounter in chart review, face to face patient visit, review of tests, documentation and/or discussion with other providers about the issues documented above.     Candi ALFARO  Female Pelvic Medicine and Reconstructive Surgery ( Urogynecology )  CC  Patient Care Team:  Merry Lino MD as PCP - General (Internal Medicine)  Bess Paredes MD [place]field, Aaron Daniel, DPM as MD (Podiatry)  Elise Huitron MD as Assigned Heart and Vascular Provider  Merry Lino MD as Assigned PCP  Tess Iniguez RN as Specialty Care Coordinator  Heather Ayon MD as MD (Urology)  Angela Montana MD as MD (Hematology & Oncology)  Yvette Pike MD as MD (Surgery)  Nurys Horton RN as Specialty Care Coordinator (Hematology & Oncology)  Felicia Thompson PA-C as Physician Assistant (Anesthesiology)  Valeria Spencer MD as MD (Gastroenterology)  Yulisa Aparicio MD as MD (Dermatology)  Agnela Montana MD as Assigned Cancer Care Provider  Yulisa Aparicio MD as Assigned Surgical Provider  Shabbir Thao MD as MD (OB/Gyn)  Maliha Garza APRN CNP as Nurse Practitioner  [facility]

## 2025-03-05 ENCOUNTER — MYC REFILL (OUTPATIENT)
Dept: CARDIOLOGY | Facility: CLINIC | Age: 75
End: 2025-03-05
Payer: COMMERCIAL

## 2025-03-05 DIAGNOSIS — I48.0 PAROXYSMAL ATRIAL FIBRILLATION (H): ICD-10-CM

## 2025-03-06 RX ORDER — METOPROLOL SUCCINATE 50 MG/1
50 TABLET, EXTENDED RELEASE ORAL DAILY
Qty: 90 TABLET | Refills: 0 | OUTPATIENT
Start: 2025-03-06

## 2025-03-07 ENCOUNTER — ANCILLARY PROCEDURE (OUTPATIENT)
Dept: GENERAL RADIOLOGY | Facility: CLINIC | Age: 75
End: 2025-03-07
Attending: INTERNAL MEDICINE
Payer: COMMERCIAL

## 2025-03-07 ENCOUNTER — OFFICE VISIT (OUTPATIENT)
Dept: FAMILY MEDICINE | Facility: CLINIC | Age: 75
End: 2025-03-07
Payer: COMMERCIAL

## 2025-03-07 ENCOUNTER — MYC MEDICAL ADVICE (OUTPATIENT)
Dept: FAMILY MEDICINE | Facility: CLINIC | Age: 75
End: 2025-03-07

## 2025-03-07 ENCOUNTER — LAB (OUTPATIENT)
Dept: LAB | Facility: CLINIC | Age: 75
End: 2025-03-07
Payer: COMMERCIAL

## 2025-03-07 VITALS
OXYGEN SATURATION: 99 % | HEART RATE: 84 BPM | RESPIRATION RATE: 16 BRPM | DIASTOLIC BLOOD PRESSURE: 78 MMHG | WEIGHT: 135 LBS | BODY MASS INDEX: 24.49 KG/M2 | TEMPERATURE: 97 F | SYSTOLIC BLOOD PRESSURE: 116 MMHG

## 2025-03-07 DIAGNOSIS — R05.1 ACUTE COUGH: ICD-10-CM

## 2025-03-07 DIAGNOSIS — R05.1 ACUTE COUGH: Primary | ICD-10-CM

## 2025-03-07 DIAGNOSIS — R06.02 SHORTNESS OF BREATH: ICD-10-CM

## 2025-03-07 DIAGNOSIS — E03.8 OTHER SPECIFIED HYPOTHYROIDISM: ICD-10-CM

## 2025-03-07 DIAGNOSIS — R61 NIGHT SWEATS: ICD-10-CM

## 2025-03-07 DIAGNOSIS — R05.3 PERSISTENT COUGH FOR 3 WEEKS OR LONGER: Primary | ICD-10-CM

## 2025-03-07 LAB
BASOPHILS # BLD AUTO: 0 10E3/UL (ref 0–0.2)
BASOPHILS NFR BLD AUTO: 1 %
EOSINOPHIL # BLD AUTO: 0.1 10E3/UL (ref 0–0.7)
EOSINOPHIL NFR BLD AUTO: 2 %
ERYTHROCYTE [DISTWIDTH] IN BLOOD BY AUTOMATED COUNT: 14.4 % (ref 10–15)
HCT VFR BLD AUTO: 39.4 % (ref 35–47)
HGB BLD-MCNC: 12.2 G/DL (ref 11.7–15.7)
IMM GRANULOCYTES # BLD: 0 10E3/UL
IMM GRANULOCYTES NFR BLD: 0 %
LYMPHOCYTES # BLD AUTO: 0.5 10E3/UL (ref 0.8–5.3)
LYMPHOCYTES NFR BLD AUTO: 12 %
MCH RBC QN AUTO: 26.1 PG (ref 26.5–33)
MCHC RBC AUTO-ENTMCNC: 31 G/DL (ref 31.5–36.5)
MCV RBC AUTO: 84 FL (ref 78–100)
MONOCYTES # BLD AUTO: 0.4 10E3/UL (ref 0–1.3)
MONOCYTES NFR BLD AUTO: 10 %
NEUTROPHILS # BLD AUTO: 3.2 10E3/UL (ref 1.6–8.3)
NEUTROPHILS NFR BLD AUTO: 75 %
NRBC # BLD AUTO: 0 10E3/UL
NRBC BLD AUTO-RTO: 0 /100
PLATELET # BLD AUTO: 252 10E3/UL (ref 150–450)
RBC # BLD AUTO: 4.68 10E6/UL (ref 3.8–5.2)
TSH SERPL DL<=0.005 MIU/L-ACNC: 0.76 UIU/ML (ref 0.3–4.2)
WBC # BLD AUTO: 4.2 10E3/UL (ref 4–11)

## 2025-03-07 PROCEDURE — 84443 ASSAY THYROID STIM HORMONE: CPT | Performed by: PATHOLOGY

## 2025-03-07 PROCEDURE — 85025 COMPLETE CBC W/AUTO DIFF WBC: CPT | Performed by: PATHOLOGY

## 2025-03-07 PROCEDURE — 36415 COLL VENOUS BLD VENIPUNCTURE: CPT | Performed by: PATHOLOGY

## 2025-03-07 PROCEDURE — 71046 X-RAY EXAM CHEST 2 VIEWS: CPT | Mod: GC | Performed by: STUDENT IN AN ORGANIZED HEALTH CARE EDUCATION/TRAINING PROGRAM

## 2025-03-07 RX ORDER — ALBUTEROL SULFATE 90 UG/1
2 INHALANT RESPIRATORY (INHALATION) EVERY 4 HOURS PRN
Qty: 18 G | Refills: 1 | Status: SHIPPED | OUTPATIENT
Start: 2025-03-07

## 2025-03-07 ASSESSMENT — PAIN SCALES - GENERAL: PAINLEVEL_OUTOF10: NO PAIN (0)

## 2025-03-07 ASSESSMENT — PATIENT HEALTH QUESTIONNAIRE - PHQ9: SUM OF ALL RESPONSES TO PHQ QUESTIONS 1-9: 0

## 2025-03-07 NOTE — PATIENT INSTRUCTIONS
Mucinex =guaifenisen  Helps loosen mucous  Safe to use  Buy over the counter and use until cough is improved

## 2025-03-07 NOTE — NURSING NOTE
74 year old    Chief Complaint   Patient presents with    Cough     Little bit of ear pain, cough at least three weeks, night sweats        Blood pressure 116/78, pulse 84, temperature 97  F (36.1  C), temperature source Skin, resp. rate 16, weight 61.2 kg (135 lb), SpO2 99%, not currently breastfeeding. Body mass index is 24.49 kg/m .    Patient Active Problem List   Diagnosis    Dupuytren contracture    Abnormal electrocardiogram    Atrial fibrillation (H)    Hyperlipidemia LDL goal <130    Malignant neoplasm of breast (H)    Rectocele    Malignant neoplasm of endocervix (H)    Vaginal cancer (H)          Wt Readings from Last 2 Encounters:   03/07/25 61.2 kg (135 lb)   01/21/25 61.2 kg (135 lb)       BP Readings from Last 3 Encounters:   03/07/25 116/78   01/21/25 102/64   12/27/24 128/77             Current Outpatient Medications   Medication Sig Dispense Refill    Acetylcarnitine HCl 250 MG CAPS Take 500 mg by mouth daily      aspirin (ASA) 81 MG chewable tablet Take 81 mg by mouth every evening      Bacillus Coagulans-Inulin (PROBIOTIC FORMULA) 1-250 BILLION-MG CAPS Take by mouth every morning 25+billion CFUS      BENFOTIAMINE PO Take 150 mg by mouth every morning      Cholecalciferol (VITAMIN D-3) 25 MCG (1000 UT) CAPS Take 1,000 Units by mouth every morning 90 capsule 3    coenzyme Q-10 capsule Take 1 capsule by mouth daily      levothyroxine (SYNTHROID/LEVOTHROID) 112 MCG tablet TAKE 1 TABLET (112 MCG) BY MOUTH DAILY BEFORE BREAKFAST 90 tablet 1    MAGNESIUM OXIDE PO Take 400 mg by mouth daily      metoprolol succinate ER (TOPROL XL) 50 MG 24 hr tablet TAKE 1 TABLET BY MOUTH EVERY DAY 90 tablet 0    Omega-3 Fatty Acids (OMEGA-3 FISH OIL PO) Take 630 mg by mouth 2 times daily      OVER-THE-COUNTER Turkey tail mushroom powder, use 1 times a day      Pectin Cit-Inos-C-Bioflav-Soy (MODIFIED CITRUS PECTIN PO) Take by mouth 2 times daily      Taurine 1000 MG CAPS Take 1,000 mg by mouth every morning 60 capsule 11     UNABLE TO FIND 500 mg MEDICATION NAME: Turmeric      UNABLE TO FIND 1,200 mg 2 times daily MEDICATION NAME: Efraín      UNABLE TO FIND 2 times daily MEDICATION NAME: [facility] for 1000mg calcium  4 pills per day      UNABLE TO FIND Take 1 tablet by mouth every morning MEDICATION NAME: Dinndolylmethane Complex (DIM) 100mg tablet 1 table daily       No current facility-administered medications for this visit.          Social History     Tobacco Use    Smoking status: Former     Current packs/day: 0.00     Average packs/day: 0.5 packs/day for 15.0 years (7.5 ttl pk-yrs)     Types: Cigarettes     Start date:      Quit date:      Years since quittin.2     Passive exposure: Past    Smokeless tobacco: Never   Vaping Use    Vaping status: Never Used   Substance Use Topics    Alcohol use: Not Currently     Alcohol/week: 0.0 - 2.0 standard drinks of alcohol     Comment: Socially     Drug use: No          Health Maintenance Due   Topic Date Due    DEPRESSION ACTION PLAN  Never done    RSV VACCINE (1 - Risk 60-74 years 1-dose series) Never done    PAP  2023    MAMMO SCREENING  2023    BMP  2024    INFLUENZA VACCINE (1) 2024    ADVANCE CARE PLANNING  01/10/2025    COVID-19 Vaccine (7 - Pfizer risk - season) 03/10/2025    TSH W/FREE T4 REFLEX  2025          Lab Results   Component Value Date    PAP NIL 2020 10:43 AM

## 2025-03-07 NOTE — PROGRESS NOTES
[facility] [address], [address]  [facility] [address]  Phone: [phone]  Fax: [phone]    Patient:  Cordell Donaldson 1950  Date of Visit:  10:53 PM  Referring Provider Referred Self      Assessment & Plan    (R05.1) Acute cough  (primary encounter diagnosis)  Comment: 3 wk hx of cough, ddx includes post reactive cough. +hx of cancer, DDX broadens to metastatic disease  Plan: XR Chest 2 Views, CBC with platelets         differential, albuterol (PROAIR HFA/PROVENTIL         HFA/VENTOLIN HFA) 108 (90 Base) MCG/ACT inhaler        Refer for CXR now. Discussed use of albuterol MDI for comfort. Star with QID dosing, then taper as needed     (R61) Night sweats  Comment: new recurrence, concerning in setting of history of cervical and vaginal cancer, hx of breast cancer  Plan: CBC with platelets differential        Discussed need to follow up with Dr. Raygoza if CXR unremarkable    (E03.8) Other specified hypothyroidism  Comment: levothyroxine, 112mcg daily, consistent with dosing, TSH is normal range, not the cause of night sweats  Plan: TSH with free T4 reflex          TSH   Date Value Ref Range Status   03/07/2025 0.76 0.30 - 4.20 uIU/mL Final   08/22/2017 2.16 0.40 - 4.00 mU/L Final           Merry Lino MD       Cordell Donaldson is a 74 year old female here for the following issues:    Cough  3 wk history  Right ear was hot, hx of stapedectomy, right sided headache  Garlic oil drops used  Dry cough started, went to [facility] x 2 wks, traveled with daughter  Tired in the afternoon, walked 4 miles  Night sweats several days a week, needs to change shirt, no fever  Wheezing, +tightness with travel  Using cough drops  Some sleep disturbance, robitussin cough syrup  She was first to become ill  Appetite ok, no nausea vomiting, eating light  Declines inhaler    Hypothyroidism  112mg levothyroxine, empty stomach  No missed doses  Hx of Afib, , no  racing.   Metoprolol XL 50mg daily    Wt Readings from Last 4 Encounters:   03/07/25 61.2 kg (135 lb)   01/21/25 61.2 kg (135 lb)   12/27/24 62.8 kg (138 lb 6.4 oz)   09/16/24 61.5 kg (135 lb 9.6 oz)       Change in bladder function  Met with Dr. Raygoza 3 wks ago  Concern about bladder, stream was slow  Bladder specialist, PVR was normal at urologist office  Concern for radiation effect  Declined the scan, bladder workup first      Patient Active Problem List   Diagnosis    Dupuytren contracture    Abnormal electrocardiogram    Atrial fibrillation (H)    Hyperlipidemia LDL goal <130    Malignant neoplasm of breast (H)    Rectocele    Malignant neoplasm of endocervix (H)    Vaginal cancer (H)       Current Outpatient Medications   Medication Sig Dispense Refill    aspirin (ASA) 81 MG chewable tablet Take 81 mg by mouth every evening      Bacillus Coagulans-Inulin (PROBIOTIC FORMULA) 1-250 BILLION-MG CAPS Take by mouth every morning 25+billion CFUS      Cholecalciferol (VITAMIN D-3) 25 MCG (1000 UT) CAPS Take 1,000 Units by mouth every morning 90 capsule 3    coenzyme Q-10 capsule Take 1 capsule by mouth daily      levothyroxine (SYNTHROID/LEVOTHROID) 112 MCG tablet TAKE 1 TABLET (112 MCG) BY MOUTH DAILY BEFORE BREAKFAST 90 tablet 1    MAGNESIUM OXIDE PO Take 400 mg by mouth daily      metoprolol succinate ER (TOPROL XL) 50 MG 24 hr tablet TAKE 1 TABLET BY MOUTH EVERY DAY 90 tablet 0    Omega-3 Fatty Acids (OMEGA-3 FISH OIL PO) Take 630 mg by mouth 2 times daily      OVER-THE-COUNTER Turkey tail mushroom powder, use 1 times a day      Pectin Cit-Inos-C-Bioflav-Soy (MODIFIED CITRUS PECTIN PO) Take by mouth 2 times daily      Taurine 1000 MG CAPS Take 1,000 mg by mouth every morning 60 capsule 11    UNABLE TO FIND 500 mg MEDICATION NAME: Turmeric      UNABLE TO FIND 1,200 mg 2 times daily MEDICATION NAME: [place]      UNABLE TO FIND Take 1 tablet by mouth every morning MEDICATION NAME: Dinndolylmethane Complex (DIM)  100mg tablet 1 table daily      Acetylcarnitine HCl 250 MG CAPS Take 500 mg by mouth daily      BENFOTIAMINE PO Take 150 mg by mouth every morning      UNABLE TO FIND 2 times daily MEDICATION NAME: [facility] for 1000mg calcium  4 pills per day         No Known Allergies     EXAM  /78 (BP Location: Left arm, Patient Position: Sitting, Cuff Size: Adult Small)   Pulse 84   Temp 97  F (36.1  C) (Skin)   Resp 16   Wt 61.2 kg (135 lb)   LMP  (LMP Unknown)   SpO2 99%   BMI 24.49 kg/m    Gen: Alert, pleasant, NAD  COR: irregularly irregular rhythm  Lungs: CTA bilaterally, no rhonchi, wheezes or rales  Abdomen: Soft, non tender, normal bowel sounds, no HSM or mass  Ext: no peripheral edema, pulses full

## 2025-03-08 DIAGNOSIS — E03.8 OTHER SPECIFIED HYPOTHYROIDISM: ICD-10-CM

## 2025-03-10 ENCOUNTER — OFFICE VISIT (OUTPATIENT)
Dept: CARDIOLOGY | Facility: CLINIC | Age: 75
End: 2025-03-10
Attending: INTERNAL MEDICINE
Payer: COMMERCIAL

## 2025-03-10 VITALS
OXYGEN SATURATION: 97 % | WEIGHT: 133.8 LBS | DIASTOLIC BLOOD PRESSURE: 79 MMHG | HEART RATE: 100 BPM | BODY MASS INDEX: 24.28 KG/M2 | SYSTOLIC BLOOD PRESSURE: 111 MMHG

## 2025-03-10 DIAGNOSIS — Z85.3 PERSONAL HISTORY OF MALIGNANT NEOPLASM OF BREAST: ICD-10-CM

## 2025-03-10 DIAGNOSIS — C53.0 MALIGNANT NEOPLASM OF ENDOCERVIX (H): ICD-10-CM

## 2025-03-10 DIAGNOSIS — I48.20 CHRONIC ATRIAL FIBRILLATION (H): Primary | ICD-10-CM

## 2025-03-10 LAB
ATRIAL RATE - MUSE: 147 BPM
DIASTOLIC BLOOD PRESSURE - MUSE: NORMAL MMHG
INTERPRETATION ECG - MUSE: NORMAL
P AXIS - MUSE: NORMAL DEGREES
PR INTERVAL - MUSE: NORMAL MS
QRS DURATION - MUSE: 70 MS
QT - MUSE: 320 MS
QTC - MUSE: 414 MS
R AXIS - MUSE: 69 DEGREES
SYSTOLIC BLOOD PRESSURE - MUSE: NORMAL MMHG
T AXIS - MUSE: -17 DEGREES
VENTRICULAR RATE- MUSE: 101 BPM

## 2025-03-10 PROCEDURE — 93005 ELECTROCARDIOGRAM TRACING: CPT

## 2025-03-10 PROCEDURE — G0463 HOSPITAL OUTPT CLINIC VISIT: HCPCS | Performed by: INTERNAL MEDICINE

## 2025-03-10 RX ORDER — METOPROLOL SUCCINATE 50 MG/1
50 TABLET, EXTENDED RELEASE ORAL DAILY
Qty: 90 TABLET | Refills: 3 | Status: SHIPPED | OUTPATIENT
Start: 2025-03-10

## 2025-03-10 RX ORDER — LEVOTHYROXINE SODIUM 112 UG/1
112 TABLET ORAL
Qty: 90 TABLET | Refills: 3 | Status: SHIPPED | OUTPATIENT
Start: 2025-03-10

## 2025-03-10 ASSESSMENT — PAIN SCALES - GENERAL: PAINLEVEL_OUTOF10: NO PAIN (0)

## 2025-03-10 NOTE — PROGRESS NOTES
History:    Cordell Donaldson is a 74 year old delightful woman with persistent atrial fibrillation. She has a history of breast cancer treated with lumpectomy and radiation (2003).  For the persistent atrial fibrillation she takes metoprolol for rate control and has declined cardioversion and anticoagulation.     She was diagnosed with cervical cancer in June 2022 and invasive ductal carcinoma of the left breast in July 2022. The cervical cancer treatment was prioritized and she received immunotherapy (Keytruda), carboplatin (was cisplatin), Avastin and paclitaxel with good response. She developed tachycardia and hypertension as a result of Avastin. On surveillance PET scan there was concern for possible immunotherapy associated myocarditis. Cardiac biomarkers were normal and a follow up cardiac MRI with stress was without ischemia or evidence of myocarditis.  Chemotherapy was stopped and she received radiation therapy and brachytherapy.     Her last visit was October 2023.     Since that time she has travelled to [place]. She developed an acute cough and CXR did not show pneumonia. She has cardiomegaly on the CXR. She has known persistent atrial fibrillation with biatrial enlargement with normal biventricular function. She is no longer on chemotherapy or immunotherapy.     PAST MEDICAL HISTORY:    History of breast cancer 2003   lumpectomy and radiation offerred chemo and patient declined at time but had oncogene test and low risk     FAMILY HISTORY:  No premature CAD    SOCIAL HISTORY:  Former smoker       CURRENT MEDICATIONS:    Aspirin 81 mg daily  Levothyroxine 112 mcg daily  Metoprolol succinate 50 mg daily      EXAM:  /79 (BP Location: Right arm, Patient Position: Chair, Cuff Size: Adult Regular)   Pulse 100   Wt 60.7 kg (133 lb 12.8 oz)   LMP  (LMP Unknown)   SpO2 97%   BMI 24.28 kg/m    In general, the patient is in no apparent distress.        Neck: No JVD. No thyromegaly  Heart: regular  with normal S1, S2. No murmur. No audible rub or gallop.    Lungs: scattered wheezes, no rales.   Extremities: No edema.  The pulses are 2+at the radial bilaterally.      I have independently reviewed and interpreted the following data:  Lipids: Total cholesterol 240, HDL 53, , triglycerides 141 (2024)    Electrolytes: normal   Hemogram: 12.2 g per DL   TSH: normal    Cardiac Studies:    Stress cardiac MRI 12/8/22  The patient's rhythm is atrial fibrillation.  Normal biventricular function, LVEF 68% and RVEF 52%.   No evidence of ischemia.   No evidence of myocardial edema or fibrosis to indicate immune checkpoint inhibitor myocarditis.     Assessment and Plan:   Cordell Donaldson is a 74 year old with the following past medical history:    Persistent atrial fibrillation  Normal biventricular function   Dyslipidemia  Cervical cancer July 2022, post chemoradiation and radiation  Invasive ductal carcinoma L breast July 2022  History of breast cancer 2003, lumpectomy, radiation followed by 5 years of tamoxifen  Autoimmune hypothyroidism     Cordell is dealing with an acute pulmonary process ? bronchitis. She has persistent a-fib, HR is elevated, she is on metoprolol. I have reassured her that it would be safe for her to use the rescue albuterol inhaler to address the cough and that she could take an extra dose of metoprolol XL 25 mg if HR >110.  She is not interested in anticoagulation at this time. She was offered an echocardiogram to ensure stability of heart function, she declined as she is improving clinically. Advised to contact us if symptoms of cough/dyspnea worsened.    Majority of today's visit was spent addressing prescription drug management.    Follow up in 1yr.    Elise Huitron MD, MS  Professor of Medicine  Cardiovascular Medicine

## 2025-03-10 NOTE — TELEPHONE ENCOUNTER
Medication requested: levothyroxine (SYNTHROID/LEVOTHROID) 112 MCG tablet   Last office visit: 3/7/25  [facility] appointments: none  Medication last refilled: 9/10/24; 90 + 1 refill  Last qualifying labs:   Component      Latest Ref Rng 3/7/2025  12:17 PM   TSH      0.30 - 4.20 uIU/mL 0.76      Prescription approved per [facility] Refill Protocol.    Kun BORREGO, RN  03/10/25 10:37 AM

## 2025-03-10 NOTE — LETTER
3/10/2025      RE: Cordell Donaldson  [address]  [facility] [address]       Dear Colleague,    Thank you for the opportunity to participate in the care of your patient, Cordell Donaldson, at the [facility] [place] at [facility]. Please see a copy of my visit note below.    History:    Cordell Donaldson is a 74 year old delightful woman with persistent atrial fibrillation. She has a history of breast cancer treated with lumpectomy and radiation (2003).  For the persistent atrial fibrillation she takes metoprolol for rate control and has declined cardioversion and anticoagulation.     She was diagnosed with cervical cancer in June 2022 and invasive ductal carcinoma of the left breast in July 2022. The cervical cancer treatment was prioritized and she received immunotherapy (Keytruda), carboplatin (was cisplatin), Avastin and paclitaxel with good response. She developed tachycardia and hypertension as a result of Avastin. On surveillance PET scan there was concern for possible immunotherapy associated myocarditis. Cardiac biomarkers were normal and a follow up cardiac MRI with stress was without ischemia or evidence of myocarditis.  Chemotherapy was stopped and she received radiation therapy and brachytherapy.     Her last visit was October 2023.     Since that time she has travelled to [place]. She developed an acute cough and CXR did not show pneumonia. She has cardiomegaly on the CXR. She has known persistent atrial fibrillation with biatrial enlargement with normal biventricular function. She is no longer on chemotherapy or immunotherapy.     PAST MEDICAL HISTORY:    History of breast cancer 2003   lumpectomy and radiation offerred chemo and patient declined at time but had oncogene test and low risk     FAMILY HISTORY:  No premature CAD    SOCIAL HISTORY:  Former smoker       CURRENT MEDICATIONS:    Aspirin 81 mg daily  Levothyroxine  112 mcg daily  Metoprolol succinate 50 mg daily      EXAM:  /79 (BP Location: Right arm, Patient Position: Chair, Cuff Size: Adult Regular)   Pulse 100   Wt 60.7 kg (133 lb 12.8 oz)   LMP  (LMP Unknown)   SpO2 97%   BMI 24.28 kg/m    In general, the patient is in no apparent distress.        Neck: No JVD. No thyromegaly  Heart: regular with normal S1, S2. No murmur. No audible rub or gallop.    Lungs: scattered wheezes, no rales.   Extremities: No edema.  The pulses are 2+at the radial bilaterally.      I have independently reviewed and interpreted the following data:  Lipids: Total cholesterol 240, HDL 53, , triglycerides 141 (2024)    Electrolytes: normal   Hemogram: 12.2 g per DL   TSH: normal    Cardiac Studies:    Stress cardiac MRI 12/8/22  The patient's rhythm is atrial fibrillation.  Normal biventricular function, LVEF 68% and RVEF 52%.   No evidence of ischemia.   No evidence of myocardial edema or fibrosis to indicate immune checkpoint inhibitor myocarditis.     Assessment and Plan:   Cordell Donaldson is a 74 year old with the following past medical history:    Persistent atrial fibrillation  Normal biventricular function   Dyslipidemia  Cervical cancer July 2022, post chemoradiation and radiation  Invasive ductal carcinoma L breast July 2022  History of breast cancer 2003, lumpectomy, radiation followed by 5 years of tamoxifen  Autoimmune hypothyroidism     Cordell is dealing with an acute pulmonary process ? bronchitis. She has persistent a-fib, HR is elevated, she is on metoprolol. I have reassured her that it would be safe for her to use the rescue albuterol inhaler to address the cough and that she could take an extra dose of metoprolol XL 25 mg if HR >110.  She is not interested in anticoagulation at this time. She was offered an echocardiogram to ensure stability of heart function, she declined as she is improving clinically. Advised to contact us if symptoms of cough/dyspnea  worsened.    Majority of today's visit was spent addressing prescription drug management.    Follow up in 1yr.    Elise Huitron MD, MS  Professor of Medicine  Cardiovascular Medicine        Please do not hesitate to contact me if you have any questions/concerns.     Sincerely,     Elise Huitron MD

## 2025-03-10 NOTE — PATIENT INSTRUCTIONS
Plan:    Continue with current medications.  I have refilled your metoprolol  Follow up in 1 year      Your Care Team:         Cardiology   Telephone Number     Garcia Rivera RN [phone]    After business hours: [phone], ask for cardiologist on-call               Cardiovascular Clinic:   [address] [place] [address]. [place] [address]      As always, thank you for trusting us with your health care needs!

## 2025-03-16 NOTE — PROGRESS NOTES
"Oncology Follow Up:  Date on this visit: 3/17/2025    Diagnosis: Left breast invasive ductal carcinoma.    Primary Physician: Merry Lino     History Of Present Illness:  Ms. Donaldson is a 74 year old female with cervical and breast cancer.    She has a history of left breast cancer treated with lumpectomy and radiation therapy in 2003 (followed w/ Dr. Mtz at [facility]). Oncotype DX recurrence score was 10.  She took 5 years of tamoxifen. She had an incidental left breast lesion found on imaging for a pelvic mass in 06/2022. Ultrasound showed a 1.3 cm mass at 12:30, 8 cm from the nipple of the left breast.  Biopsy was c/w a grade 2 invasive ductal carcinoma, ER strong in 100%, ND negative, and HER2 negative.  Given concurrent recurrent pelvic mass with plan for cytotoxic chemotherapy, plan was made to monitor and initiate endocrine therapy when appropriate.  She completed 6 cycles of chemotherapy followed by radiation to her pelvic mass.  She has subsequently declined to start endocrine therapy.    In regards to her cervical cancer history, she was initially determined to have squamous carcinoma in 10/2012.  She is s/p FRANCIA and upper vaginectomy in 12/2012 with no residual invasive cancer, but residual HSIL.  Biopsy on 7/22/2013 was c/w HSIL, cannot exclude invasion.  In 05/2022 she developed symptoms of pelvic heaviness (felt like she \"bruised her tailbone\") and progressive pain. She felt like her urination and bowel movements were constricted, needing to put in a lot of effort to urinate or have BM. Pelvic MRI showed a large midline pelvic mass measuring up to 8.5 cm and abutting the bladder and the rectum without definite invasion or left pelvic LAD.  These findings were confirmed on PET/CT. She was started on chemo with cisplatin, paclitaxel, and bevacizumab (7/11/22).  Caris testing showed PD-L1 CPS score of 10, MMR deficient, MSI high disease with a TMB of 53 mut/Mb. Pembrolizumab was added with C3.  " Cisplatin was changed to carboplatin starting with C5 due to tinnitus.  After a total of 6 cycles imaging demonstrated good response.    She is s/p radiation to the pelvis and para-aortic lymph nodes and interstitial brachytherapy to the vaginal cuff (12/28/2022 - 2/15/2023).     Interval History:  Ms. Donaldson comes into clinic today for routine breast cancer surveillance.  She reports that for the last month she has had a significant bothersome cough.  She denies that it started with an upper respiratory tract infection.  At the time of onset of the cough, it was associated with night sweats.  The night sweats have since resolved.  She saw her primary care physician and had a chest x-ray which was without concerning findings.  Her massage therapist thought it could be a sinus issue.  Since she has been doing techniques to clear her sinuses and believes that she has had improvement in the cough.  For example, she previously was unable to take a deep breath without coughing, she now can do so.  She previously was sleeping in a chair and is able to sleep in bed again.  She thinks these are signs that things are indeed getting better.  She denies associated shortness of breath or chest pain.  She has had no fevers or chills.  She denies new bone or joint aches or pains.  She has no abdominal pain, bloating, nausea, or vomiting.  She remains unable to feel her left breast mass.  She continues to have vaginal symptoms related to had the treatment of her cervical cancer.  She is using vaginal dilator therapy which has been helpful.    Past Medical/Surgical History:  Past Medical History:   Diagnosis Date    Abnormal Pap smear     maybe 20 years ago    Cervical cancer (H)     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     History of breast cancer 2003    lumpectomy and radiation offerred chemo and patient declined at time but had oncogene test and low risk    Hyperlipidemia LDL goal < 160      Osteopenia     Paroxysmal A-fib (H)     Severe vaginal dysplasia      Past Surgical History:   Procedure Laterality Date    BIOPSY      BREAST SURGERY Left 2003    lumpectomy left, did radiation, 5 yr of tamoxifen    COLONOSCOPY  2018    repeat in 2028    Colposcopy Cervix with Loop Electrode Conization and Lser to Vagina  2008    COLPOSCOPY, BIOPSY, COMBINED  10/24/2012    Procedure: COMBINED COLPOSCOPY, BIOPSY;  Colposcopy, Biopsy of Cervix and Vagina, Ultrasound Guidance;  Surgeon: Colette Ng MD;  Location: [facility] OR    ENT SURGERY  01/23/2018    otoscelerosis, right side    HYSTERECTOMY, FRANCIA  12/2012    high grade cervical dysplasia, [facility], Dr. Arciniega    INSERT INTERSTITIAL NEEDLE, ULTRASOUND GUIDED N/A 02/13/2023    Procedure: INSERTION, NEEDLE, WITH ULTRASOUND GUIDANCE, FOR INTERSTITIAL BRACHYTHERAPY;  Surgeon: Raymond Stockton MD;  Location: [facility] OR    INSERT PORT VASCULAR ACCESS Right 07/18/2022    Procedure: INSERTION, VASCULAR ACCESS PORT;  Surgeon: Donnie Elias MD;  Location: [facility] OR    IR CHEST PORT PLACEMENT > 5 YRS OF AGE  07/18/2022    IR PORT REMOVAL RIGHT  03/30/2023    NC LAP VAGINECTOMY, PARTIAL REMOVAL OF VAGINAL WALL  10/2013    upper vaginectomy for dysplasia, Dr. Megan Arciniega    REMOVE INTERSTITIAL NEEDLE N/A 02/15/2023    Procedure: REMOVAL, INTERSTITIAL NEEDLE, AFTER TEMPORARY BRACHYTHERAPY;  Surgeon: Raymond Stockton MD;  Location: [facility] OR    REMOVE PORT VASCULAR ACCESS Right 03/30/2023    Procedure: Remove port vascular access right;  Surgeon: Donnie Elias MD;  Location: [facility] OR     Allergies:  Allergies as of 03/17/2025    (No Known Allergies)     Current Medications:  Current Outpatient Medications   Medication Sig Dispense Refill    albuterol (PROAIR HFA/PROVENTIL HFA/VENTOLIN HFA) 108 (90 Base) MCG/ACT inhaler Inhale 2 puffs into the lungs every 4 hours as needed for shortness of breath or wheezing. 18 g 1    aspirin (ASA) 81 MG chewable tablet Take 81 mg by mouth every evening       Bacillus Coagulans-Inulin (PROBIOTIC FORMULA) 1-250 BILLION-MG CAPS Take by mouth every morning 25+billion CFUS      Cholecalciferol (VITAMIN D-3) 25 MCG (1000 UT) CAPS Take 1,000 Units by mouth every morning 90 capsule 3    coenzyme Q-10 capsule Take 1 capsule by mouth daily      levothyroxine (SYNTHROID/LEVOTHROID) 112 MCG tablet TAKE 1 TABLET (112 MCG) BY MOUTH DAILY BEFORE BREAKFAST 90 tablet 3    MAGNESIUM OXIDE PO Take 400 mg by mouth daily      metoprolol succinate ER (TOPROL XL) 50 MG 24 hr tablet Take 1 tablet (50 mg) by mouth daily. 90 tablet 3    Omega-3 Fatty Acids (OMEGA-3 FISH OIL PO) Take 630 mg by mouth 2 times daily      OVER-THE-COUNTER Turkey tail mushroom powder, use 1 times a day      Pectin Cit-Inos-C-Bioflav-Soy (MODIFIED CITRUS PECTIN PO) Take by mouth 2 times daily      Taurine 1000 MG CAPS Take 1,000 mg by mouth every morning 60 capsule 11    UNABLE TO FIND 500 mg MEDICATION NAME: Turmeric      UNABLE TO FIND 1,200 mg 2 times daily MEDICATION NAME: Efraín      UNABLE TO FIND Take 1 tablet by mouth every morning MEDICATION NAME: Dinndolylmethane Complex (DIM) 100mg tablet 1 table daily        Physical Exam:  /71 (BP Location: Right arm, Patient Position: Sitting, Cuff Size: Adult Regular)   Pulse 91   Temp 97.5  F (36.4  C) (Oral)   Resp 18   Wt 59.8 kg (131 lb 14.4 oz)   LMP  (LMP Unknown)   SpO2 98%   BMI 23.93 kg/m    GENERAL APPEARANCE: Well appearing adult female in NAD.  Alert and oriented x 3.    HEENT: NC/AT, sclera anicteric.  MMM.    LYMPHATICS: No palpable cervical, supraclavicular, or axillary lymphadenopathy  RESP: Inspiratory wheeze RLL.  Lungs are otherwise CTA.  CARDIOVASCULAR:  RRR.  No audible m/r/g.  BREAST:  Bilateral breasts are of normal fibroglandular density.  There are no dominant or discretely palpable masses in either breast.  There is an approximate 5-6 mm palpable density at 12:30, 8 cm from the nipple of the left breast in area of known  chest wall recurrence.  Bilateral nipples are everted and without discharge.  ABDOMEN:  soft, nondistended  MUSCULOSKELETAL: Full ROM of the bilateral upper extremities.  SKIN: no visible concerning skin lesions or rashes  PSYCHIATRIC: Normal mood and affect.  Pleasant and conversant.    Laboratory/Imaging Studies  I personally reviewed the below images and laboratories while in clinic today:    3/7/2025 CXR:  FINDINGS:      Metallic opacity projecting over the left upper chest, likely breast  biopsy clip. Borderline enlarged cardiac silhouette. No discernible  pneumothorax. No significant pleural effusion.                                                                      IMPRESSION:      Borderline enlarged cardiac silhouette. May consider cardiac  ultrasound for further evaluation, as clinically desired.       Latest Reference Range & Units 03/07/25 12:17   TSH 0.30 - 4.20 uIU/mL 0.76   WBC 4.0 - 11.0 10e3/uL 4.2   Hemoglobin 11.7 - 15.7 g/dL 12.2   Hematocrit 35.0 - 47.0 % 39.4   Platelet Count 150 - 450 10e3/uL 252     ASSESSMENT/PLAN:  Ms. Donaldson is a 75 y/o woman with a history of left breast cancer (s/p lumpectomy, radiation in 2003 followed by 5 years of tamoxifen w/ Dr. Mtz) and cervical SCC (10/2012, s/p FRANCIA, upper vaginectomy) with pelvic mass and a second ER positive, GA negative, HER2 negative left breast cancer.     1. Left breast cancer.  12:30, 8 cm from the nipple.  - Without surgery/radiation treatment of breast cancer, the breast cancer is not considered cured.  She has declined all further treatment, wanting to focus on quality of life at this time.  She would be open to discussion of treatment options if evidence of further tumor growth.    - She is asymptomatic of disease recurrence on history taken today and clinical exam is without evidence of tumor growth.  She declines repeat imaging of the left breast mass.  - Return to clinic in 6 months.      2. Recurrent cervical versus vaginal  cancer  - She follows with Dr. Raygoza [facility]   - s/p 6 cycles neodjuvant Cisplatin 50 mg/m2 + paclitaxel 175 mg/m2 + bevacizumab 15 mg/kg + pembrolizumab every 21 days 7/11 - 11/21/2022.  Pembrolizumab added C3 and beyond; Cisplatin changed to carboplatin for cycles 5 and 6 due to tinnitus.    - s/p radiation to the pelvis and para-aortic lymph nodes as well as interstitial brachytherapy to the vaginal cuff, completed 02/2023.  - Plan is for regular clinic visits and to image only if symptomatic.    3.  Cough/RLL inspiratory wheeze:  Has had cough for >1 month.  Discussed with patient that both of her prior malignancies can metastasize to the lungs and with prolonged cough, would typically evaluate further with a CT of the chest.  She feels her symptoms are improving at this point and would like to hold off on imaging for now.  - Discussed if cough is still present in 2 weeks, recommend obtaining a CT of the chest.  Asked that she contact me to update me regarding the cough.  She states she will vinh it on her calendar.    4.  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  She continues on levothyroxine 112 mcg PO daily.  - Last TSH on 3/7/2025 was wnl.     5.  Osteoporosis:  DEXA in 06/2024 with lowest T-score of -2.8 in the bilateral femoral necks c/w osteoporosis.    - she is treating with calcium, vitamin D, weight bearing exercise.  - recommend discussing treatment of osteoporosis with PCP    6.  Follow Up:  Return to clinic in 6 months.    I spent 35 minutes on the date of the encounter doing chart review, review of test results, interpretation of tests, patient visit, and documentation

## 2025-03-17 ENCOUNTER — ONCOLOGY VISIT (OUTPATIENT)
Dept: ONCOLOGY | Facility: CLINIC | Age: 75
End: 2025-03-17
Attending: INTERNAL MEDICINE
Payer: COMMERCIAL

## 2025-03-17 VITALS
BODY MASS INDEX: 23.93 KG/M2 | SYSTOLIC BLOOD PRESSURE: 114 MMHG | RESPIRATION RATE: 18 BRPM | OXYGEN SATURATION: 98 % | HEART RATE: 91 BPM | TEMPERATURE: 97.5 F | WEIGHT: 131.9 LBS | DIASTOLIC BLOOD PRESSURE: 71 MMHG

## 2025-03-17 DIAGNOSIS — C52 VAGINAL CANCER (H): ICD-10-CM

## 2025-03-17 DIAGNOSIS — C50.412 MALIGNANT NEOPLASM OF UPPER-OUTER QUADRANT OF LEFT BREAST IN FEMALE, ESTROGEN RECEPTOR POSITIVE (H): Primary | ICD-10-CM

## 2025-03-17 DIAGNOSIS — Z17.0 MALIGNANT NEOPLASM OF UPPER-OUTER QUADRANT OF LEFT BREAST IN FEMALE, ESTROGEN RECEPTOR POSITIVE (H): Primary | ICD-10-CM

## 2025-03-17 DIAGNOSIS — R05.2 SUBACUTE COUGH: ICD-10-CM

## 2025-03-17 DIAGNOSIS — R06.2 INSPIRATORY WHEEZE ON EXAMINATION: ICD-10-CM

## 2025-03-17 PROCEDURE — 99214 OFFICE O/P EST MOD 30 MIN: CPT | Performed by: INTERNAL MEDICINE

## 2025-03-17 PROCEDURE — G0463 HOSPITAL OUTPT CLINIC VISIT: HCPCS | Performed by: INTERNAL MEDICINE

## 2025-03-17 ASSESSMENT — PAIN SCALES - GENERAL: PAINLEVEL_OUTOF10: NO PAIN (0)

## 2025-03-17 NOTE — NURSING NOTE
"Oncology Rooming Note    March 17, 2025 10:22 AM   Cordell Donaldson is a 74 year old female who presents for:    Chief Complaint   Patient presents with    Oncology Clinic Visit     Malignant neoplasm of upper-outer quadrant of left breast in female, estrogen receptor positive     Initial Vitals: /71 (BP Location: Right arm, Patient Position: Sitting, Cuff Size: Adult Regular)   Pulse 91   Temp 97.5  F (36.4  C) (Oral)   Resp 18   Wt 59.8 kg (131 lb 14.4 oz)   LMP  (LMP Unknown)   SpO2 98%   BMI 23.93 kg/m   Estimated body mass index is 23.93 kg/m  as calculated from the following:    Height as of 4/30/24: 1.581 m (5' 2.25\").    Weight as of this encounter: 59.8 kg (131 lb 14.4 oz). Body surface area is 1.62 meters squared.  No Pain (0) Comment: Data Unavailable   No LMP recorded (lmp unknown). Patient has had a hysterectomy.  Allergies reviewed: Yes  Medications reviewed: Yes    Medications: Medication refills not needed today.  Pharmacy name entered into [employer]:    [id number] - [place], [place] - [facility]  [id number] - [place], [place] - [address] [place] [address] PHARMACY [place], [place] - [address] [facility] [id number]    Frailty Screening:   Is the patient here for a new oncology consult visit in cancer care? 2. No    PHQ9:  Did this patient require a PHQ9?: No      Clinical concerns: none.       Zayda Antonio"

## 2025-03-17 NOTE — LETTER
"3/17/2025      Cordell Donaldson  [address]  [facility] [address]      Dear Colleague,    Thank you for referring your patient, Cordell Donaldson, to the [facility]. Please see a copy of my visit note below.    Oncology Follow Up:  Date on this visit: 3/17/2025    Diagnosis: Left breast invasive ductal carcinoma.    Primary Physician: Merry Lino     History Of Present Illness:  Ms. Donaldson is a 74 year old female with cervical and breast cancer.    She has a history of left breast cancer treated with lumpectomy and radiation therapy in 2003 (followed w/ Dr. Mtz at [facility] [place] Oncotype DX recurrence score was 10.  She took 5 years of tamoxifen. She had an incidental left breast lesion found on imaging for a pelvic mass in 06/2022. Ultrasound showed a 1.3 cm mass at 12:30, 8 cm from the nipple of the left breast.  Biopsy was c/w a grade 2 invasive ductal carcinoma, ER strong in 100%, DE negative, and HER2 negative.  Given concurrent recurrent pelvic mass with plan for cytotoxic chemotherapy, plan was made to monitor and initiate endocrine therapy when appropriate.  She completed 6 cycles of chemotherapy followed by radiation to her pelvic mass.  She has subsequently declined to start endocrine therapy.    In regards to her cervical cancer history, she was initially determined to have squamous carcinoma in 10/2012.  She is s/p FRANCIA and upper vaginectomy in 12/2012 with no residual invasive cancer, but residual HSIL.  Biopsy on 7/22/2013 was c/w HSIL, cannot exclude invasion.  In 05/2022 she developed symptoms of pelvic heaviness (felt like she \"bruised her tailbone\") and progressive pain. She felt like her urination and bowel movements were constricted, needing to put in a lot of effort to urinate or have BM. Pelvic MRI showed a large midline pelvic mass measuring up to 8.5 cm and abutting the bladder and the rectum without definite invasion or left pelvic LAD.  These " findings were confirmed on PET/CT. She was started on chemo with cisplatin, paclitaxel, and bevacizumab (7/11/22).  Caris testing showed PD-L1 CPS score of 10, MMR deficient, MSI high disease with a TMB of 53 mut/Mb. Pembrolizumab was added with C3.  Cisplatin was changed to carboplatin starting with C5 due to tinnitus.  After a total of 6 cycles imaging demonstrated good response.    She is s/p radiation to the pelvis and para-aortic lymph nodes and interstitial brachytherapy to the vaginal cuff (12/28/2022 - 2/15/2023).     Interval History:  Ms. Donaldson comes into clinic today for routine breast cancer surveillance.  She reports that for the last month she has had a significant bothersome cough.  She denies that it started with an upper respiratory tract infection.  At the time of onset of the cough, it was associated with night sweats.  The night sweats have since resolved.  She saw her primary care physician and had a chest x-ray which was without concerning findings.  Her massage therapist thought it could be a sinus issue.  Since she has been doing techniques to clear her sinuses and believes that she has had improvement in the cough.  For example, she previously was unable to take a deep breath without coughing, she now can do so.  She previously was sleeping in a chair and is able to sleep in bed again.  She thinks these are signs that things are indeed getting better.  She denies associated shortness of breath or chest pain.  She has had no fevers or chills.  She denies new bone or joint aches or pains.  She has no abdominal pain, bloating, nausea, or vomiting.  She remains unable to feel her left breast mass.  She continues to have vaginal symptoms related to had the treatment of her cervical cancer.  She is using vaginal dilator therapy which has been helpful.    Past Medical/Surgical History:  Past Medical History:   Diagnosis Date     Abnormal Pap smear     maybe 20 years ago     Cervical cancer (H)       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      History of breast cancer 2003    lumpectomy and radiation offerred chemo and patient declined at time but had oncogene test and low risk     Hyperlipidemia LDL goal < 160      Osteopenia      Paroxysmal A-fib (H)      Severe vaginal dysplasia      Past Surgical History:   Procedure Laterality Date     BIOPSY       BREAST SURGERY Left 2003    lumpectomy left, did radiation, 5 yr of tamoxifen     COLONOSCOPY  2018    repeat in 2028     Colposcopy Cervix with Loop Electrode Conization and Lser to Vagina  2008     COLPOSCOPY, BIOPSY, COMBINED  10/24/2012    Procedure: COMBINED COLPOSCOPY, BIOPSY;  Colposcopy, Biopsy of Cervix and Vagina, Ultrasound Guidance;  Surgeon: Colette Ng MD;  Location: [facility] OR     ENT SURGERY  01/23/2018    otoscelerosis, right side     HYSTERECTOMY, FRANCIA  12/2012    high grade cervical dysplasia, [facility], Dr. Arciniega     INSERT INTERSTITIAL NEEDLE, ULTRASOUND GUIDED N/A 02/13/2023    Procedure: INSERTION, NEEDLE, WITH ULTRASOUND GUIDANCE, FOR INTERSTITIAL BRACHYTHERAPY;  Surgeon: Raymond Stockton MD;  Location: [facility]     INSERT PORT VASCULAR ACCESS Right 07/18/2022    Procedure: INSERTION, VASCULAR ACCESS PORT;  Surgeon: Donnie Elias MD;  Location: [facility]     IR CHEST PORT PLACEMENT > 5 YRS OF AGE  07/18/2022     IR PORT REMOVAL RIGHT  03/30/2023     RI LAP VAGINECTOMY, PARTIAL REMOVAL OF VAGINAL WALL  10/2013    upper vaginectomy for dysplasia, Dr. Megan Arciniega     REMOVE INTERSTITIAL NEEDLE N/A 02/15/2023    Procedure: REMOVAL, INTERSTITIAL NEEDLE, AFTER TEMPORARY BRACHYTHERAPY;  Surgeon: Raymond Stockton MD;  Location: [facility] OR     REMOVE PORT VASCULAR ACCESS Right 03/30/2023    Procedure: Remove port vascular access right;  Surgeon: Donnie Elias MD;  Location: [facility]     Allergies:  Allergies as of 03/17/2025     (No Known Allergies)     Current Medications:  Current Outpatient Medications    Medication Sig Dispense Refill     albuterol (PROAIR HFA/PROVENTIL HFA/VENTOLIN HFA) 108 (90 Base) MCG/ACT inhaler Inhale 2 puffs into the lungs every 4 hours as needed for shortness of breath or wheezing. 18 g 1     aspirin (ASA) 81 MG chewable tablet Take 81 mg by mouth every evening       Bacillus Coagulans-Inulin (PROBIOTIC FORMULA) 1-250 BILLION-MG CAPS Take by mouth every morning 25+billion CFUS       Cholecalciferol (VITAMIN D-3) 25 MCG (1000 UT) CAPS Take 1,000 Units by mouth every morning 90 capsule 3     coenzyme Q-10 capsule Take 1 capsule by mouth daily       levothyroxine (SYNTHROID/LEVOTHROID) 112 MCG tablet TAKE 1 TABLET (112 MCG) BY MOUTH DAILY BEFORE BREAKFAST 90 tablet 3     MAGNESIUM OXIDE PO Take 400 mg by mouth daily       metoprolol succinate ER (TOPROL XL) 50 MG 24 hr tablet Take 1 tablet (50 mg) by mouth daily. 90 tablet 3     Omega-3 Fatty Acids (OMEGA-3 FISH OIL PO) Take 630 mg by mouth 2 times daily       OVER-THE-COUNTER Turkey tail mushroom powder, use 1 times a day       Pectin Cit-Inos-C-Bioflav-Soy (MODIFIED CITRUS PECTIN PO) Take by mouth 2 times daily       Taurine 1000 MG CAPS Take 1,000 mg by mouth every morning 60 capsule 11     UNABLE TO FIND 500 mg MEDICATION NAME: Turmeric       UNABLE TO FIND 1,200 mg 2 times daily MEDICATION NAME: Efraín       UNABLE TO FIND Take 1 tablet by mouth every morning MEDICATION NAME: Dinndolylmethane Complex (DIM) 100mg tablet 1 table daily        Physical Exam:  /71 (BP Location: Right arm, Patient Position: Sitting, Cuff Size: Adult Regular)   Pulse 91   Temp 97.5  F (36.4  C) (Oral)   Resp 18   Wt 59.8 kg (131 lb 14.4 oz)   LMP  (LMP Unknown)   SpO2 98%   BMI 23.93 kg/m    GENERAL APPEARANCE: Well appearing adult female in NAD.  Alert and oriented x 3.    HEENT: NC/AT, sclera anicteric.  MMM.    LYMPHATICS: No palpable cervical, supraclavicular, or axillary lymphadenopathy  RESP: Inspiratory wheeze RLL.  Lungs are otherwise  CTA.  CARDIOVASCULAR:  RRR.  No audible m/r/g.  BREAST:  Bilateral breasts are of normal fibroglandular density.  There are no dominant or discretely palpable masses in either breast.  There is an approximate 5-6 mm palpable density at 12:30, 8 cm from the nipple of the left breast in area of known chest wall recurrence.  Bilateral nipples are everted and without discharge.  ABDOMEN:  soft, nondistended  MUSCULOSKELETAL: Full ROM of the bilateral upper extremities.  SKIN: no visible concerning skin lesions or rashes  PSYCHIATRIC: Normal mood and affect.  Pleasant and conversant.    Laboratory/Imaging Studies  I personally reviewed the below images and laboratories while in clinic today:    3/7/2025 CXR:  FINDINGS:      Metallic opacity projecting over the left upper chest, likely breast  biopsy clip. Borderline enlarged cardiac silhouette. No discernible  pneumothorax. No significant pleural effusion.                                                                      IMPRESSION:      Borderline enlarged cardiac silhouette. May consider cardiac  ultrasound for further evaluation, as clinically desired.       Latest Reference Range & Units 03/07/25 12:17   TSH 0.30 - 4.20 uIU/mL 0.76   WBC 4.0 - 11.0 10e3/uL 4.2   Hemoglobin 11.7 - 15.7 g/dL 12.2   Hematocrit 35.0 - 47.0 % 39.4   Platelet Count 150 - 450 10e3/uL 252     ASSESSMENT/PLAN:  Ms. Donaldson is a 73 y/o woman with a history of left breast cancer (s/p lumpectomy, radiation in 2003 followed by 5 years of tamoxifen w/ Dr. Mtz) and cervical SCC (10/2012, s/p FRANCIA, upper vaginectomy) with pelvic mass and a second ER positive, IA negative, HER2 negative left breast cancer.     1. Left breast cancer.  12:30, 8 cm from the nipple.  - Without surgery/radiation treatment of breast cancer, the breast cancer is not considered cured.  She has declined all further treatment, wanting to focus on quality of life at this time.  She would be open to discussion of treatment  options if evidence of further tumor growth.    - She is asymptomatic of disease recurrence on history taken today and clinical exam is without evidence of tumor growth.  She declines repeat imaging of the left breast mass.  - Return to clinic in 6 months.      2. Recurrent cervical versus vaginal cancer  - She follows with Dr. Raygoza of [facility]   - s/p 6 cycles neodjuvant Cisplatin 50 mg/m2 + paclitaxel 175 mg/m2 + bevacizumab 15 mg/kg + pembrolizumab every 21 days 7/11 - 11/21/2022.  Pembrolizumab added C3 and beyond; Cisplatin changed to carboplatin for cycles 5 and 6 due to tinnitus.    - s/p radiation to the pelvis and para-aortic lymph nodes as well as interstitial brachytherapy to the vaginal cuff, completed 02/2023.  - Plan is for regular clinic visits and to image only if symptomatic.    3.  Cough/RLL inspiratory wheeze:  Has had cough for >1 month.  Discussed with patient that both of her prior malignancies can metastasize to the lungs and with prolonged cough, would typically evaluate further with a CT of the chest.  She feels her symptoms are improving at this point and would like to hold off on imaging for now.  - Discussed if cough is still present in 2 weeks, recommend obtaining a CT of the chest.  Asked that she contact me to update me regarding the cough.  She states she will vinh it on her calendar.    4.  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  She continues on levothyroxine 112 mcg PO daily.  - Last TSH on 3/7/2025 was wnl.     5.  Osteoporosis:  DEXA in 06/2024 with lowest T-score of -2.8 in the bilateral femoral necks c/w osteoporosis.    - she is treating with calcium, vitamin D, weight bearing exercise.  - recommend discussing treatment of osteoporosis with PCP    6.  Follow Up:  Return to clinic in 6 months.    I spent 35 minutes on the date of the encounter doing chart review, review of test results, interpretation of tests, patient visit, and documentation                    Again, thank you for allowing me to participate in the care of your patient.        Sincerely,        Angela Montana MD    Electronically signed

## 2025-03-31 ENCOUNTER — PATIENT OUTREACH (OUTPATIENT)
Dept: ONCOLOGY | Facility: CLINIC | Age: 75
End: 2025-03-31
Payer: COMMERCIAL

## 2025-03-31 NOTE — PROGRESS NOTES
[facility]: Cancer Care                                                                                          Called pt to check in about her cough she had the last time she saw Feliz Montana. At that time Dr Montana wanted to check it out, but pt wanting to hold off. LVM for pt requesting a call back and will send a Gl[employer] message and offer that as a way to respond.     Signature:  Kori Willis RNCC

## 2025-04-03 ENCOUNTER — MYC MEDICAL ADVICE (OUTPATIENT)
Dept: CARDIOLOGY | Facility: CLINIC | Age: 75
End: 2025-04-03
Payer: COMMERCIAL

## 2025-04-03 DIAGNOSIS — Z85.3 PERSONAL HISTORY OF MALIGNANT NEOPLASM OF BREAST: ICD-10-CM

## 2025-04-03 DIAGNOSIS — I48.20 CHRONIC ATRIAL FIBRILLATION (H): Primary | ICD-10-CM

## 2025-04-07 NOTE — TELEPHONE ENCOUNTER
Ordered ECHO per Dr. Huitron and reached out to the patient to have her schedule appt and follow up.    Garcia Rivera, RN BSN  Cardiology Nurse Coordinator

## 2025-04-13 ENCOUNTER — HEALTH MAINTENANCE LETTER (OUTPATIENT)
Age: 75
End: 2025-04-13

## 2025-04-16 ENCOUNTER — TELEPHONE (OUTPATIENT)
Dept: PULMONOLOGY | Facility: CLINIC | Age: 75
End: 2025-04-16
Payer: COMMERCIAL

## 2025-04-16 DIAGNOSIS — R06.02 SOB (SHORTNESS OF BREATH): Primary | ICD-10-CM

## 2025-04-16 NOTE — TELEPHONE ENCOUNTER
[facility] Call Center    Phone Message    May a detailed message be left on voicemail: yes     Reason for Call: Appointment Intake    Referring Provider Name: N/A (self-referral)  Diagnosis and/or Symptoms: Cough/shortness of breath    Pt is scheduled to establish care 4/30/25 with Monique TIWARI CNP, will have PFT's done 4/25/25-needs orders placed for PFT's (pended).    Action Taken: Other: Pulm    Travel Screening: Not Applicable

## 2025-04-21 ENCOUNTER — ANCILLARY PROCEDURE (OUTPATIENT)
Dept: CARDIOLOGY | Facility: CLINIC | Age: 75
End: 2025-04-21
Attending: INTERNAL MEDICINE
Payer: COMMERCIAL

## 2025-04-21 DIAGNOSIS — I48.20 CHRONIC ATRIAL FIBRILLATION (H): ICD-10-CM

## 2025-04-21 DIAGNOSIS — Z85.3 PERSONAL HISTORY OF MALIGNANT NEOPLASM OF BREAST: ICD-10-CM

## 2025-04-21 DIAGNOSIS — R06.02 SHORTNESS OF BREATH: Primary | ICD-10-CM

## 2025-04-21 LAB — LVEF ECHO: NORMAL

## 2025-04-21 PROCEDURE — 93306 TTE W/DOPPLER COMPLETE: CPT | Performed by: INTERNAL MEDICINE

## 2025-04-25 NOTE — PROGRESS NOTES
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  Most recently she completed treatment with chemotherapy 22.  She presents today for an acute visit.     Breast Cancer History:  : Left breast cancer.  -Lumpectomy.  -Radiation therapy.     2422-3827: BRENNAN.      22: PET/CT to stage vaginal cancer (see below).   Mild soft tissue  thickening in the left retropectoral region with an SUV max of 2.7.  7/15/22: Invasive ductal carcinoma.   -Managed by Dr. Montana. Endocrine therapy with anastozole recommended after completion of therapy for the vaginal cancer, but Cordell declined, prefers to focus on quality of life.         Cervical/Vaginal Dysplasia/Cancer History:  10/24/12:   -Endocervical curettings: Squamous carcinoma, invasion cannot be assessed.   -Ectocervical biopsies: HSIL (CIN3).   -Vaginal biopsy, posterior: HSIL (VaIN3).  2012: Robotic-assisted laparoscopic lysis of adhesions, left ureterolysis with  conversion to laparotomy, total abdominal hysterectomy, and an upper  Vaginectomy by gynecologic oncologist Dr. Arciniega.   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      13: Vaginal biopsy: HSIL, cannot exclude invasion.   -Consultation with gynecologic oncologist Dr. Johnson. Upper vaginectomy and CO2 laser ablation recommended, patient declined.   [id number]: No vaginal dysplasia treatment.   22: Pelvic MRI:   Centrally hypoenhancing heterogeneous midline pelvic mass along  the superior margin of the  "vaginal vault measures 8.5 x 6.7 x 8.1 cm.  The mass abut the bladder and there is mucosal irregularity at the  site of abutment. The mass indents the  decompressed rectum but there is no definite invasion or mucosal  Irregularity.  6/9/22: CT-guided biopsy of pelvic mass: Poorly-differentiated squamous cell carcinoma, HPV-associated.   7/1/22: Staging PET/CT: Stage III vaginal cancer (vs recurrent, metastatic cervical cancer).      7/20/22, 8/10/22, 8/31/22,9/21/22, 10/26/22, 11/21/22: Cycles #1-6 of first-line chemotherapy with IV platinum + paclitaxel + bevacizumab 15 mg/kg + pembrolizumab 200 mg (added cycle 3) every 21 days.   -Cycles 1-4: Cisplatin 50 mg/m2, paclitaxel 175 mg/m2.   -Cycle 3:  Pembrolizumab added to all subsequent cycles due to PD-L1+ result per the KEYNOTE-826 regimen.  -9/22/22: PET/CT: Partial response.  -Discussed with radiation oncologist Dr. Stockton and at the gyn onc/radiation oncology tumor conference. Recommendation for additional chemotherapy prior to radiation therapy.   -Cycles 5+: Platinum changed to carboplatin due to tinnitus, and paclitaxel dose-reduced to 135 mg/m2 due to peripheral neuropathy.   -12/5/22: PET/CT: Partial response. .   12/28/22-2/15/23: First-line pelvic external beam radiation therapy with total dose 4500 cGy in 25 fractions then vaginal cuff brachytherapy with total dose 2500 cGy in 5 fractions. No concurrent chemosensitization due to myelosuppression and decreased patient tolerance of therapy.   -Patient declined maintenance therapy due to significantly decreased quality of life while on systemic therapy.   -6/8/23: PET/CT: No definitive evidence of disease. Ascites with imaging concerning for cirrhotic changes.  -9/13/23: Abdominal ultrasound: Negative for cirrhosis.   -Ascites resolved, attributed to pembrolizumab.                  Today she comes to clinic with concern for some vaginal spotting.  Spotting lasted x 2 days and started after a \"coughing fit\".  " She has been struggling with a chronic cough and is meeting with pulmonary later this week.  Last week on Weds she had gone out to dinner and had a lot of bad coughing after she came home.  The spotting started the next morning.  She has had no spotting for the last 3 days.  She used her dilator yesterday without any bleeding.  She otherwise has felt well.  She has had no abdominal pain or bloating.  Normal appetite.  She had some constipation on Thursday but normal BMs since.                  Health Maintenance  Colonoscopy: 9/14/18  Mammogram: 7/13/22  Annual physical: 4/30/24    Past Medical History:    Past Medical History:   Diagnosis Date    Abnormal Pap smear     maybe 20 years ago    Cervical cancer (H)     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     History of breast cancer 2003    lumpectomy and radiation offerred chemo and patient declined at time but had oncogene test and low risk    Hyperlipidemia LDL goal < 160     Osteopenia     Paroxysmal A-fib (H)     Severe vaginal dysplasia          Past Surgical History:    Past Surgical History:   Procedure Laterality Date    BIOPSY      BREAST SURGERY Left 2003    lumpectomy left, did radiation, 5 yr of tamoxifen    COLONOSCOPY  2018    repeat in 2028    Colposcopy Cervix with Loop Electrode Conization and Lser to Vagina  2008    COLPOSCOPY, BIOPSY, COMBINED  10/24/2012    Procedure: COMBINED COLPOSCOPY, BIOPSY;  Colposcopy, Biopsy of Cervix and Vagina, Ultrasound Guidance;  Surgeon: Colette Ng MD;  Location: [facility]    ENT SURGERY  01/23/2018    otoscelerosis, right side    HYSTERECTOMY, FRANCIA  12/2012    high grade cervical dysplasia, [facility], Dr. Arciniega    INSERT INTERSTITIAL NEEDLE, ULTRASOUND GUIDED N/A 02/13/2023    Procedure: INSERTION, NEEDLE, WITH ULTRASOUND GUIDANCE, FOR INTERSTITIAL BRACHYTHERAPY;  Surgeon: Raymond Stockton MD;  Location: [facility]    INSERT PORT VASCULAR ACCESS Right 07/18/2022    Procedure: INSERTION,  VASCULAR ACCESS PORT;  Surgeon: Donnie Elias MD;  Location: [facility] OR    IR CHEST PORT PLACEMENT > 5 YRS OF AGE  07/18/2022    IR PORT REMOVAL RIGHT  03/30/2023    DE LAP VAGINECTOMY, PARTIAL REMOVAL OF VAGINAL WALL  10/2013    upper vaginectomy for dysplasia, Dr. Megan Arciniega    REMOVE INTERSTITIAL NEEDLE N/A 02/15/2023    Procedure: REMOVAL, INTERSTITIAL NEEDLE, AFTER TEMPORARY BRACHYTHERAPY;  Surgeon: Raymond Stockton MD;  Location: [facility] OR    REMOVE PORT VASCULAR ACCESS Right 03/30/2023    Procedure: Remove port vascular access right;  Surgeon: Donnie Elias MD;  Location: [facility] OR         Health Maintenance Due   Topic Date Due    DEPRESSION ACTION PLAN  Never done    RSV VACCINE (1 - Risk 60-74 years 1-dose series) Never done    PAP  01/02/2023    MAMMO SCREENING  07/13/2023    BMP  08/30/2024    INFLUENZA VACCINE (1) 09/01/2024    ADVANCE CARE PLANNING  01/10/2025    COVID-19 Vaccine (7 - Pfizer risk 2024-25 season) 03/10/2025    MEDICARE ANNUAL WELLNESS VISIT  04/30/2025    LIPID  04/30/2025    FALL RISK ASSESSMENT  04/30/2025       Current Medications:     Current Outpatient Medications   Medication Sig Dispense Refill    albuterol (PROAIR HFA/PROVENTIL HFA/VENTOLIN HFA) 108 (90 Base) MCG/ACT inhaler Inhale 2 puffs into the lungs every 4 hours as needed for shortness of breath or wheezing. 18 g 1    aspirin (ASA) 81 MG chewable tablet Take 81 mg by mouth every evening      Bacillus Coagulans-Inulin (PROBIOTIC FORMULA) 1-250 BILLION-MG CAPS Take by mouth every morning 25+billion CFUS      Cholecalciferol (VITAMIN D-3) 25 MCG (1000 UT) CAPS Take 1,000 Units by mouth every morning 90 capsule 3    coenzyme Q-10 capsule Take 1 capsule by mouth daily      levothyroxine (SYNTHROID/LEVOTHROID) 112 MCG tablet TAKE 1 TABLET (112 MCG) BY MOUTH DAILY BEFORE BREAKFAST 90 tablet 3    MAGNESIUM OXIDE PO Take 400 mg by mouth daily      metoprolol succinate ER (TOPROL XL) 50 MG 24 hr tablet Take 1 tablet (50 mg) by  "mouth daily. 90 tablet 3    Omega-3 Fatty Acids (OMEGA-3 FISH OIL PO) Take 630 mg by mouth 2 times daily      OVER-THE-COUNTER Turkey tail mushroom powder, use 1 times a day      Pectin Cit-Inos-C-Bioflav-Soy (MODIFIED CITRUS PECTIN PO) Take by mouth 2 times daily      Taurine 1000 MG CAPS Take 1,000 mg by mouth every morning 60 capsule 11    UNABLE TO FIND 500 mg MEDICATION NAME: Turmeric      UNABLE TO FIND 1,200 mg 2 times daily MEDICATION NAME: Efraín      UNABLE TO FIND Take 1 tablet by mouth every morning MEDICATION NAME: Dinndolylmethane Complex (DIM) 100mg tablet 1 table daily           Allergies:      No Known Allergies     Social History:     Social History     Tobacco Use    Smoking status: Former     Current packs/day: 0.00     Average packs/day: 0.5 packs/day for 15.0 years (7.5 ttl pk-yrs)     Types: Cigarettes     Start date:      Quit date:      Years since quittin.3     Passive exposure: Past    Smokeless tobacco: Never   Substance Use Topics    Alcohol use: Not Currently     Alcohol/week: 0.0 - 2.0 standard drinks of alcohol     Comment: Socially        History   Drug Use No         Family History:     The patient's family history is notable for:    Family History   Problem Relation Age of Onset    Myocardial Infarction Mother 73        Tob    Myocardial Infarction Father 68        Tob    Breast Cancer Sister 47         of breast cancer age 63; BRCA mutation testing negative    Cancer Maternal Grandmother         Unsure of type.    Breast Cancer Paternal Grandmother         Unsure of diagnosis--some type of \"women's issue\"    Skin Cancer Daughter 47    Anesthesia Reaction No family hx of     Deep Vein Thrombosis (DVT) No family hx of          Physical Exam:     /46 (BP Location: Left arm, Patient Position: Sitting, Cuff Size: Adult Regular)   Temp 98.9  F (37.2  C) (Oral)   Resp 20   LMP  (LMP Unknown)   SpO2 96%   There is no height or weight on file to calculate " BMI.    General Appearance:  alert, no distress     HEENT: no palpable nodules or masses        Cardiovascular: regular rate and rhythm, no gallops, rubs or murmurs     Respiratory: lungs clear, no rales, rhonchi or wheezes    Musculoskeletal: extremities non tender and without edema    Skin: no lesions or rashes     Neurological: normal gait, no gross defects     Psychiatric: appropriate mood and affect                               Hematological: normal cervical, supraclavicular and inguinal lymph nodes     Gastrointestinal:       abdomen soft, non-tender, non-distended, no organomegaly or masses    Genitourinary: External genitalia and urethral meatus appears normal.  Vagina is smooth without nodularity or masses.  Cervix not easily visualized.  Radiation changes present with vaginal foreshortening to about 5 cm.   Bimanual exam reveal no masses, nodularity or fullness.  Recto-vaginal exam deferred.  Medium Danuta speculum used for exam.      No results found for this or any previous visit (from the past 24 hours).       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  Most recently she completed treatment with chemotherapy 11/21/22.  She presents today for an acute visit.      20 minutes spent on the date of the encounter doing chart review, history and exam, documentation, and further activities as noted above.      Plan:     1.) SCC vagina/cervix:  BRENNAN on exam.  Suspect bleeding was from radiation changes, no source of bleeding identified today.  She prefers to keep visit with Dr. Raygoza in June as scheduled.  RTC as scheduled to see Dr. Raygoza 6/27/25.  Agree with plan to follow up with pulmonary.  Reviewed signs and symptoms for when she should contact the clinic or seek additional care.  Patient to contact the clinic with any questions or  concerns in the interim.      Genetic risk factors were assessed and she was negative for germline BRCA1, BRCA2 pathogenic variants.     [facility] Testing, 8/14/22 (specimen from 6/9/22)  -PD-L1+ (CPS 10)  -Mismatch repair deficient/microsatellite instability-high  -TMB high (53 mut/Mb)  -NTRK 1/2/3 fusion not detected.     Labs and/or tests ordered include:  None.     2.) Health maintenance:  Issues addressed today include following up with PCP for annual health maintenance and non-gynecologic issues.       Niya Holden, DNP, APRN, FNP-C, AOCNP  Oncology Nurse Practitioner  Division of Gynecologic Oncology  Pager: [phone]     CC  Patient Care Team:  Merry Lino MD as PCP - General (Internal Medicine)  Bess Paredes MD Corfield, Aaron Daniel, DPM as MD (Podiatry)  Elise Huitron MD as Assigned Heart and Vascular Provider  Merry Lino MD as Assigned PCP  Heather Ayon MD as MD (Urology)  Angela Montana MD as MD (Hematology & Oncology)  Yvette Pike MD as MD (Surgery)  Nurys Horton RN as Specialty Care Coordinator (Hematology & Oncology)  Felicia Thompson PA-C as Physician Assistant (Anesthesiology)  Valeria Spencer MD as MD (Gastroenterology)  Yulisa Aparicio MD as MD (Dermatology)  Shabbir Thao MD as MD (OB/Gyn)  Maliha Garza APRN CNP as Nurse Practitioner  Bert Rivera RN as Specialty Care Coordinator (Cardiology)  Yulisa Aparicio MD as Assigned Dermatology Provider  Angela Montana MD as Assigned Cancer Care Provider  Monique Bocanegra APRN CNP as Pulmonologist (Pulmonary Disease)  Maliha Garza APRN CNP as Assigned OBGYN Provider  SELF, REFERRED

## 2025-04-28 ENCOUNTER — ONCOLOGY VISIT (OUTPATIENT)
Dept: ONCOLOGY | Facility: CLINIC | Age: 75
End: 2025-04-28
Attending: NURSE PRACTITIONER
Payer: COMMERCIAL

## 2025-04-28 VITALS
SYSTOLIC BLOOD PRESSURE: 115 MMHG | DIASTOLIC BLOOD PRESSURE: 46 MMHG | RESPIRATION RATE: 20 BRPM | OXYGEN SATURATION: 96 % | TEMPERATURE: 98.9 F

## 2025-04-28 DIAGNOSIS — C53.0 MALIGNANT NEOPLASM OF ENDOCERVIX (H): Primary | ICD-10-CM

## 2025-04-28 PROCEDURE — G0463 HOSPITAL OUTPT CLINIC VISIT: HCPCS | Performed by: NURSE PRACTITIONER

## 2025-04-28 PROCEDURE — 99213 OFFICE O/P EST LOW 20 MIN: CPT | Performed by: NURSE PRACTITIONER

## 2025-04-28 ASSESSMENT — PAIN SCALES - GENERAL: PAINLEVEL_OUTOF10: NO PAIN (0)

## 2025-04-28 NOTE — NURSING NOTE
"Oncology Rooming Note    April 28, 2025 3:02 PM   Cordell Donaldson is a 75 year old female who presents for:    Chief Complaint   Patient presents with    Oncology Clinic Visit     Malignant neoplasm of endocervix     Initial Vitals: /46 (BP Location: Left arm, Patient Position: Sitting, Cuff Size: Adult Regular)   Temp 98.9  F (37.2  C) (Oral)   Resp 20   LMP  (LMP Unknown)   SpO2 96%  Estimated body mass index is 23.93 kg/m  as calculated from the following:    Height as of 4/30/24: 1.581 m (5' 2.25\").    Weight as of 3/17/25: 59.8 kg (131 lb 14.4 oz). There is no height or weight on file to calculate BSA.  No Pain (0) Comment: Data Unavailable   No LMP recorded (lmp unknown). Patient has had a hysterectomy.  Allergies reviewed: Yes  Medications reviewed: Yes    Medications: Medication refills not needed today.  Pharmacy name entered into [employer]:    [id number] - [place], [place] - [facility]  [id number] - [place], [place] - [facility] [place], [place] - [address] [facility] [address]    Frailty Screening:   Is the patient here for a new oncology consult visit in cancer care? 2. No    PHQ9:  Did this patient require a PHQ9?: No      Clinical concerns: none       Lebron Addison              "

## 2025-04-28 NOTE — LETTER
2025      Cordell Donaldson  [address]  [facility] [address]      Dear Colleague,    Thank you for referring your patient, Cordell Donaldson, to the [facility]. Please see a copy of my visit note below.    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  Most recently she completed treatment with chemotherapy 22.  She presents today for an acute visit.     Breast Cancer History:  : Left breast cancer.  -Lumpectomy.  -Radiation therapy.     [id number]: BRENNAN.      22: PET/CT to stage vaginal cancer (see below).   Mild soft tissue  thickening in the left retropectoral region with an SUV max of 2.7.  7/15/22: Invasive ductal carcinoma.   -Managed by Dr. Montana. Endocrine therapy with anastozole recommended after completion of therapy for the vaginal cancer, but Cordell declined, prefers to focus on quality of life.         Cervical/Vaginal Dysplasia/Cancer History:  10/24/12:   -Endocervical curettings: Squamous carcinoma, invasion cannot be assessed.   -Ectocervical biopsies: HSIL (CIN3).   -Vaginal biopsy, posterior: HSIL (VaIN3).  2012: Robotic-assisted laparoscopic lysis of adhesions, left ureterolysis with  conversion to laparotomy, total abdominal hysterectomy, and an upper  Vaginectomy by gynecologic oncologist Dr. Arciniega.   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      13: Vaginal biopsy: HSIL, cannot exclude invasion.   -Consultation with gynecologic  oncologist Dr. Johnson. Upper vaginectomy and CO2 laser ablation recommended, patient declined.   [id number]: No vaginal dysplasia treatment.   5/20/22: Pelvic MRI:   Centrally hypoenhancing heterogeneous midline pelvic mass along  the superior margin of the vaginal vault measures 8.5 x 6.7 x 8.1 cm.  The mass abut the bladder and there is mucosal irregularity at the  site of abutment. The mass indents the  decompressed rectum but there is no definite invasion or mucosal  Irregularity.  6/9/22: CT-guided biopsy of pelvic mass: Poorly-differentiated squamous cell carcinoma, HPV-associated.   7/1/22: Staging PET/CT: Stage III vaginal cancer (vs recurrent, metastatic cervical cancer).      7/20/22, 8/10/22, 8/31/22,9/21/22, 10/26/22, 11/21/22: Cycles #1-6 of first-line chemotherapy with IV platinum + paclitaxel + bevacizumab 15 mg/kg + pembrolizumab 200 mg (added cycle 3) every 21 days.   -Cycles 1-4: Cisplatin 50 mg/m2, paclitaxel 175 mg/m2.   -Cycle 3:  Pembrolizumab added to all subsequent cycles due to PD-L1+ result per the KEYNOTE-826 regimen.  -9/22/22: PET/CT: Partial response.  -Discussed with radiation oncologist Dr. Stockton and at the gyn onc/radiation oncology tumor conference. Recommendation for additional chemotherapy prior to radiation therapy.   -Cycles 5+: Platinum changed to carboplatin due to tinnitus, and paclitaxel dose-reduced to 135 mg/m2 due to peripheral neuropathy.   -12/5/22: PET/CT: Partial response. .   12/28/22-2/15/23: First-line pelvic external beam radiation therapy with total dose 4500 cGy in 25 fractions then vaginal cuff brachytherapy with total dose 2500 cGy in 5 fractions. No concurrent chemosensitization due to myelosuppression and decreased patient tolerance of therapy.   -Patient declined maintenance therapy due to significantly decreased quality of life while on systemic therapy.   -6/8/23: PET/CT: No definitive evidence of disease. Ascites with imaging concerning for cirrhotic  "changes.  -9/13/23: Abdominal ultrasound: Negative for cirrhosis.   -Ascites resolved, attributed to pembrolizumab.                  Today she comes to clinic with concern for some vaginal spotting.  Spotting lasted x 2 days and started after a \"coughing fit\".  She has been struggling with a chronic cough and is meeting with pulmonary later this week.  Last week on Weds she had gone out to dinner and had a lot of bad coughing after she came home.  The spotting started the next morning.  She has had no spotting for the last 3 days.  She used her dilator yesterday without any bleeding.  She otherwise has felt well.  She has had no abdominal pain or bloating.  Normal appetite.  She had some constipation on Thursday but normal BMs since.                  Health Maintenance  Colonoscopy: 9/14/18  Mammogram: 7/13/22  Annual physical: 4/30/24    Past Medical History:    Past Medical History:   Diagnosis Date     Abnormal Pap smear     maybe 20 years ago     Cervical cancer (H)      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      History of breast cancer 2003    lumpectomy and radiation offerred chemo and patient declined at time but had oncogene test and low risk     Hyperlipidemia LDL goal < 160      Osteopenia      Paroxysmal A-fib (H)      Severe vaginal dysplasia          Past Surgical History:    Past Surgical History:   Procedure Laterality Date     BIOPSY       BREAST SURGERY Left 2003    lumpectomy left, did radiation, 5 yr of tamoxifen     COLONOSCOPY  2018    repeat in 2028     Colposcopy Cervix with Loop Electrode Conization and Lser to Vagina  2008     COLPOSCOPY, BIOPSY, COMBINED  10/24/2012    Procedure: COMBINED COLPOSCOPY, BIOPSY;  Colposcopy, Biopsy of Cervix and Vagina, Ultrasound Guidance;  Surgeon: Colette Ng MD;  Location: [facility] OR     ENT SURGERY  01/23/2018    otoscelerosis, right side     HYSTERECTOMY, FRANCIA  12/2012    high grade cervical dysplasia, [place] OncDr. " Demian     INSERT INTERSTITIAL NEEDLE, ULTRASOUND GUIDED N/A 02/13/2023    Procedure: INSERTION, NEEDLE, WITH ULTRASOUND GUIDANCE, FOR INTERSTITIAL BRACHYTHERAPY;  Surgeon: Raymond Stockton MD;  Location: [facility] OR     INSERT PORT VASCULAR ACCESS Right 07/18/2022    Procedure: INSERTION, VASCULAR ACCESS PORT;  Surgeon: Donnie Elias MD;  Location: [facility] OR     IR CHEST PORT PLACEMENT > 5 YRS OF AGE  07/18/2022     IR PORT REMOVAL RIGHT  03/30/2023     VA LAP VAGINECTOMY, PARTIAL REMOVAL OF VAGINAL WALL  10/2013    upper vaginectomy for dysplasia, Dr. Megan Arciniega     REMOVE INTERSTITIAL NEEDLE N/A 02/15/2023    Procedure: REMOVAL, INTERSTITIAL NEEDLE, AFTER TEMPORARY BRACHYTHERAPY;  Surgeon: Raymond Stockton MD;  Location: [facility] OR     REMOVE PORT VASCULAR ACCESS Right 03/30/2023    Procedure: Remove port vascular access right;  Surgeon: Donnie Elias MD;  [place] [facility] [place]         Health Maintenance Due   Topic Date Due     DEPRESSION ACTION PLAN  Never done     RSV VACCINE (1 - Risk 60-74 years 1-dose series) Never done     PAP  01/02/2023     MAMMO SCREENING  07/13/2023     BMP  08/30/2024     INFLUENZA VACCINE (1) 09/01/2024     ADVANCE CARE PLANNING  01/10/2025     COVID-19 Vaccine (7 - Pfizer risk 2024-25 season) 03/10/2025     MEDICARE ANNUAL WELLNESS VISIT  04/30/2025     LIPID  04/30/2025     FALL RISK ASSESSMENT  04/30/2025       Current Medications:     Current Outpatient Medications   Medication Sig Dispense Refill     albuterol (PROAIR HFA/PROVENTIL HFA/VENTOLIN HFA) 108 (90 Base) MCG/ACT inhaler Inhale 2 puffs into the lungs every 4 hours as needed for shortness of breath or wheezing. 18 g 1     aspirin (ASA) 81 MG chewable tablet Take 81 mg by mouth every evening       Bacillus Coagulans-Inulin (PROBIOTIC FORMULA) 1-250 BILLION-MG CAPS Take by mouth every morning 25+billion CFUS       Cholecalciferol (VITAMIN D-3) 25 MCG (1000 UT) CAPS Take 1,000 Units by mouth every morning 90 capsule 3     coenzyme Q-10  "capsule Take 1 capsule by mouth daily       levothyroxine (SYNTHROID/LEVOTHROID) 112 MCG tablet TAKE 1 TABLET (112 MCG) BY MOUTH DAILY BEFORE BREAKFAST 90 tablet 3     MAGNESIUM OXIDE PO Take 400 mg by mouth daily       metoprolol succinate ER (TOPROL XL) 50 MG 24 hr tablet Take 1 tablet (50 mg) by mouth daily. 90 tablet 3     Omega-3 Fatty Acids (OMEGA-3 FISH OIL PO) Take 630 mg by mouth 2 times daily       OVER-THE-COUNTER Turkey tail mushroom powder, use 1 times a day       Pectin Cit-Inos-C-Bioflav-Soy (MODIFIED CITRUS PECTIN PO) Take by mouth 2 times daily       Taurine 1000 MG CAPS Take 1,000 mg by mouth every morning 60 capsule 11     UNABLE TO FIND 500 mg MEDICATION NAME: Turmeric       UNABLE TO FIND 1,200 mg 2 times daily MEDICATION NAME: Efraín       UNABLE TO FIND Take 1 tablet by mouth every morning MEDICATION NAME: Dinndolylmethane Complex (DIM) 100mg tablet 1 table daily           Allergies:      No Known Allergies     Social History:     Social History     Tobacco Use     Smoking status: Former     Current packs/day: 0.00     Average packs/day: 0.5 packs/day for 15.0 years (7.5 ttl pk-yrs)     Types: Cigarettes     Start date:      Quit date:      Years since quittin.3     Passive exposure: Past     Smokeless tobacco: Never   Substance Use Topics     Alcohol use: Not Currently     Alcohol/week: 0.0 - 2.0 standard drinks of alcohol     Comment: Socially        History   Drug Use No         Family History:     The patient's family history is notable for:    Family History   Problem Relation Age of Onset     Myocardial Infarction Mother 73        Tob     Myocardial Infarction Father 68        Tob     Breast Cancer Sister 47         of breast cancer age 63; BRCA mutation testing negative     Cancer Maternal Grandmother         Unsure of type.     Breast Cancer Paternal Grandmother         Unsure of diagnosis--some type of \"women's issue\"     Skin Cancer Daughter 47     Anesthesia " Reaction No family hx of      Deep Vein Thrombosis (DVT) No family hx of          Physical Exam:     /46 (BP Location: Left arm, Patient Position: Sitting, Cuff Size: Adult Regular)   Temp 98.9  F (37.2  C) (Oral)   Resp 20   LMP  (LMP Unknown)   SpO2 96%   There is no height or weight on file to calculate BMI.    General Appearance:  alert, no distress     HEENT: no palpable nodules or masses        Cardiovascular: regular rate and rhythm, no gallops, rubs or murmurs     Respiratory: lungs clear, no rales, rhonchi or wheezes    Musculoskeletal: extremities non tender and without edema    Skin: no lesions or rashes     Neurological: normal gait, no gross defects     Psychiatric: appropriate mood and affect                               Hematological: normal cervical, supraclavicular and inguinal lymph nodes     Gastrointestinal:       abdomen soft, non-tender, non-distended, no organomegaly or masses    Genitourinary: External genitalia and urethral meatus appears normal.  Vagina is smooth without nodularity or masses.  Cervix not easily visualized.  Radiation changes present with vaginal foreshortening to about 5 cm.   Bimanual exam reveal no masses, nodularity or fullness.  Recto-vaginal exam deferred.  Medium Danuta speculum used for exam.      No results found for this or any previous visit (from the past 24 hours).       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  Most recently she completed treatment with chemotherapy 11/21/22.  She presents today for an acute visit.      20 minutes spent on the date of the encounter doing chart review, history and exam, documentation, and further activities as noted above.      Plan:     1.) SCC vagina/cervix:  BRENNAN on exam.  Suspect bleeding was from radiation changes, no source of bleeding  identified today.  She prefers to keep visit with Dr. Raygoza in June as scheduled.  RTC as scheduled to see Dr. Raygoza 6/27/25.  Agree with plan to follow up with pulmonary.  Reviewed signs and symptoms for when she should contact the clinic or seek additional care.  Patient to contact the clinic with any questions or concerns in the interim.      Genetic risk factors were assessed and she was negative for germline BRCA1, BRCA2 pathogenic variants.     [facility] Testing, 8/14/22 (specimen from 6/9/22)  -PD-L1+ (CPS 10)  -Mismatch repair deficient/microsatellite instability-high  -TMB high (53 mut/Mb)  -NTRK 1/2/3 fusion not detected.     Labs and/or tests ordered include:  None.     2.) Health maintenance:  Issues addressed today include following up with PCP for annual health maintenance and non-gynecologic issues.       Niya Holden, YUE, APRN, FNP-C, AOCNP  Oncology Nurse Practitioner  Division of Gynecologic Oncology  Pager: [phone]     CC  Patient Care Team:  Merry Lino MD as PCP - General (Internal Medicine)  Bess Paredes MD Corfield, Aaron Daniel, DPM as MD (Podiatry)  Elise Huitron MD as Assigned Heart and Vascular Provider  Merry Lino MD as Assigned PCP  Heather Ayon MD as MD (Urology)  Angela Montana MD as MD (Hematology & Oncology)  Yvette Pike MD as MD (Surgery)  Nurys Horton RN as Specialty Care Coordinator (Hematology & Oncology)  Felicia Thompson PA-C as Physician Assistant (Anesthesiology)  Valeria Spencer MD as MD (Gastroenterology)  Yulisa Aparicio MD as MD (Dermatology)  Shabbir Thao MD as MD (OB/Gyn)  Maliha Garza APRN CNP as Nurse Practitioner  Bert Rivera, RN as Specialty Care Coordinator (Cardiology)  Yulisa Aparicio MD as Assigned Dermatology Provider  Angela Montana MD as Assigned Cancer Care Provider  Monique Bocanegra APRN CNP as Pulmonologist (Pulmonary  Disease)  Maliha Garza APRN CNP as Assigned OBGYN Provider  SELF, REFERRED      Again, thank you for allowing me to participate in the care of your patient.        Sincerely,        TE Rivers CNP    Electronically signed

## 2025-04-30 ENCOUNTER — OFFICE VISIT (OUTPATIENT)
Dept: PULMONOLOGY | Facility: CLINIC | Age: 75
End: 2025-04-30
Payer: COMMERCIAL

## 2025-04-30 VITALS
SYSTOLIC BLOOD PRESSURE: 119 MMHG | BODY MASS INDEX: 22.5 KG/M2 | OXYGEN SATURATION: 98 % | WEIGHT: 127 LBS | DIASTOLIC BLOOD PRESSURE: 77 MMHG | HEART RATE: 105 BPM | HEIGHT: 63 IN

## 2025-04-30 DIAGNOSIS — I48.91 ATRIAL FIBRILLATION, UNSPECIFIED TYPE (H): ICD-10-CM

## 2025-04-30 DIAGNOSIS — R05.2 SUBACUTE COUGH: Primary | ICD-10-CM

## 2025-04-30 PROCEDURE — 3078F DIAST BP <80 MM HG: CPT | Performed by: NURSE PRACTITIONER

## 2025-04-30 PROCEDURE — 99204 OFFICE O/P NEW MOD 45 MIN: CPT | Performed by: NURSE PRACTITIONER

## 2025-04-30 PROCEDURE — 3074F SYST BP LT 130 MM HG: CPT | Performed by: NURSE PRACTITIONER

## 2025-04-30 NOTE — PATIENT INSTRUCTIONS
It was a pleasure seeing you in clinic today. This is what we discussed:    Your lung function is normal.   We should get a CT scan to look at your airways and lung tissue.  I think your cough is from upper airway irritation from sinuses or reflux.   I have referred you to ENT.   Follow up as needed   If you have worsening symptoms, questions, or need to speak with the nurse please call [phone].

## 2025-04-30 NOTE — PROGRESS NOTES
Pulmonary Outpatient Cosult Note  April 30, 2025      Assessment and Plan:   Cordell Donaldson is a 75 year old F, former smoker, with history of breast cancer, cervical and vaginal cancer, A-fib, HLD presenting today for evaluation of subacute cough.  Dry barking cough started in 02/2025, no obvious trigger.  No change with albuterol use.  Chest x-ray has been clear.  She does also admit to frequent throat clearing but denies any obvious postnasal drip or reflux symptoms.  PFT showed normal spirometry, no bronchodilator response was tested.  Normal lung volumes.  DLCO uncorrected for hemoglobin normal.  PET CT in 06/2023 shows a few stable pulmonary nodules, largest 2 mm in left upper lobe.    #. Subacute cough: Given nature of cough, normal spirometry, and lack of response to albuterol unlikely there is a pulmonary cause for cough. Symptoms more suspicious for upper airway cough syndrome from sinus drainage or reflux.  She is agreeable to a ENT assessment.  I suggested a ICS or PPI trial but she would like to hold off on any medications until further assessment is done.  She is agreeable to CT imaging suggested by her oncologist recently.  She will contact oncology about CT chest.  She prefers they order this.  I asked that she let me know when this is done so I can review images.  Discontinue albuterol due to A-fib. If bronchodilator is indicated will utilize levalbuterol.  ENT referral placed.  I mention she could check with [facility] to see if she can be seen sooner if [facility] has a very long wait.  #. Left breast cancer: Treated with lumpectomy and radiation therapy in 2003.    #. Cervical cancer history: s/p FRANCIA and upper vaginectomy in 12/2012 with no residual invasive cancer, but residual HSIL.  In 07/2022 MRI showed a large midline pelvic mass, confirmed on PET/CT.  Treated with chemotherapy. s/p radiation to the pelvis and para-aortic lymph nodes and interstitial brachytherapy to the vaginal cuff  (12/28/2022 - 2/15/2023).   Continue follow-up with oncology as indicated.  #.  HCM: UTD with COVID-vaccine (09/2024)  Recommend she stay up-to-date with respiratory vaccines.  I would encourage PCV 20, RSV, and seasonal flu.    RTC as needed if ENT workup is unremarkable or CT scan is abnormal.    Monique Bocanegra, CNP  Pulmonary Medicine  ______________________________________________________________________________    CC:   Chief Complaint   Patient presents with    Consult     Diagnosis  R05.3 (ICD-10-CM) - Persistent cough for 3 weeks or longer  R06.02 (ICD-10-CM) - Shortness of breath        HPI:   Cordell presents today accompanied by her  for evaluation of subacute cough.  Cough started in Feb. Denies any acute illness symptoms. She went on a vacation and cough did not improve x 3 weeks.  She does feel there has been slight improvement in the last few weeks.  The cough is described as barking with occasional wheeze. She is also very easily fatigued.   Cough is worse when laying down.   She has frequent throat clearing.   Denies fever, chills, body aches, or hemoptysis.  Her massage therapist thought it could be a sinus issue.   Was evaluated by PCP on 3/7 and given albuterol which put her in a fib. CXR was clear.  A CT chest was suggested by her oncologist but she is nervous about imaging following her years of cancer diagnosis and treatment.    Minimal smoking history.  Quit 1990.  Denies any environmental allergies.   Denies heartburn.     PMH:  Patient Active Problem List    Diagnosis Date Noted    Vaginal cancer (H) 10/04/2023     Priority: Medium     Treatment History: Stage III squamous cell carcinoma (vs recurrent, metastatic cervical cancer although initial cervical cancer diagnosis 10 years prior)  -7/20/22-8/10/22: First-line chemotherapy with IV cisplatin + paclitaxel x2 cycles.   -8/31/22-11/21/22: First-line chemotherapy with IV platinum (cisplatin x2 cycles; carboplatin x2 cycles) +  paclitaxel + pembrolizumab x4 cycles (total 6 cycles). Partial response.   -12/28/22-2/15/23: First-line pelvic external beam radiation therapy then vaginal cuff brachytherapy.   -Declined adjuvant chemotherapy due to poor QOL on chemotherapy.   -Surveillance as follows (per the NCCN guidelines):  Every 3 months x2 years, then every 6 months x3 years (NP 3x/year, MD annually). No cytology tests given high false-positive rate in the setting of radiation therapy.       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 HSIL, no residual invasive cancer on the hysterectomy specimen.   -(pending): First-line chemotherapy for recurrent cervix vs new vaginal cancer with cisplatin + paclitaxel + bevacizumab.       Rectocele 04/14/2022     Priority: Medium    Hyperlipidemia LDL goal <130 01/02/2020     Priority: Medium     Declines statin use      Atrial fibrillation (H) 10/03/2016     Priority: Medium     Declines anticoagulation management or lipid management      Abnormal electrocardiogram 09/30/2016     Priority: Medium    Dupuytren contracture 09/05/2014     Priority: Medium    Malignant neoplasm of breast (H) 01/08/2009     Priority: Medium       PSH:  Past Surgical History:   Procedure Laterality Date    BIOPSY      BREAST SURGERY Left 2003    lumpectomy left, did radiation, 5 yr of tamoxifen    COLONOSCOPY  2018    repeat in 2028    Colposcopy Cervix with Loop Electrode Conization and Lser to Vagina  2008    COLPOSCOPY, BIOPSY, COMBINED  10/24/2012    Procedure: COMBINED COLPOSCOPY, BIOPSY;  Colposcopy, Biopsy of Cervix and Vagina, Ultrasound Guidance;  Surgeon: Colette Ng MD;  Location: [facility] OR    ENT SURGERY  01/23/2018    otoscelerosis, right side    HYSTERECTOMY, FRANCIA   12/2012    high grade cervical dysplasia, [facility], Dr. Arciniega    INSERT INTERSTITIAL NEEDLE, ULTRASOUND GUIDED N/A 02/13/2023    Procedure: INSERTION, NEEDLE, WITH ULTRASOUND GUIDANCE, FOR INTERSTITIAL BRACHYTHERAPY;  Surgeon: Raymond Stockton MD;  Location: [facility] OR    INSERT PORT VASCULAR ACCESS Right 07/18/2022    Procedure: INSERTION, VASCULAR ACCESS PORT;  Surgeon: Donnie Elias MD;  Location: [facility] OR    IR CHEST PORT PLACEMENT > 5 YRS OF AGE  07/18/2022    IR PORT REMOVAL RIGHT  03/30/2023    NE LAP VAGINECTOMY, PARTIAL REMOVAL OF VAGINAL WALL  10/2013    upper vaginectomy for dysplasia, Dr. Megan Arciniega    REMOVE INTERSTITIAL NEEDLE N/A 02/15/2023    Procedure: REMOVAL, INTERSTITIAL NEEDLE, AFTER TEMPORARY BRACHYTHERAPY;  Surgeon: Raymond Stockton MD;  Location: [facility] OR    REMOVE PORT VASCULAR ACCESS Right 03/30/2023    Procedure: Remove port vascular access right;  Surgeon: Donnie Elias MD;  Location: [facility] OR       Current Meds:  Current Outpatient Medications   Medication Sig Dispense Refill    albuterol (PROAIR HFA/PROVENTIL HFA/VENTOLIN HFA) 108 (90 Base) MCG/ACT inhaler Inhale 2 puffs into the lungs every 4 hours as needed for shortness of breath or wheezing. 18 g 1    Bacillus Coagulans-Inulin (PROBIOTIC FORMULA) 1-250 BILLION-MG CAPS Take by mouth every morning 25+billion CFUS      Cholecalciferol (VITAMIN D-3) 25 MCG (1000 UT) CAPS Take 1,000 Units by mouth every morning 90 capsule 3    coenzyme Q-10 capsule Take 1 capsule by mouth daily      levothyroxine (SYNTHROID/LEVOTHROID) 112 MCG tablet TAKE 1 TABLET (112 MCG) BY MOUTH DAILY BEFORE BREAKFAST 90 tablet 3    MAGNESIUM OXIDE PO Take 400 mg by mouth daily      metoprolol succinate ER (TOPROL XL) 50 MG 24 hr tablet Take 1 tablet (50 mg) by mouth daily. 90 tablet 3    Omega-3 Fatty Acids (OMEGA-3 FISH OIL PO) Take 630 mg by mouth 2 times daily      OVER-THE-COUNTER Turkey tail mushroom powder, use 1 times a day      Pectin Cit-Inos-C-Bioflav-Soy  "(MODIFIED CITRUS PECTIN PO) Take by mouth 2 times daily      UNABLE TO FIND 500 mg MEDICATION NAME: Turmeric      UNABLE TO FIND 1,200 mg 2 times daily MEDICATION NAME: Efraín      UNABLE TO FIND Take 1 tablet by mouth every morning MEDICATION NAME: Dinndolylmethane Complex (DIM) 100mg tablet 1 table daily      aspirin (ASA) 81 MG chewable tablet Take 81 mg by mouth every evening      Taurine 1000 MG CAPS Take 1,000 mg by mouth every morning 60 capsule 11     No current facility-administered medications for this visit.       Allergies:  No Known Allergies    Social Hx:  Social History     Tobacco Use    Smoking status: Former     Current packs/day: 0.00     Average packs/day: 0.5 packs/day for 15.0 years (7.5 ttl pk-yrs)     Types: Cigarettes     Start date:      Quit date:      Years since quittin.3     Passive exposure: Past    Smokeless tobacco: Never   Vaping Use    Vaping status: Never Used   Substance Use Topics    Alcohol use: Not Currently     Alcohol/week: 0.0 - 2.0 standard drinks of alcohol     Comment: Socially     Drug use: No     Family HX: family history includes Breast Cancer in her paternal grandmother; Breast Cancer (age of onset: 47) in her sister; Cancer in her maternal grandmother; Myocardial Infarction (age of onset: 68) in her father; Myocardial Infarction (age of onset: 73) in her mother; Skin Cancer (age of onset: 47) in her daughter.    ROS:   10-point review performed and notable for the above-mentioned symptoms. The remainder reviewed and negative.     Physical Exam:  /77 (BP Location: Left arm, Patient Position: Sitting, Cuff Size: Adult Regular)   Pulse 105   Ht 1.588 m (5' 2.5\")   Wt 57.6 kg (127 lb)   LMP  (LMP Unknown)   SpO2 98%   BMI 22.86 kg/m      Physical Exam  Constitutional:       General: She is not in acute distress.     Appearance: She is not ill-appearing.   Cardiovascular:      Rate and Rhythm: Normal rate. Rhythm irregular.      Heart sounds: " Normal heart sounds.   Pulmonary:      Effort: Pulmonary effort is normal. No respiratory distress.      Breath sounds: No wheezing or rhonchi.      Comments: Occasional dry cough during visit  Musculoskeletal:      Right lower leg: No edema.      Left lower leg: No edema.   Neurological:      Mental Status: She is alert.   Psychiatric:         Behavior: Behavior normal.       PFT's     4/25/2025:    Impression:  Full Pulmonary Function Test is abnormal.  PFTs are consistent with  no  obstructive disease.  There is no hyperinflation.  There is no air-trapping.  Diffusion capacity when corrected for hemoglobin is not reduced.    Labs:   personally reviewed in the EMR.   Latest Reference Range & Units 03/07/25 12:17   Absolute Eosinophils 0.0 - 0.7 10e3/uL 0.1      Latest Reference Range & Units 03/07/25 12:17   Hemoglobin 11.7 - 15.7 g/dL 12.2     Imaging studies: personally reviewed and interpreted. Below are the Radiology interpretations.    XR CHEST 2 VIEWS 3/7/2025 12:03 PM  INDICATION: Acute cough  COMPARISON: Chest x-ray 7/30/2008  FINDINGS:   Metallic opacity projecting over the left upper chest, likely breast  biopsy clip. Borderline enlarged cardiac silhouette. No discernible  pneumothorax. No significant pleural effusion.                                                              IMPRESSION:   Borderline enlarged cardiac silhouette. May consider cardiac  ultrasound for further evaluation, as clinically desired.    Combined Report of: PET and CT on 6/8/2023 1:25 PM:  INDICATION: Follow up vaginal/cervical cancer  post chemo/radiation;  Cervical cancer (H); Malignant neoplasm of endocervix (H).  COMPARISON: PET CT from 12/5/2022.  FINDINGS:   CHEST:  Unchanged mild focal uptake in the left interpectoral region adjacent  to the biopsy clip (3/300) measuring approximately 0.8 cm with SUV max  of 1.9. Unchanged additional, approximately 1 cm mildly avid soft  tissue anterior to the left subclavian vein  (3/279).  The central tracheobronchial tree is clear. No pleural effusion or  pneumothorax. No acute consolidation. A few small pulmonary nodules  are stable, including a 0.2 cm solid pulmonary nodule in the left  upper lobe. No new or enlarging pulmonary nodules.  Stable cardiomegaly. Resolution of diffuse uptake in the left atrium,  right atrium and right ventricles Trace pericardial effusion. The  thoracic aorta and main pulmonary artery are within normal limits for  diameter. The esophagus is unremarkable. Unchanged 2.9 cm subcutaneous  lipoma in the right lower chest wall.

## 2025-05-01 ENCOUNTER — PATIENT OUTREACH (OUTPATIENT)
Dept: CARE COORDINATION | Facility: CLINIC | Age: 75
End: 2025-05-01
Payer: COMMERCIAL

## 2025-05-06 ENCOUNTER — OFFICE VISIT (OUTPATIENT)
Dept: FAMILY MEDICINE | Facility: CLINIC | Age: 75
End: 2025-05-06
Payer: COMMERCIAL

## 2025-05-06 VITALS
WEIGHT: 125 LBS | OXYGEN SATURATION: 98 % | SYSTOLIC BLOOD PRESSURE: 119 MMHG | RESPIRATION RATE: 18 BRPM | HEART RATE: 85 BPM | BODY MASS INDEX: 22.5 KG/M2 | TEMPERATURE: 98 F | DIASTOLIC BLOOD PRESSURE: 75 MMHG

## 2025-05-06 DIAGNOSIS — R49.9 CHANGE IN VOICE: ICD-10-CM

## 2025-05-06 DIAGNOSIS — R31.9 HEMATURIA, UNSPECIFIED TYPE: ICD-10-CM

## 2025-05-06 DIAGNOSIS — R13.10 DYSPHAGIA, UNSPECIFIED TYPE: ICD-10-CM

## 2025-05-06 DIAGNOSIS — R05.3 CHRONIC COUGH: Primary | ICD-10-CM

## 2025-05-06 DIAGNOSIS — N93.9 VAGINAL SPOTTING: ICD-10-CM

## 2025-05-06 RX ORDER — FAMOTIDINE 20 MG/1
20 TABLET, FILM COATED ORAL 2 TIMES DAILY
Qty: 60 TABLET | Refills: 2 | Status: SHIPPED | OUTPATIENT
Start: 2025-05-06

## 2025-05-06 NOTE — PROGRESS NOTES
[facility] [address], [address]  [facility] [address]  Phone: [phone]  Fax: [phone]    Patient:  Cordell Donaldson 1950  Date of Visit:  9:57 PM  Referring Provider Referred Self      Assessment & Plan    (R05.3) Chronic cough  (primary encounter diagnosis)  Comment: ongoing since 2/2025, initially had chest tightness, wheezing. Persistent, had normal chest xray. Trial of albuterol triggered palpitations, she has underlying Afib and HR was >100 so stopped, not sure it helped. Recent visit with pulmonologist, normal PFTs. They suspected post nasal drip, GERD, reactive airway (option to try corticosteroid inhaler). Lungs are clear today. I think this is likely post reactive cough. Maritza confides she is worried about lung cancer as her oncologist ordered CT scan of her chest  Plan: recommend she proceed with CT scan though her chest xray was normal. She has hx of breast, vaginal and cervical cancers and her oncologist is wishing to rule out metastatic disease.     (R13.10) Dysphagia, unspecified type   (R49.9) Change in voice  Comment: several month hx of progressive dysphagia and hoarse voice, suspect silent reflux  Plan: famotidine (PEPCID) 20 MG tablet        Discussed 8-12 week course of Pepcid AC. If not improving over the next month, then pursue EGD. Discussed lifestyle changes, avoid caffeine, alcohol, spicy/acidic foods, NSAIDs      (R31.9) Hematuria, unspecified type  N93.9) Vaginal spotting  Comment: visit in Dec 2024 with urogyn. No evidence for active bleeding, she was noted to have caruncle. Has recent urine test, + for microscopic blood, protein, no associated dysuria.   Plan: Adult Urology  Referral        Refer back to uro/gyn clinic as she may need cystoscopy.     60 minutes spend on the date of this encounter doing chart review, history and exam, documentation and further activities as noted above.     Merry Coronel  MD Zoie       Cordell Donaldson is a 75 year old woman with hx of breast cancer, treated with left sided lumpectomy (2003), radiation and tamoxifen x 5 yr. She also has hx of persistent atrial fibrillation, not on anticoagulation. Hx of cervical cancer 2012 and vaginal cancer 2013, with recent recurrence of cervical cancer. 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 Raygoza (gyn/onc) q 3 months  Goes for clinical exam. But declines any further imaging as she would not opt for chemotherapy  Today  Vaginal spotting, recently evaluated 4/28/25 by gyn onc team  Spotting began after a coughing fit  Denies abdominal pain/ bloating  GYN thought bleeding may be from radiation changes, no bleeding seen on their exam.   Has follow up appt with Dr. Raygoza 6/27/25  CT chest is ordered  5/1/25, went for a long walk, then to a [employer], stood x 90 min  Saw blood  with urination, no pain with urination,   Called and submitted a UA  No hx of stones  No longer having symptoms    Cough  Dry barky cough 2/2025, no change with albuterol  CXR negative  Nighttime is worse  3/7/25 visit--was sick before travel  Reported wheezing and tightness  Albuterol triggered Afib, used only 3x  Today  Dry cough persists  No wheezing or chest tightness  Worse at night, helped with honey/lemon      Trouble swallowing  Burping more  Drinking water with food, ? Food getting stuck mid sternum, belching more, no regurgitation  No acid reflux  Caffeine 2 / cup, now 3/4 c daily  No Etoh since Feb  No ASA, japenese enzyme for blood thinner  Some ibuprofen  Can only sleep in right side, due to  cough  Overchewing food  TUMS occasionally  Taking digestive Enzymes   Voice is more hoarse     Patient Active Problem List   Diagnosis    Dupuytren contracture    Abnormal electrocardiogram    Atrial fibrillation (H)    Hyperlipidemia LDL goal <130    Malignant neoplasm of breast (H)    Rectocele    Malignant neoplasm of endocervix (H)    Vaginal cancer (H)       Current Outpatient Medications   Medication Sig Dispense Refill    aspirin (ASA) 81 MG chewable tablet Take 81 mg by mouth every evening      Bacillus Coagulans-Inulin (PROBIOTIC FORMULA) 1-250 BILLION-MG CAPS Take by mouth every morning 25+billion CFUS      Cholecalciferol (VITAMIN D-3) 25 MCG (1000 UT) CAPS Take 1,000 Units by mouth every morning 90 capsule 3    coenzyme Q-10 capsule Take 1 capsule by mouth daily      levothyroxine (SYNTHROID/LEVOTHROID) 112 MCG tablet TAKE 1 TABLET (112 MCG) BY MOUTH DAILY BEFORE BREAKFAST 90 tablet 3    MAGNESIUM OXIDE PO Take 400 mg by mouth daily      metoprolol succinate ER (TOPROL XL) 50 MG 24 hr tablet Take 1 tablet (50 mg) by mouth daily. 90 tablet 3    Omega-3 Fatty Acids (OMEGA-3 FISH OIL PO) Take 630 mg by mouth 2 times daily      OVER-THE-COUNTER Turkey tail mushroom powder, use 1 times a day      Pectin Cit-Inos-C-Bioflav-Soy (MODIFIED CITRUS PECTIN PO) Take by mouth 2 times daily      Taurine 1000 MG CAPS Take 1,000 mg by mouth every morning 60 capsule 11    UNABLE TO FIND 500 mg MEDICATION NAME: Turmeric      UNABLE TO FIND 1,200 mg 2 times daily MEDICATION NAME: Efraín      UNABLE TO FIND Take 1 tablet by mouth every morning MEDICATION NAME: Dinndolylmethane Complex (DIM) 100mg tablet 1 table daily         No Known Allergies     EXAM  /75 (BP Location: Left arm, Patient Position: Sitting, Cuff Size: Adult Regular)   Pulse 85   Temp 98  F (36.7  C) (Skin)   Resp 18   Wt 56.7 kg (125 lb)   LMP  (LMP Unknown)   SpO2 98%   BMI 22.50 kg/m    Gen: Alert, pleasant, NAD  COR: S1,S2, no  murmur  Lungs: CTA bilaterally, no rhonchi, wheezes or rales  Abdomen: Soft, non tender, normal bowel sounds, no HSM or mass

## 2025-05-06 NOTE — NURSING NOTE
Cordell  75 year old    Chief Complaint   Patient presents with    Follow Up     See [employer] message from 5/2/2025 - following up with other specialists, discuss plan     Chronic Cough     Seems to be causing upper backache - chest CT scheduled    Fatigue            Blood pressure 119/75, pulse 85, temperature 98  F (36.7  C), temperature source Skin, resp. rate 18, weight 56.7 kg (125 lb), SpO2 98%, not currently breastfeeding. Body mass index is 22.5 kg/m .    Patient Active Problem List   Diagnosis    Dupuytren contracture    Abnormal electrocardiogram    Atrial fibrillation (H)    Hyperlipidemia LDL goal <130    Malignant neoplasm of breast (H)    Rectocele    Malignant neoplasm of endocervix (H)    Vaginal cancer (H)              Wt Readings from Last 2 Encounters:   05/06/25 56.7 kg (125 lb)   04/30/25 57.6 kg (127 lb)       BP Readings from Last 3 Encounters:   05/06/25 119/75   04/30/25 119/77   04/28/25 115/46                Current Outpatient Medications   Medication Sig Dispense Refill    Bacillus Coagulans-Inulin (PROBIOTIC FORMULA) 1-250 BILLION-MG CAPS Take by mouth every morning 25+billion CFUS      Cholecalciferol (VITAMIN D-3) 25 MCG (1000 UT) CAPS Take 1,000 Units by mouth every morning 90 capsule 3    coenzyme Q-10 capsule Take 1 capsule by mouth daily      levothyroxine (SYNTHROID/LEVOTHROID) 112 MCG tablet TAKE 1 TABLET (112 MCG) BY MOUTH DAILY BEFORE BREAKFAST 90 tablet 3    MAGNESIUM OXIDE PO Take 400 mg by mouth daily      metoprolol succinate ER (TOPROL XL) 50 MG 24 hr tablet Take 1 tablet (50 mg) by mouth daily. 90 tablet 3    Omega-3 Fatty Acids (OMEGA-3 FISH OIL PO) Take 630 mg by mouth 2 times daily      OVER-THE-COUNTER Turkey tail mushroom powder, use 1 times a day      Pectin Cit-Inos-C-Bioflav-Soy (MODIFIED CITRUS PECTIN PO) Take by mouth 2 times daily      UNABLE TO FIND 500 mg MEDICATION NAME: Turmeric      UNABLE TO FIND 1,200 mg 2 times daily MEDICATION NAME: Efraín       UNABLE TO FIND Take 1 tablet by mouth every morning MEDICATION NAME: Dinndolylmethane Complex (DIM) 100mg tablet 1 table daily      aspirin (ASA) 81 MG chewable tablet Take 81 mg by mouth every evening      Taurine 1000 MG CAPS Take 1,000 mg by mouth every morning 60 capsule 11     No current facility-administered medications for this visit.              Social History     Tobacco Use    Smoking status: Former     Current packs/day: 0.00     Average packs/day: 0.5 packs/day for 15.0 years (7.5 ttl pk-yrs)     Types: Cigarettes     Start date:      Quit date:      Years since quittin.3     Passive exposure: Past    Smokeless tobacco: Never   Vaping Use    Vaping status: Never Used   Substance Use Topics    Alcohol use: Not Currently     Alcohol/week: 0.0 - 2.0 standard drinks of alcohol     Comment: Socially     Drug use: No              Health Maintenance Due   Topic Date Due    DEPRESSION ACTION PLAN  Never done    RSV VACCINE (1 - Risk 60-74 years 1-dose series) Never done    PAP  2023    MAMMO SCREENING  2023    BMP  2024    ADVANCE CARE PLANNING  01/10/2025    COVID-19 Vaccine (7 - Pfizer risk - season) 03/10/2025    LIPID  2025    FALL RISK ASSESSMENT  2025    MEDICARE ANNUAL WELLNESS VISIT  2025              Lab Results   Component Value Date    PAP NIL 2020              May 6, 2025 10:45 AM

## 2025-05-06 NOTE — PATIENT INSTRUCTIONS
Food getting stuck ==DYSPHAGIA  Pepcid 20mg twice daily, with morning pills and before evening meal  x 8-12 week  If swallowing issue is not improving then I would recommend upper endoscopy      Be mindful of :  Caffeine  Alcohol  Spicy / acidic food  Ibuprofen, Aleve       Cough  Consider inhaled corticosteroid, no albuterol in this medication

## 2025-05-07 ENCOUNTER — PATIENT OUTREACH (OUTPATIENT)
Dept: CARE COORDINATION | Facility: CLINIC | Age: 75
End: 2025-05-07
Payer: COMMERCIAL

## 2025-05-07 NOTE — PROGRESS NOTES
Home to rest, increase fluids  Change positions slowly  Keep appointment with Dr Christian tomorrow to review myelogram results  Call the office with any questions or concerns   Paged Gyn/onc regarding PACU report stating pt was in A-fibb in the OR and in PACU. She is on metoprolol and currently is rate controlled. Do you want Telemetery?     Spoke with Katerine RICARDO with gyn/onc. OK to keep her here 2/2 to cares required. If HR >120 notify team.

## 2025-05-09 ENCOUNTER — HOSPITAL ENCOUNTER (OUTPATIENT)
Dept: CT IMAGING | Facility: CLINIC | Age: 75
Discharge: HOME | End: 2025-05-09
Attending: NURSE PRACTITIONER | Admitting: NURSE PRACTITIONER
Payer: COMMERCIAL

## 2025-05-09 DIAGNOSIS — R05.2 SUBACUTE COUGH: ICD-10-CM

## 2025-05-09 DIAGNOSIS — R06.02 SOB (SHORTNESS OF BREATH): ICD-10-CM

## 2025-05-09 LAB — RADIOLOGIST FLAGS: ABNORMAL

## 2025-05-09 PROCEDURE — 71250 CT THORAX DX C-: CPT

## 2025-05-09 PROCEDURE — 71250 CT THORAX DX C-: CPT | Mod: 26 | Performed by: RADIOLOGY

## 2025-05-12 ENCOUNTER — TELEPHONE (OUTPATIENT)
Dept: PULMONOLOGY | Facility: CLINIC | Age: 75
End: 2025-05-12
Payer: COMMERCIAL

## 2025-05-12 ENCOUNTER — VIRTUAL VISIT (OUTPATIENT)
Dept: CARDIOLOGY | Facility: CLINIC | Age: 75
End: 2025-05-12
Attending: INTERNAL MEDICINE
Payer: COMMERCIAL

## 2025-05-12 ENCOUNTER — PATIENT OUTREACH (OUTPATIENT)
Dept: ONCOLOGY | Facility: CLINIC | Age: 75
End: 2025-05-12

## 2025-05-12 VITALS — HEIGHT: 63 IN | WEIGHT: 123 LBS | BODY MASS INDEX: 21.79 KG/M2

## 2025-05-12 DIAGNOSIS — Z85.3 PERSONAL HISTORY OF MALIGNANT NEOPLASM OF BREAST: ICD-10-CM

## 2025-05-12 DIAGNOSIS — J98.59 MEDIASTINAL MASS: ICD-10-CM

## 2025-05-12 DIAGNOSIS — I48.20 CHRONIC ATRIAL FIBRILLATION (H): Primary | ICD-10-CM

## 2025-05-12 DIAGNOSIS — C52 VAGINAL CANCER (H): Primary | ICD-10-CM

## 2025-05-12 DIAGNOSIS — C52 VAGINAL CANCER (H): ICD-10-CM

## 2025-05-12 DIAGNOSIS — C53.0 MALIGNANT NEOPLASM OF ENDOCERVIX (H): ICD-10-CM

## 2025-05-12 ASSESSMENT — PAIN SCALES - GENERAL: PAINLEVEL_OUTOF10: NO PAIN (0)

## 2025-05-12 NOTE — CONSULTS
Outpatient IR Referral  05/12/25    Cordell Donaldson  [id number]      IR referral Request:    Outpatient IR Radiology Referral. Cordell Donaldson. MRN: [id number]. Lymph node biopsy, Paratracheal or carinal. History of breast and vaginal cancer, new lymphadenopathy. AC = No. Referring: Hina Raygoza MD in  GYN ONCOLOGY. Call Back # [phone].    ===  Plan:    Case and imaging discussed with IR Dr. Bert Vyas.    There is a large mass target in the mediastinum. This location is NOT accessible via percutaneous biopsy.    Procedure declined.    Recommend consultation with Interventional Pulmonology for consideration of endoscopy sampling attempt.    See CT 5/9/25  Chest: Right paratracheal 2.4 x 2.4 x 2.7 cm lesion (series 3 image  70, series 7 image 55). There is also subcarinal lesion measuring  approximately 4.6 x 4.5 x 5.2 cm (series 3 image 132, series 6 image  44).      Jason Mcdonald PA-C  Interventional Radiology   IR on-call pager: [phone]

## 2025-05-12 NOTE — PROGRESS NOTES
New Patient Oncology Nurse Navigator Note     Referring provider: Dr. Hina Raygoza    Referring Clinic/Organization: [facility]  Referred to: Thoracic Surgery  Requested provider (if applicable): First available - did not specify   Referral Received: 05/12/25       Evaluation for :   Diagnosis   C52 (ICD-10-CM) - Vaginal cancer (H)   J98.59 (ICD-10-CM) - Mediastinal mass        Additional Information: History of vaginal cancer and breast cancer. New mediastinal mass, assess for possible biopsy.     Clinical History (per Nurse review of records provided):      05/09/2025 CT Chest w/o contrast (bookmarked) showed:   IMPRESSION:  1. Large lesion in the subcarinal region measuring up to 5.2 cm,  favored to represent lymphadenopathy, which is causing stenosis of the  right lower lobe bronchus with downstream near complete opacification  of the right lower lobe. Additional right paratracheal lymph node  measuring up to 2.7 cm, and numerous other smaller lymph nodes are  present throughout the lower neck and thoracic cavity. Findings are  concerning for metastatic disease. PETCT and bronchoscopy with  contrast would be helpful in further evaluation.  2. Trace right pleural effusion.  3. Small pericardial effusion and cardiomegaly.  4. The left lung and right upper/middle lobes are relatively clear.    Clinical Assessment / Barriers to Care (Per Nurse):    Records Location: [facility]   Records Needed: None  Additional testing needed prior to consult: None  Referral updates and Plan:     5/12: Msg sent to on call IP provider if this would be more appropriate for IP to see. Per inbasket from Dr. Leung, Cordell can see IP and will go directly to procedure for biopsy with their team.     Writer called Cordell and updated her that she should be hearing from the surgery scheduler to set up the biopsy appt, rather than seeing Thoracic Surgery. Writer explained I have reviewed her case with the on-call interventional  pulmonologist and they recommended biopsy with their team. She was given the surgery scheduler's phone number if she does not hear from them in the next 24 hours. Writer also gave her my direct phone number. All questions were answered. Cordell thanked writer for the call. Will watch for procedure to be scheduled.    EZRA LakhaniN, RN, OCN  [facility] Oncology Nurse Navigator  [phone] / [phone]

## 2025-05-12 NOTE — PROGRESS NOTES
"History:    Cordell Donaldson is a 75 year old delightful woman with persistent atrial fibrillation. She has a history of breast cancer treated with lumpectomy and radiation (2003).  She was diagnosed with cervical cancer in June 2022 and invasive ductal carcinoma of the left breast in July 2022. The cervical cancer treatment was prioritized and she received immunotherapy (Keytruda), carboplatin (was cisplatin), Avastin and paclitaxel with good response. She developed tachycardia and hypertension as a result of Avastin. On surveillance PET scan there was concern for possible immunotherapy associated myocarditis. Cardiac biomarkers were normal and a follow up cardiac MRI with stress was without ischemia or evidence of myocarditis.  Chemotherapy was stopped and she received radiation therapy and brachytherapy.     For the persistent atrial fibrillation she takes metoprolol for rate control and has declined cardioversion and anticoagulation.     Her last visit was March 2025. She has been dealing with a chronic cough and dyspnea. She was diagnosed with GERD and underwent a chest CT for further evaluation.  She has known persistent atrial fibrillation with biatrial enlargement with normal biventricular function. She is no longer on chemotherapy or immunotherapy.     PAST MEDICAL HISTORY:    History of breast cancer 2003   lumpectomy and radiation offerred chemo and patient declined at time but had oncogene test and low risk     FAMILY HISTORY:  No premature CAD    SOCIAL HISTORY:  Former smoker       CURRENT MEDICATIONS:    Aspirin 81 mg daily  Levothyroxine 112 mcg daily  Metoprolol succinate 50 mg daily      EXAM:  Ht 1.588 m (5' 2.5\")   Wt 55.8 kg (123 lb)   LMP  (LMP Unknown)   BMI 22.14 kg/m    Hoarse voice  In general, the patient is in no apparent distress.        I have independently reviewed and interpreted the following data:  April 2024 Lipids: Total cholesterol 240, HDL 53, , triglycerides " 141     Electrolytes: normal   Hemogram: 12.2 g per DL   TSH: normal    Chest CT 5/9/25- in brief there is a right paratracheal mass and large subcarinal mass with many enlarged lymph nodes    Cardiac Studies:  Echo 4/2025 - normal biventricular function. Severe biatrial enlargement. PFO with L to R shunt, no significant changes from 2022.     Stress cardiac MRI 12/8/22  The patient's rhythm is atrial fibrillation.  Normal biventricular function, LVEF 68% and RVEF 52%.   No evidence of ischemia.   No evidence of myocardial edema or fibrosis to indicate immune checkpoint inhibitor myocarditis.     Assessment and Plan:   Cordell Donaldson is a 75 year old with    Persistent atrial fibrillation  Normal biventricular function   Dyslipidemia  Cervical cancer July 2022, post chemoradiation and radiation  Invasive ductal carcinoma L breast July 2022  History of breast cancer 2003, lumpectomy, radiation followed by 5 years of tamoxifen  Autoimmune hypothyroidism     Cordell has been dealing with chronic cough and dyspnea. Her recent chest CT has concerning findings that merits expedited workup with a biopsy and that she should hear back from the pulmonary specialist. The findings could explain her pulmonary symptoms. I have informed her that I would contact her gyn and breast oncologists.     For the persistent a-fib, she is on metoprolol. She takes an extra dose of metoprolol XL 25 mg if HR >110.  She is not on anticoagulation per her wishes. Her recent echocardiogram is notable for stable heart function. The PFO in the absence of R to L shunt or CVA/TIA will be monitored for now.    Advised to contact us if new CV symptoms  Follow up in 3 months    Elise Huitron MD, MS  Professor of Medicine  Cardiovascular Medicine

## 2025-05-12 NOTE — LETTER
5/12/2025      RE: Cordell Donaldson  [address]  [facility] [address]       Dear Colleague,    Thank you for the opportunity to participate in the care of your patient, Cordell Donaldson, at the [facility] [place] [facility]. Please see a copy of my visit note below.    History:    Cordell Donaldson is a 75 year old delightful woman with persistent atrial fibrillation. She has a history of breast cancer treated with lumpectomy and radiation (2003).  She was diagnosed with cervical cancer in June 2022 and invasive ductal carcinoma of the left breast in July 2022. The cervical cancer treatment was prioritized and she received immunotherapy (Keytruda), carboplatin (was cisplatin), Avastin and paclitaxel with good response. She developed tachycardia and hypertension as a result of Avastin. On surveillance PET scan there was concern for possible immunotherapy associated myocarditis. Cardiac biomarkers were normal and a follow up cardiac MRI with stress was without ischemia or evidence of myocarditis.  Chemotherapy was stopped and she received radiation therapy and brachytherapy.     For the persistent atrial fibrillation she takes metoprolol for rate control and has declined cardioversion and anticoagulation.     Her last visit was March 2025. She has been dealing with a chronic cough and dyspnea. She was diagnosed with GERD and underwent a chest CT for further evaluation.  She has known persistent atrial fibrillation with biatrial enlargement with normal biventricular function. She is no longer on chemotherapy or immunotherapy.     PAST MEDICAL HISTORY:    History of breast cancer 2003   lumpectomy and radiation offerred chemo and patient declined at time but had oncogene test and low risk     FAMILY HISTORY:  No premature CAD    SOCIAL HISTORY:  Former smoker       CURRENT MEDICATIONS:    Aspirin 81 mg daily  Levothyroxine 112 mcg  "daily  Metoprolol succinate 50 mg daily      EXAM:  Ht 1.588 m (5' 2.5\")   Wt 55.8 kg (123 lb)   LMP  (LMP Unknown)   BMI 22.14 kg/m    Hoarse voice  In general, the patient is in no apparent distress.        I have independently reviewed and interpreted the following data:  April 2024 Lipids: Total cholesterol 240, HDL 53, , triglycerides 141     Electrolytes: normal   Hemogram: 12.2 g per DL   TSH: normal    Chest CT 5/9/25- in brief there is a right paratracheal mass and large subcarinal mass with many enlarged lymph nodes    Cardiac Studies:  Echo 4/2025 - normal biventricular function. Severe biatrial enlargement. PFO with L to R shunt, no significant changes from 2022.     Stress cardiac MRI 12/8/22  The patient's rhythm is atrial fibrillation.  Normal biventricular function, LVEF 68% and RVEF 52%.   No evidence of ischemia.   No evidence of myocardial edema or fibrosis to indicate immune checkpoint inhibitor myocarditis.     Assessment and Plan:   Cordell Donaldson is a 75 year old with    Persistent atrial fibrillation  Normal biventricular function   Dyslipidemia  Cervical cancer July 2022, post chemoradiation and radiation  Invasive ductal carcinoma L breast July 2022  History of breast cancer 2003, lumpectomy, radiation followed by 5 years of tamoxifen  Autoimmune hypothyroidism     Cordell has been dealing with chronic cough and dyspnea. Her recent chest CT has concerning findings that merits expedited workup with a biopsy and that she should hear back from the pulmonary specialist. The findings could explain her pulmonary symptoms. I have informed her that I would contact her gyn and breast oncologists.     For the persistent a-fib, she is on metoprolol. She takes an extra dose of metoprolol XL 25 mg if HR >110.  She is not on anticoagulation per her wishes. Her recent echocardiogram is notable for stable heart function. The PFO in the absence of R to L shunt or CVA/TIA will be monitored for " now.    Advised to contact us if new CV symptoms  Follow up in 3 months    Elise Huitron MD, MS  Professor of Medicine  Cardiovascular Medicine          Please do not hesitate to contact me if you have any questions/concerns.     Sincerely,     Elise Huitron MD

## 2025-05-12 NOTE — TELEPHONE ENCOUNTER
Spoke with Maritza regarding below CT scan results.  We did discuss that this could explain her cough and that further workup is needed.  She has already been contacted about upcoming PET scan ordered by oncology.  I did let her know I had discussed images with Dr. Reynolds from interventional pulmonology who has contacted interventional radiology to discuss for best plans for biopsy.  She has no further questions at this time and is agreeable to plan.    Monique Bocanegra, CNP  Pulmonary Medicine  [facility]   [phone]        EXAMINATION: CT CHEST W/O CONTRAST, 5/9/2025 3:06 PM     TECHNIQUE:  Helical CT images from the thoracic inlet through the lung  bases were obtained without IV contrast.      COMPARISON: Chest radiograph 3/7/2025     HISTORY: Subacute cough; SOB (shortness of breath)     FINDINGS:     Chest: Right paratracheal 2.4 x 2.4 x 2.7 cm lesion (series 3 image  70, series 7 image 55). There is also subcarinal lesion measuring  approximately 4.6 x 4.5 x 5.2 cm (series 3 image 132, series 6 image  44). Innumerable other prominent and small lymph nodes throughout the  lower neck and mediastinum.  Mild cardiomegaly. Small pericardial effusion. Normal caliber main  pulmonary artery and ascending aorta. Normal branching of the great  vessels. Mild calcifications of the aortic arch.     Multifocal mucoid impaction of the right lower lobe and bronchus with  near complete consolidation of the right lower lobe. Right middle lobe  atelectasis. The left lobe is relatively clear. Mild left basilar  atelectasis. No pneumothorax. Trace right pleural effusion. No left  pleural effusion.     Upper abdomen: Within normal limits on this non-contrast exam.     Bones/soft tissues: Degenerative changes in the visualized spine. No  acute osseous abnormalities or suspicious bony lesions.                                                                      IMPRESSION:  1. Large lesion in the subcarinal region measuring up  to 5.2 cm,  favored to represent lymphadenopathy, which is causing stenosis of the  right lower lobe bronchus with downstream near complete opacification  of the right lower lobe. Additional right paratracheal lymph node  measuring up to 2.7 cm, and numerous other smaller lymph nodes are  present throughout the lower neck and thoracic cavity. Findings are  concerning for metastatic disease. PETCT and bronchoscopy with  contrast would be helpful in further evaluation.  2. Trace right pleural effusion.  3. Small pericardial effusion and cardiomegaly.  4. The left lung and right upper/middle lobes are relatively clear.

## 2025-05-12 NOTE — NURSING NOTE
Current patient location: [address]  [facility] [address]    Is the patient currently in the state of [place]? YES    Visit mode: VIDEO    If the visit is dropped, the patient can be reconnected by:VIDEO VISIT: Text to cell phone:   Telephone Information:   Mobile [phone]    and VIDEO VISIT: Send to e-mail at: [email or web address]    Will anyone else be joining the visit? Pts    (If patient encounters technical issues they should call [phone])    Are changes needed to the allergy or medication list? No    Patient denies any changes since echeck-in completion and states all information entered during echeck-in remains accurate.    Are refills needed on medications prescribed by this physician? NO    Rooming Documentation:  Questionnaire(s) completed    No other vitals to report today    Reason for visit: VLADIMIR Dejesus MA VVF

## 2025-05-12 NOTE — PATIENT INSTRUCTIONS
Plan:    Follow up with cardiology in 3 months       Your Care Team:         Cardiology   Telephone Number     Kat Leal RN [phone]    After business hours: [phone], ask for cardiologist on-call           On-call cardiologist for after hours or on weekends:    [phone], option #4, and ask to speak to the on-call cardiologist.    Cardiovascular Clinic:   [address] [place] [address]. [place] [address]      As always, thank you for trusting us with your health care needs!    If you have further questions, please utilize [employer] to contact us.

## 2025-05-12 NOTE — TELEPHONE ENCOUNTER
Christina from imaging called to let Monique Bocanegra CNP know about a flagged results for patients CT scan.

## 2025-05-13 ENCOUNTER — DOCUMENTATION ONLY (OUTPATIENT)
Dept: PULMONOLOGY | Facility: CLINIC | Age: 75
End: 2025-05-13
Payer: COMMERCIAL

## 2025-05-13 DIAGNOSIS — R91.8 LUNG MASS: Primary | ICD-10-CM

## 2025-05-13 DIAGNOSIS — R59.1 LYMPHADENOPATHY: Primary | ICD-10-CM

## 2025-05-13 RX ORDER — LIDOCAINE 40 MG/G
CREAM TOPICAL
Status: CANCELLED | OUTPATIENT
Start: 2025-05-13

## 2025-05-13 NOTE — NURSING NOTE
Interventional Pulmonology: Pre-Flight Planning    Procedure date: May 14th, 2025    Scheduled procedure: BRONCHOSCOPY, WITH BIOPSY OF 1 OR 2 LYMPH NODE STATIONS WITH ENDOBRONCHIAL ULTRASOUND GUIDANCE    Location: [facility]    Anesthesia: Moderate Sedation.    H&P plan: Not needed for endoscopy procedures.    Medication hold(s): No anticoagulation.    CT scheduled: Not needed for this procedure.     Preoperative Instructions: Sent to patient via [employer] (Just sent, will follow-up this is read).    Pathology results follow up: Dr. Raygoza, Dr. [place], and Monique Bocanegra

## 2025-05-14 ENCOUNTER — OFFICE VISIT (OUTPATIENT)
Dept: UROLOGY | Facility: CLINIC | Age: 75
End: 2025-05-14
Payer: COMMERCIAL

## 2025-05-14 ENCOUNTER — HOSPITAL ENCOUNTER (OUTPATIENT)
Facility: CLINIC | Age: 75
Discharge: STILL ADMITTED OR ADMINISTRATIVE | End: 2025-05-14
Attending: STUDENT IN AN ORGANIZED HEALTH CARE EDUCATION/TRAINING PROGRAM | Admitting: STUDENT IN AN ORGANIZED HEALTH CARE EDUCATION/TRAINING PROGRAM
Payer: COMMERCIAL

## 2025-05-14 ENCOUNTER — HOSPITAL ENCOUNTER (OUTPATIENT)
Facility: CLINIC | Age: 75
Setting detail: OBSERVATION
LOS: 1 days | Discharge: HOME | End: 2025-05-15
Attending: EMERGENCY MEDICINE | Admitting: STUDENT IN AN ORGANIZED HEALTH CARE EDUCATION/TRAINING PROGRAM
Payer: COMMERCIAL

## 2025-05-14 VITALS
BODY MASS INDEX: 21.79 KG/M2 | RESPIRATION RATE: 16 BRPM | HEART RATE: 118 BPM | WEIGHT: 123 LBS | OXYGEN SATURATION: 96 % | HEIGHT: 63 IN

## 2025-05-14 VITALS
SYSTOLIC BLOOD PRESSURE: 118 MMHG | HEIGHT: 63 IN | BODY MASS INDEX: 22.12 KG/M2 | HEART RATE: 78 BPM | DIASTOLIC BLOOD PRESSURE: 79 MMHG

## 2025-05-14 DIAGNOSIS — I48.91 ATRIAL FIBRILLATION WITH RVR (H): ICD-10-CM

## 2025-05-14 DIAGNOSIS — I48.20 CHRONIC A-FIB (H): ICD-10-CM

## 2025-05-14 DIAGNOSIS — N95.2 VAGINAL ATROPHY: ICD-10-CM

## 2025-05-14 DIAGNOSIS — R31.29 MICROHEMATURIA: Primary | ICD-10-CM

## 2025-05-14 DIAGNOSIS — N36.2 URETHRAL CARUNCLE: ICD-10-CM

## 2025-05-14 DIAGNOSIS — N89.5 NARROWING OR CLOSURE OF VAGINA: ICD-10-CM

## 2025-05-14 DIAGNOSIS — R31.9 HEMATURIA, UNSPECIFIED TYPE: ICD-10-CM

## 2025-05-14 DIAGNOSIS — R31.0 GROSS HEMATURIA: ICD-10-CM

## 2025-05-14 LAB
ABO + RH BLD: NORMAL
ALBUMIN SERPL BCG-MCNC: 3.5 G/DL (ref 3.5–5.2)
ALBUMIN UR-MCNC: NEGATIVE MG/DL
ALP SERPL-CCNC: 108 U/L (ref 40–150)
ALT SERPL W P-5'-P-CCNC: 11 U/L (ref 0–50)
ANION GAP SERPL CALCULATED.3IONS-SCNC: 13 MMOL/L (ref 7–15)
APPEARANCE UR: ABNORMAL
APTT PPP: 38 SECONDS (ref 22–38)
AST SERPL W P-5'-P-CCNC: 17 U/L (ref 0–45)
ATRIAL RATE - MUSE: 105 BPM
BASOPHILS # BLD AUTO: 0 10E3/UL (ref 0–0.2)
BASOPHILS NFR BLD AUTO: 0 %
BILIRUB SERPL-MCNC: 0.4 MG/DL
BILIRUB UR QL STRIP: NEGATIVE
BLD GP AB SCN SERPL QL: NEGATIVE
BUN SERPL-MCNC: 7.1 MG/DL (ref 8–23)
CALCIUM SERPL-MCNC: 9.7 MG/DL (ref 8.8–10.4)
CHLORIDE SERPL-SCNC: 94 MMOL/L (ref 98–107)
COLOR UR AUTO: ABNORMAL
CREAT SERPL-MCNC: 0.71 MG/DL (ref 0.51–0.95)
DIASTOLIC BLOOD PRESSURE - MUSE: NORMAL MMHG
EGFRCR SERPLBLD CKD-EPI 2021: 89 ML/MIN/1.73M2
EOSINOPHIL # BLD AUTO: 0 10E3/UL (ref 0–0.7)
EOSINOPHIL NFR BLD AUTO: 0 %
ERYTHROCYTE [DISTWIDTH] IN BLOOD BY AUTOMATED COUNT: 16.9 % (ref 10–15)
FERRITIN SERPL-MCNC: 273 NG/ML (ref 11–328)
GLUCOSE SERPL-MCNC: 105 MG/DL (ref 70–99)
GLUCOSE UR STRIP-MCNC: NEGATIVE MG/DL
HCO3 SERPL-SCNC: 22 MMOL/L (ref 22–29)
HCT VFR BLD AUTO: 31.2 % (ref 35–47)
HGB BLD-MCNC: 9.8 G/DL (ref 11.7–15.7)
HGB UR QL STRIP: ABNORMAL
IMM GRANULOCYTES # BLD: 0.1 10E3/UL
IMM GRANULOCYTES NFR BLD: 0 %
INR PPP: 1.14 (ref 0.85–1.15)
INTERPRETATION ECG - MUSE: NORMAL
IRON BINDING CAPACITY (ROCHE): 166 UG/DL (ref 240–430)
IRON SATN MFR SERPL: 7 % (ref 15–46)
IRON SERPL-MCNC: 12 UG/DL (ref 37–145)
KETONES UR STRIP-MCNC: NEGATIVE MG/DL
LEUKOCYTE ESTERASE UR QL STRIP: NEGATIVE
LYMPHOCYTES # BLD AUTO: 0.6 10E3/UL (ref 0.8–5.3)
LYMPHOCYTES NFR BLD AUTO: 5 %
MAGNESIUM SERPL-MCNC: 1.8 MG/DL (ref 1.7–2.3)
MCH RBC QN AUTO: 23.9 PG (ref 26.5–33)
MCHC RBC AUTO-ENTMCNC: 31.4 G/DL (ref 31.5–36.5)
MCV RBC AUTO: 76 FL (ref 78–100)
MONOCYTES # BLD AUTO: 0.6 10E3/UL (ref 0–1.3)
MONOCYTES NFR BLD AUTO: 5 %
MUCOUS THREADS #/AREA URNS LPF: PRESENT /LPF
NEUTROPHILS # BLD AUTO: 10.1 10E3/UL (ref 1.6–8.3)
NEUTROPHILS NFR BLD AUTO: 89 %
NITRATE UR QL: NEGATIVE
NRBC # BLD AUTO: 0 10E3/UL
NRBC BLD AUTO-RTO: 0 /100
P AXIS - MUSE: NORMAL DEGREES
PH UR STRIP: 7 [PH] (ref 5–7)
PHOSPHATE SERPL-MCNC: 2.8 MG/DL (ref 2.5–4.5)
PLATELET # BLD AUTO: 386 10E3/UL (ref 150–450)
POTASSIUM SERPL-SCNC: 4.3 MMOL/L (ref 3.4–5.3)
PR INTERVAL - MUSE: NORMAL MS
PROT SERPL-MCNC: 6.5 G/DL (ref 6.4–8.3)
PROTHROMBIN TIME: 14.6 SECONDS (ref 11.8–14.8)
QRS DURATION - MUSE: 70 MS
QT - MUSE: 298 MS
QTC - MUSE: 421 MS
R AXIS - MUSE: 31 DEGREES
RBC # BLD AUTO: 4.1 10E6/UL (ref 3.8–5.2)
RBC URINE: 16 /HPF
SODIUM SERPL-SCNC: 129 MMOL/L (ref 135–145)
SP GR UR STRIP: 1.01 (ref 1–1.03)
SPECIMEN EXP DATE BLD: NORMAL
SYSTOLIC BLOOD PRESSURE - MUSE: NORMAL MMHG
T AXIS - MUSE: -25 DEGREES
T4 FREE SERPL-MCNC: 1.66 NG/DL (ref 0.9–1.7)
TROPONIN T SERPL HS-MCNC: 14 NG/L
TROPONIN T SERPL HS-MCNC: 15 NG/L
TSH SERPL DL<=0.005 MIU/L-ACNC: 5.07 UIU/ML (ref 0.3–4.2)
UROBILINOGEN UR STRIP-MCNC: NORMAL MG/DL
VENTRICULAR RATE- MUSE: 120 BPM
VIT B12 SERPL-MCNC: 668 PG/ML (ref 232–1245)
WBC # BLD AUTO: 11.4 10E3/UL (ref 4–11)
WBC URINE: 3 /HPF

## 2025-05-14 PROCEDURE — 36415 COLL VENOUS BLD VENIPUNCTURE: CPT

## 2025-05-14 PROCEDURE — 999N000001 HC CANCELLED SURGERY UP TO 15 MINS: Performed by: STUDENT IN AN ORGANIZED HEALTH CARE EDUCATION/TRAINING PROGRAM

## 2025-05-14 PROCEDURE — 87086 URINE CULTURE/COLONY COUNT: CPT

## 2025-05-14 PROCEDURE — 84439 ASSAY OF FREE THYROXINE: CPT

## 2025-05-14 PROCEDURE — 999N000141 HC STATISTIC PRE-PROCEDURE NURSING ASSESSMENT: Performed by: STUDENT IN AN ORGANIZED HEALTH CARE EDUCATION/TRAINING PROGRAM

## 2025-05-14 PROCEDURE — 82607 VITAMIN B-12: CPT

## 2025-05-14 PROCEDURE — 85610 PROTHROMBIN TIME: CPT

## 2025-05-14 PROCEDURE — 84443 ASSAY THYROID STIM HORMONE: CPT

## 2025-05-14 PROCEDURE — 83550 IRON BINDING TEST: CPT

## 2025-05-14 PROCEDURE — 96374 THER/PROPH/DIAG INJ IV PUSH: CPT

## 2025-05-14 PROCEDURE — 86850 RBC ANTIBODY SCREEN: CPT

## 2025-05-14 PROCEDURE — 84100 ASSAY OF PHOSPHORUS: CPT

## 2025-05-14 PROCEDURE — 83735 ASSAY OF MAGNESIUM: CPT

## 2025-05-14 PROCEDURE — 250N000009 HC RX 250: Performed by: EMERGENCY MEDICINE

## 2025-05-14 PROCEDURE — 250N000013 HC RX MED GY IP 250 OP 250 PS 637: Performed by: EMERGENCY MEDICINE

## 2025-05-14 PROCEDURE — 3078F DIAST BP <80 MM HG: CPT

## 2025-05-14 PROCEDURE — 82040 ASSAY OF SERUM ALBUMIN: CPT

## 2025-05-14 PROCEDURE — 85004 AUTOMATED DIFF WBC COUNT: CPT

## 2025-05-14 PROCEDURE — 82728 ASSAY OF FERRITIN: CPT

## 2025-05-14 PROCEDURE — 86901 BLOOD TYPING SEROLOGIC RH(D): CPT

## 2025-05-14 PROCEDURE — 99285 EMERGENCY DEPT VISIT HI MDM: CPT | Mod: 25

## 2025-05-14 PROCEDURE — G0463 HOSPITAL OUTPT CLINIC VISIT: HCPCS

## 2025-05-14 PROCEDURE — 99214 OFFICE O/P EST MOD 30 MIN: CPT

## 2025-05-14 PROCEDURE — 93005 ELECTROCARDIOGRAM TRACING: CPT

## 2025-05-14 PROCEDURE — 80053 COMPREHEN METABOLIC PANEL: CPT

## 2025-05-14 PROCEDURE — 85730 THROMBOPLASTIN TIME PARTIAL: CPT

## 2025-05-14 PROCEDURE — 99459 PELVIC EXAMINATION: CPT

## 2025-05-14 PROCEDURE — 81001 URINALYSIS AUTO W/SCOPE: CPT

## 2025-05-14 PROCEDURE — 3074F SYST BP LT 130 MM HG: CPT

## 2025-05-14 PROCEDURE — 99223 1ST HOSP IP/OBS HIGH 75: CPT | Mod: GC | Performed by: STUDENT IN AN ORGANIZED HEALTH CARE EDUCATION/TRAINING PROGRAM

## 2025-05-14 PROCEDURE — 85014 HEMATOCRIT: CPT

## 2025-05-14 PROCEDURE — 84484 ASSAY OF TROPONIN QUANT: CPT

## 2025-05-14 PROCEDURE — 250N000013 HC RX MED GY IP 250 OP 250 PS 637

## 2025-05-14 PROCEDURE — 120N000002 HC R&B MED SURG/OB UMMC

## 2025-05-14 RX ORDER — LIDOCAINE 40 MG/G
CREAM TOPICAL
Status: DISCONTINUED | OUTPATIENT
Start: 2025-05-14 | End: 2025-05-15 | Stop reason: HOSPADM

## 2025-05-14 RX ORDER — AMOXICILLIN 250 MG
2 CAPSULE ORAL 2 TIMES DAILY PRN
Status: DISCONTINUED | OUTPATIENT
Start: 2025-05-14 | End: 2025-05-15 | Stop reason: HOSPADM

## 2025-05-14 RX ORDER — METOPROLOL TARTRATE 1 MG/ML
5 INJECTION, SOLUTION INTRAVENOUS ONCE
Status: COMPLETED | OUTPATIENT
Start: 2025-05-14 | End: 2025-05-14

## 2025-05-14 RX ORDER — POLYETHYLENE GLYCOL 3350 17 G/17G
17 POWDER, FOR SOLUTION ORAL 2 TIMES DAILY PRN
Status: DISCONTINUED | OUTPATIENT
Start: 2025-05-14 | End: 2025-05-15 | Stop reason: HOSPADM

## 2025-05-14 RX ORDER — METOPROLOL SUCCINATE 25 MG/1
50 TABLET, EXTENDED RELEASE ORAL EVERY EVENING
Status: DISCONTINUED | OUTPATIENT
Start: 2025-05-14 | End: 2025-05-15 | Stop reason: HOSPADM

## 2025-05-14 RX ORDER — AMOXICILLIN 250 MG
1 CAPSULE ORAL 2 TIMES DAILY PRN
Status: DISCONTINUED | OUTPATIENT
Start: 2025-05-14 | End: 2025-05-15 | Stop reason: HOSPADM

## 2025-05-14 RX ORDER — FAMOTIDINE 20 MG/1
20 TABLET, FILM COATED ORAL 2 TIMES DAILY
Status: DISCONTINUED | OUTPATIENT
Start: 2025-05-14 | End: 2025-05-15 | Stop reason: HOSPADM

## 2025-05-14 RX ORDER — SALIVA STIMULANT COMB. NO.3
1 SPRAY, NON-AEROSOL (ML) MUCOUS MEMBRANE 4 TIMES DAILY PRN
Status: DISCONTINUED | OUTPATIENT
Start: 2025-05-14 | End: 2025-05-15 | Stop reason: HOSPADM

## 2025-05-14 RX ORDER — METOPROLOL TARTRATE 1 MG/ML
5 INJECTION, SOLUTION INTRAVENOUS EVERY 5 MIN PRN
Status: DISCONTINUED | OUTPATIENT
Start: 2025-05-14 | End: 2025-05-15

## 2025-05-14 RX ORDER — METOPROLOL TARTRATE 25 MG/1
25 TABLET, FILM COATED ORAL ONCE
Status: COMPLETED | OUTPATIENT
Start: 2025-05-14 | End: 2025-05-14

## 2025-05-14 RX ORDER — LEVOTHYROXINE SODIUM 112 UG/1
112 TABLET ORAL
Status: DISCONTINUED | OUTPATIENT
Start: 2025-05-15 | End: 2025-05-15 | Stop reason: HOSPADM

## 2025-05-14 RX ORDER — ACETAMINOPHEN 500 MG
1000 TABLET ORAL EVERY 8 HOURS PRN
Status: DISCONTINUED | OUTPATIENT
Start: 2025-05-14 | End: 2025-05-15 | Stop reason: HOSPADM

## 2025-05-14 RX ADMIN — METOPROLOL TARTRATE 5 MG: 5 INJECTION INTRAVENOUS at 15:40

## 2025-05-14 RX ADMIN — FAMOTIDINE 20 MG: 20 TABLET, FILM COATED ORAL at 19:24

## 2025-05-14 RX ADMIN — METOPROLOL SUCCINATE 50 MG: 25 TABLET, EXTENDED RELEASE ORAL at 19:24

## 2025-05-14 RX ADMIN — METOPROLOL TARTRATE 25 MG: 25 TABLET, FILM COATED ORAL at 15:39

## 2025-05-14 ASSESSMENT — ACTIVITIES OF DAILY LIVING (ADL)
ADLS_ACUITY_SCORE: 53
ADLS_ACUITY_SCORE: 52
ADLS_ACUITY_SCORE: 51
ADLS_ACUITY_SCORE: 52
ADLS_ACUITY_SCORE: 51
ADLS_ACUITY_SCORE: 51
ADLS_ACUITY_SCORE: 53
ADLS_ACUITY_SCORE: 53
ADLS_ACUITY_SCORE: 52
ADLS_ACUITY_SCORE: 51

## 2025-05-14 ASSESSMENT — COLUMBIA-SUICIDE SEVERITY RATING SCALE - C-SSRS
6. HAVE YOU EVER DONE ANYTHING, STARTED TO DO ANYTHING, OR PREPARED TO DO ANYTHING TO END YOUR LIFE?: NO
2. HAVE YOU ACTUALLY HAD ANY THOUGHTS OF KILLING YOURSELF IN THE PAST MONTH?: NO
1. IN THE PAST MONTH, HAVE YOU WISHED YOU WERE DEAD OR WISHED YOU COULD GO TO SLEEP AND NOT WAKE UP?: NO

## 2025-05-14 NOTE — PATIENT INSTRUCTIONS
Thank you for trusting us with your care!   Please be aware, if you are on Mychart, you may see your results prior to your providers review. If labs are abnormal, we will call or message you on [employer] with a follow up plan.    If you need to contact us for questions about:  Symptoms, Scheduling & Medical Questions; Non-urgent (2-3 day response) [employer] message, Urgent (needing response today) [phone] (if after 3:30pm next day response)   Prescriptions: Please call your Pharmacy   Billing: [facility] [phone] or [facility]:[phone]

## 2025-05-14 NOTE — H&P
[facility]    History and Physical - Medicine Service, [facility] TEAM 5       Date of Admission:  5/14/2025    Assessment & Plan      Cordell Donaldson is a 75 year old female with a history of persistent Afib (not on AC), breast cancer s/p lumpectomy and radiation (2003), cervical cancer s/p Keytruda/ carboplatin/ Avastin and Paclitaxel(2022),  IDC of left breast (not on therapy), chronic cough possibly related to noted Large lesion in the subcarinal region who presented for bronchoscopy with lung and lymph node biopsies (EBUS) and was found to be in Afib w/ RVR.      Afib w/ RVR, resolved  Hx of Persistent Afib  Known PFO  Follows with Cardiology, las seen 5/12. For the persistent atrial fibrillation she takes metoprolol for rate control and has declined cardioversion and anticoagulation. She takes an extra dose of metoprolol XL 25 mg if HR >110. She is not on anticoagulation per her wishes. Her recent echocardiogram is notable for stable heart function. The PFO in the absence of R to L shunt or CVA/TIA which is being monitored for now. Patient seen in pre op by IP Pulm. HR consistently in the 150s, looked like A fib. Not on AC as she takes a Japanese supplement. Clinically, she is short of breath but this could be due to her bronchial stenosis. IP Pulm referred her to the ED for further evaluation. Pt was rates of 150's, declined cardioversion. Recived PO Metop tartrate 25mg and IV Metop 5mg with rates improving to <100.  - Continue Metoprolol succinate 50mg qEvening  - Added Metoprolol Tartrate 5mg q5mins x3  - Discussed AC, pt continues to decline standard of care meds at this time  - Nattokinase not ordered, unverified/lack of efficacy    Large Subcarinal Mass  Multifocal Mucoid impaction of RML  RLL Atelectasis  Chronic cough  Had been following with PCP for chronic cough. Ongoing since 2/2025, initially had chest tightness, wheezing. Persistent, had normal chest  "xray. Trial of albuterol triggered palpitations, she has underlying Afib and HR was >100 so stopped. Recent visit with pulmonologist, normal PFTs. They suspected post nasal drip, GERD, reactive airway (option to try corticosteroid inhaler). CT scan ordered by oncology team on 5/9 which demonstrated, \" Large lesion in the subcarinal region measuring up to 5.2 cm, favored to represent lymphadenopathy, which is causing stenosis of the right lower lobe bronchus with downstream near complete opacification of the right lower lobe. Additional right paratracheal lymph node measuring up to 2.7 cm, and numerous other smaller lymph nodes are  present throughout the lower neck and thoracic cavity.\" Was referred to IP Pulm for EBUS and lymph node biopsy. Procedure aborted due to afib w/ RVR, rescheduled for 5/15 in the AM.  - IP Pulm Consult placed  - NPO at midnight for EBUS with lymph node biopsy    Leukocytosis  WBC 11.4 of neutrophilic predominance. Unclear etiology, continued chronic non-productive cough. No fevers or chills. Vital signs all WNL, no tachycardia, hypoxia, tachycardia 2/2 afib. UA performed earlier unremarkable. Obtain CXR/CT imaging as indicated to eval for post-obstructive PNA. No clear indication for abx therapy.   - Monitor for now, broad infxn workup, start abx as appropriate    Hyponatremia, possibly hypovolemic  Na of 129 overall of unclear etiology, previous baseline >130. Possibly hypovolemic vs malignant related. Not at level warranting acute intervention. Asymptomatic overall. Urine studies if persistent.  - Recheck in the AM.    Microcytic Anemia  Known to Oncology team, previously believed to be related to radiation therapy and slow bleed from prior cervical cancer resection. No overt signs of bleed on exam. History with no concern for bleed.   - Iron Panel, Ferritin, Folate    ----------------------------------------------------------------- " Chronic--------------------------------------------------------------------  Breast cancer s/p lumpectomy and radiation (2003)  Cervical cancer s/p Keytruda, carboplatin, Avastin and Paclitaxel(2022),   IDC of left breast (declined Anastrozole)  Follows with Dr. Raygoza, next appt on 6/27/25. 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 Most recently she completed treatment with chemotherapy 11/21/22.   - Continue with outpt Oncology team    Hypothyroidism  TSH elevated to 5.07 on admission though possibly euthyroid sick syndrome.  - Free T4 pending  - Continue PTA Levothyroxine  - Follow up with PCP     Small pericardial effusion  Cardiomegaly  Incidental finding on CT imaging. Cardiology aware. No tamponade physiology.          Diet: Combination Diet Low Saturated Fat Na <2400mg Diet, No Caffeine Diet  NPO for Procedure/Surgery per Anesthesia Guidelines Except for: Meds; Clear liquids before procedure/surgery: ADULT (Age GREATER than or Equal to 18 years) - Clear liquids 2 hours before procedure/surgery    DVT Prophylaxis: Pneumatic Compression Devices  Lozano Catheter: Not present  Fluids: None  Lines: None     Cardiac Monitoring: ACTIVE order. Indication: Tachyarrhythmias, acute (48 hours)  Code Status: Full Code discussed with pt will touch base with  regarding possible change to DNRI    Clinically Significant Risk Factors Present on Admission         # Hyponatremia: Lowest Na = 129 mmol/L in last 2 days, will monitor as appropriate  # Hypochloremia: Lowest Cl = 94 mmol/L in last 2 days, will monitor as appropriate         # Coagulation Defect: INR = 1.14 (Ref range: 0.85 - 1.15) and/or PTT = 38 Seconds (Ref range: 22 - 38 Seconds), will monitor for bleeding         # Anemia: based on hgb <11                  Disposition Plan      Expected Discharge Date: 05/15/2025                The patient's care was  "discussed with the Attending Physician, Dr. Peters.      Edgardo Kyle MD  Medicine Service, [facility] TEAM [address] [place] [address]  Securely message with [employer] (more info)  Text page via [employer] Paging/Directory   See signed in provider for up to date coverage information  ______________________________________________________________________    Chief Complaint   Afib w/ RVR    History is obtained from the patient    History of Present Illness   Cordell Donaldson is a 75 year old female with a history of persistent Afib (not on AC), breast cancer s/p lumpectomy and radiation (2003), cervical cancer s/p Keytruda/ carboplatin/ Avastin and Paclitaxel(2022),  IDC of left breast (not on therapy), chronic cough possibly related to noted Large lesion in the subcarinal region who presented for bronchoscopy with lung and lymph node biopsies (EBUS) and was found to be in Afib w/ RVR.    Had been following with PCP for chronic cough. Ongoing since 2/2025, initially had chest tightness, wheezing. Persistent, had normal chest xray. Trial of albuterol triggered palpitations, she has underlying Afib and HR was >100 so stopped. Recent visit with pulmonologist, normal PFTs. They suspected post nasal drip, GERD, reactive airway (option to try corticosteroid inhaler). CT scan ordered by oncology team which demonstrated, \" Large lesion in the subcarinal region measuring up to 5.2 cm, favored to represent lymphadenopathy, which is causing stenosis of the right lower lobe bronchus with downstream near complete opacification of the right lower lobe. Additional right paratracheal lymph node measuring up to 2.7 cm, and numerous other smaller lymph nodes are  present throughout the lower neck and thoracic cavity.\" Was referred to IP Pulm for EBUS and lymph node biopsy.     Patient seen in pre op by IP Pulm. HR consistently in the 150s, looked like A fib. Not on AC as she takes a Japanese supplement. " Clinically, she is short of breath but this could be due to her bronchial stenosis. Procedure aborted due to afib w/ RVR, rescheduled for 5/15 in the AM. IP Pulm referred her to the ED for further evaluation. Pt was rates of 150's, declined cardioversion. Recived PO Metop tartrate 25mg and IV Metop 5mg with rates improving to <100. Pt was rates of 150's, declined cardioversion. Recived PO Metop tartrate 25mg and IV Metop 5mg with rates improving to <100.    Admitted to medicine for ongoing cares.      Past Medical History    Past Medical History:   Diagnosis Date    Abnormal Pap smear     maybe 20 years ago    Cervical cancer (H)     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     History of breast cancer 2003    lumpectomy and radiation offerred chemo and patient declined at time but had oncogene test and low risk    Hyperlipidemia LDL goal < 160     Osteopenia     Paroxysmal A-fib (H)     Severe vaginal dysplasia        Past Surgical History   Past Surgical History:   Procedure Laterality Date    BIOPSY      BREAST SURGERY Left 2003    lumpectomy left, did radiation, 5 yr of tamoxifen    COLONOSCOPY  2018    repeat in 2028    Colposcopy Cervix with Loop Electrode Conization and Lser to Vagina  2008    COLPOSCOPY, BIOPSY, COMBINED  10/24/2012    Procedure: COMBINED COLPOSCOPY, BIOPSY;  Colposcopy, Biopsy of Cervix and Vagina, Ultrasound Guidance;  Surgeon: Colette Ng MD;  Location: [facility] OR    ENT SURGERY  01/23/2018    otoscelerosis, right side    HYSTERECTOMY, FRANCIA  12/2012    high grade cervical dysplasia, [place] [facility], Dr. Arciniega    INSERT INTERSTITIAL NEEDLE, ULTRASOUND GUIDED N/A 02/13/2023    Procedure: INSERTION, NEEDLE, WITH ULTRASOUND GUIDANCE, FOR INTERSTITIAL BRACHYTHERAPY;  Surgeon: Raymond Stockton MD;  Location: [facility] OR    INSERT PORT VASCULAR ACCESS Right 07/18/2022    Procedure: INSERTION, VASCULAR ACCESS PORT;  Surgeon: Donnie Elias MD;  Location: [facility] OR    IR CHEST  PORT PLACEMENT > 5 YRS OF AGE  2022    IR PORT REMOVAL RIGHT  2023    MI LAP VAGINECTOMY, PARTIAL REMOVAL OF VAGINAL WALL  10/2013    upper vaginectomy for dysplasia, Dr. Megan Arciniega    REMOVE INTERSTITIAL NEEDLE N/A 02/15/2023    Procedure: REMOVAL, INTERSTITIAL NEEDLE, AFTER TEMPORARY BRACHYTHERAPY;  Surgeon: Ryamond Stockton MD;  Location: [facility] OR    REMOVE PORT VASCULAR ACCESS Right 2023    Procedure: Remove port vascular access right;  Surgeon: Donnie Elias MD;  Location: [facility] OR       Prior to Admission Medications   Prior to Admission Medications   Prescriptions Last Dose Informant Patient Reported? Taking?   Bacillus Coagulans-Inulin (PROBIOTIC FORMULA) 1-250 BILLION-MG CAPS 2025  Yes Yes   Sig: Take by mouth every morning 25+billion CFUS   Cholecalciferol (VITAMIN D-3) 25 MCG (1000 UT) CAPS 2025  Yes Yes   Sig: Take 1,000 Units by mouth every morning   MAGNESIUM OXIDE PO 2025  Yes Yes   Sig: Take 400 mg by mouth daily   OVER-THE-COUNTER 2025  Yes Yes   Sig: Turkey tail mushroom powder, use 1 times a day   Omega-3 Fatty Acids (OMEGA-3 FISH OIL PO) 2025  Yes Yes   Sig: Take 630 mg by mouth 2 times daily   Pectin Cit-Inos-C-Bioflav-Soy (MODIFIED CITRUS PECTIN PO) 2025  Yes Yes   Sig: Take by mouth 2 times daily   UNABLE TO FIND 2025  Yes Yes   Sig: Take 1 tablet by mouth every morning MEDICATION NAME: Dinndolylmethane Complex (DIM) 100mg tablet 1 table daily   UNABLE TO FIND 2025  Yes Yes   Si,200 mg 2 times daily MEDICATION NAME: Snowflake   UNABLE TO FIND 2025  Yes Yes   Si mg MEDICATION NAME: Turmeric   UNABLE TO FIND 2025  Yes Yes   Sig: Take by mouth daily. MEDICATION NAME: Nattokinase   coenzyme Q-10 capsule 2025  Yes Yes   Sig: Take 1 capsule by mouth daily   famotidine (PEPCID) 20 MG tablet 2025 at  5:45 AM  No Yes   Sig: Take 1 tablet (20 mg) by mouth 2 times daily.   levothyroxine (SYNTHROID/LEVOTHROID) 112 MCG  "tablet 2025 at  5:45 AM  No Yes   Sig: TAKE 1 TABLET (112 MCG) BY MOUTH DAILY BEFORE BREAKFAST   metoprolol succinate ER (TOPROL XL) 50 MG 24 hr tablet 2025 at  7:00 PM  No Yes   Sig: Take 1 tablet (50 mg) by mouth daily.      Facility-Administered Medications: None        Social History   I have reviewed this patient's social history and updated it with pertinent information if needed.  Social History     Tobacco Use    Smoking status: Former     Current packs/day: 0.00     Average packs/day: 0.5 packs/day for 15.0 years (7.5 ttl pk-yrs)     Types: Cigarettes     Start date:      Quit date:      Years since quittin.3     Passive exposure: Past    Smokeless tobacco: Never   Vaping Use    Vaping status: Never Used   Substance Use Topics    Alcohol use: Not Currently     Alcohol/week: 0.0 - 2.0 standard drinks of alcohol     Comment: Socially     Drug use: No         Family History   I have reviewed this patient's family history and updated it with pertinent information if needed.  Family History   Problem Relation Age of Onset    Myocardial Infarction Mother 73        Tob    Myocardial Infarction Father 68        Tob    Breast Cancer Sister 47         of breast cancer age 63; BRCA mutation testing negative    Cancer Maternal Grandmother         Unsure of type.    Breast Cancer Paternal Grandmother         Unsure of diagnosis--some type of \"women's issue\"    Skin Cancer Daughter 47    Anesthesia Reaction No family hx of     Deep Vein Thrombosis (DVT) No family hx of          Allergies   No Known Allergies     Physical Exam   Vital Signs: Temp: 98.7  F (37.1  C) Temp src: Oral BP: 129/87 Pulse: 91   Resp: 18 SpO2: 98 % O2 Device: None (Room air)    Weight: 0 lbs 0 oz    General Appearance: Elderly female, resting comfortably in bed, NAD  Respiratory: Breathing Room Air comfortably, no accessory muscle use, CTAB+  Cardiovascular: Regular Rate, Irregularly irregular rhythm, no wheezing, no " rhonchi, no rales  GI: NBS, soft, non-tender, no guarding or rebound  Skin: Well-perfused, no rashes, no lesions, scattered ecchymoses   Neuro: Aox3, no gross focal deficits. Normal strength throughout     Medical Decision Making         Data     I have personally reviewed the following data over the past 24 hrs:    11.4 (H)  \   9.8 (L)   / 386     129 (L) 94 (L) 7.1 (L) /  105 (H)   4.3 22 0.71 \     ALT: 11 AST: 17 AP: 108 TBILI: 0.4   ALB: 3.5 TOT PROTEIN: 6.5 LIPASE: N/A     Trop: 14 BNP: N/A     TSH: 5.07 (H) T4: 1.66 A1C: N/A     INR:  1.14 PTT:  38   D-dimer:  N/A Fibrinogen:  N/A       Imaging results reviewed over the past 24 hrs:   No results found for this or any previous visit (from the past 24 hours).

## 2025-05-14 NOTE — ED PROVIDER NOTES
[place] EMERGENCY DEPARTMENT ([facility])    5/14/25       ED PROVIDER NOTE   History     Chief Complaint   Patient presents with    Atrial Fib      The history is provided by the patient and medical records.       Cordell Donaldson is a 75 year old female with history of chronic atrial fibrillation, breast cancer s/p lumpectomy and radiation (2002), cervical cancer s/p vulvar radiation, possible lung metastasis who presents to the emergency department with atrial fibrillation.  She was scheduled for regular bronchoscopy with biopsy today to evaluate new large lesion in the subcarinal region.  While there she developed rapid atrial fibrillation and was sent to the ED for further evaluation.  Here patient is stable and not having chest pain. She reports SOB due to obstruction in her lungs.  She is on metoprolol XL nightly for atrial fibrillation with PRN metoprolol IR if HR is above 110. She has not taken metoprolol yet today.  She is taking a japanese enzyme called Nattokinase for anticoagulation. She has never been cardioverted and has no interest in being cardioverted.       ECHOCARDIOGRAM COMPLETE 4/21/2025  Interpretation Summary  Significant beat to beat variation in ventricular function due to atrial  fibrillation and rapid heart rates (90s-130s).  Global and regional left ventricular function is normal with an EF of 55-60%.  Global right ventricular function is normal.  Severe biatrial enlargement. Large PFO with left to right shunt.  IVC diameter and respiratory changes fall into an intermediate range  suggesting an RA pressure of 8 mmHg.  No pericardial effusion.      Physical Exam  BP: (!) 127/95  Pulse: (!) 124  Temp: 98.7  F (37.1  C)  Resp: 18  SpO2: 96 %  Physical Exam  Constitutional:       General: She is not in acute distress.     Appearance: Normal appearance. She is not diaphoretic.   HENT:      Head: Atraumatic.      Mouth/Throat:      Mouth: Mucous membranes are moist.   Eyes:       General: No scleral icterus.     Conjunctiva/sclera: Conjunctivae normal.   Cardiovascular:      Rate and Rhythm: Tachycardia present. Rhythm irregular.      Heart sounds: Normal heart sounds.   Pulmonary:      Effort: No respiratory distress.      Breath sounds: Normal breath sounds.   Abdominal:      General: Abdomen is flat.   Musculoskeletal:      Cervical back: Neck supple.   Skin:     General: Skin is warm.      Findings: No rash.   Neurological:      Mental Status: She is alert.           ED Course, Procedures, & Data      Procedures            EKG Interpretation:      Interpreted by Kevin Pepper  Time reviewed: 1530  Symptoms at time of EKG: palpitations   Rhythm: afib  Rate: Tachycardia  Comparison to prior: no acute changes  Clinical Impression: abnormal EKG; afib with rvr, no stemi, different from prior EKG that showed afib with normal rate but same morphology today         Results for orders placed or performed during the hospital encounter of 05/14/25   Comprehensive metabolic panel     Status: Abnormal   Result Value Ref Range    Sodium 129 (L) 135 - 145 mmol/L    Potassium 4.3 3.4 - 5.3 mmol/L    Carbon Dioxide (CO2) 22 22 - 29 mmol/L    Anion Gap 13 7 - 15 mmol/L    Urea Nitrogen 7.1 (L) 8.0 - 23.0 mg/dL    Creatinine 0.71 0.51 - 0.95 mg/dL    GFR Estimate 89 >60 mL/min/1.73m2    Calcium 9.7 8.8 - 10.4 mg/dL    Chloride 94 (L) 98 - 107 mmol/L    Glucose 105 (H) 70 - 99 mg/dL    Alkaline Phosphatase 108 40 - 150 U/L    AST 17 0 - 45 U/L    ALT 11 0 - 50 U/L    Protein Total 6.5 6.4 - 8.3 g/dL    Albumin 3.5 3.5 - 5.2 g/dL    Bilirubin Total 0.4 <=1.2 mg/dL   INR     Status: Normal   Result Value Ref Range    INR 1.14 0.85 - 1.15    PT 14.6 11.8 - 14.8 Seconds   Partial thromboplastin time     Status: Normal   Result Value Ref Range    aPTT 38 22 - 38 Seconds   Troponin T, High Sensitivity     Status: Normal   Result Value Ref Range    Troponin T, High Sensitivity 14 <=14 ng/L   TSH with free T4  reflex     Status: Abnormal   Result Value Ref Range    TSH 5.07 (H) 0.30 - 4.20 uIU/mL   CBC with platelets and differential     Status: Abnormal   Result Value Ref Range    WBC Count 11.4 (H) 4.0 - 11.0 10e3/uL    RBC Count 4.10 3.80 - 5.20 10e6/uL    Hemoglobin 9.8 (L) 11.7 - 15.7 g/dL    Hematocrit 31.2 (L) 35.0 - 47.0 %    MCV 76 (L) 78 - 100 fL    MCH 23.9 (L) 26.5 - 33.0 pg    MCHC 31.4 (L) 31.5 - 36.5 g/dL    RDW 16.9 (H) 10.0 - 15.0 %    Platelet Count 386 150 - 450 10e3/uL    % Neutrophils 89 %    % Lymphocytes 5 %    % Monocytes 5 %    % Eosinophils 0 %    % Basophils 0 %    % Immature Granulocytes 0 %    NRBCs per 100 WBC 0 <1 /100    Absolute Neutrophils 10.1 (H) 1.6 - 8.3 10e3/uL    Absolute Lymphocytes 0.6 (L) 0.8 - 5.3 10e3/uL    Absolute Monocytes 0.6 0.0 - 1.3 10e3/uL    Absolute Eosinophils 0.0 0.0 - 0.7 10e3/uL    Absolute Basophils 0.0 0.0 - 0.2 10e3/uL    Absolute Immature Granulocytes 0.1 <=0.4 10e3/uL    Absolute NRBCs 0.0 10e3/uL   T4 free     Status: Normal   Result Value Ref Range    Free T4 1.66 0.90 - 1.70 ng/dL   Magnesium     Status: Normal   Result Value Ref Range    Magnesium 1.8 1.7 - 2.3 mg/dL   Phosphorus     Status: Normal   Result Value Ref Range    Phosphorus 2.8 2.5 - 4.5 mg/dL   EKG 12 lead     Status: None   Result Value Ref Range    Systolic Blood Pressure  mmHg    Diastolic Blood Pressure  mmHg    Ventricular Rate 120 BPM    Atrial Rate 105 BPM    CA Interval  ms    QRS Duration 70 ms     ms    QTc 421 ms    P Axis  degrees    R AXIS 31 degrees    T Axis -25 degrees    Interpretation ECG       Undetermined rhythm  Low voltage QRS  Anterior infarct , age undetermined  Abnormal ECG  Unconfirmed report - interpretation of this ECG is computer generated - see medical record for final interpretation    Confirmed by - EMERGENCY ROOM, PHYSICIAN ([id number]),  CIARAN GIFFORD ([id number]) on 5/14/2025 3:39:22 PM     Adult Type and Screen     Status: None   Result Value Ref Range     ABO/RH(D) O POS     Antibody Screen Negative Negative    SPECIMEN EXPIRATION DATE [id number]    CBC with platelets differential     Status: Abnormal    Narrative    The following orders were created for panel order CBC with platelets differential.  Procedure                               Abnormality         Status                     ---------                               -----------         ------                     CBC with platelets and ...[[id number]]  Abnormal            Final result                 Please view results for these tests on the individual orders.   ABO/Rh type and screen     Status: None    Narrative    The following orders were created for panel order ABO/Rh type and screen.  Procedure                               Abnormality         Status                     ---------                               -----------         ------                     Adult Type and Screen[[id number]]                           Final result                 Please view results for these tests on the individual orders.   Results for orders placed or performed in visit on 05/14/25   UA with Microscopic reflex to Culture     Status: Abnormal    Specimen: Urine, Clean Catch   Result Value Ref Range    Color Urine Light Yellow Colorless, Straw, Light Yellow, Yellow    Appearance Urine Slightly Cloudy (A) Clear    Glucose Urine Negative Negative mg/dL    Bilirubin Urine Negative Negative    Ketones Urine Negative Negative mg/dL    Specific Gravity Urine 1.009 1.003 - 1.035    Blood Urine Small (A) Negative    pH Urine 7.0 5.0 - 7.0    Protein Albumin Urine Negative Negative mg/dL    Urobilinogen Urine Normal Normal mg/dL    Nitrite Urine Negative Negative    Leukocyte Esterase Urine Negative Negative    Mucus Urine Present (A) None Seen /LPF    RBC Urine 16 (H) <=2 /HPF    WBC Urine 3 <=5 /HPF    Narrative    Urine Culture not indicated     Medications   famotidine (PEPCID) tablet 20 mg (has no administration in time  range)   levothyroxine (SYNTHROID/LEVOTHROID) tablet 112 mcg (has no administration in time range)   metoprolol succinate ER (TOPROL XL) 24 hr tablet 50 mg (has no administration in time range)   lidocaine 1 % 0.1-1 mL (has no administration in time range)   lidocaine (LMX4) cream (has no administration in time range)   sodium chloride (PF) 0.9% PF flush 3 mL (has no administration in time range)   sodium chloride (PF) 0.9% PF flush 3 mL (has no administration in time range)   senna-docusate (SENOKOT-S/PERICOLACE) 8.6-50 MG per tablet 1 tablet (has no administration in time range)     Or   senna-docusate (SENOKOT-S/PERICOLACE) 8.6-50 MG per tablet 2 tablet (has no administration in time range)   acetaminophen (TYLENOL) tablet 1,000 mg (has no administration in time range)   melatonin tablet 1 mg (has no administration in time range)   polyethylene glycol (MIRALAX) Packet 17 g (has no administration in time range)   benzocaine-menthol (CHLORASEPTIC MAX) 15-10 MG lozenge 1 lozenge (has no administration in time range)   artificial saliva (BIOTENE MT) solution 1 spray (has no administration in time range)   metoprolol (LOPRESSOR) injection 5 mg (has no administration in time range)   metoprolol (LOPRESSOR) injection 5 mg (5 mg Intravenous $Given 5/14/25 1540)   metoprolol tartrate (LOPRESSOR) tablet 25 mg (25 mg Oral $Given 5/14/25 1539)     Labs Ordered and Resulted from Time of ED Arrival to Time of ED Departure   COMPREHENSIVE METABOLIC PANEL - Abnormal       Result Value    Sodium 129 (*)     Potassium 4.3      Carbon Dioxide (CO2) 22      Anion Gap 13      Urea Nitrogen 7.1 (*)     Creatinine 0.71      GFR Estimate 89      Calcium 9.7      Chloride 94 (*)     Glucose 105 (*)     Alkaline Phosphatase 108      AST 17      ALT 11      Protein Total 6.5      Albumin 3.5      Bilirubin Total 0.4     TSH WITH FREE T4 REFLEX - Abnormal    TSH 5.07 (*)    CBC WITH PLATELETS AND DIFFERENTIAL - Abnormal    WBC Count 11.4  (*)     RBC Count 4.10      Hemoglobin 9.8 (*)     Hematocrit 31.2 (*)     MCV 76 (*)     MCH 23.9 (*)     MCHC 31.4 (*)     RDW 16.9 (*)     Platelet Count 386      % Neutrophils 89      % Lymphocytes 5      % Monocytes 5      % Eosinophils 0      % Basophils 0      % Immature Granulocytes 0      NRBCs per 100 WBC 0      Absolute Neutrophils 10.1 (*)     Absolute Lymphocytes 0.6 (*)     Absolute Monocytes 0.6      Absolute Eosinophils 0.0      Absolute Basophils 0.0      Absolute Immature Granulocytes 0.1      Absolute NRBCs 0.0     INR - Normal    INR 1.14      PT 14.6     PARTIAL THROMBOPLASTIN TIME - Normal    aPTT 38     TROPONIN T, HIGH SENSITIVITY - Normal    Troponin T, High Sensitivity 14     T4 FREE - Normal    Free T4 1.66     MAGNESIUM - Normal    Magnesium 1.8     PHOSPHORUS - Normal    Phosphorus 2.8     TROPONIN T, HIGH SENSITIVITY   IRON AND IRON BINDING CAPACITY   FERRITIN   VITAMIN B12   TYPE AND SCREEN, ADULT    ABO/RH(D) O POS      Antibody Screen Negative      SPECIMEN EXPIRATION DATE [id number]     ABO/RH TYPE AND SCREEN     No orders to display          Critical care was not performed.     Medical Decision Making  The patient's presentation was of moderate complexity (a chronic illness mild to moderate exacerbation, progression, or side effect of treatment).    The patient's evaluation involved:  ordering and/or review of 3+ test(s) in this encounter (see separate area of note for details)  discussion of management or test interpretation with another health professional (see separate area of note for details)    The patient's management necessitated high risk (a decision regarding hospitalization).    Assessment & Plan    Cordell Donaldson is a 75 year old  female with history of chronic atrial fibrillation, breast cancer s/p lumpectomy and radiation (2002), cervical cancer s/p vulvar radiation, possible lung metastasis who presents to the emergency department with atrial fibrillation.  Vitals in the ED tachycardic to 124 on presentation otherwise unremarkable. Physical exam unremarkable. Initial differential diagnosis includes but not limited to A-fib with RVR. Nursing notes reviewed. Patient held Nattokinase today 2/2 NPO for procedure.    CBC mild leukocytosis 11.4 with absolute neutrophils at 10.1.  Anemia to 9.8.  CMP hyponatremic to 129 hypochloremic to 94.  PTT, INR, PT all normal.  TSH elevated to 5.07.  T4 sent.  In the ED, the patient's symptoms were managed with IV 5 mg metoprolol, 25 IR metoprolol, with improvement in symptoms upon reassessment and normalization of heart rate.  Patient's rate sustained controlled under 100 BPM.  Discussed with interventional pulmonology about scheduling bronchoscopy for tomorrow.    The complete clinical picture is most consistent with A-fib with RVR. After counseling on the diagnosis, work-up, and treatment plan, the patient was admitted to medicine with plan of bronchoscopy tomorrow. NPO at midnight for possible procedure. Patient seen and discussed with attending physician Dr. Marina and we are in agreement with the plan of care.      Final diagnoses:   Chronic a-fib (H)   Atrial fibrillation with RVR (H)     New Prescriptions    No medications on file     --  Kevin Pepper PA-C   Emergency Medicine   [facility] EMERGENCY DEPARTMENT  5/14/2025      I have reviewed the nursing notes. I have reviewed the findings, diagnosis, plan and need for follow up with the patient.    New Prescriptions    No medications on file       Final diagnoses:   Chronic a-fib (H)   Atrial fibrillation with RVR (H)     OLI Oliveros MD  [facility] EMERGENCY DEPARTMENT  5/14/2025    --    ED Attending Physician Attestation    I Kosta Marina MD, cared for this patient with the Advanced Practice Provider ([facility]). I personally provided a substantive portion of the care for this patient, including approving the care  plan for the number and complexity of problems addressed and taking responsibility related to the risk of complications and/or morbidity or mortality of patient management. Please see the [facility]'s documentation for full details.          Kosta Marina MD  Emergency Medicine           Kosta Marina MD  05/14/25 1922       Kevin Pepper  05/14/25 1228

## 2025-05-14 NOTE — ED TRIAGE NOTES
From endo   Scheduled for bronch with bx today-arrived in afib -160s  No metop taken today, took 50mg last night at 7pm  Per pt, takes Nattokinase, a japanese enzyme, as a blood thinner   Triage Assessment (Adult)       Row Name 05/14/25 1441          Triage Assessment    Airway WDL WDL        Respiratory WDL    Respiratory WDL X;rhythm/pattern     Rhythm/Pattern, Respiratory shortness of breath        Skin Circulation/Temperature WDL    Skin Circulation/Temperature WDL WDL        Cardiac WDL    Cardiac WDL X;rhythm     Pulse Rate & Regularity apical pulse irregular     Cardiac Rhythm Atrial fibrillation        Peripheral/Neurovascular WDL    Peripheral Neurovascular WDL WDL        Cognitive/Neuro/Behavioral WDL    Cognitive/Neuro/Behavioral WDL WDL

## 2025-05-14 NOTE — PROGRESS NOTES
May 14, 2025    Return visit    CC: blood in urine    A/P:   Cordell Donaldson is a 75 year old F with blood in urine on 5/2/25. She does not have a history of either juany nor microhematuria. She does have a mild urethral caruncle/prolapse, and it is reasonable to think that increased work and friction/shearing force during hike could have irritated this highly vascular urothelium, producing the blood seen in urine.     Encounter Diagnoses   Name Primary?    Hematuria, unspecified type     Microhematuria Yes    Gross hematuria     Vaginal atrophy     Urethral caruncle     Narrowing or closure of vagina      Cordell was seen today for new patient.    Diagnoses and all orders for this visit:    Microhematuria  -     UA with Microscopic reflex to Culture  -     Urine Culture Aerobic Bacterial  -     UA Macroscopic with reflex to Microscopic and Culture; Future  -     Urine Culture Aerobic Bacterial; Future    Hematuria, unspecified type  -     Adult Urology  Referral    Gross hematuria    Vaginal atrophy    Urethral caruncle    Narrowing or closure of vagina      Hematuria: 1 occurrence of juany + micro  Today we discussed that her hematuria spontaneously resolved with no recurrence  - pelvic exam wnl today, urethral caruncle unchanged  - UA micro and CS pending today  - FUTURE order for 1 UA micro / CS on an asymptomatic day (no blood seen in urine, no UTI s/s)  - If both urine samples are RBC negative, we do not need a cystoscopy  - If RBC in urine samples, schedule cystoscopy at [facility] with Dr. Moss or Master    No CT Urogram at this time as she has a head to toe CT scheduled     Vaginal atrophy / vaginal narrowing s/p vulvar rads / urethral caruncle  - relayed chat/mychart message to ONC RN re: plz review benefits of local low dose vulvovaginal hormones  - CONTINUE use of vaginal dilator    Subjective  Cordell Donaldson returns today for evaluation of juany blood seen in urine on 5/2, which resolved on  5/5. She did follow up with PCP on 5/6 and referral to Urogyn placed for continued evaluation. Since her last visit she travelled in [place], developed chronic cough/voice change and chest CT 5/12/25 is concerning for metastatic findings (plan is biopsy today).     Cordell says she exerted herself 5/1 on a difficult, fast paced hike then went to a demonstration. That day she saw scant blood on toilet paper after urination and on 5/2 she saw that her urine was red. It did self resolve after she rested. It has not recurred. She notes that she has been using her dilator/expander vaginally 2 x weekly and there has been no blood on it after use, she is confident the blood came from her urethra or bladder.     She is not too concerned about it and declines writer's offer of CT urogram, stating her ONC team told her they are ordering full body CT soon related to concerns for metastasis.     5/2/25: Alok Hematuria, microhematuria  5/2/25 RBC >100/HPF    This note was copied and pasted from Dr Garza on 1/21/25  CC: weak urine stream and prolonged bladder emptying time     A/P: Cordell Donaldson is a 74 year old F with medical hx significant for constipation and rectocele, breast cancer (2003; lumpectomy left, radiation, 5 yr of tamoxifen) and cervical CINIII and surgical history significant for L mastectomy and FRANCIA.        Encounter Diagnoses   Name Primary?    Narrowing or closure of vagina Yes    Vaginal atrophy      Urethral caruncle        Cordell was seen today for consult.     Diagnoses and all orders for this visit:     Narrowing or closure of vagina     Vaginal atrophy     Urethral caruncle     Other orders  -     Adult Uro/Gyn  Referral        1) Vaginal introitus narrowed: today we discussed that s/p vulvar radiation she has remodelled vaginal tissue with narrowed vaginal opening as well as shortened vaginal canal. For this reason it is important to restart use of vaginal dilators  - start by using them  5 minutes 2 x week with vaginal moisturizer like bionourish (good clean love) or a silicone lubricant like uberlube  - slowly advance time of use until back to guideline of 20 minutes per week (or 10  minutes twice weekly)     2) Vulvovaginal atrophy: thinning skin and mucosa r/t low estrogen state. Given that she continues cancer surveillance s/p recurrent cervical CIN3, it is not safe to introduce any systemic estrogen, however there is good data to support that estradiol vaginal cream and intrarosa (DHEA) vaginal cream act locally, and do not cause detectable hormone levels. However, many oncologists use caution and do not re-introduce hormones even locally. Consider a conversation with oncology regarding risks/benefits of using hormones at the vulva/vagina to alleviate severe vestibular and vaginal atrophy, and to assist in maintaining vaginal introitus.      - Alternatively use vaginal moisturizures and silicone lubricant as well as vibrator to continue to moisturize the vagina and encourage blood flow to vulvovaginal anatomy     3) Urethral caruncle: exam shows mild urothelial prolapse today with some shiny red tissue outside of the urethral meatus. It is not causing any symptoms and does not require treatment.      We discussed that at this time both history and physical exam provide reassurance that things are healthy. Cordell voids within 30 seconds, senses complete emptying and our PVR shows 0 mL, telling us that her bladder empties completely. Cordell has good bladder capacity as evidenced by adequate hydration and voiding every 2+ hours. Nocturia is worst with avoidable bladder triggers and well controlled with restricting free fluids 2 hrs before sleep.      No need to return for follow up unless new symptoms become bothersome.      HPI:  Cordell Donaldson is a 74 year old F with medical hx significant for breast cancer (2003; s/p left lumpectomy, radiation, L breast mastectomy, 5 yr of tamoxifen) and  cervical CINIII (s/p FRANCIA) who presents for evaluation of weak urine stream and sense of slow urination with prolonged emptying time.      She urinates every 2 hrs daytime. She urinates during this visit and timed the voiding at about 25 seconds. She limits bladder triggers.      A few months ago Cordell became concerned it was taking her longer than usual to empty her bladder. She sensed slower urination and would bear down and push to empty. Her concern was that she was taking too long in the bathroom. Then she told herself to relax and just let herself urinate, and this sensation has improved. She is not sure if things are healthy in her pelvis. She followed up with her oncology team, was offered pelvic imaging to rule out recurrence, and decided not to seek imaging  as she is focused on quality of life at this time and concludes imaging would not convince her to adopt a treatment-orientation, at least at this time.      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 Surveillance visit.     History of Present Illness:   Cordell Donaldson is a 74 year old initially referred to the Gynecologic Cancer Clinic at the [facility] on 7/5/2022 by gynecologic oncologist Dr. Arciniega and radiation oncologist Dr. Stockton for management of a pelvic mass due to recurrent cervical cancer vs new vaginal cancer. History as follows:      Breast Cancer History:  2003: Left breast cancer.  -Lumpectomy.  -Radiation therapy.     [id number]: BRENNAN.      7/1/22: PET/CT to stage vaginal cancer (see below).   Mild soft tissue  thickening in the left retropectoral region with an SUV max of 2.7.  7/15/22: Invasive ductal carcinoma.   -Managed by Dr. Montana. Endocrine therapy with  anastozole recommended after completion of therapy for the vaginal cancer, but Cordell declined, prefers to focus on quality of life.      Cervical/Vaginal Dysplasia/Cancer History:  10/24/12:   -Endocervical curettings: Squamous carcinoma, invasion cannot be assessed.   -Ectocervical biopsies: HSIL (CIN3).   -Vaginal biopsy, posterior: HSIL (VaIN3).  12/2012: Robotic-assisted laparoscopic lysis of adhesions, left ureterolysis with  conversion to laparotomy, total abdominal hysterectomy, and an upper  Vaginectomy by gynecologic oncologist Dr. Arciniega.   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      7/22/13: Vaginal biopsy: HSIL, cannot exclude invasion.   -Consultation with gynecologic oncologist Dr. Johnson. Upper vaginectomy and CO2 laser ablation recommended, patient declined.   [id number]: No vaginal dysplasia treatment.   5/20/22: Pelvic MRI: I have personally reviewed the MRI images.   Centrally hypoenhancing heterogeneous midline pelvic mass along  the superior margin of the vaginal vault measures 8.5 x 6.7 x 8.1 cm.  The mass abut the bladder and there is mucosal irregularity at the  site of abutment. The mass indents the  decompressed rectum but there is no definite invasion or mucosal  Irregularity.  6/9/22: CT-guided biopsy of pelvic mass: Poorly-differentiated squamous cell carcinoma, HPV-associated.   7/1/22: Staging PET/CT: Stage III vaginal cancer (vs recurrent, metastatic cervical cancer).      7/20/22, 8/10/22, 8/31/22,9/21/22, 10/26/22, 11/21/22: Cycles #1-6 of first-line chemotherapy with IV platinum + paclitaxel + bevacizumab 15 mg/kg + pembrolizumab 200 mg (added cycle 3) every 21 days.   -Cycles 1-4: Cisplatin 50 mg/m2, paclitaxel 175 mg/m2.   -Cycle 3:  Pembrolizumab added to all subsequent cycles due to PD-L1+ result per the KEYNOTE-826 regimen.  -9/22/22: PET/CT: Partial response. I have personally reviewed the PET/CT images.  "  -Discussed with radiation oncologist Dr. Stockton and at the gyn onc/radiation oncology tumor conference. Recommendation for additional chemotherapy prior to radiation therapy.   -Cycles 5+: Platinum changed to carboplatin due to tinnitus, and paclitaxel dose-reduced to 135 mg/m2 due to peripheral neuropathy.   -12/5/22: PET/CT: Partial response. I have personally reviewed the PET/CT images.   12/28/22-2/15/23: First-line pelvic external beam radiation therapy with total dose 4500 cGy in 25 fractions then vaginal cuff brachytherapy with total dose 2500 cGy in 5 fractions. No concurrent chemosensitization due to myelosuppression and decreased patient tolerance of therapy.   -Patient declined maintenance therapy due to significantly decreased quality of life while on systemic therapy.   -6/8/23: PET/CT: No definitive evidence of disease. Ascites with imaging concerning for cirrhotic changes.  -9/13/23: Abdominal ultrasound: Negative for cirrhosis.   -Ascites resolved, attributed to pembrolizumab.      Genetic/Genomic/Molecular Testing History:  2006: Negative for germline BRCA1, BRCA2 pathogenic variants.      8/14/22 (specimen from 6/9/22): [facility] Testing.   -PD-L1+ (CPS 10)  -Mismatch repair deficient/microsatellite instability-high  -TMB high (53 mut/Mb)  -NTRK 1/2/3 fusion not detected.      Subjective:  Cordell returns to the gyn onc clinic today for her scheduled surveillance visit, unaccompanied.   Maritza traveled through [place] x3 weeks, and was able to control her bowel movements and urination well. However, intermittently since October she has noted that it takes her a long time to void. She needs to sit in the bathroom for long periods of time due to a \"slow stream.\" No pain.   She continues to work with a homeopathic provider, who provided a remedy for her urinary stream. She does think this is helping it to improve.   No vaginal bleeding. Otherwise feels well.     Past Medical History:  Past Medical " History   sical Exam:    /77 (BP Location: Right arm, Patient Position: Sitting, Cuff Size: Adult Regular)   Pulse 89   Temp 97.7  F (36.5  C) (Oral)   Resp 16   Wt 62.8 kg (138 lb 6.4 oz)   LMP  (LMP Unknown)   SpO2 99%   BMI 25.11 kg/m     Body mass index is 25.11 kg/m .     Genitourinary:External genitalia grossly normal. Medium Danuta speculum inserted ~3-4 cm, with limited view of the vagina normal with radiation changes. On bimanual exam, there is a ridge of smooth but firm tissue anteriorly just posterior to the urethra; Cordell states it has always been there and does not think additional evaluation is needed. Remainder of the palpable vagina (3-4 cm) is smooth and soft. Rectovaginal exam reveals a smooth rectovaginal septum.   Verbal consent obtained from the patient for the pelvic exam.   Each step of the exam was verbalized throughout.  Present for the exam: GEOFF Horton.       Assessment:  Cordell Donaldson is a 74 year old patient with a diagnosis of stage III squamous cell carcinoma of the vagina (vs recurrent metastatic cervical cancer) s/p primary therapy without gross evidence of disease.      Urinary hesitancy, intermittent, most likely due to radiation changes, less likely recurrent disease.      Medical history significant for FIGO Stage IA1 squamous cell carcinoma of the cervix s/p hysterectomy with no residual disease, and a long history of vaginal HSIL.     Diagnosis of recurrent breast cancer during vaginal cancer treatment.      Plan:   1.)    Squamous cell carcinoma: Recommend pelvic MRI to further evaluate for recurrent disease as a cause of the urinary hesitancy, but Cordell would like to avoid imaging, and will think about it. Referral to urology for additional evaluation.     Plan as follows:   -Cordell will return to the gyn onc clinic in 6 months for her next surveillance visit.   2.)    Breast cancer: Continue management per medical oncologist Dr. Montana.  Radiographic response of breast cancer to current chemotherapy. endocrine therapy with anastrozole recommended, but patient declined, favoring focus on quality of life.      3.)Genetic risk factors were assessed and the patient does not meet the qualifications for a referral.       4.)Labs and/or tests ordered include: None.                 5.)  Health maintenance issues addressed today include: continuing routine healthcare maintenance with her primary care provider.      6.)  Code status:  Full-code.     7.)Prescriptions: None.     Hina Raygoza MD, MS, FACOG, FACS  12/27/2024  10:50 AM            Urinary Symptoms/Voiding function  - daytime voids: every 2 hours   - nocturia: 1- rarely 4 x; on a 'bad' night she does not restrict water 2 hours before bed and then will get up a lot. Some is residual trauma from chemo/rads.   - bladder irritants: 6 oz carbonated beverages evening; 2 c coffee am   - hydration: two 20 oz water / day      Pelvic Organ Prolapse Symptoms  - none      Gastrointestinal Symptoms:  - no constipation      Sexual function/Pelvic floor pain/GYN:   - active with vibrator which is pleasurable     Relevant Medical History:    Diabetes? no  High Blood pressure? no     Recurrent UTIs? no  Sleep Apnea? no  Obesity? no  History of Blood clots? no  Other medical problems: see above     LMP  (LMP Unknown)  No LMP recorded (lmp unknown). Patient has had a hysterectomy. There is no height or weight on file to calculate BMI.  General: pleasant female in no acute distress  Psych: normal mentation, well oriented  Respiratory:  Unlabored breathing  Musculoskeletal: no gross deformities     Pelvic Exam:  Clitoris: garcia mobile with no adhesions or phimosis, no keratin pearls  Vulva: No external lesions, normal hair distribution, normal architecture, no hypopigmentation or skin changes  Vestibule: mucosa pale pink moist intact with no lesions, swelling or injury  Urethra: with caruncle - mild asymptomatic  "urothelial prolapse is not friable to touch.  Urethral sponge is not tender to palpation  Bladder: non tender to palpation with single digit vaginal exam   Vagina: dry pale pink flat walls with no lesions. Narrowed vaginal introitus Shortened vagina approximately 4 cm depth with no s/s trauma, no   Cervix: surgically absent  Uterus: surgically absent  Anus: skin intact with no lesions, fissures or external hemorrhoids     Pelvic floor strength: 3/5 kegels.    Pelvic floor muscles: can flex and relax fully      Voiding trial:     VOID 30 ml  PVR 0 mL by Bladder ultrasound  Leak with Cough stress test : no, no prolapse     Objective  /79   Pulse 78   Ht 1.588 m (5' 2.52\")   LMP  (LMP Unknown)   BMI 22.12 kg/m    General: pleasant female in no acute distress  Psych: normal mentation, well oriented  Respiratory:  Unlabored breathing  Musculoskeletal: no gross deformities    Pelvic Exam:  Vestibule: mucosa pale pink moist intact with no lesions, swelling or injury  Urethra: mild asymptomatic caruncle - about 1-2mm of urothelial prolapse outside meatus unchanged from last assessment, is non friable and non painful to palpation.  Urethral sponge is not tender to palpation  Bladder: non tender to palpation with single digit vaginal exam   Vagina: dry pale pink flat walls with no lesions. Narrowed vaginal introitus mildly improved today r/t consistent dilator use.  Shortened vagina approximately 4 cm depth with no s/s trauma, no bleeding, no open areas  Cervix: surgically absent  Uterus: surgically absent  Anus: skin intact with no lesions, fissures or external hemorrhoids    I spent a total of 30  minutes with Cordell Donaldson  on the date of the encounter in chart review, face to face patient visit, review of tests, documentation and/or discussion with other providers about the issues documented above.    Candi ALFARO  Female Pelvic Medicine and Reconstructive Surgery ( Urogynecology )    CC  Patient " Care Team:  Merry Lino MD as PCP - General (Internal Medicine)  Bess Paredes MD Corfield, Aaron Daniel, DPM as MD (Podiatry)  Elise Huitron MD as Assigned Heart and Vascular Provider  Merry Lino MD as Assigned PCP  Nany, Heather Munguia MD as MD (Urology)  Angela Montana MD as MD (Hematology & Oncology)  Yvette Pike MD as MD (Surgery)  Nurys Horton RN as Specialty Care Coordinator (Hematology & Oncology)  Felicia Thompson PA-C as Physician Assistant (Anesthesiology)  Valeria Spencer MD as MD (Gastroenterology)  Yulisa Aparicio MD as MD (Dermatology)  Shabbir Thao MD as MD (OB/Gyn)  Thang Singer APRN CNP as Nurse Practitioner  Bert Rivera RN as Specialty Care Coordinator (Cardiology)  Yulisa Aparicio MD as Assigned Dermatology Provider  Angela Montana MD as Assigned Cancer Care Provider  Monique Bocanegra APRN CNP as Pulmonologist (Pulmonary Disease)  Thang Singer APRN CNP as Assigned OBGYN Provider  Lebron Hardwick MD as MD (Otolaryngology)  Thang Singer APRN CNP as Nurse Practitioner (OB/Gyn)  Zuleyka Manuel, RN as Specialty Care Coordinator  Kacie Morgan RN as Specialty Care Coordinator  THANG SINGER

## 2025-05-14 NOTE — PROGRESS NOTES
Brief IP note:     Was by ER that Afib in under control now. Will try to bronch her tomorrow. Please keep her NPO after midnight and hold anticoagulation.

## 2025-05-14 NOTE — PROGRESS NOTES
Patient seen in pre op. HR consistently in the 150s, looks like A fib. She does have a history of this. Not on AC. Takes a Japanese supplement. Clinically, she is short of breath but this could be due to her bronchial stenosis. Doing her procedure under conscious sedation would be unsafe right now with her HR in the 150s. I spoke to the ED doc at the [place] and lewis end her down for evaluation. She does have lopressor that she takes when she feels her heart rate is fast.

## 2025-05-14 NOTE — ED NOTES
Bed: ED03  Expected date:   Expected time:   Means of arrival:   Comments:  Endo patient a fib RVR

## 2025-05-14 NOTE — MEDICATION SCRIBE - ADMISSION MEDICATION HISTORY
Medication Scribe Admission Medication History    Admission medication history is complete. The information provided in this note is only as accurate as the sources available at the time of the update.    Information Source(s): Patient via in-person    Pertinent Information: Cordell reports no changes in current prescribed medications. Patient denies taking any other medications. Dispense report and outside medication reconciliation list have been reviewed.     Changes made to PTA medication list:  Added:   UNABLE TO FIND: Nattokinase (OTC)  Deleted: None  Changed: None    Allergies reviewed with patient and updates made in EHR: yes    Medication History Completed By: Katherine Jeronimo 5/14/2025 4:53 PM    PTA Med List   Medication Sig Last Dose/Taking    Bacillus Coagulans-Inulin (PROBIOTIC FORMULA) 1-250 BILLION-MG CAPS Take by mouth every morning 25+billion CFUS 5/13/2025    Cholecalciferol (VITAMIN D-3) 25 MCG (1000 UT) CAPS Take 1,000 Units by mouth every morning 5/13/2025    coenzyme Q-10 capsule Take 1 capsule by mouth daily 5/13/2025    famotidine (PEPCID) 20 MG tablet Take 1 tablet (20 mg) by mouth 2 times daily. 5/14/2025 at  5:45 AM    levothyroxine (SYNTHROID/LEVOTHROID) 112 MCG tablet TAKE 1 TABLET (112 MCG) BY MOUTH DAILY BEFORE BREAKFAST 5/14/2025 at  5:45 AM    MAGNESIUM OXIDE PO Take 400 mg by mouth daily 5/13/2025    metoprolol succinate ER (TOPROL XL) 50 MG 24 hr tablet Take 1 tablet (50 mg) by mouth daily. 5/13/2025 at  7:00 PM    Omega-3 Fatty Acids (OMEGA-3 FISH OIL PO) Take 630 mg by mouth 2 times daily 5/13/2025    OVER-THE-COUNTER Turkey tail mushroom powder, use 1 times a day 5/13/2025    Pectin Cit-Inos-C-Bioflav-Soy (MODIFIED CITRUS PECTIN PO) Take by mouth 2 times daily 5/13/2025    UNABLE TO FIND Take by mouth daily. MEDICATION NAME: Nattokinase 5/13/2025    UNABLE TO FIND 500 mg MEDICATION NAME: Turmeric 5/13/2025    UNABLE TO FIND 1,200 mg 2 times daily MEDICATION NAME: Efraín  5/13/2025    UNABLE TO FIND Take 1 tablet by mouth every morning MEDICATION NAME: Dinndolylmethane Complex (DIM) 100mg tablet 1 table daily 5/13/2025

## 2025-05-14 NOTE — LETTER
5/14/2025       RE: Cordell Donaldson  [address]  [facility] [address]     Dear Colleague,    Thank you for referring your patient, Cordell Donaldson, to the [facility] [place] at [facility]. Please see a copy of my visit note below.    May 14, 2025    Return visit    CC: blood in urine    A/P:   Cordell Donaldson is a 75 year old F with blood in urine on 5/2/25. She does not have a history of either juany nor microhematuria. She does have a mild urethral caruncle/prolapse, and it is reasonable to think that increased work and friction/shearing force during hike could have irritated this highly vascular urothelium, producing the blood seen in urine.     Encounter Diagnoses   Name Primary?     Hematuria, unspecified type      Microhematuria Yes     Gross hematuria      Vaginal atrophy      Urethral caruncle      Narrowing or closure of vagina      Cordell was seen today for new patient.    Diagnoses and all orders for this visit:    Microhematuria  -     UA with Microscopic reflex to Culture  -     Urine Culture Aerobic Bacterial  -     UA Macroscopic with reflex to Microscopic and Culture; Future  -     Urine Culture Aerobic Bacterial; Future    Hematuria, unspecified type  -     Adult Urology  Referral    Gross hematuria    Vaginal atrophy    Urethral caruncle    Narrowing or closure of vagina      Hematuria: 1 occurrence of juany + micro  Today we discussed that her hematuria spontaneously resolved with no recurrence  - pelvic exam wnl today, urethral caruncle unchanged  - UA micro and CS pending today  - FUTURE order for 1 UA micro / CS on an asymptomatic day (no blood seen in urine, no UTI s/s)  - If both urine samples are RBC negative, we do not need a cystoscopy  - If RBC in urine samples, [place] [facility] [place]    No CT Urogram at this time as she has a head to toe CT scheduled     Vaginal  atrophy / vaginal narrowing s/p vulvar rads / urethral caruncle  - relayed chat/mychart message to ONC RN re: plz review benefits of local low dose vulvovaginal hormones  - CONTINUE use of vaginal dilator    Subjective  Cordell Donaldson returns today for evaluation of juany blood seen in urine on 5/2, which resolved on 5/5. She did follow up with PCP on 5/6 and referral to Urogyn placed for continued evaluation. Since her last visit she travelled in [place], developed chronic cough/voice change and chest CT 5/12/25 is concerning for metastatic findings (plan is biopsy today).     Cordell says she exerted herself 5/1 on a difficult, fast paced hike then went to a demonstration. That day she saw scant blood on toilet paper after urination and on 5/2 she saw that her urine was red. It did self resolve after she rested. It has not recurred. She notes that she has been using her dilator/expander vaginally 2 x weekly and there has been no blood on it after use, she is confident the blood came from her urethra or bladder.     She is not too concerned about it and declines writer's offer of CT urogram, stating her ONC team told her they are ordering full body CT soon related to concerns for metastasis.     5/2/25: Juany Hematuria, microhematuria  5/2/25 RBC >100/HPF    This note was copied and pasted from Dr Garza on 1/21/25  CC: weak urine stream and prolonged bladder emptying time     A/P: Cordell Donaldson is a 74 year old F with medical hx significant for constipation and rectocele, breast cancer (2003; lumpectomy left, radiation, 5 yr of tamoxifen) and cervical CINIII and surgical history significant for L mastectomy and FRANCIA.        Encounter Diagnoses   Name Primary?     Narrowing or closure of vagina Yes     Vaginal atrophy       Urethral caruncle        Cordell was seen today for consult.     Diagnoses and all orders for this visit:     Narrowing or closure of vagina     Vaginal atrophy     Urethral caruncle      Other orders  -     Adult Uro/Gyn  Referral        1) Vaginal introitus narrowed: today we discussed that s/p vulvar radiation she has remodelled vaginal tissue with narrowed vaginal opening as well as shortened vaginal canal. For this reason it is important to restart use of vaginal dilators  - start by using them 5 minutes 2 x week with vaginal moisturizer like bionourish (good clean love) or a silicone lubricant like uberlube  - slowly advance time of use until back to guideline of 20 minutes per week (or 10  minutes twice weekly)     2) Vulvovaginal atrophy: thinning skin and mucosa r/t low estrogen state. Given that she continues cancer surveillance s/p recurrent cervical CIN3, it is not safe to introduce any systemic estrogen, however there is good data to support that estradiol vaginal cream and intrarosa (DHEA) vaginal cream act locally, and do not cause detectable hormone levels. However, many oncologists use caution and do not re-introduce hormones even locally. Consider a conversation with oncology regarding risks/benefits of using hormones at the vulva/vagina to alleviate severe vestibular and vaginal atrophy, and to assist in maintaining vaginal introitus.      - Alternatively use vaginal moisturizures and silicone lubricant as well as vibrator to continue to moisturize the vagina and encourage blood flow to vulvovaginal anatomy     3) Urethral caruncle: exam shows mild urothelial prolapse today with some shiny red tissue outside of the urethral meatus. It is not causing any symptoms and does not require treatment.      We discussed that at this time both history and physical exam provide reassurance that things are healthy. Cordell voids within 30 seconds, senses complete emptying and our PVR shows 0 mL, telling us that her bladder empties completely. Cordell has good bladder capacity as evidenced by adequate hydration and voiding every 2+ hours. Nocturia is worst with avoidable bladder  triggers and well controlled with restricting free fluids 2 hrs before sleep.      No need to return for follow up unless new symptoms become bothersome.      HPI:  Cordell Donaldson is a 74 year old F with medical hx significant for breast cancer (2003; s/p left lumpectomy, radiation, L breast mastectomy, 5 yr of tamoxifen) and cervical CINIII (s/p FRANCIA) who presents for evaluation of weak urine stream and sense of slow urination with prolonged emptying time.      She urinates every 2 hrs daytime. She urinates during this visit and timed the voiding at about 25 seconds. She limits bladder triggers.      A few months ago Cordell became concerned it was taking her longer than usual to empty her bladder. She sensed slower urination and would bear down and push to empty. Her concern was that she was taking too long in the bathroom. Then she told herself to relax and just let herself urinate, and this sensation has improved. She is not sure if things are healthy in her pelvis. She followed up with her oncology team, was offered pelvic imaging to rule out recurrence, and decided not to seek imaging  as she is focused on quality of life at this time and concludes imaging would not convince her to adopt a treatment-orientation, at least at this time.      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 Surveillance visit.     History of Present Illness:   Cordell Donaldson is a 74 year old initially referred to the Gynecologic Cancer Clinic at the [facility] on 7/5/2022 by gynecologic oncologist Dr. Arciniega and radiation oncologist Dr. Stockton for management of a pelvic mass due to recurrent cervical cancer vs new vaginal cancer. History as follows:      Breast  Cancer History:  2003: Left breast cancer.  -Lumpectomy.  -Radiation therapy.     1520-9060: BRENNAN.      7/1/22: PET/CT to stage vaginal cancer (see below).   Mild soft tissue  thickening in the left retropectoral region with an SUV max of 2.7.  7/15/22: Invasive ductal carcinoma.   -Managed by Dr. Montana. Endocrine therapy with anastozole recommended after completion of therapy for the vaginal cancer, but Cordell declined, prefers to focus on quality of life.      Cervical/Vaginal Dysplasia/Cancer History:  10/24/12:   -Endocervical curettings: Squamous carcinoma, invasion cannot be assessed.   -Ectocervical biopsies: HSIL (CIN3).   -Vaginal biopsy, posterior: HSIL (VaIN3).  12/2012: Robotic-assisted laparoscopic lysis of adhesions, left ureterolysis with  conversion to laparotomy, total abdominal hysterectomy, and an upper  Vaginectomy by gynecologic oncologist Dr. Arciniega.   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      7/22/13: Vaginal biopsy: HSIL, cannot exclude invasion.   -Consultation with gynecologic oncologist Dr. Johnson. Upper vaginectomy and CO2 laser ablation recommended, patient declined.   [id number]: No vaginal dysplasia treatment.   5/20/22: Pelvic MRI: I have personally reviewed the MRI images.   Centrally hypoenhancing heterogeneous midline pelvic mass along  the superior margin of the vaginal vault measures 8.5 x 6.7 x 8.1 cm.  The mass abut the bladder and there is mucosal irregularity at the  site of abutment. The mass indents the  decompressed rectum but there is no definite invasion or mucosal  Irregularity.  6/9/22: CT-guided biopsy of pelvic mass: Poorly-differentiated squamous cell carcinoma, HPV-associated.   7/1/22: Staging PET/CT: Stage III vaginal cancer (vs recurrent, metastatic cervical cancer).      7/20/22, 8/10/22, 8/31/22,9/21/22, 10/26/22, 11/21/22: Cycles #1-6 of first-line chemotherapy with IV platinum +  paclitaxel + bevacizumab 15 mg/kg + pembrolizumab 200 mg (added cycle 3) every 21 days.   -Cycles 1-4: Cisplatin 50 mg/m2, paclitaxel 175 mg/m2.   -Cycle 3:  Pembrolizumab added to all subsequent cycles due to PD-L1+ result per the KEYNOTE-826 regimen.  -9/22/22: PET/CT: Partial response. I have personally reviewed the PET/CT images.   -Discussed with radiation oncologist Dr. Stockton and at the gyn onc/radiation oncology tumor conference. Recommendation for additional chemotherapy prior to radiation therapy.   -Cycles 5+: Platinum changed to carboplatin due to tinnitus, and paclitaxel dose-reduced to 135 mg/m2 due to peripheral neuropathy.   -12/5/22: PET/CT: Partial response. I have personally reviewed the PET/CT images.   12/28/22-2/15/23: First-line pelvic external beam radiation therapy with total dose 4500 cGy in 25 fractions then vaginal cuff brachytherapy with total dose 2500 cGy in 5 fractions. No concurrent chemosensitization due to myelosuppression and decreased patient tolerance of therapy.   -Patient declined maintenance therapy due to significantly decreased quality of life while on systemic therapy.   -6/8/23: PET/CT: No definitive evidence of disease. Ascites with imaging concerning for cirrhotic changes.  -9/13/23: Abdominal ultrasound: Negative for cirrhosis.   -Ascites resolved, attributed to pembrolizumab.      Genetic/Genomic/Molecular Testing History:  2006: Negative for germline BRCA1, BRCA2 pathogenic variants.      8/14/22 (specimen from 6/9/22): [facility] Testing.   -PD-L1+ (CPS 10)  -Mismatch repair deficient/microsatellite instability-high  -TMB high (53 mut/Mb)  -NTRK 1/2/3 fusion not detected.      Subjective:  Cordell returns to the gyn onc clinic today for her scheduled surveillance visit, unaccompanied.   Mairtza traveled through [place] x3 weeks, and was able to control her bowel movements and urination well. However, intermittently since October she has noted that it takes her a long time  "to void. She needs to sit in the bathroom for long periods of time due to a \"slow stream.\" No pain.   She continues to work with a homeopathic provider, who provided a remedy for her urinary stream. She does think this is helping it to improve.   No vaginal bleeding. Otherwise feels well.     Past Medical History:  Past Medical History   sical Exam:    /77 (BP Location: Right arm, Patient Position: Sitting, Cuff Size: Adult Regular)   Pulse 89   Temp 97.7  F (36.5  C) (Oral)   Resp 16   Wt 62.8 kg (138 lb 6.4 oz)   LMP  (LMP Unknown)   SpO2 99%   BMI 25.11 kg/m     Body mass index is 25.11 kg/m .     Genitourinary:External genitalia grossly normal. Medium Danuta speculum inserted ~3-4 cm, with limited view of the vagina normal with radiation changes. On bimanual exam, there is a ridge of smooth but firm tissue anteriorly just posterior to the urethra; Cordell states it has always been there and does not think additional evaluation is needed. Remainder of the palpable vagina (3-4 cm) is smooth and soft. Rectovaginal exam reveals a smooth rectovaginal septum.   Verbal consent obtained from the patient for the pelvic exam.   Each step of the exam was verbalized throughout.  Present for the exam: GEOFF Horton.       Assessment:  Cordell Donaldson is a 74 year old patient with a diagnosis of stage III squamous cell carcinoma of the vagina (vs recurrent metastatic cervical cancer) s/p primary therapy without gross evidence of disease.      Urinary hesitancy, intermittent, most likely due to radiation changes, less likely recurrent disease.      Medical history significant for FIGO Stage IA1 squamous cell carcinoma of the cervix s/p hysterectomy with no residual disease, and a long history of vaginal HSIL.     Diagnosis of recurrent breast cancer during vaginal cancer treatment.      Plan:   1.)    Squamous cell carcinoma: Recommend pelvic MRI to further evaluate for recurrent disease as a cause of the " urinary hesitancy, but Cordell would like to avoid imaging, and will think about it. Referral to urology for additional evaluation.     Plan as follows:   -Cordell will return to the gyn onc clinic in 6 months for her next surveillance visit.   2.)    Breast cancer: Continue management per medical oncologist Dr. Montana. Radiographic response of breast cancer to current chemotherapy. endocrine therapy with anastrozole recommended, but patient declined, favoring focus on quality of life.      3.)Genetic risk factors were assessed and the patient does not meet the qualifications for a referral.       4.)Labs and/or tests ordered include: None.                 5.)  Health maintenance issues addressed today include: continuing routine healthcare maintenance with her primary care provider.      6.)  Code status:  Full-code.     7.)Prescriptions: None.     Hnia Raygoza MD, MS, FACOG, FACS  12/27/2024  10:50 AM            Urinary Symptoms/Voiding function  - daytime voids: every 2 hours   - nocturia: 1- rarely 4 x; on a 'bad' night she does not restrict water 2 hours before bed and then will get up a lot. Some is residual trauma from chemo/rads.   - bladder irritants: 6 oz carbonated beverages evening; 2 c coffee am   - hydration: two 20 oz water / day      Pelvic Organ Prolapse Symptoms  - none      Gastrointestinal Symptoms:  - no constipation      Sexual function/Pelvic floor pain/GYN:   - active with vibrator which is pleasurable     Relevant Medical History:    Diabetes? no  High Blood pressure? no     Recurrent UTIs? no  Sleep Apnea? no  Obesity? no  History of Blood clots? no  Other medical problems: see above     LMP  (LMP Unknown)  No LMP recorded (lmp unknown). Patient has had a hysterectomy. There is no height or weight on file to calculate BMI.  General: pleasant female in no acute distress  Psych: normal mentation, well oriented  Respiratory:  Unlabored breathing  Musculoskeletal: no gross deformities    "  Pelvic Exam:  Clitoris: garcia mobile with no adhesions or phimosis, no keratin pearls  Vulva: No external lesions, normal hair distribution, normal architecture, no hypopigmentation or skin changes  Vestibule: mucosa pale pink moist intact with no lesions, swelling or injury  Urethra: with caruncle - mild asymptomatic urothelial prolapse is not friable to touch.  Urethral sponge is not tender to palpation  Bladder: non tender to palpation with single digit vaginal exam   Vagina: dry pale pink flat walls with no lesions. Narrowed vaginal introitus Shortened vagina approximately 4 cm depth with no s/s trauma, no   Cervix: surgically absent  Uterus: surgically absent  Anus: skin intact with no lesions, fissures or external hemorrhoids     Pelvic floor strength: 3/5 kegels.    Pelvic floor muscles: can flex and relax fully      Voiding trial:     VOID 30 ml  PVR 0 mL by Bladder ultrasound  Leak with Cough stress test : no, no prolapse     Objective  /79   Pulse 78   Ht 1.588 m (5' 2.52\")   LMP  (LMP Unknown)   BMI 22.12 kg/m    General: pleasant female in no acute distress  Psych: normal mentation, well oriented  Respiratory:  Unlabored breathing  Musculoskeletal: no gross deformities    Pelvic Exam:  Vestibule: mucosa pale pink moist intact with no lesions, swelling or injury  Urethra: mild asymptomatic caruncle - about 1-2mm of urothelial prolapse outside meatus unchanged from last assessment, is non friable and non painful to palpation.  Urethral sponge is not tender to palpation  Bladder: non tender to palpation with single digit vaginal exam   Vagina: dry pale pink flat walls with no lesions. Narrowed vaginal introitus mildly improved today r/t consistent dilator use.  Shortened vagina approximately 4 cm depth with no s/s trauma, no bleeding, no open areas  Cervix: surgically absent  Uterus: surgically absent  Anus: skin intact with no lesions, fissures or external hemorrhoids    I spent a total of 30  " minutes with Cordell Donaldson  on the date of the encounter in chart review, face to face patient visit, review of tests, documentation and/or discussion with other providers about the issues documented above.    Candi ALFARO  Female Pelvic Medicine and Reconstructive Surgery ( Urogynecology )    CC  Patient Care Team:  Merry Lino MD as PCP - General (Internal Medicine)  Bess Paredes MD Corfield, Aaron Daniel, DPM as MD (Podiatry)  Elise Huitron MD as Assigned Heart and Vascular Provider  Merry Lino MD as Assigned PCP  Nany, Heather Munguia MD as MD (Urology)  Angela Montana MD as MD (Hematology & Oncology)  Yvette Pike MD as MD (Surgery)  Nurys Horton RN as Specialty Care Coordinator (Hematology & Oncology)  Felicia Thompson PA-C as Physician Assistant (Anesthesiology)  Valeria Spencer MD as MD (Gastroenterology)  Yulisa Aparicio MD as MD (Dermatology)  Shabbir Thao MD as MD (OB/Gyn)  Thang Garza APRN CNP as Nurse Practitioner  Bert Rivera RN as Specialty Care Coordinator (Cardiology)  Yulisa Aparicio MD as Assigned Dermatology Provider  Angela Montana MD as Assigned Cancer Care Provider  Monique Bocanegra APRN CNP as Pulmonologist (Pulmonary Disease)  Thang Garza APRN CNP as Assigned OBGYN Provider  Lebron Hardwick MD as MD (Otolaryngology)  Thang Garza APRN CNP as Nurse Practitioner (OB/Gyn)  Zuleyka Manuel RN as Specialty Care Coordinator  Kacie Morgan RN as Specialty Care Coordinator  THANG GARZA      Again, thank you for allowing me to participate in the care of your patient.      Sincerely,    TE Nguyen CNP

## 2025-05-15 VITALS
TEMPERATURE: 98 F | HEART RATE: 103 BPM | DIASTOLIC BLOOD PRESSURE: 65 MMHG | RESPIRATION RATE: 16 BRPM | OXYGEN SATURATION: 94 % | SYSTOLIC BLOOD PRESSURE: 118 MMHG

## 2025-05-15 PROBLEM — I48.91 ATRIAL FIBRILLATION WITH RVR (H): Status: ACTIVE | Noted: 2025-05-15

## 2025-05-15 LAB
ALBUMIN SERPL BCG-MCNC: 3.2 G/DL (ref 3.5–5.2)
ALP SERPL-CCNC: 100 U/L (ref 40–150)
ALT SERPL W P-5'-P-CCNC: 9 U/L (ref 0–50)
ANION GAP SERPL CALCULATED.3IONS-SCNC: 10 MMOL/L (ref 7–15)
AST SERPL W P-5'-P-CCNC: 18 U/L (ref 0–45)
BACTERIA UR CULT: NORMAL
BASOPHILS # BLD AUTO: 0 10E3/UL (ref 0–0.2)
BASOPHILS NFR BLD AUTO: 0 %
BILIRUB SERPL-MCNC: 0.3 MG/DL
BUN SERPL-MCNC: 5.8 MG/DL (ref 8–23)
CALCIUM SERPL-MCNC: 9.1 MG/DL (ref 8.8–10.4)
CHLORIDE SERPL-SCNC: 97 MMOL/L (ref 98–107)
CREAT SERPL-MCNC: 0.66 MG/DL (ref 0.51–0.95)
EGFRCR SERPLBLD CKD-EPI 2021: >90 ML/MIN/1.73M2
EOSINOPHIL # BLD AUTO: 0.1 10E3/UL (ref 0–0.7)
EOSINOPHIL NFR BLD AUTO: 1 %
ERYTHROCYTE [DISTWIDTH] IN BLOOD BY AUTOMATED COUNT: 16.9 % (ref 10–15)
FOLATE SERPL-MCNC: 9.8 NG/ML (ref 4.6–34.8)
GLUCOSE SERPL-MCNC: 112 MG/DL (ref 70–99)
HCO3 SERPL-SCNC: 23 MMOL/L (ref 22–29)
HCT VFR BLD AUTO: 30.8 % (ref 35–47)
HGB BLD-MCNC: 9.6 G/DL (ref 11.7–15.7)
IMM GRANULOCYTES # BLD: 0.1 10E3/UL
IMM GRANULOCYTES NFR BLD: 1 %
INR PPP: 1.19 (ref 0.85–1.15)
LYMPHOCYTES # BLD AUTO: 0.8 10E3/UL (ref 0.8–5.3)
LYMPHOCYTES NFR BLD AUTO: 7 %
MAGNESIUM SERPL-MCNC: 1.8 MG/DL (ref 1.7–2.3)
MCH RBC QN AUTO: 24.2 PG (ref 26.5–33)
MCHC RBC AUTO-ENTMCNC: 31.2 G/DL (ref 31.5–36.5)
MCV RBC AUTO: 78 FL (ref 78–100)
MONOCYTES # BLD AUTO: 0.5 10E3/UL (ref 0–1.3)
MONOCYTES NFR BLD AUTO: 5 %
NEUTROPHILS # BLD AUTO: 9.2 10E3/UL (ref 1.6–8.3)
NEUTROPHILS NFR BLD AUTO: 87 %
NRBC # BLD AUTO: 0 10E3/UL
NRBC BLD AUTO-RTO: 0 /100
PLATELET # BLD AUTO: 379 10E3/UL (ref 150–450)
POTASSIUM SERPL-SCNC: 4.2 MMOL/L (ref 3.4–5.3)
PROT SERPL-MCNC: 5.9 G/DL (ref 6.4–8.3)
PROTHROMBIN TIME: 15.1 SECONDS (ref 11.8–14.8)
RBC # BLD AUTO: 3.96 10E6/UL (ref 3.8–5.2)
SODIUM SERPL-SCNC: 130 MMOL/L (ref 135–145)
WBC # BLD AUTO: 10.6 10E3/UL (ref 4–11)

## 2025-05-15 PROCEDURE — 250N000011 HC RX IP 250 OP 636: Mod: JZ | Performed by: STUDENT IN AN ORGANIZED HEALTH CARE EDUCATION/TRAINING PROGRAM

## 2025-05-15 PROCEDURE — 85610 PROTHROMBIN TIME: CPT

## 2025-05-15 PROCEDURE — 88344 IMHCHEM/IMCYTCHM EA MLT ANTB: CPT | Mod: TC | Performed by: STUDENT IN AN ORGANIZED HEALTH CARE EDUCATION/TRAINING PROGRAM

## 2025-05-15 PROCEDURE — 88305 TISSUE EXAM BY PATHOLOGIST: CPT | Mod: 26 | Performed by: PATHOLOGY

## 2025-05-15 PROCEDURE — 88360 TUMOR IMMUNOHISTOCHEM/MANUAL: CPT | Mod: 26 | Performed by: PATHOLOGY

## 2025-05-15 PROCEDURE — 250N000009 HC RX 250: Performed by: STUDENT IN AN ORGANIZED HEALTH CARE EDUCATION/TRAINING PROGRAM

## 2025-05-15 PROCEDURE — 82746 ASSAY OF FOLIC ACID SERUM: CPT

## 2025-05-15 PROCEDURE — 31652 BRONCH EBUS SAMPLNG 1/2 NODE: CPT | Performed by: STUDENT IN AN ORGANIZED HEALTH CARE EDUCATION/TRAINING PROGRAM

## 2025-05-15 PROCEDURE — 99222 1ST HOSP IP/OBS MODERATE 55: CPT | Mod: 25 | Performed by: STUDENT IN AN ORGANIZED HEALTH CARE EDUCATION/TRAINING PROGRAM

## 2025-05-15 PROCEDURE — 88342 IMHCHEM/IMCYTCHM 1ST ANTB: CPT | Mod: 26 | Performed by: PATHOLOGY

## 2025-05-15 PROCEDURE — 84295 ASSAY OF SERUM SODIUM: CPT

## 2025-05-15 PROCEDURE — G0378 HOSPITAL OBSERVATION PER HR: HCPCS

## 2025-05-15 PROCEDURE — 99152 MOD SED SAME PHYS/QHP 5/>YRS: CPT | Performed by: STUDENT IN AN ORGANIZED HEALTH CARE EDUCATION/TRAINING PROGRAM

## 2025-05-15 PROCEDURE — 250N000009 HC RX 250

## 2025-05-15 PROCEDURE — 96361 HYDRATE IV INFUSION ADD-ON: CPT

## 2025-05-15 PROCEDURE — 258N000003 HC RX IP 258 OP 636

## 2025-05-15 PROCEDURE — 85025 COMPLETE CBC W/AUTO DIFF WBC: CPT

## 2025-05-15 PROCEDURE — 88344 IMHCHEM/IMCYTCHM EA MLT ANTB: CPT | Mod: 26 | Performed by: PATHOLOGY

## 2025-05-15 PROCEDURE — 83735 ASSAY OF MAGNESIUM: CPT

## 2025-05-15 PROCEDURE — 96376 TX/PRO/DX INJ SAME DRUG ADON: CPT | Mod: XU

## 2025-05-15 PROCEDURE — 82374 ASSAY BLOOD CARBON DIOXIDE: CPT

## 2025-05-15 PROCEDURE — 250N000013 HC RX MED GY IP 250 OP 250 PS 637

## 2025-05-15 PROCEDURE — 36415 COLL VENOUS BLD VENIPUNCTURE: CPT

## 2025-05-15 PROCEDURE — 88173 CYTOPATH EVAL FNA REPORT: CPT | Mod: TC | Performed by: STUDENT IN AN ORGANIZED HEALTH CARE EDUCATION/TRAINING PROGRAM

## 2025-05-15 PROCEDURE — 99238 HOSP IP/OBS DSCHRG MGMT 30/<: CPT | Mod: GC | Performed by: STUDENT IN AN ORGANIZED HEALTH CARE EDUCATION/TRAINING PROGRAM

## 2025-05-15 RX ORDER — LIDOCAINE HYDROCHLORIDE 40 MG/ML
INJECTION, SOLUTION RETROBULBAR PRN
Status: DISCONTINUED | OUTPATIENT
Start: 2025-05-15 | End: 2025-05-15 | Stop reason: HOSPADM

## 2025-05-15 RX ORDER — FENTANYL CITRATE 50 UG/ML
INJECTION, SOLUTION INTRAMUSCULAR; INTRAVENOUS PRN
Status: DISCONTINUED | OUTPATIENT
Start: 2025-05-15 | End: 2025-05-15 | Stop reason: HOSPADM

## 2025-05-15 RX ORDER — METOPROLOL TARTRATE 1 MG/ML
5 INJECTION, SOLUTION INTRAVENOUS EVERY 5 MIN PRN
Status: DISCONTINUED | OUTPATIENT
Start: 2025-05-15 | End: 2025-05-15 | Stop reason: HOSPADM

## 2025-05-15 RX ORDER — LIDOCAINE HYDROCHLORIDE 10 MG/ML
INJECTION, SOLUTION INFILTRATION; PERINEURAL PRN
Status: DISCONTINUED | OUTPATIENT
Start: 2025-05-15 | End: 2025-05-15 | Stop reason: HOSPADM

## 2025-05-15 RX ADMIN — METOPROLOL TARTRATE 5 MG: 5 INJECTION INTRAVENOUS at 06:50

## 2025-05-15 RX ADMIN — METOPROLOL TARTRATE 5 MG: 5 INJECTION INTRAVENOUS at 17:27

## 2025-05-15 RX ADMIN — LEVOTHYROXINE SODIUM 112 MCG: 0.11 TABLET ORAL at 10:08

## 2025-05-15 RX ADMIN — FAMOTIDINE 20 MG: 20 TABLET, FILM COATED ORAL at 09:09

## 2025-05-15 RX ADMIN — SODIUM CHLORIDE, SODIUM LACTATE, POTASSIUM CHLORIDE, AND CALCIUM CHLORIDE 500 ML: .6; .31; .03; .02 INJECTION, SOLUTION INTRAVENOUS at 09:09

## 2025-05-15 ASSESSMENT — ACTIVITIES OF DAILY LIVING (ADL)
ADLS_ACUITY_SCORE: 52
ADLS_ACUITY_SCORE: 56
ADLS_ACUITY_SCORE: 52
ADLS_ACUITY_SCORE: 56
ADLS_ACUITY_SCORE: 52
ADLS_ACUITY_SCORE: 56
ADLS_ACUITY_SCORE: 52

## 2025-05-15 NOTE — PLAN OF CARE
Hours of care [id number]    Neuro: A&Ox4.   Cardiac: Afib rates 70-90. VSS.   Respiratory: Sating >92% on RA.  GI/: Adequate urine output.   Diet/appetite: NPO for bronch.  Activity:  Up ad curtis.  Pain: At acceptable level on current regimen.   Skin: No new deficits noted.  LDA's: L PIV: SL    Plan: Continue with POC. Notify primary team with changes.

## 2025-05-15 NOTE — OR NURSING
Pt tolerated EBUS with FNA, very well, under conscious sedation. O2 was turned up to 15 lpm  soon after procedure was started. Have been weaning pt down on the O2. Pt was returned to the ED on 2 lpm per OXIPLUS

## 2025-05-15 NOTE — PROGRESS NOTES
DISCHARGE             5/15/2025      6:05 PM  ----------------------------------------------------------------------------  Discharged to: Home  Via: private transportation  Accompanied by:   Discharge Instructions: diet, activity, medications, follow up appointments, when to call the MD, aftercare instructions.  Prescriptions: To be filled by home pharmacy; medication list reviewed & sent with pt  Follow Up Appointments: arranged; information given  Belongings: All sent with pt  IV: d/c'd  Telemetry: d/c'd  Pt exhibits understanding of above discharge instructions; all questions answered.    Discharge Paperwork: Signed, copied, and sent home with patient.

## 2025-05-15 NOTE — PROCEDURES
INTERVENTIONAL PULMONOLOGY       Procedure(s):    A flexible bronchoscopy  Airway exam  EBUS-TBNA (1 sites)    Indication:  Right hilar mass    Attending of Record:  Leo Leung DO    Interventional Pulmonary Fellow   none    Trainees Present:   None     Medications:    9 ml 4% lidocaine  6 ml 1% lidocaine  2.5 mg versed  125 mcg fentanyl    Sedation Time:   19 min.     Time Out:  Performed    The patient's medical record has been reviewed.  The indication for the procedure was reviewed.  The necessary history and physical examination was performed and reviewed.  The risks, benefits and alternatives of the procedure were discussed with the the patient in detail and she had the opportunity to ask questions.  I discussed in particular the potential complications including risks of minor or life-threatening bleeding and/or infection, respiratory failure, vocal cord trauma / paralysis, pneumothorax, and discomfort. Sedation risks were also discussed including abnormal heart rhythms, low blood pressure, and respiratory failure. All questions were answered to the best of my ability.  Verbal and written informed consent was obtained.  The proposed procedure and the patient's identification were verified prior to the procedure by the physician and the nurse.    The patient was assessed for the adequacy for the procedure and to receive medications.   Mental Status:  Alert and oriented x 3  Airway examination:  Class I (Complete visualization of soft palate)  Pulmonary:  Non labored respirations  CV:  Regular rate  ASA Grade:  (II)  Mild systemic disease    After clinical evaluation and reviewing the indication, risks, alternatives and benefits of the procedure the patient was deemed to be in satisfactory condition to undergo the procedure.      Immediately before administration of medications the patient was re-assessed for adequacy to receive sedatives including the heart rate, respiratory rate, mental status, oxygen  saturation, blood pressure and adequacy of pulmonary ventilation. These same parameters were continuously monitored throughout the procedure.    A Tuberculosis risk assessment was performed:  The patient has no known RISK of Tuberculosis    The procedure was performed in a negative airflow room: Yes    Maneuvers / Procedure:      Airway Examination: A complete airway examination was performed from the distal trachea to the subsegmental level in each lobe of both lungs.  Pertinent findings include the following.    Large right hilar mass causing compression of the RBI. Cannot pass through this. RUL is patent.   Left sided airways are patent but have some mucosal edema diffusely. No endobronchial lesions seen.          Next EBUS TBNA of the right hilar mass was obtained. 7 passes were obtained.     Any disposable equipment was visually inspected and deemed to be intact immediately post procedure.      Relevant Pictures    RUL (1p), RBI      Assessment and Recommendations:     Successful completion of bronchoscopy airway exam, and EBUS TBNA.   Ok to discharge once medically stable from my end.   She likely will need a rigid bronch with balloon dilation and/or stent insertion in her RBI.   I discussed this with her. We will wait for the results of her right hilar mass biopsy and then decide.   I will reach out to her next week with the results.         Leo Leung  Staff Interventional Pulmonologist

## 2025-05-15 NOTE — PROGRESS NOTES
"CLINICAL NUTRITION SERVICES - ASSESSMENT NOTE    RECOMMENDATIONS FOR MDs/PROVIDERS TO ORDER:  None at this time    Registered Dietitian Interventions:  Pt politely declined supplements  Discussed importance of adequate intake and strategies to meet goals     Future/Additional Recommendations:  Monitor intakes once diet resumes, weight, interest in supplements      REASON FOR ASSESSMENT  Provider order - \"Pt with possible metastatic cancer, inital intended weightloss now with unintentional weight loss\"    PMH  75 year old female with history of chronic atrial fibrillation, breast cancer s/p lumpectomy and radiation (2002), cervical cancer s/p vulvar radiation, possible lung metastasis who presents to the emergency department with atrial fibrillation. She was scheduled for regular bronchoscopy with biopsy 5/14 to evaluate new large lesion in the subcarinal region. While there she developed rapid atrial fibrillation and was sent to the ED for further evaluation.     INFORMATION OBTAINED  Assessed patient in room. Pt reported a decreased appetite over the past two months. She feels as though she can meet her needs through food and politely declined supplements. Pt denied any GI symptoms.     NUTRITION HISTORY  Pt has a regular diet at home and ate three meals per day PTA. Breakfast- yogurt w/ berries, granola; lunch- leftovers; dinner- soup, salad, rice w/ bean, or other vegetables or fish. Pt does not eat meat.     CURRENT NUTRITION ORDERS  Diet: NPO   Snacks/Supplements: None      CURRENT INTAKE/TOLERANCE  Pt has only ordered one meal this admission and is currently NPO. No intakes documented.     LABS  Nutrition-relevant labs: Reviewed BUN 5.8 other renal labs WNL, Na+ 130, albumin 3.2, total protein 5.9    MEDICATIONS  Nutrition-relevant medications: Reviewed Pepcid, lactated ringers, levothyroxine     ANTHROPOMETRICS  Height: 158.8 cm (5' 2.5\")  Admission Weight:  55.8 kg   Most Recent Weight:  55.8 kg   IBW: 52.3 " kg  BMI: 22.1 kg/m^2  Weight History:   Wt Readings from Last Encounters:   05/14/25 55.8 kg (123 lb)   05/12/25 55.8 kg (123 lb)   05/06/25 56.7 kg (125 lb)   04/30/25 57.6 kg (127 lb)   03/17/25 59.8 kg (131 lb 14.4 oz)   03/10/25 60.7 kg (133 lb 12.8 oz)   03/07/25 61.2 kg (135 lb)   01/21/25 61.2 kg (135 lb)   12/27/24 62.8 kg (138 lb 6.4 oz)   09/16/24 61.5 kg (135 lb 9.6 oz)   09/06/24 61.2 kg (135 lb)   05/03/24 61.2 kg (135 lb)   04/30/24 60.3 kg (133 lb)   03/27/24 59.9 kg (132 lb)   03/18/24 60.1 kg (132 lb 9.6 oz)   Pt had initial intentional wt loss but now having unintentional wt loss   9% wt loss in < 3 months (severe)    Dosing Weight: 56 kg, based on actual wt    ASSESSED NUTRITION NEEDS  Estimated Energy Needs: 9869-9311 kcals/day (25 - 30+ kcals/kg)  Justification: Maintenance *if lung lung metastasis present, will require additional energy  Estimated Protein Needs: 67-84 grams protein/day (1.2 - 1.5 grams of pro/kg)  Justification: Increased needs and Maintenance  Estimated Fluid Needs: (25 - 30 mL/kg  Justification: Maintenance and Per provider pending fluid status    SYSTEM AND PHYSICAL FINDINGS    GI symptoms: Reviewed  Skin/wounds: Reviewed no wounds/skin injuries     MALNUTRITION  % Intake: < 75% for >/= 1 month (moderate)  % Weight Loss: > 7.5% in 3 months (severe)   Subcutaneous Fat Loss: Orbital: Mild and Triceps: Moderate  Muscle Loss: Temples (temporalis muscle): Moderate, Clavicles (pectoralis and deltoids): Moderate, and Interosseous muscles: Moderate  Fluid Accumulation/Edema: None noted  Malnutrition Diagnosis: Severe malnutrition in the context of chronic illness  Malnutrition Present on Admission: Yes    NUTRITION DIAGNOSIS  Inadequate oral intake related to decreased appetite as evidenced by pt report, unintended wt loss, and fat/muscle loss.     INTERVENTIONS  Nutrition counseling strategies  Nutrition education application  Pt politely declined supplements     GOALS  Patient to  consume % of nutritionally adequate meal trays TID, or the equivalent with supplements/snacks once diet advances.     MONITORING/EVALUATION  Progress toward goals will be monitored and evaluated per policy.    Arleen Wiley PhD, RD, LD  Clinical Dietitian II  Available by name via [employer]  Weekend/Holiday Vocera: Weekend Holiday Clinical Dietitian [Multi Site Groups]

## 2025-05-15 NOTE — DISCHARGE SUMMARY
[facility]  Discharge Summary - Medicine & Pediatrics       Date of Admission:  5/14/2025  Date of Discharge:  5/15/2025  Discharging Provider: Dr. Kip Berger  Discharge Service: Medicine Service, [facility] TEAM 5    Discharge Diagnoses   Afib w/ RVR, resolved  Hx of Persistent Afib  Known PFO  Large Subcarinal Mass  Multifocal Mucoid impaction of RML  RLL Atelectasis  Chronic cough  Leukocytosis  Hyponatremia, possibly hypovolemic  Microcytic Anemia  Breast cancer s/p lumpectomy and radiation (2003)  Cervical cancer s/p Keytruda, carboplatin, Avastin and Paclitaxel(2022),   IDC of left breast (declined Anastrozole)  Hypothyroidism  Small pericardial effusion  Cardiomegaly      Clinically Significant Risk Factors     # Severe Malnutrition: based on nutrition assessment and treatment provided per dietitian's recommendations.      Follow-ups Needed After Discharge   Follow-up Appointments       Hospital Follow-up with Existing Primary Care Provider (PCP)          Schedule Primary Care visit within: 30 Days               Unresulted Labs Ordered in the Past 30 Days of this Admission       Date and Time Order Name Status Description    5/15/2025  3:31 PM Fine Needle Aspirate Lymph Node(s), Hilar, Right In process         These results will be followed up by PCP and Oncology team    Discharge Disposition   Discharged to home  Condition at discharge: Stable    Hospital Course   Cordell Donaldson is a 75 year old female with a history of persistent Afib (not on AC), breast cancer s/p lumpectomy and radiation (2003), cervical cancer s/p Keytruda/ carboplatin/ Avastin and Paclitaxel(2022),  IDC of left breast (not on therapy), chronic cough possibly related to noted Large lesion in the subcarinal region who presented for bronchoscopy with lung and lymph node biopsies (EBUS) and was found to be in Afib w/ RVR. RVR rapidly controlled with IV and PO metoprolol. Pt had successful  "procedure with  Pulm, cleared for discharge on 5/15.       Large Subcarinal Mass  Multifocal Mucoid impaction of RML  RLL Atelectasis  Chronic cough  Had been following with PCP for chronic cough. Ongoing since 2/2025, initially had chest tightness, wheezing. Persistent, had normal chest xray. Trial of albuterol triggered palpitations, she has underlying Afib and HR was >100 so stopped. Recent visit with pulmonologist, normal PFTs. They suspected post nasal drip, GERD, reactive airway (option to try corticosteroid inhaler). CT scan ordered by oncology team on 5/9 which demonstrated, \" Large lesion in the subcarinal region measuring up to 5.2 cm, favored to represent lymphadenopathy, which is causing stenosis of the right lower lobe bronchus with downstream near complete opacification of the right lower lobe. Additional right paratracheal lymph node measuring up to 2.7 cm, and numerous other smaller lymph nodes are present throughout the lower neck and thoracic cavity.\" Was referred to IP Pulm for EBUS and lymph node biopsy. Successful completion of bronchoscopy airway exam, and EBUS TBNA with  Pulm on 5/15.  - IP Pulm Consult, EBUS with lymph node biopsy today  - Successful completion of bronchoscopy airway exam, and EBUS TBNA.   - Ok to discharge once medically stable from my end.   - She likely will need a rigid bronch with balloon dilation and/or stent insertion in her RBI.   - I discussed this with her. We will wait for the results of her right hilar mass biopsy and then decide.   - I will reach out to her next week with the results.     Afib w/ RVR, resolved  Hx of Persistent Afib  Known PFO  Follows with Cardiologypiero seen 5/12. For the persistent atrial fibrillation she takes metoprolol for rate control and has declined cardioversion and anticoagulation. She takes an extra dose of metoprolol XL 25 mg if HR >110. She is not on anticoagulation per her wishes. Her recent echocardiogram is notable for " stable heart function. The PFO in the absence of R to L shunt or CVA/TIA which is being monitored for now. Patient seen in pre op by IP Pulm. HR consistently in the 150s, looked like A fib. Not on AC as she takes a Japanese supplement. Clinically, she is short of breath but this could be due to her bronchial stenosis. IP Pulm referred her to the ED for further evaluation. Pt was rates of 150's, declined cardioversion. Recived PO Metop tartrate 25mg and IV Metop 5mg with rates improving to <100.  - Continue PTA Metoprolol succinate 50mg qEvening with PRN as recommended by outpt Cards team  - Discussed AC, pt continues to decline standard of care meds at this time  - Nattokinase not ordered, unverified/lack of efficacy     Microcytic Anemia, likely inflammatory  Known to Oncology team, previously believed to be related to radiation therapy and slow bleed from prior cervical cancer resection. No overt signs of bleed on exam. History with no concern for bleed. Iron Panel, Ferritin, Folate were consistent with anemia of chronic disease.     Breast cancer s/p lumpectomy and radiation (2003)  Cervical cancer s/p Keytruda, carboplatin, Avastin and Paclitaxel(2022),   IDC of left breast (declined Anastrozole)  Follows with Dr. Raygoza, next appt on 6/27/25. 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 Most recently she completed treatment with chemotherapy 11/21/22.   - Continue with outpt Oncology team     Hypothyroidism  TSH elevated to 5.07 with normal T4 on admission possibly euthyroid sick syndrome.  - Continue PTA Levothyroxine  - Follow up with PCP      Small pericardial effusion  Cardiomegaly  Incidental finding on CT imaging. Cardiology aware. No tamponade physiology.      Leukocytosis, resolved     Hyponatremia, at baseline    Consultations This Hospital Stay   INTERVENTIONAL PULMONARY ADULT IP CONSULT  NUTRITION  "SERVICES ADULT IP CONSULT    Code Status   Full Code       The patient was discussed with MD [facility]  [facility] EMERGENCY DEPARTMENT  [address]  [place] [address]  Phone: [phone]  ______________________________________________________________________    Physical Exam   Vital Signs: Temp: 98  F (36.7  C) Temp src: Oral BP: 105/79 Pulse: 118   Resp: 16 SpO2: 94 % O2 Device: None (Room air) Oxygen Delivery: 4 LPM  Weight: 0 lbs 0 oz  General Appearance:  Elderly female, resting comfortably in bed, NAD  Respiratory: Breathing Room Air comfortably, no accessory muscle use, CTAB+  Cardiovascular: Regular Rate, Irregularly irregular rhythm, no wheezing, no rhonchi, no rales  GI: NBS, soft, non-tender, no guarding or rebound  Skin: Well-perfused, no rashes, no lesions, scattered ecchymoses   Neuro: Aox3, no gross focal deficits. Normal strength throughout       Primary Care Physician   Merry Lino    Discharge Orders      Reason for your hospital stay    Had been following with PCP for chronic cough. Ongoing since 2/2025, initially had chest tightness, wheezing. Persistent, had normal chest xray. Trial of albuterol triggered palpitations, she has underlying Afib and HR was >100 so stopped. Recent visit with pulmonologist, normal PFTs. They suspected post nasal drip, GERD, reactive airway (option to try corticosteroid inhaler). CT scan ordered by oncology team on 5/9 which demonstrated, \" Large lesion in the subcarinal region measuring up to 5.2 cm, favored to represent lymphadenopathy, which is causing stenosis of the right lower lobe bronchus with downstream near complete opacification of the right lower lobe. Additional right paratracheal lymph node measuring up to 2.7 cm, and numerous other smaller lymph nodes are present throughout the lower neck and thoracic cavity.\" Was referred to IP Pulm for EBUS and lymph node biopsy. Procedure aborted due " to afib w/ RVR, rescheduled for today.     Activity    Your activity upon discharge: activity as tolerated     Discharge Instructions    Please schedule a follow up appointment with your pirmary care provider and Oncology team in 1-2 weeks to discuss your hospitalization and any changes in your medications/treatment plans that may have occurred.    Please follow the instructions of the interventional pulmonology team, they will work with you to schedule follow up. Please seek immediate medical attention with any worsening dyspnea, SOB, cough, fevers or chills. Any chest pain, palpitations, jaw/arm/abdominal pain that may occur.     Diet    Follow this diet upon discharge: Current Diet: Regular diet     Hospital Follow-up with Existing Primary Care Provider (PCP)            Significant Results and Procedures   Most Recent 3 CBC's:  Recent Labs   Lab Test 05/15/25  0626 05/14/25  1521 03/07/25  1217   WBC 10.6 11.4* 4.2   HGB 9.6* 9.8* 12.2   MCV 78 76* 84    386 252     Most Recent 3 BMP's:  Recent Labs   Lab Test 05/15/25  0626 05/14/25  1521 08/30/23  1108   * 129* 139   POTASSIUM 4.2 4.3 4.8   CHLORIDE 97* 94* 102   CO2 23 22 26   BUN 5.8* 7.1* 21.8   CR 0.66 0.71 0.82   ANIONGAP 10 13 11   LINDA 9.1 9.7 9.7   * 105* 93     Most Recent 2 LFT's:  Recent Labs   Lab Test 05/15/25  0626 05/14/25  1521   AST 18 17   ALT 9 11   ALKPHOS 100 108   BILITOTAL 0.3 0.4     Most Recent 3 BNP's:  Recent Labs   Lab Test 04/25/25  1201 12/07/22  1056   NTBNP 866 444     Most Recent D-dimer:No lab results found.  7-Day Micro Results       Collected Updated Procedure Result Status      05/14/2025 0922 05/15/2025 1154 Urine Culture Aerobic Bacterial [[id number]]   Urine, Midstream    Final result Component Value   Culture <10,000 CFU/mL Mixture of Urogenital Rachele                     Most Recent TSH and T4:  Recent Labs   Lab Test 05/14/25  1521   TSH 5.07*   T4 1.66     Most Recent Urinalysis:  Recent Labs   Lab  Test 05/14/25  0922 04/06/22  1544 04/06/22  1511   COLOR Light Yellow   < > Yellow   APPEARANCE Slightly Cloudy*   < > Slightly Cloudy*   URINEGLC Negative   < > Negative   URINEBILI Negative   < > Negative   URINEKETONE Negative   < > Negative   SG 1.009   < > 1.025   UBLD Small*   < > Trace*   URINEPH 7.0   < > 7.0   PROTEIN Negative   < > Negative   UROBILINOGEN  --   --  0.2   NITRITE Negative   < > Negative   LEUKEST Negative   < > Negative   RBCU 16*   < >  --    WBCU 3   < >  --     < > = values in this interval not displayed.     Most Recent ESR & CRP:No lab results found.  Most Recent Anemia Panel:  Recent Labs   Lab Test 05/15/25  0626 05/14/25  1521   WBC 10.6 11.4*   HGB 9.6* 9.8*   HCT 30.8* 31.2*   MCV 78 76*    386   IRON  --  12*   IRONSAT  --  7*   FEB  --  166*   IGOR  --  273   B12  --  668   FOLIC 9.8  --    ,   Results for orders placed or performed in visit on 05/12/25   IR Referral    Jason Allan PA-C     5/12/2025  1:18 PM      Outpatient IR Referral  05/12/25    Cordell Donaldson  [id number]      IR referral Request:    Outpatient IR Radiology Referral. Cordell Donaldson. MRN:   [id number]. Lymph node biopsy, Paratracheal or carinal. History   of breast and vaginal cancer, new lymphadenopathy. AC = No.   Referring: Hina Raygoza MD in  GYN ONCOLOGY. Call   Back # [phone].    ===  Plan:    Case and imaging discussed with IR Dr. Bert Vyas.    There is a large mass target in the mediastinum. This location is   NOT accessible via percutaneous biopsy.    Procedure declined.    Recommend consultation with Interventional Pulmonology for   consideration of endoscopy sampling attempt.    See CT 5/9/25  Chest: Right paratracheal 2.4 x 2.4 x 2.7 cm lesion (series 3   image  70, series 7 image 55). There is also subcarinal lesion measuring  approximately 4.6 x 4.5 x 5.2 cm (series 3 image 132, series 6   image  44).      Jason Mcdonald,  OLI  Interventional Radiology   IR on-call pager: [phone]         Discharge Medications   Current Discharge Medication List        CONTINUE these medications which have NOT CHANGED    Details   Bacillus Coagulans-Inulin (PROBIOTIC FORMULA) 1-250 BILLION-MG CAPS Take by mouth every morning 25+billion CFUS      Cholecalciferol (VITAMIN D-3) 25 MCG (1000 UT) CAPS Take 1,000 Units by mouth every morning  Qty: 90 capsule, Refills: 3      coenzyme Q-10 capsule Take 1 capsule by mouth daily      famotidine (PEPCID) 20 MG tablet Take 1 tablet (20 mg) by mouth 2 times daily.  Qty: 60 tablet, Refills: 2    Associated Diagnoses: Dysphagia, unspecified type      levothyroxine (SYNTHROID/LEVOTHROID) 112 MCG tablet TAKE 1 TABLET (112 MCG) BY MOUTH DAILY BEFORE BREAKFAST  Qty: 90 tablet, Refills: 3    Associated Diagnoses: Other specified hypothyroidism      MAGNESIUM OXIDE PO Take 400 mg by mouth daily      metoprolol succinate ER (TOPROL XL) 50 MG 24 hr tablet Take 1 tablet (50 mg) by mouth daily.  Qty: 90 tablet, Refills: 3      Omega-3 Fatty Acids (OMEGA-3 FISH OIL PO) Take 630 mg by mouth 2 times daily      OVER-THE-COUNTER Turkey tail mushroom powder, use 1 times a day      Pectin Cit-Inos-C-Bioflav-Soy (MODIFIED CITRUS PECTIN PO) Take by mouth 2 times daily      !! UNABLE TO FIND Take by mouth daily. MEDICATION NAME: Nattokinase      !! UNABLE TO FIND 500 mg MEDICATION NAME: Turmeric      !! UNABLE TO FIND 1,200 mg 2 times daily MEDICATION NAME: Efraín      !! UNABLE TO FIND Take 1 tablet by mouth every morning MEDICATION NAME: Dinndolylmethane Complex (DIM) 100mg tablet 1 table daily       !! - Potential duplicate medications found. Please discuss with provider.        Allergies   No Known Allergies

## 2025-05-15 NOTE — PROGRESS NOTES
Interventional Pulmonology          Follow-up Inpatient Progress Note                                      May 15, 2025              Patient: Cordell Donaldson  Date of Service: 5/15/2025    Date of Admission: 5/14/2025      Assessment:   Cordell Donaldson is a 75 year old female admitted on 5/14/2025 with Afib with RVR when she came in for a scheduled bronchoscopy and EBUS procedure for subcarinal mass. Interventional Pulmonology following for pre-op assessment.    Currently the patient is stable, her HR was controlled overnight, still in afib rhythm. NPO since last night.     Plan:  She seems to be stable for the procedure today. Will proceed with EBUS today in the endo suite under moderate sedation.    She can be discharged after the procedure if vitally still stable.     Patient seen and discussed with Dr. Madhu Tidwell  Interventional Pulmonology Fellow    Thank you for this consultation.    Pager: [phone]         Interval History:   Stable HR improved. Still in afib rhythm      Review of Systems:  Gen: negative for fever, chills, change in weight  ENT: no sore throat, new sinus pain or nasal drainage  Resp: see interval history  CV: no chest pain, no palpitations  GI: no nausea, vomiting, change in stools  Psych: mood stable           Physical Exam:   Temp:  [97.9  F (36.6  C)-98.7  F (37.1  C)] 97.9  F (36.6  C)  Pulse:  [] 113  Resp:  [16-18] 16  BP: ()/(53-95) 117/56  SpO2:  [94 %-98 %] 95 %  No intake or output data in the 24 hours ending 05/15/25 1250    General: Awake, alert and in no apparent distress   Neck: Trachea supple/midline  Pulm: Clear breath sounds b/l, no crackles, no wheezes  CV: RRR, no murmurs  Abdomen: Soft, non-tender, non-distended   MSK: No edema, no clubbing   Neurologic: No focal deficits  Skin: No obvious rash. Warm and dry  Psych: AAOx3, not agitated, answering to questions appropriately

## 2025-05-19 ENCOUNTER — TELEPHONE (OUTPATIENT)
Dept: UROLOGY | Facility: CLINIC | Age: 75
End: 2025-05-19
Payer: COMMERCIAL

## 2025-05-19 ENCOUNTER — MYC MEDICAL ADVICE (OUTPATIENT)
Dept: ONCOLOGY | Facility: CLINIC | Age: 75
End: 2025-05-19
Payer: COMMERCIAL

## 2025-05-19 DIAGNOSIS — R31.29 MICROHEMATURIA: Primary | ICD-10-CM

## 2025-05-19 NOTE — TELEPHONE ENCOUNTER
Left Voicemail (1st Attempt) for the patient to call back and schedule the following:    Appointment type: RTN cysto  Provider: Master Moss  Return date: Next available  Specialty phone number: [phone]  Additional appointment(s) needed: NA  Additonal Notes: NA

## 2025-05-20 ENCOUNTER — CARE COORDINATION (OUTPATIENT)
Dept: PULMONOLOGY | Facility: CLINIC | Age: 75
End: 2025-05-20
Payer: COMMERCIAL

## 2025-05-20 DIAGNOSIS — R91.1 LUNG NODULE: Primary | ICD-10-CM

## 2025-05-20 LAB
INTERPRETATION SERPL IEP-IMP: NORMAL
LAB DIRECTOR COMMENTS: NORMAL
LAB DIRECTOR DISCLAIMER: NORMAL
LAB DIRECTOR INTERPRETATION: NORMAL
LAB DIRECTOR METHODOLOGY: NORMAL
LAB DIRECTOR RESULTS: NORMAL
LAB TEST RESULTS REPORTED IN RPTPERIOD: NORMAL
SEQUENCING METHOD PNL BLD/T: NORMAL
SPECIMEN TYPE: NORMAL
SPECIMEN TYPE: NORMAL

## 2025-05-20 PROCEDURE — G0452 MOLECULAR PATHOLOGY INTERPR: HCPCS | Mod: 26 | Performed by: PATHOLOGY

## 2025-05-20 PROCEDURE — 81456 SO/HL 51/>GSAP RNA ALYS: CPT | Performed by: STUDENT IN AN ORGANIZED HEALTH CARE EDUCATION/TRAINING PROGRAM

## 2025-05-20 NOTE — PROGRESS NOTES
FNA results forwarded to Monique TIWARI CNP, Dr. Raygoza, and Dr. Montana. Requested provider reach out to patient regarding follow up plan and next steps    Fine Needle Aspirate Lymph Node(s), Hilar, Right: [id number]  Order: [id number]   Collected 5/15/2025  3:31 PM       Status: Final result    Test Result Released: Yes (not seen)    0 Result Notes      Component  Ref Range & Units    Final Diagnosis   A. LYMPH NODE(S), HILAR, RIGHT, HILAR MASS, FINE NEEDLE ASPIRATE:  Interpretation:  - Positive for malignancy  - Non-small cell carcinoma consistent with squamous cell carcinoma (see comment)     Adequacy: Satisfactory for evaluation     Electronically signed by Jefe Deluca MD on 5/20/2025 at 1206 CDT   Comment    TTF-1/p40 dual stain and GATA3 immunohistochemical stains are performed with appropriately reactive controls and show the tumor cells to be positive for p40, patchy/non-specific for GATA3, and negative for TTF-1.  The morphologic and immunohistochemical profile is compatible with carcinoma, favor squamous cell carcinoma. PD-L1 report to follow and will be reported in an addendum. Molecular studies are ordered and will be issued separately.  This case received intradepartmental peer review.   Clinical Information    75F with history of breast and cervical cancer with lung mass   Gross Description    A(1). Lymph Node(s), Hilar, Right, Hilar Mass:A. Lymph Node(s), Hilar, Right, Hilar Mass, Fine Needle Aspirate:  Received material in formalin, processed for one hematoxylin stained cell block.   Microscopic Description       Case was reviewed by the following:  Pathology Fellow: Nanda Mahoney DO  Resident Pathologist: Nolan Singh MD  Resident Pathologist: Lisa Mcmahon MD  A resident or fellow in a training program was involved in the initial review, preparation, and/or interpretation of this case.  I, as the senior physician, attest that I have personally reviewed all specimens and  or slides, including the listed special stains, and used them with my medical judgement to determine the final diagnosis.               Abnormal Result?  No Yes Abnormal    Performing Labs    The technical component of this testing was completed at [facility].      Stain controls for all stains resulted within this report have been reviewed and show appropriate reactivity.    Disclaimer       The following assays; ALK (D5F3), ER, HER2, PD-L1, BRAF, CD20, CD30 AZF, CD30 Formalin, MLH-1, MSH-2, MSH-6 and PMS-2, have not been validated on decalcified tissues. Results should be interpreted with caution given the possibility of false negative results on decalcified specimens.         Resulting Agency [facility]             Specimen Collected: 05/15/25  3:31 PM Last Resulted: 05/20/25 12:06 PM

## 2025-05-21 LAB
PATH REPORT.ADDENDUM SPEC: ABNORMAL
PATH REPORT.COMMENTS IMP SPEC: ABNORMAL
PATH REPORT.COMMENTS IMP SPEC: YES
PATH REPORT.FINAL DX SPEC: ABNORMAL
PATH REPORT.GROSS SPEC: ABNORMAL
PATH REPORT.MICROSCOPIC SPEC OTHER STN: ABNORMAL
PATH REPORT.RELEVANT HX SPEC: ABNORMAL

## 2025-05-22 ENCOUNTER — MYC MEDICAL ADVICE (OUTPATIENT)
Dept: PULMONOLOGY | Facility: CLINIC | Age: 75
End: 2025-05-22
Payer: COMMERCIAL

## 2025-05-22 ENCOUNTER — TELEPHONE (OUTPATIENT)
Dept: CARDIOLOGY | Facility: CLINIC | Age: 75
End: 2025-05-22
Payer: COMMERCIAL

## 2025-05-22 ENCOUNTER — TELEPHONE (OUTPATIENT)
Dept: PULMONOLOGY | Facility: CLINIC | Age: 75
End: 2025-05-22
Payer: COMMERCIAL

## 2025-05-22 DIAGNOSIS — C34.90 MALIGNANT NEOPLASM OF LUNG, UNSPECIFIED LATERALITY, UNSPECIFIED PART OF LUNG (H): Primary | ICD-10-CM

## 2025-05-22 NOTE — TELEPHONE ENCOUNTER
"Writer sent [employer] message to patient, she replied and then decided to call right after. Scheduling IP procedure for 06/05 with Dr. Reynolds. Patient will contact PCP for H&P, was advised to call writer back if unable to schedule. Patient mentioned a \"Pashto enzyme\" that she believes may be a blood thinner and is choosing to hold that for 7 days prior to the procedure. Packet information sent via [employer] per patient request.    Jovany Stewart on 5/22/2025 at 2:55 PM'    "

## 2025-05-22 NOTE — PROGRESS NOTES
Patient with right hilar mass, positive for malignancy. Most if not all of the mass is extrinsic.   Plan for rigid bronch, possible stent placement, possible tissue debulking, and EBUS TBNA for staging.

## 2025-05-22 NOTE — TELEPHONE ENCOUNTER
Patient sent another [employer] message to notify writer that H&P has been scheduled for 05/30.    Jovany Stewart on 5/22/2025 at 3:10 PM

## 2025-05-22 NOTE — TELEPHONE ENCOUNTER
Left Voicemail (1st Attempt) and Sent Mychart (1st Attempt) for the patient to call back and schedule the following:    Appointment type: [facility] CARDIO  Provider: SANTO  Return date: AUGUST 2025  Specialty phone number: [phone] OPT 1  Additional appointment(s) needed: N/A  Additonal Notes: VIRTUAL FOLLOW-UP

## 2025-05-28 DIAGNOSIS — R13.10 DYSPHAGIA, UNSPECIFIED TYPE: ICD-10-CM

## 2025-05-29 ENCOUNTER — TUMOR CONFERENCE (OUTPATIENT)
Dept: ONCOLOGY | Facility: CLINIC | Age: 75
End: 2025-05-29
Payer: COMMERCIAL

## 2025-05-29 ENCOUNTER — DOCUMENTATION ONLY (OUTPATIENT)
Dept: PULMONOLOGY | Facility: CLINIC | Age: 75
End: 2025-05-29
Payer: COMMERCIAL

## 2025-05-29 RX ORDER — FAMOTIDINE 20 MG/1
20 TABLET, FILM COATED ORAL 2 TIMES DAILY
Qty: 180 TABLET | Refills: 1 | OUTPATIENT
Start: 2025-05-29

## 2025-05-29 NOTE — NURSING NOTE
Interventional Pulmonology: Pre-Flight Planning    Procedure date: 6/5/25    Scheduled procedure: BRONCHOSCOPY, RIGID, possible tissue debulking, possible stent placement, endobronchial ultrasound with transbronchial needle aspiration.     Location: [facility]     Anesthesia: General    H&P plan: To be completed on 5/30 by Dr. Lino.    Medication hold(s): n/a    CT scheduled: not needed for this procedure    Preoperative Instructions: Sent to patient via [employer] (confirmed read).    Pathology results follow up: Dr. Raygoza, Dr. Montana, and Monique Bocanegra

## 2025-05-29 NOTE — TUMOR CONFERENCE
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  Clinical Trial Discussion: No  If yes, what clinical trial should be considered: n/a       Consensus/Management Options:  Recent PET and CT chest were reviewed with Dr. Forman. Group consensus is for referral to interventional pulmonology due to bronchial compression from subcarinal (metastatic mass). Group favors treatment with Tisotumab Vedotin. Topotecan is also an alternative option, would avoid pembrolizumab given prior ascites. The group also supports potential hospice transition if patient does not desire further treatment.           This abstract and interpretation of the conversation at tumor board has been completed by Benjy Rasheed. These are the general recommendations/discussion provided at tumor board conference. The definitive recommendation/discussion will be made by the primary health care team and patient after full discussion as appropriate.

## 2025-05-30 ENCOUNTER — ONCOLOGY VISIT (OUTPATIENT)
Dept: ONCOLOGY | Facility: CLINIC | Age: 75
End: 2025-05-30
Attending: OBSTETRICS & GYNECOLOGY
Payer: COMMERCIAL

## 2025-05-30 ENCOUNTER — OFFICE VISIT (OUTPATIENT)
Dept: FAMILY MEDICINE | Facility: CLINIC | Age: 75
End: 2025-05-30
Payer: COMMERCIAL

## 2025-05-30 VITALS
WEIGHT: 124 LBS | RESPIRATION RATE: 16 BRPM | HEART RATE: 101 BPM | OXYGEN SATURATION: 99 % | DIASTOLIC BLOOD PRESSURE: 63 MMHG | HEIGHT: 62 IN | BODY MASS INDEX: 22.82 KG/M2 | TEMPERATURE: 97.7 F | SYSTOLIC BLOOD PRESSURE: 99 MMHG

## 2025-05-30 VITALS
HEART RATE: 101 BPM | OXYGEN SATURATION: 99 % | TEMPERATURE: 97.7 F | RESPIRATION RATE: 16 BRPM | WEIGHT: 124 LBS | SYSTOLIC BLOOD PRESSURE: 99 MMHG | HEIGHT: 62 IN | BODY MASS INDEX: 22.82 KG/M2 | DIASTOLIC BLOOD PRESSURE: 63 MMHG

## 2025-05-30 DIAGNOSIS — E87.1 HYPONATREMIA: ICD-10-CM

## 2025-05-30 DIAGNOSIS — E03.8 OTHER SPECIFIED HYPOTHYROIDISM: ICD-10-CM

## 2025-05-30 DIAGNOSIS — R05.3 CHRONIC COUGH: ICD-10-CM

## 2025-05-30 DIAGNOSIS — J18.1 CONSOLIDATION OF RIGHT LOWER LOBE OF LUNG: ICD-10-CM

## 2025-05-30 DIAGNOSIS — Z01.818 PREOP GENERAL PHYSICAL EXAM: Primary | ICD-10-CM

## 2025-05-30 DIAGNOSIS — D50.9 IRON DEFICIENCY ANEMIA, UNSPECIFIED IRON DEFICIENCY ANEMIA TYPE: ICD-10-CM

## 2025-05-30 DIAGNOSIS — K59.01 SLOW TRANSIT CONSTIPATION: ICD-10-CM

## 2025-05-30 DIAGNOSIS — I48.11 LONGSTANDING PERSISTENT ATRIAL FIBRILLATION (H): ICD-10-CM

## 2025-05-30 DIAGNOSIS — C52 VAGINAL CANCER (H): Primary | ICD-10-CM

## 2025-05-30 PROCEDURE — G0463 HOSPITAL OUTPT CLINIC VISIT: HCPCS | Performed by: OBSTETRICS & GYNECOLOGY

## 2025-05-30 RX ORDER — POLYETHYLENE GLYCOL 3350 17 G/17G
17 POWDER, FOR SOLUTION ORAL DAILY
Qty: 510 G | Refills: 3 | Status: ON HOLD | OUTPATIENT
Start: 2025-05-30

## 2025-05-30 RX ORDER — CODEINE PHOSPHATE AND GUAIFENESIN 10; 100 MG/5ML; MG/5ML
1-2 SOLUTION ORAL EVERY 4 HOURS PRN
Qty: 473 ML | Refills: 3 | Status: ON HOLD | OUTPATIENT
Start: 2025-05-30

## 2025-05-30 ASSESSMENT — PAIN SCALES - GENERAL
PAINLEVEL_OUTOF10: NO PAIN (0)
PAINLEVEL_OUTOF10: NO PAIN (0)

## 2025-05-30 NOTE — PATIENT INSTRUCTIONS
How to Take Your Medication Before Surgery  Hold any vitamins, herbs, supplements, aspirin, ibuprofen, naproxen for 10 days prior to surgery.   Take 1/2 tablet of metoprolol and thyroid medication on the morning of your surgery  You may use your inhalers on the morning of surgery if needed.  You may take acetaminophen (Tylenol) in the 10 days prior to surgery.          Patient Education   Preparing for Your Surgery  For Adults  Getting started  In most cases, a nurse will call to review your health history and instructions. They will give you an arrival time based on your scheduled surgery time. Please be ready to share:  Your doctor's clinic name and phone number  Your medical, surgical, and anesthesia history  A list of allergies and sensitivities  A list of medicines, including herbal treatments and over-the-counter drugs  Whether the patient has a legal guardian (ask how to send us the papers in advance)  Note: You may not receive a call if you were seen at our PAC (Preoperative Assessment Center).  Please tell us if you're pregnant--or if there's any chance you might be pregnant. Some surgeries may injure a fetus (unborn baby), so they require a pregnancy test. Surgeries that are safe for a fetus don't always need a test, and you can choose whether to have one.   Preparing for surgery  Within 10 to 30 days of surgery: Have a pre-op exam (sometimes called an H&P, or History and Physical). This can be done at a clinic or pre-operative center.  If you're having a , you may not need this exam. Talk to your care team.  At your pre-op exam, talk to your care team about all medicines you take. (This includes CBD oil and any drugs, such as THC, marijuana, and other forms of cannabis.) If you need to stop any medicine before surgery, ask when to start taking it again.  This is for your safety. Many medicines and drugs can make you bleed too much during surgery. Some change how well surgery (anesthesia) drugs  work.  Call your insurance company to let them know you're having surgery. (If you don't have insurance, call [phone].)  Call your clinic if there's any change in your health. This includes a scrape or scratch near the surgery site, or any signs of a cold (sore throat, runny nose, cough, rash, fever).  Eating and drinking guidelines  For your safety: Unless your surgeon tells you otherwise, follow the guidelines below.  Eat and drink as normal until 8 hours before you arrive for surgery. After that, no food or milk. You can spit out gum when you arrive.  Drink clear liquids until 2 hours before you arrive. These are liquids you can see through, like water, Gatorade, and Propel Water. They also include plain black coffee and tea (no cream or milk).  No alcohol for 24 hours before you arrive. The night before surgery, stop any drinks that contain THC.  If your care team tells you to take medicine on the morning of surgery, it's okay to take it with a sip of water. No other medicines or drugs are allowed (including CBD oil)--follow your care team's instructions.  If you have questions the day of surgery, call your hospital or surgery center.   Preventing infection  Shower or bathe the night before and the morning of surgery. Follow the instructions your clinic gave you. (If no instructions, use regular soap.)  Don't shave or clip hair near your surgery site. We'll remove the hair if needed.  Don't smoke or vape the morning of surgery. No chewing tobacco for 6 hours before you arrive. A nicotine patch is okay. You may spit out nicotine gum when you arrive.  For some surgeries, the surgeon will tell you to fully quit smoking and nicotine.  We will make every effort to keep you safe from infection. We will:  Clean our hands often with soap and water (or an alcohol-based hand rub).  Clean the skin at your surgery site with a special soap that kills germs.  Give you a special gown to keep you warm. (Cold raises the  risk of infection.)  Wear hair covers, masks, gowns, and gloves during surgery.  Give antibiotic medicine, if prescribed. Not all surgeries need this medicine.  What to bring on the day of surgery  Photo ID and insurance card  Copy of your health care directive, if you have one  Glasses and hearing aids (bring cases)  You can't wear contacts during surgery  Inhaler and eye drops, if you use them (tell us about these when you arrive)  CPAP machine or breathing device, if you use them  A few personal items, if spending the night  If you have . . .  A pacemaker, ICD (cardiac defibrillator), or other implant: Bring the ID card.  An implanted stimulator: Bring the remote control.  A legal guardian: Bring a copy of the certified (court-stamped) guardianship papers.  Please remove any jewelry, including body piercings. Leave jewelry and other valuables at home.  If you're going home the day of surgery  You must have a support person drive you home. They should stay with you overnight, and they may need to help with your self-care.  If you don't have a support person, please tells us as soon as possible. We can help.  After surgery  If it's hard to control your pain or you need more pain medicine, please call your surgeon's office.  Questions?   If you have any questions for your care team, list them here:   ____________________________________________________________________________________________________________________________________________________________________________________________________________________________________________________________  For informational purposes only. Not to replace the advice of your health care provider. Copyright   2003, 2019 [facility]. All rights reserved. Clinically reviewed by Vamshi Rodriguez MD. [employer] [id number] - REV 02/25.

## 2025-05-30 NOTE — NURSING NOTE
"75 year old    Chief Complaint   Patient presents with    Pre-Op Exam        Blood pressure 99/63, pulse 101, temperature 97.7  F (36.5  C), temperature source Skin, resp. rate 16, height 1.583 m (5' 2.32\"), weight 56.2 kg (124 lb), SpO2 99%, not currently breastfeeding. Body mass index is 22.45 kg/m .    Patient Active Problem List   Diagnosis    Dupuytren contracture    Abnormal electrocardiogram    Atrial fibrillation (H)    Hyperlipidemia LDL goal <130    Malignant neoplasm of breast (H)    Rectocele    Malignant neoplasm of endocervix (H)    Vaginal cancer (H)    Chronic a-fib (H)    Atrial fibrillation with RVR (H)          Wt Readings from Last 2 Encounters:   05/30/25 56.2 kg (124 lb)   05/14/25 55.8 kg (123 lb)       BP Readings from Last 3 Encounters:   05/30/25 99/63   05/15/25 118/65   05/14/25 118/79             Current Outpatient Medications   Medication Sig Dispense Refill    Bacillus Coagulans-Inulin (PROBIOTIC FORMULA) 1-250 BILLION-MG CAPS Take by mouth every morning 25+billion CFUS      Cholecalciferol (VITAMIN D-3) 25 MCG (1000 UT) CAPS Take 1,000 Units by mouth every morning 90 capsule 3    clonazePAM (KLONOPIN) 0.5 MG tablet Take 1 tablet (0.5 mg) by mouth nightly as needed for anxiety or sleep. 30 tablet 0    coenzyme Q-10 capsule Take 1 capsule by mouth daily      famotidine (PEPCID) 20 MG tablet Take 1 tablet (20 mg) by mouth 2 times daily. 60 tablet 2    levothyroxine (SYNTHROID/LEVOTHROID) 112 MCG tablet TAKE 1 TABLET (112 MCG) BY MOUTH DAILY BEFORE BREAKFAST 90 tablet 3    MAGNESIUM OXIDE PO Take 400 mg by mouth daily      metoprolol succinate ER (TOPROL XL) 50 MG 24 hr tablet Take 1 tablet (50 mg) by mouth daily. 90 tablet 3    Omega-3 Fatty Acids (OMEGA-3 FISH OIL PO) Take 630 mg by mouth 2 times daily      OVER-THE-COUNTER Turkey tail mushroom powder, use 1 times a day      Pectin Cit-Inos-C-Bioflav-Soy (MODIFIED CITRUS PECTIN PO) Take by mouth 2 times daily      UNABLE TO FIND Take by " mouth daily. MEDICATION NAME: Nattokinase      UNABLE TO FIND 500 mg MEDICATION NAME: Turmeric      UNABLE TO FIND 1,200 mg 2 times daily MEDICATION NAME: Efraín      UNABLE TO FIND Take 1 tablet by mouth every morning MEDICATION NAME: Dinndolylmethane Complex (DIM) 100mg tablet 1 table daily       No current facility-administered medications for this visit.          Social History     Tobacco Use    Smoking status: Former     Current packs/day: 0.00     Average packs/day: 0.5 packs/day for 15.0 years (7.5 ttl pk-yrs)     Types: Cigarettes     Start date:      Quit date:      Years since quittin.4     Passive exposure: Past    Smokeless tobacco: Never   Vaping Use    Vaping status: Never Used   Substance Use Topics    Alcohol use: Not Currently     Alcohol/week: 0.0 - 2.0 standard drinks of alcohol     Comment: Socially     Drug use: No          Health Maintenance Due   Topic Date Due    DEPRESSION ACTION PLAN  Never done    RSV VACCINE (1 - Risk 60-74 years 1-dose series) Never done    PAP  2023    MAMMO SCREENING  2023    COVID-19 VACCINE (7 - Pfizer risk - season) 03/10/2025    MEDICARE ANNUAL WELLNESS VISIT  2025    LIPID  2025          Lab Results   Component Value Date    PAP NIL 2020           May 30, 2025 8:13 AM

## 2025-05-30 NOTE — LETTER
2025      Cordell Donaldson  [address]  [facility] [address]      Dear Colleague,    Thank you for referring your patient, Cordell Donaldson, to the [facility]. Please see a copy of my visit note below.    Follow-up Note on Referred Patient  Gynecologic Oncology Clinic      Date of visit: 2024         Referring Provider/Gynecologic Oncologist:  Megan Arciniega MD  [facility]   Phone [phone]  [phone]      Radiation Oncologist:   Raymond Stockton MD, PhD  [facility]      Medical Oncologist:  Angela Montana MD  [facility].      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 Surveillance visit.      History of Present Illness:   Cordell Donaldson is a 75 year old initially referred to the Gynecologic Cancer Clinic at the [facility] on 2022 by gynecologic oncologist Dr. Arciniega and radiation oncologist Dr. Stockton for management of a pelvic mass due to recurrent cervical cancer vs new vaginal cancer. History as follows:     Breast Cancer History:  : Left breast cancer.  -Lumpectomy.  -Radiation therapy.    [id number]: BRENNAN.     22: PET/CT to stage vaginal cancer (see below).   Mild soft tissue  thickening in the left retropectoral region with an SUV max of 2.7.  7/15/22: Invasive ductal carcinoma.   -Managed by Dr. Montana. Endocrine therapy with anastozole recommended after completion of therapy for the vaginal cancer, but Cordell declined, prefers to focus on quality of life.       Cervical/Vaginal Dysplasia/Cancer History:  10/24/12:   -Endocervical curettings: Squamous carcinoma, invasion cannot be assessed.   -Ectocervical biopsies: HSIL (CIN3).   -Vaginal  biopsy, posterior: HSIL (VaIN3).  12/2012: Robotic-assisted laparoscopic lysis of adhesions, left ureterolysis with  conversion to laparotomy, total abdominal hysterectomy, and an upper  Vaginectomy by gynecologic oncologist Dr. Arciniega.   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     7/22/13: Vaginal biopsy: HSIL, cannot exclude invasion.   -Consultation with gynecologic oncologist Dr. Johnson. Upper vaginectomy and CO2 laser ablation recommended, patient declined.   [id number]: No vaginal dysplasia treatment.   5/20/22: Pelvic MRI: I have personally reviewed the MRI images.   Centrally hypoenhancing heterogeneous midline pelvic mass along  the superior margin of the vaginal vault measures 8.5 x 6.7 x 8.1 cm.  The mass abut the bladder and there is mucosal irregularity at the  site of abutment. The mass indents the  decompressed rectum but there is no definite invasion or mucosal  Irregularity.  6/9/22: CT-guided biopsy of pelvic mass: Poorly-differentiated squamous cell carcinoma, HPV-associated.   7/1/22: Staging PET/CT: Stage III vaginal cancer (vs recurrent, metastatic cervical cancer).     7/20/22, 8/10/22, 8/31/22,9/21/22, 10/26/22, 11/21/22: Cycles #1-6 of first-line chemotherapy with IV platinum + paclitaxel + bevacizumab 15 mg/kg + pembrolizumab 200 mg (added cycle 3) every 21 days.   -Cycles 1-4: Cisplatin 50 mg/m2, paclitaxel 175 mg/m2.   -Cycle 3:  Pembrolizumab added to all subsequent cycles due to PD-L1+ result per the KEYNOTE-826 regimen.  -9/22/22: PET/CT: Partial response. I have personally reviewed the PET/CT images.   -Discussed with radiation oncologist Dr. Stockton and at the gyn onc/radiation oncology tumor conference. Recommendation for additional chemotherapy prior to radiation therapy.   -Cycles 5+: Platinum changed to carboplatin due to tinnitus, and paclitaxel dose-reduced to 135 mg/m2 due to peripheral neuropathy.   -12/5/22:  PET/CT: Partial response. I have personally reviewed the PET/CT images.   12/28/22-2/15/23: First-line pelvic external beam radiation therapy with total dose 4500 cGy in 25 fractions then vaginal cuff brachytherapy with total dose 2500 cGy in 5 fractions. No concurrent chemosensitization due to myelosuppression and decreased patient tolerance of therapy.   -Patient declined maintenance therapy due to significantly decreased quality of life while on systemic therapy.   -6/8/23: PET/CT: No definitive evidence of disease. Ascites with imaging concerning for cirrhotic changes.  -9/13/23: Abdominal ultrasound: Negative for cirrhosis.   -Ascites resolved, attributed to pembrolizumab.   -3/7/25: Chest xray to evaluate new cough: BRENNAN.   -5//9/25: Chest CT to evaluate cough, SOB: Recurrent disease.   -5/27/25: FNA of right hilar mass: Non-small cell carcinoma c/w squamous cell carcinoma; IHC: TTF1-; GATA3-  -5/27/25: PET/CT: Recurrent, metastatic disease.   -5/28/25: Discussed at [facility] gyn onc tumor board. Consensus for discussion of second-line therapy with tisotumab vedotin vs best supportive care, with recognition of the palliative nature of all options, and the potential for pneumonitis which could be catastrophic. Discussed the renal mass, favored to be additional metastatic disease given the extensive nature of recurrence, however, cannot rule-out a third primary renal cell carcinoma since kidney is not a common place for vaginal cancer recurrence.       Genetic/Genomic/Molecular Testing History:  2006: Negative for germline BRCA1, BRCA2 pathogenic variants.     8/14/22 (specimen from 6/9/22): [facility] Testing.   -PD-L1+ (CPS 10)  -Mismatch repair deficient/microsatellite instability-high  -TMB high (53 mut/Mb)  -NTRK 1/2/3 fusion not detected.     5/15/25: Tumor IHC.  -PD-L1+ (TPS 70%)    5/20/25: [facility] Oncology Fusion Gene panel:   -Negative for fusion events in ACVR2A, AKT1, AKT2, AKT3, ALK, AR, XYXYDZ77, ARHGAP6, RICHMOND,  BCOR, BRAF, BRD3, BRD4, CAMTA1, CCNB3, CCND1, , CIC, CRTC1, CSF1, CSF1R, CTNNB1, DNAJB1, EGF, EGFR, EPC1, ERBB2, ERBB4, ERG, ESR1, ESRRA, ETV1, ETV4, ETV5, ETV6, EWSR1, FGF1, FGFR1, FGFR2, FGFR3, FGR, FOS, FOSB, FOXO1, FOXO4, FOXR2, FUS, GLI1, GRB7, HMGA2, HRAS, IDH1, IDH2, IGF1R, INSR, JAK2, JAK3, JAZF1, KIT, KRAS, MAML2, MAP2K1, MAST1, MAST2, MBTD1, MDM2, MEAF6, MET, MGEA5, MKL2, MN1, MSMB, MUSK, MYB, MYBL1, MYC, MYOD1, NCOA1, NCOA2, NCOA3, NFATC2, NFE2L2, NFIB, NOTCH1, NOTCH2, NR4A3, NRAS, NRG1, NTRK1, NTRK2, NTRK3, NUMBL, NUTM1, PAX3, PAX8, PDGFB, PDGFD, PDGFRA, PDGFRB, PHF1, PHKB, PIK3CA, PKN1, PLAG1, PPARG, PRDM10, PRKACA, PRKACB, PRKCA, PRKCB, PRKCD, PRKD1, PRKD2, PRKD3, RAD51B, RAF1, RELA, RET, ROS1, RSPO2, RSPO3, SS18, SS18L1, STAT6, TAF15, TCF12, TERT, TFE3, TFEB, TFG, THADA, TMPRSS2, USP6, VGLL2, WWTR1, YAP1, YWHAE       Subjective:  Cordell returns for a scheduled follow-up visit to review results and discuss management of her recurrent cancer. She is accompanied by her .  Cordell reports frustration due to a significant scheduling error--Cordell showed me a MyChart message from my RN Carina which tells her to come in today at 12:30p per my request. Unfortunately Codrell was never added to my clinic schedule. She showed up for her visit and was told that her appointment was not until 1:30p, even though she was not even on my schedule for that time. I was not notified until 2:30p that Cordell was in the clinic visit. Therefore, she has been waiting >2h for an appointment that she was given but which was never scheduled. Unfortunately she also does not feel well due to her recurrent cancer, which makes this scheduling mistake even more difficult to tolerate.   On review of recent events, Cordell reports a cough since February.  She had a chest xray 3/7/25 with no abnormalities. She ultimately got the CT above 5/9/25, leading ultimately to the evaluation above.   She is scheduled for  bronchoscopy for stenting next week.   Maritza's  reports that she has a severe cough that keeps her up at night. She has some sputum in the morning but mostly this is a dry cough.  Maritza's activity is severely limited by shortness of breath, wheezing. She also gets fatigued easily.       Past Medical History:  Past Medical History:   Diagnosis Date     Abnormal Pap smear     maybe 20 years ago     Cervical cancer (H)      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      History of breast cancer 2003    lumpectomy and radiation offerred chemo and patient declined at time but had oncogene test and low risk     Hyperlipidemia LDL goal < 160      Osteopenia      Paroxysmal A-fib (H)      Severe vaginal dysplasia        Past Surgical History:  Past Surgical History:   Procedure Laterality Date     BIOPSY       BREAST SURGERY Left 2003    lumpectomy left, did radiation, 5 yr of tamoxifen     BRONCHOSCOPY RIDID OR FLEXIBLE W/ENDOBRONCHIAL ULTRASOUND GUIDED 1 OR 2 NODE STATIONS N/A 5/15/2025    Procedure: Bronchoscopy Ridid Or Flexible W/Endobronchial Ultrasound Guided 1 Or 2 Node Stations;  Surgeon: Leo Leung DO;  Location:  GI     COLONOSCOPY  2018    repeat in 2028     Colposcopy Cervix with Loop Electrode Conization and Lser to Vagina  2008     COLPOSCOPY, BIOPSY, COMBINED  10/24/2012    Procedure: COMBINED COLPOSCOPY, BIOPSY;  Colposcopy, Biopsy of Cervix and Vagina, Ultrasound Guidance;  Surgeon: Colette Ng MD;  Location:  OR     ENT SURGERY  01/23/2018    otoscelerosis, right side     HYSTERECTOMY, FRANCIA  12/2012    high grade cervical dysplasia, [facility], Dr. Arciniega     INSERT INTERSTITIAL NEEDLE, ULTRASOUND GUIDED N/A 02/13/2023    Procedure: INSERTION, NEEDLE, WITH ULTRASOUND GUIDANCE, FOR INTERSTITIAL BRACHYTHERAPY;  Surgeon: Raymond Stockton MD;  Location:  OR     INSERT PORT VASCULAR ACCESS Right 07/18/2022    Procedure: INSERTION, VASCULAR ACCESS PORT;   Surgeon: Donnie Elias MD;  Location: [facility] OR     IR CHEST PORT PLACEMENT > 5 YRS OF AGE  07/18/2022     IR PORT REMOVAL RIGHT  03/30/2023     CA LAP VAGINECTOMY, PARTIAL REMOVAL OF VAGINAL WALL  10/2013    upper vaginectomy for dysplasia, Dr. Megan Arciniega     REMOVE INTERSTITIAL NEEDLE N/A 02/15/2023    Procedure: REMOVAL, INTERSTITIAL NEEDLE, AFTER TEMPORARY BRACHYTHERAPY;  Surgeon: Raymond Stockton MD;  Location: [facility] OR     REMOVE PORT VASCULAR ACCESS Right 03/30/2023    Procedure: Remove port vascular access right;  Surgeon: Donnie Elias MD;  Location: [facility] OR       Current Medications:   Current Outpatient Medications   Medication Sig Dispense Refill     guaiFENesin-codeine (ROBITUSSIN AC) 100-10 MG/5ML solution Take 5-10 mLs by mouth every 4 hours as needed for cough. 473 mL 3     polyethylene glycol (MIRALAX) 17 GM/Dose powder Take 17 g by mouth daily. 510 g 3     Bacillus Coagulans-Inulin (PROBIOTIC FORMULA) 1-250 BILLION-MG CAPS Take by mouth every morning 25+billion CFUS       Cholecalciferol (VITAMIN D-3) 25 MCG (1000 UT) CAPS Take 1,000 Units by mouth every morning 90 capsule 3     clonazePAM (KLONOPIN) 0.5 MG tablet Take 1 tablet (0.5 mg) by mouth nightly as needed for anxiety or sleep. 30 tablet 0     coenzyme Q-10 capsule Take 1 capsule by mouth daily       famotidine (PEPCID) 20 MG tablet Take 1 tablet (20 mg) by mouth 2 times daily. 60 tablet 2     levothyroxine (SYNTHROID/LEVOTHROID) 112 MCG tablet TAKE 1 TABLET (112 MCG) BY MOUTH DAILY BEFORE BREAKFAST 90 tablet 3     MAGNESIUM OXIDE PO Take 400 mg by mouth daily       metoprolol succinate ER (TOPROL XL) 50 MG 24 hr tablet Take 1 tablet (50 mg) by mouth daily. 90 tablet 3     Omega-3 Fatty Acids (OMEGA-3 FISH OIL PO) Take 630 mg by mouth 2 times daily       OVER-THE-COUNTER Turkey tail mushroom powder, use 1 times a day       Pectin Cit-Inos-C-Bioflav-Soy (MODIFIED CITRUS PECTIN PO) Take by mouth 2 times daily       UNABLE TO FIND Take by  "mouth daily. MEDICATION NAME: Nattokinase       UNABLE TO FIND 500 mg MEDICATION NAME: Turmeric       UNABLE TO FIND 1,200 mg 2 times daily MEDICATION NAME: Efraín       UNABLE TO FIND Take 1 tablet by mouth every morning MEDICATION NAME: Dinndolylmethane Complex (DIM) 100mg tablet 1 table daily       No current facility-administered medications for this visit.        Allergies:   No Known Allergies       Social History:  Patient lives with her . 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 She walks daily, gardens, does yoga twice/week.  She does have Advanced Directives, but has identified her daughter and  as her desired [employer] of .   Social History     Tobacco Use     Smoking status: Former     Current packs/day: 0.00     Average packs/day: 0.5 packs/day for 15.0 years (7.5 ttl pk-yrs)     Types: Cigarettes     Start date:      Quit date:      Years since quittin.4     Passive exposure: Past     Smokeless tobacco: Never   Substance Use Topics     Alcohol use: Not Currently     Alcohol/week: 0.0 - 2.0 standard drinks of alcohol     Comment: Socially        History   Drug Use No       Family History:   The patient's family history is notable for a first-degree and second-degree paternal relative with breast cancer.  No known family history of ovarian, uterine, colon, urothelial/renal, prostate or pancreatic cancers.  No known family history of melanoma.  Family History   Problem Relation Age of Onset     Myocardial Infarction Mother 73        Tob     Myocardial Infarction Father 68        Tob     Breast Cancer Sister 47         of breast cancer age 63; BRCA mutation testing negative     Cancer Maternal Grandmother         Unsure of type.     Breast Cancer Paternal Grandmother         Unsure of diagnosis--some type of \"women's issue\"     Skin Cancer Daughter 47     Anesthesia Reaction No family hx of      Deep Vein Thrombosis (DVT) No family hx of        Physical Exam:  " "  BP 99/63   Pulse 101   Temp 97.7  F (36.5  C)   Resp 16   Ht 1.583 m (5' 2.32\")   Wt 56.2 kg (124 lb)   LMP  (LMP Unknown)   SpO2 99%   BMI 22.45 kg/m     Body mass index is 22.45 kg/m .    General Appearance: alert, no distress     Respiratory: Able to talk in full sentences. +intermittent dry cough.     Musculoskeletal: extremities non tender and without edema    Skin: no lesions or rashes     Neurological:  no gross defects     Psychiatric: appropriate mood and affect               Laboratory Examination:   I have independently reviewed & interpreted the 5/15/25 pathology, IHC & fusion results detailed in the HPI.       Radiographic Examination:  5/27/25: PET/CT: Recurrent, metastatic vaginal cancer. I have personally reviewed and interpreted the PET/CT images.    CHEST:  Lymph nodes:   Left intrapectoral biopsied 0.8 cm lymph node with SUV max 3.9 (6/158)  Right axillary 1.0 cm lymph node with SUV max of 0.4.  Right paratracheal necrotic 2.8 x 2.7 cm mass with SUV max of 20.4.  Subcarinal necrotic 6.1 x 4.9 cm mass with SUV max 22.5.  Cardiophrenic necrotic 1.7 cm node with SUV max 14.  LUNGS: The central tracheobronchial tree is clear. Large right pleural  effusion with near complete atelectasis of the right lower lobe and  partial atelectasis of the right middle lobe. Right lower lobe 2.7 x  2.2 cm hypodensity with SUV max 10.5.  PELVIS: Hypermetabolic activity in soft  tissues just posterior to the symphysis pubis presumably residual  impression soft tissue max SUV 8.7.  LYMPH NODES:   Conglomerate right periaortic 1.0 cm and necrotic 1.2 cm lymph nodes  with SUV max 36.  Right periaortic necrotic 1.3 cm lymph node with SUV max 29.6.  Left periaortic 1.1 cm lymph node with SUV max 17.6.  Right common iliac 1.4 cm lymph node with SUV max 11.8.  Right retroperitoneal 0.6 cm lymph node/deposit between the iliac  adjacent psoas muscle with SUV max 3.5.  Right internal iliac 0.9 cm necrotic lymph node " with SUV max 15.0.  PERITONEUM:  Trace volume peritoneal fluid. No free air. Left upper quadrant  anterolateral peripheral lesion contiguous to bowel likely  misregistered to a 1.1 x 0.9 cm peritoneal deposit with SUV max 6.9.    5/9/25: Chest CT: Significant subcarinal and paratracheal lymphadenopathy. I have personally reviewed and interpreted the CT images.    CHEST: Right paratracheal 2.4 x 2.4 x 2.7 cm lesion. There is also subcarinal lesion measuring  approximately 4.6 x 4.5 x 5.2 cm. Innumerable other prominent and small lymph nodes throughout the  lower neck and mediastinum.      Performance Status:  ECOG Grade 3 (due to current pulmonary status).       Assessment:  Cordell Donaldson is a 75 year old patient with a diagnosis of stage III squamous cell carcinoma of the vagina (vs recurrent metastatic cervical cancer) currently with recurrent, metastatic disease.     Cough, SOB due to chest lymphadenopathy causing bronchial stenosis.     Medical history significant for FIGO Stage IA1 squamous cell carcinoma of the cervix s/p hysterectomy with no residual disease, and a long history of vaginal HSIL.    Diagnosis of recurrent breast cancer during vaginal cancer treatment, currently in surveillance.         Plan:   1.)    Squamous cell carcinoma: I reviewed the chest CT and PET/CT images with Cordell and reviewed the pathology results. Discussed that unfortunately recurrent vaginal cancer does not respond well to treatment. Discussed that the best second-line therapy option would be tisotumab vedotin, although this would be palliative only, and I shared my concerns for tolerance of this therapy, especially since her primary therapy was so hard on her. Discussed my concern for potential development of pneumonitis--while this is overall low risk (reported <5% with TV), if it occurred it could be catastrophic in the setting of her already compromised pulmonary status. We also discussed best supportive care/hospice  care. Unfortunately she is not eligible for clinical trials given her current performance status. We did discuss the option of kidney biopsy for definitive diagnosis of the renal mass, however, decision-making regarding additional diagnosis really depends on how she wants to manage the known recurrent vaginal cancer, as it is likely that even in the setting of two separate cancers that the recurrent, metastatic vaginal cancer is the more serious of the two.     After discussion of the risks/benefits of each option, Cordell would like more time to consider her options.   Plan as follows:   -RTC 6/27/25 as currently scheduled to continue discussion. Cordell will call if she decides how she would like to proceed prior to that time so we can write appropriate orders.   -Proceed with endobronchial ultrasound, possible stent as scheduled for palliative of pulmonary symptoms.   -Prescription for guaifenisen with codeine for cough suppression in the interim.   Referral to palliative care for additional symptom management.     Side-effects:   -Grade 1 peripheral neuropathy (attributable to paclitaxel and cisplatin): Currently stable, not interfering with activity. Patient discharged from PT since she has improved her strength/function.  -Urinary hesitancy: Possibly due to new pelvic nodule vs radiation changes. Consultation with urogynecologist Dr. Moss scheduled 8/22/25, pending decision regarding management of her vaginal cancer.     2.) Breast cancer: Continue management per medical oncologist Dr. Montana. Radiographic response of breast cancer to current chemotherapy. endocrine therapy with anastrozole recommended, but patient declined, favoring focus on quality of life.      3.) Genetic risk factors were assessed and the patient does not meet the qualifications for a referral.      4.) Labs and/or tests ordered include: None.     5.) Health maintenance issues addressed today include: none.    6.) Code status:   Full-code.    7.) Prescriptions: 1) Guaifenisin with codeine; 2) Prophylactic miralax.         A total of 45 minutes was spent with the patient, 100%  of which were spent in counseling the patient and/or treatment planning; an additional 10 minutes was spent in chart review and documentation.      Hina Raygoza MD, MS, FACOG, FACS  6/1/2025  7:22 PM                          CC  Patient Care Team:  Merry Lino MD as PCP - General (Internal Medicine)  Bess Paredes MD Corfield, Aaron Daniel, MYAM as MD (Podiatry)  Elise Huitron MD as Assigned Heart and Vascular Provider  Merry Lino MD as Assigned PCP  Nany, Heather Munguia MD as MD (Urology)  Angela Montana MD as MD (Hematology & Oncology)  Yvette Pike MD as MD (Surgery)  Nurys Horton RN as Specialty Care Coordinator (Hematology & Oncology)  Felicia Thompson PA-C as Physician Assistant (Anesthesiology)  Valeria Spencer MD as MD (Gastroenterology)  Yulisa Aparicio MD as MD (Dermatology)  Shabbir Thao MD as MD (OB/Gyn)  Maliha Garza APRN CNP as Nurse Practitioner  Bert Rivera, RN as Specialty Care Coordinator (Cardiology)  Yulisa Aparicio MD as Assigned Dermatology Provider  Angela oMntana MD as Assigned Cancer Care Provider  Monique Bocanegra APRN CNP as Pulmonologist (Pulmonary Disease)  Maliha Garza APRN CNP as Assigned OBGYN Provider  Lebron Hardwick MD as MD (Otolaryngology)  Maliha Garza APRN CNP as Nurse Practitioner (OB/Gyn)  Elise Huitron MD as MD (Cardiovascular Disease)  Monique Bocanegra APRN CNP as Assigned Pulmonology Provider  SELF, REFERRED    Again, thank you for allowing me to participate in the care of your patient.        Sincerely,        Hina Raygoza MD    Electronically signed

## 2025-05-30 NOTE — PROGRESS NOTES
Preoperative Evaluation   PHYSICIANS [place] [address] [facility], [address]  [facility] [address]  Phone: [phone]  Fax: [phone]  Primary Provider: Merry Lino MD  Pre-op Performing Provider: Merry Lino MD  May 30, 2025             5/26/2025   Surgical Information   What procedure is being done? Bronchoscopy Rigid, possible tissue debulking, possible stent placement, endobronchial ultrasound with transbronchial needle aspiration.    Facility or Hospital where procedure/surgery will be performed: [facility]   Who is doing the procedure / surgery? Dr. Steve Reynolds   Date of surgery / procedure: June 5, 2025   Time of surgery / procedure: 9:05am   Where do you plan to recover after surgery? at home with family     Fax number for surgical facility: Note does not need to be faxed, will be available electronically in Epic.    Assessment & Plan     The proposed surgical procedure is considered INTERMEDIATE risk.  (Z01.818) Preop general physical exam  (primary encounter diagnosis)  (J18.1) Consolidation of right lower lobe of lung  Comment: enlarged LN obstructing bronchus of RLL, thought to be from metastatic cancer (hx of breast cancer and vaginal cancer)  Plan: no contraindication to procedure, proceed as planned.     (E87.1) Hyponatremia  Comment: sodium 130, likely secondary to pulmonary process  Plan: level is stable for surgery    (I48.11) Longstanding persistent atrial fibrillation (H)  Comment: rate controlled with metoprolol ER 50mg daily, takes an additional 1/2 tablet if HR > 100, usually in the 80s. She declined anticoagulation  Plan: Take metoprolol the evening prior to surgery, at your usual time.. Recommend taking an additional 1/2 tablet (25mg dose) on morning of surgery.     (E03.8) Other specified hypothyroidism  Comment: on levothyroxine 112mcg daily  Plan: may take dose with sip of water on morning of  surgery  Recheck TSH post operatively.     (D50.9) Iron deficiency anemia, unspecified iron deficiency anemia type  Comment: hemoglobin 9.6, she is hemodynamically stable  Plan: ok to proceed with surgery.   Recommend checking hemoglobin post op.     How to Take Your Medication Before Surgery  Hold any vitamins, herbs, supplements, aspirin, ibuprofen, naproxen for 10 days prior to surgery.   Take metoprolol and thyroid medication on the morning of your surgery  You may use your inhalers on the morning of surgery if needed.  You may take acetaminophen (Tylenol) in the 10 days prior to surgery.    - No identified additional risk factors other than previously addressed      Recommendation  Approval given to proceed with proposed procedure, without further diagnostic evaluation.    Merry Lino MD  Internal Medicine/Pediatrics      Subjective   Cordell is a 75 year old, presenting for the following:  Pre-Op Exam    HPI: Cordell Donaldson is a 75 year old woman with hx of breast cancer, hx of cervical cancer 2012/ vaginal cancer 2013, with recurrence of cervical cancer. She recently developed a persistent dry cough. Chest CT identified a large lymph node in the subcarinal region, compressing the bronchus, resulting in near complete opacification of the right lower lobe. She is to undergo rigid bronchoscopy with possible tissue debulking, possible stent placement, endobronchial ultrasound with transbronchial needle aspiration.        5/26/2025   Pre-Op Questionnaire   Have you ever had a heart attack or stroke? No   Have you ever had surgery on your heart or blood vessels, such as a stent placement, a coronary artery bypass, or surgery on an artery in your head, neck, heart, or legs? No   Do you have chest pain with activity? No   Do you have a history of heart failure? No   Do you currently have a cold, bronchitis or symptoms of other infection? No   Do you have a cough, shortness of breath, or wheezing? (!) YES --dry  hacking cough   Do you or anyone in your family have previous history of blood clots? No   Do you or does anyone in your family have a serious bleeding problem such as prolonged bleeding following surgeries or cuts? No   Have you ever had problems with anemia or been told to take iron pills? No   Have you had any abnormal blood loss such as black, tarry or bloody stools, or abnormal vaginal bleeding? No   Have you ever had a blood transfusion? No   Are you willing to have a blood transfusion if it is medically needed before, during, or after your surgery? Yes   Have you or any of your relatives ever had problems with anesthesia? No   Do you have sleep apnea, excessive snoring or daytime drowsiness? No   Do you have any artifical heart valves or other implanted medical devices like a pacemaker, defibrillator, or continuous glucose monitor? No   Do you have artificial joints? No   Are you allergic to latex? No     Advance Care Planning    Document on file is a Health Care Directive or POLST.    Preoperative Review of    reviewed - controlled substances reflected in medication list.      Atrial fibrillation  Rate controlled, metoprolol XL 50mg daily  If needed she has been instructed by her cardiologist to take an additional 1/2 tablet (25mg) if experiencing palpitations  She has declined anticoagulation  ECHO 4/21/2025  Interpretation Summary  Significant beat to beat variation in ventricular function due to atrial  fibrillation and rapid heart rates (90s-130s).  Global and regional left ventricular function is normal with an EF of 55-60%.  Global right ventricular function is normal.  Severe biatrial enlargement. Large PFO with left to right shunt.  IVC diameter and respiratory changes fall into an intermediate range  suggesting an RA pressure of 8 mmHg.  No pericardial effusion.    Hypothyroidism  Levothyroxine 112 mcg daily  Mild rise in TSH noted  Dose has been consistent over past 3 months    TSH   Date  Value Ref Range Status   05/14/2025 5.07 (H) 0.30 - 4.20 uIU/mL Final   03/07/2025 0.76 0.40 - 4.00 mU/L Final       Patient Active Problem List    Diagnosis Date Noted    Atrial fibrillation with RVR (H) 05/15/2025     Priority: Medium    Chronic a-fib (H) 05/14/2025     Priority: Medium    Vaginal cancer (H) 10/04/2023     Priority: Medium     Treatment History: Stage III squamous cell carcinoma (vs recurrent, metastatic cervical cancer although initial cervical cancer diagnosis 10 years prior)  -7/20/22-8/10/22: First-line chemotherapy with IV cisplatin + paclitaxel x2 cycles.   -8/31/22-11/21/22: First-line chemotherapy with IV platinum (cisplatin x2 cycles; carboplatin x2 cycles) + paclitaxel + pembrolizumab x4 cycles (total 6 cycles). Partial response.   -12/28/22-2/15/23: First-line pelvic external beam radiation therapy then vaginal cuff brachytherapy.   -Declined adjuvant chemotherapy due to poor QOL on chemotherapy.   -Surveillance as follows (per the NCCN guidelines):  Every 3 months x2 years, then every 6 months x3 years (NP 3x/year, MD annually). No cytology tests given high false-positive rate in the setting of radiation therapy.       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 HSIL, no residual invasive cancer on the hysterectomy specimen.   -(pending): First-line chemotherapy for recurrent cervix vs new vaginal cancer with cisplatin + paclitaxel + bevacizumab.       Rectocele 04/14/2022     Priority: Medium    Hyperlipidemia LDL goal <130 01/02/2020     Priority: Medium     Declines statin use      Atrial fibrillation (H) 10/03/2016     Priority: Medium     Declines anticoagulation management or lipid management      Abnormal electrocardiogram  09/30/2016     Priority: Medium    Dupuytren contracture 09/05/2014     Priority: Medium    Malignant neoplasm of breast (H) 01/08/2009     Priority: Medium      Past Medical History:   Diagnosis Date    Abnormal Pap smear     maybe 20 years ago    Cervical cancer (H)     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     History of breast cancer 2003    lumpectomy and radiation offerred chemo and patient declined at time but had oncogene test and low risk    Hyperlipidemia LDL goal < 160     Osteopenia     Paroxysmal A-fib (H)     Severe vaginal dysplasia      Past Surgical History:   Procedure Laterality Date    BIOPSY      BREAST SURGERY Left 2003    lumpectomy left, did radiation, 5 yr of tamoxifen    BRONCHOSCOPY RIDID OR FLEXIBLE W/ENDOBRONCHIAL ULTRASOUND GUIDED 1 OR 2 NODE STATIONS N/A 5/15/2025    Procedure: Bronchoscopy Ridid Or Flexible W/Endobronchial Ultrasound Guided 1 Or 2 Node Stations;  Surgeon: Leo Leung DO;  Location:  GI    COLONOSCOPY  2018    repeat in 2028    Colposcopy Cervix with Loop Electrode Conization and Lser to Vagina  2008    COLPOSCOPY, BIOPSY, COMBINED  10/24/2012    Procedure: COMBINED COLPOSCOPY, BIOPSY;  Colposcopy, Biopsy of Cervix and Vagina, Ultrasound Guidance;  Surgeon: Colette Ng MD;  Location: [facility] OR    ENT SURGERY  01/23/2018    otoscelerosis, right side    HYSTERECTOMY, FRANCIA  12/2012    high grade cervical dysplasia, [place] [facility], Dr. Arciniega    INSERT INTERSTITIAL NEEDLE, ULTRASOUND GUIDED N/A 02/13/2023    Procedure: INSERTION, NEEDLE, WITH ULTRASOUND GUIDANCE, FOR INTERSTITIAL BRACHYTHERAPY;  Surgeon: Raymond Stockton MD;  Location: [facility] OR    INSERT PORT VASCULAR ACCESS Right 07/18/2022    Procedure: INSERTION, VASCULAR ACCESS PORT;  Surgeon: Donnie Elias MD;  [facility]    IR CHEST PORT PLACEMENT > 5 YRS OF AGE  07/18/2022    IR PORT REMOVAL RIGHT  03/30/2023    MD LAP VAGINECTOMY, PARTIAL REMOVAL OF VAGINAL WALL  10/2013     upper vaginectomy for dysplasia, Dr. Megan Arciniega    REMOVE INTERSTITIAL NEEDLE N/A 02/15/2023    Procedure: REMOVAL, INTERSTITIAL NEEDLE, AFTER TEMPORARY BRACHYTHERAPY;  Surgeon: Raymond Stockton MD;  Location: [facility] OR    REMOVE PORT VASCULAR ACCESS Right 03/30/2023    Procedure: Remove port vascular access right;  Surgeon: Donnie Elias MD;  [place] [facility]     Current Outpatient Medications   Medication Sig Dispense Refill    Bacillus Coagulans-Inulin (PROBIOTIC FORMULA) 1-250 BILLION-MG CAPS Take by mouth every morning 25+billion CFUS      Cholecalciferol (VITAMIN D-3) 25 MCG (1000 UT) CAPS Take 1,000 Units by mouth every morning 90 capsule 3    clonazePAM (KLONOPIN) 0.5 MG tablet Take 1 tablet (0.5 mg) by mouth nightly as needed for anxiety or sleep. 30 tablet 0    coenzyme Q-10 capsule Take 1 capsule by mouth daily      famotidine (PEPCID) 20 MG tablet Take 1 tablet (20 mg) by mouth 2 times daily. 60 tablet 2    levothyroxine (SYNTHROID/LEVOTHROID) 112 MCG tablet TAKE 1 TABLET (112 MCG) BY MOUTH DAILY BEFORE BREAKFAST 90 tablet 3    MAGNESIUM OXIDE PO Take 400 mg by mouth daily      metoprolol succinate ER (TOPROL XL) 50 MG 24 hr tablet Take 1 tablet (50 mg) by mouth daily. 90 tablet 3    Omega-3 Fatty Acids (OMEGA-3 FISH OIL PO) Take 630 mg by mouth 2 times daily      OVER-THE-COUNTER Turkey tail mushroom powder, use 1 times a day      Pectin Cit-Inos-C-Bioflav-Soy (MODIFIED CITRUS PECTIN PO) Take by mouth 2 times daily      UNABLE TO FIND Take by mouth daily. MEDICATION NAME: Nattokinase      UNABLE TO FIND 500 mg MEDICATION NAME: Turmeric      UNABLE TO FIND 1,200 mg 2 times daily MEDICATION NAME: Efraín      UNABLE TO FIND Take 1 tablet by mouth every morning MEDICATION NAME: Dinndolylmethane Complex (DIM) 100mg tablet 1 table daily      guaiFENesin-codeine (ROBITUSSIN AC) 100-10 MG/5ML solution Take 5-10 mLs by mouth every 4 hours as needed for cough. 473 mL 3    polyethylene glycol (MIRALAX) 17  "GM/Dose powder Take 17 g by mouth daily. 510 g 3       No Known Allergies     Social History     Tobacco Use    Smoking status: Former     Current packs/day: 0.00     Average packs/day: 0.5 packs/day for 15.0 years (7.5 ttl pk-yrs)     Types: Cigarettes     Start date:      Quit date:      Years since quittin.4     Passive exposure: Past    Smokeless tobacco: Never   Substance Use Topics    Alcohol use: Not Currently     Alcohol/week: 0.0 - 2.0 standard drinks of alcohol     Comment: Socially      Family History   Problem Relation Age of Onset    Myocardial Infarction Mother 73        Tob    Myocardial Infarction Father 68        Tob    Breast Cancer Sister 47         of breast cancer age 63; BRCA mutation testing negative    Cancer Maternal Grandmother         Unsure of type.    Breast Cancer Paternal Grandmother         Unsure of diagnosis--some type of \"women's issue\"    Skin Cancer Daughter 47    Anesthesia Reaction No family hx of     Deep Vein Thrombosis (DVT) No family hx of      History   Drug Use No             Review of Systems  Constitutional, HEENT, cardiovascular, pulmonary, gi and gu systems are negative, except as otherwise noted.    Objective    BP 99/63 (BP Location: Left arm, Patient Position: Sitting, Cuff Size: Adult Small)   Pulse 101   Temp 97.7  F (36.5  C) (Skin)   Resp 16   Ht 1.583 m (5' 2.32\")   Wt 56.2 kg (124 lb)   LMP  (LMP Unknown)   SpO2 99%   BMI 22.45 kg/m     Estimated body mass index is 22.45 kg/m  as calculated from the following:    Height as of this encounter: 1.583 m (5' 2.32\").    Weight as of this encounter: 56.2 kg (124 lb).  Physical Exam  GENERAL: alert , pleasant, harsh cough  EYES: Eyes grossly normal to inspection, PERRL and conjunctivae and sclerae normal  HENT: ear canals and TM's normal, nose and mouth without ulcers or lesions  NECK: no adenopathy  RESP:decreased BS in right base, no wheezing or rhonchi, remainder of lung fields clear  CV: " irreg irreg S1S2,  no murmur  ABDOMEN: soft, nontender, no hepatosplenomegaly, no masses and bowel sounds normal  MS: no gross musculoskeletal defects noted, no edema  SKIN: no suspicious lesions or rashes  NEURO: Normal strength and tone, mentation intact and speech normal  PSYCH: mentation appears normal, affect normal/bright  Ext: no edema    Recent Labs   Lab Test 05/15/25  0626 05/14/25  1521   HGB 9.6* 9.8*    386   INR 1.19* 1.14   * 129*   POTASSIUM 4.2 4.3   CR 0.66 0.71        Diagnostics  EKG: (5/14/25)abnormal EKG; afib with rvr rate 120, no stemi, different from prior EKG that showed afib with normal rate but same morphology .     Revised Cardiac Risk Index (RCRI)  The patient has the following serious cardiovascular risks for perioperative complications:   - No serious cardiac risks = 0 points     RCRI Interpretation: 0 points: Class I (very low risk - 0.4% complication rate)         Signed Electronically by: Merry Lino MD  A copy of this evaluation report is provided to the requesting physician.

## 2025-05-30 NOTE — PROGRESS NOTES
Follow-up Note on Referred Patient  Gynecologic Oncology Clinic      Date of visit: 2024         Referring Provider/Gynecologic Oncologist:  Megan Arciniega MD  [facility]   Phone [phone]  [phone]      Radiation Oncologist:   Raymond Stockton MD, PhD  [facility]      Medical Oncologist:  Angela Montana MD  [facility].      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 Surveillance visit.      History of Present Illness:   Cordell Donaldson is a 75 year old initially referred to the Gynecologic Cancer Clinic at the [facility] on 2022 by gynecologic oncologist Dr. Arciniega and radiation oncologist Dr. Stockton for management of a pelvic mass due to recurrent cervical cancer vs new vaginal cancer. History as follows:     Breast Cancer History:  : Left breast cancer.  -Lumpectomy.  -Radiation therapy.    [id number]: BRENNAN.     22: PET/CT to stage vaginal cancer (see below).   Mild soft tissue  thickening in the left retropectoral region with an SUV max of 2.7.  7/15/22: Invasive ductal carcinoma.   -Managed by Dr. Montana. Endocrine therapy with anastozole recommended after completion of therapy for the vaginal cancer, but Cordell declined, prefers to focus on quality of life.       Cervical/Vaginal Dysplasia/Cancer History:  10/24/12:   -Endocervical curettings: Squamous carcinoma, invasion cannot be assessed.   -Ectocervical biopsies: HSIL (CIN3).   -Vaginal biopsy, posterior: HSIL (VaIN3).  2012: Robotic-assisted laparoscopic lysis of adhesions, left ureterolysis with  conversion to laparotomy, total abdominal hysterectomy, and an upper  Vaginectomy by gynecologic oncologist Dr. Arciniega.   -Pathology: Stage  IA1 squamous cell carcinoma of the cervix with no residual invasive cancer, residual HSIL.     7/22/13: Vaginal biopsy: HSIL, cannot exclude invasion.   -Consultation with gynecologic oncologist Dr. Johnson. Upper vaginectomy and CO2 laser ablation recommended, patient declined.   [id number]: No vaginal dysplasia treatment.   5/20/22: Pelvic MRI: I have personally reviewed the MRI images.   Centrally hypoenhancing heterogeneous midline pelvic mass along  the superior margin of the vaginal vault measures 8.5 x 6.7 x 8.1 cm.  The mass abut the bladder and there is mucosal irregularity at the  site of abutment. The mass indents the  decompressed rectum but there is no definite invasion or mucosal  Irregularity.  6/9/22: CT-guided biopsy of pelvic mass: Poorly-differentiated squamous cell carcinoma, HPV-associated.   7/1/22: Staging PET/CT: Stage III vaginal cancer (vs recurrent, metastatic cervical cancer).     7/20/22, 8/10/22, 8/31/22,9/21/22, 10/26/22, 11/21/22: Cycles #1-6 of first-line chemotherapy with IV platinum + paclitaxel + bevacizumab 15 mg/kg + pembrolizumab 200 mg (added cycle 3) every 21 days.   -Cycles 1-4: Cisplatin 50 mg/m2, paclitaxel 175 mg/m2.   -Cycle 3:  Pembrolizumab added to all subsequent cycles due to PD-L1+ result per the KEYNOTE-826 regimen.  -9/22/22: PET/CT: Partial response. I have personally reviewed the PET/CT images.   -Discussed with radiation oncologist Dr. Stockton and at the gyn onc/radiation oncology tumor conference. Recommendation for additional chemotherapy prior to radiation therapy.   -Cycles 5+: Platinum changed to carboplatin due to tinnitus, and paclitaxel dose-reduced to 135 mg/m2 due to peripheral neuropathy.   -12/5/22: PET/CT: Partial response. I have personally reviewed the PET/CT images.   12/28/22-2/15/23: First-line pelvic external beam radiation therapy with total dose 4500 cGy in 25 fractions then vaginal cuff brachytherapy with total dose 2500 cGy in 5 fractions. No  concurrent chemosensitization due to myelosuppression and decreased patient tolerance of therapy.   -Patient declined maintenance therapy due to significantly decreased quality of life while on systemic therapy.   -6/8/23: PET/CT: No definitive evidence of disease. Ascites with imaging concerning for cirrhotic changes.  -9/13/23: Abdominal ultrasound: Negative for cirrhosis.   -Ascites resolved, attributed to pembrolizumab.   -3/7/25: Chest xray to evaluate new cough: BRENNAN.   -5//9/25: Chest CT to evaluate cough, SOB: Recurrent disease.   -5/27/25: FNA of right hilar mass: Non-small cell carcinoma c/w squamous cell carcinoma; IHC: TTF1-; GATA3-  -5/27/25: PET/CT: Recurrent, metastatic disease.   -5/28/25: Discussed at [facility] gyn onc tumor board. Consensus for discussion of second-line therapy with tisotumab vedotin vs best supportive care, with recognition of the palliative nature of all options, and the potential for pneumonitis which could be catastrophic. Discussed the renal mass, favored to be additional metastatic disease given the extensive nature of recurrence, however, cannot rule-out a third primary renal cell carcinoma since kidney is not a common place for vaginal cancer recurrence.       Genetic/Genomic/Molecular Testing History:  2006: Negative for germline BRCA1, BRCA2 pathogenic variants.     8/14/22 (specimen from 6/9/22): [facility] Testing.   -PD-L1+ (CPS 10)  -Mismatch repair deficient/microsatellite instability-high  -TMB high (53 mut/Mb)  -NTRK 1/2/3 fusion not detected.     5/15/25: Tumor IHC.  -PD-L1+ (TPS 70%)    5/20/25: [facility] Oncology Fusion Gene panel:   -Negative for fusion events in ACVR2A, AKT1, AKT2, AKT3, ALK, AR, NPDWOC23, ARHGAP6, RICHMOND, BCOR, BRAF, BRD3, BRD4, CAMTA1, CCNB3, CCND1, , CIC, CRTC1, CSF1, CSF1R, CTNNB1, DNAJB1, EGF, EGFR, EPC1, ERBB2, ERBB4, ERG, ESR1, ESRRA, ETV1, ETV4, ETV5, ETV6, EWSR1, FGF1, FGFR1, FGFR2, FGFR3, FGR, FOS, FOSB, FOXO1, FOXO4, FOXR2, FUS, GLI1, GRB7, HMGA2,  HRAS, IDH1, IDH2, IGF1R, INSR, JAK2, JAK3, JAZF1, KIT, KRAS, MAML2, MAP2K1, MAST1, MAST2, MBTD1, MDM2, MEAF6, MET, MGEA5, MKL2, MN1, MSMB, MUSK, MYB, MYBL1, MYC, MYOD1, NCOA1, NCOA2, NCOA3, NFATC2, NFE2L2, NFIB, NOTCH1, NOTCH2, NR4A3, NRAS, NRG1, NTRK1, NTRK2, NTRK3, NUMBL, NUTM1, PAX3, PAX8, PDGFB, PDGFD, PDGFRA, PDGFRB, PHF1, PHKB, PIK3CA, PKN1, PLAG1, PPARG, PRDM10, PRKACA, PRKACB, PRKCA, PRKCB, PRKCD, PRKD1, PRKD2, PRKD3, RAD51B, RAF1, RELA, RET, ROS1, RSPO2, RSPO3, SS18, SS18L1, STAT6, TAF15, TCF12, TERT, TFE3, TFEB, TFG, THADA, TMPRSS2, USP6, VGLL2, WWTR1, YAP1, YWHAE       Subjective:  Cordell returns for a scheduled follow-up visit to review results and discuss management of her recurrent cancer. She is accompanied by her .  Cordell reports frustration due to a significant scheduling error--Cordell showed me a MyChart message from my RN Carina which tells her to come in today at 12:30p per my request. Unfortunately Cordell was never added to my clinic schedule. She showed up for her visit and was told that her appointment was not until 1:30p, even though she was not even on my schedule for that time. I was not notified until 2:30p that Cordell was in the clinic visit. Therefore, she has been waiting >2h for an appointment that she was given but which was never scheduled. Unfortunately she also does not feel well due to her recurrent cancer, which makes this scheduling mistake even more difficult to tolerate.   On review of recent events, Cordell reports a cough since February.  She had a chest xray 3/7/25 with no abnormalities. She ultimately got the CT above 5/9/25, leading ultimately to the evaluation above.   She is scheduled for bronchoscopy for stenting next week.   Maritza's  reports that she has a severe cough that keeps her up at night. She has some sputum in the morning but mostly this is a dry cough.  Maritza's activity is severely limited by shortness of breath, wheezing. She also  gets fatigued easily.       Past Medical History:  Past Medical History:   Diagnosis Date    Abnormal Pap smear     maybe 20 years ago    Cervical cancer (H)     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     History of breast cancer 2003    lumpectomy and radiation offerred chemo and patient declined at time but had oncogene test and low risk    Hyperlipidemia LDL goal < 160     Osteopenia     Paroxysmal A-fib (H)     Severe vaginal dysplasia        Past Surgical History:  Past Surgical History:   Procedure Laterality Date    BIOPSY      BREAST SURGERY Left 2003    lumpectomy left, did radiation, 5 yr of tamoxifen    BRONCHOSCOPY RIDID OR FLEXIBLE W/ENDOBRONCHIAL ULTRASOUND GUIDED 1 OR 2 NODE STATIONS N/A 5/15/2025    Procedure: Bronchoscopy Ridid Or Flexible W/Endobronchial Ultrasound Guided 1 Or 2 Node Stations;  Surgeon: Leo Leung DO;  Location:  GI    COLONOSCOPY  2018    repeat in 2028    Colposcopy Cervix with Loop Electrode Conization and Lser to Vagina  2008    COLPOSCOPY, BIOPSY, COMBINED  10/24/2012    Procedure: COMBINED COLPOSCOPY, BIOPSY;  Colposcopy, Biopsy of Cervix and Vagina, Ultrasound Guidance;  Surgeon: Colette Ng MD;  Location:  OR    ENT SURGERY  01/23/2018    otoscelerosis, right side    HYSTERECTOMY, FRANCIA  12/2012    high grade cervical dysplasia, [facility], Dr. Arciniega    INSERT INTERSTITIAL NEEDLE, ULTRASOUND GUIDED N/A 02/13/2023    Procedure: INSERTION, NEEDLE, WITH ULTRASOUND GUIDANCE, FOR INTERSTITIAL BRACHYTHERAPY;  Surgeon: Raymond Stockton MD;  Location:  OR    INSERT PORT VASCULAR ACCESS Right 07/18/2022    Procedure: INSERTION, VASCULAR ACCESS PORT;  Surgeon: Donnie Elias MD;  [place] [facility] [place]    IR CHEST PORT PLACEMENT > 5 YRS OF AGE  07/18/2022    IR PORT REMOVAL RIGHT  03/30/2023    ID LAP VAGINECTOMY, PARTIAL REMOVAL OF VAGINAL WALL  10/2013    upper vaginectomy for dysplasia, Dr. Megan Arciniega    REMOVE INTERSTITIAL NEEDLE N/A  02/15/2023    Procedure: REMOVAL, INTERSTITIAL NEEDLE, AFTER TEMPORARY BRACHYTHERAPY;  Surgeon: Raymond Stockton MD;  Location: [facility] OR    REMOVE PORT VASCULAR ACCESS Right 03/30/2023    Procedure: Remove port vascular access right;  Surgeon: Donnie Elias MD;  Location: [facility] OR       Current Medications:   Current Outpatient Medications   Medication Sig Dispense Refill    guaiFENesin-codeine (ROBITUSSIN AC) 100-10 MG/5ML solution Take 5-10 mLs by mouth every 4 hours as needed for cough. 473 mL 3    polyethylene glycol (MIRALAX) 17 GM/Dose powder Take 17 g by mouth daily. 510 g 3    Bacillus Coagulans-Inulin (PROBIOTIC FORMULA) 1-250 BILLION-MG CAPS Take by mouth every morning 25+billion CFUS      Cholecalciferol (VITAMIN D-3) 25 MCG (1000 UT) CAPS Take 1,000 Units by mouth every morning 90 capsule 3    clonazePAM (KLONOPIN) 0.5 MG tablet Take 1 tablet (0.5 mg) by mouth nightly as needed for anxiety or sleep. 30 tablet 0    coenzyme Q-10 capsule Take 1 capsule by mouth daily      famotidine (PEPCID) 20 MG tablet Take 1 tablet (20 mg) by mouth 2 times daily. 60 tablet 2    levothyroxine (SYNTHROID/LEVOTHROID) 112 MCG tablet TAKE 1 TABLET (112 MCG) BY MOUTH DAILY BEFORE BREAKFAST 90 tablet 3    MAGNESIUM OXIDE PO Take 400 mg by mouth daily      metoprolol succinate ER (TOPROL XL) 50 MG 24 hr tablet Take 1 tablet (50 mg) by mouth daily. 90 tablet 3    Omega-3 Fatty Acids (OMEGA-3 FISH OIL PO) Take 630 mg by mouth 2 times daily      OVER-THE-COUNTER Turkey tail mushroom powder, use 1 times a day      Pectin Cit-Inos-C-Bioflav-Soy (MODIFIED CITRUS PECTIN PO) Take by mouth 2 times daily      UNABLE TO FIND Take by mouth daily. MEDICATION NAME: Nattokinase      UNABLE TO FIND 500 mg MEDICATION NAME: Turmeric      UNABLE TO FIND 1,200 mg 2 times daily MEDICATION NAME: Efraín      UNABLE TO FIND Take 1 tablet by mouth every morning MEDICATION NAME: Dinndolylmethane Complex (DIM) 100mg tablet 1 table daily       No  "current facility-administered medications for this visit.        Allergies:   No Known Allergies       Social History:  Patient lives with her . 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 She walks daily, gardens, does yoga twice/week.  She does have Advanced Directives, but has identified her daughter and  as her desired [employer] of .   Social History     Tobacco Use    Smoking status: Former     Current packs/day: 0.00     Average packs/day: 0.5 packs/day for 15.0 years (7.5 ttl pk-yrs)     Types: Cigarettes     Start date:      Quit date:      Years since quittin.4     Passive exposure: Past    Smokeless tobacco: Never   Substance Use Topics    Alcohol use: Not Currently     Alcohol/week: 0.0 - 2.0 standard drinks of alcohol     Comment: Socially        History   Drug Use No       Family History:   The patient's family history is notable for a first-degree and second-degree paternal relative with breast cancer.  No known family history of ovarian, uterine, colon, urothelial/renal, prostate or pancreatic cancers.  No known family history of melanoma.  Family History   Problem Relation Age of Onset    Myocardial Infarction Mother 73        Tob    Myocardial Infarction Father 68        Tob    Breast Cancer Sister 47         of breast cancer age 63; BRCA mutation testing negative    Cancer Maternal Grandmother         Unsure of type.    Breast Cancer Paternal Grandmother         Unsure of diagnosis--some type of \"women's issue\"    Skin Cancer Daughter 47    Anesthesia Reaction No family hx of     Deep Vein Thrombosis (DVT) No family hx of        Physical Exam:    BP 99/63   Pulse 101   Temp 97.7  F (36.5  C)   Resp 16   Ht 1.583 m (5' 2.32\")   Wt 56.2 kg (124 lb)   LMP  (LMP Unknown)   SpO2 99%   BMI 22.45 kg/m     Body mass index is 22.45 kg/m .    General Appearance: alert, no distress     Respiratory: Able to talk in full sentences. +intermittent dry cough. "     Musculoskeletal: extremities non tender and without edema    Skin: no lesions or rashes     Neurological:  no gross defects     Psychiatric: appropriate mood and affect               Laboratory Examination:   I have independently reviewed & interpreted the 5/15/25 pathology, IHC & fusion results detailed in the HPI.       Radiographic Examination:  5/27/25: PET/CT: Recurrent, metastatic vaginal cancer. I have personally reviewed and interpreted the PET/CT images.    CHEST:  Lymph nodes:   Left intrapectoral biopsied 0.8 cm lymph node with SUV max 3.9 (6/158)  Right axillary 1.0 cm lymph node with SUV max of 0.4.  Right paratracheal necrotic 2.8 x 2.7 cm mass with SUV max of 20.4.  Subcarinal necrotic 6.1 x 4.9 cm mass with SUV max 22.5.  Cardiophrenic necrotic 1.7 cm node with SUV max 14.  LUNGS: The central tracheobronchial tree is clear. Large right pleural  effusion with near complete atelectasis of the right lower lobe and  partial atelectasis of the right middle lobe. Right lower lobe 2.7 x  2.2 cm hypodensity with SUV max 10.5.  PELVIS: Hypermetabolic activity in soft  tissues just posterior to the symphysis pubis presumably residual  impression soft tissue max SUV 8.7.  LYMPH NODES:   Conglomerate right periaortic 1.0 cm and necrotic 1.2 cm lymph nodes  with SUV max 36.  Right periaortic necrotic 1.3 cm lymph node with SUV max 29.6.  Left periaortic 1.1 cm lymph node with SUV max 17.6.  Right common iliac 1.4 cm lymph node with SUV max 11.8.  Right retroperitoneal 0.6 cm lymph node/deposit between the iliac  adjacent psoas muscle with SUV max 3.5.  Right internal iliac 0.9 cm necrotic lymph node with SUV max 15.0.  PERITONEUM:  Trace volume peritoneal fluid. No free air. Left upper quadrant  anterolateral peripheral lesion contiguous to bowel likely  misregistered to a 1.1 x 0.9 cm peritoneal deposit with SUV max 6.9.    5/9/25: Chest CT: Significant subcarinal and paratracheal lymphadenopathy. I have  personally reviewed and interpreted the CT images.    CHEST: Right paratracheal 2.4 x 2.4 x 2.7 cm lesion. There is also subcarinal lesion measuring  approximately 4.6 x 4.5 x 5.2 cm. Innumerable other prominent and small lymph nodes throughout the  lower neck and mediastinum.      Performance Status:  ECOG Grade 3 (due to current pulmonary status).       Assessment:  Cordell Donaldson is a 75 year old patient with a diagnosis of stage III squamous cell carcinoma of the vagina (vs recurrent metastatic cervical cancer) currently with recurrent, metastatic disease.     Cough, SOB due to chest lymphadenopathy causing bronchial stenosis.     Medical history significant for FIGO Stage IA1 squamous cell carcinoma of the cervix s/p hysterectomy with no residual disease, and a long history of vaginal HSIL.    Diagnosis of recurrent breast cancer during vaginal cancer treatment, currently in surveillance.         Plan:   1.)    Squamous cell carcinoma: I reviewed the chest CT and PET/CT images with Cordell and reviewed the pathology results. Discussed that unfortunately recurrent vaginal cancer does not respond well to treatment. Discussed that the best second-line therapy option would be tisotumab vedotin, although this would be palliative only, and I shared my concerns for tolerance of this therapy, especially since her primary therapy was so hard on her. Discussed my concern for potential development of pneumonitis--while this is overall low risk (reported <5% with TV), if it occurred it could be catastrophic in the setting of her already compromised pulmonary status. We also discussed best supportive care/hospice care. Unfortunately she is not eligible for clinical trials given her current performance status. We did discuss the option of kidney biopsy for definitive diagnosis of the renal mass, however, decision-making regarding additional diagnosis really depends on how she wants to manage the known recurrent vaginal  cancer, as it is likely that even in the setting of two separate cancers that the recurrent, metastatic vaginal cancer is the more serious of the two.     After discussion of the risks/benefits of each option, Cordell would like more time to consider her options.   Plan as follows:   -RTC 6/27/25 as currently scheduled to continue discussion. Cordell will call if she decides how she would like to proceed prior to that time so we can write appropriate orders.   -Proceed with endobronchial ultrasound, possible stent as scheduled for palliative of pulmonary symptoms.   -Prescription for guaifenisen with codeine for cough suppression in the interim.   Referral to palliative care for additional symptom management.     Side-effects:   -Grade 1 peripheral neuropathy (attributable to paclitaxel and cisplatin): Currently stable, not interfering with activity. Patient discharged from PT since she has improved her strength/function.  -Urinary hesitancy: Possibly due to new pelvic nodule vs radiation changes. Consultation with urogynecologist Dr. Moss scheduled 8/22/25, pending decision regarding management of her vaginal cancer.     2.) Breast cancer: Continue management per medical oncologist Dr. Montana. Radiographic response of breast cancer to current chemotherapy. endocrine therapy with anastrozole recommended, but patient declined, favoring focus on quality of life.      3.) Genetic risk factors were assessed and the patient does not meet the qualifications for a referral.      4.) Labs and/or tests ordered include: None.     5.) Health maintenance issues addressed today include: none.    6.) Code status:  Full-code.    7.) Prescriptions: 1) Guaifenisin with codeine; 2) Prophylactic miralax.         A total of 45 minutes was spent with the patient, 100%  of which were spent in counseling the patient and/or treatment planning; an additional 10 minutes was spent in chart review and documentation.      Hina Raygoza,  MD, MS, FACOG, FACS  6/1/2025  7:22 PM                          CC  Patient Care Team:  Merry Lino MD as PCP - General (Internal Medicine)  Bess Paredes MD Corfield, Aaron Daniel, DPM as MD (Podiatry)  Elise Huitron MD as Assigned Heart and Vascular Provider  Merry Lino MD as Assigned PCP  Nany, Heather Munguia MD as MD (Urology)  Angela Montana MD as MD (Hematology & Oncology)  Yvette Pike MD as MD (Surgery)  Clifford, Nurys GAY RN as Specialty Care Coordinator (Hematology & Oncology)  Felicia Thompson PA-C as Physician Assistant (Anesthesiology)  Valeria Spencer MD as MD (Gastroenterology)  Yulisa Aparicio MD as MD (Dermatology)  Shabbir Thao MD as MD (OB/Gyn)  Maliha Garza APRN CNP as Nurse Practitioner  Bert Rivera RN as Specialty Care Coordinator (Cardiology)  Yulisa Aparicio MD as Assigned Dermatology Provider  Angela Montana MD as Assigned Cancer Care Provider  Monique Bocanegra APRN CNP as Pulmonologist (Pulmonary Disease)  Maliha Garza APRN CNP as Assigned OBGYN Provider  Lebron Hardwick MD as MD (Otolaryngology)  Maliha Garza APRN CNP as Nurse Practitioner (OB/Gyn)  Elise Huitron MD as MD (Cardiovascular Disease)  Monique Bocanegra APRN CNP as Assigned Pulmonology Provider  SELF, REFERRED

## 2025-05-30 NOTE — NURSING NOTE
"Oncology Rooming Note    May 30, 2025 2:53 PM   Cordell Donaldson is a 75 year old female who presents for:    Chief Complaint   Patient presents with    Oncology Clinic Visit     Vaginal CA     Initial Vitals: BP 99/63   Pulse 101   Temp 97.7  F (36.5  C)   Resp 16   Ht 1.583 m (5' 2.32\")   Wt 56.2 kg (124 lb)   LMP  (LMP Unknown)   SpO2 99%   BMI 22.45 kg/m   Estimated body mass index is 22.45 kg/m  as calculated from the following:    Height as of this encounter: 1.583 m (5' 2.32\").    Weight as of this encounter: 56.2 kg (124 lb). Body surface area is 1.57 meters squared.  No Pain (0) Comment: Data Unavailable   No LMP recorded (lmp unknown). Patient has had a hysterectomy.  Allergies reviewed: Yes  Medications reviewed: Yes    Medications: Medication refills not needed today.  Pharmacy name entered into [employer]:    [facility] #[id number] - [place], [place] - [address] [place] [address] PHARMACY [place], [place] -  [facility] [phone]    Frailty Screening:   Is the patient here for a new oncology consult visit in cancer care? 2. No    PHQ9:  Did this patient require a PHQ9?: No      Clinical concerns:        Brit Lopez              "

## 2025-06-02 ENCOUNTER — PATIENT OUTREACH (OUTPATIENT)
Dept: CARE COORDINATION | Facility: CLINIC | Age: 75
End: 2025-06-02
Payer: COMMERCIAL

## 2025-06-02 ENCOUNTER — TELEPHONE (OUTPATIENT)
Dept: ONCOLOGY | Facility: CLINIC | Age: 75
End: 2025-06-02
Payer: COMMERCIAL

## 2025-06-02 DIAGNOSIS — R05.3 CHRONIC COUGH: ICD-10-CM

## 2025-06-02 RX ORDER — CODEINE PHOSPHATE AND GUAIFENESIN 10; 100 MG/5ML; MG/5ML
1-2 SOLUTION ORAL EVERY 4 HOURS PRN
Qty: 473 ML | Refills: 2 | Status: ON HOLD | OUTPATIENT
Start: 2025-06-02

## 2025-06-02 NOTE — PATIENT INSTRUCTIONS
SCHEDULING:  -KEEP 6/27/25 appointment to continue management discussion.   -KEEP appointment for possible bronchial stent.  -Consultation with palliative care for ongoing symptom management.       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   History of vaginal HSIL (pre-cancer).  Squamous cell carcinoma involving a pelvic mass with associated lymphadenopathy, likely stage III vaginal cancer (vs recurrent, metastatic cervical cancer), currently without definitive evidence of disease.   Treatment History:  -12/2012: Robotic-assisted laparoscopic lysis of adhesions (take-down of scar tissue), left ureterolysis (freeing of the left ureter) with  conversion to laparotomy, total abdominal hysterectomy (removal of uterus/cervix), and an upper  Vaginectomy (removal of the upper vagina).  -7/20/22-11/21/22: First-line therapy with IV cisplatin + paclitaxel + bevacizumab + pembrolizumab (added cycles 3+).   -12/28/22-2/15/23: First-line pelvic external beam radiation therapy then vaginal brachytherapy.       PLAN:  1) Squamous cell carcinoma: Option for second-line therapy with tisotumab vedotin every 3 weeks vs best supportive care/hospice care.   Unfortunately anticipated response to therapy is low and goal would be to stabilize disease or possibly decrease the size of the tumors to prevent progression of symptoms/decrease current symptoms, but would not lengthen overall survival. There are risks of toxicity, including a risk of pneumonitis (lung inflammation) which could be life-threatening given your already compromised lung status.     Plan as follows:  -RTC 6/27/25 as scheduled. Please call in the interim if you make a decision on how you would like to proceed and we can start getting things in place.   -Please also call with questions that arise.     2) Urinary hesitancy: This could be due to the new pelvic nodule vs radiation changes. Consultation with urogynecologist Dr. Moss 8/22/25  pending decisions regarding cancer management.       Please call with any questions or concerns. [phone]       Hina Raygoza MD, MS, FACOG, FACS  6/1/2025  7:23 PM

## 2025-06-02 NOTE — TELEPHONE ENCOUNTER
Medication:Robitussin with codeine  Last prescribing provider: on 5/30/2025 to bride patient  until establishes with palliative care scheduled for 6/20/2025.   Last clinic visit date: 5/30/2025  Recommendations for requested medication:  Isaac called stating local [facility] does not have in stock and unsure when will get in stock.  Isaac states called [facility] in clinic at [address] [place] [address] and they have it in stock but need a new prescription sent since there is codeine in the prescription  Any other pertinent information:routed to care team.       Copied and pasted from last plan note on 5/30/2025        1316 This writer spoke to [address] pharmacy Svet[facility] and was able to take TORB for the Robitussin AC.

## 2025-06-03 NOTE — NURSING NOTE
Appt with md end of June  Referral to palliative care  Will decide on hospice vs t[facility], pt to call

## 2025-06-05 ENCOUNTER — HOSPITAL ENCOUNTER (INPATIENT)
Facility: CLINIC | Age: 75
End: 2025-06-05
Attending: INTERNAL MEDICINE | Admitting: INTERNAL MEDICINE
Payer: COMMERCIAL

## 2025-06-05 VITALS
HEIGHT: 63 IN | OXYGEN SATURATION: 98 % | WEIGHT: 123.6 LBS | TEMPERATURE: 98 F | HEART RATE: 122 BPM | BODY MASS INDEX: 21.9 KG/M2 | DIASTOLIC BLOOD PRESSURE: 76 MMHG | RESPIRATION RATE: 24 BRPM | SYSTOLIC BLOOD PRESSURE: 124 MMHG

## 2025-06-05 DIAGNOSIS — K59.01 SLOW TRANSIT CONSTIPATION: ICD-10-CM

## 2025-06-05 DIAGNOSIS — I48.91 ATRIAL FIBRILLATION, UNSPECIFIED TYPE (H): ICD-10-CM

## 2025-06-05 DIAGNOSIS — Z17.0 MALIGNANT NEOPLASM OF LEFT BREAST IN FEMALE, ESTROGEN RECEPTOR POSITIVE, UNSPECIFIED SITE OF BREAST (H): ICD-10-CM

## 2025-06-05 DIAGNOSIS — C52 VAGINAL CANCER (H): Primary | ICD-10-CM

## 2025-06-05 DIAGNOSIS — J96.01 ACUTE RESPIRATORY FAILURE WITH HYPOXIA (H): ICD-10-CM

## 2025-06-05 DIAGNOSIS — C50.912 MALIGNANT NEOPLASM OF LEFT BREAST IN FEMALE, ESTROGEN RECEPTOR POSITIVE, UNSPECIFIED SITE OF BREAST (H): ICD-10-CM

## 2025-06-05 DIAGNOSIS — F51.02 ADJUSTMENT INSOMNIA: ICD-10-CM

## 2025-06-05 DIAGNOSIS — R13.10 DYSPHAGIA, UNSPECIFIED TYPE: ICD-10-CM

## 2025-06-05 DIAGNOSIS — F41.9 ANXIETY: ICD-10-CM

## 2025-06-05 PROBLEM — J96.90 RESPIRATORY FAILURE (H): Status: ACTIVE | Noted: 2025-06-05

## 2025-06-05 PROCEDURE — 360N000083 HC SURGERY LEVEL 3 W/ FLUORO, PER MIN: Performed by: INTERNAL MEDICINE

## 2025-06-05 PROCEDURE — 120N000005 HC R&B MS OVERFLOW UMMC

## 2025-06-05 PROCEDURE — 99207 PR APP CREDIT; MD BILLING SHARED VISIT: CPT | Mod: FS | Performed by: PHYSICIAN ASSISTANT

## 2025-06-05 PROCEDURE — 272N000001 HC OR GENERAL SUPPLY STERILE: Performed by: INTERNAL MEDICINE

## 2025-06-05 PROCEDURE — 94660 CPAP INITIATION&MGMT: CPT

## 2025-06-05 PROCEDURE — 250N000009 HC RX 250

## 2025-06-05 PROCEDURE — 999N000141 HC STATISTIC PRE-PROCEDURE NURSING ASSESSMENT: Performed by: INTERNAL MEDICINE

## 2025-06-05 PROCEDURE — 250N000011 HC RX IP 250 OP 636: Mod: JZ

## 2025-06-05 PROCEDURE — 370N000017 HC ANESTHESIA TECHNICAL FEE, PER MIN: Performed by: INTERNAL MEDICINE

## 2025-06-05 PROCEDURE — 710N000010 HC RECOVERY PHASE 1, LEVEL 2, PER MIN: Performed by: INTERNAL MEDICINE

## 2025-06-05 PROCEDURE — 99223 1ST HOSP IP/OBS HIGH 75: CPT | Mod: FS | Performed by: PEDIATRICS

## 2025-06-05 PROCEDURE — 250N000025 HC SEVOFLURANE, PER MIN: Performed by: INTERNAL MEDICINE

## 2025-06-05 PROCEDURE — 250N000013 HC RX MED GY IP 250 OP 250 PS 637: Performed by: NURSE PRACTITIONER

## 2025-06-05 PROCEDURE — 5A09357 ASSISTANCE WITH RESPIRATORY VENTILATION, LESS THAN 24 CONSECUTIVE HOURS, CONTINUOUS POSITIVE AIRWAY PRESSURE: ICD-10-PCS | Performed by: INTERNAL MEDICINE

## 2025-06-05 PROCEDURE — 999N000157 HC STATISTIC RCP TIME EA 10 MIN

## 2025-06-05 PROCEDURE — 0BJ08ZZ INSPECTION OF TRACHEOBRONCHIAL TREE, VIA NATURAL OR ARTIFICIAL OPENING ENDOSCOPIC: ICD-10-PCS | Performed by: INTERNAL MEDICINE

## 2025-06-05 PROCEDURE — 0WCQ8ZZ EXTIRPATION OF MATTER FROM RESPIRATORY TRACT, VIA NATURAL OR ARTIFICIAL OPENING ENDOSCOPIC: ICD-10-PCS | Performed by: INTERNAL MEDICINE

## 2025-06-05 RX ORDER — HYDROMORPHONE HYDROCHLORIDE 1 MG/ML
1 SOLUTION ORAL
Status: ACTIVE | OUTPATIENT
Start: 2025-06-05

## 2025-06-05 RX ORDER — FENTANYL CITRATE 50 UG/ML
50 INJECTION, SOLUTION INTRAMUSCULAR; INTRAVENOUS EVERY 5 MIN PRN
Status: DISCONTINUED | OUTPATIENT
Start: 2025-06-05 | End: 2025-06-05 | Stop reason: HOSPADM

## 2025-06-05 RX ORDER — ONDANSETRON 4 MG/1
4 TABLET, ORALLY DISINTEGRATING ORAL EVERY 30 MIN PRN
Status: DISCONTINUED | OUTPATIENT
Start: 2025-06-05 | End: 2025-06-05 | Stop reason: HOSPADM

## 2025-06-05 RX ORDER — NALOXONE HYDROCHLORIDE 0.4 MG/ML
0.1 INJECTION, SOLUTION INTRAMUSCULAR; INTRAVENOUS; SUBCUTANEOUS
Status: DISCONTINUED | OUTPATIENT
Start: 2025-06-05 | End: 2025-06-05 | Stop reason: HOSPADM

## 2025-06-05 RX ORDER — BISACODYL 10 MG
10 SUPPOSITORY, RECTAL RECTAL
Status: DISCONTINUED | OUTPATIENT
Start: 2025-06-08 | End: 2025-06-05

## 2025-06-05 RX ORDER — MINERAL OIL/HYDROPHIL PETROLAT
OINTMENT (GRAM) TOPICAL
Status: ACTIVE | OUTPATIENT
Start: 2025-06-05

## 2025-06-05 RX ORDER — DEXAMETHASONE SODIUM PHOSPHATE 4 MG/ML
4 INJECTION, SOLUTION INTRA-ARTICULAR; INTRALESIONAL; INTRAMUSCULAR; INTRAVENOUS; SOFT TISSUE
Status: DISCONTINUED | OUTPATIENT
Start: 2025-06-05 | End: 2025-06-05 | Stop reason: HOSPADM

## 2025-06-05 RX ORDER — NALOXONE HYDROCHLORIDE 0.4 MG/ML
0.2 INJECTION, SOLUTION INTRAMUSCULAR; INTRAVENOUS; SUBCUTANEOUS
Status: ACTIVE | OUTPATIENT
Start: 2025-06-05

## 2025-06-05 RX ORDER — METOPROLOL SUCCINATE 50 MG/1
50 TABLET, EXTENDED RELEASE ORAL DAILY
Status: DISPENSED | OUTPATIENT
Start: 2025-06-05

## 2025-06-05 RX ORDER — ATROPINE SULFATE 10 MG/ML
2 SOLUTION/ DROPS OPHTHALMIC EVERY 4 HOURS PRN
Status: DISPENSED | OUTPATIENT
Start: 2025-06-05

## 2025-06-05 RX ORDER — SODIUM CHLORIDE, SODIUM LACTATE, POTASSIUM CHLORIDE, CALCIUM CHLORIDE 600; 310; 30; 20 MG/100ML; MG/100ML; MG/100ML; MG/100ML
INJECTION, SOLUTION INTRAVENOUS CONTINUOUS
Status: DISCONTINUED | OUTPATIENT
Start: 2025-06-05 | End: 2025-06-05 | Stop reason: HOSPADM

## 2025-06-05 RX ORDER — HYDROMORPHONE HYDROCHLORIDE 1 MG/ML
2 SOLUTION ORAL
Status: ACTIVE | OUTPATIENT
Start: 2025-06-05

## 2025-06-05 RX ORDER — LORAZEPAM 0.5 MG/1
0.5 TABLET ORAL EVERY 4 HOURS PRN
Status: DISPENSED | OUTPATIENT
Start: 2025-06-05

## 2025-06-05 RX ORDER — HYDROMORPHONE HCL IN WATER/PF 6 MG/30 ML
0.2 PATIENT CONTROLLED ANALGESIA SYRINGE INTRAVENOUS EVERY 5 MIN PRN
Status: DISCONTINUED | OUTPATIENT
Start: 2025-06-05 | End: 2025-06-05 | Stop reason: HOSPADM

## 2025-06-05 RX ORDER — METOPROLOL TARTRATE 1 MG/ML
1-2 INJECTION, SOLUTION INTRAVENOUS EVERY 5 MIN PRN
Status: DISCONTINUED | OUTPATIENT
Start: 2025-06-05 | End: 2025-06-05 | Stop reason: HOSPADM

## 2025-06-05 RX ORDER — ONDANSETRON 2 MG/ML
4 INJECTION INTRAMUSCULAR; INTRAVENOUS EVERY 6 HOURS PRN
Status: ACTIVE | OUTPATIENT
Start: 2025-06-05

## 2025-06-05 RX ORDER — PROCHLORPERAZINE MALEATE 5 MG/1
5 TABLET ORAL EVERY 6 HOURS PRN
Status: ACTIVE | OUTPATIENT
Start: 2025-06-05

## 2025-06-05 RX ORDER — SENNOSIDES 8.6 MG
1 TABLET ORAL 2 TIMES DAILY PRN
Status: DISCONTINUED | OUTPATIENT
Start: 2025-06-05 | End: 2025-06-05

## 2025-06-05 RX ORDER — FENTANYL CITRATE 50 UG/ML
25 INJECTION, SOLUTION INTRAMUSCULAR; INTRAVENOUS EVERY 5 MIN PRN
Status: DISCONTINUED | OUTPATIENT
Start: 2025-06-05 | End: 2025-06-05 | Stop reason: HOSPADM

## 2025-06-05 RX ORDER — NALOXONE HYDROCHLORIDE 0.4 MG/ML
0.1 INJECTION, SOLUTION INTRAMUSCULAR; INTRAVENOUS; SUBCUTANEOUS
Status: ACTIVE | OUTPATIENT
Start: 2025-06-05

## 2025-06-05 RX ORDER — HYDROMORPHONE HCL IN WATER/PF 6 MG/30 ML
0.4 PATIENT CONTROLLED ANALGESIA SYRINGE INTRAVENOUS EVERY 5 MIN PRN
Status: DISCONTINUED | OUTPATIENT
Start: 2025-06-05 | End: 2025-06-05 | Stop reason: HOSPADM

## 2025-06-05 RX ORDER — HYDROMORPHONE HYDROCHLORIDE 2 MG/1
2 TABLET ORAL
Status: ACTIVE | OUTPATIENT
Start: 2025-06-05

## 2025-06-05 RX ORDER — ONDANSETRON 4 MG/1
4 TABLET, ORALLY DISINTEGRATING ORAL EVERY 6 HOURS PRN
Status: ACTIVE | OUTPATIENT
Start: 2025-06-05

## 2025-06-05 RX ORDER — SENNOSIDES 8.6 MG
1 TABLET ORAL 2 TIMES DAILY PRN
Status: ACTIVE | OUTPATIENT
Start: 2025-06-05

## 2025-06-05 RX ORDER — CARBOXYMETHYLCELLULOSE SODIUM 5 MG/ML
1 SOLUTION/ DROPS OPHTHALMIC
Status: DISCONTINUED | OUTPATIENT
Start: 2025-06-05 | End: 2025-06-05

## 2025-06-05 RX ORDER — LABETALOL HYDROCHLORIDE 5 MG/ML
10 INJECTION, SOLUTION INTRAVENOUS
Status: DISCONTINUED | OUTPATIENT
Start: 2025-06-05 | End: 2025-06-05 | Stop reason: HOSPADM

## 2025-06-05 RX ORDER — HYDRALAZINE HYDROCHLORIDE 20 MG/ML
2.5-5 INJECTION INTRAMUSCULAR; INTRAVENOUS EVERY 10 MIN PRN
Status: DISCONTINUED | OUTPATIENT
Start: 2025-06-05 | End: 2025-06-05 | Stop reason: HOSPADM

## 2025-06-05 RX ORDER — CARBOXYMETHYLCELLULOSE SODIUM 5 MG/ML
1-2 SOLUTION/ DROPS OPHTHALMIC
Status: ACTIVE | OUTPATIENT
Start: 2025-06-05

## 2025-06-05 RX ORDER — ONDANSETRON 2 MG/ML
4 INJECTION INTRAMUSCULAR; INTRAVENOUS EVERY 30 MIN PRN
Status: DISCONTINUED | OUTPATIENT
Start: 2025-06-05 | End: 2025-06-05 | Stop reason: HOSPADM

## 2025-06-05 RX ORDER — CARBOXYMETHYLCELLULOSE SODIUM 5 MG/ML
1-2 SOLUTION/ DROPS OPHTHALMIC
Status: DISCONTINUED | OUTPATIENT
Start: 2025-06-05 | End: 2025-06-05

## 2025-06-05 RX ORDER — BISACODYL 10 MG
10 SUPPOSITORY, RECTAL RECTAL
Status: ACTIVE | OUTPATIENT
Start: 2025-06-08

## 2025-06-05 RX ADMIN — FENTANYL CITRATE 25 MCG: 50 INJECTION INTRAMUSCULAR; INTRAVENOUS at 12:42

## 2025-06-05 RX ADMIN — METOPROLOL TARTRATE 2 MG: 5 INJECTION INTRAVENOUS at 12:41

## 2025-06-05 RX ADMIN — METOPROLOL TARTRATE 2 MG: 5 INJECTION INTRAVENOUS at 12:51

## 2025-06-05 RX ADMIN — METOPROLOL TARTRATE 1 MG: 5 INJECTION INTRAVENOUS at 13:00

## 2025-06-05 RX ADMIN — FENTANYL CITRATE 25 MCG: 50 INJECTION INTRAMUSCULAR; INTRAVENOUS at 12:35

## 2025-06-05 RX ADMIN — METOPROLOL SUCCINATE 50 MG: 50 TABLET, EXTENDED RELEASE ORAL at 20:50

## 2025-06-05 ASSESSMENT — ACTIVITIES OF DAILY LIVING (ADL)
ADLS_ACUITY_SCORE: 47
ADLS_ACUITY_SCORE: 36
ADLS_ACUITY_SCORE: 47
ADLS_ACUITY_SCORE: 36
ADLS_ACUITY_SCORE: 47
ADLS_ACUITY_SCORE: 47
ADLS_ACUITY_SCORE: 36

## 2025-06-05 NOTE — ANESTHESIA PROCEDURE NOTES
Airway       Patient location during procedure: OR       Procedure Start/Stop Times: 6/5/2025 11:56 AM  Staff -        CRNA: Angela Chaney APRN CRNA       Performed By: CRNA  Consent for Airway        Urgency: elective  Indications and Patient Condition       Indications for airway management: leilani-procedural       Induction type:intravenous       Mask difficulty assessment: 0 - not attempted    Final Airway Details       Final airway type: endotracheal airway       Successful airway: ETT - single  Endotracheal Airway Details        ETT size (mm): 8.5       Cuffed: yes       Successful intubation technique: video laryngoscopy       VL Blade Size: Glidescope 3       Grade View of Cords: 1       Adjucts: stylet       Position: Right       Measured from: lips       Secured at (cm): 21       Bite block used: None    Post intubation assessment        Placement verified by: capnometry, equal breath sounds and chest rise        Number of attempts at approach: 1       Number of other approaches attempted: 0       Secured with: tape       Ease of procedure: easy       Dentition: Intact and Unchanged    Medication(s) Administered   Medication Administration Time: 6/5/2025 11:56 AM

## 2025-06-05 NOTE — PROGRESS NOTES
Pt has had significant frequent non productive coughing since coming out of or at 1215. Pt unable to successfully cough up secretions other than scant amounts that have been blood tinged. BIPAP was placed at 1515 and fit confirmed with RT. Since placement, cough has minimized and oxygen remains >90%. Pt did voice feeling claustrophobic but is tolerating BiPAP at this time. Pt encouraged to inform staff if no longer able to tolerate. Checked with PAR Provider about cancelling ABG's post BiPAP placement as pt is going to have a palliative care conference with admission. Family present at bedside.

## 2025-06-05 NOTE — PROGRESS NOTES
Brief Pulmonary Note    Unfortunately Cordell has metastatic vaginal cancer with a poor prognosis. She tolerated primary treatment poorly and there was concern that a palliative regimen would also be poorly tolerated.    We were planning a procedure today to palliate her shortness of breath by stenting her airways. Unfortunately her disease has progressed a lot in the last few weeks.  She desaturated to the 60s after intubation and required some advanced maneuvers to stabilize her.      I spoke with her  intra-procedure: my concern was that while stenting her airway was feasible, there was a reasonable chance for instability, respiratory/cardiac arrest and death. Given her consideration for hospice pre op he felt that limiting procedure and trying to extubate to allow her to be awake and talk was most in her interest.    She was extubated without difficulty but is presently with increased work of breathing with secretions.  She was fairly compromised pre op but probable has worsened airway edema to due rigid bronchoscopy and the maneuvers required to stabilize her after intubation.    She will need to be admitted to medicine. I have made her DNR/DNI and she will need to consider comfort care/hospice now,    Recommendation   -steroids - methylpred 40 mg twice daily or similar decadron   -nebulizers - normal saline for comfort as needed   -palliative care consult    I will discuss with admitting team when assigned.    Steve Reynolds MD

## 2025-06-05 NOTE — DISCHARGE INSTRUCTIONS
Post Bronchoscopy Patient Instructions:    June 5, 2025  Cordell Donaldson    Your procedure completed (bronchoscopy with airways exam) without any immediate complications.  You may cough up scant amount of blood for the next 12-24 hours. If you have excessive cough with blood, chest pain, shortness of breath, please report to the closest emergency room.    You may experience low grade (less than 100.5 F) fever next 24 hours, if so, you can take Tylenol. If the fever persists more than 24 hours, please contact to our office or your primary care provider.    You may resume your diet as it was prior to procedure.    You may resume your medications after the procedure unless you are instructed to do differently.     Please follow instructions from the nursing staff upon discharge in terms of activity. In general, you should avoid any attention or motor skill requiring activities (e.g., driving or operating any motorized vehicle) for 24 hours as you might be still under the effect of sedation medications. Please make sure an adult to accompany you next 24 hours.     Should you have any question, please do not hesitate to call our office Zuleyka Manuel [phone].     Please take a few moments to evaluate the care you received by me on this link: [email or web address]  Interventional Pulmonary Fellow     call this only if you are having uncontrolled symptoms or serious side effects from your medicines: [phone].    For refills, please give us a week (5 working days) notice. We don't always have providers available everyday to do refills. If you call the day you run out of your medicine, we may not be able to refill it in time, so call 5 days in advance

## 2025-06-05 NOTE — PROCEDURES
INTERVENTIONAL PULMONOLOGY       Procedure(s):    A flexible and rigid bronchoscopy   Airway exam  Therapeutic suctioning (2 sites)    Indication:  metastatic lung malignancy     Attending of Record:  Steve Reynolds MD    Interventional Pulmonary Fellow   Luda Tidwell    Trainees Present:   None     Medications:    General Anesthesia - See anesthesia flowsheet for details    Sedation Time:   Per Anesthesia Care Provider    Time Out:  Performed    The patient's medical record has been reviewed.  The indication for the procedure was reviewed.  The necessary history and physical examination was performed and reviewed.  The risks, benefits and alternatives of the procedure were discussed with the the patient in detail and she had the opportunity to ask questions.  I discussed in particular the potential complications including risks of minor or life-threatening bleeding and/or infection, respiratory failure, vocal cord trauma / paralysis, pneumothorax, and discomfort. Sedation risks were also discussed including abnormal heart rhythms, low blood pressure, and respiratory failure. All questions were answered to the best of my ability.  Verbal and written informed consent was obtained.  The proposed procedure and the patient's identification were verified prior to the procedure by the physician and the nurse.    The patient was assessed for the adequacy for the procedure and to receive medications.   Mental Status:  Alert and oriented x 3  Airway examination:  Class I (Complete visualization of soft palate)  Pulmonary:  Non labored respirations  CV:  Regular rate  ASA Grade:  (II)  Mild systemic disease    After clinical evaluation and reviewing the indication, risks, alternatives and benefits of the procedure the patient was deemed to be in satisfactory condition to undergo the procedure.      Immediately before administration of medications the patient was re-assessed for adequacy to receive sedatives  including the heart rate, respiratory rate, mental status, oxygen saturation, blood pressure and adequacy of pulmonary ventilation. These same parameters were continuously monitored throughout the procedure.    A Tuberculosis risk assessment was performed:  The patient has no known RISK of Tuberculosis    The procedure was performed in a negative airflow room: The patient could not be moved to a negative airflow room because of needed OR for the procedure    Maneuvers / Procedure:      A Flexible and 12mm Rigid bronchoscope bronchoscope was used for the procedure. The rigid bronchoscope was inserted into the mouth. Uvula, epiglottis and vocal cords were seen. The scope was advanced turning the bevel to 90 degress while passing through the cords and into the trachea.     Airway Examination: A complete airway examination was performed from the distal trachea to the subsegmental level in each lobe of both lungs.  Pertinent findings include extensive bilateral endobronchial tumor involvement in both lungs especially the left main stem and BI, both were completely occluded.     Therapeutic suctioning: 15-20min of operative time was spent clearing out the airway of debris, blood and mucous prior to the intervention.     Any disposable equipment was visually inspected and deemed to be intact immediately post procedure.      Relevant Pictures  MC view       Assessment and Recommendations:   Successful completion of RB with airways exam.   Extensive bilateral endobronchial tumor involvement in both lungs especially the left main stem and BI, both were completely occluded.   Patient had significant hypoxia during procedure down to 60% before attempting any interventions, however it was immediately reversed after bypassing the left main obstruction. We paused at that time and talked to the  in the middle of the procedure due to high risk of proceeding with interventions. The decision was made in agreement with the   to hold on interventions now.   Ok to discharge once medically stable.   Follow up as needed.           Luda Tidwell  Interventional pulmonary fellow  Pager: [phone]

## 2025-06-05 NOTE — ANESTHESIA PREPROCEDURE EVALUATION
Anesthesia Pre-Procedure Evaluation    Patient: Cordell Donaldson   MRN: [id number] : 1950          Procedure : Procedure(s):  BRONCHOSCOPY, RIGID, possible tissue debulking, possible stent placement, endobronchial ultrasound with transbronchial needle aspiration.         Past Medical History:   Diagnosis Date    Abnormal Pap smear     maybe 20 years ago    Cervical cancer (H)     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     History of breast cancer     lumpectomy and radiation offerred chemo and patient declined at time but had oncogene test and low risk    Hyperlipidemia LDL goal < 160     Osteopenia     Paroxysmal A-fib (H)     Severe vaginal dysplasia       Past Surgical History:   Procedure Laterality Date    BIOPSY      BREAST SURGERY Left     lumpectomy left, did radiation, 5 yr of tamoxifen    BRONCHOSCOPY RIDID OR FLEXIBLE W/ENDOBRONCHIAL ULTRASOUND GUIDED 1 OR 2 NODE STATIONS N/A 5/15/2025    Procedure: Bronchoscopy Ridid Or Flexible W/Endobronchial Ultrasound Guided 1 Or 2 Node Stations;  Surgeon: Leo Leung DO;  Location:  GI    COLONOSCOPY      repeat in     Colposcopy Cervix with Loop Electrode Conization and Lser to Vagina      COLPOSCOPY, BIOPSY, COMBINED  10/24/2012    Procedure: COMBINED COLPOSCOPY, BIOPSY;  Colposcopy, Biopsy of Cervix and Vagina, Ultrasound Guidance;  Surgeon: Colette Ng MD;  Location[facility]    ENT SURGERY  2018    otoscelerosis, right side    HYSTERECTOMY, FRANCIA  2012    high grade cervical dysplasia, [place] [facility], Dr. Arciniega    INSERT INTERSTITIAL NEEDLE, ULTRASOUND GUIDED N/A 2023    Procedure: INSERTION, NEEDLE, WITH ULTRASOUND GUIDANCE, FOR INTERSTITIAL BRACHYTHERAPY;  Surgeon: Raymond Stockton MD;  Location[facility] OR    INSERT PORT VASCULAR ACCESS Right 2022    Procedure: INSERTION, VASCULAR ACCESS PORT;  Surgeon: Donnie Elias MD;  [place] [facility] [place]    IR CHEST PORT PLACEMENT > 5 YRS  OF AGE  2022    IR PORT REMOVAL RIGHT  2023    [place] LAP VAGINECTOMY, PARTIAL REMOVAL OF VAGINAL WALL  10/2013    upper vaginectomy for dysplasia, Dr. Megan Arciniega    REMOVE INTERSTITIAL NEEDLE N/A 02/15/2023    Procedure: REMOVAL, INTERSTITIAL NEEDLE, AFTER TEMPORARY BRACHYTHERAPY;  Surgeon: Raymond Stockton MD;  [facility]    REMOVE PORT VASCULAR ACCESS Right 2023    Procedure: Remove port vascular access right;  Surgeon: Donnie Elisa MD;  [place] [facility] [place]      No Known Allergies   Social History     Tobacco Use    Smoking status: Former     Current packs/day: 0.00     Average packs/day: 0.5 packs/day for 15.0 years (7.5 ttl pk-yrs)     Types: Cigarettes     Start date:      Quit date:      Years since quittin.4     Passive exposure: Past    Smokeless tobacco: Never   Substance Use Topics    Alcohol use: Not Currently     Alcohol/week: 0.0 - 2.0 standard drinks of alcohol     Comment: Socially       Wt Readings from Last 1 Encounters:   25 56.1 kg (123 lb 10.9 oz)        Anesthesia Evaluation   Pt has had prior anesthetic.     No history of anesthetic complications       ROS/MED HX  ENT/Pulmonary: Comment: Endobronchial mass - productive cough since february      Neurologic:       Cardiovascular:     (+)  - -   -  - -                        dysrhythmias, a-fib,             METS/Exercise Tolerance: 3 - Able to walk 1-2 blocks without stopping Comment: Has had decreased activity recently due to increased shortness of breath but not cardiac in origin   Hematologic:       Musculoskeletal:       GI/Hepatic:       Renal/Genitourinary:       Endo:       Psychiatric/Substance Use:       Infectious Disease:       Malignancy: Comment: Recurrent breast, cervical  (+) Malignancy,     Other:              Physical Exam  Airway  Mallampati: III  TM distance: >3 FB  Neck ROM: full  Mouth opening: >= 4 cm    Cardiovascular   Rhythm: irregular  Rate: tachycardia     Dental   (+) Modest  Abnormalities - crowns, retainers, 1 or 2 missing teeth      Pulmonary       Neurological   She appears awake, alert and oriented x3.    Other Findings       OUTSIDE LABS:  CBC:   Lab Results   Component Value Date    WBC 10.6 05/15/2025    WBC 11.4 (H) 05/14/2025    HGB 9.6 (L) 05/15/2025    HGB 9.8 (L) 05/14/2025    HCT 30.8 (L) 05/15/2025    HCT 31.2 (L) 05/14/2025     05/15/2025     05/14/2025     BMP:   Lab Results   Component Value Date     (L) 05/15/2025     (L) 05/14/2025    POTASSIUM 4.2 05/15/2025    POTASSIUM 4.3 05/14/2025    CHLORIDE 97 (L) 05/15/2025    CHLORIDE 94 (L) 05/14/2025    CO2 23 05/15/2025    CO2 22 05/14/2025    BUN 5.8 (L) 05/15/2025    BUN 7.1 (L) 05/14/2025    CR 0.66 05/15/2025    CR 0.71 05/14/2025     (A) 05/27/2025     (H) 05/15/2025     COAGS:   Lab Results   Component Value Date    PTT 38 05/14/2025    INR 1.19 (H) 05/15/2025     POC:   Lab Results   Component Value Date     (H) 10/31/2012     HEPATIC:   Lab Results   Component Value Date    ALBUMIN 3.2 (L) 05/15/2025    PROTTOTAL 5.9 (L) 05/15/2025    ALT 9 05/15/2025    AST 18 05/15/2025    ALKPHOS 100 05/15/2025    BILITOTAL 0.3 05/15/2025     OTHER:   Lab Results   Component Value Date    LACT 0.7 02/13/2023    LINDA 9.1 05/15/2025    PHOS 2.8 05/14/2025    MAG 1.8 05/15/2025    TSH 5.07 (H) 05/14/2025    T4 1.66 05/14/2025       Anesthesia Plan    ASA Status:  3      NPO Status: NPO Appropriate   Anesthesia Type: General.  Airway: natural airway.  Induction: intravenous.   Techniques and Equipment:       - Monitoring Plan: standard ASA monitoring, processed EEG monitor     Consents    Anesthesia Plan(s) and associated risks, benefits, and realistic alternatives discussed. Questions answered and patient/representative(s) expressed understanding.     - Discussed:     - Discussed with:  Patient, family          Blood Consent:      - Discussed with: not discussed.     Postoperative  Care    Pain management: multimodal analgesia.     Comments:                   Kun Wallace MD    I have reviewed the pertinent notes and labs in the chart from the past 30 days and (re)examined the patient.  Any updates or changes from those notes are reflected in this note.    Clinically Significant Risk Factors Present on Admission

## 2025-06-05 NOTE — ANESTHESIA POSTPROCEDURE EVALUATION
Patient: Cordell Donaldson    Procedure: Procedure(s):  BRONCHOSCOPY, RIGID       Anesthesia Type:  General    Note:  Disposition: Admission   Postop Pain Control:    PONV: No   Neuro/Psych: Uneventful            Sign Out: Acceptable/Baseline neuro status   Airway/Respiratory:             Sign Out: AIRWAY IN SITU/Resp. Support               Airway in situ/Resp. Support: CPAP/BIPAP via mask                 Reason: Unplanned   CV/Hemodynamics: Uneventful            Sign Out: Acceptable CV status; No obvious hypovolemia; No obvious fluid overload   Other NRE: NONE   DID A NON-ROUTINE EVENT OCCUR? YES    Event details/Postop Comments:  Extent of endobronchial disease was much more significant than anticipated, so pulm team was unable to stent.  Rigid bronch -> ETT - > extubated in OR, then BiPAP in PACU to reduce work of breathing (pt maintaining SpO2 well)           Last vitals:  Vitals Value Taken Time   /71 06/05/25 13:45   Temp 37.3  C (99.2  F) 06/05/25 12:15   Pulse 127 06/05/25 14:00   Resp 22 06/05/25 14:00   SpO2 98 % 06/05/25 14:00   Vitals shown include unfiled device data.    Electronically Signed By: Kun Wallace MD  June 5, 2025  2:01 PM

## 2025-06-05 NOTE — H&P
[facility]    History and Physical - Hospitalist Service, GOLD TEAM 11       Date of Admission:  6/5/2025    Assessment & Plan   Cordell Donaldson is a 75 year old female admitted on 6/5/2025. She has a past medical history of persistent atrial fibrillation and (not on anticoagulation), breast cancer status postlumpectomy and radiation (2003), cervical cancer status post Keytruda, carboplatin, Avastin and paclitaxel (2022) IDC of the left breast, and chronic cough secondary to a notable large lesion in the subcarinal region who recently was admitted for biopsy of this lung lesion that returned positive for non-small cell carcinoma consistent with squamous cell carcinoma.  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  She was then recommended for interventional pulmonology to do a bronchial compression from the metastatic mass in her lungs which she presented to have done today.  Unfortunately during the procedure it had to be aborted as she developed severe hypoxia and the tumor burden was so advanced and no stent could be placed.  Family was called to the bedside and decided to have the patient extubated with plans to move forward with comfort cares as she is unable to tolerate this procedure and has significant ongoing hypoxia.  She was able to have her breathing stabilized with intermittent BiPAP and time to have family come see her. She currently is able to answer yes/no to questions and reports being in a small amount of pain and primarily is in shock at the rapid decline of her health.    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  She is stable on BiPAP settings and her  is able to spend quality time with her at the bedside.  As long as she is maintained on intermittent BiPAP she likely has some time for family to come see her.  Her goal is to be able to transition home on hospice in a couple of days but understands that we are not able to predict how things will go given the aggressive nature of her cancer.    - Plan to initiate comfort care order set once patient hits the internal medicine floor  - [place] [facility] [place] she can continue to receive intermittent BiPAP  - Will provide liquid Dilaudid as needed for any air hunger  - Will provide Zofran and Compazine for any nausea  - Haldol and Ativan as needed for any anxiety or agitation  - Palliative Care consult to evaluate her rapid decline, symptoms and help with symptom management  - Social work consult tomorrow will need to be placed if she does okay overnight to start evaluating insurance and planning for home hospice  - General Diet: Encourage patient to eat or drink what ever they would like but will need to time it with periods of time off of BiPAP or short breaks          Diet: NPO for Medical/Clinical Reasons Except for: Meds    DVT Prophylaxis: None  Lozano Catheter: Not present  Lines: None     Cardiac Monitoring: ACTIVE order. Indication: Procedural area  Code Status: No CPR- Do NOT Intubate      Disposition Plan     Medically Ready for Discharge: Anticipated in 2-4 Days      MIGDALIA Chamorro  Hospitalist Service, GOLD TEAM [address] [place] [address]  Securely message with [employer] (more info)  Text page via [employer] Paging/Directory   See signed in provider for up to date coverage information    ______________________________________________________________________    Chief Complaint   Hypoxic respiratory failure    History of Present  Illness   Cordell Donaldson is a 75 year old female admitted on 6/5/2025. She has a past medical history of persistent atrial fibrillation and (not on anticoagulation), breast cancer status postlumpectomy and radiation (2003), cervical cancer status post Keytruda, carboplatin, Avastin and paclitaxel (2022) IDC of the left breast, and chronic cough secondary to a notable large lesion in the subcarinal region who recently was admitted for biopsy of this lung lesion that returned positive for non-small cell carcinoma consistent with squamous cell carcinoma.  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  She was then recommended for interventional pulmonology to do a bronchial compression from the metastatic mass in her lungs which she presented to have done today.  Unfortunately during the procedure it had to be aborted as she developed severe hypoxia and the tumor burden was so advanced and no stent could be placed.  Family was called to the bedside and decided to have the patient extubated with plans to move forward with comfort cares as she is unable to tolerate this procedure and has significant ongoing hypoxia.  She was able to have her breathing stabilized with intermittent BiPAP and time to have family come see her      Past Medical History    Past Medical History:   Diagnosis Date    Abnormal Pap smear     maybe 20 years ago    Cervical cancer (H)     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     History of breast cancer 2003    lumpectomy and radiation offerred chemo and patient declined at time but had oncogene test and low risk    Hyperlipidemia LDL goal < 160     Osteopenia     Paroxysmal A-fib (H)     Severe vaginal dysplasia        Past Surgical History   Past Surgical History:   Procedure Laterality Date    BIOPSY      BREAST SURGERY Left  2003    lumpectomy left, did radiation, 5 yr of tamoxifen    BRONCHOSCOPY RIDID OR FLEXIBLE W/ENDOBRONCHIAL ULTRASOUND GUIDED 1 OR 2 NODE STATIONS N/A 5/15/2025    Procedure: Bronchoscopy Ridid Or Flexible W/Endobronchial Ultrasound Guided 1 Or 2 Node Stations;  Surgeon: Leo Leung DO;  Location:  GI    COLONOSCOPY  2018    repeat in 2028    Colposcopy Cervix with Loop Electrode Conization and Lser to Vagina  2008    COLPOSCOPY, BIOPSY, COMBINED  10/24/2012    Procedure: COMBINED COLPOSCOPY, BIOPSY;  Colposcopy, Biopsy of Cervix and Vagina, Ultrasound Guidance;  Surgeon: Colette Ng MD;  Location:  OR    ENT SURGERY  01/23/2018    otoscelerosis, right side    HYSTERECTOMY, FRANCIA  12/2012    high grade cervical dysplasia, [place] [facility], Dr. Arciniega    INSERT INTERSTITIAL NEEDLE, ULTRASOUND GUIDED N/A 02/13/2023    Procedure: INSERTION, NEEDLE, WITH ULTRASOUND GUIDANCE, FOR INTERSTITIAL BRACHYTHERAPY;  Surgeon: Raymond Stockton MD;  Location:  OR    INSERT PORT VASCULAR ACCESS Right 07/18/2022    Procedure: INSERTION, VASCULAR ACCESS PORT;  Surgeon: Donnie Elias MD;  [place] [facility] [place]    IR CHEST PORT PLACEMENT > 5 YRS OF AGE  07/18/2022    IR PORT REMOVAL RIGHT  03/30/2023    MI LAP VAGINECTOMY, PARTIAL REMOVAL OF VAGINAL WALL  10/2013    upper vaginectomy for dysplasia, Dr. Megan Arciniega    REMOVE INTERSTITIAL NEEDLE N/A 02/15/2023    Procedure: REMOVAL, INTERSTITIAL NEEDLE, AFTER TEMPORARY BRACHYTHERAPY;  Surgeon: Raymond Stockton MD;  Location:  OR    REMOVE PORT VASCULAR ACCESS Right 03/30/2023    Procedure: Remove port vascular access right;  Surgeon: Donnie Elias MD;  Location: [facility] [place]       Prior to Admission Medications   Prior to Admission Medications   Prescriptions Last Dose Informant Patient Reported? Taking?   Bacillus Coagulans-Inulin (PROBIOTIC FORMULA) 1-250 BILLION-MG CAPS Past Week  Yes Yes   Sig: Take by mouth every morning 25+billion CFUS   Cholecalciferol (VITAMIN D-3) 25 MCG (1000  UT) CAPS Past Week  Yes Yes   Sig: Take 1,000 Units by mouth every morning   MAGNESIUM OXIDE PO 2025  Yes Yes   Sig: Take 400 mg by mouth daily   OVER-THE-COUNTER Unknown  Yes No   Sig: Turkey tail mushroom powder, use 1 times a day   Omega-3 Fatty Acids (OMEGA-3 FISH OIL PO) Past Week  Yes Yes   Sig: Take 630 mg by mouth 2 times daily   Pectin Cit-Inos-C-Bioflav-Soy (MODIFIED CITRUS PECTIN PO) Past Month  Yes Yes   Sig: Take by mouth 2 times daily   UNABLE TO FIND Past Week  Yes Yes   Sig: Take 1 tablet by mouth every morning MEDICATION NAME: Dinndolylmethane Complex (DIM) 100mg tablet 1 table daily   UNABLE TO FIND Past Week  Yes Yes   Si,200 mg 2 times daily MEDICATION NAME: [place]   UNABLE TO FIND Past Month  Yes Yes   Si mg MEDICATION NAME: Tur[place]   UNABLE TO FIND Past Month  Yes Yes   Sig: Take by mouth daily. MEDICATION NAME: Nattokinase   clonazePAM (KLONOPIN) 0.5 MG tablet Unknown  No Yes   Sig: Take 1 tablet (0.5 mg) by mouth nightly as needed for anxiety or sleep.   coenzyme Q-10 capsule Past Week  Yes Yes   Sig: Take 1 capsule by mouth daily   famotidine (PEPCID) 20 MG tablet Past Month  No Yes   Sig: Take 1 tablet (20 mg) by mouth 2 times daily.   guaiFENesin-codeine (ROBITUSSIN AC) 100-10 MG/5ML solution Past Week  No Yes   Sig: Take 5-10 mLs by mouth every 4 hours as needed for cough.   guaiFENesin-codeine (ROBITUSSIN AC) 100-10 MG/5ML solution Past Week  No Yes   Sig: Take 5-10 mLs by mouth every 4 hours as needed for cough.   levothyroxine (SYNTHROID/LEVOTHROID) 112 MCG tablet 2025  No Yes   Sig: TAKE 1 TABLET (112 MCG) BY MOUTH DAILY BEFORE BREAKFAST   metoprolol succinate ER (TOPROL XL) 50 MG 24 hr tablet 2025 Morning  No Yes   Sig: Take 1 tablet (50 mg) by mouth daily.   polyethylene glycol (MIRALAX) 17 GM/Dose powder Unknown  No No   Sig: Take 17 g by mouth daily.      Facility-Administered Medications: None        Review of Systems    The 10 point Review of Systems is  "negative other than noted in the HPI or here.     Social History   I have reviewed this patient's social history and updated it with pertinent information if needed.  Social History     Tobacco Use    Smoking status: Former     Current packs/day: 0.00     Average packs/day: 0.5 packs/day for 15.0 years (7.5 ttl pk-yrs)     Types: Cigarettes     Start date:      Quit date:      Years since quittin.4     Passive exposure: Past    Smokeless tobacco: Never   Vaping Use    Vaping status: Never Used   Substance Use Topics    Alcohol use: Not Currently     Alcohol/week: 0.0 - 2.0 standard drinks of alcohol     Comment: Socially     Drug use: No         Family History   I have reviewed this patient's family history and updated it with pertinent information if needed.  Family History   Problem Relation Age of Onset    Myocardial Infarction Mother 73        Tob    Myocardial Infarction Father 68        Tob    Breast Cancer Sister 47         of breast cancer age 63; BRCA mutation testing negative    Cancer Maternal Grandmother         Unsure of type.    Breast Cancer Paternal Grandmother         Unsure of diagnosis--some type of \"women's issue\"    Skin Cancer Daughter 47    Anesthesia Reaction No family hx of     Deep Vein Thrombosis (DVT) No family hx of          Allergies   No Known Allergies     Physical Exam   Vital Signs: Temp: 99.2  F (37.3  C) Temp src: Oral BP: 106/71 Pulse: (!) 127   Resp: 18 SpO2: 98 % O2 Device: BiPAP/CPAP Oxygen Delivery: 4 LPM  Weight: 123 lbs 10.85 oz    General Appearance: 75 year old female sitting up in bed with bipap mask on  Respiratory: dyspneic on bipap  Cardiovascular: tachycardic, regular rhythm, no appreciable murmurs, rubs or gallops  GI: bowel sounds active, non-tender to palpation   Skin: warm, dry, no open sores, lesions or ulcerations  Psych: alert, oriented to name, in shock with the rapid progression of things today, able to answer basic questions    Medical " Decision Making       60 MINUTES SPENT BY ME on the date of service doing chart review, history, exam, documentation & further activities per the note.      Data

## 2025-06-06 VITALS
SYSTOLIC BLOOD PRESSURE: 126 MMHG | BODY MASS INDEX: 21.9 KG/M2 | HEIGHT: 63 IN | OXYGEN SATURATION: 97 % | TEMPERATURE: 97.9 F | HEART RATE: 107 BPM | DIASTOLIC BLOOD PRESSURE: 69 MMHG | RESPIRATION RATE: 18 BRPM | WEIGHT: 123.6 LBS

## 2025-06-06 PROCEDURE — 99223 1ST HOSP IP/OBS HIGH 75: CPT | Performed by: INTERNAL MEDICINE

## 2025-06-06 PROCEDURE — 250N000013 HC RX MED GY IP 250 OP 250 PS 637: Performed by: PEDIATRICS

## 2025-06-06 PROCEDURE — 250N000009 HC RX 250: Performed by: PHYSICIAN ASSISTANT

## 2025-06-06 PROCEDURE — 999N000157 HC STATISTIC RCP TIME EA 10 MIN

## 2025-06-06 PROCEDURE — 99239 HOSP IP/OBS DSCHRG MGMT >30: CPT | Mod: FS | Performed by: PEDIATRICS

## 2025-06-06 PROCEDURE — 99418 PROLNG IP/OBS E/M EA 15 MIN: CPT | Performed by: INTERNAL MEDICINE

## 2025-06-06 PROCEDURE — 99207 PR APP CREDIT; MD BILLING SHARED VISIT: CPT | Performed by: PHYSICIAN ASSISTANT

## 2025-06-06 PROCEDURE — 250N000013 HC RX MED GY IP 250 OP 250 PS 637: Performed by: NURSE PRACTITIONER

## 2025-06-06 RX ORDER — ATROPINE SULFATE 10 MG/ML
2 SOLUTION/ DROPS OPHTHALMIC EVERY 4 HOURS PRN
Qty: 15 ML | Refills: 1 | Status: SHIPPED | OUTPATIENT
Start: 2025-06-06

## 2025-06-06 RX ORDER — MAGNESIUM OXIDE 400 MG/1
TABLET ORAL
DISCHARGE
Start: 2025-06-06

## 2025-06-06 RX ORDER — CLONAZEPAM 0.5 MG/1
0.5 TABLET ORAL
Status: ACTIVE | DISCHARGE
Start: 2025-06-06 | End: 2025-06-06

## 2025-06-06 RX ORDER — LORAZEPAM 0.5 MG/1
0.5 TABLET ORAL EVERY 4 HOURS PRN
Qty: 15 TABLET | Refills: 0 | Status: SHIPPED | OUTPATIENT
Start: 2025-06-06

## 2025-06-06 RX ORDER — SENNOSIDES 8.6 MG
1 TABLET ORAL
Status: DISCONTINUED | OUTPATIENT
Start: 2025-06-06 | End: 2025-06-06 | Stop reason: HOSPADM

## 2025-06-06 RX ORDER — ONDANSETRON 4 MG/1
4 TABLET, ORALLY DISINTEGRATING ORAL EVERY 6 HOURS PRN
Qty: 30 TABLET | Refills: 0 | Status: SHIPPED | OUTPATIENT
Start: 2025-06-06

## 2025-06-06 RX ORDER — MORPHINE SULFATE 10 MG/5ML
2.5-5 SOLUTION ORAL
Qty: 100 ML | Refills: 0 | Status: SHIPPED | OUTPATIENT
Start: 2025-06-06

## 2025-06-06 RX ORDER — MORPHINE SULFATE 10 MG/5ML
2.5-5 SOLUTION ORAL
Refills: 0 | Status: DISCONTINUED | OUTPATIENT
Start: 2025-06-06 | End: 2025-06-06

## 2025-06-06 RX ORDER — SODIUM CHLORIDE FOR INHALATION 3 %
3 VIAL, NEBULIZER (ML) INHALATION
Status: DISCONTINUED | OUTPATIENT
Start: 2025-06-06 | End: 2025-06-06 | Stop reason: HOSPADM

## 2025-06-06 RX ORDER — BISACODYL 10 MG
10 SUPPOSITORY, RECTAL RECTAL
Qty: 3 SUPPOSITORY | Refills: 0 | Status: SHIPPED | OUTPATIENT
Start: 2025-06-08

## 2025-06-06 RX ORDER — POLYETHYLENE GLYCOL 3350 17 G/17G
POWDER, FOR SOLUTION ORAL
DISCHARGE
Start: 2025-06-06

## 2025-06-06 RX ORDER — MORPHINE SULFATE 10 MG/5ML
2.5-5 SOLUTION ORAL
Refills: 0 | Status: DISCONTINUED | OUTPATIENT
Start: 2025-06-06 | End: 2025-06-06 | Stop reason: HOSPADM

## 2025-06-06 RX ORDER — LEVOTHYROXINE SODIUM 112 UG/1
112 TABLET ORAL DAILY
Status: DISCONTINUED | OUTPATIENT
Start: 2025-06-06 | End: 2025-06-06 | Stop reason: HOSPADM

## 2025-06-06 RX ORDER — CARBOXYMETHYLCELLULOSE SODIUM 5 MG/ML
1-2 SOLUTION/ DROPS OPHTHALMIC
Qty: 30 EACH | Refills: 1 | Status: SHIPPED | OUTPATIENT
Start: 2025-06-06

## 2025-06-06 RX ORDER — FAMOTIDINE 20 MG/1
20 TABLET, FILM COATED ORAL 2 TIMES DAILY PRN
Qty: 60 TABLET | Refills: 2 | Status: SHIPPED | OUTPATIENT
Start: 2025-06-06

## 2025-06-06 RX ORDER — METOPROLOL SUCCINATE 50 MG/1
50 TABLET, EXTENDED RELEASE ORAL DAILY
Status: DISCONTINUED | OUTPATIENT
Start: 2025-06-06 | End: 2025-06-06 | Stop reason: HOSPADM

## 2025-06-06 RX ADMIN — LORAZEPAM 0.5 MG: 0.5 TABLET ORAL at 04:47

## 2025-06-06 RX ADMIN — LEVOTHYROXINE SODIUM 112 MCG: 0.11 TABLET ORAL at 09:02

## 2025-06-06 RX ADMIN — Medication 1 LOZENGE: at 04:49

## 2025-06-06 RX ADMIN — Medication 1 LOZENGE: at 00:02

## 2025-06-06 RX ADMIN — SODIUM CHLORIDE SOLN NEBU 3% 3 ML: 3 NEBU SOLN at 10:46

## 2025-06-06 RX ADMIN — Medication 1 LOZENGE: at 06:58

## 2025-06-06 RX ADMIN — MORPHINE SULFATE 2.5 MG: 10 SOLUTION ORAL at 14:03

## 2025-06-06 RX ADMIN — LORAZEPAM 0.5 MG: 0.5 TABLET ORAL at 00:02

## 2025-06-06 ASSESSMENT — ACTIVITIES OF DAILY LIVING (ADL)
ADLS_ACUITY_SCORE: 36
DEPENDENT_IADLS:: INDEPENDENT
ADLS_ACUITY_SCORE: 36

## 2025-06-06 NOTE — CONSULTS
Care Management Initial Consult    General Information  Assessment completed with: Patient, VM-chart review, Children,    Type of CM/SW Visit: Initial Assessment    Primary Care Provider verified and updated as needed: Yes   Readmission within the last 30 days: current reason for admission unrelated to previous admission   Return Category: Exacerbation of disease    Reason for Consult: end of life/hospice  Advance Care Planning: Advance Care Planning Reviewed: present on chart, verified with patient, no concerns identified          Communication Assessment  Patient's communication style: spoken language (English or Bilingual)    Hearing Difficulty or Deaf: no (a little [place] in R ear)   Wear Glasses or Blind: yes    Cognitive  Cognitive/Neuro/Behavioral: WDL                      Living Environment:   People in home: spouse     Current living Arrangements: house      Able to return to prior arrangements: yes       Family/Social Support:  Care provided by: self  Provides care for: no one, no one, unable/limited ability to care for self     Support system: , Children          Description of Support System: Involved, Supportive    Support Assessment: Adequate family and caregiver support, Adequate social supports    Current Resources:   Patient receiving home care services: No        Community Resources: None  Equipment currently used at home: none  Supplies currently used at home: None    Employment/Financial:  Employment Status: unable to assess. 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          Financial Concerns:   n/a          Does the patient's insurance plan have a 3 day qualifying hospital stay waiver?  No    Lifestyle & Psychosocial Needs:  Social Drivers of Health     Food Insecurity: Low Risk  (6/5/2025)    Food Insecurity     Within the past 12 months, did you worry that your food would run out before you got money to buy more?: No     Within the past 12 months, did the food you bought just not last and  you didn t have money to get more?: No   Depression: Not at risk (5/12/2025)    PHQ-2     PHQ-2 Score: 0   Housing Stability: Low Risk  (6/5/2025)    Housing Stability     Do you have housing? : Yes     Are you worried about losing your housing?: No   Tobacco Use: Medium Risk (6/5/2025)    Patient History     Smoking Tobacco Use: Former     Smokeless Tobacco Use: Never     Passive Exposure: Past   Financial Resource Strain: Low Risk  (6/5/2025)    Financial Resource Strain     Within the past 12 months, have you or your family members you live with been unable to get utilities (heat, electricity) when it was really needed?: No   Alcohol Use: Not At Risk (3/7/2020)    AUDIT-C     Frequency of Alcohol Consumption: 2-4 times a month     Average Number of Drinks: 1 or 2     Frequency of Binge Drinking: Never   Transportation Needs: Low Risk  (6/5/2025)    Transportation Needs     Within the past 12 months, has lack of transportation kept you from medical appointments, getting your medicines, non-medical meetings or appointments, work, or from getting things that you need?: No   Physical Activity: Sufficiently Active (4/26/2024)    Exercise Vital Sign     Days of Exercise per Week: 6 days     Minutes of Exercise per Session: 40 min   Interpersonal Safety: Low Risk  (6/5/2025)    Interpersonal Safety     Do you feel physically and emotionally safe where you currently live?: Yes     Within the past 12 months, have you been hit, slapped, kicked or otherwise physically hurt by someone?: No     Within the past 12 months, have you been humiliated or emotionally abused in other ways by your partner or ex-partner?: No   Stress: No Stress Concern Present (4/26/2024)    [place] [employer] - Occupational Stress Questionnaire     Feeling of Stress : Not at all   Social Connections: Unknown (4/26/2024)    Social Connection and Isolation Panel [NHANES]     Frequency of Communication with Friends and Family: Not  on file     Frequency of Social Gatherings with Friends and Family: Twice a week     Attends Denominational Services: Not on file     Active Member of Clubs or Organizations: Not on file     Attends Club or Organization Meetings: Not on file     Marital Status: Not on file   Health Literacy: Not on file       Functional Status:  Prior to admission patient needed assistance:   Dependent ADLs:: Independent  Dependent IADLs:: Independent       Mental Health Status:  Mental Health Status: No Current Concerns       Chemical Dependency Status:  Chemical Dependency Status: No Current Concerns             Values/Beliefs:  Spiritual, Cultural Beliefs, Denominational Practices, Values that affect care:                 Discussed  Partnership in Safe Discharge Planning  document with patient/family: No    Additional Information:  Per H&P: 75 year old female admitted on 6/5/2025. She has a past medical history of persistent atrial fibrillation and (not on anticoagulation), breast cancer status postlumpectomy and radiation (2003), cervical cancer status post Keytruda, carboplatin, Avastin and paclitaxel (2022) IDC of the left breast, and chronic cough secondary to a notable large lesion in the subcarinal region who recently was admitted for biopsy of this lung lesion that returned positive for non-small cell carcinoma consistent with squamous cell carcinoma.  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  She was then recommended for interventional pulmonology to do a bronchial compression from the metastatic mass in her lungs which she presented to have done today.  Unfortunately during the procedure it had to be aborted as she developed severe hypoxia and the tumor burden was so advanced and no stent could be placed.  Family was called to the bedside and decided to have the patient extubated with plans to  move forward with comfort cares as she is unable to tolerate this procedure and has significant ongoing hypoxia.  She was able to have her breathing stabilized with intermittent BiPAP and time to have family come see her. She currently is able to answer yes/no to questions and reports being in a small amount of pain and primarily is in shock at the rapid decline of her health.     As Palliative team was meeting with pt, SW stopped by to meet with pt and her daughter to be a part of conversation. Pt and daughter did confirm plan for home hospice. Pt did ask about discharging home w/o signing up for hospice and then Palliaitve spoke with pt about their recommendation being signed up for home hospice before discharge. Pt did confirm that she's overwhelmed with information so Palliative told them that they'll swing by later this afternoon which the pt and daughter were agreeable to.     Please see Palliative note 6/6 for details on their conversations with pt and daughter.     After discussion with Gold 6 provider (Aarti) and Palliative (Arie), plan is for a home discharge (with oxygen) and informational visit scheduled with home hospice agency (pt to sign up officially when ready). 6C RNCC (Alin) working on home oxygen arrangement.     BRIGHT sent new referral via [facility] to [facility] updating them on informational visit at home before formally signing up and gave them pt's family contact info.     [facility]  Phone: [phone]  Fax: [phone]  - 6/6: BRIGHT called at 2:25pm, got transferred to admissions, and left a detailed VM about the new referral. BRIGHT then saw that the referral has been accepted by the agency on Epic.         Next Steps: n/a    ___________________    CYNDIE Edmonds, LUKE  6C , covering beds 6401 to 6519  [facility]   Phone: [phone]  6C Cards BRIGHT Jiménez

## 2025-06-06 NOTE — CARE PLAN
06/06/25 1210   Home Oxygen Assessment (RN/RT ONLY)   Does patient have oxygen at home? No   1. SpO2 on room air at rest while awake 94       Oxygen LPM at rest 1   3. SpO2 on room air during Activity/with exercise 88   4. SpO2 with oxygen during activity/with exercise 100       Oxygen LPM during activity/with exercise 4  (Pt needed 8L initially to recover from 88% back to %)   Does patient qualify for Home O2? Yes  (now at 4 L, will titrate down to 1-2 L NC after she recovers)     
Negative

## 2025-06-06 NOTE — PROGRESS NOTES
CLINICAL NUTRITION SERVICES    Informed decision made to change pt s status to comfort care. Nutrition interventions discontinued and no further interventions planned at this time. RD can be consulted if needed.    RD signing off on 6/6/2025.    Vianney Mason MS, RD, LD, [facility]    6C (beds 8144-4385) + 7C (beds 6477-7444) + ED + Obs  Available in Vocera by name or unit dietitian

## 2025-06-06 NOTE — PROVIDER NOTIFICATION
Time of notification: 8:45 PM  Provider notified: Antonia  Patient status: Am I good to give scheduled metoprolol, I'm just getting a warning asking if giving it too soon. Looks like she got IV around 11am.    Provider: should be okay to give    8:49 PM - Writer: would you be able to advance her diet to soft, easy to chew? She hasn't been on bipap and is swallowing ok.     - Provider placed diet order for pt.    9:20PM - Pt is wondering if she could get a cough drop lozenge PRN for her cough?    10:49PM - Pt was asking again about a lozenge/cough drop? She's not on Bipap and there isn't one in her room, she said it makes her too anxious and would prefer not to go on it.   She was also asking if it'd be possible to not have an IV? Her PIV is bothering her and was wondering if she could go without. She denies pain and nausea.    Provider - placed lozenge order. She's on comfort care at this point, so if she doesn't want an IV, that's ok. My understanding is that she and her family wanted her on intermittent bipap to get her a little more time, but she doesn't have to use it if she doesn't want to. I'll put in some lorazepam in case she wants to try that to see if it makes the bipap tolerable.

## 2025-06-06 NOTE — PLAN OF CARE
Goal Outcome Evaluation:    Plan of Care Reviewed With: patient    Overall Patient Progress: no change    Outcome Evaluation: SW to send home hospice referrals.

## 2025-06-06 NOTE — PROGRESS NOTES
Oxygen Documentation  I certify that this patient, Cordell Donaldson has been under my care (or a nurse practitioner or physican's assistant working with me). This is the face-to-face encounter for oxygen medical necessity.      At the time of this encounter, I have reviewed the qualifying testing and have determined that supplemental oxygen is reasonable and necessary and is expected to improve the patient's condition in a home setting.         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    If portability is ordered, is the patient mobile within the home? yes    Was this visit performed as a telehealth visit: No      Richelle Gooden MD, MPH  Internal Medicine-Pediatrics [facility]

## 2025-06-06 NOTE — PROGRESS NOTES
Patient has been assessed for Home Oxygen needs. Oxygen readings:    *Pulse oximetry (SpO2) = 94% on room air at rest while awake.    *SpO2 improved to 98% on 1-2 liters/minute at rest.    *SpO2 = 88% on room air during activity/with exercise.    *SpO2 improved to 98% on 8 liters/minute during activity/with exercise.     Pt desaturated to 88% with activity and needed 8L to recover her back to 98% and now titrated down to 4L NC and will titrate down to 2L NC once comfortable.

## 2025-06-06 NOTE — CONSULTS
Palliative Care Consultation Note  [facility]      Patient: Cordell Donaldson  Date of Admission:  6/5/2025    Requesting Clinician / Team: Hospitalist Team, Gold 6  Reason for consult: Symptom management of pain and hypoxia.        Recommendations & Counseling     GOALS OF CARE:   Patient's GOC are to remain at home with family as much as possible. Patient and family are interested in focusing on comfort-driven cares, and they plan to enroll in home hospice. They have a hospice intake visit scheduled for 6/8/25 with [facility]. See HPI for additional details of discussion.     ADVANCE CARE PLANNING:  ACP documents on file.   POLST completed today by Dr. Sargent emphasizing DNR/DNI, comfort-focused and hospice type cares.   Code status: No CPR- Do NOT Intubate    MEDICAL MANAGEMENT:   Dyspnea: Patient expressed goals to control dyspnea without overt sedation. She tried both Morphine and Ativan during admission with good effect. She will be discharged on below regimen:  Continue Morphine solution 2.5-5 mg q4hr PRN. Patient plans to use PRN during the day-- good benefit from the 2.5.   Continue PO Ativan 0.5 mg q4hr PRN. Patient plans to use at night.   Discharged with supplemental oxygen.   Pain: Currently controlled with Tylenol.   Continue Tylenol 975 mg TID PRN.   Constipation: Encouraged aggressive bowel regimen with full opioid agonists.   Continue Senna BID.   Consider additional Miralax PRN.     PSYCHOSOCIAL/SPIRITUAL SUPPORT:  The patient has a robust support system from family (, daughter) and friends, as well as community. They declined the need for  services today.     Palliative Care will sign off as the patient is discharging. Thank you for the consult and allowing us to aid in the care of Cordell Donaldson.    These recommendations have been discussed with the Debi Broussard PA-C via [employer].    Myrna Keenan MD  [facility], Palliative  Care  Securely message with the Vocera Web Console (learn more here) or  Text page via [employer] Paging/[employer]     Patient seen and evaluated with Dr. Keenan.   Agree with assessment and recommendations.  I personally spent 90 minutes caring for the patient regarding goals of care, symptom management and hospice planning on the date of the encounter. These recommendations were given to the primary team via phone discussions.  Merle Sargent MD / Palliative Medicine   Securely message with the Vocera Web Console (learn more here)   Text page via [employer] Paging/[employer]       Assessment      Cordell Donaldson is a 75-year-old female PMH atrial fibrillation (not on anticoagulation), hypothyroidism, iron deficiency anemia, history of breast cancer (s/p lumpectomy and radiation, 2003), cervical cancer (s/p Keytruda, Carboplatin, Avastin and Paclitaxel, 2022), and current metastatic vaginal cancer with lung metastasis who was admitted 6/5/25 after failed bronchial compression of lung mass due to tumor burden. Given inability to proceed with planned stenting procedure and previous poor toleration of primary treatment with significant progression of malignancy, family elected to pursue comfort cares and hospices. Palliative was consulted for symptom management of pain and hypoxia.     Today, the patient was seen for:  - Goals of care discussion.   - Hospice review.   - Symptom management for stridor and hypoxia.     History of Present Illness   Initially met with the patient (Cordell) and her daughter (Loyd). On recheck later, the patient's  (Isaac) was also present. Summary of conversation as follows:     - Introduction to Palliative Care: I introduced our role as an extra layer of support and how we help patients and families dealing with serious, potentially life-limiting illnesses. I explained the composition of the palliative care team.  Palliative care helps patients and families navigate their care while  focusing on the whole person; providing emotional, social and spiritual support  Palliative care often assists with symptom management, information sharing about what to expect from the illness, available treatment options and what effect those options may have on the disease course, and provide effective communication and caring support.  - Patient perception of illness: The patient and her daughter were asked their current perception of the patient's condition. The patient articulated that she has metastatic vaginal cancer and has not responded well to primary treatment. She reviewed that the stenting procedure was not able to be completed. She understood that she has poor prognosis with no curable treatment options available.   - Goals of care/code status: The patient expressed that her goals currently are to be at home with family as much as possible. If her condition were to deteriorate upon discharge, she would like to remain home as much as possible. She understands the role of hospice as her sister was enrolled in hospice. The family expressed desire to enroll in hospice. They scheduled intake meeting for 6/8/25. The patient confirmed that her current code status was DNR/DNI. POLST form was completed by Dr. Sargent.  - Support and psychosocial factors:The patient lives with her . Her daughter arrived from [place] last night. She enjoys gardening. She has robust community and spiritual support. She declined  consult.  - Pain: Her pain has been controlled with Tylenol. She has not current concerns regarding pain.   - Breathing: Her most bothersome symptom is loud breathing and shortness of breath. Her breathing has been getting worse day-by-day per her report. Ativan has been helpful for this during her hospitalization. Saline nebulizer has not made a difference. She tried Guanfacine-Codeine as an outpatient- which was extremely sedating and constipating. She would prefer not to try Codeine  again. She was open to the idea of trying Morphine. On recheck, the patient reports that Morphine was significantly helpful for her breathing and was not too sedating.     Prognosis, Goals, & Planning:   Prognosis, Goals, and/or Advance Care Planning:  Education provided regarding hospice philosophy, prognostic,and eligibility criteria. Discussed what services are provided and those that are not,  Discussed common misconceptions. We explored the various disposition options where they can receive hospice care (home, residential hospice homes, LTC with hospice) including subsequent financial and familial implications. Discussed typical anticipated timing of discharge.    Code Status was addressed today:   Patient was DNR/DNI prior to evaluation.     Patient's decision making preferences: shared with support from loved ones        Patient has decision-making capacity today for complex decisions: Intact           Coping, Meaning, & Spirituality:   Mood, coping, and/or meaning in the context of serious illness were addressed today: Yes    Social:   Important relationships/caregivers: Isaac () and Loyd (daughter)  Areas of fulfillment/alejandra: Enjoys gardening. Wants to be home with family as much as possible.    Medications:  Reviewed this patient's medication profile and medications from this hospitalization. Notable medications:  - PO Dilaudid 1-2 mg q2hr PRN   - PO Ativan 0.5 mg q4hr PRN     [place] Board of Pharmacy Data Base Reviewed: Yes:   reviewed - controlled substances reflected in medication list..    ROS:  Comprehensive ROS is reviewed and is negative except as here & per HPI:     Physical Exam   Vital Signs with Ranges  Temp:  [97.9  F (36.6  C)-99.2  F (37.3  C)] 97.9  F (36.6  C)  Pulse:  [107-139] 107  Resp:  [16-25] 18  BP: (102-156)/(38-95) 126/69  FiO2 (%):  [30 %] 30 %  SpO2:  [91 %-100 %] 97 %  Wt Readings from Last 10 Encounters:   06/05/25 56.1 kg (123 lb 9.6 oz)   05/30/25 56.2 kg (124 lb)    05/30/25 56.2 kg (124 lb)   05/14/25 55.8 kg (123 lb)   05/12/25 55.8 kg (123 lb)   05/06/25 56.7 kg (125 lb)   04/30/25 57.6 kg (127 lb)   03/17/25 59.8 kg (131 lb 14.4 oz)   03/10/25 60.7 kg (133 lb 12.8 oz)   03/07/25 61.2 kg (135 lb)     123 lbs 9.6 oz    PHYSICAL EXAM:  General: Alert and oriented  Skin: Warm and dry, no abnormalities noted.  ENT: Speech intact, nasal passages open  CV: No cyanosis or pallor, warm and well perfused.  Respiratory: NC O2 in place. Increased WOB on initial exam, improved on repeat exam.   Neuro: Comprehension intact. Gross and fine motor skills intact.   Psychiatric: Mood and affect appear normal.   Extremities: Able to move all four extremities at will.      Data reviewed:  No results found for this or any previous visit (from the past 24 hours).    Medical Decision Making   Please see A&P for additional details of medical decision making.

## 2025-06-06 NOTE — OR NURSING
[place] hospitalist Dr. Darnell Knight to order patient's metoprolol. Per patient she takes 50 mg at 1800 and is concerned about taking her medication late.

## 2025-06-06 NOTE — PLAN OF CARE
Goal Outcome Evaluation:      Plan of Care Reviewed With: patient, spouse        Outcome Evaluation: Pt was unable to sleep, discussed the PRN medications available and their uses. 0.5 mg Ativan given, pt was able to sleep for several hours. Woke up to use the restroom, unable to fall back asleep. 0.5mg Ativan and a lozenge given-pt resting comfortably.

## 2025-06-06 NOTE — PROGRESS NOTES
RT saw the pt this evening about her BiPAP. She says that the BiPAP felt like a bit too much when she had last night and she does not want to use it now. No machine in the room. RT will follow up and will bring machine if needed.           Manan Desai, RT

## 2025-06-06 NOTE — PROGRESS NOTES
Two nurse head-to-toe skin assessment performed by Ellie HU RN and Arleen LATIF RN.  Skin issues noted: None. See PCS for further assessment and treatments.

## 2025-06-06 NOTE — PHARMACY-ADMISSION MEDICATION HISTORY
Pharmacy Intern Admission Medication History    Admission medication history is complete. The information provided in this note is only as accurate as the sources available at the time of the update.    Information Source(s): [facility]A    Pertinent Information: Patient discharged recently on 5/15. PTA reflects dispense report and chart review.     Changes made to PTA medication list:  Added: None  Deleted:   Guaifenesin-codeine 100-10 MG/5 mL solution: Take 5-10 mLs by mouth every 4 hours as needed for cough.  Duplicate therapy   Changed: None    Allergies reviewed with patient and updates made in EHR: yes    Medication History Completed By: Magdalena Campuzano 6/6/2025 9:01 AM    PTA Med List   Medication Sig Last Dose/Taking    Bacillus Coagulans-Inulin (PROBIOTIC FORMULA) 1-250 BILLION-MG CAPS Take by mouth every morning 25+billion CFUS Past Week    Cholecalciferol (VITAMIN D-3) 25 MCG (1000 UT) CAPS Take 1,000 Units by mouth every morning Past Week    clonazePAM (KLONOPIN) 0.5 MG tablet Take 1 tablet (0.5 mg) by mouth nightly as needed for anxiety or sleep. Unknown    coenzyme Q-10 capsule Take 1 capsule by mouth daily Past Week    famotidine (PEPCID) 20 MG tablet Take 1 tablet (20 mg) by mouth 2 times daily. Past Month    guaiFENesin-codeine (ROBITUSSIN AC) 100-10 MG/5ML solution Take 5-10 mLs by mouth every 4 hours as needed for cough. Past Week    levothyroxine (SYNTHROID/LEVOTHROID) 112 MCG tablet TAKE 1 TABLET (112 MCG) BY MOUTH DAILY BEFORE BREAKFAST 6/4/2025    MAGNESIUM OXIDE PO Take 400 mg by mouth daily 6/4/2025    metoprolol succinate ER (TOPROL XL) 50 MG 24 hr tablet Take 1 tablet (50 mg) by mouth daily. 6/5/2025 Morning    Omega-3 Fatty Acids (OMEGA-3 FISH OIL PO) Take 630 mg by mouth 2 times daily Past Week    Pectin Cit-Inos-C-Bioflav-Soy (MODIFIED CITRUS PECTIN PO) Take by mouth 2 times daily Past Month    UNABLE TO FIND Take by mouth daily. MEDICATION NAME: Nattokinase Past  Month    UNABLE TO FIND 500 mg MEDICATION NAME: Turmeric Past Month    UNABLE TO FIND 1,200 mg 2 times daily MEDICATION NAME: Efraín Past Week    UNABLE TO FIND Take 1 tablet by mouth every morning MEDICATION NAME: Dinndolylmethane Complex (DIM) 100mg tablet 1 table daily Past Week

## 2025-06-06 NOTE — PROGRESS NOTES
Care Management Discharge Note    Discharge Date: 06/07/2025       Discharge Disposition: Home    Discharge Services:  DME    Discharge DME: Oxygen    Discharge Transportation: family or friend will provide, agency    Private pay costs discussed: Not applicable    Does the patient's insurance plan have a 3 day qualifying hospital stay waiver?  No    PAS Confirmation Code:  n/a  Patient/family educated on Medicare website which has current facility and service quality ratings: no    Education Provided on the Discharge Plan: Yes  Persons Notified of Discharge Plans: patient  Patient/Family in Agreement with the Plan: yes    Handoff Referral Completed: Yes, [facility] PCP: Internal handoff referral completed    Additional Information:  Patient to discharge home with home O2 set up with ME as well as nebulizer machine order.     RNCC coordinating, got provider face to face note is in and orders are in, bedside nursing completing home O2 testing, [facility] already contacted, will deliver equipment prior to discharge.    Patient probable for home hospice in near future, SW sent referral to [facility] which is accepted, patient/family to work with hospice agency.    RNCC to sign off after discharge.     DME    [facility]  [facility] [address]   [address]  Phone: [phone]  Fax: [phone]    Vendor to supply--  Oxygen, nebulizer    ELMO Kearney, BA, RN, CMSRN  6C ([address]) Nurse Care Coordinator   [facility]  Phone: [phone]  Available on Vocera: Lebron Holden

## 2025-06-06 NOTE — PROGRESS NOTES
Admission          6/5/2025  9:29 AM  -----------------------------------------------------------  Reason for admission:  Primary team notified of pt arrival.  Admitted from: PACU  Via: stretcher  Accompanied by: family  Belongings: Placed in closet; valuables sent home with family  Admission Profile: complete  Teaching: orientation to unit and call light- call light within reach, call don't fall, use of console, meal times, when to call for the RN, and enforced importance of safety   Access: PIV  Telemetry:Placed on pt  Ht./Wt.: complete  Code Status verified on armband: yes  Suction/Ambu bag/Flowmeter at bedside: yes    Pt status:    Neuro: A&Ox4. Can make needs known.  Cardiac: Afib. VSS.   Respiratory: Sating >92% on RA - 2L NC.  GI/: Adequate urine output.  Activity:  SBA/Independent up to bathroom.  Pain: At acceptable level on current regimen. Denies  Skin: No new deficits noted.  LDA's: PIV    Plan: Continue to monitor and follow POC. Notify primary team with changes.

## 2025-06-06 NOTE — DISCHARGE SUMMARY
[facility]  Hospitalist Discharge Summary      Date of Admission:  6/5/2025  Date of Discharge:  6/6/2025  Discharging Provider: Debi Broussard PA-C  Discharge Service: Hospitalist Service, GOLD TEAM 6    Discharge Diagnoses   Chronic hypoxic respiratory failure secondary to metastatic mass in lungs  Status post bronchoscopy (6/5/2025)  Recently diagnosed SCC in the lung  Risk, s/p chemotherapy and lumpectomy as well as XRT  Cervical cancer s/p Keytruda and chemotherapy  Chronic cough    Clinically Significant Risk Factors          Follow-ups Needed After Discharge   Follow-up Appointments       Hospital Follow-up with Existing Primary Care Provider (PCP)      Follow up with your primary care provider as per your wishes    Schedule Primary Care visit within: 30 Days               Unresulted Labs Ordered in the Past 30 Days of this Admission       No orders found for last 31 day(s).            Discharge Disposition   Discharged to home with hospice  Condition at discharge: Stable    Hospital Course     Cordell Donaldson is a 75 year old female admitted on 6/5/2025. She has a past medical history of persistent atrial fibrillation and (not on anticoagulation), breast cancer status postlumpectomy and radiation (2003), cervical cancer status post Keytruda, carboplatin, Avastin and paclitaxel (2022) IDC of the left breast, and chronic cough secondary to a notable large lesion in the subcarinal region who recently was admitted for biopsy of this lung lesion that returned positive for non-small cell carcinoma consistent with squamous cell carcinoma.  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  She was then recommended for interventional pulmonology to do a bronchial compression from the metastatic mass in her lungs which she presented to have  done today.  Unfortunately during the procedure it had to be aborted as she developed severe hypoxia and the tumor burden was so advanced and no stent could be placed.  Family was called to the bedside and decided to have the patient extubated with plans to move forward with comfort cares as she is unable to tolerate this procedure and has significant ongoing hypoxia.  She was able to have her breathing stabilized with intermittent BiPAP and time to have family come see her. She currently is able to answer yes/no to questions and reports being in a small amount of pain and primarily is in shock at the rapid decline of her health.  She underwent a rigid bronchoscopy here with interventional pulmonology who noted that there was extensive bilateral endobronchial tumor involvement in both lungs especially the left mainstem NBI that were both completely occluded.  There is significant hypoxia down to 60% during the procedure as noted per the procedure note.  She was kept overnight postoperatively and palliative care as well as hospice care were consulted and met with the patient and her family.  Procedures were initiated to provide hospice cares at home.  She had a walk test that showed qualification for home O2 which was  procured prior to discharge.  She was discharged to home on 6/6/2025 with hospice process initiated    Consultations This Hospital Stay   PALLIATIVE CARE ADULT IP CONSULT  CARE MANAGEMENT / SOCIAL WORK IP CONSULT    Code Status   No CPR- Do NOT Intubate    Time Spent on this Encounter   I, Debi Broussard PA-C, personally saw the patient today and spent greater than 30 minutes discharging this patient.       Debi Broussard PA-C  [facility] [address] [address]  Phone: [phone]  ______________________________________________________________________    Physical Exam   Vital Signs: Temp: 97.9  F (36.6  C) Temp src: Oral BP: 126/69 Pulse: 107   Resp: 18  "SpO2: 97 % O2 Device: None (Room air) Oxygen Delivery: 2 LPM  Weight: 123 lbs 9.6 oz  /69 (BP Location: Left arm)   Pulse 107   Temp 97.9  F (36.6  C) (Oral)   Resp 18   Ht 1.588 m (5' 2.5\")   Wt 56.1 kg (123 lb 9.6 oz)   LMP  (LMP Unknown)   SpO2 97%   BMI 22.25 kg/m    Generalized appearance: A&O, NAD, sitting upright in bed  HEENT: NC, AT, pupils equal and round, sclera anicteric  PULM:  non-labored breathing on 2L NC  SKIN: CDI, noncyanotic, anicteric  Psych: Mood and affect appropriate         Primary Care Physician   Merry Lino    Discharge Orders      Reason for your hospital stay    Bronchoscopy procedure and post-procedure monitoring, arrangement of home cares     Activity    Your activity upon discharge: activity as tolerated and no driving     When to contact your care team    You have been accepted by hospice care who will be able to guide you with ongoing cares     Nebulizer and Supplies Order    Nebulizer Documentation  I attest that I have seen and evaluated Cordell Donaldson. She has a diagnosis of J47.9 - Bronchiectasis, uncomplicated and a nebulizer machine is needed to administer medication to improve breathing passages.     I, the undersigned, certify that the above prescribed supplies are medically necessary for this patient and is both reasonable and necessary in reference to accepted standards of medical and necessary in reference to accepted standards of medical practice in the treatment of this patient's condition and is not prescribed as a convenience.     Oxygen Order    Oxygen Documentation  I certify that this patient, Cordell Donaldson has been under my care (or a nurse practitioner or physican's assistant working with me). This is the face-to-face encounter for oxygen medical necessity.      At the time of this encounter, I have reviewed the qualifying testing and have determined that supplemental oxygen is reasonable and necessary and is expected to improve " the patient's condition in a home setting.         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    If portability is ordered, is the patient mobile within the home? yes    Was this visit performed as a telehealth visit: No     Diet    Follow this diet upon discharge: Regular     Hospital Follow-up with Existing Primary Care Provider (PCP)    Follow up with your primary care provider as per your wishes       Significant Results and Procedures   Most Recent 3 CBC's:  Recent Labs   Lab Test 05/15/25  0626 05/14/25  1521 03/07/25  1217   WBC 10.6 11.4* 4.2   HGB 9.6* 9.8* 12.2   MCV 78 76* 84    386 252     Most Recent 3 BMP's:  Recent Labs   Lab Test 05/27/25  0825 05/15/25  0626 05/14/25  1521 08/30/23  1108   NA  --  130* 129* 139   POTASSIUM  --  4.2 4.3 4.8   CHLORIDE  --  97* 94* 102   CO2  --  23 22 26   BUN  --  5.8* 7.1* 21.8   CR  --  0.66 0.71 0.82   ANIONGAP  --  10 13 11   LINDA  --  9.1 9.7 9.7   * 112* 105* 93     Most Recent 2 LFT's:  Recent Labs   Lab Test 05/15/25  0626 05/14/25  1521   AST 18 17   ALT 9 11   ALKPHOS 100 108   BILITOTAL 0.3 0.4   ,   Results for orders placed or performed during the hospital encounter of 05/29/25   MR Brain w/o & w Contrast    Narrative    EXAM: MR BRAIN W/O and W CONTRAST  LOCATION: [facility]  DATE: 5/29/2025    INDICATION: Headache. Intracranial metastasis evaluation.  COMPARISON: None available at time of dictation.  CONTRAST: 6 mL Gadavist  TECHNIQUE: Routine multiplanar multisequence head MRI without and with intravenous contrast.    FINDINGS:  INTRACRANIAL CONTENTS: No acute or subacute infarct. No mass, acute hemorrhage, or extra-axial fluid collections. Scattered nonspecific T2/FLAIR hyperintensities within the cerebral white matter most consistent with mild chronic microvascular ischemic   change. Foci of presumed chronic  microhemorrhage involving the right temporal lobe and left posterior frontal lobe. No pathologic enhancement. Mild generalized cerebral volume loss. No hydrocephalus. Normal position of the cerebellar tonsils.    SELLA: No abnormality accounting for technique.    OSSEOUS STRUCTURES/SOFT TISSUES: Normal marrow signal. The major intracranial vascular flow voids are maintained.     ORBITS: No abnormality accounting for technique.     SINUSES/MASTOIDS: No paranasal sinus mucosal disease. Scattered fluid/membrane thickening in the right mastoid air cells. No apparent mass in the posterior nasopharynx or skull base.       Impression    IMPRESSION:    1.  No intracranial metastasis       Discharge Medications   Current Discharge Medication List        START taking these medications    Details   atropine 1 % ophthalmic solution Place 2 drops under the tongue every 4 hours as needed for secretions.  Qty: 15 mL, Refills: 1    Associated Diagnoses: Acute respiratory failure with hypoxia (H)      bisacodyl (DULCOLAX) 10 MG suppository Place 1 suppository (10 mg) rectally once as needed for other (for no bowel movement for 72 hours).  Qty: 3 suppository, Refills: 0    Associated Diagnoses: Slow transit constipation      carboxymethylcellulose PF (REFRESH PLUS) 0.5 % ophthalmic solution Place 1-2 drops into both eyes every hour as needed for dry eyes.  Qty: 30 each, Refills: 1    Associated Diagnoses: Acute respiratory failure with hypoxia (H); Anxiety      LORazepam (ATIVAN) 0.5 MG tablet Take 1 tablet (0.5 mg) by mouth every 4 hours as needed for anxiety.  Qty: 15 tablet, Refills: 0    Associated Diagnoses: Acute respiratory failure with hypoxia (H); Anxiety      morphine 10 MG/5ML solution Take 1.25-2.5 mLs (2.5-5 mg) by mouth every 2 hours as needed for moderate to severe pain (as needed for air hunger).  Qty: 100 mL, Refills: 0    Associated Diagnoses: Acute respiratory failure with hypoxia (H); Atrial fibrillation,  unspecified type (H)      ondansetron (ZOFRAN ODT) 4 MG ODT tab Take 1 tablet (4 mg) by mouth every 6 hours as needed for nausea or vomiting.  Qty: 30 tablet, Refills: 0    Associated Diagnoses: Malignant neoplasm of left breast in female, estrogen receptor positive, unspecified site of breast (H)           CONTINUE these medications which have CHANGED    Details   clonazePAM (KLONOPIN) 0.5 MG tablet Take 1 tablet (0.5 mg) by mouth nightly as needed for anxiety or sleep.    Associated Diagnoses: Anxiety; Adjustment insomnia      famotidine (PEPCID) 20 MG tablet Take 1 tablet (20 mg) by mouth 2 times daily as needed (indegestion).  Qty: 60 tablet, Refills: 2    Associated Diagnoses: Dysphagia, unspecified type      magnesium oxide 400 MG tablet Optional to take 1 tab po daily if preferred    Associated Diagnoses: Slow transit constipation; Atrial fibrillation, unspecified type (H)      polyethylene glycol (MIRALAX) 17 GM/Dose powder Ok to continue 17g by mouth daily if preferred    Associated Diagnoses: Slow transit constipation           CONTINUE these medications which have NOT CHANGED    Details   coenzyme Q-10 capsule Take 1 capsule by mouth daily      levothyroxine (SYNTHROID/LEVOTHROID) 112 MCG tablet TAKE 1 TABLET (112 MCG) BY MOUTH DAILY BEFORE BREAKFAST  Qty: 90 tablet, Refills: 3    Associated Diagnoses: Other specified hypothyroidism      metoprolol succinate ER (TOPROL XL) 50 MG 24 hr tablet Take 1 tablet (50 mg) by mouth daily.  Qty: 90 tablet, Refills: 3      Omega-3 Fatty Acids (OMEGA-3 FISH OIL PO) Take 630 mg by mouth 2 times daily      Pectin Cit-Inos-C-Bioflav-Soy (MODIFIED CITRUS PECTIN PO) Take by mouth 2 times daily           STOP taking these medications       Bacillus Coagulans-Inulin (PROBIOTIC FORMULA) 1-250 BILLION-MG CAPS Comments:   Reason for Stopping:         Cholecalciferol (VITAMIN D-3) 25 MCG (1000 UT) CAPS Comments:   Reason for Stopping:         guaiFENesin-codeine (ROBITUSSIN AC)  100-10 MG/5ML solution Comments:   Reason for Stopping:         OVER-THE-COUNTER Comments:   Reason for Stopping:         UNABLE TO FIND Comments:   Reason for Stopping:         UNABLE TO FIND Comments:   Reason for Stopping:         UNABLE TO FIND Comments:   Reason for Stopping:         UNABLE TO FIND Comments:   Reason for Stopping:             Allergies   No Known Allergies

## 2025-06-07 PROBLEM — C78.01 MALIGNANT NEOPLASM METASTATIC TO BOTH LUNGS (H): Status: ACTIVE | Noted: 2025-01-01

## 2025-06-07 PROBLEM — C78.02 MALIGNANT NEOPLASM METASTATIC TO BOTH LUNGS (H): Status: ACTIVE | Noted: 2025-01-01

## 2025-06-07 NOTE — PROGRESS NOTES
Needs hospice paperwork completed and faxed to [facility] on Mon 6/9    Merry Lino MD  Internal Medicine/Pediatrics

## 2025-06-07 NOTE — TELEPHONE ENCOUNTER
"Patient discharged fm [facility] yesterday on CMO with plans for Hospice Enrollment. Formal enrollment not planned until 6/8 at 10AM.    Received telephone call from  reporting hallucinations and an anxiety attack, and he was concerned it was related to the ativan.    He reports that initially in the hospital, Ativan had helped her sleep.  She got a dose at 10 PM last night.  At 4:30am, he states she was up hallucinating and agitated.  They got her downstairs and settled on couch, and she fell back asleep.    Currently she's not anxious or hallucinating.  She does have labored breathing.  Told him she feels things are \"progressing fast\".      Says he's now thinking that they might want to move to [facility] as their hospice agency since there would be an inpatient facility if needed.  They would not be able to process the referral, if one could be placed, until 6/9.  Discussed that on the weekend, this sort of transition would be really difficult, and my recommendation would be to enroll with [facility] tomorrow, get settled in, and see how things go from there.  He was in agreement.    Given her breathing is more labored right now, I did recommend giving her a dose of the morphine.  Advised to at least at minimum assess her symptoms every 4 hours, but recognize they can give repeat doses every 2 hours as needed for shortness of breath.    "

## 2025-06-12 ENCOUNTER — DOCUMENTATION ONLY (OUTPATIENT)
Dept: OTHER | Facility: CLINIC | Age: 75
End: 2025-06-12
Payer: COMMERCIAL

## (undated) DEVICE — PREP SCRUB SOL EXIDINE 4% CHG 4OZ 29002-404

## (undated) DEVICE — LIGHT HANDLE X1 31140133

## (undated) DEVICE — ADH SKIN CLOSURE PREMIERPRO EXOFIN 1.0ML 3470

## (undated) DEVICE — SU MONOCRYL 4-0 P-3 18" UND Y494G

## (undated) DEVICE — CATH FOLEY 24FR 5ML SILICONE LUBRI-SIL 175824

## (undated) DEVICE — COVER CAMERA IN-LIGHT DISP LT-C02

## (undated) DEVICE — ENDO VALVE SUCTION BRONCH EVIS MAJ-209

## (undated) DEVICE — DRAPE MAYO STAND 23X54 8337

## (undated) DEVICE — GLOVE BIOGEL PI MICRO SZ 6.0 48560

## (undated) DEVICE — PAD CHUX UNDERPAD 23X24" 7136

## (undated) DEVICE — SOL NACL 0.9% IRRIG 1000ML BOTTLE 2F7124

## (undated) DEVICE — ANTIFOG SOLUTION W/FOAM PAD 31142527

## (undated) DEVICE — PITCHER STERILE 1000ML  SSK9004A

## (undated) DEVICE — PACK GOWN 3/PK DISP XL SBA32GPFCB

## (undated) DEVICE — SU VICRYL 4-0 P-3 18" UND  J494H

## (undated) DEVICE — KNIFE HANDLE W/15 BLADE 371615

## (undated) DEVICE — ADH LIQUID MASTISOL TOPICAL VIAL 2-3ML 0523-48

## (undated) DEVICE — SYR 30ML SLIP TIP W/O NDL 302833

## (undated) DEVICE — DECANTER BAG 2002S

## (undated) DEVICE — CATH PLUG W/CAP 000076

## (undated) DEVICE — TUBING SUCTION 10'X3/16" N510

## (undated) DEVICE — GOWN XLG DISP 9545

## (undated) DEVICE — SUCTION MANIFOLD NEPTUNE 2 SYS 4 PORT 0702-020-000

## (undated) DEVICE — NDL BLUNT 18GA 1" W/O FILTER 305181

## (undated) DEVICE — DRSG ULCER ALGINATE COMFEEL 8X8" LF 33120

## (undated) DEVICE — LABEL MEDICATION SYSTEM 3303-P

## (undated) DEVICE — SYR 10ML SLIP TIP W/O NDL 303134

## (undated) DEVICE — KIT INTRODUCER FLUENT MICRO 5FRX10CM ECHO TIP KIT-038-04

## (undated) DEVICE — PACK SET-UP STD 9102

## (undated) DEVICE — ENDO VALVE BX EVIS MAJ-210

## (undated) DEVICE — KIT ENDO FIRST STEP DISINFECTANT 200ML W/POUCH EP-4

## (undated) DEVICE — GLOVE BIOGEL PI ULTRATOUCH G SZ 8.0 42180

## (undated) DEVICE — PACK CENTRAL LINE INSERTION SAN32CLFCG

## (undated) DEVICE — SOL NACL 0.9% INJ 250ML BAG 2B1322Q

## (undated) DEVICE — Device

## (undated) DEVICE — BASIN EMESIS STERILE  SSK9005A

## (undated) DEVICE — SPONGE RAY-TEC 4X8" 7318

## (undated) DEVICE — DRSG KERLIX 4 1/2"X4YDS ROLL 6715

## (undated) DEVICE — SYR PISTON URETHRAL 60ML 68000

## (undated) DEVICE — DRAPE LEGGINGS CLEAR 8430

## (undated) DEVICE — SPONGE PACK VAGINAL 2"X9

## (undated) DEVICE — LUBRICANT INST KIT ENDO-LUBE 220-90

## (undated) DEVICE — SU ETHIBOND 0 CT-2 30" X412H

## (undated) DEVICE — LINEN TOWEL PACK X5 5464

## (undated) DEVICE — COVER ULTRASOUND PROBE W/GEL FLEXI-FEEL 6"X58" LF  25-FF658

## (undated) DEVICE — JELLY LUBRICATING SURGILUBE 2OZ TUBE

## (undated) DEVICE — BAND ELASTIC #18 LATEX FREE 14102-100

## (undated) DEVICE — CUP AND LID 2PK 2OZ STERILE  SSK9006A

## (undated) DEVICE — LINEN GOWN XLG 5407

## (undated) DEVICE — CATH TRAY FOLEY SURESTEP 16FR W/URNE MTR STLK LATEX A303316A

## (undated) DEVICE — SOL ISOPROPYL RUBBING ALCOHOL USP 70% 4OZ HDX-20 I0020

## (undated) DEVICE — GLOVE PROTEXIS POWDER FREE SMT 8.0  2D72PT80X

## (undated) DEVICE — ENDO VALVE SUCTION ULTRASOUND BRONCH MAJ-1414

## (undated) DEVICE — SOL WATER IRRIG 1000ML BOTTLE 2F7114

## (undated) DEVICE — CONTAINER SPECIMEN 4OZ STERILE 17099

## (undated) DEVICE — NDL COUNTER 20CT 31142493

## (undated) DEVICE — CONNECTOR MALE TO MALE LL

## (undated) DEVICE — PREP SKIN SCRUB TRAY 4461A

## (undated) DEVICE — DRSG ABDOMINAL 07 1/2X8" 7197D

## (undated) RX ORDER — FENTANYL CITRATE 50 UG/ML
INJECTION, SOLUTION INTRAMUSCULAR; INTRAVENOUS
Status: DISPENSED
Start: 2023-02-13

## (undated) RX ORDER — IOPAMIDOL 755 MG/ML
INJECTION, SOLUTION INTRAVASCULAR
Status: DISPENSED
Start: 2025-06-05

## (undated) RX ORDER — DEXAMETHASONE SODIUM PHOSPHATE 4 MG/ML
INJECTION, SOLUTION INTRA-ARTICULAR; INTRALESIONAL; INTRAMUSCULAR; INTRAVENOUS; SOFT TISSUE
Status: DISPENSED
Start: 2023-02-13

## (undated) RX ORDER — REGADENOSON 0.08 MG/ML
INJECTION, SOLUTION INTRAVENOUS
Status: DISPENSED
Start: 2022-12-08

## (undated) RX ORDER — ACETAMINOPHEN 325 MG/1
TABLET ORAL
Status: DISPENSED
Start: 2023-03-30

## (undated) RX ORDER — FENTANYL CITRATE-0.9 % NACL/PF 10 MCG/ML
PLASTIC BAG, INJECTION (ML) INTRAVENOUS
Status: DISPENSED
Start: 2023-02-15

## (undated) RX ORDER — PHENAZOPYRIDINE HYDROCHLORIDE 200 MG/1
TABLET, FILM COATED ORAL
Status: DISPENSED
Start: 2023-02-13

## (undated) RX ORDER — LIDOCAINE HYDROCHLORIDE 10 MG/ML
INJECTION, SOLUTION EPIDURAL; INFILTRATION; INTRACAUDAL; PERINEURAL
Status: DISPENSED
Start: 2022-07-18

## (undated) RX ORDER — FENTANYL CITRATE 50 UG/ML
INJECTION, SOLUTION INTRAMUSCULAR; INTRAVENOUS
Status: DISPENSED
Start: 2025-05-15

## (undated) RX ORDER — FENTANYL CITRATE-0.9 % NACL/PF 10 MCG/ML
PLASTIC BAG, INJECTION (ML) INTRAVENOUS
Status: DISPENSED
Start: 2023-02-13

## (undated) RX ORDER — FENTANYL CITRATE 50 UG/ML
INJECTION, SOLUTION INTRAMUSCULAR; INTRAVENOUS
Status: DISPENSED
Start: 2025-06-05

## (undated) RX ORDER — AMINOPHYLLINE 25 MG/ML
INJECTION, SOLUTION INTRAVENOUS
Status: DISPENSED
Start: 2022-12-08

## (undated) RX ORDER — LIDOCAINE HYDROCHLORIDE 10 MG/ML
INJECTION, SOLUTION EPIDURAL; INFILTRATION; INTRACAUDAL; PERINEURAL
Status: DISPENSED
Start: 2025-05-15

## (undated) RX ORDER — PROPOFOL 10 MG/ML
INJECTION, EMULSION INTRAVENOUS
Status: DISPENSED
Start: 2023-02-13

## (undated) RX ORDER — HEPARIN SODIUM,PORCINE 10 UNIT/ML
VIAL (ML) INTRAVENOUS
Status: DISPENSED
Start: 2022-07-18

## (undated) RX ORDER — ONDANSETRON 2 MG/ML
INJECTION INTRAMUSCULAR; INTRAVENOUS
Status: DISPENSED
Start: 2023-02-13

## (undated) RX ORDER — HEPARIN SODIUM (PORCINE) LOCK FLUSH IV SOLN 100 UNIT/ML 100 UNIT/ML
SOLUTION INTRAVENOUS
Status: DISPENSED
Start: 2022-07-18

## (undated) RX ORDER — METOPROLOL TARTRATE 1 MG/ML
INJECTION, SOLUTION INTRAVENOUS
Status: DISPENSED
Start: 2025-06-05

## (undated) RX ORDER — ONDANSETRON 2 MG/ML
INJECTION INTRAMUSCULAR; INTRAVENOUS
Status: DISPENSED
Start: 2023-02-15

## (undated) RX ORDER — EPHEDRINE SULFATE 50 MG/ML
INJECTION, SOLUTION INTRAMUSCULAR; INTRAVENOUS; SUBCUTANEOUS
Status: DISPENSED
Start: 2023-02-13

## (undated) RX ORDER — HEPARIN SODIUM (PORCINE) LOCK FLUSH IV SOLN 100 UNIT/ML 100 UNIT/ML
SOLUTION INTRAVENOUS
Status: DISPENSED
Start: 2023-02-09

## (undated) RX ORDER — ACETAMINOPHEN 325 MG/1
TABLET ORAL
Status: DISPENSED
Start: 2022-07-18

## (undated) RX ORDER — FENTANYL CITRATE-0.9 % NACL/PF 10 MCG/ML
PLASTIC BAG, INJECTION (ML) INTRAVENOUS
Status: DISPENSED
Start: 2025-06-05

## (undated) RX ORDER — HEPARIN SODIUM (PORCINE) LOCK FLUSH IV SOLN 100 UNIT/ML 100 UNIT/ML
SOLUTION INTRAVENOUS
Status: DISPENSED
Start: 2022-12-16

## (undated) RX ORDER — METOPROLOL TARTRATE 50 MG
TABLET ORAL
Status: DISPENSED
Start: 2025-06-05

## (undated) RX ORDER — LIDOCAINE HYDROCHLORIDE AND EPINEPHRINE 10; 10 MG/ML; UG/ML
INJECTION, SOLUTION INFILTRATION; PERINEURAL
Status: DISPENSED
Start: 2025-05-15

## (undated) RX ORDER — GABAPENTIN 100 MG/1
CAPSULE ORAL
Status: DISPENSED
Start: 2022-07-18